# Patient Record
Sex: FEMALE | Race: WHITE | Employment: OTHER | URBAN - METROPOLITAN AREA
[De-identification: names, ages, dates, MRNs, and addresses within clinical notes are randomized per-mention and may not be internally consistent; named-entity substitution may affect disease eponyms.]

---

## 2017-01-12 RX ORDER — CITALOPRAM 20 MG/1
TABLET ORAL
Qty: 90 TABLET | Refills: 1 | Status: SHIPPED | OUTPATIENT
Start: 2017-01-12 | End: 2017-07-11 | Stop reason: SDUPTHER

## 2017-01-20 RX ORDER — IBUPROFEN 600 MG/1
TABLET ORAL
Qty: 270 TABLET | Refills: 0 | Status: SHIPPED | OUTPATIENT
Start: 2017-01-20 | End: 2017-04-18 | Stop reason: SDUPTHER

## 2017-02-18 DIAGNOSIS — I10 UNSPECIFIED ESSENTIAL HYPERTENSION: ICD-10-CM

## 2017-02-20 RX ORDER — AMLODIPINE BESYLATE 5 MG/1
TABLET ORAL
Qty: 90 TABLET | Refills: 0 | Status: SHIPPED | OUTPATIENT
Start: 2017-02-20 | End: 2017-05-19 | Stop reason: SDUPTHER

## 2017-03-21 ENCOUNTER — OFFICE VISIT (OUTPATIENT)
Dept: FAMILY MEDICINE CLINIC | Age: 47
End: 2017-03-21
Payer: COMMERCIAL

## 2017-03-21 VITALS
DIASTOLIC BLOOD PRESSURE: 68 MMHG | BODY MASS INDEX: 44.57 KG/M2 | SYSTOLIC BLOOD PRESSURE: 128 MMHG | WEIGHT: 227 LBS | HEIGHT: 60 IN | HEART RATE: 86 BPM | TEMPERATURE: 98.9 F

## 2017-03-21 DIAGNOSIS — K52.9 GASTROENTERITIS: Primary | ICD-10-CM

## 2017-03-21 DIAGNOSIS — R10.12 LEFT UPPER QUADRANT PAIN: ICD-10-CM

## 2017-03-21 PROCEDURE — 99213 OFFICE O/P EST LOW 20 MIN: CPT | Performed by: FAMILY MEDICINE

## 2017-03-21 ASSESSMENT — ENCOUNTER SYMPTOMS
ABDOMINAL PAIN: 1
CONSTIPATION: 0
SHORTNESS OF BREATH: 0
NAUSEA: 1
DIARRHEA: 1
VOMITING: 1
SORE THROAT: 0

## 2017-03-21 ASSESSMENT — PATIENT HEALTH QUESTIONNAIRE - PHQ9
2. FEELING DOWN, DEPRESSED OR HOPELESS: 0
SUM OF ALL RESPONSES TO PHQ QUESTIONS 1-9: 0
SUM OF ALL RESPONSES TO PHQ9 QUESTIONS 1 & 2: 0
1. LITTLE INTEREST OR PLEASURE IN DOING THINGS: 0

## 2017-03-29 ENCOUNTER — HOSPITAL ENCOUNTER (OUTPATIENT)
Dept: PAIN MANAGEMENT | Age: 47
Discharge: HOME | End: 2017-03-29
Payer: COMMERCIAL

## 2017-03-29 DIAGNOSIS — M47.26 OSTEOARTHRITIS OF SPINE WITH RADICULOPATHY, LUMBAR REGION: ICD-10-CM

## 2017-03-29 DIAGNOSIS — M70.72 HIP BURSITIS, LEFT: ICD-10-CM

## 2017-03-29 DIAGNOSIS — M16.12 PRIMARY OSTEOARTHRITIS OF LEFT HIP: ICD-10-CM

## 2017-03-29 DIAGNOSIS — M54.42 CHRONIC LEFT-SIDED LOW BACK PAIN WITH LEFT-SIDED SCIATICA: ICD-10-CM

## 2017-03-29 DIAGNOSIS — M51.36 DEGENERATION OF LUMBAR INTERVERTEBRAL DISC: ICD-10-CM

## 2017-03-29 DIAGNOSIS — M50.20 CERVICAL DISC HERNIATION: ICD-10-CM

## 2017-03-29 DIAGNOSIS — M47.816 ARTHROPATHY OF LUMBAR FACET JOINT: ICD-10-CM

## 2017-03-29 DIAGNOSIS — M54.16 LUMBAR RADICULOPATHY, CHRONIC: Primary | ICD-10-CM

## 2017-03-29 DIAGNOSIS — M46.1 SACROILIITIS (HCC): ICD-10-CM

## 2017-03-29 DIAGNOSIS — M50.30 DEGENERATIVE DISC DISEASE, CERVICAL: ICD-10-CM

## 2017-03-29 DIAGNOSIS — M47.896 OTHER OSTEOARTHRITIS OF SPINE, LUMBAR REGION: ICD-10-CM

## 2017-03-29 DIAGNOSIS — G89.29 CHRONIC LEFT-SIDED LOW BACK PAIN WITH LEFT-SIDED SCIATICA: ICD-10-CM

## 2017-03-29 DIAGNOSIS — M47.816 OSTEOARTHRITIS OF LUMBAR SPINE, UNSPECIFIED SPINAL OSTEOARTHRITIS COMPLICATION STATUS: ICD-10-CM

## 2017-03-29 PROCEDURE — 99213 OFFICE O/P EST LOW 20 MIN: CPT | Performed by: NURSE PRACTITIONER

## 2017-03-29 PROCEDURE — 99213 OFFICE O/P EST LOW 20 MIN: CPT

## 2017-03-29 RX ORDER — HYDROCODONE BITARTRATE AND ACETAMINOPHEN 5; 325 MG/1; MG/1
1 TABLET ORAL NIGHTLY PRN
Qty: 30 TABLET | Refills: 0 | Status: SHIPPED | OUTPATIENT
Start: 2017-04-02 | End: 2017-05-02 | Stop reason: SDUPTHER

## 2017-03-29 RX ORDER — HYDROCODONE BITARTRATE AND ACETAMINOPHEN 5; 325 MG/1; MG/1
1 TABLET ORAL NIGHTLY PRN
Qty: 30 TABLET | Refills: 0 | Status: SHIPPED | OUTPATIENT
Start: 2017-03-29 | End: 2017-03-29 | Stop reason: SDUPTHER

## 2017-04-06 RX ORDER — TOPIRAMATE 100 MG/1
TABLET, FILM COATED ORAL
Qty: 270 TABLET | Refills: 0 | Status: SHIPPED | OUTPATIENT
Start: 2017-04-06 | End: 2017-07-05 | Stop reason: SDUPTHER

## 2017-04-13 RX ORDER — TIZANIDINE 4 MG/1
4 TABLET ORAL 3 TIMES DAILY
Qty: 270 TABLET | Refills: 0 | Status: SHIPPED | OUTPATIENT
Start: 2017-05-04 | End: 2017-07-24 | Stop reason: SDUPTHER

## 2017-04-18 RX ORDER — IBUPROFEN 600 MG/1
TABLET ORAL
Qty: 270 TABLET | Refills: 0 | Status: SHIPPED | OUTPATIENT
Start: 2017-04-18 | End: 2017-07-17 | Stop reason: SDUPTHER

## 2017-05-02 ENCOUNTER — HOSPITAL ENCOUNTER (OUTPATIENT)
Dept: PAIN MANAGEMENT | Age: 47
Discharge: HOME | End: 2017-05-02
Payer: COMMERCIAL

## 2017-05-02 DIAGNOSIS — M47.26 OSTEOARTHRITIS OF SPINE WITH RADICULOPATHY, LUMBAR REGION: ICD-10-CM

## 2017-05-02 DIAGNOSIS — M47.896 OTHER OSTEOARTHRITIS OF SPINE, LUMBAR REGION: ICD-10-CM

## 2017-05-02 DIAGNOSIS — M16.12 PRIMARY OSTEOARTHRITIS OF LEFT HIP: ICD-10-CM

## 2017-05-02 DIAGNOSIS — M47.816 ARTHROPATHY OF LUMBAR FACET JOINT: ICD-10-CM

## 2017-05-02 DIAGNOSIS — M54.42 CHRONIC LEFT-SIDED LOW BACK PAIN WITH LEFT-SIDED SCIATICA: ICD-10-CM

## 2017-05-02 DIAGNOSIS — M50.30 DEGENERATIVE DISC DISEASE, CERVICAL: ICD-10-CM

## 2017-05-02 DIAGNOSIS — M50.20 CERVICAL DISC HERNIATION: ICD-10-CM

## 2017-05-02 DIAGNOSIS — G89.29 CHRONIC LEFT-SIDED LOW BACK PAIN WITH LEFT-SIDED SCIATICA: ICD-10-CM

## 2017-05-02 DIAGNOSIS — M51.36 DEGENERATION OF LUMBAR INTERVERTEBRAL DISC: ICD-10-CM

## 2017-05-02 DIAGNOSIS — M70.72 HIP BURSITIS, LEFT: ICD-10-CM

## 2017-05-02 DIAGNOSIS — M54.16 LUMBAR RADICULOPATHY, CHRONIC: Primary | ICD-10-CM

## 2017-05-02 PROCEDURE — 99213 OFFICE O/P EST LOW 20 MIN: CPT

## 2017-05-02 PROCEDURE — 99213 OFFICE O/P EST LOW 20 MIN: CPT | Performed by: NURSE PRACTITIONER

## 2017-05-02 RX ORDER — HYDROCODONE BITARTRATE AND ACETAMINOPHEN 5; 325 MG/1; MG/1
1 TABLET ORAL NIGHTLY PRN
Qty: 30 TABLET | Refills: 0 | Status: SHIPPED | OUTPATIENT
Start: 2017-05-02 | End: 2017-05-02 | Stop reason: SDUPTHER

## 2017-05-02 RX ORDER — MAGNESIUM OXIDE 500 MG
TABLET ORAL PRN
Status: ON HOLD | COMMUNITY
End: 2021-03-04 | Stop reason: ALTCHOICE

## 2017-05-02 RX ORDER — HYDROCODONE BITARTRATE AND ACETAMINOPHEN 5; 325 MG/1; MG/1
1 TABLET ORAL NIGHTLY PRN
Qty: 30 TABLET | Refills: 0 | Status: SHIPPED | OUTPATIENT
Start: 2017-05-14 | End: 2017-06-08 | Stop reason: SDUPTHER

## 2017-05-19 DIAGNOSIS — I10 UNSPECIFIED ESSENTIAL HYPERTENSION: ICD-10-CM

## 2017-05-19 RX ORDER — AMLODIPINE BESYLATE 5 MG/1
TABLET ORAL
Qty: 90 TABLET | Refills: 1 | Status: SHIPPED | OUTPATIENT
Start: 2017-05-19 | End: 2017-10-12 | Stop reason: SDUPTHER

## 2017-06-08 ENCOUNTER — HOSPITAL ENCOUNTER (OUTPATIENT)
Dept: PAIN MANAGEMENT | Age: 47
Discharge: HOME | End: 2017-06-08
Payer: COMMERCIAL

## 2017-06-08 VITALS
WEIGHT: 234 LBS | SYSTOLIC BLOOD PRESSURE: 144 MMHG | TEMPERATURE: 98.1 F | HEART RATE: 71 BPM | RESPIRATION RATE: 18 BRPM | OXYGEN SATURATION: 96 % | BODY MASS INDEX: 45.94 KG/M2 | HEIGHT: 60 IN | DIASTOLIC BLOOD PRESSURE: 84 MMHG

## 2017-06-08 DIAGNOSIS — M51.36 DEGENERATION OF LUMBAR INTERVERTEBRAL DISC: ICD-10-CM

## 2017-06-08 DIAGNOSIS — M47.816 ARTHROPATHY OF LUMBAR FACET JOINT: ICD-10-CM

## 2017-06-08 DIAGNOSIS — M54.16 LUMBAR RADICULOPATHY, CHRONIC: ICD-10-CM

## 2017-06-08 DIAGNOSIS — Z79.899 ENCOUNTER FOR MEDICATION MANAGEMENT: ICD-10-CM

## 2017-06-08 DIAGNOSIS — M47.896 OTHER OSTEOARTHRITIS OF SPINE, LUMBAR REGION: ICD-10-CM

## 2017-06-08 DIAGNOSIS — M47.26 OSTEOARTHRITIS OF SPINE WITH RADICULOPATHY, LUMBAR REGION: Primary | ICD-10-CM

## 2017-06-08 DIAGNOSIS — M50.20 CERVICAL DISC HERNIATION: ICD-10-CM

## 2017-06-08 DIAGNOSIS — M46.1 SACROILIITIS (HCC): ICD-10-CM

## 2017-06-08 DIAGNOSIS — M54.42 CHRONIC LEFT-SIDED LOW BACK PAIN WITH LEFT-SIDED SCIATICA: ICD-10-CM

## 2017-06-08 DIAGNOSIS — G89.29 CHRONIC LEFT-SIDED LOW BACK PAIN WITH LEFT-SIDED SCIATICA: ICD-10-CM

## 2017-06-08 DIAGNOSIS — M16.12 PRIMARY OSTEOARTHRITIS OF LEFT HIP: ICD-10-CM

## 2017-06-08 DIAGNOSIS — M50.30 DEGENERATIVE DISC DISEASE, CERVICAL: ICD-10-CM

## 2017-06-08 DIAGNOSIS — M47.816 OSTEOARTHRITIS OF LUMBAR SPINE, UNSPECIFIED SPINAL OSTEOARTHRITIS COMPLICATION STATUS: ICD-10-CM

## 2017-06-08 DIAGNOSIS — M70.72 HIP BURSITIS, LEFT: ICD-10-CM

## 2017-06-08 PROCEDURE — 99214 OFFICE O/P EST MOD 30 MIN: CPT | Performed by: PAIN MEDICINE

## 2017-06-08 PROCEDURE — 80307 DRUG TEST PRSMV CHEM ANLYZR: CPT

## 2017-06-08 PROCEDURE — 99213 OFFICE O/P EST LOW 20 MIN: CPT

## 2017-06-08 RX ORDER — HYDROCODONE BITARTRATE AND ACETAMINOPHEN 5; 325 MG/1; MG/1
1 TABLET ORAL NIGHTLY PRN
Qty: 30 TABLET | Refills: 0 | Status: SHIPPED | OUTPATIENT
Start: 2017-06-08 | End: 2017-07-24 | Stop reason: SDUPTHER

## 2017-06-08 ASSESSMENT — ENCOUNTER SYMPTOMS
BACK PAIN: 1
COUGH: 0
HEARTBURN: 0
ABDOMINAL PAIN: 0
BOWEL INCONTINENCE: 0
BLURRED VISION: 0
NAUSEA: 0
RESPIRATORY NEGATIVE: 1
EYES NEGATIVE: 1
GASTROINTESTINAL NEGATIVE: 1
PHOTOPHOBIA: 0
HEMOPTYSIS: 0
VOMITING: 0

## 2017-06-08 ASSESSMENT — PAIN DESCRIPTION - FREQUENCY: FREQUENCY: CONTINUOUS

## 2017-06-08 ASSESSMENT — PAIN DESCRIPTION - DESCRIPTORS: DESCRIPTORS: ACHING;SHARP;BURNING

## 2017-06-08 ASSESSMENT — PAIN DESCRIPTION - ORIENTATION: ORIENTATION: RIGHT;LEFT

## 2017-06-08 ASSESSMENT — PAIN DESCRIPTION - PAIN TYPE: TYPE: CHRONIC PAIN

## 2017-06-08 ASSESSMENT — PAIN DESCRIPTION - DIRECTION: RADIATING_TOWARDS: LEFT HIP AND KNEE

## 2017-06-08 ASSESSMENT — PAIN DESCRIPTION - LOCATION: LOCATION: BACK

## 2017-06-08 ASSESSMENT — PAIN DESCRIPTION - ONSET: ONSET: ON-GOING

## 2017-06-08 ASSESSMENT — PAIN SCALES - GENERAL: PAINLEVEL_OUTOF10: 4

## 2017-06-08 ASSESSMENT — PAIN DESCRIPTION - PROGRESSION: CLINICAL_PROGRESSION: NOT CHANGED

## 2017-06-12 LAB
6-ACETYLMORPHINE, UR: NOT DETECTED
7-AMINOCLONAZEPAM, URINE: NOT DETECTED
ALPHA-OH-ALPRAZ, URINE: NOT DETECTED
ALPRAZOLAM, URINE: NOT DETECTED
AMPHETAMINES, URINE: NOT DETECTED
BARBITURATES, URINE: NOT DETECTED
BENZOYLECGONINE, UR: NOT DETECTED
BUPRENORPHINE URINE: NOT DETECTED
CARISOPRODOL, UR: NOT DETECTED
CLONAZEPAM, URINE: NOT DETECTED
CODEINE, URINE: NOT DETECTED
CREATININE URINE: 102.1 MG/DL (ref 20–400)
DIAZEPAM, URINE: NOT DETECTED
DRUGS EXPECTED, UR: NORMAL
EER HI RES INTERP UR: NORMAL
ETHYL GLUCURONIDE UR: NOT DETECTED
FENTANYL URINE: NOT DETECTED
HYDROCODONE, URINE: PRESENT
HYDROMORPHONE, URINE: NOT DETECTED
LORAZEPAM, URINE: NOT DETECTED
MARIJUANA METAB, UR: NOT DETECTED
MDA, UR: NOT DETECTED
MDEA, EVE, UR: NOT DETECTED
MDMA URINE: NOT DETECTED
MEPERIDINE METAB, UR: NOT DETECTED
METHADONE, URINE: NOT DETECTED
METHAMPHETAMINE, URINE: NOT DETECTED
METHYLPHENIDATE: NOT DETECTED
MIDAZOLAM, URINE: NOT DETECTED
MORPHINE URINE: NOT DETECTED
NORBUPRENORPHINE, URINE: NOT DETECTED
NORDIAZEPAM, URINE: NOT DETECTED
NORFENTANYL, URINE: NOT DETECTED
NORHYDROCODONE, URINE: PRESENT
NOROXYCODONE, URINE: NOT DETECTED
NOROXYMORPHONE, URINE: NOT DETECTED
OXAZEPAM, URINE: NOT DETECTED
OXYCODONE URINE: NOT DETECTED
OXYMORPHONE, URINE: NOT DETECTED
PAIN MANAGEMENT DRUG PANEL INTERP, URINE: NORMAL
PAIN MGT DRUG PANEL, HI RES, UR: NORMAL
PCP,URINE: NOT DETECTED
PHENTERMINE, UR: NOT DETECTED
PROPOXYPHENE, URINE: NOT DETECTED
TAPENTADOL, URINE: NOT DETECTED
TAPENTADOL-O-SULFATE, URINE: NOT DETECTED
TEMAZEPAM, URINE: NOT DETECTED
TRAMADOL, URINE: NOT DETECTED
ZOLPIDEM, URINE: NOT DETECTED

## 2017-06-26 ENCOUNTER — HOSPITAL ENCOUNTER (OUTPATIENT)
Dept: PAIN MANAGEMENT | Age: 47
Discharge: HOME | End: 2017-06-26
Payer: COMMERCIAL

## 2017-06-26 DIAGNOSIS — M47.816 ARTHROPATHY OF LUMBAR FACET JOINT: ICD-10-CM

## 2017-06-26 DIAGNOSIS — M47.26 OSTEOARTHRITIS OF SPINE WITH RADICULOPATHY, LUMBAR REGION: Primary | ICD-10-CM

## 2017-06-26 DIAGNOSIS — M50.30 DEGENERATIVE DISC DISEASE, CERVICAL: ICD-10-CM

## 2017-07-05 RX ORDER — TOPIRAMATE 100 MG/1
TABLET, FILM COATED ORAL
Qty: 270 TABLET | Refills: 0 | Status: SHIPPED | OUTPATIENT
Start: 2017-07-05 | End: 2017-10-11 | Stop reason: SDUPTHER

## 2017-07-11 RX ORDER — CITALOPRAM 20 MG/1
TABLET ORAL
Qty: 90 TABLET | Refills: 0 | Status: SHIPPED | OUTPATIENT
Start: 2017-07-11 | End: 2017-10-09 | Stop reason: SDUPTHER

## 2017-07-17 ENCOUNTER — TELEPHONE (OUTPATIENT)
Dept: PAIN MANAGEMENT | Age: 47
End: 2017-07-17

## 2017-07-17 RX ORDER — IBUPROFEN 600 MG/1
TABLET ORAL
Qty: 270 TABLET | Refills: 0 | Status: SHIPPED | OUTPATIENT
Start: 2017-07-17 | End: 2017-10-15 | Stop reason: SDUPTHER

## 2017-07-24 ENCOUNTER — HOSPITAL ENCOUNTER (OUTPATIENT)
Dept: PAIN MANAGEMENT | Age: 47
Discharge: HOME | End: 2017-07-24
Payer: COMMERCIAL

## 2017-07-24 DIAGNOSIS — M54.16 LUMBAR RADICULOPATHY, CHRONIC: Chronic | ICD-10-CM

## 2017-07-24 DIAGNOSIS — M47.26 OSTEOARTHRITIS OF SPINE WITH RADICULOPATHY, LUMBAR REGION: Primary | ICD-10-CM

## 2017-07-24 DIAGNOSIS — M16.12 PRIMARY OSTEOARTHRITIS OF LEFT HIP: ICD-10-CM

## 2017-07-24 PROCEDURE — 99213 OFFICE O/P EST LOW 20 MIN: CPT | Performed by: NURSE PRACTITIONER

## 2017-07-24 PROCEDURE — 99213 OFFICE O/P EST LOW 20 MIN: CPT

## 2017-07-24 RX ORDER — HYDROCODONE BITARTRATE AND ACETAMINOPHEN 5; 325 MG/1; MG/1
1 TABLET ORAL NIGHTLY PRN
Qty: 30 TABLET | Refills: 0 | Status: SHIPPED | OUTPATIENT
Start: 2017-07-24 | End: 2017-08-21 | Stop reason: SDUPTHER

## 2017-07-24 RX ORDER — TIZANIDINE 4 MG/1
4 TABLET ORAL 3 TIMES DAILY
Qty: 270 TABLET | Refills: 0 | Status: SHIPPED | OUTPATIENT
Start: 2017-08-02 | End: 2017-10-13 | Stop reason: SDUPTHER

## 2017-07-24 RX ORDER — PREGABALIN 50 MG/1
50 CAPSULE ORAL 3 TIMES DAILY
Qty: 90 CAPSULE | Refills: 0 | Status: SHIPPED | OUTPATIENT
Start: 2017-07-24 | End: 2017-08-21 | Stop reason: SDUPTHER

## 2017-07-24 ASSESSMENT — ENCOUNTER SYMPTOMS
SHORTNESS OF BREATH: 0
BACK PAIN: 1
BOWEL INCONTINENCE: 0
CONSTIPATION: 0
COUGH: 0

## 2017-08-04 ENCOUNTER — HOSPITAL ENCOUNTER (OUTPATIENT)
Dept: PAIN MANAGEMENT | Age: 47
Discharge: HOME | End: 2017-08-04
Payer: COMMERCIAL

## 2017-08-04 ENCOUNTER — HOSPITAL ENCOUNTER (OUTPATIENT)
Dept: GENERAL RADIOLOGY | Age: 47
Discharge: HOME | End: 2017-08-04
Payer: COMMERCIAL

## 2017-08-04 VITALS
SYSTOLIC BLOOD PRESSURE: 147 MMHG | TEMPERATURE: 98.6 F | OXYGEN SATURATION: 98 % | RESPIRATION RATE: 18 BRPM | DIASTOLIC BLOOD PRESSURE: 92 MMHG | BODY MASS INDEX: 45.94 KG/M2 | WEIGHT: 234 LBS | HEART RATE: 66 BPM | HEIGHT: 60 IN

## 2017-08-04 DIAGNOSIS — M47.26 OSTEOARTHRITIS OF SPINE WITH RADICULOPATHY, LUMBAR REGION: Primary | ICD-10-CM

## 2017-08-04 DIAGNOSIS — R52 PAIN: ICD-10-CM

## 2017-08-04 DIAGNOSIS — M51.36 DEGENERATION OF LUMBAR INTERVERTEBRAL DISC: ICD-10-CM

## 2017-08-04 DIAGNOSIS — M47.816 ARTHROPATHY OF LUMBAR FACET JOINT: ICD-10-CM

## 2017-08-04 PROCEDURE — 6360000002 HC RX W HCPCS

## 2017-08-04 PROCEDURE — 64495 INJ PARAVERT F JNT L/S 3 LEV: CPT | Performed by: PAIN MEDICINE

## 2017-08-04 PROCEDURE — 6360000002 HC RX W HCPCS: Performed by: PAIN MEDICINE

## 2017-08-04 PROCEDURE — 64494 INJ PARAVERT F JNT L/S 2 LEV: CPT

## 2017-08-04 PROCEDURE — 64495 INJ PARAVERT F JNT L/S 3 LEV: CPT

## 2017-08-04 PROCEDURE — 64493 INJ PARAVERT F JNT L/S 1 LEV: CPT

## 2017-08-04 PROCEDURE — 2500000003 HC RX 250 WO HCPCS

## 2017-08-04 PROCEDURE — 64494 INJ PARAVERT F JNT L/S 2 LEV: CPT | Performed by: PAIN MEDICINE

## 2017-08-04 PROCEDURE — 64493 INJ PARAVERT F JNT L/S 1 LEV: CPT | Performed by: PAIN MEDICINE

## 2017-08-04 PROCEDURE — 6360000004 HC RX CONTRAST MEDICATION

## 2017-08-04 PROCEDURE — 3209999900 FLUORO FOR SURGICAL PROCEDURES

## 2017-08-04 RX ORDER — MIDAZOLAM HYDROCHLORIDE 1 MG/ML
INJECTION INTRAMUSCULAR; INTRAVENOUS
Status: COMPLETED | OUTPATIENT
Start: 2017-08-04 | End: 2017-08-04

## 2017-08-04 RX ORDER — FENTANYL CITRATE 50 UG/ML
INJECTION, SOLUTION INTRAMUSCULAR; INTRAVENOUS
Status: COMPLETED | OUTPATIENT
Start: 2017-08-04 | End: 2017-08-04

## 2017-08-04 RX ADMIN — FENTANYL CITRATE 50 MCG: 50 INJECTION INTRAMUSCULAR; INTRAVENOUS at 10:05

## 2017-08-04 RX ADMIN — MIDAZOLAM 2 MG: 1 INJECTION INTRAMUSCULAR; INTRAVENOUS at 10:00

## 2017-08-04 ASSESSMENT — PAIN - FUNCTIONAL ASSESSMENT: PAIN_FUNCTIONAL_ASSESSMENT: 0-10

## 2017-08-04 ASSESSMENT — PAIN DESCRIPTION - PROGRESSION: CLINICAL_PROGRESSION: GRADUALLY WORSENING

## 2017-08-04 ASSESSMENT — PAIN DESCRIPTION - PAIN TYPE: TYPE: CHRONIC PAIN

## 2017-08-04 ASSESSMENT — PAIN DESCRIPTION - FREQUENCY: FREQUENCY: CONTINUOUS

## 2017-08-04 ASSESSMENT — PAIN DESCRIPTION - ORIENTATION: ORIENTATION: LEFT

## 2017-08-04 ASSESSMENT — PAIN DESCRIPTION - ONSET: ONSET: ON-GOING

## 2017-08-04 ASSESSMENT — PAIN SCALES - GENERAL
PAINLEVEL_OUTOF10: 0
PAINLEVEL_OUTOF10: 3
PAINLEVEL_OUTOF10: 6

## 2017-08-04 ASSESSMENT — PAIN DESCRIPTION - LOCATION: LOCATION: BACK;HIP

## 2017-08-04 ASSESSMENT — PAIN DESCRIPTION - DIRECTION: RADIATING_TOWARDS: LEFT HIP

## 2017-08-07 ENCOUNTER — TELEPHONE (OUTPATIENT)
Dept: PAIN MANAGEMENT | Age: 47
End: 2017-08-07

## 2017-08-07 NOTE — TELEPHONE ENCOUNTER
Retrieved voice message left by pt this AM, re: back brace script prepared by  at office visit last week. She relates diagnosis r/t back brace needs to be included on the script; it must also be signed by ordering physician. Will route to Dr Maxine Ramirez for his review. Contacted pt et advised her of same. Assured her that the order will be faxed to [facility] on [phone] [facility] once it is ready.   Adolfo BURKETT

## 2017-08-21 ENCOUNTER — HOSPITAL ENCOUNTER (OUTPATIENT)
Dept: PAIN MANAGEMENT | Age: 47
Discharge: HOME | End: 2017-08-21
Payer: COMMERCIAL

## 2017-08-21 VITALS
WEIGHT: 227 LBS | SYSTOLIC BLOOD PRESSURE: 124 MMHG | BODY MASS INDEX: 44.57 KG/M2 | HEART RATE: 74 BPM | RESPIRATION RATE: 16 BRPM | DIASTOLIC BLOOD PRESSURE: 89 MMHG | HEIGHT: 60 IN | OXYGEN SATURATION: 96 %

## 2017-08-21 DIAGNOSIS — M16.12 PRIMARY OSTEOARTHRITIS OF LEFT HIP: ICD-10-CM

## 2017-08-21 DIAGNOSIS — G89.29 CHRONIC LEFT-SIDED LOW BACK PAIN WITH LEFT-SIDED SCIATICA: ICD-10-CM

## 2017-08-21 DIAGNOSIS — M47.816 OSTEOARTHRITIS OF LUMBAR SPINE, UNSPECIFIED SPINAL OSTEOARTHRITIS COMPLICATION STATUS: ICD-10-CM

## 2017-08-21 DIAGNOSIS — M50.30 DEGENERATIVE DISC DISEASE, CERVICAL: ICD-10-CM

## 2017-08-21 DIAGNOSIS — Z79.899 ENCOUNTER FOR MEDICATION MANAGEMENT: ICD-10-CM

## 2017-08-21 DIAGNOSIS — M50.20 CERVICAL DISC HERNIATION: ICD-10-CM

## 2017-08-21 DIAGNOSIS — M47.816 ARTHROPATHY OF LUMBAR FACET JOINT: ICD-10-CM

## 2017-08-21 DIAGNOSIS — M51.36 DEGENERATION OF LUMBAR INTERVERTEBRAL DISC: ICD-10-CM

## 2017-08-21 DIAGNOSIS — M47.26 OSTEOARTHRITIS OF SPINE WITH RADICULOPATHY, LUMBAR REGION: Primary | ICD-10-CM

## 2017-08-21 DIAGNOSIS — M25.552 HIP PAIN, ACUTE, LEFT: ICD-10-CM

## 2017-08-21 DIAGNOSIS — M54.42 CHRONIC LEFT-SIDED LOW BACK PAIN WITH LEFT-SIDED SCIATICA: ICD-10-CM

## 2017-08-21 PROCEDURE — 99213 OFFICE O/P EST LOW 20 MIN: CPT

## 2017-08-21 PROCEDURE — 99213 OFFICE O/P EST LOW 20 MIN: CPT | Performed by: NURSE PRACTITIONER

## 2017-08-21 RX ORDER — PREGABALIN 50 MG/1
50 CAPSULE ORAL 3 TIMES DAILY
Qty: 90 CAPSULE | Refills: 2 | Status: SHIPPED | OUTPATIENT
Start: 2017-08-23 | End: 2017-11-03

## 2017-08-21 RX ORDER — HYDROCODONE BITARTRATE AND ACETAMINOPHEN 5; 325 MG/1; MG/1
1 TABLET ORAL NIGHTLY PRN
Qty: 30 TABLET | Refills: 0 | Status: SHIPPED | OUTPATIENT
Start: 2017-08-28 | End: 2017-09-26 | Stop reason: SDUPTHER

## 2017-08-21 RX ORDER — HYDROCODONE BITARTRATE AND ACETAMINOPHEN 5; 325 MG/1; MG/1
1 TABLET ORAL NIGHTLY PRN
Qty: 30 TABLET | Refills: 0 | Status: SHIPPED | OUTPATIENT
Start: 2017-08-23 | End: 2017-08-21 | Stop reason: SDUPTHER

## 2017-08-21 ASSESSMENT — ENCOUNTER SYMPTOMS
EYES NEGATIVE: 1
RESPIRATORY NEGATIVE: 1
GASTROINTESTINAL NEGATIVE: 1
BACK PAIN: 1

## 2017-09-26 ENCOUNTER — HOSPITAL ENCOUNTER (OUTPATIENT)
Dept: PAIN MANAGEMENT | Age: 47
Discharge: HOME | End: 2017-09-26
Payer: COMMERCIAL

## 2017-09-26 VITALS — SYSTOLIC BLOOD PRESSURE: 158 MMHG | RESPIRATION RATE: 16 BRPM | DIASTOLIC BLOOD PRESSURE: 104 MMHG | HEART RATE: 73 BPM

## 2017-09-26 DIAGNOSIS — M51.36 DEGENERATION OF LUMBAR INTERVERTEBRAL DISC: Chronic | ICD-10-CM

## 2017-09-26 DIAGNOSIS — M54.16 LUMBAR RADICULOPATHY, CHRONIC: Primary | Chronic | ICD-10-CM

## 2017-09-26 DIAGNOSIS — M47.26 OSTEOARTHRITIS OF SPINE WITH RADICULOPATHY, LUMBAR REGION: ICD-10-CM

## 2017-09-26 DIAGNOSIS — Z79.899 ENCOUNTER FOR MEDICATION MANAGEMENT: ICD-10-CM

## 2017-09-26 PROCEDURE — 99213 OFFICE O/P EST LOW 20 MIN: CPT

## 2017-09-26 PROCEDURE — 99214 OFFICE O/P EST MOD 30 MIN: CPT | Performed by: NURSE PRACTITIONER

## 2017-09-26 RX ORDER — HYDROCODONE BITARTRATE AND ACETAMINOPHEN 5; 325 MG/1; MG/1
1 TABLET ORAL NIGHTLY PRN
Qty: 75 TABLET | Refills: 0 | Status: SHIPPED | OUTPATIENT
Start: 2017-09-26 | End: 2017-10-30 | Stop reason: SDUPTHER

## 2017-09-26 ASSESSMENT — ENCOUNTER SYMPTOMS
COUGH: 0
BACK PAIN: 1
SHORTNESS OF BREATH: 0
CONSTIPATION: 0

## 2017-10-09 RX ORDER — CITALOPRAM 20 MG/1
TABLET ORAL
Qty: 90 TABLET | Refills: 0 | Status: SHIPPED | OUTPATIENT
Start: 2017-10-09 | End: 2017-10-20 | Stop reason: ALTCHOICE

## 2017-10-11 RX ORDER — TOPIRAMATE 100 MG/1
TABLET, FILM COATED ORAL
Qty: 270 TABLET | Refills: 0 | Status: SHIPPED | OUTPATIENT
Start: 2017-10-11 | End: 2018-01-11 | Stop reason: SDUPTHER

## 2017-10-12 ENCOUNTER — TELEPHONE (OUTPATIENT)
Dept: FAMILY MEDICINE CLINIC | Age: 47
End: 2017-10-12

## 2017-10-12 DIAGNOSIS — I10 UNSPECIFIED ESSENTIAL HYPERTENSION: ICD-10-CM

## 2017-10-12 RX ORDER — AMLODIPINE BESYLATE 5 MG/1
TABLET ORAL
Qty: 90 TABLET | Refills: 0 | Status: SHIPPED | OUTPATIENT
Start: 2017-10-12 | End: 2017-12-23 | Stop reason: SDUPTHER

## 2017-10-12 RX ORDER — OMEPRAZOLE 40 MG/1
40 CAPSULE, DELAYED RELEASE ORAL DAILY
Qty: 90 CAPSULE | Refills: 0 | Status: SHIPPED | OUTPATIENT
Start: 2017-10-12 | End: 2017-12-23 | Stop reason: SDUPTHER

## 2017-10-13 DIAGNOSIS — I10 UNSPECIFIED ESSENTIAL HYPERTENSION: ICD-10-CM

## 2017-10-13 RX ORDER — TIZANIDINE 4 MG/1
4 TABLET ORAL SEE ADMIN INSTRUCTIONS
Qty: 180 TABLET | Refills: 0 | Status: SHIPPED | OUTPATIENT
Start: 2017-11-01 | End: 2018-01-05 | Stop reason: SDUPTHER

## 2017-10-14 DIAGNOSIS — I10 ESSENTIAL HYPERTENSION: Primary | ICD-10-CM

## 2017-10-16 RX ORDER — IBUPROFEN 600 MG/1
TABLET ORAL
Qty: 270 TABLET | Refills: 0 | Status: SHIPPED | OUTPATIENT
Start: 2017-10-16 | End: 2018-01-16 | Stop reason: SDUPTHER

## 2017-10-16 NOTE — TELEPHONE ENCOUNTER
Spoke with patient and let her know that lab work was ordered and sent over to [facility] on [address]

## 2017-10-18 LAB
ANION GAP SERPL CALCULATED.3IONS-SCNC: 12 MMOL/L
BUN BLDV-MCNC: 13 MG/DL (ref 10–20)
CALCIUM SERPL-MCNC: 9.3 MG/DL (ref 8.7–10.8)
CHLORIDE BLD-SCNC: 108 MMOL/L (ref 95–111)
CHOLESTEROL/HDL RATIO: 4.5
CHOLESTEROL: 217 MG/DL
CO2: 25 MMOL/L (ref 21–32)
CREAT SERPL-MCNC: 0.9 MG/DL (ref 0.5–1.3)
EGFR AFRICAN AMERICAN: 82
EGFR IF NONAFRICAN AMERICAN: 67
GLUCOSE: 123 MG/DL (ref 70–100)
HDLC SERPL-MCNC: 48 MG/DL (ref 40–60)
LDL CHOLESTEROL CALCULATED: 126 MG/DL
LDL/HDL RATIO: 2.6
POTASSIUM SERPL-SCNC: 4.2 MMOL/L (ref 3.5–5.4)
SODIUM BLD-SCNC: 141 MMOL/L (ref 134–147)
TRIGL SERPL-MCNC: 217 MG/DL
VLDLC SERPL CALC-MCNC: 43 MG/DL

## 2017-10-20 ENCOUNTER — OFFICE VISIT (OUTPATIENT)
Dept: FAMILY MEDICINE CLINIC | Age: 47
End: 2017-10-20
Payer: COMMERCIAL

## 2017-10-20 VITALS
BODY MASS INDEX: 46.33 KG/M2 | OXYGEN SATURATION: 98 % | HEART RATE: 74 BPM | DIASTOLIC BLOOD PRESSURE: 86 MMHG | HEIGHT: 59 IN | WEIGHT: 229.8 LBS | TEMPERATURE: 97.8 F | SYSTOLIC BLOOD PRESSURE: 122 MMHG

## 2017-10-20 DIAGNOSIS — Z23 NEED FOR INFLUENZA VACCINATION: ICD-10-CM

## 2017-10-20 DIAGNOSIS — E66.01 MORBID OBESITY WITH BMI OF 45.0-49.9, ADULT (HCC): ICD-10-CM

## 2017-10-20 DIAGNOSIS — F32.A DEPRESSION, UNSPECIFIED DEPRESSION TYPE: ICD-10-CM

## 2017-10-20 DIAGNOSIS — R35.0 URINARY FREQUENCY: ICD-10-CM

## 2017-10-20 DIAGNOSIS — I10 ESSENTIAL HYPERTENSION: Primary | ICD-10-CM

## 2017-10-20 DIAGNOSIS — Z12.31 SCREENING MAMMOGRAM, ENCOUNTER FOR: ICD-10-CM

## 2017-10-20 LAB
BILIRUBIN, POC: NORMAL
BLOOD URINE, POC: NORMAL
CLARITY, POC: CLEAR
COLOR, POC: YELLOW
GLUCOSE URINE, POC: NORMAL
KETONES, POC: NORMAL
LEUKOCYTE EST, POC: NORMAL
NITRITE, POC: NORMAL
PH, POC: 7
PROTEIN, POC: NORMAL
SPECIFIC GRAVITY, POC: 1.01
UROBILINOGEN, POC: NORMAL

## 2017-10-20 PROCEDURE — 99214 OFFICE O/P EST MOD 30 MIN: CPT | Performed by: FAMILY MEDICINE

## 2017-10-20 PROCEDURE — 81003 URINALYSIS AUTO W/O SCOPE: CPT | Performed by: FAMILY MEDICINE

## 2017-10-20 PROCEDURE — 90688 IIV4 VACCINE SPLT 0.5 ML IM: CPT | Performed by: FAMILY MEDICINE

## 2017-10-20 PROCEDURE — 90471 IMMUNIZATION ADMIN: CPT | Performed by: FAMILY MEDICINE

## 2017-10-20 RX ORDER — TOLTERODINE 4 MG/1
4 CAPSULE, EXTENDED RELEASE ORAL DAILY
Qty: 30 CAPSULE | Refills: 3 | Status: SHIPPED | OUTPATIENT
Start: 2017-10-20 | End: 2017-11-03 | Stop reason: SDUPTHER

## 2017-10-20 ASSESSMENT — ENCOUNTER SYMPTOMS
CONSTIPATION: 0
ABDOMINAL PAIN: 0
SHORTNESS OF BREATH: 0
SORE THROAT: 0
BACK PAIN: 1
NAUSEA: 0

## 2017-10-30 ENCOUNTER — HOSPITAL ENCOUNTER (OUTPATIENT)
Dept: PAIN MANAGEMENT | Age: 47
Discharge: HOME | End: 2017-10-30
Payer: COMMERCIAL

## 2017-10-30 VITALS
HEART RATE: 73 BPM | WEIGHT: 229 LBS | BODY MASS INDEX: 46.16 KG/M2 | HEIGHT: 59 IN | RESPIRATION RATE: 16 BRPM | TEMPERATURE: 98 F | OXYGEN SATURATION: 98 %

## 2017-10-30 DIAGNOSIS — M54.16 LUMBAR RADICULOPATHY, CHRONIC: Primary | ICD-10-CM

## 2017-10-30 DIAGNOSIS — M16.12 PRIMARY OSTEOARTHRITIS OF LEFT HIP: ICD-10-CM

## 2017-10-30 DIAGNOSIS — M47.816 ARTHROPATHY OF LUMBAR FACET JOINT: ICD-10-CM

## 2017-10-30 DIAGNOSIS — M47.26 OSTEOARTHRITIS OF SPINE WITH RADICULOPATHY, LUMBAR REGION: ICD-10-CM

## 2017-10-30 DIAGNOSIS — Z79.899 ENCOUNTER FOR MEDICATION MANAGEMENT: ICD-10-CM

## 2017-10-30 DIAGNOSIS — M50.30 DEGENERATIVE DISC DISEASE, CERVICAL: ICD-10-CM

## 2017-10-30 DIAGNOSIS — M25.552 HIP PAIN, ACUTE, LEFT: ICD-10-CM

## 2017-10-30 DIAGNOSIS — M50.20 CERVICAL DISC HERNIATION: ICD-10-CM

## 2017-10-30 DIAGNOSIS — M54.42 CHRONIC LEFT-SIDED LOW BACK PAIN WITH LEFT-SIDED SCIATICA: ICD-10-CM

## 2017-10-30 DIAGNOSIS — G89.29 CHRONIC LEFT-SIDED LOW BACK PAIN WITH LEFT-SIDED SCIATICA: ICD-10-CM

## 2017-10-30 DIAGNOSIS — M46.1 SACROILIITIS (HCC): ICD-10-CM

## 2017-10-30 DIAGNOSIS — M51.36 DEGENERATION OF LUMBAR INTERVERTEBRAL DISC: ICD-10-CM

## 2017-10-30 DIAGNOSIS — M47.816 OSTEOARTHRITIS OF LUMBAR SPINE, UNSPECIFIED SPINAL OSTEOARTHRITIS COMPLICATION STATUS: ICD-10-CM

## 2017-10-30 PROCEDURE — 99213 OFFICE O/P EST LOW 20 MIN: CPT

## 2017-10-30 PROCEDURE — 99213 OFFICE O/P EST LOW 20 MIN: CPT | Performed by: NURSE PRACTITIONER

## 2017-10-30 RX ORDER — HYDROCODONE BITARTRATE AND ACETAMINOPHEN 5; 325 MG/1; MG/1
1 TABLET ORAL SEE ADMIN INSTRUCTIONS
Qty: 75 TABLET | Refills: 0 | Status: SHIPPED | OUTPATIENT
Start: 2017-11-01 | End: 2017-12-01 | Stop reason: SDUPTHER

## 2017-10-30 ASSESSMENT — ENCOUNTER SYMPTOMS
CONSTIPATION: 1
EYES NEGATIVE: 1
RESPIRATORY NEGATIVE: 1

## 2017-10-30 NOTE — PROGRESS NOTES
tablet, Rfl: 0    [START ON 11/1/2017] tiZANidine (ZANAFLEX) 4 MG tablet, Take 1 tablet by mouth See Admin Instructions 1 tablet by mouth 2-3 times a day prn spasms, Disp: 180 tablet, Rfl: 0    omeprazole (PRILOSEC) 40 MG delayed release capsule, Take 1 capsule by mouth daily, Disp: 90 capsule, Rfl: 0    Elastic Bandages & Supports (LUMBAR BACK BRACE/SUPPORT PAD) MISC, 1 each by Does not apply route daily as needed (pain), Disp: 1 each, Rfl: 0    Melatonin ER 5 MG TBCR, Take by mouth as needed, Disp: , Rfl:     fluticasone (FLONASE) 50 MCG/ACT nasal spray, , Disp: , Rfl:     Zinc 30 MG TABS, Take by mouth, Disp: , Rfl:     Ascorbic Acid (VITAMIN C) 500 MG CAPS, Take by mouth, Disp: , Rfl:     ECHINACEA PO, Take by mouth, Disp: , Rfl:     Glucosamine-Chondroitin (GLUCOSAMINE CHONDR COMPLEX PO), Take  by mouth., Disp: , Rfl:     Family History   Problem Relation Age of Onset    Cancer Mother     Heart Disease Father     Diabetes Father     High Blood Pressure Father     Other Other      Celiac Sprue. Aunt       Social History     Social History    Marital status:      Spouse name: N/A    Number of children: N/A    Years of education: N/A     Occupational History    unemployed      Social History Main Topics    Smoking status: Never Smoker    Smokeless tobacco: Never Used    Alcohol use No    Drug use: No    Sexual activity: Yes     Other Topics Concern    Not on file     Social History Narrative    No narrative on file       Review of Systems:  Review of Systems   Constitution: Negative. HENT: Negative. Eyes: Negative. Cardiovascular: Negative. Respiratory: Negative. Skin: Negative. Musculoskeletal: Positive for joint pain. Left hip   Gastrointestinal: Positive for constipation. Genitourinary: Negative. Neurological:        Head feels like in a fog   Psychiatric/Behavioral: The patient is nervous/anxious.          On zoloft         Physical Exam:  Pulse 73 06/08/2017 11:30 AM [facility]   Oxazepam, Urine Not Detected   Final 06/08/2017 11:30 AM [facility]   Oxycodone Urine Not Detected   Final 06/08/2017 11:30 AM [facility]   Oxymorphone, Urine Not Detected   Final 06/08/2017 11:30 AM [facility]   PCP, Urine Not Detected   Final 06/08/2017 11:30 AM [facility]   Phentermine, Ur Not Detected   Final 06/08/2017 11:30 AM [facility]   Propoxyphene, Urine Not Detected   Final 06/08/2017 11:30 AM [facility]   Tapentadol-O-Sulfate, Urine Not Detected   Final 06/08/2017 11:30 AM [facility]   Tapentadol, Urine Not Detected   Final 06/08/2017 11:30 AM [facility]   Temazepam, Urine Not Detected   Final 06/08/2017 11:30 AM [facility]   Tramadol, Urine Not Detected   Final 06/08/2017 11:30 AM [facility]   Zolpidem, Urine Not Detected   Final 06/08/2017 11:30 AM [facility]   Drugs Expected, Ur   Final 06/08/2017 11:30 AM [facility]   HYDROCODONE ON [id number] AT 1201 AM, RECEIVED [id number]    Creatinine, Ur 102.1  20.0 - 400.0 mg/dL Final 06/08/2017 11:30 AM [facility]   Pain Mgt Drug Panel, Hi Res, Ur See Below   Final 06/08/2017 11:30 AM [facility]   (NOTE)   Methodology: Qualitative Enzyme Immunoassay and Qualitative Liquid   Chromatography-Time of Flight-Mass Spectrometry, Quantitative   Spectrophotometry   The absence of expected drug(s) and/or drug metabolite(s) may   indicate non-compliance, inappropriate timing of specimen   collection relative to drug administration, poor drug absorption,   diluted/adulterated urine, or limitations of testing. The   concentration must be greater than or equal to the cutoff to be   reported as present.  If specific drug concentrations are   required, contact the laboratory within two weeks of specimen   collection to request quantification by a second analytical   technique. Interpretive questions should be directed to the   laboratory. Results based on immunoassay detection that do not match clinical   expectations should be   interpreted with caution.  Confirmatory testing by mass spectrometry for immunoassay-based results is available, if   ordered within two weeks of specimen collection. Additional   charges apply. For medical purposes only; not valid for forensic use. This test was developed and its performance characteristics   determined by [facility]. The U.S. Food and Drug   Administration has not approved or cleared this test; however, FDA   clearance or approval is not currently required for clinical use. The results are not intended to be used as the sole means for   clinical diagnosis or patient management decisions. EER Hi Res Interp Ur See Note   Final 06/08/2017 11:30 AM [facility]   (NOTE)   Access DisplayLink Enhanced Report using either link below:   -Direct access: [email or web address]   -Enter Username, Password: [email or web address]   Username: [email or web address]   Password: [email or web address]   Performed by [facility] [phone]   www. Marisol Wilson MD, Lab. Director   Performed at [address] [place] [address] Dr Kay Prabhakar[address] [phone]    Testing Performed By     [facility] Brannon Levin Name Director Address Valid Date Range   18-[facility] LAB Unknown Unknown 02/07/11 1934-Present   Lab and Collection     DRUG SCREEN, PAIN on 6/8/2017   Result History     DRUG SCREEN, PAIN on 6/12/2017       Assessment:  Problem List Items Addressed This Visit     Lumbar radiculopathy, chronic - Primary (Chronic)    Degeneration of lumbar intervertebral disc (Chronic)    Relevant Medications    HYDROcodone-acetaminophen (NORCO) 5-325 MG per tablet (Start on 11/1/2017)    Degenerative disc disease, cervical    Relevant Medications    HYDROcodone-acetaminophen (NORCO) 5-325 MG per tablet (Start on 11/1/2017)    Cervical disc herniation    Osteoarthritis of lumbar spine    Relevant Medications    HYDROcodone-acetaminophen (NORCO) 5-325 MG per tablet (Start on 11/1/2017)    Sacroiliitis [facility])    Relevant Medications    HYDROcodone-acetaminophen (NORCO) 5-325 MG per tablet (Start on 11/1/2017)    Primary osteoarthritis of left hip    Relevant Medications    HYDROcodone-acetaminophen (NORCO) 5-325 MG per tablet (Start on 11/1/2017)    Arthropathy of lumbar facet joint    Chronic left-sided low back pain with left-sided sciatica    Relevant Medications    HYDROcodone-acetaminophen (NORCO) 5-325 MG per tablet (Start on 11/1/2017)    Osteoarthritis of spine with radiculopathy, lumbar region    Relevant Medications    HYDROcodone-acetaminophen (NORCO) 5-325 MG per tablet (Start on 11/1/2017)    Encounter for medication management      Other Visit Diagnoses     Hip pain, acute, left                Treatment Plan:  DISCUSSION: Treatment options discussed with patient and all questions answered to patient's satisfaction. TREATMENT OPTIONS:   Continue opioid therapy  Script hydrocodone, tizanidine  Return in 4 weeks  Contract requirements met. Satisfactory pain management plan. Medication helps with personal goals and self care needs. Patient is stable on current regimen of meds and medication is effectively managing pain, we will continue current medications without changes. As always, we encourage daily stretching and strengthening exercises, and recommend minimizing use of pain medications unless patient cannot get through daily activities due to pain.   Follow up appointment made

## 2017-11-03 ENCOUNTER — OFFICE VISIT (OUTPATIENT)
Dept: FAMILY MEDICINE CLINIC | Age: 47
End: 2017-11-03
Payer: COMMERCIAL

## 2017-11-03 VITALS
TEMPERATURE: 97.6 F | SYSTOLIC BLOOD PRESSURE: 132 MMHG | WEIGHT: 232.8 LBS | HEART RATE: 80 BPM | BODY MASS INDEX: 46.93 KG/M2 | OXYGEN SATURATION: 98 % | HEIGHT: 59 IN | DIASTOLIC BLOOD PRESSURE: 84 MMHG

## 2017-11-03 DIAGNOSIS — F32.A ANXIETY AND DEPRESSION: Primary | ICD-10-CM

## 2017-11-03 DIAGNOSIS — I10 ESSENTIAL HYPERTENSION: ICD-10-CM

## 2017-11-03 DIAGNOSIS — F41.9 ANXIETY AND DEPRESSION: Primary | ICD-10-CM

## 2017-11-03 DIAGNOSIS — K21.9 GASTROESOPHAGEAL REFLUX DISEASE WITHOUT ESOPHAGITIS: ICD-10-CM

## 2017-11-03 PROCEDURE — 99213 OFFICE O/P EST LOW 20 MIN: CPT | Performed by: FAMILY MEDICINE

## 2017-11-03 RX ORDER — TOLTERODINE 4 MG/1
4 CAPSULE, EXTENDED RELEASE ORAL DAILY
Qty: 90 CAPSULE | Refills: 1 | Status: SHIPPED | OUTPATIENT
Start: 2017-11-03 | End: 2018-04-26 | Stop reason: SDUPTHER

## 2017-11-03 ASSESSMENT — ENCOUNTER SYMPTOMS
CONSTIPATION: 0
ABDOMINAL PAIN: 0
SORE THROAT: 0
NAUSEA: 0
SHORTNESS OF BREATH: 0

## 2017-11-03 NOTE — PROGRESS NOTES
bothering her now I told her to make an appointment with him and let him know that she is having pain in her hands  Review of Systems   Constitutional: Negative for appetite change. HENT: Negative for ear pain and sore throat. Eyes: Positive for visual disturbance. Wears glasses   Respiratory: Negative for shortness of breath. Cardiovascular: Negative for chest pain. Gastrointestinal: Negative for abdominal pain, constipation and nausea. Genitourinary: Negative for frequency and pelvic pain. Musculoskeletal: Negative for arthralgias. Neurological: Negative for dizziness, syncope and headaches. Objective:   Physical Exam   Constitutional: She is oriented to person, place, and time. She appears well-developed and well-nourished. /84   Pulse 80   Temp 97.6 °F (36.4 °C) (Oral)   Ht 4' 11\" (1.499 m)   Wt 232 lb 12.8 oz (105.6 kg)   SpO2 98%   BMI 47.02 kg/m²    HENT:   Head: Normocephalic. Mouth/Throat: Oropharynx is clear and moist.        Eyes: Conjunctivae are normal.   Cardiovascular: Normal rate and regular rhythm. Pulmonary/Chest: Breath sounds normal. She has no rales. Abdominal: Soft. Bowel sounds are normal. There is no tenderness. Musculoskeletal: She exhibits no edema. Mild swelling in the metacarpophalangeal joints present in both hands   Lymphadenopathy:     She has no cervical adenopathy. Neurological: She is alert and oriented to person, place, and time. Skin: No rash noted. Nursing note and vitals reviewed. Assessment:      1. Anxiety and depression  Improving    2. Essential hypertension  Controlled    3. Gastroesophageal reflux disease without esophagitis  Controlled           Plan:        No orders of the defined types were placed in this encounter.     Orders Placed This Encounter   Medications    sertraline (ZOLOFT) 50 MG tablet     Sig: Take 1 tablet by mouth daily     Dispense:  90 tablet     Refill:  1    tolterodine (DETROL LA) 4

## 2017-11-06 ENCOUNTER — OFFICE VISIT (OUTPATIENT)
Dept: FAMILY MEDICINE CLINIC | Age: 47
End: 2017-11-06
Payer: COMMERCIAL

## 2017-11-06 VITALS
BODY MASS INDEX: 46.85 KG/M2 | WEIGHT: 232.4 LBS | HEART RATE: 71 BPM | SYSTOLIC BLOOD PRESSURE: 122 MMHG | HEIGHT: 59 IN | DIASTOLIC BLOOD PRESSURE: 84 MMHG | OXYGEN SATURATION: 97 % | TEMPERATURE: 97.9 F

## 2017-11-06 DIAGNOSIS — Z00.00 PHYSICAL EXAM: Primary | ICD-10-CM

## 2017-11-06 PROCEDURE — 99396 PREV VISIT EST AGE 40-64: CPT | Performed by: FAMILY MEDICINE

## 2017-11-06 ASSESSMENT — ENCOUNTER SYMPTOMS
COUGH: 0
BACK PAIN: 0
NAUSEA: 0
SHORTNESS OF BREATH: 0
VOMITING: 0
CONSTIPATION: 0
ABDOMINAL PAIN: 0
SORE THROAT: 0

## 2017-12-01 ENCOUNTER — HOSPITAL ENCOUNTER (OUTPATIENT)
Dept: PAIN MANAGEMENT | Age: 47
Discharge: HOME | End: 2017-12-01
Payer: COMMERCIAL

## 2017-12-01 DIAGNOSIS — M51.36 DEGENERATION OF LUMBAR INTERVERTEBRAL DISC: Chronic | ICD-10-CM

## 2017-12-01 DIAGNOSIS — Z79.899 ENCOUNTER FOR MEDICATION MANAGEMENT: ICD-10-CM

## 2017-12-01 DIAGNOSIS — M47.816 ARTHROPATHY OF LUMBAR FACET JOINT: ICD-10-CM

## 2017-12-01 DIAGNOSIS — M54.16 LUMBAR RADICULOPATHY, CHRONIC: Primary | Chronic | ICD-10-CM

## 2017-12-01 PROCEDURE — 99213 OFFICE O/P EST LOW 20 MIN: CPT

## 2017-12-01 PROCEDURE — 99214 OFFICE O/P EST MOD 30 MIN: CPT | Performed by: NURSE PRACTITIONER

## 2017-12-01 RX ORDER — HYDROCODONE BITARTRATE AND ACETAMINOPHEN 5; 325 MG/1; MG/1
1 TABLET ORAL SEE ADMIN INSTRUCTIONS
Qty: 60 TABLET | Refills: 0 | Status: SHIPPED | OUTPATIENT
Start: 2017-12-01 | End: 2018-01-05 | Stop reason: SDUPTHER

## 2017-12-01 RX ORDER — DOCUSATE SODIUM 100 MG/1
100 CAPSULE, LIQUID FILLED ORAL DAILY PRN
Qty: 30 CAPSULE | Refills: 2 | Status: SHIPPED | OUTPATIENT
Start: 2017-12-01

## 2017-12-01 ASSESSMENT — ENCOUNTER SYMPTOMS
COUGH: 0
CONSTIPATION: 0
BACK PAIN: 1
SHORTNESS OF BREATH: 0
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
## 2017-12-23 DIAGNOSIS — I10 ESSENTIAL HYPERTENSION: ICD-10-CM

## 2017-12-26 RX ORDER — OMEPRAZOLE 40 MG/1
CAPSULE, DELAYED RELEASE ORAL
Qty: 90 CAPSULE | Refills: 1 | Status: SHIPPED | OUTPATIENT
Start: 2017-12-26 | End: 2018-06-24 | Stop reason: SDUPTHER

## 2017-12-26 RX ORDER — AMLODIPINE BESYLATE 5 MG/1
TABLET ORAL
Qty: 90 TABLET | Refills: 1 | Status: SHIPPED | OUTPATIENT
Start: 2017-12-26 | End: 2018-06-24 | Stop reason: SDUPTHER

## 2018-01-05 ENCOUNTER — HOSPITAL ENCOUNTER (OUTPATIENT)
Dept: PAIN MANAGEMENT | Age: 48
Discharge: HOME | End: 2018-01-05
Payer: COMMERCIAL

## 2018-01-05 VITALS
DIASTOLIC BLOOD PRESSURE: 91 MMHG | WEIGHT: 229 LBS | OXYGEN SATURATION: 98 % | BODY MASS INDEX: 46.16 KG/M2 | TEMPERATURE: 98.4 F | HEART RATE: 72 BPM | SYSTOLIC BLOOD PRESSURE: 134 MMHG | HEIGHT: 59 IN | RESPIRATION RATE: 16 BRPM

## 2018-01-05 DIAGNOSIS — M16.12 PRIMARY OSTEOARTHRITIS OF LEFT HIP: ICD-10-CM

## 2018-01-05 DIAGNOSIS — M54.16 LUMBAR RADICULOPATHY, CHRONIC: ICD-10-CM

## 2018-01-05 DIAGNOSIS — M47.816 OSTEOARTHRITIS OF LUMBAR SPINE, UNSPECIFIED SPINAL OSTEOARTHRITIS COMPLICATION STATUS: Primary | ICD-10-CM

## 2018-01-05 DIAGNOSIS — M50.30 DEGENERATIVE DISC DISEASE, CERVICAL: ICD-10-CM

## 2018-01-05 DIAGNOSIS — M25.552 HIP PAIN, ACUTE, LEFT: ICD-10-CM

## 2018-01-05 DIAGNOSIS — M47.816 ARTHROPATHY OF LUMBAR FACET JOINT: ICD-10-CM

## 2018-01-05 DIAGNOSIS — M46.1 SACROILIITIS (HCC): ICD-10-CM

## 2018-01-05 DIAGNOSIS — M50.20 CERVICAL DISC HERNIATION: ICD-10-CM

## 2018-01-05 DIAGNOSIS — G89.29 CHRONIC LEFT-SIDED LOW BACK PAIN WITH LEFT-SIDED SCIATICA: ICD-10-CM

## 2018-01-05 DIAGNOSIS — M47.26 OSTEOARTHRITIS OF SPINE WITH RADICULOPATHY, LUMBAR REGION: ICD-10-CM

## 2018-01-05 DIAGNOSIS — M51.36 DEGENERATION OF LUMBAR INTERVERTEBRAL DISC: ICD-10-CM

## 2018-01-05 DIAGNOSIS — M54.42 CHRONIC LEFT-SIDED LOW BACK PAIN WITH LEFT-SIDED SCIATICA: ICD-10-CM

## 2018-01-05 DIAGNOSIS — Z79.899 ENCOUNTER FOR MEDICATION MANAGEMENT: ICD-10-CM

## 2018-01-05 PROCEDURE — 99213 OFFICE O/P EST LOW 20 MIN: CPT | Performed by: NURSE PRACTITIONER

## 2018-01-05 PROCEDURE — 99213 OFFICE O/P EST LOW 20 MIN: CPT

## 2018-01-05 RX ORDER — TIZANIDINE 4 MG/1
4 TABLET ORAL EVERY 8 HOURS PRN
Qty: 270 TABLET | Refills: 0 | Status: SHIPPED | OUTPATIENT
Start: 2018-01-05 | End: 2018-03-07 | Stop reason: SDUPTHER

## 2018-01-05 RX ORDER — PREGABALIN 50 MG/1
50 CAPSULE ORAL 3 TIMES DAILY
Qty: 90 CAPSULE | Refills: 3 | Status: SHIPPED | OUTPATIENT
Start: 2018-01-05 | End: 2018-03-07

## 2018-01-05 RX ORDER — HYDROCODONE BITARTRATE AND ACETAMINOPHEN 5; 325 MG/1; MG/1
1 TABLET ORAL 2 TIMES DAILY
Qty: 60 TABLET | Refills: 0 | Status: SHIPPED | OUTPATIENT
Start: 2018-01-10 | End: 2018-02-06 | Stop reason: SDUPTHER

## 2018-01-05 ASSESSMENT — ENCOUNTER SYMPTOMS
BACK PAIN: 1
CONSTIPATION: 1
RESPIRATORY NEGATIVE: 1

## 2018-01-11 ENCOUNTER — NURSE ONLY (OUTPATIENT)
Dept: FAMILY MEDICINE CLINIC | Age: 48
End: 2018-01-11
Payer: COMMERCIAL

## 2018-01-11 VITALS
WEIGHT: 228.4 LBS | BODY MASS INDEX: 46.13 KG/M2 | TEMPERATURE: 98.5 F | SYSTOLIC BLOOD PRESSURE: 126 MMHG | HEART RATE: 86 BPM | OXYGEN SATURATION: 96 % | DIASTOLIC BLOOD PRESSURE: 84 MMHG

## 2018-01-11 DIAGNOSIS — I10 ESSENTIAL HYPERTENSION: ICD-10-CM

## 2018-01-11 DIAGNOSIS — S91.331A PUNCTURE WOUND OF RIGHT FOOT, INITIAL ENCOUNTER: Primary | ICD-10-CM

## 2018-01-11 DIAGNOSIS — R53.83 FATIGUE, UNSPECIFIED TYPE: ICD-10-CM

## 2018-01-11 PROCEDURE — 90715 TDAP VACCINE 7 YRS/> IM: CPT | Performed by: FAMILY MEDICINE

## 2018-01-11 PROCEDURE — 90471 IMMUNIZATION ADMIN: CPT | Performed by: FAMILY MEDICINE

## 2018-01-11 PROCEDURE — 99213 OFFICE O/P EST LOW 20 MIN: CPT | Performed by: FAMILY MEDICINE

## 2018-01-11 RX ORDER — TOPIRAMATE 100 MG/1
TABLET, FILM COATED ORAL
Qty: 270 TABLET | Refills: 0 | Status: SHIPPED | OUTPATIENT
Start: 2018-01-11 | End: 2018-03-07 | Stop reason: SDUPTHER

## 2018-01-11 ASSESSMENT — ENCOUNTER SYMPTOMS
NAUSEA: 0
CONSTIPATION: 0
SORE THROAT: 0
SHORTNESS OF BREATH: 0
ABDOMINAL PAIN: 0

## 2018-01-11 NOTE — PROGRESS NOTES
After obtaining consent, and per orders of Dr. Melly Wilson injection of T-dap given in Left deltoid by Amber NUNEZ. Patient instructed to report any adverse reaction to the office immediately.

## 2018-01-16 RX ORDER — IBUPROFEN 600 MG/1
TABLET ORAL
Qty: 270 TABLET | Refills: 0 | Status: SHIPPED | OUTPATIENT
Start: 2018-01-16 | End: 2018-04-19 | Stop reason: SDUPTHER

## 2018-01-26 LAB
T4 FREE: 0.73 NG/DL (ref 0.8–1.8)
TSH SERPL DL<=0.05 MIU/L-ACNC: 0.69 UIU/ML (ref 0.4–4.4)

## 2018-02-06 ENCOUNTER — HOSPITAL ENCOUNTER (OUTPATIENT)
Dept: PAIN MANAGEMENT | Age: 48
Discharge: HOME | End: 2018-02-06
Payer: COMMERCIAL

## 2018-02-06 VITALS
HEIGHT: 59 IN | HEART RATE: 88 BPM | OXYGEN SATURATION: 96 % | DIASTOLIC BLOOD PRESSURE: 96 MMHG | RESPIRATION RATE: 16 BRPM | TEMPERATURE: 98.4 F | SYSTOLIC BLOOD PRESSURE: 152 MMHG | BODY MASS INDEX: 45.96 KG/M2 | WEIGHT: 228 LBS

## 2018-02-06 DIAGNOSIS — M47.816 OSTEOARTHRITIS OF LUMBAR SPINE, UNSPECIFIED SPINAL OSTEOARTHRITIS COMPLICATION STATUS: ICD-10-CM

## 2018-02-06 DIAGNOSIS — M47.816 ARTHROPATHY OF LUMBAR FACET JOINT: ICD-10-CM

## 2018-02-06 DIAGNOSIS — G89.29 CHRONIC LEFT-SIDED LOW BACK PAIN WITH LEFT-SIDED SCIATICA: ICD-10-CM

## 2018-02-06 DIAGNOSIS — M51.36 DEGENERATION OF LUMBAR INTERVERTEBRAL DISC: ICD-10-CM

## 2018-02-06 DIAGNOSIS — Z79.899 ENCOUNTER FOR MEDICATION MANAGEMENT: ICD-10-CM

## 2018-02-06 DIAGNOSIS — M54.42 CHRONIC LEFT-SIDED LOW BACK PAIN WITH LEFT-SIDED SCIATICA: ICD-10-CM

## 2018-02-06 DIAGNOSIS — M54.16 LUMBAR RADICULOPATHY, CHRONIC: Primary | ICD-10-CM

## 2018-02-06 DIAGNOSIS — M50.20 CERVICAL DISC HERNIATION: ICD-10-CM

## 2018-02-06 PROCEDURE — 99214 OFFICE O/P EST MOD 30 MIN: CPT | Performed by: PAIN MEDICINE

## 2018-02-06 PROCEDURE — 99213 OFFICE O/P EST LOW 20 MIN: CPT

## 2018-02-06 RX ORDER — HYDROCODONE BITARTRATE AND ACETAMINOPHEN 5; 325 MG/1; MG/1
1 TABLET ORAL 2 TIMES DAILY
Qty: 60 TABLET | Refills: 0 | Status: SHIPPED | OUTPATIENT
Start: 2018-02-11 | End: 2018-03-07 | Stop reason: SDUPTHER

## 2018-02-06 ASSESSMENT — PAIN SCALES - GENERAL: PAINLEVEL_OUTOF10: 4

## 2018-02-06 ASSESSMENT — ENCOUNTER SYMPTOMS
GASTROINTESTINAL NEGATIVE: 1
EYES NEGATIVE: 1
NAUSEA: 0
ABDOMINAL PAIN: 0
EYE PAIN: 0
HEARTBURN: 0
CONSTIPATION: 0
RESPIRATORY NEGATIVE: 1
BACK PAIN: 1
SHORTNESS OF BREATH: 0
COUGH: 0
BLURRED VISION: 0
BOWEL INCONTINENCE: 0

## 2018-02-06 ASSESSMENT — PAIN DESCRIPTION - DIRECTION: RADIATING_TOWARDS: LEFT LEG TO KNEE

## 2018-02-06 ASSESSMENT — PAIN DESCRIPTION - ONSET: ONSET: ON-GOING

## 2018-02-06 ASSESSMENT — PAIN DESCRIPTION - LOCATION: LOCATION: BACK;LEG

## 2018-02-06 ASSESSMENT — PAIN DESCRIPTION - PAIN TYPE: TYPE: CHRONIC PAIN

## 2018-02-06 ASSESSMENT — PAIN DESCRIPTION - ORIENTATION: ORIENTATION: LEFT;LOWER

## 2018-02-06 ASSESSMENT — PAIN DESCRIPTION - FREQUENCY: FREQUENCY: CONTINUOUS

## 2018-02-06 ASSESSMENT — PAIN DESCRIPTION - PROGRESSION: CLINICAL_PROGRESSION: NOT CHANGED

## 2018-02-06 ASSESSMENT — PAIN DESCRIPTION - DESCRIPTORS: DESCRIPTORS: ACHING;CONSTANT;DULL;NAGGING;SHARP

## 2018-02-06 NOTE — PROGRESS NOTES
patient has normal left ankle strength. Other   Pulse: present      Right Knee Exam     Muscle Strength     The patient has normal right knee strength. Left Knee Exam     Muscle Strength     The patient has normal left knee strength. Right Hip Exam     Muscle Strength   The patient has normal right hip strength. Left Hip Exam     Tenderness   The patient is experiencing tenderness in the greater trochanter. Muscle Strength   The patient has normal left hip strength. Back Exam     Tenderness   The patient is experiencing tenderness in the lumbar and sacroiliac. Range of Motion   Back extension: Limited and painful. Back flexion: Limited and painful. Back lateral bend right: Limited and painful. Back lateral bend left: Limited and painful. Back rotation right: Limited and painful. Back rotation left: Limited and painful. Tests   Straight leg raise right: positive  Straight leg raise left: positive    Other   Sensation: normal  Gait: antalgic   Erythema: no back redness  Scars: absent    Comments:  Skin --normal appearance  kyphosis absent and scoliosis absent  Alignment of her shoulders, scapulae and iliac crests--symmetric    Lumbar lordosis-----------Decreased  Movements of the lumbar spine indicated above are diminished range with pain   Facet loading---  : Right side--    Pain-Moderate                                   Left side----   Pain  Moderate  Graham's test -------------- Right side---positive                                           Left side-----positive          Nurses Notes and Vital Signs reviewed.     DATA  Labs:  Benzodiazepine Screen, Urine   Date Value Ref Range Status   09/19/2014 NEGATIVE NEG Final     Comment:           (Positive cutoff 200 ng/mL)                  6/12/2017  8:29 AM - [facility] Incoming Lab Results From [facility]     Component Results     Component Value Ref Range & Units Status Collected Lab   Pain Management Drug Panel Interp, Urine Dispense:  60 tablet     Refill:  0     Fill 12/3/17     Urine drug screens have been appropriate. No aberrant activity noted. Analgesia is achieved. Activities of daily living are possible because of medications. Safe use of medications explained to patient. Counselling/Preventive measures for pain  Control:    [x]  Spine strengthening exercises are discussed with patient in detail. [x] Ill effects of being on chronic pain medications such as sleep disturbances, hormonal changes, withdrawal symptoms,  chronic opioid dependence and tolerance were discussed with patient. I had asked the patient to minimize medication use and utilize pain medications only for uncontrolled rest pain or pain with exertional activities. I advised patient not to self escalate pain medications without consulting with us. At each of patient's future visits we will try to taper pain medications, while adjusting the adjunct medications, and re-evaluating for Physical Therapy to improve spinal and joint strength. We will continue to have discussions to decrease pain medications as tolerated. We discussed the same at today's visit and have not been to implement it, as the patient's pain is somewhat under control with current medications. Controlled Substances Monitoring:     Attestation: The Prescription Monitoring Report for this patient was reviewed today. (Edgar Sharma MD)  Documentation: Possible medication side effects, risk of tolerance and/or dependence, and alternative treatments discussed., Obtaining appropriate analgesic effect of treatment., No signs of potential drug abuse or diversion identified. (Edgar Sharma MD)  Medication Contracts: Existing medication contract. (Edgar Sharma MD)  Decision Making Process : Patient's health history and referral records thoroughly reviewed before focused physical examination and discussion with patient.    Over 50% of today's visit is spent on

## 2018-03-07 ENCOUNTER — HOSPITAL ENCOUNTER (OUTPATIENT)
Dept: PAIN MANAGEMENT | Age: 48
Discharge: HOME | End: 2018-03-07
Payer: COMMERCIAL

## 2018-03-07 VITALS
RESPIRATION RATE: 16 BRPM | DIASTOLIC BLOOD PRESSURE: 99 MMHG | HEART RATE: 77 BPM | BODY MASS INDEX: 45.96 KG/M2 | OXYGEN SATURATION: 96 % | SYSTOLIC BLOOD PRESSURE: 154 MMHG | WEIGHT: 228 LBS | TEMPERATURE: 98.3 F | HEIGHT: 59 IN

## 2018-03-07 DIAGNOSIS — M47.26 OSTEOARTHRITIS OF SPINE WITH RADICULOPATHY, LUMBAR REGION: ICD-10-CM

## 2018-03-07 DIAGNOSIS — M47.816 OSTEOARTHRITIS OF LUMBAR SPINE, UNSPECIFIED SPINAL OSTEOARTHRITIS COMPLICATION STATUS: Primary | ICD-10-CM

## 2018-03-07 DIAGNOSIS — M54.42 CHRONIC LEFT-SIDED LOW BACK PAIN WITH LEFT-SIDED SCIATICA: ICD-10-CM

## 2018-03-07 DIAGNOSIS — M25.552 HIP PAIN, ACUTE, LEFT: ICD-10-CM

## 2018-03-07 DIAGNOSIS — M46.1 SACROILIITIS (HCC): ICD-10-CM

## 2018-03-07 DIAGNOSIS — M16.12 PRIMARY OSTEOARTHRITIS OF LEFT HIP: ICD-10-CM

## 2018-03-07 DIAGNOSIS — M47.816 ARTHROPATHY OF LUMBAR FACET JOINT: ICD-10-CM

## 2018-03-07 DIAGNOSIS — M51.36 DEGENERATION OF LUMBAR INTERVERTEBRAL DISC: ICD-10-CM

## 2018-03-07 DIAGNOSIS — G89.29 CHRONIC LEFT-SIDED LOW BACK PAIN WITH LEFT-SIDED SCIATICA: ICD-10-CM

## 2018-03-07 DIAGNOSIS — M50.30 DEGENERATIVE DISC DISEASE, CERVICAL: ICD-10-CM

## 2018-03-07 DIAGNOSIS — M50.20 CERVICAL DISC HERNIATION: ICD-10-CM

## 2018-03-07 DIAGNOSIS — M54.16 LUMBAR RADICULOPATHY, CHRONIC: ICD-10-CM

## 2018-03-07 DIAGNOSIS — Z79.899 ENCOUNTER FOR MEDICATION MANAGEMENT: ICD-10-CM

## 2018-03-07 PROCEDURE — 99213 OFFICE O/P EST LOW 20 MIN: CPT

## 2018-03-07 PROCEDURE — 99213 OFFICE O/P EST LOW 20 MIN: CPT | Performed by: NURSE PRACTITIONER

## 2018-03-07 RX ORDER — PREGABALIN 75 MG/1
75 CAPSULE ORAL 3 TIMES DAILY
Qty: 90 CAPSULE | Refills: 2 | Status: SHIPPED | OUTPATIENT
Start: 2018-03-07 | End: 2018-07-17 | Stop reason: SDUPTHER

## 2018-03-07 RX ORDER — TOPIRAMATE 100 MG/1
TABLET, FILM COATED ORAL
Qty: 270 TABLET | Refills: 0 | Status: SHIPPED | OUTPATIENT
Start: 2018-04-11 | End: 2018-07-10 | Stop reason: SDUPTHER

## 2018-03-07 RX ORDER — TIZANIDINE 4 MG/1
4 TABLET ORAL EVERY 8 HOURS PRN
Qty: 270 TABLET | Refills: 0 | Status: SHIPPED | OUTPATIENT
Start: 2018-04-08 | End: 2018-07-11 | Stop reason: SDUPTHER

## 2018-03-07 RX ORDER — HYDROCODONE BITARTRATE AND ACETAMINOPHEN 5; 325 MG/1; MG/1
1 TABLET ORAL 2 TIMES DAILY
Qty: 60 TABLET | Refills: 0 | Status: SHIPPED | OUTPATIENT
Start: 2018-03-14 | End: 2018-04-04 | Stop reason: SDUPTHER

## 2018-03-07 RX ORDER — VITAMIN B COMPLEX
1 CAPSULE ORAL DAILY
Status: ON HOLD | COMMUNITY
End: 2021-03-04 | Stop reason: ALTCHOICE

## 2018-03-07 ASSESSMENT — ENCOUNTER SYMPTOMS
BACK PAIN: 1
RESPIRATORY NEGATIVE: 1
GASTROINTESTINAL NEGATIVE: 1

## 2018-03-07 NOTE — PROGRESS NOTES
symptoms. No bowel or bladder incontinence, no weakness, and no falling. Pill count: appropriate  Controlled Substances Monitoring: The Prescription Monitoring Report for this patient was reviewed today. ([facility] [place])    Obtaining appropriate analgesic effect of treatment., No signs of potential drug abuse or diversion identified. [facility] [place])              Existing medication contract. Lue Goltz Swope, APRN)    Morphine equivalent dose as reported on [place]AR[place]: 10  Attestation: The Prescription Monitoring Report for this patient was reviewed today. [facility] [place])  Documentation: Obtaining appropriate analgesic effect of treatment., No signs of potential drug abuse or diversion identified. [facility] [place])  Medication Contracts: Existing medication contract. ([facility] [place])  Review of [place] does not show any aberrant prescription behavior. Medication is helping the patient stay active. Patient denies any side effects and reports adequate analgesia. No sign of misuse/abuse.     Past Medical History:   Diagnosis Date    Asthma     Colon polyp 10/31/2016    sessile serated adenoma x2    Depression     Diverticulitis 10/2016    Gastritis     GERD (gastroesophageal reflux disease)     Hemorrhoids     int/ext    Hypertension     Osteoarthritis     Shingles 1-22-16    Tubular adenoma 10/31/2016       Past Surgical History:   Procedure Laterality Date    CERVICAL DISCECTOMY  12/2013    & fusion     CHOLECYSTECTOMY      COLONOSCOPY  10/31/2016    int/ext hemorrhoids; sessile serated adenomax2; tubular adenoma    DILATION AND CURETTAGE OF UTERUS      HAND SURGERY Right     thumb surgery, BONE REMOVED    HYSTERECTOMY      LAPAROSCOPY      TONSILLECTOMY      TUBAL LIGATION      UPPER GASTROINTESTINAL ENDOSCOPY  10/31/2016    gastritis       Allergies   Allergen Reactions    Adhesive Tape Other (See Comments)     blisters    Imitrex [Sumatriptan] Other (See Comments)     \"feels like head is going to explode    Morphine Itching     hives         Current Outpatient Prescriptions:     [START ON 4/11/2018] topiramate (TOPAMAX) 100 MG tablet, TAKE 1 TABLET IN THE MORNING AND 2 TABLETS IN THE EVENING, Disp: 270 tablet, Rfl: 0    [START ON 4/8/2018] tiZANidine (ZANAFLEX) 4 MG tablet, Take 1 tablet by mouth every 8 hours as needed (spasms), Disp: 270 tablet, Rfl: 0    b complex vitamins capsule, Take 1 capsule by mouth daily, Disp: , Rfl:     pregabalin (LYRICA) 75 MG capsule, Take 1 capsule by mouth 3 times daily for 30 days. , Disp: 90 capsule, Rfl: 2    [START ON 3/14/2018] HYDROcodone-acetaminophen (NORCO) 5-325 MG per tablet, Take 1 tablet by mouth 2 times daily for 30 days. Earliest Fill Date: 3/14/18, Disp: 60 tablet, Rfl: 0    omeprazole (PRILOSEC) 40 MG delayed release capsule, TAKE 1 CAPSULE DAILY, Disp: 90 capsule, Rfl: 1    amLODIPine (NORVASC) 5 MG tablet, TAKE 1 TABLET DAILY, Disp: 90 tablet, Rfl: 1    docusate sodium (COLACE) 100 MG capsule, Take 1 capsule by mouth daily as needed for Constipation, Disp: 30 capsule, Rfl: 2    sertraline (ZOLOFT) 50 MG tablet, Take 1 tablet by mouth daily, Disp: 90 tablet, Rfl: 1    tolterodine (DETROL LA) 4 MG extended release capsule, Take 1 capsule by mouth daily, Disp: 90 capsule, Rfl: 1    vitamin D (CHOLECALCIFEROL) 1000 UNIT TABS tablet, Take 1,000 Units by mouth daily, Disp: , Rfl:     Probiotic Product (PROBIOTIC DAILY PO), Take 1 tablet by mouth daily. , Disp: , Rfl:     Multiple Vitamins-Calcium (ONE-A-DAY WOMENS FORMULA PO), Take  by mouth., Disp: , Rfl:     ibuprofen (ADVIL;MOTRIN) 600 MG tablet, TAKE 1 TABLET EVERY 8 HOURS AS NEEDED FOR PAIN, Disp: 270 tablet, Rfl: 0    Elastic Bandages & Supports (LUMBAR BACK BRACE/SUPPORT PAD) MISC, 1 each by Does not apply route daily as needed (pain), Disp: 1 each, Rfl: 0    Melatonin ER 5 MG TBCR, Take by mouth as needed, Disp: , Rfl:     Zinc 30 MG TABS, Take by mouth, Disp: , Rfl:   Ascorbic Acid (VITAMIN C) 500 MG CAPS, Take by mouth, Disp: , Rfl:     ECHINACEA PO, Take by mouth, Disp: , Rfl:     Nutritional Supplements (ESTROVEN PM PO), Take 1 tablet by mouth daily. , Disp: , Rfl:     Glucosamine-Chondroitin (GLUCOSAMINE CHONDR COMPLEX PO), Take  by mouth., Disp: , Rfl:     Family History   Problem Relation Age of Onset    Cancer Mother     Heart Disease Father     Diabetes Father     High Blood Pressure Father     Other Other      Celiac Sprue. Aunt       Social History     Social History    Marital status:      Spouse name: N/A    Number of children: N/A    Years of education: N/A     Occupational History    unemployed      Social History Main Topics    Smoking status: Never Smoker    Smokeless tobacco: Never Used    Alcohol use No    Drug use: No    Sexual activity: Yes     Other Topics Concern    Not on file     Social History Narrative    No narrative on file       Review of Systems:  Review of Systems   Constitution: Negative. HENT: Negative. Eyes:        Glasses   Cardiovascular: Negative. Respiratory: Negative. Skin: Negative. Musculoskeletal: Positive for back pain and joint pain. Gastrointestinal: Negative. Genitourinary: Positive for nocturia. Neurological: Positive for numbness. Psychiatric/Behavioral: The patient is nervous/anxious. On zoloft which helps         Physical Exam:  BP (!) 154/99   Pulse 77   Temp 98.3 °F (36.8 °C) (Oral)   Resp 16   Ht 4' 11\" (1.499 m)   Wt 228 lb (103.4 kg)   SpO2 96%   BMI 46.05 kg/m²     Physical Exam  Constitutional: She is oriented to person, place, and time and well-developed, well-nourished, and in no distress. obese   HENT:   Head: Normocephalic. Neck: Normal range of motion. Neck supple. Pulmonary/Chest: Effort normal.   Musculoskeletal:        Lumbar back: She exhibits decreased range of motion and tenderness.         Back:    Tender left lumbar paraspinal muscles Neurological: She is alert and oriented to person, place, and time. She has normal strength. Reflex Scores:       Patellar reflexes are 1+ on the right side and 1+ on the left side. Achilles reflexes are 1+ on the right side and 1+ on the left side.     Record/Diagnostics Review:      As above, I did review the imaging  6/12/2017  8:29 AM - Raymon, [facility] Incoming Lab Results From [facility]      Component Results      Component Value Ref Range & Units Status Collected Lab   Pain Management Drug Panel Interp, Urine Consistent    Final 06/08/2017 11:30 AM [facility]   (NOTE)   ________________________________________________________________   DRUGS EXPECTED:   HYDROCODONE [06/07/2017 12:01 AM]   ________________________________________________________________   CONSISTENT with medications provided:   HYDROCODONE : based on hydrocodone, norhydrocodone   ________________________________________________________________   INTERPRETIVE INFORMATION:Pain Mgt Ruiz, High Res/EMIT, Ur, Interp   Interpretation depends on accuracy and completeness of patient   medication information submitted by client. 6-Acetylmorphine, Ur Not Detected    Final 06/08/2017 11:30 AM [facility]   7-Aminoclonazepam, Urine Not Detected    Final 06/08/2017 11:30 AM [facility]   Alpha-OH-Alpraz, Urine Not Detected    Final 06/08/2017 11:30 AM [facility]   Alprazolam, Urine Not Detected    Final 06/08/2017 11:30 AM [facility]   Amphetamines, urine Not Detected    Final 06/08/2017 11:30 AM [facility]   Barbiturates, Ur Not Detected    Final 06/08/2017 11:30 AM [facility]   Benzoylecgonine, Ur Not Detected    Final 06/08/2017 11:30 AM [facility]   Buprenorphine Urine Not Detected    Final 06/08/2017 11:30 AM [facility]   Carisoprodol, Ur Not Detected    Final 06/08/2017 11:30 AM [facility]   (NOTE)   The carisoprodol immunoassay has cross-reactivity to carisoprodol   and meprobamate.     Clonazepam, Urine Not Detected    Final 06/08/2017 11:30 AM [facility]   Codeine, Urine Not Detected    Final 06/08/2017 11:30 AM [facility]   MDA, Ur Not Detected    Final 06/08/2017 11:30 AM [facility]   Diazepam, Urine Not Detected    Final 06/08/2017 11:30 AM [facility]   Ethyl Glucuronide Ur Not Detected    Final 06/08/2017 11:30 AM [facility]   Fentanyl, Ur Not Detected    Final 06/08/2017 11:30 AM [facility]   Hydrocodone, Urine Present    Final 06/08/2017 11:30 AM [facility]   Hydromorphone, Urine Not Detected    Final 06/08/2017 11:30 AM [facility]   Lorazepam, Urine Not Detected    Final 06/08/2017 11:30 AM [facility]   Marijuana Metab, Ur Not Detected    Final 06/08/2017 11:30 AM [facility]   MDEA, EMA, Ur Not Detected    Final 06/08/2017 11:30 AM [facility]   MDMA URINE Not Detected    Final 06/08/2017 11:30 AM [facility]   Meperidine Metab, Ur Not Detected    Final 06/08/2017 11:30 AM [facility]   Methadone, Urine Not Detected    Final 06/08/2017 11:30 AM [facility]   Methamphetamine, Urine Not Detected    Final 06/08/2017 11:30 AM [facility]   Methylphenidate Not Detected    Final 06/08/2017 11:30 AM [facility]   Midazolam, Urine Not Detected    Final 06/08/2017 11:30 AM [facility]   Morphine Urine Not Detected    Final 06/08/2017 11:30 AM [facility]   Norbuprenorphine, Urine Not Detected    Final 06/08/2017 11:30 AM [facility]   Nordiazepam, Urine Not Detected    Final 06/08/2017 11:30 AM [facility]   Norfentanyl, Urine Not Detected    Final 06/08/2017 11:30 AM [facility]   NORHYDROCODONE, URINE Present    Final 06/08/2017 11:30 AM [facility]   Noroxycodone, Urine Not Detected    Final 06/08/2017 11:30 AM [facility]   NOROXYMORPHONE, URINE Not Detected    Final 06/08/2017 11:30 AM [facility]   Oxazepam, Urine Not Detected    Final 06/08/2017 11:30 AM [facility]   Oxycodone Urine Not Detected    Final 06/08/2017 11:30 AM [facility]   Oxymorphone, Urine Not Detected    Final 06/08/2017 11:30 AM [facility]   PCP, Urine Not Detected    Final 06/08/2017 11:30 AM [facility]   Phentermine, Ur Not Detected    Final 06/08/2017 11:30 AM [facility]   Propoxyphene, Urine Not Detected (ZANAFLEX) 4 MG tablet (Start on 4/8/2018)    HYDROcodone-acetaminophen ([address]) 5-325 MG per tablet (Start on 3/14/2018)    Primary osteoarthritis of left hip    Relevant Medications    tiZANidine (ZANAFLEX) 4 MG tablet (Start on 4/8/2018)    HYDROcodone-acetaminophen ([address]) 5-325 MG per tablet (Start on 3/14/2018)    Arthropathy of lumbar facet joint    Relevant Medications    HYDROcodone-acetaminophen (NORCO) 5-325 MG per tablet (Start on 3/14/2018)    Chronic left-sided low back pain with left-sided sciatica    Relevant Medications    tiZANidine (ZANAFLEX) 4 MG tablet (Start on 4/8/2018)    HYDROcodone-acetaminophen ([address]) 5-325 MG per tablet (Start on 3/14/2018)    Osteoarthritis of spine with radiculopathy, lumbar region    Relevant Medications    topiramate (TOPAMAX) 100 MG tablet (Start on 4/11/2018)    tiZANidine (ZANAFLEX) 4 MG tablet (Start on 4/8/2018)    pregabalin (LYRICA) 75 MG capsule    HYDROcodone-acetaminophen ([address]) 5-325 MG per tablet (Start on 3/14/2018)    Encounter for medication management      Other Visit Diagnoses     Hip pain, acute, left            Treatment Plan:  DISCUSSION: Treatment options discussed with patient and all questions answered to patient's satisfaction.       [x] Ill effects of being on chronic pain medications such as sleep disturbances, respiratory depression, hormonal changes, withdrawal symptoms,  chronic opioid dependence and tolerance as well as risk of taking opioids with Benzodiazepines and taking opioids along with  alcohol,  were discussed with patient. I had asked the patient to minimize medication use and utilize pain medications only for uncontrolled rest pain or pain with exertional activities. I advised patient not to self escalate pain medications without consulting with us.   At each of patient's future visits we will try to taper pain medications, while adjusting the adjunct medications, and re-evaluating for Physical Therapy to improve spinal and

## 2018-04-04 ENCOUNTER — HOSPITAL ENCOUNTER (OUTPATIENT)
Dept: PAIN MANAGEMENT | Age: 48
Discharge: HOME | End: 2018-04-04
Payer: COMMERCIAL

## 2018-04-04 ENCOUNTER — TELEPHONE (OUTPATIENT)
Dept: PAIN MANAGEMENT | Age: 48
End: 2018-04-04

## 2018-04-04 VITALS
SYSTOLIC BLOOD PRESSURE: 146 MMHG | HEART RATE: 84 BPM | OXYGEN SATURATION: 97 % | WEIGHT: 228 LBS | HEIGHT: 59 IN | DIASTOLIC BLOOD PRESSURE: 69 MMHG | TEMPERATURE: 98.4 F | BODY MASS INDEX: 45.96 KG/M2 | RESPIRATION RATE: 16 BRPM

## 2018-04-04 DIAGNOSIS — M50.30 DEGENERATIVE DISC DISEASE, CERVICAL: ICD-10-CM

## 2018-04-04 DIAGNOSIS — M54.42 CHRONIC LEFT-SIDED LOW BACK PAIN WITH LEFT-SIDED SCIATICA: ICD-10-CM

## 2018-04-04 DIAGNOSIS — M47.816 OSTEOARTHRITIS OF LUMBAR SPINE, UNSPECIFIED SPINAL OSTEOARTHRITIS COMPLICATION STATUS: Primary | ICD-10-CM

## 2018-04-04 DIAGNOSIS — G89.29 CHRONIC LEFT-SIDED LOW BACK PAIN WITH LEFT-SIDED SCIATICA: ICD-10-CM

## 2018-04-04 DIAGNOSIS — M47.896 OTHER OSTEOARTHRITIS OF SPINE, LUMBAR REGION: ICD-10-CM

## 2018-04-04 DIAGNOSIS — M16.12 PRIMARY OSTEOARTHRITIS OF LEFT HIP: ICD-10-CM

## 2018-04-04 DIAGNOSIS — M47.816 ARTHROPATHY OF LUMBAR FACET JOINT: ICD-10-CM

## 2018-04-04 DIAGNOSIS — M51.36 DEGENERATION OF LUMBAR INTERVERTEBRAL DISC: ICD-10-CM

## 2018-04-04 DIAGNOSIS — M25.552 HIP PAIN, ACUTE, LEFT: ICD-10-CM

## 2018-04-04 DIAGNOSIS — M46.1 SACROILIITIS (HCC): ICD-10-CM

## 2018-04-04 DIAGNOSIS — Z79.899 ENCOUNTER FOR MEDICATION MANAGEMENT: ICD-10-CM

## 2018-04-04 DIAGNOSIS — M54.16 LUMBAR RADICULOPATHY, CHRONIC: ICD-10-CM

## 2018-04-04 DIAGNOSIS — M47.26 OSTEOARTHRITIS OF SPINE WITH RADICULOPATHY, LUMBAR REGION: ICD-10-CM

## 2018-04-04 DIAGNOSIS — M50.20 CERVICAL DISC HERNIATION: ICD-10-CM

## 2018-04-04 PROCEDURE — 99213 OFFICE O/P EST LOW 20 MIN: CPT | Performed by: NURSE PRACTITIONER

## 2018-04-04 PROCEDURE — 99213 OFFICE O/P EST LOW 20 MIN: CPT

## 2018-04-04 RX ORDER — HYDROCODONE BITARTRATE AND ACETAMINOPHEN 5; 325 MG/1; MG/1
1 TABLET ORAL 2 TIMES DAILY
Qty: 60 TABLET | Refills: 0 | Status: SHIPPED | OUTPATIENT
Start: 2018-04-15 | End: 2018-05-11 | Stop reason: SDUPTHER

## 2018-04-04 ASSESSMENT — ENCOUNTER SYMPTOMS
RESPIRATORY NEGATIVE: 1
GASTROINTESTINAL NEGATIVE: 1
BACK PAIN: 1

## 2018-04-19 RX ORDER — IBUPROFEN 600 MG/1
TABLET ORAL
Qty: 270 TABLET | Refills: 0 | Status: SHIPPED | OUTPATIENT
Start: 2018-04-19 | End: 2018-07-19 | Stop reason: SDUPTHER

## 2018-04-26 RX ORDER — TOLTERODINE 4 MG/1
CAPSULE, EXTENDED RELEASE ORAL
Qty: 90 CAPSULE | Refills: 1 | Status: SHIPPED | OUTPATIENT
Start: 2018-04-26 | End: 2018-10-02 | Stop reason: ALTCHOICE

## 2018-05-11 ENCOUNTER — HOSPITAL ENCOUNTER (OUTPATIENT)
Dept: PAIN MANAGEMENT | Age: 48
Discharge: HOME | End: 2018-05-11
Payer: COMMERCIAL

## 2018-05-11 VITALS
DIASTOLIC BLOOD PRESSURE: 94 MMHG | HEART RATE: 70 BPM | HEIGHT: 59 IN | OXYGEN SATURATION: 98 % | WEIGHT: 228 LBS | RESPIRATION RATE: 16 BRPM | BODY MASS INDEX: 45.96 KG/M2 | TEMPERATURE: 98.3 F | SYSTOLIC BLOOD PRESSURE: 155 MMHG

## 2018-05-11 DIAGNOSIS — M46.1 SACROILIITIS (HCC): ICD-10-CM

## 2018-05-11 DIAGNOSIS — M47.26 OSTEOARTHRITIS OF SPINE WITH RADICULOPATHY, LUMBAR REGION: ICD-10-CM

## 2018-05-11 DIAGNOSIS — M54.16 LUMBAR RADICULOPATHY, CHRONIC: ICD-10-CM

## 2018-05-11 DIAGNOSIS — Z79.899 ENCOUNTER FOR MEDICATION MANAGEMENT: ICD-10-CM

## 2018-05-11 DIAGNOSIS — M54.42 CHRONIC LEFT-SIDED LOW BACK PAIN WITH LEFT-SIDED SCIATICA: ICD-10-CM

## 2018-05-11 DIAGNOSIS — G89.29 CHRONIC LEFT-SIDED LOW BACK PAIN WITH LEFT-SIDED SCIATICA: ICD-10-CM

## 2018-05-11 DIAGNOSIS — M50.30 DEGENERATIVE DISC DISEASE, CERVICAL: ICD-10-CM

## 2018-05-11 DIAGNOSIS — M51.36 DEGENERATION OF LUMBAR INTERVERTEBRAL DISC: ICD-10-CM

## 2018-05-11 DIAGNOSIS — M50.20 CERVICAL DISC HERNIATION: ICD-10-CM

## 2018-05-11 DIAGNOSIS — M47.816 ARTHROPATHY OF LUMBAR FACET JOINT: ICD-10-CM

## 2018-05-11 DIAGNOSIS — M47.816 OSTEOARTHRITIS OF LUMBAR SPINE, UNSPECIFIED SPINAL OSTEOARTHRITIS COMPLICATION STATUS: Primary | ICD-10-CM

## 2018-05-11 DIAGNOSIS — M16.12 PRIMARY OSTEOARTHRITIS OF LEFT HIP: ICD-10-CM

## 2018-05-11 DIAGNOSIS — M47.896 OTHER OSTEOARTHRITIS OF SPINE, LUMBAR REGION: ICD-10-CM

## 2018-05-11 PROCEDURE — 99213 OFFICE O/P EST LOW 20 MIN: CPT | Performed by: NURSE PRACTITIONER

## 2018-05-11 PROCEDURE — 99213 OFFICE O/P EST LOW 20 MIN: CPT

## 2018-05-11 RX ORDER — HYDROCODONE BITARTRATE AND ACETAMINOPHEN 5; 325 MG/1; MG/1
1 TABLET ORAL 2 TIMES DAILY
Qty: 60 TABLET | Refills: 0 | Status: SHIPPED | OUTPATIENT
Start: 2018-05-17 | End: 2018-06-19 | Stop reason: SDUPTHER

## 2018-05-11 ASSESSMENT — ENCOUNTER SYMPTOMS
BACK PAIN: 1
CONSTIPATION: 1
RESPIRATORY NEGATIVE: 1

## 2018-06-19 ENCOUNTER — HOSPITAL ENCOUNTER (OUTPATIENT)
Dept: PAIN MANAGEMENT | Age: 48
Discharge: HOME | End: 2018-06-19
Payer: COMMERCIAL

## 2018-06-19 VITALS
SYSTOLIC BLOOD PRESSURE: 148 MMHG | TEMPERATURE: 98.3 F | OXYGEN SATURATION: 98 % | WEIGHT: 228 LBS | BODY MASS INDEX: 45.96 KG/M2 | HEIGHT: 59 IN | HEART RATE: 71 BPM | DIASTOLIC BLOOD PRESSURE: 90 MMHG | RESPIRATION RATE: 16 BRPM

## 2018-06-19 DIAGNOSIS — M50.30 DEGENERATIVE DISC DISEASE, CERVICAL: ICD-10-CM

## 2018-06-19 DIAGNOSIS — M50.20 CERVICAL DISC HERNIATION: ICD-10-CM

## 2018-06-19 DIAGNOSIS — M47.26 OSTEOARTHRITIS OF SPINE WITH RADICULOPATHY, LUMBAR REGION: ICD-10-CM

## 2018-06-19 DIAGNOSIS — M47.816 ARTHROPATHY OF LUMBAR FACET JOINT: ICD-10-CM

## 2018-06-19 DIAGNOSIS — M47.816 OSTEOARTHRITIS OF LUMBAR SPINE, UNSPECIFIED SPINAL OSTEOARTHRITIS COMPLICATION STATUS: Primary | ICD-10-CM

## 2018-06-19 DIAGNOSIS — M51.36 DEGENERATION OF LUMBAR INTERVERTEBRAL DISC: ICD-10-CM

## 2018-06-19 DIAGNOSIS — Z79.899 ENCOUNTER FOR MEDICATION MANAGEMENT: ICD-10-CM

## 2018-06-19 DIAGNOSIS — M47.896 OTHER OSTEOARTHRITIS OF SPINE, LUMBAR REGION: ICD-10-CM

## 2018-06-19 DIAGNOSIS — M16.12 PRIMARY OSTEOARTHRITIS OF LEFT HIP: ICD-10-CM

## 2018-06-19 DIAGNOSIS — M46.1 SACROILIITIS (HCC): ICD-10-CM

## 2018-06-19 DIAGNOSIS — M54.16 LUMBAR RADICULOPATHY, CHRONIC: ICD-10-CM

## 2018-06-19 DIAGNOSIS — M54.42 CHRONIC LEFT-SIDED LOW BACK PAIN WITH LEFT-SIDED SCIATICA: ICD-10-CM

## 2018-06-19 DIAGNOSIS — G89.29 CHRONIC LEFT-SIDED LOW BACK PAIN WITH LEFT-SIDED SCIATICA: ICD-10-CM

## 2018-06-19 PROCEDURE — 80307 DRUG TEST PRSMV CHEM ANLYZR: CPT

## 2018-06-19 PROCEDURE — 99213 OFFICE O/P EST LOW 20 MIN: CPT | Performed by: NURSE PRACTITIONER

## 2018-06-19 PROCEDURE — 99213 OFFICE O/P EST LOW 20 MIN: CPT

## 2018-06-19 RX ORDER — HYDROCODONE BITARTRATE AND ACETAMINOPHEN 5; 325 MG/1; MG/1
1 TABLET ORAL 2 TIMES DAILY
Qty: 60 TABLET | Refills: 0 | Status: SHIPPED | OUTPATIENT
Start: 2018-06-20 | End: 2018-07-17 | Stop reason: SDUPTHER

## 2018-06-19 ASSESSMENT — ENCOUNTER SYMPTOMS
GASTROINTESTINAL NEGATIVE: 1
BACK PAIN: 1
EYES NEGATIVE: 1
RESPIRATORY NEGATIVE: 1

## 2018-06-24 DIAGNOSIS — I10 ESSENTIAL HYPERTENSION: ICD-10-CM

## 2018-06-24 LAB
6-ACETYLMORPHINE, UR: NOT DETECTED
7-AMINOCLONAZEPAM, URINE: NOT DETECTED
ALPHA-OH-ALPRAZ, URINE: NOT DETECTED
ALPRAZOLAM, URINE: NOT DETECTED
AMPHETAMINES, URINE: NOT DETECTED
BARBITURATES, URINE: NOT DETECTED
BENZOYLECGONINE, UR: NOT DETECTED
BUPRENORPHINE URINE: NOT DETECTED
CARISOPRODOL, UR: NOT DETECTED
CLONAZEPAM, URINE: NOT DETECTED
CODEINE, URINE: NOT DETECTED
CREATININE URINE: 132.8 MG/DL (ref 20–400)
DIAZEPAM, URINE: NOT DETECTED
DRUGS EXPECTED, UR: NORMAL
EER HI RES INTERP UR: NORMAL
ETHYL GLUCURONIDE UR: NOT DETECTED
FENTANYL URINE: NOT DETECTED
HYDROCODONE, URINE: NOT DETECTED
HYDROMORPHONE, URINE: NOT DETECTED
LORAZEPAM, URINE: NOT DETECTED
MARIJUANA METAB, UR: NOT DETECTED
MDA, UR: NOT DETECTED
MDEA, EVE, UR: NOT DETECTED
MDMA URINE: NOT DETECTED
MEPERIDINE METAB, UR: NOT DETECTED
METHADONE, URINE: NOT DETECTED
METHAMPHETAMINE, URINE: NOT DETECTED
METHYLPHENIDATE: NOT DETECTED
MIDAZOLAM, URINE: NOT DETECTED
MORPHINE URINE: NOT DETECTED
NORBUPRENORPHINE, URINE: NOT DETECTED
NORDIAZEPAM, URINE: NOT DETECTED
NORFENTANYL, URINE: NOT DETECTED
NORHYDROCODONE, URINE: NOT DETECTED
NOROXYCODONE, URINE: NOT DETECTED
NOROXYMORPHONE, URINE: NOT DETECTED
OXAZEPAM, URINE: NOT DETECTED
OXYCODONE URINE: NOT DETECTED
OXYMORPHONE, URINE: NOT DETECTED
PAIN MANAGEMENT DRUG PANEL INTERP, URINE: NORMAL
PAIN MGT DRUG PANEL, HI RES, UR: NORMAL
PCP,URINE: NOT DETECTED
PHENTERMINE, UR: NOT DETECTED
PROPOXYPHENE, URINE: NOT DETECTED
TAPENTADOL, URINE: NOT DETECTED
TAPENTADOL-O-SULFATE, URINE: NOT DETECTED
TEMAZEPAM, URINE: NOT DETECTED
TRAMADOL, URINE: NOT DETECTED
ZOLPIDEM, URINE: NOT DETECTED

## 2018-06-25 RX ORDER — OMEPRAZOLE 40 MG/1
CAPSULE, DELAYED RELEASE ORAL
Qty: 90 CAPSULE | Refills: 0 | Status: SHIPPED | OUTPATIENT
Start: 2018-06-25 | End: 2018-09-22 | Stop reason: SDUPTHER

## 2018-06-25 RX ORDER — AMLODIPINE BESYLATE 5 MG/1
TABLET ORAL
Qty: 90 TABLET | Refills: 0 | Status: SHIPPED | OUTPATIENT
Start: 2018-06-25 | End: 2018-09-22 | Stop reason: SDUPTHER

## 2018-07-10 RX ORDER — TOPIRAMATE 100 MG/1
TABLET, FILM COATED ORAL
Qty: 270 TABLET | Refills: 0 | Status: SHIPPED | OUTPATIENT
Start: 2018-07-10 | End: 2018-10-09 | Stop reason: SDUPTHER

## 2018-07-11 RX ORDER — TIZANIDINE 4 MG/1
4 TABLET ORAL 2 TIMES DAILY PRN
Qty: 180 TABLET | Refills: 0 | Status: SHIPPED | OUTPATIENT
Start: 2018-07-11 | End: 2018-09-14 | Stop reason: SDUPTHER

## 2018-07-17 ENCOUNTER — HOSPITAL ENCOUNTER (OUTPATIENT)
Dept: PAIN MANAGEMENT | Age: 48
Discharge: HOME | End: 2018-07-17
Payer: COMMERCIAL

## 2018-07-17 VITALS
SYSTOLIC BLOOD PRESSURE: 139 MMHG | DIASTOLIC BLOOD PRESSURE: 93 MMHG | BODY MASS INDEX: 45.96 KG/M2 | OXYGEN SATURATION: 98 % | TEMPERATURE: 98.2 F | WEIGHT: 228 LBS | RESPIRATION RATE: 20 BRPM | HEART RATE: 69 BPM | HEIGHT: 59 IN

## 2018-07-17 DIAGNOSIS — Z79.899 ENCOUNTER FOR MEDICATION MANAGEMENT: ICD-10-CM

## 2018-07-17 DIAGNOSIS — M50.20 CERVICAL DISC HERNIATION: ICD-10-CM

## 2018-07-17 DIAGNOSIS — M47.816 ARTHROPATHY OF LUMBAR FACET JOINT: ICD-10-CM

## 2018-07-17 DIAGNOSIS — M47.896 OTHER OSTEOARTHRITIS OF SPINE, LUMBAR REGION: ICD-10-CM

## 2018-07-17 DIAGNOSIS — M47.816 OSTEOARTHRITIS OF LUMBAR SPINE, UNSPECIFIED SPINAL OSTEOARTHRITIS COMPLICATION STATUS: Primary | ICD-10-CM

## 2018-07-17 DIAGNOSIS — G89.29 CHRONIC LEFT-SIDED LOW BACK PAIN WITH LEFT-SIDED SCIATICA: ICD-10-CM

## 2018-07-17 DIAGNOSIS — M46.1 SACROILIITIS (HCC): ICD-10-CM

## 2018-07-17 DIAGNOSIS — M50.30 DEGENERATIVE DISC DISEASE, CERVICAL: ICD-10-CM

## 2018-07-17 DIAGNOSIS — M51.36 DEGENERATION OF LUMBAR INTERVERTEBRAL DISC: ICD-10-CM

## 2018-07-17 DIAGNOSIS — M47.26 OSTEOARTHRITIS OF SPINE WITH RADICULOPATHY, LUMBAR REGION: ICD-10-CM

## 2018-07-17 DIAGNOSIS — M54.16 LUMBAR RADICULOPATHY, CHRONIC: ICD-10-CM

## 2018-07-17 DIAGNOSIS — M54.42 CHRONIC LEFT-SIDED LOW BACK PAIN WITH LEFT-SIDED SCIATICA: ICD-10-CM

## 2018-07-17 DIAGNOSIS — M16.12 PRIMARY OSTEOARTHRITIS OF LEFT HIP: ICD-10-CM

## 2018-07-17 PROCEDURE — 99213 OFFICE O/P EST LOW 20 MIN: CPT | Performed by: NURSE PRACTITIONER

## 2018-07-17 PROCEDURE — 99213 OFFICE O/P EST LOW 20 MIN: CPT

## 2018-07-17 RX ORDER — PREGABALIN 75 MG/1
75 CAPSULE ORAL 3 TIMES DAILY
Qty: 90 CAPSULE | Refills: 3 | Status: SHIPPED | OUTPATIENT
Start: 2018-07-17 | End: 2019-01-16 | Stop reason: SDUPTHER

## 2018-07-17 RX ORDER — HYDROCODONE BITARTRATE AND ACETAMINOPHEN 5; 325 MG/1; MG/1
1 TABLET ORAL 2 TIMES DAILY
Qty: 60 TABLET | Refills: 0 | Status: SHIPPED | OUTPATIENT
Start: 2018-07-17 | End: 2018-08-15 | Stop reason: SDUPTHER

## 2018-07-17 ASSESSMENT — ENCOUNTER SYMPTOMS
NAUSEA: 1
BACK PAIN: 1
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
## 2018-07-19 RX ORDER — IBUPROFEN 600 MG/1
TABLET ORAL
Qty: 270 TABLET | Refills: 0 | Status: SHIPPED | OUTPATIENT
Start: 2018-07-19 | End: 2018-10-18 | Stop reason: SDUPTHER

## 2018-08-15 ENCOUNTER — HOSPITAL ENCOUNTER (OUTPATIENT)
Dept: PAIN MANAGEMENT | Age: 48
Discharge: HOME | End: 2018-08-15
Payer: COMMERCIAL

## 2018-08-15 VITALS
HEIGHT: 60 IN | WEIGHT: 228 LBS | OXYGEN SATURATION: 97 % | TEMPERATURE: 98.2 F | HEART RATE: 78 BPM | DIASTOLIC BLOOD PRESSURE: 83 MMHG | RESPIRATION RATE: 20 BRPM | SYSTOLIC BLOOD PRESSURE: 134 MMHG | BODY MASS INDEX: 44.76 KG/M2

## 2018-08-15 DIAGNOSIS — M54.16 LUMBAR RADICULOPATHY, CHRONIC: ICD-10-CM

## 2018-08-15 DIAGNOSIS — M51.36 DEGENERATION OF LUMBAR INTERVERTEBRAL DISC: ICD-10-CM

## 2018-08-15 DIAGNOSIS — G89.29 CHRONIC LEFT-SIDED LOW BACK PAIN WITH LEFT-SIDED SCIATICA: ICD-10-CM

## 2018-08-15 DIAGNOSIS — M47.816 ARTHROPATHY OF LUMBAR FACET JOINT: ICD-10-CM

## 2018-08-15 DIAGNOSIS — M50.30 DEGENERATIVE DISC DISEASE, CERVICAL: ICD-10-CM

## 2018-08-15 DIAGNOSIS — M47.26 OSTEOARTHRITIS OF SPINE WITH RADICULOPATHY, LUMBAR REGION: Primary | ICD-10-CM

## 2018-08-15 DIAGNOSIS — M50.20 CERVICAL DISC HERNIATION: ICD-10-CM

## 2018-08-15 DIAGNOSIS — Z79.899 ENCOUNTER FOR MEDICATION MANAGEMENT: ICD-10-CM

## 2018-08-15 DIAGNOSIS — M54.42 CHRONIC LEFT-SIDED LOW BACK PAIN WITH LEFT-SIDED SCIATICA: ICD-10-CM

## 2018-08-15 DIAGNOSIS — M47.816 OSTEOARTHRITIS OF LUMBAR SPINE, UNSPECIFIED SPINAL OSTEOARTHRITIS COMPLICATION STATUS: ICD-10-CM

## 2018-08-15 PROCEDURE — 99214 OFFICE O/P EST MOD 30 MIN: CPT | Performed by: PAIN MEDICINE

## 2018-08-15 PROCEDURE — 99214 OFFICE O/P EST MOD 30 MIN: CPT

## 2018-08-15 RX ORDER — HYDROCODONE BITARTRATE AND ACETAMINOPHEN 5; 325 MG/1; MG/1
1 TABLET ORAL 2 TIMES DAILY
Qty: 60 TABLET | Refills: 0 | Status: SHIPPED | OUTPATIENT
Start: 2018-08-17 | End: 2018-09-14 | Stop reason: SDUPTHER

## 2018-08-15 ASSESSMENT — PAIN DESCRIPTION - ONSET: ONSET: ON-GOING

## 2018-08-15 ASSESSMENT — ENCOUNTER SYMPTOMS
COUGH: 0
NAUSEA: 0
BLURRED VISION: 0
BOWEL INCONTINENCE: 0
EYES NEGATIVE: 1
RESPIRATORY NEGATIVE: 1
BACK PAIN: 1
GASTROINTESTINAL NEGATIVE: 1
CONSTIPATION: 0
SPUTUM PRODUCTION: 0
PHOTOPHOBIA: 0
ABDOMINAL PAIN: 0
HEARTBURN: 0

## 2018-08-15 ASSESSMENT — PAIN DESCRIPTION - LOCATION: LOCATION: BACK

## 2018-08-15 ASSESSMENT — PAIN DESCRIPTION - PAIN TYPE: TYPE: CHRONIC PAIN

## 2018-08-15 ASSESSMENT — PAIN DESCRIPTION - FREQUENCY: FREQUENCY: CONTINUOUS

## 2018-08-15 ASSESSMENT — PAIN DESCRIPTION - ORIENTATION: ORIENTATION: MID;LOWER;LEFT

## 2018-08-15 ASSESSMENT — PAIN SCALES - GENERAL: PAINLEVEL_OUTOF10: 6

## 2018-08-15 ASSESSMENT — PAIN DESCRIPTION - PROGRESSION: CLINICAL_PROGRESSION: GRADUALLY WORSENING
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
## 2018-08-28 ENCOUNTER — OFFICE VISIT (OUTPATIENT)
Dept: PODIATRY | Age: 48
End: 2018-08-28
Payer: COMMERCIAL

## 2018-08-28 VITALS
BODY MASS INDEX: 44.57 KG/M2 | HEIGHT: 60 IN | HEART RATE: 67 BPM | SYSTOLIC BLOOD PRESSURE: 132 MMHG | DIASTOLIC BLOOD PRESSURE: 78 MMHG | WEIGHT: 227 LBS

## 2018-08-28 DIAGNOSIS — M92.61 HAGLUND'S DEFORMITY, RIGHT: ICD-10-CM

## 2018-08-28 DIAGNOSIS — L98.9 BENIGN SKIN LESION: ICD-10-CM

## 2018-08-28 DIAGNOSIS — M76.62 ACHILLES TENDONITIS, BILATERAL: ICD-10-CM

## 2018-08-28 DIAGNOSIS — B07.0 PLANTAR WART, LEFT FOOT: Primary | ICD-10-CM

## 2018-08-28 DIAGNOSIS — M92.62 HAGLUND'S DEFORMITY, LEFT: ICD-10-CM

## 2018-08-28 DIAGNOSIS — M79.672 PAIN OF LEFT FOOT: ICD-10-CM

## 2018-08-28 DIAGNOSIS — M67.02 ACHILLES TENDON CONTRACTURE, LEFT: ICD-10-CM

## 2018-08-28 DIAGNOSIS — M76.61 ACHILLES TENDONITIS, BILATERAL: ICD-10-CM

## 2018-08-28 PROCEDURE — 73620 X-RAY EXAM OF FOOT: CPT | Performed by: PODIATRIST

## 2018-08-28 PROCEDURE — 11421 EXC H-F-NK-SP B9+MARG 0.6-1: CPT | Performed by: PODIATRIST

## 2018-08-28 PROCEDURE — 99203 OFFICE O/P NEW LOW 30 MIN: CPT | Performed by: PODIATRIST

## 2018-08-28 PROCEDURE — L4397 STATIC OR DYNAMI AFO PRE OTS: HCPCS | Performed by: PODIATRIST

## 2018-08-28 RX ORDER — PREDNISONE 10 MG/1
10 TABLET ORAL DAILY
Qty: 21 EACH | Refills: 0 | Status: SHIPPED | OUTPATIENT
Start: 2018-08-28 | End: 2018-09-18 | Stop reason: ALTCHOICE

## 2018-08-28 NOTE — PROGRESS NOTES
ridges/skin lines, with small black petechia. Pain with squeeze noted. Gait analysis:     Radiographs: 2 views right Foot:  Small posterior calcaneal exostosis. Sudheer's deformity present. Radiographs: 2 views left Foot:  Small posterior calcaneal exostosis. Sudheer's deformity present. Asessment: Patient is a 53 y.o. female with:   1. Plantar wart, left foot    2. Benign skin lesion    3. Pain of left foot    4. Achilles tendonitis, bilateral    5. Sudheer's deformity, left    6. Sudheer's deformity, right    7. Achilles tendon contracture, left          Plan: Patient examined and evaluated. Current condition and treatment options discussed in detail. Verbal and written instructions given to patient. Contact office with any questions/problems/concerns. 1) continue orthotics, good shoes  2) HEP  3) I have dispensed a plantar fascia night splint/Pre-fabricated AFO LEFT. Due to the patient's diagnosis and related symptoms this is medically necessary for the treatment. The function of this device is to stretch the gastro-soleus complex and the plantar fascia, and provide stabilization and compression to the affected area. This device will allow the patient to slowly increase flexibility and ROM of the extremity. Specific instructions on weight bearing and ROM exercises were discussed and given to the patient. The goals and function of this device was explained in detail to the patient. Upon  analysis, the device appeared to be fitting well and the patient states that the device is comfortable at this time. The patient was shown how to properly apply, wear, and care for the device. The patient was able to apply properly. At that time, the device was  dispensed, it was suitable for the patient's condition and was not substandard. No guarantees were given and the precautions were reviewed.  Written instructions and warranty information was given along with the list of the twenty-one (21) Durable Medical

## 2018-08-28 NOTE — PATIENT INSTRUCTIONS
placing a weighted object, such as a soup can, on the other end of the towel. 1. While sitting, place your foot on a towel on the floor and scrunch the towel toward you with your toes. 2. Then, also using your toes, push the towel away from you. [place] pickups    1. Put marbles on the floor next to a cup.  2. Using your toes, try to lift the marbles up from the floor and put them in the cup. Follow-up care is a key part of your treatment and safety. Be sure to make and go to all appointments, and call your doctor if you are having problems. It's also a good idea to know your test results and keep a list of the medicines you take. Where can you learn more? Go to https://shopatplacespeamandeepFandeavor.SeeMedia. org and sign in to your [employer] account. Enter K683 in the Beaming box to learn more about \"Plantar Fasciitis: Exercises. \"     If you do not have an account, please click on the \"Sign Up Now\" link. Current as of: November 29, 2017  Content Version: 11.7  © 4378-5758 [employer]. Care instructions adapted under license by [facility] ([facility]). If you have questions about a medical condition or this instruction, always ask your healthcare professional. [address] any warranty or liability for your use of this information. Patient Education        Achilles Tendon: Exercises  Your Care Instructions  Here are some examples of exercises for your achilles tendon. Start each exercise slowly. Ease off the exercise if you start to have pain. Your doctor or physical therapist will tell you when you can start these exercises and which ones will work best for you. Toe stretch  Toe stretch    7. Sit in a chair, and extend your affected leg so that your heel is on the floor. 8. With your hand, reach down and pull your big toe up and back. Pull toward your ankle and away from the floor. 9. Hold the position for at least 15 to 30 seconds.   10. Repeat 2 to 4 times a session, several times a day. Calf-plantar fascia stretch    4. Sit with your legs extended and knees straight. 5. Place a towel around your foot just under the toes. 6. Hold each end of the towel in each hand, with your hands above your knees. 7. Pull back with the towel so that your foot stretches toward you. 8. Hold the position for at least 15 to 30 seconds. 9. Repeat 2 to 4 times a session, up to 5 sessions a day. Floor stretch    4. Stand about 2 feet from a wall, and place your hands on the wall at about shoulder height. Or you can stand behind a chair, placing your hands on the back of it for balance. 5. Step back with the leg you want to stretch. Keep the leg straight, and press your heel into the floor with your toe turned slightly in.  6. Lean forward, and bend your other leg slightly. Feel the stretch in the Achilles tendon of your back leg. Hold for at least 15 to 30 seconds. 7. Repeat 2 to 4 times a session, up to 5 sessions a day. Stair stretch    3. Stand with the balls of both feet on the edge of a step or curb (or a medium-sized phone book). With at least one hand, hold onto something solid for balance, such as a banister or handrail. 4. Keeping your affected leg straight, slowly let that heel hang down off of the step or curb until you feel a stretch in the back of your calf and/or Achilles area. Some of your weight should still be on the other leg. 5. Hold this position for at least 15 to 30 seconds. 6. Repeat 2 to 4 times a session, up to 5 times a day or whenever your Achilles tendon starts to feel tight. This stretch can also be done with your knee slightly bent. Strength exercise    3. This exercise will get you started on building strength after an Achilles tendon injury. Your doctor or physical therapist can help you move on to more challenging exercises as you heal and get stronger. 4. Stand on a step with your heel off the edge of the step.  Hold on to a handrail or wall for balance. 5. Push up on your toes, then slowly count to 10 as you lower yourself back down until your heel is below the step. If it hurts to push up on your toes, try putting most of your weight on your other foot as you push up, or try using your arms to help you. If you can't do this exercise without causing pain, stop the exercise and talk to your doctor. 6. Repeat the exercise 8 to 12 times, half with the knee straight and half with the knee bent. Follow-up care is a key part of your treatment and safety. Be sure to make and go to all appointments, and call your doctor if you are having problems. It's also a good idea to know your test results and keep a list of the medicines you take. Where can you learn more? Go to https://"[employer]leaheb.[employer]. org and sign in to your [employer] account. Enter L085 in the ALCOHOOT box to learn more about \"Achilles Tendon: Exercises. \"     If you do not have an account, please click on the \"Sign Up Now\" link. Current as of: March 21, 2017  Content Version: 11.7  © 9756-3789 [employer]. Care instructions adapted under license by [facility] ([facility]). If you have questions about a medical condition or this instruction, always ask your healthcare professional. [address] any warranty or liability for your use of this information.

## 2018-08-29 ASSESSMENT — ENCOUNTER SYMPTOMS
BACK PAIN: 1
VOMITING: 0
CONSTIPATION: 0
COLOR CHANGE: 0
DIARRHEA: 0
NAUSEA: 0

## 2018-09-14 ENCOUNTER — HOSPITAL ENCOUNTER (OUTPATIENT)
Dept: PAIN MANAGEMENT | Age: 48
Discharge: HOME | End: 2018-09-14
Payer: COMMERCIAL

## 2018-09-14 VITALS
BODY MASS INDEX: 44.57 KG/M2 | SYSTOLIC BLOOD PRESSURE: 148 MMHG | HEART RATE: 84 BPM | DIASTOLIC BLOOD PRESSURE: 93 MMHG | HEIGHT: 60 IN | WEIGHT: 227 LBS | RESPIRATION RATE: 16 BRPM

## 2018-09-14 DIAGNOSIS — M51.36 DEGENERATION OF LUMBAR INTERVERTEBRAL DISC: Chronic | ICD-10-CM

## 2018-09-14 DIAGNOSIS — Z79.899 ENCOUNTER FOR MEDICATION MANAGEMENT: ICD-10-CM

## 2018-09-14 DIAGNOSIS — M54.16 LUMBAR RADICULOPATHY, CHRONIC: Primary | Chronic | ICD-10-CM

## 2018-09-14 DIAGNOSIS — G89.29 CHRONIC LEFT-SIDED LOW BACK PAIN WITH LEFT-SIDED SCIATICA: ICD-10-CM

## 2018-09-14 DIAGNOSIS — M46.1 SACROILIITIS (HCC): ICD-10-CM

## 2018-09-14 DIAGNOSIS — M50.20 CERVICAL DISC HERNIATION: ICD-10-CM

## 2018-09-14 DIAGNOSIS — M47.816 ARTHROPATHY OF LUMBAR FACET JOINT: ICD-10-CM

## 2018-09-14 DIAGNOSIS — M47.816 OSTEOARTHRITIS OF LUMBAR SPINE, UNSPECIFIED SPINAL OSTEOARTHRITIS COMPLICATION STATUS: ICD-10-CM

## 2018-09-14 DIAGNOSIS — M47.816 LUMBAR SPONDYLOSIS: Chronic | ICD-10-CM

## 2018-09-14 DIAGNOSIS — M54.42 CHRONIC LEFT-SIDED LOW BACK PAIN WITH LEFT-SIDED SCIATICA: ICD-10-CM

## 2018-09-14 PROCEDURE — 80307 DRUG TEST PRSMV CHEM ANLYZR: CPT

## 2018-09-14 PROCEDURE — 99214 OFFICE O/P EST MOD 30 MIN: CPT | Performed by: NURSE PRACTITIONER

## 2018-09-14 PROCEDURE — 99213 OFFICE O/P EST LOW 20 MIN: CPT

## 2018-09-14 RX ORDER — HYDROCODONE BITARTRATE AND ACETAMINOPHEN 5; 325 MG/1; MG/1
1 TABLET ORAL 2 TIMES DAILY
Qty: 60 TABLET | Refills: 0 | Status: SHIPPED | OUTPATIENT
Start: 2018-09-17 | End: 2018-10-15 | Stop reason: SDUPTHER

## 2018-09-14 RX ORDER — TIZANIDINE 4 MG/1
4 TABLET ORAL EVERY 8 HOURS PRN
Qty: 270 TABLET | Refills: 0 | Status: SHIPPED | OUTPATIENT
Start: 2018-09-14 | End: 2018-12-13 | Stop reason: SDUPTHER

## 2018-09-14 ASSESSMENT — ENCOUNTER SYMPTOMS
CONSTIPATION: 0
SHORTNESS OF BREATH: 0
BACK PAIN: 1
COUGH: 0
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
## 2018-09-18 ENCOUNTER — OFFICE VISIT (OUTPATIENT)
Dept: FAMILY MEDICINE CLINIC | Age: 48
End: 2018-09-18
Payer: COMMERCIAL

## 2018-09-18 ENCOUNTER — HOSPITAL ENCOUNTER (OUTPATIENT)
Age: 48
Setting detail: SPECIMEN
Discharge: HOME | End: 2018-09-18
Payer: COMMERCIAL

## 2018-09-18 VITALS
BODY MASS INDEX: 44.96 KG/M2 | HEIGHT: 60 IN | TEMPERATURE: 99.3 F | SYSTOLIC BLOOD PRESSURE: 134 MMHG | WEIGHT: 229 LBS | HEART RATE: 72 BPM | DIASTOLIC BLOOD PRESSURE: 84 MMHG

## 2018-09-18 DIAGNOSIS — E11.9 TYPE 2 DIABETES MELLITUS WITHOUT COMPLICATION, WITHOUT LONG-TERM CURRENT USE OF INSULIN (HCC): Primary | ICD-10-CM

## 2018-09-18 DIAGNOSIS — Z23 NEED FOR PROPHYLACTIC VACCINATION AND INOCULATION AGAINST INFLUENZA: ICD-10-CM

## 2018-09-18 DIAGNOSIS — R30.0 DYSURIA: ICD-10-CM

## 2018-09-18 DIAGNOSIS — I10 ESSENTIAL HYPERTENSION: ICD-10-CM

## 2018-09-18 LAB
6-ACETYLMORPHINE, UR: NOT DETECTED
7-AMINOCLONAZEPAM, URINE: NOT DETECTED
ALPHA-OH-ALPRAZ, URINE: NOT DETECTED
ALPRAZOLAM, URINE: NOT DETECTED
AMPHETAMINES, URINE: NOT DETECTED
BARBITURATES, URINE: NOT DETECTED
BENZOYLECGONINE, UR: NOT DETECTED
BILIRUBIN, POC: NORMAL
BLOOD URINE, POC: NORMAL
BUPRENORPHINE URINE: NOT DETECTED
CARISOPRODOL, UR: NOT DETECTED
CLARITY, POC: NORMAL
CLONAZEPAM, URINE: NOT DETECTED
CODEINE, URINE: NOT DETECTED
COLOR, POC: YELLOW
CREATININE URINE: 193 MG/DL (ref 20–400)
DIAZEPAM, URINE: NOT DETECTED
DRUGS EXPECTED, UR: NORMAL
EER HI RES INTERP UR: NORMAL
ETHYL GLUCURONIDE UR: NOT DETECTED
FENTANYL URINE: NOT DETECTED
GLUCOSE URINE, POC: NORMAL
HBA1C MFR BLD: 7 %
HYDROCODONE, URINE: PRESENT
HYDROMORPHONE, URINE: NOT DETECTED
KETONES, POC: NORMAL
LEUKOCYTE EST, POC: NORMAL
LORAZEPAM, URINE: NOT DETECTED
MARIJUANA METAB, UR: NOT DETECTED
MDA, UR: NOT DETECTED
MDEA, EVE, UR: NOT DETECTED
MDMA URINE: NOT DETECTED
MEPERIDINE METAB, UR: NOT DETECTED
METHADONE, URINE: NOT DETECTED
METHAMPHETAMINE, URINE: NOT DETECTED
METHYLPHENIDATE: NOT DETECTED
MIDAZOLAM, URINE: NOT DETECTED
MORPHINE URINE: NOT DETECTED
NITRITE, POC: NORMAL
NORBUPRENORPHINE, URINE: NOT DETECTED
NORDIAZEPAM, URINE: NOT DETECTED
NORFENTANYL, URINE: NOT DETECTED
NORHYDROCODONE, URINE: PRESENT
NOROXYCODONE, URINE: NOT DETECTED
NOROXYMORPHONE, URINE: NOT DETECTED
OXAZEPAM, URINE: NOT DETECTED
OXYCODONE URINE: NOT DETECTED
OXYMORPHONE, URINE: NOT DETECTED
PAIN MANAGEMENT DRUG PANEL INTERP, URINE: NORMAL
PAIN MGT DRUG PANEL, HI RES, UR: NORMAL
PCP,URINE: NOT DETECTED
PH, POC: 7
PHENTERMINE, UR: NOT DETECTED
PROPOXYPHENE, URINE: NOT DETECTED
PROTEIN, POC: NORMAL
SPECIFIC GRAVITY, POC: 1.02
TAPENTADOL, URINE: NOT DETECTED
TAPENTADOL-O-SULFATE, URINE: NOT DETECTED
TEMAZEPAM, URINE: NOT DETECTED
TRAMADOL, URINE: NOT DETECTED
UROBILINOGEN, POC: 0.2
ZOLPIDEM, URINE: NOT DETECTED

## 2018-09-18 PROCEDURE — 99214 OFFICE O/P EST MOD 30 MIN: CPT | Performed by: FAMILY MEDICINE

## 2018-09-18 PROCEDURE — 83036 HEMOGLOBIN GLYCOSYLATED A1C: CPT | Performed by: FAMILY MEDICINE

## 2018-09-18 PROCEDURE — 90686 IIV4 VACC NO PRSV 0.5 ML IM: CPT | Performed by: FAMILY MEDICINE

## 2018-09-18 PROCEDURE — 81003 URINALYSIS AUTO W/O SCOPE: CPT | Performed by: FAMILY MEDICINE

## 2018-09-18 PROCEDURE — 90471 IMMUNIZATION ADMIN: CPT | Performed by: FAMILY MEDICINE

## 2018-09-18 RX ORDER — METFORMIN HYDROCHLORIDE 500 MG/1
500 TABLET, EXTENDED RELEASE ORAL
Qty: 30 TABLET | Refills: 3 | Status: SHIPPED | OUTPATIENT
Start: 2018-09-18 | End: 2019-01-04 | Stop reason: SDUPTHER

## 2018-09-18 NOTE — PROGRESS NOTES
Subjective:      Patient ID: Vilma Campos is a 53 y.o. female. Here today concerned about her sugars. She had a plantars wart removed a few weeks ago with Dr. Allyssa Mccarthy. She asked him about it and he said that it shouldn't have taken more than 10 days to heal. She is still having some tenderness and hurts to walk on. She has cut herself recently and it seemed to take linger to heal.     Visit Information    Have you changed or started any medications since your last visit including any over-the-counter medicines, vitamins, or herbal medicines? no   Are you having any side effects from any of your medications? -  no  Have you stopped taking any of your medications? Is so, why? -  no    Have you seen any other physician or provider since your last visit? No  Have you had any other diagnostic tests since your last visit? No  Have you been seen in the emergency room and/or had an admission to a hospital since we last saw you? No  Have you had your routine dental cleaning in the past 6 months? no    Have you activated your [employer] account? If not, what are your barriers?  Yes     Patient Care Team:  Marycarmen Veliz MD as PCP - General (Family Medicine)  Marycarmen Veliz MD as PCP - S Attributed Provider  [facility] MD Andreas as Orthopedic Surgeon (Orthopedic Surgery)  Lyn Walsh MD as Consulting Physician (Gastroenterology)    Medical History Review      Health Maintenance   Topic Date Due    HIV screen  08/08/1986    Diabetes screen  08/08/2011    Cervical cancer screen  07/03/2017    Flu vaccine (1) 09/01/2018    Colon cancer screen colonoscopy  10/31/2019    Lipid screen  10/17/2022    DTaP/Tdap/Td vaccine (2 - Td) 01/11/2028       HPI  72-year-old female is seen in the office today for follow-up for her hypertension blood pressure is controlled she is on Norvasc denies of any chest pain or shortness of her breath patient's blood sugars were high a few times and hemoglobin A1c was done and it was high she

## 2018-09-19 LAB
CULTURE: NO GROWTH
Lab: NORMAL
SPECIMEN DESCRIPTION: NORMAL
STATUS: NORMAL

## 2018-09-20 ASSESSMENT — ENCOUNTER SYMPTOMS
SHORTNESS OF BREATH: 0
BACK PAIN: 0
NAUSEA: 0
SORE THROAT: 0
ABDOMINAL PAIN: 0

## 2018-09-22 DIAGNOSIS — I10 ESSENTIAL HYPERTENSION: ICD-10-CM

## 2018-09-24 RX ORDER — OMEPRAZOLE 40 MG/1
CAPSULE, DELAYED RELEASE ORAL
Qty: 90 CAPSULE | Refills: 0 | Status: SHIPPED | OUTPATIENT
Start: 2018-09-24 | End: 2018-12-22 | Stop reason: SDUPTHER

## 2018-09-24 RX ORDER — AMLODIPINE BESYLATE 5 MG/1
TABLET ORAL
Qty: 90 TABLET | Refills: 0 | Status: SHIPPED | OUTPATIENT
Start: 2018-09-24 | End: 2018-12-18 | Stop reason: ALTCHOICE

## 2018-10-02 ENCOUNTER — OFFICE VISIT (OUTPATIENT)
Dept: FAMILY MEDICINE CLINIC | Age: 48
End: 2018-10-02
Payer: COMMERCIAL

## 2018-10-02 VITALS
HEART RATE: 88 BPM | DIASTOLIC BLOOD PRESSURE: 80 MMHG | BODY MASS INDEX: 44.17 KG/M2 | SYSTOLIC BLOOD PRESSURE: 138 MMHG | HEIGHT: 60 IN | WEIGHT: 225 LBS | TEMPERATURE: 98.5 F

## 2018-10-02 DIAGNOSIS — E11.9 TYPE 2 DIABETES MELLITUS WITHOUT COMPLICATION, WITHOUT LONG-TERM CURRENT USE OF INSULIN (HCC): Primary | ICD-10-CM

## 2018-10-02 DIAGNOSIS — R22.42 LUMP OF LEFT THIGH: ICD-10-CM

## 2018-10-02 DIAGNOSIS — I10 ESSENTIAL HYPERTENSION: ICD-10-CM

## 2018-10-02 DIAGNOSIS — R32 URINARY INCONTINENCE, UNSPECIFIED TYPE: ICD-10-CM

## 2018-10-02 PROCEDURE — 99214 OFFICE O/P EST MOD 30 MIN: CPT | Performed by: FAMILY MEDICINE

## 2018-10-02 RX ORDER — CEPHALEXIN 500 MG/1
500 CAPSULE ORAL 2 TIMES DAILY
Qty: 20 CAPSULE | Refills: 0 | Status: SHIPPED | OUTPATIENT
Start: 2018-10-02 | End: 2018-10-18 | Stop reason: ALTCHOICE

## 2018-10-02 ASSESSMENT — ENCOUNTER SYMPTOMS
SHORTNESS OF BREATH: 0
ABDOMINAL PAIN: 0
NAUSEA: 0
SORE THROAT: 0

## 2018-10-02 NOTE — PROGRESS NOTES
Subjective:      Patient ID: Roge Bell is a 53 y.o. female. Here for diabetes f/u she did bring her home numbers with her. She also has a boil in her groin area that is getting worse. She has tried to treat it herself. Chronic Disease Visit Information    BP Readings from Last 3 Encounters:   10/02/18 138/80   09/18/18 134/84   09/14/18 (!) 148/93          Hemoglobin A1C (%)   Date Value   09/18/2018 7.0     LDL Cholesterol (mg/dL)   Date Value   04/20/2016 122     LDL Calculated (mg/dL)   Date Value   10/17/2017 126     HDL (mg/dl)   Date Value   10/17/2017 48     BUN (mg/dl)   Date Value   10/17/2017 13     CREATININE (mg/dl)   Date Value   10/17/2017 0.9     Glucose (mg/dl)   Date Value   10/17/2017 123 (H)            Have you changed or started any medications since your last visit including any over-the-counter medicines, vitamins, or herbal medicines? no   Are you having any side effects from any of your medications? -  no  Have you stopped taking any of your medications? Is so, why? -  no    Have you seen any other physician or provider since your last visit? No  Have you had any other diagnostic tests since your last visit? No  Have you been seen in the emergency room and/or had an admission to a hospital since we last saw you? No  Have you had your annual diabetic retinal (eye) exam? Yes - Records Requested  Have you had your routine dental cleaning in the past 6 months? yes -     Have you activated your [employer] account? If not, what are your barriers?  Yes     Patient Care Team:  Maxine Bautista MD as PCP - General (Family Medicine)  Maxine Bautista MD as PCP - S Attributed Provider  Keaton Jenkins MD as Orthopedic Surgeon (Orthopedic Surgery)  Adalberto Arriaza MD as Consulting Physician (Gastroenterology)         Medical History Review      Health Maintenance   Topic Date Due    HIV screen  08/08/1986    Cervical cancer screen  07/03/2017    Potassium monitoring  10/17/2018    Creatinine monitoring  10/17/2018    Colon cancer screen colonoscopy  10/31/2019    Lipid screen  10/17/2022    DTaP/Tdap/Td vaccine (2 - Td) 01/11/2028    Flu vaccine  Completed       HPI  73-year-old female is seen in the Office. Today for follow-up for her diabetes was started on Glucophage her blood sugars are running good they are between 97 and 191 she is watching her diet and has lost some weight denies of any numbness or tingling in her feet. She complains off  Lump on her left thigh there is no drainage present and it is painful Denies of any fever or chills, Also detrol doesn't seem to help her incontinence will start her on Myrbetriq Does not want to see a urologist at present  Review of Systems   Constitutional: Negative for appetite change. HENT: Negative for ear pain and sore throat. Eyes: Positive for visual disturbance. Wears glasses   Respiratory: Negative for shortness of breath. Cardiovascular: Negative for chest pain. Gastrointestinal: Negative for abdominal pain and nausea. Genitourinary: Negative for frequency and pelvic pain. Neurological: Negative for dizziness and headaches. Objective:   Physical Exam   Constitutional: She is oriented to person, place, and time. She appears well-developed and well-nourished. /80 (Site: Right Upper Arm, Position: Sitting, Cuff Size: Large Adult)   Pulse 88   Temp 98.5 °F (36.9 °C) (Oral)   Ht 5' (1.524 m)   Wt 225 lb (102.1 kg)   BMI 43.94 kg/m²    HENT:   Head: Normocephalic. Mouth/Throat: Oropharynx is clear and moist.        Eyes: Conjunctivae are normal.   Cardiovascular: Normal rate and regular rhythm. Pulmonary/Chest: Breath sounds normal. She has no rales. Abdominal: Soft. Bowel sounds are normal. There is no tenderness. Musculoskeletal: She exhibits edema. Lymphadenopathy:     She has no cervical adenopathy. Neurological: She is alert and oriented to person, place, and time.    Skin:   Lump present on the

## 2018-10-09 RX ORDER — TOPIRAMATE 100 MG/1
TABLET, FILM COATED ORAL
Qty: 270 TABLET | Refills: 0 | Status: SHIPPED | OUTPATIENT
Start: 2018-10-09 | End: 2019-01-09 | Stop reason: SDUPTHER

## 2018-10-15 ENCOUNTER — HOSPITAL ENCOUNTER (OUTPATIENT)
Dept: PAIN MANAGEMENT | Age: 48
Discharge: HOME | End: 2018-10-15
Payer: COMMERCIAL

## 2018-10-15 DIAGNOSIS — M47.816 OSTEOARTHRITIS OF LUMBAR SPINE, UNSPECIFIED SPINAL OSTEOARTHRITIS COMPLICATION STATUS: ICD-10-CM

## 2018-10-15 DIAGNOSIS — M54.42 CHRONIC LEFT-SIDED LOW BACK PAIN WITH LEFT-SIDED SCIATICA: ICD-10-CM

## 2018-10-15 DIAGNOSIS — Z79.899 ENCOUNTER FOR MEDICATION MANAGEMENT: ICD-10-CM

## 2018-10-15 DIAGNOSIS — M51.36 DEGENERATION OF LUMBAR INTERVERTEBRAL DISC: Chronic | ICD-10-CM

## 2018-10-15 DIAGNOSIS — M54.16 LUMBAR RADICULOPATHY, CHRONIC: Chronic | ICD-10-CM

## 2018-10-15 DIAGNOSIS — M47.816 ARTHROPATHY OF LUMBAR FACET JOINT: ICD-10-CM

## 2018-10-15 DIAGNOSIS — G89.29 CHRONIC LEFT-SIDED LOW BACK PAIN WITH LEFT-SIDED SCIATICA: ICD-10-CM

## 2018-10-15 DIAGNOSIS — M47.26 OSTEOARTHRITIS OF SPINE WITH RADICULOPATHY, LUMBAR REGION: ICD-10-CM

## 2018-10-15 DIAGNOSIS — M47.816 LUMBAR SPONDYLOSIS: Primary | Chronic | ICD-10-CM

## 2018-10-15 DIAGNOSIS — M50.20 CERVICAL DISC HERNIATION: ICD-10-CM

## 2018-10-15 PROCEDURE — 99213 OFFICE O/P EST LOW 20 MIN: CPT

## 2018-10-15 PROCEDURE — 99214 OFFICE O/P EST MOD 30 MIN: CPT | Performed by: NURSE PRACTITIONER

## 2018-10-15 RX ORDER — HYDROCODONE BITARTRATE AND ACETAMINOPHEN 5; 325 MG/1; MG/1
1 TABLET ORAL 2 TIMES DAILY
Qty: 60 TABLET | Refills: 0 | Status: SHIPPED | OUTPATIENT
Start: 2018-10-17 | End: 2018-11-13 | Stop reason: SDUPTHER

## 2018-10-15 ASSESSMENT — ENCOUNTER SYMPTOMS
COUGH: 0
BACK PAIN: 1
SHORTNESS OF BREATH: 0
CONSTIPATION: 0

## 2018-10-15 NOTE — PROGRESS NOTES
Disp: 90 capsule, Rfl: 3    sertraline (ZOLOFT) 50 MG tablet, TAKE 1 TABLET DAILY, Disp: 90 tablet, Rfl: 1    b complex vitamins capsule, Take 1 capsule by mouth daily, Disp: , Rfl:     docusate sodium (COLACE) 100 MG capsule, Take 1 capsule by mouth daily as needed for Constipation, Disp: 30 capsule, Rfl: 2    Elastic Bandages & Supports (LUMBAR BACK BRACE/SUPPORT PAD) MISC, 1 each by Does not apply route daily as needed (pain), Disp: 1 each, Rfl: 0    Melatonin ER 5 MG TBCR, Take by mouth as needed, Disp: , Rfl:     vitamin D (CHOLECALCIFEROL) 1000 UNIT TABS tablet, Take 1,000 Units by mouth daily, Disp: , Rfl:     Zinc 30 MG TABS, Take by mouth, Disp: , Rfl:     Ascorbic Acid (VITAMIN C) 500 MG CAPS, Take by mouth, Disp: , Rfl:     ECHINACEA PO, Take by mouth, Disp: , Rfl:     Probiotic Product (PROBIOTIC DAILY PO), Take 1 tablet by mouth daily. , Disp: , Rfl:     Glucosamine-Chondroitin (GLUCOSAMINE CHONDR COMPLEX PO), Take  by mouth., Disp: , Rfl:     Multiple Vitamins-Calcium (ONE-A-DAY WOMENS FORMULA PO), Take  by mouth., Disp: , Rfl:     Family History   Problem Relation Age of Onset    Cancer Mother     Heart Disease Father     Diabetes Father     High Blood Pressure Father     Other Other         Celiac Sprue. Aunt       Social History     Social History    Marital status:      Spouse name: N/A    Number of children: N/A    Years of education: N/A     Occupational History    unemployed      Social History Main Topics    Smoking status: Never Smoker    Smokeless tobacco: Never Used    Alcohol use No    Drug use: No    Sexual activity: Yes     Other Topics Concern    Not on file     Social History Narrative    No narrative on file       Review of Systems:  Review of Systems   Constitution: Negative for chills and fever. Cardiovascular: Negative for chest pain and irregular heartbeat. Respiratory: Negative for cough and shortness of breath.     Musculoskeletal: Positive demonstrate no discrete mass.     Impression: Lumbar spondylotic changes without evidence for significant central canal stenosis. Please see above level by level complete analysis    Last MAMI 9/2018 - appropriate    Assessment:  Problem List Items Addressed This Visit     Lumbar radiculopathy, chronic (Chronic)    Relevant Medications    HYDROcodone-acetaminophen (NORCO) 5-325 MG per tablet (Start on 10/17/2018)    Other Relevant Orders    FACET JOINT L/S    FL INJ LUMB/SAC FACET SINGLE LEVEL    Degeneration of lumbar intervertebral disc (Chronic)    Relevant Medications    HYDROcodone-acetaminophen (NORCO) 5-325 MG per tablet (Start on 10/17/2018)    Other Relevant Orders    FACET JOINT L/S    FL INJ LUMB/SAC FACET SINGLE LEVEL    Lumbar spondylosis - Primary (Chronic)    Relevant Medications    HYDROcodone-acetaminophen (NORCO) 5-325 MG per tablet (Start on 10/17/2018)    Other Relevant Orders    FACET JOINT L/S    FL INJ LUMB/SAC FACET SINGLE LEVEL    Cervical disc herniation    Relevant Medications    HYDROcodone-acetaminophen (NORCO) 5-325 MG per tablet (Start on 10/17/2018)    Osteoarthritis of lumbar spine    Relevant Medications    HYDROcodone-acetaminophen (NORCO) 5-325 MG per tablet (Start on 10/17/2018)    Arthropathy of lumbar facet joint    Relevant Medications    HYDROcodone-acetaminophen (NORCO) 5-325 MG per tablet (Start on 10/17/2018)    Chronic left-sided low back pain with left-sided sciatica    Relevant Medications    HYDROcodone-acetaminophen (NORCO) 5-325 MG per tablet (Start on 10/17/2018)    Osteoarthritis of spine with radiculopathy, lumbar region    Relevant Medications    HYDROcodone-acetaminophen (NORCO) 5-325 MG per tablet (Start on 10/17/2018)    Encounter for medication management            Treatment Plan:  DISCUSSION: Treatment options discussed with patient and allquestions answered to patient's satisfaction. Patient relates current medications are helping the pain.  Patient reports

## 2018-10-18 ENCOUNTER — OFFICE VISIT (OUTPATIENT)
Dept: PODIATRY | Age: 48
End: 2018-10-18
Payer: COMMERCIAL

## 2018-10-18 VITALS — HEIGHT: 61 IN | WEIGHT: 225 LBS | BODY MASS INDEX: 42.48 KG/M2

## 2018-10-18 DIAGNOSIS — M67.02 ACHILLES TENDON CONTRACTURE, LEFT: ICD-10-CM

## 2018-10-18 DIAGNOSIS — M67.88 ACHILLES TENDINOSIS OF LEFT LOWER EXTREMITY: ICD-10-CM

## 2018-10-18 DIAGNOSIS — M92.62 HAGLUND'S DEFORMITY, LEFT: Primary | ICD-10-CM

## 2018-10-18 DIAGNOSIS — M72.2 PLANTAR FASCIITIS, LEFT: ICD-10-CM

## 2018-10-18 DIAGNOSIS — M79.672 PAIN OF LEFT FOOT: ICD-10-CM

## 2018-10-18 PROCEDURE — 99213 OFFICE O/P EST LOW 20 MIN: CPT | Performed by: PODIATRIST

## 2018-10-18 PROCEDURE — L4360 PNEUMAT WALKING BOOT PRE CST: HCPCS | Performed by: PODIATRIST

## 2018-10-18 RX ORDER — PREDNISONE 10 MG/1
1 TABLET ORAL DAILY
Qty: 21 EACH | Refills: 0 | Status: SHIPPED | OUTPATIENT
Start: 2018-10-18 | End: 2018-11-30

## 2018-10-18 RX ORDER — IBUPROFEN 600 MG/1
TABLET ORAL
Qty: 270 TABLET | Refills: 0 | Status: SHIPPED | OUTPATIENT
Start: 2018-10-18 | End: 2019-01-16 | Stop reason: SDUPTHER

## 2018-10-18 ASSESSMENT — ENCOUNTER SYMPTOMS
CONSTIPATION: 0
NAUSEA: 0
DIARRHEA: 0
COLOR CHANGE: 0
VOMITING: 0
BACK PAIN: 1

## 2018-10-18 NOTE — PROGRESS NOTES
insertion, and superior posterior calcaneus with prominence, lateral calcaneal tubercle, and lateral band of plantar fascia. left worse than rightBilateral. normal medial longitudinal arch, Bilateral.  Ankle ROM abnormal - tight posterior group,Bilateral.      Integument: Warm, dry, supple, Bilateral.  Multiple rough hyperkeratotic papules on the plantar left foot, total size 0.7 cm, that disrupt dermal ridges/skin lines, with small black petechia. Pain with squeeze noted. Gait analysis:     Radiographs: 2 views right Foot:  Small posterior calcaneal exostosis. Sudheer's deformity present. Radiographs: 2 views left Foot:  Small posterior calcaneal exostosis. Sudheer's deformity present. Asessment: Patient is a 53 y.o. female with:   1. Sudheer's deformity, left    2. Achilles tendon contracture, left    3. Achilles tendinosis of left lower extremity    4. Plantar fasciitis, left    5. Pain of left foot          Plan: Patient examined and evaluated. Current condition and treatment options discussed in detail. Verbal and written instructions given to patient. Contact office with any questions/problems/concerns. 1)  good shoes  2) HEP  3) I have dispensed a pneumatic equalizer walker. Due to the patient's diagnosis and related symptoms this is medically necessary for the treatment. The function of this device is to restrict and limit motion and provide stabilization and compression to the affected area. This device will allow the patient to slowly increase strength and ROM of the extremity. Specific instructions on weight bearing and ROM exercises were discussed and given to the patient. The goals and function of this device was explained in detail to the patient. Upon gait analysis, the device appeared to be fitting well and the patient states that the device is comfortable at this time. The patient was shown how to properly apply, wear, and care for the device.  The patient was able to apply properly

## 2018-10-22 DIAGNOSIS — E11.9 TYPE 2 DIABETES MELLITUS WITHOUT COMPLICATION, WITHOUT LONG-TERM CURRENT USE OF INSULIN (HCC): Primary | ICD-10-CM

## 2018-10-22 RX ORDER — GLUCOSAMINE HCL/CHONDROITIN SU 500-400 MG
CAPSULE ORAL
Qty: 360 STRIP | Refills: 0 | Status: CANCELLED | OUTPATIENT
Start: 2018-10-22

## 2018-10-22 RX ORDER — LANCETS 30 GAUGE
EACH MISCELLANEOUS
Qty: 360 EACH | Refills: 3 | Status: CANCELLED | OUTPATIENT
Start: 2018-10-22

## 2018-10-23 ENCOUNTER — HOSPITAL ENCOUNTER (OUTPATIENT)
Dept: GENERAL RADIOLOGY | Age: 48
Discharge: HOME | End: 2018-10-25
Payer: COMMERCIAL

## 2018-10-23 ENCOUNTER — HOSPITAL ENCOUNTER (OUTPATIENT)
Dept: PAIN MANAGEMENT | Age: 48
Discharge: HOME | End: 2018-10-23
Payer: COMMERCIAL

## 2018-10-23 VITALS
WEIGHT: 225 LBS | TEMPERATURE: 98.2 F | RESPIRATION RATE: 16 BRPM | HEIGHT: 61 IN | DIASTOLIC BLOOD PRESSURE: 91 MMHG | SYSTOLIC BLOOD PRESSURE: 170 MMHG | BODY MASS INDEX: 42.48 KG/M2 | HEART RATE: 55 BPM | OXYGEN SATURATION: 98 %

## 2018-10-23 DIAGNOSIS — M47.816 LUMBAR SPONDYLOSIS: ICD-10-CM

## 2018-10-23 DIAGNOSIS — M51.36 DEGENERATION OF LUMBAR INTERVERTEBRAL DISC: ICD-10-CM

## 2018-10-23 DIAGNOSIS — M47.26 OSTEOARTHRITIS OF SPINE WITH RADICULOPATHY, LUMBAR REGION: ICD-10-CM

## 2018-10-23 DIAGNOSIS — M47.816 LUMBAR SPONDYLOSIS: Primary | ICD-10-CM

## 2018-10-23 DIAGNOSIS — M54.16 LUMBAR RADICULOPATHY, CHRONIC: ICD-10-CM

## 2018-10-23 PROCEDURE — 3209999900 FLUORO FOR SURGICAL PROCEDURES

## 2018-10-23 PROCEDURE — 64495 INJ PARAVERT F JNT L/S 3 LEV: CPT

## 2018-10-23 PROCEDURE — 6360000004 HC RX CONTRAST MEDICATION

## 2018-10-23 PROCEDURE — 6360000002 HC RX W HCPCS: Performed by: PAIN MEDICINE

## 2018-10-23 PROCEDURE — 64493 INJ PARAVERT F JNT L/S 1 LEV: CPT

## 2018-10-23 PROCEDURE — 64494 INJ PARAVERT F JNT L/S 2 LEV: CPT | Performed by: PAIN MEDICINE

## 2018-10-23 PROCEDURE — 64495 INJ PARAVERT F JNT L/S 3 LEV: CPT | Performed by: PAIN MEDICINE

## 2018-10-23 PROCEDURE — 2500000003 HC RX 250 WO HCPCS

## 2018-10-23 PROCEDURE — 6360000002 HC RX W HCPCS

## 2018-10-23 PROCEDURE — 64493 INJ PARAVERT F JNT L/S 1 LEV: CPT | Performed by: PAIN MEDICINE

## 2018-10-23 PROCEDURE — 64494 INJ PARAVERT F JNT L/S 2 LEV: CPT

## 2018-10-23 RX ORDER — FENTANYL CITRATE 50 UG/ML
INJECTION, SOLUTION INTRAMUSCULAR; INTRAVENOUS
Status: COMPLETED | OUTPATIENT
Start: 2018-10-23 | End: 2018-10-23

## 2018-10-23 RX ORDER — MIDAZOLAM HYDROCHLORIDE 1 MG/ML
INJECTION INTRAMUSCULAR; INTRAVENOUS
Status: COMPLETED | OUTPATIENT
Start: 2018-10-23 | End: 2018-10-23

## 2018-10-23 RX ADMIN — FENTANYL CITRATE 25 MCG: 50 INJECTION, SOLUTION INTRAMUSCULAR; INTRAVENOUS at 09:33

## 2018-10-23 RX ADMIN — MIDAZOLAM 2 MG: 1 INJECTION INTRAMUSCULAR; INTRAVENOUS at 09:19

## 2018-10-23 RX ADMIN — FENTANYL CITRATE 50 MCG: 50 INJECTION, SOLUTION INTRAMUSCULAR; INTRAVENOUS at 09:24

## 2018-10-23 ASSESSMENT — PAIN - FUNCTIONAL ASSESSMENT: PAIN_FUNCTIONAL_ASSESSMENT: 0-10

## 2018-10-23 ASSESSMENT — PAIN DESCRIPTION - ONSET: ONSET: ON-GOING

## 2018-10-23 ASSESSMENT — PAIN DESCRIPTION - PAIN TYPE: TYPE: CHRONIC PAIN

## 2018-10-23 ASSESSMENT — PAIN DESCRIPTION - DIRECTION: RADIATING_TOWARDS: LEFT LEG TO KNEE

## 2018-10-23 ASSESSMENT — PAIN DESCRIPTION - PROGRESSION: CLINICAL_PROGRESSION: NOT CHANGED

## 2018-10-23 ASSESSMENT — PAIN DESCRIPTION - LOCATION: LOCATION: BACK

## 2018-10-23 ASSESSMENT — PAIN DESCRIPTION - FREQUENCY: FREQUENCY: CONTINUOUS

## 2018-10-23 ASSESSMENT — ACTIVITIES OF DAILY LIVING (ADL): EFFECT OF PAIN ON DAILY ACTIVITIES: PAIN INCREASES WITH WALKING AND STANDING

## 2018-10-23 ASSESSMENT — PAIN SCALES - GENERAL: PAINLEVEL_OUTOF10: 6

## 2018-10-23 ASSESSMENT — PAIN DESCRIPTION - ORIENTATION: ORIENTATION: RIGHT;LEFT;LOWER

## 2018-10-23 NOTE — PROCEDURES
Pre-Procedure Note    Patient Name: Ivon Flores   YOB: 1970  Room/Bed: Room/bed info not found  Medical Record Number: [id number]  Date: 10/23/2018       Indication:    1. Lumbar spondylosis    2. Lumbar radiculopathy, chronic    3. Degeneration of lumbar intervertebral disc    4. Osteoarthritis of spine with radiculopathy, lumbar region        Consent: On file. Vital Signs:   Vitals:    10/23/18 0939   BP: (!) 149/90   Pulse: 58   Resp: 16   Temp:    SpO2: 96%       Past Medical History:   has a past medical history of Asthma; Colon polyp; Depression; Diverticulitis; Gastritis; GERD (gastroesophageal reflux disease); Hemorrhoids; Hypertension; Osteoarthritis; Shingles; and Tubular adenoma. Past Surgical History:   has a past surgical history that includes Hysterectomy; laparoscopy; Tonsillectomy; Hand surgery (Right); Tubal ligation; Cervical discectomy (12/2013); Cholecystectomy; Dilation and curettage of uterus; Colonoscopy (10/31/2016); and Upper gastrointestinal endoscopy (10/31/2016). Pre-Sedation Documentation and Exam:   Vital signs have been reviewed (see flow sheet for vitals). Mallampati Airway Assessment:  normal    ASA Classification:  Class 3 - A patient with severe systemic disease that limits activity but is not incapacitating    Sedation/ Anesthesia Plan:   intravenous sedation   as needed    Medications Planned:   midazolam (Versed) / Fentanyl  Intravenously  as needed. Patient is an appropriate candidate for plan of sedation: yes  Patient's History and Physical examination was reviewed and there is no change. Electronically signed by Junie Ann MD on 10/23/2018 at 9:50 AM        Preoperative Diagnosis:    1. Lumbar spondylosis    2. Lumbar radiculopathy, chronic    3. Degeneration of lumbar intervertebral disc    4. Osteoarthritis of spine with radiculopathy, lumbar region        Postoperative Diagnosis:   1. Lumbar spondylosis    2.  Lumbar radiculopathy,

## 2018-10-24 ENCOUNTER — TELEPHONE (OUTPATIENT)
Dept: PAIN MANAGEMENT | Age: 48
End: 2018-10-24

## 2018-10-30 ENCOUNTER — HOSPITAL ENCOUNTER (OUTPATIENT)
Dept: MRI IMAGING | Facility: CLINIC | Age: 48
Discharge: HOME | End: 2018-11-01
Payer: COMMERCIAL

## 2018-10-30 PROCEDURE — 73721 MRI JNT OF LWR EXTRE W/O DYE: CPT

## 2018-10-31 ENCOUNTER — TELEPHONE (OUTPATIENT)
Dept: PODIATRY | Age: 48
End: 2018-10-31

## 2018-10-31 NOTE — TELEPHONE ENCOUNTER
----- Message from Guido Beckwith DPM sent at 10/31/2018 10:41 AM EDT -----  No tendon tear. Bursitis. Can try an injection, PT, or prednisone.

## 2018-11-08 RX ORDER — PREDNISONE 10 MG/1
1 TABLET ORAL DAILY
Qty: 21 EACH | Refills: 0 | Status: SHIPPED | OUTPATIENT
Start: 2018-11-08 | End: 2018-11-30

## 2018-11-13 ENCOUNTER — HOSPITAL ENCOUNTER (OUTPATIENT)
Dept: PAIN MANAGEMENT | Age: 48
Discharge: HOME | End: 2018-11-13
Payer: COMMERCIAL

## 2018-11-13 VITALS
HEART RATE: 74 BPM | SYSTOLIC BLOOD PRESSURE: 142 MMHG | BODY MASS INDEX: 42.48 KG/M2 | DIASTOLIC BLOOD PRESSURE: 79 MMHG | TEMPERATURE: 98.6 F | RESPIRATION RATE: 20 BRPM | OXYGEN SATURATION: 95 % | HEIGHT: 61 IN | WEIGHT: 225 LBS

## 2018-11-13 DIAGNOSIS — M54.16 LUMBAR RADICULOPATHY, CHRONIC: Chronic | ICD-10-CM

## 2018-11-13 DIAGNOSIS — M47.816 OSTEOARTHRITIS OF LUMBAR SPINE, UNSPECIFIED SPINAL OSTEOARTHRITIS COMPLICATION STATUS: ICD-10-CM

## 2018-11-13 DIAGNOSIS — G89.29 CHRONIC LEFT-SIDED LOW BACK PAIN WITH LEFT-SIDED SCIATICA: ICD-10-CM

## 2018-11-13 DIAGNOSIS — M51.36 DEGENERATION OF LUMBAR INTERVERTEBRAL DISC: Chronic | ICD-10-CM

## 2018-11-13 DIAGNOSIS — M50.20 CERVICAL DISC HERNIATION: ICD-10-CM

## 2018-11-13 DIAGNOSIS — M54.42 CHRONIC LEFT-SIDED LOW BACK PAIN WITH LEFT-SIDED SCIATICA: ICD-10-CM

## 2018-11-13 DIAGNOSIS — M47.816 ARTHROPATHY OF LUMBAR FACET JOINT: ICD-10-CM

## 2018-11-13 PROCEDURE — 99213 OFFICE O/P EST LOW 20 MIN: CPT

## 2018-11-13 PROCEDURE — 99214 OFFICE O/P EST MOD 30 MIN: CPT | Performed by: PAIN MEDICINE

## 2018-11-13 RX ORDER — HYDROCODONE BITARTRATE AND ACETAMINOPHEN 5; 325 MG/1; MG/1
1 TABLET ORAL 2 TIMES DAILY
Qty: 60 TABLET | Refills: 0 | Status: SHIPPED | OUTPATIENT
Start: 2018-11-16 | End: 2018-12-13 | Stop reason: SDUPTHER

## 2018-11-13 ASSESSMENT — ENCOUNTER SYMPTOMS
PHOTOPHOBIA: 0
EYE DISCHARGE: 0
NAUSEA: 0
SHORTNESS OF BREATH: 0
CONSTIPATION: 0
COUGH: 1
SORE THROAT: 0
ABDOMINAL PAIN: 0
DIARRHEA: 0
APNEA: 0
BACK PAIN: 1
VOMITING: 0

## 2018-11-13 ASSESSMENT — PAIN DESCRIPTION - PAIN TYPE: TYPE: CHRONIC PAIN

## 2018-11-13 ASSESSMENT — PAIN DESCRIPTION - PROGRESSION: CLINICAL_PROGRESSION: GRADUALLY IMPROVING

## 2018-11-13 ASSESSMENT — PAIN DESCRIPTION - DIRECTION: RADIATING_TOWARDS: LEFT LEG TO KNEE

## 2018-11-13 ASSESSMENT — PAIN DESCRIPTION - DESCRIPTORS: DESCRIPTORS: BURNING;ACHING

## 2018-11-13 ASSESSMENT — PAIN SCALES - GENERAL: PAINLEVEL_OUTOF10: 5

## 2018-11-13 ASSESSMENT — PAIN DESCRIPTION - ORIENTATION: ORIENTATION: LOWER;LEFT;RIGHT

## 2018-11-13 ASSESSMENT — PAIN DESCRIPTION - LOCATION: LOCATION: BACK

## 2018-11-13 ASSESSMENT — ACTIVITIES OF DAILY LIVING (ADL): EFFECT OF PAIN ON DAILY ACTIVITIES: PAIN INCREASES WITH WALKING AND STANDING

## 2018-11-13 ASSESSMENT — PAIN DESCRIPTION - ONSET: ONSET: ON-GOING

## 2018-11-13 NOTE — PROGRESS NOTES
analgesics, home exercises, heat, NSAIDs, muscle relaxant and ice for the symptoms. The treatment provided no relief. Back Pain   This is a chronic problem. The current episode started more than 1 year ago. The problem occurs constantly. Progression since onset: Somewhat improved. The pain is present in the lumbar spine and sacro-iliac. The quality of the pain is described as aching and burning. Radiates to: To both lower extremities worse on the left. The pain is at a severity of 4/10 (1-5). The pain is moderate. The pain is worse during the day. The symptoms are aggravated by bending and standing (Walking, lifting and ADLs). Associated symptoms include numbness. Pertinent negatives include no abdominal pain, chest pain, dysuria, fever, tingling or weakness. Risk factors include lack of exercise, sedentary lifestyle and obesity. Treatments tried: Lumbar facet joint injections. The treatment provided moderate relief. Patient relates current medications are helping the pain. Patient reports taking pain medications as prescribed, denies obtaining medications from different sources and denies use of illegal drugs. Patient denies side effects from medications like nausea, vomiting, constipation or drowsiness. Patient reports current activities of daily living ar possible due to medications and would like to continue them. Patient relates current medications are helping the pain. Patient reports taking pain medications as prescribed, denies obtaining medications from different sources and denies use of illegal drugs. Patient denies side effects from medications like nausea, vomiting, constipation or drowsiness. Patient reports current activities of daily living ar possible due to medications and would like to continue them. OARRS compliant?  yes  Concern for prescription abuse?no    Current Pain Assessment  Pain Assessment  Pain Assessment: 0-10  Pain Level: 5  Pain Type: Chronic pain  Pain Location: Back  Pain Orientation: Lower, Left, Right (worse left)  Pain Radiating Towards: left leg to knee  Pain Descriptors: Burning, Aching  Pain Onset: On-going  Clinical Progression: Gradually improving  Effect of Pain on Daily Activities: pain increases with walking and standing  Patient's Stated Pain Goal: 2  Pain Intervention(s): Medication (see eMar), Repositioned, Rest                    ADVERSE MEDICATION EFFECTS:   Constipation: no  Bowel Regimen: No:   Diet: carb free  Appetite:  ok  Sedation:  no  Urinary Retention: no    FOCUSED PAINSCALE:  Highest : 5  Lowest :1  Average: Range-varies with activity  When and What  was your last procedure:     10-17-18 left lumbar facet joint injection T12-L1 to L5-S1  Was your procedure effective:  75 % improved    ACTIVITY/SOCIAL/EMOTIONAL:  Sleep Pattern: 6 hours per night. generally restful sleep  Energy Level:  Tired/Fatigued  Currently attending Physical Therapy:  No  Home Exercises:every 3 days tread mill  Mobility: pain increases with walking   Currently seeing a Psychiatrist or Psychologist:  No  Emotional Issues: anxiety/ nervousness, depression- patient states recent loss of loved one and increase stress with daughter   Mood: flat affect     ABERRANT BEHAVIORS SINCE LAST VISIT:  Have you ever been treated in another Pain Clinic not applicable  Refills for prescriptions appropriate: yes  Lost rx/pills:no  Taking more medication than prescribed:  no  Are you receiving PAIN medications from  other doctors: no  Last Urine/Serum Drug Screen :9-2018  Was Serum/UDS as anticipated?   yes  Brought pill bottles in :yes   Was Pill count appropriate? :yes   Are currently pregnant?not applicable  Recent ER visits: No             Past Medical History      Diagnosis Date    Asthma     Colon polyp 10/31/2016    sessile serated adenoma x2    Depression     Diverticulitis 10/2016    Gastritis     GERD (gastroesophageal reflux disease)     Hemorrhoids     int/ext    Hypertension  Osteoarthritis     Shingles 1-22-16    Tubular adenoma 10/31/2016       Surgical History  Past Surgical History:   Procedure Laterality Date    CERVICAL DISCECTOMY  12/2013    & fusion     CHOLECYSTECTOMY      COLONOSCOPY  10/31/2016    int/ext hemorrhoids; sessile serated adenomax2; tubular adenoma    DILATION AND CURETTAGE OF UTERUS      HAND SURGERY Right     thumb surgery, BONE REMOVED    HYSTERECTOMY      LAPAROSCOPY      TONSILLECTOMY      TUBAL LIGATION      UPPER GASTROINTESTINAL ENDOSCOPY  10/31/2016    gastritis       Medications  Current Outpatient Prescriptions   Medication Sig Dispense Refill    [START ON 11/16/2018] HYDROcodone-acetaminophen (NORCO) 5-325 MG per tablet Take 1 tablet by mouth 2 times daily for 30 days. Bekah Martins Date: 11/16/18 60 tablet 0    sertraline (ZOLOFT) 50 MG tablet TAKE 1 TABLET DAILY 90 tablet 1    PredniSONE 10 MG (21) TBPK Take 1 Dose by mouth daily Take 60mg po on day 1, 50mg po on day 2, 40mg po on day 3, 30mg po on day 4, 20 mg po on day 5, 10mg po on day 6 21 each 0     MG tablet TAKE 1 TABLET EVERY 8 HOURS AS NEEDED FOR PAIN 270 tablet 0    omeprazole (PRILOSEC) 40 MG delayed release capsule TAKE 1 CAPSULE DAILY 90 capsule 0    amLODIPine (NORVASC) 5 MG tablet TAKE 1 TABLET DAILY 90 tablet 0    metFORMIN (GLUCOPHAGE XR) 500 MG extended release tablet Take 1 tablet by mouth daily (with breakfast) 30 tablet 3    tiZANidine (ZANAFLEX) 4 MG tablet Take 1 tablet by mouth every 8 hours as needed (spasms) 270 tablet 0    pregabalin (LYRICA) 75 MG capsule Take 1 capsule by mouth 3 times daily for 30 days. . 90 capsule 3    b complex vitamins capsule Take 1 capsule by mouth daily      docusate sodium (COLACE) 100 MG capsule Take 1 capsule by mouth daily as needed for Constipation 30 capsule 2    Melatonin ER 5 MG TBCR Take by mouth as needed      vitamin D (CHOLECALCIFEROL) 1000 UNIT TABS tablet Take 1,000 Units by mouth daily      Zinc fatigue. Negative for appetite change and fever. HENT: Negative for congestion, ear pain, nosebleeds and sore throat. Eyes: Negative for photophobia and discharge. Respiratory: Positive for cough. Negative for apnea and shortness of breath. Cardiovascular: Negative for chest pain and leg swelling. Gastrointestinal: Negative for abdominal pain, constipation, diarrhea, nausea and vomiting. Endocrine: Negative for polydipsia and polyuria. Genitourinary: Negative for difficulty urinating, dysuria and hematuria. Musculoskeletal: Positive for back pain. Skin: Negative. Negative for rash. Neurological: Positive for numbness. Negative for tingling, tremors, seizures, speech difficulty and weakness. Left thigh numbness   Hematological: Does not bruise/bleed easily. Psychiatric/Behavioral: Positive for sleep disturbance. Negative for suicidal ideas. The patient is nervous/anxious. GENERAL PHYSICAL EXAM:  Vitals: BP (!) 142/79   Pulse 74   Temp 98.6 °F (37 °C) (Oral)   Resp 20   Ht 5' 1\" (1.549 m)   Wt 225 lb (102.1 kg)   SpO2 95%   BMI 42.51 kg/m² , Body mass index is 42.51 kg/m². Physical Exam   Constitutional: She is oriented to person, place, and time. She appears well-developed and well-nourished. Morbidly obese   HENT:   Head: Normocephalic and atraumatic. Eyes: Pupils are equal, round, and reactive to light. Conjunctivae and EOM are normal.   Neck: Normal range of motion. Neck supple. No JVD present. No tracheal deviation present. No thyromegaly present. Cardiovascular: Normal rate and regular rhythm. Pulmonary/Chest: Effort normal. No respiratory distress. Abdominal: She exhibits no distension. Musculoskeletal: She exhibits no edema. Neurological: She is alert and oriented to person, place, and time. She has normal strength. She displays no atrophy, no tremor and normal reflexes. No cranial nerve deficit or sensory deficit.  She exhibits normal muscle tone. She displays a negative Romberg sign. Coordination and gait normal.   Reflex Scores:       Patellar reflexes are 2+ on the right side and 2+ on the left side. Achilles reflexes are 2+ on the right side and 2+ on the left side. Skin: Skin is warm and dry. Psychiatric: She has a normal mood and affect. Her speech is normal and behavior is normal. Judgment and thought content normal. Cognition and memory are normal.    Right Ankle Exam     Muscle Strength   The patient has normal right ankle strength. Left Ankle Exam     Muscle Strength   The patient has normal left ankle strength. Right Knee Exam     Muscle Strength     The patient has normal right knee strength. Left Knee Exam     Muscle Strength     The patient has normal left knee strength. Right Hip Exam     Muscle Strength   The patient has normal right hip strength. Left Hip Exam     Muscle Strength   The patient has normal left hip strength. Back Exam     Tenderness   The patient is experiencing tenderness in the lumbar and sacroiliac. Range of Motion   Back extension: Limited and painful. Back flexion: Limited and painful. Back lateral bend right: Limited and painful. Back lateral bend left: Limited and painful. Back rotation right: Limited and painful. Back rotation left: Limited and painful. Tests   Straight leg raise right: negative  Straight leg raise left: negative    Other   Sensation: normal  Gait: antalgic   Scars: absent    Comments:  Facet tenderness is present bilaterally especially worse on the left side            Nurses Notes and Vital Signs reviewed.     DATA  Labs:  Benzodiazepine Screen, Urine   Date Value Ref Range Status   09/19/2014 NEGATIVE NEG Final     Comment:           (Positive cutoff 200 ng/mL)                  9/18/2018  7:31 PM - [facility]angelist [facility]ullon Incoming Lab Results From [facility]     Component Results     Component Value Ref Range & Units Status Collected Lab   Pain

## 2018-11-15 ENCOUNTER — OFFICE VISIT (OUTPATIENT)
Dept: PODIATRY | Age: 48
End: 2018-11-15
Payer: COMMERCIAL

## 2018-11-15 VITALS — BODY MASS INDEX: 44.17 KG/M2 | WEIGHT: 225 LBS | HEIGHT: 60 IN

## 2018-11-15 DIAGNOSIS — M72.2 PLANTAR FASCIITIS, LEFT: ICD-10-CM

## 2018-11-15 DIAGNOSIS — M67.02 ACHILLES TENDON CONTRACTURE, LEFT: ICD-10-CM

## 2018-11-15 DIAGNOSIS — M79.672 PAIN OF LEFT FOOT: ICD-10-CM

## 2018-11-15 DIAGNOSIS — M92.62 HAGLUND'S DEFORMITY, LEFT: Primary | ICD-10-CM

## 2018-11-15 DIAGNOSIS — M67.88 ACHILLES TENDINOSIS OF LEFT LOWER EXTREMITY: ICD-10-CM

## 2018-11-15 PROCEDURE — 99213 OFFICE O/P EST LOW 20 MIN: CPT | Performed by: PODIATRIST

## 2018-11-15 ASSESSMENT — ENCOUNTER SYMPTOMS
COLOR CHANGE: 0
VOMITING: 0
DIARRHEA: 0
BACK PAIN: 1
CONSTIPATION: 0
NAUSEA: 0

## 2018-11-15 NOTE — PROGRESS NOTES
[facility]  New Patient History and Physical    Chief Complaint:   Chief Complaint   Patient presents with    Follow-up     left heel, feels better when in boot        HPI: Ella Ramey is a 53 y.o. female who presents to the office today  Follow up MRI    The CAM walker helps, the prednisone helped, but pain returning in the back of her heel.     heel pain, The prednisone helped with the back of the heel pain. The night splint is helping. Pain is returning however, getting worse on the side of her heel and foot. Zehra Parrish left worse than right, for years, was seen years ago, had injections in the heels by Dr. Oh Ren, did not want to come back due to pain, has custom orthotics, wearing them, has been doing HEP. Pain is back of heels, pain after rest.    .      Currently denies F/C/N/V. Allergies   Allergen Reactions    Adhesive Tape Other (See Comments)     blisters    Imitrex [Sumatriptan] Other (See Comments)     \"feels like head is going to explode    Morphine Itching     hives       Past Medical History:   Diagnosis Date    Asthma     Colon polyp 10/31/2016    sessile serated adenoma x2    Depression     Diverticulitis 10/2016    Gastritis     GERD (gastroesophageal reflux disease)     Hemorrhoids     int/ext    Hypertension     Osteoarthritis     Shingles 1-22-16    Tubular adenoma 10/31/2016       Prior to Admission medications    Medication Sig Start Date End Date Taking? Authorizing Provider   HYDROcodone-acetaminophen (NORCO) 5-325 MG per tablet Take 1 tablet by mouth 2 times daily for 30 days. Melissa Breath Date: 11/16/18 11/16/18 12/16/18 Yes Blair Burrows MD   PredniSONE 10 MG (21) TBPK Take 1 Dose by mouth daily Take 60mg po on day 1, 50mg po on day 2, 40mg po on day 3, 30mg po on day 4, 20 mg po on day 5, 10mg po on day 6 11/8/18  Yes Lauryn Dawn DPM   sertraline (ZOLOFT) 50 MG tablet TAKE 1 TABLET DAILY 10/23/18  Yes Lynn Martinez MD   ONE TOUCH LANCETS mouth   Yes Historical Provider, MD   ECHINACEA PO Take by mouth   Yes Historical Provider, MD   Probiotic Product (PROBIOTIC DAILY PO) Take 1 tablet by mouth daily. Yes Historical Provider, MD   Glucosamine-Chondroitin (GLUCOSAMINE CHONDR COMPLEX PO) Take  by mouth. Yes Historical Provider, MD   Multiple Vitamins-Calcium (ONE-A-DAY WOMENS FORMULA PO) Take  by mouth. Yes Historical Provider, MD   pregabalin (LYRICA) 75 MG capsule Take 1 capsule by mouth 3 times daily for 30 days. . 7/17/18 11/13/18  Massimo Service, APRN - CNP       Past Surgical History:   Procedure Laterality Date    CERVICAL DISCECTOMY  12/2013    & fusion     CHOLECYSTECTOMY      COLONOSCOPY  10/31/2016    int/ext hemorrhoids; sessile serated adenomax2; tubular adenoma    DILATION AND CURETTAGE OF UTERUS      HAND SURGERY Right     thumb surgery, BONE REMOVED    HYSTERECTOMY      LAPAROSCOPY      TONSILLECTOMY      TUBAL LIGATION      UPPER GASTROINTESTINAL ENDOSCOPY  10/31/2016    gastritis       Family History   Problem Relation Age of Onset    Cancer Mother     Heart Disease Father     Diabetes Father     High Blood Pressure Father     Other Other         Celiac Sprue. Aunt       Social History   Substance Use Topics    Smoking status: Never Smoker    Smokeless tobacco: Never Used    Alcohol use No       Review of Systems   Constitutional: Negative for chills, diaphoresis, fatigue, fever and unexpected weight change. Cardiovascular: Negative for leg swelling. Gastrointestinal: Negative for constipation, diarrhea, nausea and vomiting. Musculoskeletal: Positive for arthralgias, back pain, gait problem, joint swelling and myalgias. Skin: Negative for color change, pallor, rash and wound. Neurological: Negative for weakness and numbness. Lower Extremity Physical Examination:     Vitals: There were no vitals filed for this visit. General: AAO x 3 in NAD.      Vascular: DP and PT pulses palpable 2/4, Bilateral. CFT <3 seconds, Bilateral.  Hair growth present to the level of the digits, Bilateral.  Edema absent, Bilateral.  Varicosities absent, Bilateral. Erythema absent, Bilateral    Neurological:  Sensation present to light touch to level of digits, Bilateral.    Musculoskeletal: Muscle strength 5/5, Bilateral.  Pain present upon palpation of distal achilles, insertion, and superior posterior calcaneus with prominence, lateral calcaneal tubercle, and lateral band of plantar fascia. left worse than rightBilateral. normal medial longitudinal arch, Bilateral.  Ankle ROM abnormal - tight posterior group,Bilateral.      Integument: Warm, dry, supple, Bilateral.  Multiple rough hyperkeratotic papules on the plantar left foot, total size 0.7 cm, that disrupt dermal ridges/skin lines, with small black petechia. Pain with squeeze noted. Gait analysis:     Radiographs: 2 views right Foot:  Small posterior calcaneal exostosis. Sudheer's deformity present. Radiographs: 2 views left Foot:  Small posterior calcaneal exostosis. Sudheer's deformity present. MRI:  1. Minimal retrocalcaneal bursitis with some edema in Kager's fat.  No   Achilles tendon tear or significant tendinosis. 2. Old ATFL injury with question of an acute component given the adjacent   swelling. 3. Tendinosis of peroneus brevis and longus tendons. Asessment: Patient is a 53 y.o. female with:   1. Sudheer's deformity, left    2. Achilles tendon contracture, left    3. Achilles tendinosis of left lower extremity    4. Plantar fasciitis, left    5. Pain of left foot          Plan: Patient examined and evaluated. Current condition and treatment options discussed in detail. Verbal and written instructions given to patient. Contact office with any questions/problems/concerns. 1)  good shoes  2) HEP  3) continue CAM walker for now  4) I discussed in detail removal of Haglunds left. He/She was in full agreement and understood risks.  The reason for

## 2018-11-30 ENCOUNTER — HOSPITAL ENCOUNTER (OUTPATIENT)
Dept: PREADMISSION TESTING | Age: 48
Discharge: HOME | End: 2018-12-04
Payer: COMMERCIAL

## 2018-11-30 VITALS
TEMPERATURE: 98.4 F | HEART RATE: 71 BPM | WEIGHT: 221 LBS | DIASTOLIC BLOOD PRESSURE: 94 MMHG | OXYGEN SATURATION: 98 % | BODY MASS INDEX: 43.39 KG/M2 | SYSTOLIC BLOOD PRESSURE: 151 MMHG | HEIGHT: 60 IN | RESPIRATION RATE: 20 BRPM

## 2018-11-30 LAB
ABSOLUTE EOS #: 0.1 K/UL (ref 0–0.4)
ABSOLUTE IMMATURE GRANULOCYTE: ABNORMAL K/UL (ref 0–0.3)
ABSOLUTE LYMPH #: 3.6 K/UL (ref 1–4.8)
ABSOLUTE MONO #: 0.5 K/UL (ref 0.1–1.3)
ANION GAP SERPL CALCULATED.3IONS-SCNC: 14 MMOL/L (ref 9–17)
BASOPHILS # BLD: 1 % (ref 0–2)
BASOPHILS ABSOLUTE: 0 K/UL (ref 0–0.2)
BUN BLDV-MCNC: 11 MG/DL (ref 6–20)
BUN/CREAT BLD: ABNORMAL (ref 9–20)
CALCIUM SERPL-MCNC: 9.5 MG/DL (ref 8.6–10.4)
CHLORIDE BLD-SCNC: 103 MMOL/L (ref 98–107)
CO2: 25 MMOL/L (ref 20–31)
CREAT SERPL-MCNC: 0.93 MG/DL (ref 0.5–0.9)
DIFFERENTIAL TYPE: ABNORMAL
EKG ATRIAL RATE: 70 BPM
EKG P AXIS: 2 DEGREES
EKG P-R INTERVAL: 126 MS
EKG Q-T INTERVAL: 412 MS
EKG QRS DURATION: 88 MS
EKG QTC CALCULATION (BAZETT): 444 MS
EKG R AXIS: 6 DEGREES
EKG T AXIS: 20 DEGREES
EKG VENTRICULAR RATE: 70 BPM
EOSINOPHILS RELATIVE PERCENT: 1 % (ref 0–4)
GFR AFRICAN AMERICAN: >60 ML/MIN
GFR NON-AFRICAN AMERICAN: >60 ML/MIN
GFR SERPL CREATININE-BSD FRML MDRD: ABNORMAL ML/MIN/{1.73_M2}
GFR SERPL CREATININE-BSD FRML MDRD: ABNORMAL ML/MIN/{1.73_M2}
GLUCOSE BLD-MCNC: 153 MG/DL (ref 70–99)
HCT VFR BLD CALC: 41.7 % (ref 36–46)
HEMOGLOBIN: 13.9 G/DL (ref 12–16)
IMMATURE GRANULOCYTES: ABNORMAL %
LYMPHOCYTES # BLD: 47 % (ref 24–44)
MCH RBC QN AUTO: 29.6 PG (ref 26–34)
MCHC RBC AUTO-ENTMCNC: 33.3 G/DL (ref 31–37)
MCV RBC AUTO: 88.9 FL (ref 80–100)
MONOCYTES # BLD: 6 % (ref 1–7)
NRBC AUTOMATED: ABNORMAL PER 100 WBC
PDW BLD-RTO: 12.9 % (ref 11.5–14.9)
PLATELET # BLD: 240 K/UL (ref 150–450)
PLATELET ESTIMATE: ABNORMAL
PMV BLD AUTO: 7.8 FL (ref 6–12)
POTASSIUM SERPL-SCNC: 4 MMOL/L (ref 3.7–5.3)
RBC # BLD: 4.69 M/UL (ref 4–5.2)
RBC # BLD: ABNORMAL 10*6/UL
SEG NEUTROPHILS: 45 % (ref 36–66)
SEGMENTED NEUTROPHILS ABSOLUTE COUNT: 3.5 K/UL (ref 1.3–9.1)
SODIUM BLD-SCNC: 142 MMOL/L (ref 135–144)
WBC # BLD: 7.7 K/UL (ref 3.5–11)
WBC # BLD: ABNORMAL 10*3/UL

## 2018-11-30 PROCEDURE — 85025 COMPLETE CBC W/AUTO DIFF WBC: CPT

## 2018-11-30 PROCEDURE — 80048 BASIC METABOLIC PNL TOTAL CA: CPT

## 2018-11-30 PROCEDURE — 36415 COLL VENOUS BLD VENIPUNCTURE: CPT

## 2018-11-30 PROCEDURE — 93005 ELECTROCARDIOGRAM TRACING: CPT

## 2018-11-30 NOTE — H&P
BONE REMOVED    HYSTERECTOMY      LAPAROSCOPY      TONSILLECTOMY      TUBAL LIGATION      UPPER GASTROINTESTINAL ENDOSCOPY  10/31/2016    gastritis       FAMILY HISTORY       Family History   Problem Relation Age of Onset    Cancer Mother     Heart Disease Father     Diabetes Father     High Blood Pressure Father     Other Other         Celiac Sprue. Aunt       SOCIAL HISTORY       Social History     Social History    Marital status:      Spouse name: N/A    Number of children: N/A    Years of education: N/A     Occupational History    unemployed      Social History Main Topics    Smoking status: Never Smoker    Smokeless tobacco: Never Used    Alcohol use No    Drug use: No    Sexual activity: Yes     Other Topics Concern    None     Social History Narrative    None           REVIEW OF SYSTEMS      Allergies   Allergen Reactions    Adhesive Tape Other (See Comments)     blisters    Imitrex [Sumatriptan] Other (See Comments)     \"feels like head is going to explode    Morphine Itching     hives       Current Outpatient Prescriptions on File Prior to Encounter   Medication Sig Dispense Refill    HYDROcodone-acetaminophen (NORCO) 5-325 MG per tablet Take 1 tablet by mouth 2 times daily for 30 days. Tamera Qasim Date: 11/16/18 60 tablet 0    sertraline (ZOLOFT) 50 MG tablet TAKE 1 TABLET DAILY (Patient taking differently: TAKE 1 TABLET DAILY Nightly) 90 tablet 1     MG tablet TAKE 1 TABLET EVERY 8 HOURS AS NEEDED FOR PAIN 270 tablet 0    topiramate (TOPAMAX) 100 MG tablet TAKE 1 TABLET IN THE MORNING AND 2 TABLETS IN THE EVENING 270 tablet 0    omeprazole (PRILOSEC) 40 MG delayed release capsule TAKE 1 CAPSULE DAILY 90 capsule 0    amLODIPine (NORVASC) 5 MG tablet TAKE 1 TABLET DAILY 90 tablet 0    metFORMIN (GLUCOPHAGE XR) 500 MG extended release tablet Take 1 tablet by mouth daily (with breakfast) 30 tablet 3    tiZANidine (ZANAFLEX) 4 MG tablet Take 1 tablet by mouth Ht 5' (1.524 m)   Wt 221 lb (100.2 kg)   SpO2 98%   BMI 43.16 kg/m²  Body mass index is 43.16 kg/m². GENERAL APPEARANCE:   Wolm Estimable is 53 y.o.,  female, moderately obese, nourished, conscious, alert. Does not appear to be distress or pain at this time. SKIN:  Warm, dry, no cyanosis or jaundice. HEAD:  Normocephalic, atraumatic, no swelling or tenderness. EYES:  Pupils equal, reactive to light. EARS:  No discharge, no marked hearing loss. NOSE:  No rhinorrhea, epistaxis or septal deformity. THROAT:  Not congested. No ulceration bleeding or discharge. NECK:  No stiffness, trachea central.  No palpable masses or L.N.                 CHEST:  Symmetrical and equal on expansion. HEART:  RRR S1 > S2. No audible murmurs or gallops. LUNGS:  Equal on expansion, normal breath sounds. No adventitious sounds. ABDOMEN:  Obese. Soft on palpation. No localized tenderness. No guarding or rigidity. No palpable hepatosplenomegaly. LYMPHATICS:  No palpable cervical lymphadenopathy. LOCOMOTOR, BACK AND SPINE:  No tenderness or deformities. EXTREMITIES:  Symmetrical, no pretibial edema. Averys sign negative. No discoloration or ulcerations. NEUROLOGIC:  The patient is conscious, alert, oriented, No apparent focal sensory or motor deficits.                                                                                      PROVISIONAL DIAGNOSES / SURGERY:       Ben's deformity, left      Achilles tendon contracture, left      Achilles tendinosis of left lower extremity      Plantar fasciitis, left      Pain of left foot    FOOT BONE EXCISION BEN'S Left        Patient Active Problem List    Diagnosis Date Noted    Type 2 diabetes mellitus without complication, without long-term current use of insulin ([address] 10/02/2018    Urinary incontinence 10/02/2018    Anxiety and depression 11/03/2017    Morbid obesity with BMI of 40.0-44.9, adult ([address] 10/20/2017    Encounter for medication management     Osteoarthritis of spine with radiculopathy, lumbar region     Chronic left-sided low back pain with left-sided sciatica     Hemorrhoids     Colon polyp 10/31/2016    Tubular adenoma 10/31/2016    Arthropathy of lumbar facet joint     Rheumatoid arthritis ([address]     Primary osteoarthritis of left hip     Sacroiliitis (HCC)     Hip bursitis     Left-sided low back pain with left-sided sciatica     Osteoarthritis of lumbar spine     GERD (gastroesophageal reflux disease) 07/13/2015    Essential hypertension 11/03/2014    Lumbar radiculopathy, chronic 09/19/2014    Degeneration of lumbar intervertebral disc 09/19/2014    Lumbar spondylosis 09/19/2014    Cervical disc herniation 11/07/2013    Degenerative disc disease, cervical 10/08/2013           MADDY Wells, APRN - CNP on 11/30/2018 at 2:30 PM

## 2018-12-01 ENCOUNTER — ANESTHESIA EVENT (OUTPATIENT)
Dept: OPERATING ROOM | Age: 48
End: 2018-12-01
Payer: COMMERCIAL

## 2018-12-01 RX ORDER — SODIUM CHLORIDE 9 MG/ML
INJECTION, SOLUTION INTRAVENOUS CONTINUOUS
Status: CANCELLED | OUTPATIENT
Start: 2018-12-01

## 2018-12-01 RX ORDER — SODIUM CHLORIDE 0.9 % (FLUSH) 0.9 %
10 SYRINGE (ML) INJECTION EVERY 12 HOURS SCHEDULED
Status: CANCELLED | OUTPATIENT
Start: 2018-12-01

## 2018-12-01 RX ORDER — LIDOCAINE HYDROCHLORIDE 10 MG/ML
1 INJECTION, SOLUTION EPIDURAL; INFILTRATION; INTRACAUDAL; PERINEURAL
Status: CANCELLED | OUTPATIENT
Start: 2018-12-01 | End: 2018-12-01

## 2018-12-01 RX ORDER — SODIUM CHLORIDE 0.9 % (FLUSH) 0.9 %
10 SYRINGE (ML) INJECTION PRN
Status: CANCELLED | OUTPATIENT
Start: 2018-12-01

## 2018-12-13 ENCOUNTER — HOSPITAL ENCOUNTER (OUTPATIENT)
Dept: PAIN MANAGEMENT | Age: 48
Discharge: HOME | End: 2018-12-13
Payer: COMMERCIAL

## 2018-12-13 VITALS
WEIGHT: 221 LBS | TEMPERATURE: 98.3 F | SYSTOLIC BLOOD PRESSURE: 152 MMHG | BODY MASS INDEX: 43.39 KG/M2 | OXYGEN SATURATION: 96 % | DIASTOLIC BLOOD PRESSURE: 76 MMHG | HEART RATE: 83 BPM | HEIGHT: 60 IN | RESPIRATION RATE: 20 BRPM

## 2018-12-13 DIAGNOSIS — M54.42 CHRONIC LEFT-SIDED LOW BACK PAIN WITH LEFT-SIDED SCIATICA: ICD-10-CM

## 2018-12-13 DIAGNOSIS — M25.552 HIP PAIN, ACUTE, LEFT: ICD-10-CM

## 2018-12-13 DIAGNOSIS — M16.12 PRIMARY OSTEOARTHRITIS OF LEFT HIP: ICD-10-CM

## 2018-12-13 DIAGNOSIS — M47.896 OTHER OSTEOARTHRITIS OF SPINE, LUMBAR REGION: ICD-10-CM

## 2018-12-13 DIAGNOSIS — M54.16 LUMBAR RADICULOPATHY, CHRONIC: ICD-10-CM

## 2018-12-13 DIAGNOSIS — M47.816 LUMBAR SPONDYLOSIS: ICD-10-CM

## 2018-12-13 DIAGNOSIS — M51.36 DEGENERATION OF LUMBAR INTERVERTEBRAL DISC: ICD-10-CM

## 2018-12-13 DIAGNOSIS — G89.29 CHRONIC LEFT-SIDED LOW BACK PAIN WITH LEFT-SIDED SCIATICA: ICD-10-CM

## 2018-12-13 DIAGNOSIS — E66.01 MORBID OBESITY WITH BMI OF 40.0-44.9, ADULT (HCC): ICD-10-CM

## 2018-12-13 DIAGNOSIS — Z79.899 ENCOUNTER FOR MEDICATION MANAGEMENT: ICD-10-CM

## 2018-12-13 DIAGNOSIS — M47.816 OSTEOARTHRITIS OF LUMBAR SPINE, UNSPECIFIED SPINAL OSTEOARTHRITIS COMPLICATION STATUS: Primary | ICD-10-CM

## 2018-12-13 DIAGNOSIS — M50.20 CERVICAL DISC HERNIATION: ICD-10-CM

## 2018-12-13 DIAGNOSIS — M46.1 SACROILIITIS (HCC): ICD-10-CM

## 2018-12-13 DIAGNOSIS — M47.26 OSTEOARTHRITIS OF SPINE WITH RADICULOPATHY, LUMBAR REGION: ICD-10-CM

## 2018-12-13 DIAGNOSIS — M47.816 ARTHROPATHY OF LUMBAR FACET JOINT: ICD-10-CM

## 2018-12-13 DIAGNOSIS — M50.30 DEGENERATIVE DISC DISEASE, CERVICAL: ICD-10-CM

## 2018-12-13 PROCEDURE — 99213 OFFICE O/P EST LOW 20 MIN: CPT | Performed by: NURSE PRACTITIONER

## 2018-12-13 PROCEDURE — 99213 OFFICE O/P EST LOW 20 MIN: CPT

## 2018-12-13 RX ORDER — TIZANIDINE 4 MG/1
4 TABLET ORAL EVERY 8 HOURS PRN
Qty: 270 TABLET | Refills: 0 | Status: SHIPPED | OUTPATIENT
Start: 2018-12-13 | End: 2019-03-11 | Stop reason: SDUPTHER

## 2018-12-13 RX ORDER — HYDROCODONE BITARTRATE AND ACETAMINOPHEN 5; 325 MG/1; MG/1
1 TABLET ORAL 2 TIMES DAILY
Qty: 60 TABLET | Refills: 0 | Status: SHIPPED | OUTPATIENT
Start: 2018-12-17 | End: 2019-01-16 | Stop reason: SDUPTHER

## 2018-12-13 ASSESSMENT — ENCOUNTER SYMPTOMS
BACK PAIN: 1
RESPIRATORY NEGATIVE: 1
CONSTIPATION: 1

## 2018-12-13 NOTE — PROGRESS NOTES
09/14/2018  3:30 PM [facility]   (NOTE)   The carisoprodol immunoassay has cross-reactivity to carisoprodol   and meprobamate.     Clonazepam, Urine Not Detected   Final 09/14/2018  3:30 PM [facility]   Codeine, Urine Not Detected   Final 09/14/2018  3:30 PM [facility]   MDA, Ur Not Detected   Final 09/14/2018  3:30 PM [facility]   Diazepam, Urine Not Detected   Final 09/14/2018  3:30 PM [facility]   Ethyl Glucuronide Ur Not Detected   Final 09/14/2018  3:30 PM [facility]   Fentanyl, Ur Not Detected   Final 09/14/2018  3:30 PM [facility]   Hydrocodone, Urine Present   Final 09/14/2018  3:30 PM [facility]   Hydromorphone, Urine Not Detected   Final 09/14/2018  3:30 PM [facility]   Lorazepam, Urine Not Detected   Final 09/14/2018  3:30 PM [facility]   Marijuana Metab, Ur Not Detected   Final 09/14/2018  3:30 PM [facility]   MDEA, EMA, Ur Not Detected   Final 09/14/2018  3:30 PM [facility]   MDMA, Urine Not Detected   Final 09/14/2018  3:30 PM [facility]   Meperidine Metab, Ur Not Detected   Final 09/14/2018  3:30 PM [facility]   Methadone, Urine Not Detected   Final 09/14/2018  3:30 PM [facility]   Methamphetamine, Urine Not Detected   Final 09/14/2018  3:30 PM [facility]   Methylphenidate Not Detected   Final 09/14/2018  3:30 PM [facility]   Midazolam, Urine Not Detected   Final 09/14/2018  3:30 PM [facility]   Morphine Urine Not Detected   Final 09/14/2018  3:30 PM [facility]   Norbuprenorphine, Urine Not Detected   Final 09/14/2018  3:30 PM [facility]   Nordiazepam, Urine Not Detected   Final 09/14/2018  3:30 PM [facility]   Norfentanyl, Urine Not Detected   Final 09/14/2018  3:30 PM [facility]   NORHYDROCODONE, URINE Present   Final 09/14/2018  3:30 PM [facility]   Noroxycodone, Urine Not Detected   Final 09/14/2018  3:30 PM [facility]   NOROXYMORPHONE, URINE Not Detected   Final 09/14/2018  3:30 PM [facility]   Oxazepam, Urine Not Detected   Final 09/14/2018  3:30 PM [facility]   Oxycodone Urine Not Detected   Final 09/14/2018  3:30 PM [facility]   Oxymorphone, Urine Not Detected   Final 09/14/2018  3:30 PM [facility]   PCP, Urine Not Detected   Final 09/14/2018  3:30 PM [facility] Phentermine, Ur Not Detected   Final 09/14/2018  3:30 PM [facility]   Propoxyphene, Urine Not Detected   Final 09/14/2018  3:30 PM [facility]   Tapentadol-O-Sulfate, Urine Not Detected   Final 09/14/2018  3:30 PM [facility]   Tapentadol, Urine Not Detected   Final 09/14/2018  3:30 PM [facility]   Temazepam, Urine Not Detected   Final 09/14/2018  3:30 PM [facility]   Tramadol, Urine Not Detected   Final 09/14/2018  3:30 PM [facility]   Zolpidem, Urine Not Detected   Final 09/14/2018  3:30 PM [facility]   Drugs Expected, Thyra Hun ON 9/14 AT AM   Final 09/14/2018  3:30  [facility]   Creatinine, Ur 193.0  20.0 - 400.0 mg/dL Final 09/14/2018  3:30 PM [facility]   Pain Mgt Drug Panel, Hi Res, Ur See Below   Final 09/14/2018  3:30 PM [facility]   (NOTE)   Methodology: Qualitative Enzyme Immunoassay and Qualitative Liquid   Chromatography-Time of Flight-Mass Spectrometry or Tandem Mass   Spectrometry, Quantitative Spectrophotometry   The absence of expected drug(s) and/or drug metabolite(s) may   indicate non-compliance, inappropriate timing of specimen   collection relative to drug administration, poor drug absorption,   diluted/adulterated urine, or limitations of testing. The   concentration must be greater than or equal to the cutoff to be   reported as present.  If specific drug concentrations are   required, contact the laboratory within two weeks of specimen   collection to request quantification by a second analytical   technique. Interpretive questions should be directed to the   laboratory. Results based on immunoassay detection that do not match clinical   expectations should be   interpreted with caution. Confirmatory testing by mass   spectrometry for immunoassay-based results is available, if   ordered within two weeks of specimen collection. Additional   charges apply. For medical purposes only; not valid for forensic use. This test was developed and its performance characteristics   determined by [facility]. The [place]  Food and tablet, Rfl: 0    tiZANidine (ZANAFLEX) 4 MG tablet, Take 1 tablet by mouth every 8 hours as needed (spasms), Disp: 270 tablet, Rfl: 0    sertraline (ZOLOFT) 50 MG tablet, TAKE 1 TABLET DAILY (Patient taking differently: TAKE 1 TABLET DAILY Nightly), Disp: 90 tablet, Rfl: 1    topiramate (TOPAMAX) 100 MG tablet, TAKE 1 TABLET IN THE MORNING AND 2 TABLETS IN THE EVENING, Disp: 270 tablet, Rfl: 0    omeprazole (PRILOSEC) 40 MG delayed release capsule, TAKE 1 CAPSULE DAILY, Disp: 90 capsule, Rfl: 0    amLODIPine (NORVASC) 5 MG tablet, TAKE 1 TABLET DAILY, Disp: 90 tablet, Rfl: 0    metFORMIN (GLUCOPHAGE XR) 500 MG extended release tablet, Take 1 tablet by mouth daily (with breakfast), Disp: 30 tablet, Rfl: 3    pregabalin (LYRICA) 75 MG capsule, Take 1 capsule by mouth 3 times daily for 30 days. ., Disp: 90 capsule, Rfl: 3    b complex vitamins capsule, Take 1 capsule by mouth daily, Disp: , Rfl:     vitamin D (CHOLECALCIFEROL) 1000 UNIT TABS tablet, Take 1,000 Units by mouth daily, Disp: , Rfl:     Zinc 30 MG TABS, Take by mouth daily , Disp: , Rfl:     Ascorbic Acid (VITAMIN C) 500 MG CAPS, Take by mouth, Disp: , Rfl:     ECHINACEA PO, Take by mouth, Disp: , Rfl:     Probiotic Product (PROBIOTIC DAILY PO), Take 1 tablet by mouth daily. , Disp: , Rfl:     Multiple Vitamins-Calcium (ONE-A-DAY WOMENS FORMULA PO), Take  by mouth., Disp: , Rfl:     ONE TOUCH LANCETS MISC, 1 each by Does not apply route 2 times daily, Disp: 200 each, Rfl: 1    blood glucose test strips (ASCENSIA AUTODISC VI;ONE TOUCH ULTRA TEST VI) strip, 1 each by In Vitro route daily As needed. , Disp: 200 each, Rfl: 1     MG tablet, TAKE 1 TABLET EVERY 8 HOURS AS NEEDED FOR PAIN, Disp: 270 tablet, Rfl: 0    docusate sodium (COLACE) 100 MG capsule, Take 1 capsule by mouth daily as needed for Constipation, Disp: 30 capsule, Rfl: 2    Elastic Bandages & Supports (LUMBAR BACK BRACE/SUPPORT PAD) MISC, 1 each by Does not apply route daily as needed (pain), Disp: 1 each, Rfl: 0    Melatonin ER 5 MG TBCR, Take by mouth as needed, Disp: , Rfl:     Family History   Problem Relation Age of Onset    Cancer Mother     Heart Disease Father     Diabetes Father     High Blood Pressure Father     Other Other         Celiac Sprue. Aunt       Social History     Social History    Marital status:      Spouse name: N/A    Number of children: N/A    Years of education: N/A     Occupational History    unemployed      Social History Main Topics    Smoking status: Never Smoker    Smokeless tobacco: Never Used    Alcohol use No    Drug use: No    Sexual activity: Yes     Other Topics Concern    Not on file     Social History Narrative    No narrative on file       Review of Systems:  Review of Systems   Constitution: Positive for weakness. HENT: Negative. Eyes:        Glasses   Cardiovascular: Negative. Respiratory: Negative. Endocrine:        Blood sugar  Average low 100's   Hematologic/Lymphatic: Negative. Skin: Negative. Musculoskeletal: Positive for back pain, falls and joint pain. Knees, left hip   Gastrointestinal: Positive for constipation. Genitourinary: Positive for nocturia and urgency. Neurological: Positive for numbness. Psychiatric/Behavioral: Positive for depression. The patient is nervous/anxious. Sees PCP         Physical Exam:  BP (!) 152/76   Pulse 83   Temp 98.3 °F (36.8 °C) (Oral)   Resp 20   Ht 5' (1.524 m)   Wt 221 lb (100.2 kg)   SpO2 96%   BMI 43.16 kg/m²     Physical Exam   Constitutional: She appears well-developed. Overweight    HENT:   Head: Normocephalic. Neck: Normal range of motion. Neck supple. Pulmonary/Chest: Effort normal.   Musculoskeletal:        Thoracic back: She exhibits decreased range of motion and tenderness. Lumbar back: She exhibits decreased range of motion and tenderness.         Back:    Decreased ROM lumbar and thoracic spine, midline tenderness, no swelling or deformity   Skin: Skin is warm, dry and intact. Psychiatric: Her behavior is normal. Judgment and thought content normal. Cognition and memory are normal. She exhibits a depressed mood.          Assessment:  Problem List Items Addressed This Visit     Lumbar radiculopathy, chronic (Chronic)    Relevant Medications    HYDROcodone-acetaminophen (NORCO) 5-325 MG per tablet (Start on 12/17/2018)    tiZANidine (ZANAFLEX) 4 MG tablet    Degeneration of lumbar intervertebral disc (Chronic)    Relevant Medications    HYDROcodone-acetaminophen (NORCO) 5-325 MG per tablet (Start on 12/17/2018)    tiZANidine (ZANAFLEX) 4 MG tablet    Lumbar spondylosis (Chronic)    Relevant Medications    HYDROcodone-acetaminophen (NORCO) 5-325 MG per tablet (Start on 12/17/2018)    tiZANidine (ZANAFLEX) 4 MG tablet    Degenerative disc disease, cervical    Relevant Medications    HYDROcodone-acetaminophen (NORCO) 5-325 MG per tablet (Start on 12/17/2018)    tiZANidine (ZANAFLEX) 4 MG tablet    Cervical disc herniation    Relevant Medications    HYDROcodone-acetaminophen (NORCO) 5-325 MG per tablet (Start on 12/17/2018)    Osteoarthritis of lumbar spine - Primary    Relevant Medications    HYDROcodone-acetaminophen (NORCO) 5-325 MG per tablet (Start on 12/17/2018)    tiZANidine (ZANAFLEX) 4 MG tablet    Sacroiliitis ([address]    Relevant Medications    HYDROcodone-acetaminophen ([address]) 5-325 MG per tablet (Start on 12/17/2018)    tiZANidine (ZANAFLEX) 4 MG tablet    Primary osteoarthritis of left hip    Relevant Medications    HYDROcodone-acetaminophen (NORCO) 5-325 MG per tablet (Start on 12/17/2018)    tiZANidine (ZANAFLEX) 4 MG tablet    Arthropathy of lumbar facet joint    Relevant Medications    HYDROcodone-acetaminophen (NORCO) 5-325 MG per tablet (Start on 12/17/2018)    Chronic left-sided low back pain with left-sided sciatica    Relevant Medications    HYDROcodone-acetaminophen (NORCO) 5-325 MG per tablet (Start on 12/17/2018)    tiZANidine (ZANAFLEX) 4 MG tablet    Osteoarthritis of spine with radiculopathy, lumbar region    Relevant Medications    HYDROcodone-acetaminophen ([address]) 5-325 MG per tablet (Start on 12/17/2018)    tiZANidine (ZANAFLEX) 4 MG tablet    Encounter for medication management    Morbid obesity with BMI of 40.0-44.9, adult ([facility] [address]      Other Visit Diagnoses     Hip pain, acute, left                    Treatment Plan:  DISCUSSION: Treatment options discussed withpatient and all questions answered to patient's satisfaction. Possible side effects, risk of tolerance and or dependence and alternative treatments discussed    Obtaining appropriate analgesic effect of treatment   No signs of potential drug abuse or diversion identified    [x] Ill effects of being on chronic pain medications such as sleepdisturbances, respiratory depression, hormonal changes, withdrawal symptoms, chronic opioid dependence and tolerance as well as risk of taking opioids with Benzodiazepines and taking opioids along with alcohol,  werediscussed with patient. I had asked the patient to minimize medication use and utilize pain medications only for uncontrolled rest pain or pain with exertional activities. I advised patient not to self-escalate painmedications without consulting with us. At each of patient's future visits we will try to taper pain medications, while adjusting the adjunct medications, and re-evaluating for Physical Therapy to improve spinal andjoint strength. We will continue to have discussions to decrease pain medications as tolerated. Counseled patient on effects their pain medication and /or their medical condition mayhave on their  ability to drive or operate machinery.  Instructed not to drive or operate machinery if drowsy     I also discussed with the patient regarding the dangers of combining narcotic pain medication with tranquilizers, alcohol or illegal drugs or taking the medication any way

## 2018-12-14 ENCOUNTER — HOSPITAL ENCOUNTER (OUTPATIENT)
Age: 48
Setting detail: OUTPATIENT SURGERY
Discharge: HOME | End: 2018-12-14
Attending: PODIATRIST | Admitting: PODIATRIST
Payer: COMMERCIAL

## 2018-12-14 ENCOUNTER — ANESTHESIA (OUTPATIENT)
Dept: OPERATING ROOM | Age: 48
End: 2018-12-14
Payer: COMMERCIAL

## 2018-12-14 VITALS — SYSTOLIC BLOOD PRESSURE: 144 MMHG | DIASTOLIC BLOOD PRESSURE: 88 MMHG | OXYGEN SATURATION: 91 %

## 2018-12-14 VITALS
OXYGEN SATURATION: 93 % | HEIGHT: 60 IN | BODY MASS INDEX: 43.39 KG/M2 | TEMPERATURE: 98.3 F | WEIGHT: 221 LBS | RESPIRATION RATE: 16 BRPM | DIASTOLIC BLOOD PRESSURE: 97 MMHG | SYSTOLIC BLOOD PRESSURE: 134 MMHG | HEART RATE: 80 BPM

## 2018-12-14 LAB
GLUCOSE BLD-MCNC: 115 MG/DL (ref 65–105)
GLUCOSE BLD-MCNC: 97 MG/DL (ref 65–105)

## 2018-12-14 PROCEDURE — 3600000002 HC SURGERY LEVEL 2 BASE: Performed by: PODIATRIST

## 2018-12-14 PROCEDURE — 7100000011 HC PHASE II RECOVERY - ADDTL 15 MIN: Performed by: PODIATRIST

## 2018-12-14 PROCEDURE — 3700000000 HC ANESTHESIA ATTENDED CARE: Performed by: PODIATRIST

## 2018-12-14 PROCEDURE — 7100000000 HC PACU RECOVERY - FIRST 15 MIN: Performed by: PODIATRIST

## 2018-12-14 PROCEDURE — 2500000003 HC RX 250 WO HCPCS: Performed by: ANESTHESIOLOGY

## 2018-12-14 PROCEDURE — 2500000003 HC RX 250 WO HCPCS: Performed by: PODIATRIST

## 2018-12-14 PROCEDURE — 2580000003 HC RX 258: Performed by: ANESTHESIOLOGY

## 2018-12-14 PROCEDURE — 2709999900 HC NON-CHARGEABLE SUPPLY: Performed by: PODIATRIST

## 2018-12-14 PROCEDURE — 82947 ASSAY GLUCOSE BLOOD QUANT: CPT

## 2018-12-14 PROCEDURE — 7100000010 HC PHASE II RECOVERY - FIRST 15 MIN: Performed by: PODIATRIST

## 2018-12-14 PROCEDURE — 7100000001 HC PACU RECOVERY - ADDTL 15 MIN: Performed by: PODIATRIST

## 2018-12-14 PROCEDURE — 6360000002 HC RX W HCPCS: Performed by: ANESTHESIOLOGY

## 2018-12-14 PROCEDURE — 3700000001 HC ADD 15 MINUTES (ANESTHESIA): Performed by: PODIATRIST

## 2018-12-14 PROCEDURE — 3600000012 HC SURGERY LEVEL 2 ADDTL 15MIN: Performed by: PODIATRIST

## 2018-12-14 RX ORDER — MEPERIDINE HYDROCHLORIDE 50 MG/ML
12.5 INJECTION INTRAMUSCULAR; INTRAVENOUS; SUBCUTANEOUS EVERY 5 MIN PRN
Status: DISCONTINUED | OUTPATIENT
Start: 2018-12-14 | End: 2018-12-14 | Stop reason: HOSPADM

## 2018-12-14 RX ORDER — ONDANSETRON 2 MG/ML
4 INJECTION INTRAMUSCULAR; INTRAVENOUS
Status: DISCONTINUED | OUTPATIENT
Start: 2018-12-14 | End: 2018-12-14 | Stop reason: HOSPADM

## 2018-12-14 RX ORDER — SODIUM CHLORIDE 0.9 % (FLUSH) 0.9 %
10 SYRINGE (ML) INJECTION EVERY 12 HOURS SCHEDULED
Status: DISCONTINUED | OUTPATIENT
Start: 2018-12-14 | End: 2018-12-14 | Stop reason: HOSPADM

## 2018-12-14 RX ORDER — LIDOCAINE HYDROCHLORIDE 10 MG/ML
INJECTION, SOLUTION EPIDURAL; INFILTRATION; INTRACAUDAL; PERINEURAL PRN
Status: DISCONTINUED | OUTPATIENT
Start: 2018-12-14 | End: 2018-12-14 | Stop reason: SDUPTHER

## 2018-12-14 RX ORDER — PROPOFOL 10 MG/ML
INJECTION, EMULSION INTRAVENOUS PRN
Status: DISCONTINUED | OUTPATIENT
Start: 2018-12-14 | End: 2018-12-14 | Stop reason: SDUPTHER

## 2018-12-14 RX ORDER — ROCURONIUM BROMIDE 10 MG/ML
INJECTION, SOLUTION INTRAVENOUS PRN
Status: DISCONTINUED | OUTPATIENT
Start: 2018-12-14 | End: 2018-12-14 | Stop reason: SDUPTHER

## 2018-12-14 RX ORDER — LABETALOL HYDROCHLORIDE 5 MG/ML
5 INJECTION, SOLUTION INTRAVENOUS EVERY 10 MIN PRN
Status: DISCONTINUED | OUTPATIENT
Start: 2018-12-14 | End: 2018-12-14 | Stop reason: HOSPADM

## 2018-12-14 RX ORDER — DIPHENHYDRAMINE HYDROCHLORIDE 50 MG/ML
12.5 INJECTION INTRAMUSCULAR; INTRAVENOUS
Status: DISCONTINUED | OUTPATIENT
Start: 2018-12-14 | End: 2018-12-14 | Stop reason: HOSPADM

## 2018-12-14 RX ORDER — SODIUM CHLORIDE 0.9 % (FLUSH) 0.9 %
10 SYRINGE (ML) INJECTION PRN
Status: DISCONTINUED | OUTPATIENT
Start: 2018-12-14 | End: 2018-12-14 | Stop reason: HOSPADM

## 2018-12-14 RX ORDER — SODIUM CHLORIDE 9 MG/ML
INJECTION, SOLUTION INTRAVENOUS CONTINUOUS
Status: DISCONTINUED | OUTPATIENT
Start: 2018-12-14 | End: 2018-12-14 | Stop reason: HOSPADM

## 2018-12-14 RX ORDER — FENTANYL CITRATE 50 UG/ML
25 INJECTION, SOLUTION INTRAMUSCULAR; INTRAVENOUS EVERY 5 MIN PRN
Status: DISCONTINUED | OUTPATIENT
Start: 2018-12-14 | End: 2018-12-14 | Stop reason: HOSPADM

## 2018-12-14 RX ORDER — SODIUM CHLORIDE, SODIUM LACTATE, POTASSIUM CHLORIDE, CALCIUM CHLORIDE 600; 310; 30; 20 MG/100ML; MG/100ML; MG/100ML; MG/100ML
INJECTION, SOLUTION INTRAVENOUS CONTINUOUS PRN
Status: DISCONTINUED | OUTPATIENT
Start: 2018-12-14 | End: 2018-12-14 | Stop reason: SDUPTHER

## 2018-12-14 RX ORDER — MIDAZOLAM HYDROCHLORIDE 1 MG/ML
2 INJECTION INTRAMUSCULAR; INTRAVENOUS ONCE
Status: COMPLETED | OUTPATIENT
Start: 2018-12-14 | End: 2018-12-14

## 2018-12-14 RX ORDER — SUCCINYLCHOLINE CHLORIDE 20 MG/ML
INJECTION INTRAMUSCULAR; INTRAVENOUS PRN
Status: DISCONTINUED | OUTPATIENT
Start: 2018-12-14 | End: 2018-12-14 | Stop reason: SDUPTHER

## 2018-12-14 RX ORDER — FENTANYL CITRATE 50 UG/ML
INJECTION, SOLUTION INTRAMUSCULAR; INTRAVENOUS PRN
Status: DISCONTINUED | OUTPATIENT
Start: 2018-12-14 | End: 2018-12-14 | Stop reason: SDUPTHER

## 2018-12-14 RX ORDER — LIDOCAINE HYDROCHLORIDE 10 MG/ML
1 INJECTION, SOLUTION EPIDURAL; INFILTRATION; INTRACAUDAL; PERINEURAL
Status: DISCONTINUED | OUTPATIENT
Start: 2018-12-14 | End: 2018-12-14 | Stop reason: HOSPADM

## 2018-12-14 RX ORDER — BUPIVACAINE HYDROCHLORIDE 5 MG/ML
INJECTION, SOLUTION EPIDURAL; INTRACAUDAL PRN
Status: DISCONTINUED | OUTPATIENT
Start: 2018-12-14 | End: 2018-12-14 | Stop reason: HOSPADM

## 2018-12-14 RX ORDER — LIDOCAINE HYDROCHLORIDE 10 MG/ML
INJECTION, SOLUTION INFILTRATION; PERINEURAL PRN
Status: DISCONTINUED | OUTPATIENT
Start: 2018-12-14 | End: 2018-12-14 | Stop reason: HOSPADM

## 2018-12-14 RX ADMIN — PROPOFOL 200 MG: 10 INJECTION, EMULSION INTRAVENOUS at 12:44

## 2018-12-14 RX ADMIN — LIDOCAINE HYDROCHLORIDE 5 ML: 10 INJECTION, SOLUTION EPIDURAL; INFILTRATION; INTRACAUDAL; PERINEURAL at 12:44

## 2018-12-14 RX ADMIN — SUCCINYLCHOLINE CHLORIDE 140 MG: 20 INJECTION INTRAMUSCULAR; INTRAVENOUS at 12:44

## 2018-12-14 RX ADMIN — ROCURONIUM BROMIDE 5 MG: 10 INJECTION INTRAVENOUS at 12:44

## 2018-12-14 RX ADMIN — SODIUM CHLORIDE, POTASSIUM CHLORIDE, SODIUM LACTATE AND CALCIUM CHLORIDE: 600; 310; 30; 20 INJECTION, SOLUTION INTRAVENOUS at 12:44

## 2018-12-14 RX ADMIN — SODIUM CHLORIDE: 9 INJECTION, SOLUTION INTRAVENOUS at 12:15

## 2018-12-14 RX ADMIN — MIDAZOLAM HYDROCHLORIDE 2 MG: 1 INJECTION, SOLUTION INTRAMUSCULAR; INTRAVENOUS at 12:29

## 2018-12-14 RX ADMIN — FENTANYL CITRATE 100 MCG: 50 INJECTION, SOLUTION INTRAMUSCULAR; INTRAVENOUS at 12:44

## 2018-12-14 ASSESSMENT — PULMONARY FUNCTION TESTS
PIF_VALUE: 14
PIF_VALUE: 6
PIF_VALUE: 15
PIF_VALUE: 12
PIF_VALUE: 3
PIF_VALUE: 3
PIF_VALUE: 2
PIF_VALUE: 3
PIF_VALUE: 12
PIF_VALUE: 21
PIF_VALUE: 6
PIF_VALUE: 1
PIF_VALUE: 6
PIF_VALUE: 31
PIF_VALUE: 27
PIF_VALUE: 33
PIF_VALUE: 2
PIF_VALUE: 12
PIF_VALUE: 32
PIF_VALUE: 12
PIF_VALUE: 22
PIF_VALUE: 27
PIF_VALUE: 1
PIF_VALUE: 1
PIF_VALUE: 37
PIF_VALUE: 7
PIF_VALUE: 22
PIF_VALUE: 2
PIF_VALUE: 34
PIF_VALUE: 24
PIF_VALUE: 26
PIF_VALUE: 0
PIF_VALUE: 26
PIF_VALUE: 15
PIF_VALUE: 15
PIF_VALUE: 23
PIF_VALUE: 1
PIF_VALUE: 3
PIF_VALUE: 26
PIF_VALUE: 1
PIF_VALUE: 1
PIF_VALUE: 21
PIF_VALUE: 2

## 2018-12-14 ASSESSMENT — PAIN DESCRIPTION - PAIN TYPE: TYPE: CHRONIC PAIN

## 2018-12-14 ASSESSMENT — PAIN SCALES - GENERAL
PAINLEVEL_OUTOF10: 0

## 2018-12-14 ASSESSMENT — PAIN - FUNCTIONAL ASSESSMENT: PAIN_FUNCTIONAL_ASSESSMENT: 0-10

## 2018-12-14 ASSESSMENT — PAIN DESCRIPTION - DESCRIPTORS: DESCRIPTORS: ACHING

## 2018-12-14 ASSESSMENT — ENCOUNTER SYMPTOMS: STRIDOR: 0

## 2018-12-14 NOTE — PROGRESS NOTES
Unable to intubate patient after multiple attempts. Changed to LMA succesfully  But unable to ventilate patient, Vt 200, O2 sat 90%. Discussed with S, he agreed to abort. May rescheduled for GETA awake nasal intubation due to small mouth or spinal anesthesia.

## 2018-12-14 NOTE — ANESTHESIA PRE PROCEDURE
Provider, MD   Ascorbic Acid (VITAMIN C) 500 MG CAPS Take by mouth   Yes Historical Provider, MD   ECHINACEA PO Take by mouth   Yes Historical Provider, MD   Probiotic Product (PROBIOTIC DAILY PO) Take 1 tablet by mouth daily. Yes Historical Provider, MD   Multiple Vitamins-Calcium (ONE-A-DAY WOMENS FORMULA PO) Take  by mouth. Yes Historical Provider, MD   ONE TOUCH LANCETS MISC 1 each by Does not apply route 2 times daily 10/22/18   Suzette Bay MD   blood glucose test strips (ASCENSIA AUTODISC VI;ONE TOUCH ULTRA TEST VI) strip 1 each by In Vitro route daily As needed. 10/22/18   Suzette Bay MD    MG tablet TAKE 1 TABLET EVERY 8 HOURS AS NEEDED FOR PAIN 10/18/18   Valora Service, APRN - CNP   Elastic Bandages & Supports (LUMBAR BACK BRACE/SUPPORT PAD) MISC 1 each by Does not apply route daily as needed (pain) 8/4/17   Luis Angel Alba MD       Current medications:    Current Facility-Administered Medications   Medication Dose Route Frequency Provider Last Rate Last Dose    0.9 % sodium chloride infusion   Intravenous Continuous Estuardosha Whitney  mL/hr at 12/14/18 1215      lidocaine PF 1 % injection 1 mL  1 mL Intradermal Once PRN Bryanna Gutierrez MD        sodium chloride flush 0.9 % injection 10 mL  10 mL Intravenous 2 times per day Bryanna Gutierrez MD        sodium chloride flush 0.9 % injection 10 mL  10 mL Intravenous PRN Bryanna Gutierrez MD        midazolam (VERSED) injection 2 mg  2 mg Intravenous Once Garrett Strickland MD        ceFAZolin (ANCEF) 2 g in dextrose 5 % 50 mL IVPB  2 g Intravenous Once Ham Garcia DPM           Allergies:     Allergies   Allergen Reactions    Adhesive Tape Other (See Comments)     blisters    Imitrex [Sumatriptan] Other (See Comments)     \"feels like head is going to explode    Morphine Itching     hives       Problem List:    Patient Active Problem List   Diagnosis Code    Degenerative disc disease, cervical M50.30    Substance Use Topics    Smoking status: Never Smoker    Smokeless tobacco: Never Used    Alcohol use No                                Counseling given: Not Answered      Vital Signs (Current):   Vitals:    12/14/18 1154   BP: 127/74   Pulse: 75   Resp: 20   Temp: 98.2 °F (36.8 °C)   TempSrc: Oral   SpO2: 98%   Weight: 221 lb (100.2 kg)   Height: 5' (1.524 m)                                              BP Readings from Last 3 Encounters:   12/14/18 127/74   12/13/18 (!) 152/76   11/30/18 (!) 151/94       NPO Status: Time of last liquid consumption: 2200                        Time of last solid consumption: 1300                        Date of last liquid consumption: 12/13/18                        Date of last solid food consumption: 12/13/18    BMI:   Wt Readings from Last 3 Encounters:   12/14/18 221 lb (100.2 kg)   12/13/18 221 lb (100.2 kg)   11/30/18 221 lb (100.2 kg)     Body mass index is 43.16 kg/m².     CBC:   Lab Results   Component Value Date    WBC 7.7 11/30/2018    RBC 4.69 11/30/2018    HGB 13.9 11/30/2018    HCT 41.7 11/30/2018    MCV 88.9 11/30/2018    RDW 12.9 11/30/2018     11/30/2018       CMP:   Lab Results   Component Value Date     11/30/2018    K 4.0 11/30/2018     11/30/2018    CO2 25 11/30/2018    BUN 11 11/30/2018    CREATININE 0.93 11/30/2018    GFRAA >60 11/30/2018    LABGLOM >60 11/30/2018    GLUCOSE 153 11/30/2018    GLUCOSE 123 10/17/2017    CALCIUM 9.5 11/30/2018       POC Tests:   Recent Labs      12/14/18   1213   POCGLU  97       Coags: No results found for: PROTIME, INR, APTT    HCG (If Applicable):   Lab Results   Component Value Date    PREGTESTUR NEGATIVE 02/21/2014        ABGs: No results found for: PHART, PO2ART, RFI7OZP, BRT0ODT, BEART, M8TVJAUC     Type & Screen (If Applicable):  No results found for: ALEXY [facility]    Anesthesia Evaluation  Patient summary reviewed  Airway: Mallampati: II  TM distance: >3 FB   Neck ROM: full  Mouth opening: > = 3 FB

## 2018-12-14 NOTE — ANESTHESIA POSTPROCEDURE EVALUATION
POST- ANESTHESIA EVALUATION       Pt Name: Socorro Garrett  MRN: [id number]  YOB: 1970  Date of evaluation: 12/14/2018  Time:  2:07 PM      /87   Pulse 76   Temp 97.3 °F (36.3 °C)   Resp 11   Ht 5' (1.524 m)   Wt 221 lb (100.2 kg)   SpO2 95%   BMI 43.16 kg/m²      Consciousness Level  Awake  Cardiopulmonary Status  Stable  Pain Adequately Treated YES  Nausea / Vomiting  NO  Adequate Hydration  YES  Anesthesia Related Complications NONE      Electronically signed by Mao Alan MD on 12/14/2018 at 2:07 PM       Department of Anesthesiology  Postprocedure Note    Patient: Socorro Garrett  MRN: [id number]  YOB: 1970  Date of evaluation: 12/14/2018  Time:  2:07 PM     Procedure Summary     Date:  12/14/18 Room / Location:  [address] [place] [address] Kimberley Fang 01 / [id number]    Anesthesia Start:  8209 Anesthesia Stop:  8362    Procedure:  ATTEMPTED FOOT BONE EXCISION BEN'S (Left Foot) Diagnosis:  (LEFT FOOT BEN'S)    Surgeon:  Manny Dixon DPM Responsible Provider:  Mao Alan MD    Anesthesia Type:  general ASA Status:  3          Anesthesia Type: general    Yolie Phase I: Yolie Score: 8    Yolie Phase II:      Last vitals: Reviewed and per EMR flowsheets.        Anesthesia Post Evaluation

## 2018-12-18 ENCOUNTER — OFFICE VISIT (OUTPATIENT)
Dept: FAMILY MEDICINE CLINIC | Age: 48
End: 2018-12-18
Payer: COMMERCIAL

## 2018-12-18 ENCOUNTER — HOSPITAL ENCOUNTER (OUTPATIENT)
Age: 48
Setting detail: SPECIMEN
Discharge: HOME | End: 2018-12-18
Payer: COMMERCIAL

## 2018-12-18 VITALS
BODY MASS INDEX: 43.19 KG/M2 | SYSTOLIC BLOOD PRESSURE: 124 MMHG | TEMPERATURE: 97.3 F | HEART RATE: 74 BPM | HEIGHT: 60 IN | OXYGEN SATURATION: 97 % | DIASTOLIC BLOOD PRESSURE: 84 MMHG | WEIGHT: 220 LBS

## 2018-12-18 DIAGNOSIS — R35.0 URINARY FREQUENCY: ICD-10-CM

## 2018-12-18 DIAGNOSIS — R68.84 JAW PAIN: ICD-10-CM

## 2018-12-18 DIAGNOSIS — E11.9 TYPE 2 DIABETES MELLITUS WITHOUT COMPLICATION, WITHOUT LONG-TERM CURRENT USE OF INSULIN (HCC): Primary | ICD-10-CM

## 2018-12-18 LAB
-: ABNORMAL
AMORPHOUS: ABNORMAL
BACTERIA: ABNORMAL
BILIRUBIN URINE: NEGATIVE
BILIRUBIN, POC: ABNORMAL
BLOOD URINE, POC: ABNORMAL
CASTS UA: ABNORMAL /LPF (ref 0–8)
CLARITY, POC: CLEAR
COLOR, POC: YELLOW
COLOR: YELLOW
COMMENT UA: ABNORMAL
CREATININE URINE: 38.6 MG/DL (ref 28–217)
CRYSTALS, UA: ABNORMAL /HPF
EPITHELIAL CELLS UA: ABNORMAL /HPF (ref 0–5)
GLUCOSE URINE, POC: ABNORMAL
GLUCOSE URINE: NEGATIVE
HBA1C MFR BLD: 6.5 %
KETONES, POC: ABNORMAL
KETONES, URINE: NEGATIVE
LEUKOCYTE EST, POC: ABNORMAL
LEUKOCYTE ESTERASE, URINE: ABNORMAL
MICROALBUMIN/CREAT 24H UR: <12 MG/L
MICROALBUMIN/CREAT UR-RTO: NORMAL MCG/MG CREAT
MUCUS: ABNORMAL
NITRITE, POC: ABNORMAL
NITRITE, URINE: NEGATIVE
OTHER OBSERVATIONS UA: ABNORMAL
PH UA: 7 (ref 5–8)
PH, POC: 7
PROTEIN UA: NEGATIVE
PROTEIN, POC: ABNORMAL
RBC UA: ABNORMAL /HPF (ref 0–4)
RENAL EPITHELIAL, UA: ABNORMAL /HPF
SPECIFIC GRAVITY UA: 1.01 (ref 1–1.03)
SPECIFIC GRAVITY, POC: 1
TRICHOMONAS: ABNORMAL
TURBIDITY: CLEAR
URINE HGB: NEGATIVE
UROBILINOGEN, POC: 0.2
UROBILINOGEN, URINE: NORMAL
WBC UA: ABNORMAL /HPF (ref 0–5)
YEAST: ABNORMAL

## 2018-12-18 PROCEDURE — 81003 URINALYSIS AUTO W/O SCOPE: CPT | Performed by: FAMILY MEDICINE

## 2018-12-18 PROCEDURE — 99214 OFFICE O/P EST MOD 30 MIN: CPT | Performed by: FAMILY MEDICINE

## 2018-12-18 PROCEDURE — 83036 HEMOGLOBIN GLYCOSYLATED A1C: CPT | Performed by: FAMILY MEDICINE

## 2018-12-18 RX ORDER — PHENAZOPYRIDINE HYDROCHLORIDE 200 MG/1
200 TABLET, FILM COATED ORAL 3 TIMES DAILY PRN
Qty: 9 TABLET | Refills: 0 | Status: SHIPPED | OUTPATIENT
Start: 2018-12-18 | End: 2018-12-21

## 2018-12-18 RX ORDER — LISINOPRIL 5 MG/1
5 TABLET ORAL DAILY
Qty: 90 TABLET | Refills: 0 | Status: SHIPPED | OUTPATIENT
Start: 2018-12-18 | End: 2019-02-28 | Stop reason: SDUPTHER

## 2018-12-18 ASSESSMENT — PATIENT HEALTH QUESTIONNAIRE - PHQ9
1. LITTLE INTEREST OR PLEASURE IN DOING THINGS: 1
2. FEELING DOWN, DEPRESSED OR HOPELESS: 1
SUM OF ALL RESPONSES TO PHQ QUESTIONS 1-9: 2
SUM OF ALL RESPONSES TO PHQ9 QUESTIONS 1 & 2: 2
SUM OF ALL RESPONSES TO PHQ QUESTIONS 1-9: 2

## 2018-12-18 ASSESSMENT — ENCOUNTER SYMPTOMS
ABDOMINAL PAIN: 0
SHORTNESS OF BREATH: 0
NAUSEA: 0
SORE THROAT: 0

## 2018-12-18 NOTE — PROGRESS NOTES
Subjective:      Patient ID: Roge Bell is a 53 y.o. female. Patient is here today for jaw pain and tongue. She was to have surgery on Friday and they couldn't get the ET tub in . Her O2 sat dropped and they were unable to proceed with the surgery. She is also having urinary frequency and burning. Visit Information    Have you changed or started any medications since your last visit including any over-the-counter medicines, vitamins, or herbal medicines? no   Are you having any side effects from any of your medications? -  no  Have you stopped taking any of your medications? Is so, why? -  no    Have you seen any other physician or provider since your last visit? No  Have you had any other diagnostic tests since your last visit? No  Have you been seen in the emergency room and/or had an admission to a hospital since we last saw you? No  Have you had your routine dental cleaning in the past 6 months? no    Have you activated your [employer] account? If not, what are your barriers?  Yes     Patient Care Team:  Maxine Bautista MD as PCP - General (Family Medicine)  Maxine Bautista MD as PCP - [facility] Attributed Provider  Keaton Jenkins MD as Orthopedic Surgeon (Orthopedic Surgery)  Adalberto Arriaza MD as Consulting Physician (Gastroenterology)      Health Maintenance   Topic Date Due    Diabetic foot exam  08/08/1981    Diabetic retinal exam  08/08/1981    HIV screen  08/08/1986    Diabetic microalbuminuria test  08/08/1989    Pneumococcal med risk (1 of 1 - PPSV23) 08/08/1990    Cervical cancer screen  07/03/2017    Lipid screen  10/17/2018    A1C test (Diabetic or Prediabetic)  09/18/2019    Colon cancer screen colonoscopy  10/31/2019    Potassium monitoring  11/30/2019    Creatinine monitoring  11/30/2019    DTaP/Tdap/Td vaccine (2 - Td) 01/11/2028    Flu vaccine  Completed       HPI  50-year-old female is seen in the office today complaining of that she has got some right-sided jaw pain and they tried

## 2018-12-20 DIAGNOSIS — N39.0 URINARY TRACT INFECTION WITHOUT HEMATURIA, SITE UNSPECIFIED: Primary | ICD-10-CM

## 2018-12-20 LAB
CULTURE: ABNORMAL
Lab: ABNORMAL
ORGANISM: ABNORMAL
SPECIMEN DESCRIPTION: ABNORMAL
STATUS: ABNORMAL

## 2018-12-20 RX ORDER — CIPROFLOXACIN 500 MG/1
500 TABLET, FILM COATED ORAL 2 TIMES DAILY
Qty: 14 TABLET | Refills: 0 | OUTPATIENT
Start: 2018-12-20 | End: 2018-12-27

## 2018-12-24 RX ORDER — OMEPRAZOLE 40 MG/1
CAPSULE, DELAYED RELEASE ORAL
Qty: 90 CAPSULE | Refills: 0 | Status: SHIPPED | OUTPATIENT
Start: 2018-12-24 | End: 2019-03-23 | Stop reason: SDUPTHER

## 2019-01-04 DIAGNOSIS — E11.9 TYPE 2 DIABETES MELLITUS WITHOUT COMPLICATION, WITHOUT LONG-TERM CURRENT USE OF INSULIN (HCC): ICD-10-CM

## 2019-01-04 RX ORDER — METFORMIN HYDROCHLORIDE 500 MG/1
500 TABLET, EXTENDED RELEASE ORAL
Qty: 90 TABLET | Refills: 1 | Status: SHIPPED | OUTPATIENT
Start: 2019-01-04 | End: 2019-06-22 | Stop reason: SDUPTHER

## 2019-01-09 RX ORDER — TOPIRAMATE 100 MG/1
TABLET, FILM COATED ORAL
Qty: 270 TABLET | Refills: 0 | Status: SHIPPED | OUTPATIENT
Start: 2019-01-09 | End: 2019-03-11 | Stop reason: SDUPTHER

## 2019-01-16 ENCOUNTER — HOSPITAL ENCOUNTER (OUTPATIENT)
Dept: PAIN MANAGEMENT | Age: 49
Discharge: HOME | End: 2019-01-16
Payer: COMMERCIAL

## 2019-01-16 VITALS
HEIGHT: 60 IN | BODY MASS INDEX: 43.19 KG/M2 | WEIGHT: 220 LBS | OXYGEN SATURATION: 96 % | HEART RATE: 74 BPM | TEMPERATURE: 98.5 F | DIASTOLIC BLOOD PRESSURE: 96 MMHG | SYSTOLIC BLOOD PRESSURE: 145 MMHG | RESPIRATION RATE: 16 BRPM

## 2019-01-16 DIAGNOSIS — M16.12 PRIMARY OSTEOARTHRITIS OF LEFT HIP: ICD-10-CM

## 2019-01-16 DIAGNOSIS — M51.36 DEGENERATION OF LUMBAR INTERVERTEBRAL DISC: ICD-10-CM

## 2019-01-16 DIAGNOSIS — G89.29 CHRONIC LEFT-SIDED LOW BACK PAIN WITH LEFT-SIDED SCIATICA: ICD-10-CM

## 2019-01-16 DIAGNOSIS — M47.816 OSTEOARTHRITIS OF LUMBAR SPINE, UNSPECIFIED SPINAL OSTEOARTHRITIS COMPLICATION STATUS: Primary | ICD-10-CM

## 2019-01-16 DIAGNOSIS — M47.816 LUMBAR SPONDYLOSIS: ICD-10-CM

## 2019-01-16 DIAGNOSIS — Z79.899 ENCOUNTER FOR MEDICATION MANAGEMENT: ICD-10-CM

## 2019-01-16 DIAGNOSIS — M47.896 OTHER OSTEOARTHRITIS OF SPINE, LUMBAR REGION: ICD-10-CM

## 2019-01-16 DIAGNOSIS — M47.26 OSTEOARTHRITIS OF SPINE WITH RADICULOPATHY, LUMBAR REGION: ICD-10-CM

## 2019-01-16 DIAGNOSIS — M54.42 CHRONIC LEFT-SIDED LOW BACK PAIN WITH LEFT-SIDED SCIATICA: ICD-10-CM

## 2019-01-16 DIAGNOSIS — M47.816 ARTHROPATHY OF LUMBAR FACET JOINT: ICD-10-CM

## 2019-01-16 DIAGNOSIS — M46.1 SACROILIITIS (HCC): ICD-10-CM

## 2019-01-16 DIAGNOSIS — M54.16 LUMBAR RADICULOPATHY, CHRONIC: ICD-10-CM

## 2019-01-16 DIAGNOSIS — F11.90 CHRONIC, CONTINUOUS USE OF OPIOIDS: ICD-10-CM

## 2019-01-16 DIAGNOSIS — M50.20 CERVICAL DISC HERNIATION: ICD-10-CM

## 2019-01-16 DIAGNOSIS — M50.30 DEGENERATIVE DISC DISEASE, CERVICAL: ICD-10-CM

## 2019-01-16 PROCEDURE — 99213 OFFICE O/P EST LOW 20 MIN: CPT

## 2019-01-16 RX ORDER — HYDROCODONE BITARTRATE AND ACETAMINOPHEN 5; 325 MG/1; MG/1
1 TABLET ORAL 2 TIMES DAILY
Qty: 60 TABLET | Refills: 0 | Status: SHIPPED | OUTPATIENT
Start: 2019-01-16 | End: 2019-02-14 | Stop reason: SDUPTHER

## 2019-01-16 RX ORDER — IBUPROFEN 600 MG/1
TABLET ORAL
Qty: 270 TABLET | Refills: 0 | Status: SHIPPED | OUTPATIENT
Start: 2019-01-16 | End: 2019-10-21 | Stop reason: SINTOL

## 2019-01-16 RX ORDER — PREGABALIN 75 MG/1
75 CAPSULE ORAL 3 TIMES DAILY
Qty: 90 CAPSULE | Refills: 3 | Status: SHIPPED | OUTPATIENT
Start: 2019-01-16 | End: 2019-05-13 | Stop reason: SDUPTHER

## 2019-01-16 ASSESSMENT — ENCOUNTER SYMPTOMS
BACK PAIN: 1
RESPIRATORY NEGATIVE: 1
CONSTIPATION: 1

## 2019-02-14 ENCOUNTER — HOSPITAL ENCOUNTER (OUTPATIENT)
Dept: PAIN MANAGEMENT | Age: 49
Discharge: HOME | End: 2019-02-14
Payer: COMMERCIAL

## 2019-02-14 VITALS
HEIGHT: 60 IN | DIASTOLIC BLOOD PRESSURE: 76 MMHG | TEMPERATURE: 98.3 F | OXYGEN SATURATION: 96 % | HEART RATE: 66 BPM | SYSTOLIC BLOOD PRESSURE: 114 MMHG | WEIGHT: 200 LBS | RESPIRATION RATE: 16 BRPM | BODY MASS INDEX: 39.27 KG/M2

## 2019-02-14 DIAGNOSIS — F11.90 CHRONIC, CONTINUOUS USE OF OPIOIDS: ICD-10-CM

## 2019-02-14 DIAGNOSIS — Z79.899 ENCOUNTER FOR MEDICATION MANAGEMENT: ICD-10-CM

## 2019-02-14 DIAGNOSIS — M47.816 LUMBAR SPONDYLOSIS: ICD-10-CM

## 2019-02-14 DIAGNOSIS — M47.816 OSTEOARTHRITIS OF LUMBAR SPINE, UNSPECIFIED SPINAL OSTEOARTHRITIS COMPLICATION STATUS: Primary | ICD-10-CM

## 2019-02-14 DIAGNOSIS — M54.42 CHRONIC LEFT-SIDED LOW BACK PAIN WITH LEFT-SIDED SCIATICA: ICD-10-CM

## 2019-02-14 DIAGNOSIS — M46.1 SACROILIITIS (HCC): ICD-10-CM

## 2019-02-14 DIAGNOSIS — M47.896 OTHER OSTEOARTHRITIS OF SPINE, LUMBAR REGION: ICD-10-CM

## 2019-02-14 DIAGNOSIS — G89.29 CHRONIC LEFT-SIDED LOW BACK PAIN WITH LEFT-SIDED SCIATICA: ICD-10-CM

## 2019-02-14 DIAGNOSIS — M47.816 ARTHROPATHY OF LUMBAR FACET JOINT: ICD-10-CM

## 2019-02-14 DIAGNOSIS — M47.26 OSTEOARTHRITIS OF SPINE WITH RADICULOPATHY, LUMBAR REGION: ICD-10-CM

## 2019-02-14 DIAGNOSIS — M54.16 LUMBAR RADICULOPATHY, CHRONIC: ICD-10-CM

## 2019-02-14 DIAGNOSIS — M16.12 PRIMARY OSTEOARTHRITIS OF LEFT HIP: ICD-10-CM

## 2019-02-14 DIAGNOSIS — M51.36 DEGENERATION OF LUMBAR INTERVERTEBRAL DISC: ICD-10-CM

## 2019-02-14 DIAGNOSIS — M50.20 CERVICAL DISC HERNIATION: ICD-10-CM

## 2019-02-14 DIAGNOSIS — M50.30 DEGENERATIVE DISC DISEASE, CERVICAL: ICD-10-CM

## 2019-02-14 PROCEDURE — 99213 OFFICE O/P EST LOW 20 MIN: CPT | Performed by: NURSE PRACTITIONER

## 2019-02-14 PROCEDURE — 99213 OFFICE O/P EST LOW 20 MIN: CPT

## 2019-02-14 RX ORDER — HYDROCODONE BITARTRATE AND ACETAMINOPHEN 5; 325 MG/1; MG/1
1 TABLET ORAL 2 TIMES DAILY
Qty: 60 TABLET | Refills: 0 | Status: SHIPPED | OUTPATIENT
Start: 2019-02-15 | End: 2019-03-11 | Stop reason: SDUPTHER

## 2019-02-14 RX ORDER — METHYLPREDNISOLONE 4 MG/1
TABLET ORAL
Qty: 1 KIT | Refills: 0 | Status: SHIPPED | OUTPATIENT
Start: 2019-02-14 | End: 2019-02-20

## 2019-02-14 ASSESSMENT — ENCOUNTER SYMPTOMS
BACK PAIN: 1
CONSTIPATION: 1
RESPIRATORY NEGATIVE: 1

## 2019-02-22 ENCOUNTER — HOSPITAL ENCOUNTER (OUTPATIENT)
Dept: GENERAL RADIOLOGY | Age: 49
Discharge: HOME | End: 2019-02-24
Payer: COMMERCIAL

## 2019-02-22 ENCOUNTER — HOSPITAL ENCOUNTER (OUTPATIENT)
Dept: PAIN MANAGEMENT | Age: 49
Discharge: HOME | End: 2019-02-22
Payer: COMMERCIAL

## 2019-02-22 VITALS
OXYGEN SATURATION: 98 % | BODY MASS INDEX: 39.27 KG/M2 | HEART RATE: 68 BPM | WEIGHT: 200 LBS | RESPIRATION RATE: 20 BRPM | HEIGHT: 60 IN | TEMPERATURE: 98.7 F | DIASTOLIC BLOOD PRESSURE: 62 MMHG | SYSTOLIC BLOOD PRESSURE: 110 MMHG

## 2019-02-22 DIAGNOSIS — M47.816 ARTHROPATHY OF LUMBAR FACET JOINT: ICD-10-CM

## 2019-02-22 DIAGNOSIS — M47.816 OSTEOARTHRITIS OF LUMBAR SPINE, UNSPECIFIED SPINAL OSTEOARTHRITIS COMPLICATION STATUS: Primary | ICD-10-CM

## 2019-02-22 DIAGNOSIS — M47.816 OSTEOARTHRITIS OF LUMBAR SPINE, UNSPECIFIED SPINAL OSTEOARTHRITIS COMPLICATION STATUS: ICD-10-CM

## 2019-02-22 DIAGNOSIS — M47.816 LUMBAR SPONDYLOSIS: ICD-10-CM

## 2019-02-22 PROCEDURE — 64494 INJ PARAVERT F JNT L/S 2 LEV: CPT | Performed by: PAIN MEDICINE

## 2019-02-22 PROCEDURE — 64494 INJ PARAVERT F JNT L/S 2 LEV: CPT

## 2019-02-22 PROCEDURE — 3209999900 FLUORO FOR SURGICAL PROCEDURES

## 2019-02-22 PROCEDURE — 64493 INJ PARAVERT F JNT L/S 1 LEV: CPT

## 2019-02-22 PROCEDURE — 64490 INJ PARAVERT F JNT C/T 1 LEV: CPT | Performed by: PAIN MEDICINE

## 2019-02-22 PROCEDURE — 64495 INJ PARAVERT F JNT L/S 3 LEV: CPT

## 2019-02-22 PROCEDURE — 99152 MOD SED SAME PHYS/QHP 5/>YRS: CPT | Performed by: PAIN MEDICINE

## 2019-02-22 PROCEDURE — 64495 INJ PARAVERT F JNT L/S 3 LEV: CPT | Performed by: PAIN MEDICINE

## 2019-02-22 PROCEDURE — 6360000002 HC RX W HCPCS: Performed by: PAIN MEDICINE

## 2019-02-22 PROCEDURE — 64493 INJ PARAVERT F JNT L/S 1 LEV: CPT | Performed by: PAIN MEDICINE

## 2019-02-22 RX ORDER — FENTANYL CITRATE 50 UG/ML
INJECTION, SOLUTION INTRAMUSCULAR; INTRAVENOUS
Status: COMPLETED | OUTPATIENT
Start: 2019-02-22 | End: 2019-02-22

## 2019-02-22 RX ORDER — MIDAZOLAM HYDROCHLORIDE 1 MG/ML
INJECTION INTRAMUSCULAR; INTRAVENOUS
Status: COMPLETED | OUTPATIENT
Start: 2019-02-22 | End: 2019-02-22

## 2019-02-22 RX ADMIN — MIDAZOLAM 2 MG: 1 INJECTION INTRAMUSCULAR; INTRAVENOUS at 08:41

## 2019-02-22 RX ADMIN — FENTANYL CITRATE 25 MCG: 50 INJECTION INTRAMUSCULAR; INTRAVENOUS at 08:44

## 2019-02-22 ASSESSMENT — PAIN DESCRIPTION - ORIENTATION: ORIENTATION: LOWER;LEFT

## 2019-02-22 ASSESSMENT — PAIN SCALES - GENERAL: PAINLEVEL_OUTOF10: 8

## 2019-02-22 ASSESSMENT — PAIN DESCRIPTION - DIRECTION: RADIATING_TOWARDS: LEFT LEG

## 2019-02-22 ASSESSMENT — ACTIVITIES OF DAILY LIVING (ADL): EFFECT OF PAIN ON DAILY ACTIVITIES: PAIN INCREASES WITH WALKING AND STANDING

## 2019-02-22 ASSESSMENT — PAIN DESCRIPTION - PAIN TYPE: TYPE: CHRONIC PAIN

## 2019-02-22 ASSESSMENT — PAIN DESCRIPTION - ONSET: ONSET: ON-GOING

## 2019-02-22 ASSESSMENT — PAIN DESCRIPTION - DESCRIPTORS: DESCRIPTORS: ACHING;BURNING;SHARP

## 2019-02-22 ASSESSMENT — PAIN DESCRIPTION - FREQUENCY: FREQUENCY: CONTINUOUS

## 2019-02-22 ASSESSMENT — PAIN - FUNCTIONAL ASSESSMENT
PAIN_FUNCTIONAL_ASSESSMENT: 0-10
PAIN_FUNCTIONAL_ASSESSMENT: PREVENTS OR INTERFERES SOME ACTIVE ACTIVITIES AND ADLS
PAIN_FUNCTIONAL_ASSESSMENT: 0-10

## 2019-02-22 ASSESSMENT — PAIN DESCRIPTION - LOCATION: LOCATION: BACK

## 2019-02-25 ENCOUNTER — TELEPHONE (OUTPATIENT)
Dept: PAIN MANAGEMENT | Age: 49
End: 2019-02-25

## 2019-02-26 ENCOUNTER — HOSPITAL ENCOUNTER (OUTPATIENT)
Age: 49
Discharge: HOME | End: 2019-02-26
Payer: COMMERCIAL

## 2019-02-26 ENCOUNTER — OFFICE VISIT (OUTPATIENT)
Dept: FAMILY MEDICINE CLINIC | Age: 49
End: 2019-02-26
Payer: COMMERCIAL

## 2019-02-26 VITALS
DIASTOLIC BLOOD PRESSURE: 80 MMHG | SYSTOLIC BLOOD PRESSURE: 110 MMHG | HEART RATE: 78 BPM | OXYGEN SATURATION: 98 % | WEIGHT: 216.6 LBS | TEMPERATURE: 98.7 F | HEIGHT: 61 IN | BODY MASS INDEX: 40.89 KG/M2

## 2019-02-26 DIAGNOSIS — Z00.00 PHYSICAL EXAM: ICD-10-CM

## 2019-02-26 DIAGNOSIS — Z23 NEED FOR PROPHYLACTIC VACCINATION AGAINST STREPTOCOCCUS PNEUMONIAE (PNEUMOCOCCUS): ICD-10-CM

## 2019-02-26 DIAGNOSIS — Z00.00 PHYSICAL EXAM: Primary | ICD-10-CM

## 2019-02-26 LAB
ALBUMIN SERPL-MCNC: 4.3 G/DL (ref 3.5–5.2)
ALBUMIN/GLOBULIN RATIO: ABNORMAL (ref 1–2.5)
ALP BLD-CCNC: 72 U/L (ref 35–104)
ALT SERPL-CCNC: 15 U/L (ref 5–33)
ANION GAP SERPL CALCULATED.3IONS-SCNC: 12 MMOL/L (ref 9–17)
AST SERPL-CCNC: 9 U/L
BILIRUB SERPL-MCNC: 0.27 MG/DL (ref 0.3–1.2)
BUN BLDV-MCNC: 17 MG/DL (ref 6–20)
BUN/CREAT BLD: ABNORMAL (ref 9–20)
CALCIUM SERPL-MCNC: 10 MG/DL (ref 8.6–10.4)
CHLORIDE BLD-SCNC: 104 MMOL/L (ref 98–107)
CHOLESTEROL/HDL RATIO: 4.7
CHOLESTEROL: 207 MG/DL
CO2: 26 MMOL/L (ref 20–31)
CREAT SERPL-MCNC: 0.86 MG/DL (ref 0.5–0.9)
GFR AFRICAN AMERICAN: >60 ML/MIN
GFR NON-AFRICAN AMERICAN: >60 ML/MIN
GFR SERPL CREATININE-BSD FRML MDRD: ABNORMAL ML/MIN/{1.73_M2}
GFR SERPL CREATININE-BSD FRML MDRD: ABNORMAL ML/MIN/{1.73_M2}
GLUCOSE BLD-MCNC: 105 MG/DL (ref 70–99)
HBA1C MFR BLD: 6.1 %
HDLC SERPL-MCNC: 44 MG/DL
LDL CHOLESTEROL: 124 MG/DL (ref 0–130)
POTASSIUM SERPL-SCNC: 4.4 MMOL/L (ref 3.7–5.3)
SODIUM BLD-SCNC: 142 MMOL/L (ref 135–144)
TOTAL PROTEIN: 7.7 G/DL (ref 6.4–8.3)
TRIGL SERPL-MCNC: 194 MG/DL
TSH SERPL DL<=0.05 MIU/L-ACNC: 0.62 MIU/L (ref 0.3–5)
VLDLC SERPL CALC-MCNC: ABNORMAL MG/DL (ref 1–30)

## 2019-02-26 PROCEDURE — G0444 DEPRESSION SCREEN ANNUAL: HCPCS | Performed by: FAMILY MEDICINE

## 2019-02-26 PROCEDURE — 80061 LIPID PANEL: CPT

## 2019-02-26 PROCEDURE — 90732 PPSV23 VACC 2 YRS+ SUBQ/IM: CPT | Performed by: FAMILY MEDICINE

## 2019-02-26 PROCEDURE — 99396 PREV VISIT EST AGE 40-64: CPT | Performed by: FAMILY MEDICINE

## 2019-02-26 PROCEDURE — 90471 IMMUNIZATION ADMIN: CPT | Performed by: FAMILY MEDICINE

## 2019-02-26 PROCEDURE — 36415 COLL VENOUS BLD VENIPUNCTURE: CPT

## 2019-02-26 PROCEDURE — 80053 COMPREHEN METABOLIC PANEL: CPT

## 2019-02-26 PROCEDURE — 84443 ASSAY THYROID STIM HORMONE: CPT

## 2019-02-26 PROCEDURE — 83036 HEMOGLOBIN GLYCOSYLATED A1C: CPT | Performed by: FAMILY MEDICINE

## 2019-02-26 ASSESSMENT — PATIENT HEALTH QUESTIONNAIRE - PHQ9
4. FEELING TIRED OR HAVING LITTLE ENERGY: 3
1. LITTLE INTEREST OR PLEASURE IN DOING THINGS: 2
7. TROUBLE CONCENTRATING ON THINGS, SUCH AS READING THE NEWSPAPER OR WATCHING TELEVISION: 3
10. IF YOU CHECKED OFF ANY PROBLEMS, HOW DIFFICULT HAVE THESE PROBLEMS MADE IT FOR YOU TO DO YOUR WORK, TAKE CARE OF THINGS AT HOME, OR GET ALONG WITH OTHER PEOPLE: 1
2. FEELING DOWN, DEPRESSED OR HOPELESS: 2
5. POOR APPETITE OR OVEREATING: 1
6. FEELING BAD ABOUT YOURSELF - OR THAT YOU ARE A FAILURE OR HAVE LET YOURSELF OR YOUR FAMILY DOWN: 1
8. MOVING OR SPEAKING SO SLOWLY THAT OTHER PEOPLE COULD HAVE NOTICED. OR THE OPPOSITE, BEING SO FIGETY OR RESTLESS THAT YOU HAVE BEEN MOVING AROUND A LOT MORE THAN USUAL: 1
3. TROUBLE FALLING OR STAYING ASLEEP: 3
SUM OF ALL RESPONSES TO PHQ QUESTIONS 1-9: 16
SUM OF ALL RESPONSES TO PHQ9 QUESTIONS 1 & 2: 4
9. THOUGHTS THAT YOU WOULD BE BETTER OFF DEAD, OR OF HURTING YOURSELF: 0
SUM OF ALL RESPONSES TO PHQ QUESTIONS 1-9: 16

## 2019-02-26 ASSESSMENT — ENCOUNTER SYMPTOMS
DIARRHEA: 0
VOMITING: 0
SORE THROAT: 0
ABDOMINAL PAIN: 0
NAUSEA: 0
EYE ITCHING: 0
COUGH: 0
SHORTNESS OF BREATH: 0
CONSTIPATION: 0

## 2019-02-28 RX ORDER — LISINOPRIL 5 MG/1
TABLET ORAL
Qty: 90 TABLET | Refills: 0 | Status: SHIPPED | OUTPATIENT
Start: 2019-02-28 | End: 2019-05-29 | Stop reason: SDUPTHER

## 2019-03-11 ENCOUNTER — HOSPITAL ENCOUNTER (OUTPATIENT)
Dept: PAIN MANAGEMENT | Age: 49
Discharge: HOME | End: 2019-03-11
Payer: COMMERCIAL

## 2019-03-11 VITALS
SYSTOLIC BLOOD PRESSURE: 129 MMHG | HEIGHT: 60 IN | BODY MASS INDEX: 42.41 KG/M2 | TEMPERATURE: 98.1 F | RESPIRATION RATE: 16 BRPM | WEIGHT: 216 LBS | OXYGEN SATURATION: 98 % | DIASTOLIC BLOOD PRESSURE: 77 MMHG | HEART RATE: 67 BPM

## 2019-03-11 DIAGNOSIS — M47.817 LUMBOSACRAL SPONDYLOSIS WITHOUT MYELOPATHY: Primary | ICD-10-CM

## 2019-03-11 DIAGNOSIS — M54.50 CHRONIC BILATERAL LOW BACK PAIN WITHOUT SCIATICA: ICD-10-CM

## 2019-03-11 DIAGNOSIS — Z79.891 CHRONIC USE OF OPIATE DRUGS THERAPEUTIC PURPOSES: ICD-10-CM

## 2019-03-11 DIAGNOSIS — M51.36 DEGENERATION OF LUMBAR INTERVERTEBRAL DISC: ICD-10-CM

## 2019-03-11 DIAGNOSIS — E66.01 MORBID OBESITY WITH BMI OF 40.0-44.9, ADULT (HCC): ICD-10-CM

## 2019-03-11 DIAGNOSIS — G89.29 CHRONIC BILATERAL LOW BACK PAIN WITHOUT SCIATICA: ICD-10-CM

## 2019-03-11 DIAGNOSIS — M47.816 ARTHROPATHY OF LUMBAR FACET JOINT: ICD-10-CM

## 2019-03-11 PROCEDURE — 99213 OFFICE O/P EST LOW 20 MIN: CPT

## 2019-03-11 PROCEDURE — 99214 OFFICE O/P EST MOD 30 MIN: CPT | Performed by: ANESTHESIOLOGY

## 2019-03-11 RX ORDER — HYDROCODONE BITARTRATE AND ACETAMINOPHEN 5; 325 MG/1; MG/1
1 TABLET ORAL 2 TIMES DAILY
Qty: 60 TABLET | Refills: 0 | Status: SHIPPED | OUTPATIENT
Start: 2019-03-11 | End: 2019-04-08 | Stop reason: SDUPTHER

## 2019-03-11 RX ORDER — TOPIRAMATE 100 MG/1
TABLET, FILM COATED ORAL
Qty: 270 TABLET | Refills: 0 | Status: SHIPPED | OUTPATIENT
Start: 2019-03-11 | End: 2019-07-19 | Stop reason: SDUPTHER

## 2019-03-11 RX ORDER — TIZANIDINE 4 MG/1
4 TABLET ORAL EVERY 8 HOURS PRN
Qty: 270 TABLET | Refills: 0 | Status: SHIPPED | OUTPATIENT
Start: 2019-03-11 | End: 2019-03-25 | Stop reason: SDUPTHER

## 2019-03-11 ASSESSMENT — ENCOUNTER SYMPTOMS
ALLERGIC/IMMUNOLOGIC NEGATIVE: 1
BACK PAIN: 1
GASTROINTESTINAL NEGATIVE: 1
RESPIRATORY NEGATIVE: 1
EYES NEGATIVE: 1

## 2019-03-11 ASSESSMENT — PAIN DESCRIPTION - ONSET: ONSET: ON-GOING

## 2019-03-11 ASSESSMENT — PAIN DESCRIPTION - ORIENTATION: ORIENTATION: LEFT

## 2019-03-11 ASSESSMENT — PAIN DESCRIPTION - PAIN TYPE: TYPE: CHRONIC PAIN

## 2019-03-11 ASSESSMENT — PAIN DESCRIPTION - FREQUENCY: FREQUENCY: CONTINUOUS

## 2019-03-11 ASSESSMENT — PAIN DESCRIPTION - LOCATION: LOCATION: BACK

## 2019-03-11 ASSESSMENT — PAIN SCALES - GENERAL: PAINLEVEL_OUTOF10: 4

## 2019-03-11 ASSESSMENT — PAIN DESCRIPTION - DIRECTION: RADIATING_TOWARDS: LEFT LEG TO KNEE

## 2019-03-11 ASSESSMENT — PAIN DESCRIPTION - PROGRESSION: CLINICAL_PROGRESSION: GRADUALLY IMPROVING

## 2019-03-25 ENCOUNTER — HOSPITAL ENCOUNTER (OUTPATIENT)
Dept: PAIN MANAGEMENT | Age: 49
Discharge: HOME | End: 2019-03-25
Payer: COMMERCIAL

## 2019-03-25 ENCOUNTER — HOSPITAL ENCOUNTER (OUTPATIENT)
Dept: GENERAL RADIOLOGY | Age: 49
Discharge: HOME | End: 2019-03-27
Payer: COMMERCIAL

## 2019-03-25 VITALS
HEIGHT: 60 IN | BODY MASS INDEX: 42.41 KG/M2 | TEMPERATURE: 97.8 F | HEART RATE: 57 BPM | WEIGHT: 216 LBS | DIASTOLIC BLOOD PRESSURE: 58 MMHG | RESPIRATION RATE: 16 BRPM | SYSTOLIC BLOOD PRESSURE: 155 MMHG | OXYGEN SATURATION: 99 %

## 2019-03-25 DIAGNOSIS — M47.816 ARTHROPATHY OF LUMBAR FACET JOINT: ICD-10-CM

## 2019-03-25 DIAGNOSIS — M47.817 LUMBOSACRAL SPONDYLOSIS WITHOUT MYELOPATHY: ICD-10-CM

## 2019-03-25 DIAGNOSIS — M51.36 DEGENERATION OF LUMBAR INTERVERTEBRAL DISC: ICD-10-CM

## 2019-03-25 DIAGNOSIS — M47.816 OSTEOARTHRITIS OF LUMBAR SPINE, UNSPECIFIED SPINAL OSTEOARTHRITIS COMPLICATION STATUS: ICD-10-CM

## 2019-03-25 DIAGNOSIS — M47.817 LUMBOSACRAL SPONDYLOSIS WITHOUT MYELOPATHY: Primary | ICD-10-CM

## 2019-03-25 PROCEDURE — 2500000003 HC RX 250 WO HCPCS: Performed by: PAIN MEDICINE

## 2019-03-25 PROCEDURE — 6360000002 HC RX W HCPCS: Performed by: PAIN MEDICINE

## 2019-03-25 PROCEDURE — 64636 DESTROY L/S FACET JNT ADDL: CPT

## 2019-03-25 PROCEDURE — 64635 DESTROY LUMB/SAC FACET JNT: CPT

## 2019-03-25 PROCEDURE — 3209999900 FLUORO FOR SURGICAL PROCEDURES

## 2019-03-25 PROCEDURE — 64636 DESTROY L/S FACET JNT ADDL: CPT | Performed by: PAIN MEDICINE

## 2019-03-25 PROCEDURE — 64635 DESTROY LUMB/SAC FACET JNT: CPT | Performed by: PAIN MEDICINE

## 2019-03-25 PROCEDURE — 99152 MOD SED SAME PHYS/QHP 5/>YRS: CPT | Performed by: PAIN MEDICINE

## 2019-03-25 RX ORDER — TIZANIDINE 4 MG/1
4 TABLET ORAL EVERY 8 HOURS PRN
Qty: 270 TABLET | Refills: 0 | Status: SHIPPED | OUTPATIENT
Start: 2019-03-25 | End: 2019-06-11

## 2019-03-25 RX ORDER — MIDAZOLAM HYDROCHLORIDE 1 MG/ML
INJECTION INTRAMUSCULAR; INTRAVENOUS
Status: COMPLETED | OUTPATIENT
Start: 2019-03-25 | End: 2019-03-25

## 2019-03-25 RX ORDER — TRIAMCINOLONE ACETONIDE 40 MG/ML
INJECTION, SUSPENSION INTRA-ARTICULAR; INTRAMUSCULAR
Status: COMPLETED | OUTPATIENT
Start: 2019-03-25 | End: 2019-03-25

## 2019-03-25 RX ORDER — BUPIVACAINE HYDROCHLORIDE 5 MG/ML
INJECTION, SOLUTION EPIDURAL; INTRACAUDAL
Status: COMPLETED | OUTPATIENT
Start: 2019-03-25 | End: 2019-03-25

## 2019-03-25 RX ORDER — OMEPRAZOLE 40 MG/1
CAPSULE, DELAYED RELEASE ORAL
Qty: 90 CAPSULE | Refills: 0 | Status: SHIPPED | OUTPATIENT
Start: 2019-03-25 | End: 2019-06-22 | Stop reason: SDUPTHER

## 2019-03-25 RX ADMIN — TRIAMCINOLONE ACETONIDE 40 MG: 40 INJECTION, SUSPENSION INTRA-ARTICULAR; INTRAMUSCULAR at 08:58

## 2019-03-25 RX ADMIN — BUPIVACAINE HYDROCHLORIDE 7 ML: 5 INJECTION, SOLUTION EPIDURAL; INTRACAUDAL; PERINEURAL at 08:52

## 2019-03-25 RX ADMIN — MIDAZOLAM 2 MG: 1 INJECTION INTRAMUSCULAR; INTRAVENOUS at 08:19

## 2019-03-25 ASSESSMENT — PAIN - FUNCTIONAL ASSESSMENT
PAIN_FUNCTIONAL_ASSESSMENT: PREVENTS OR INTERFERES SOME ACTIVE ACTIVITIES AND ADLS
PAIN_FUNCTIONAL_ASSESSMENT: 0-10

## 2019-03-25 ASSESSMENT — PAIN DESCRIPTION - FREQUENCY: FREQUENCY: CONTINUOUS

## 2019-03-25 ASSESSMENT — PAIN DESCRIPTION - ORIENTATION: ORIENTATION: LOWER;LEFT

## 2019-03-25 ASSESSMENT — PAIN DESCRIPTION - DIRECTION: RADIATING_TOWARDS: LEFT LEG TO KNEE

## 2019-03-25 ASSESSMENT — PAIN DESCRIPTION - DESCRIPTORS: DESCRIPTORS: ACHING;BURNING;SHOOTING;SPASM

## 2019-03-25 ASSESSMENT — PAIN DESCRIPTION - PROGRESSION: CLINICAL_PROGRESSION: GRADUALLY WORSENING

## 2019-03-25 ASSESSMENT — PAIN DESCRIPTION - PAIN TYPE: TYPE: CHRONIC PAIN

## 2019-03-25 ASSESSMENT — PAIN SCALES - GENERAL: PAINLEVEL_OUTOF10: 7

## 2019-03-25 ASSESSMENT — PAIN DESCRIPTION - ONSET: ONSET: ON-GOING

## 2019-03-25 ASSESSMENT — ACTIVITIES OF DAILY LIVING (ADL): EFFECT OF PAIN ON DAILY ACTIVITIES: PAIN INCREASES WITH PHYSICAL ACTIVITY

## 2019-03-25 ASSESSMENT — PAIN DESCRIPTION - LOCATION: LOCATION: BACK

## 2019-03-26 ENCOUNTER — TELEPHONE (OUTPATIENT)
Dept: PAIN MANAGEMENT | Age: 49
End: 2019-03-26

## 2019-04-08 ENCOUNTER — HOSPITAL ENCOUNTER (OUTPATIENT)
Dept: PAIN MANAGEMENT | Age: 49
Discharge: HOME | End: 2019-04-08
Payer: COMMERCIAL

## 2019-04-08 VITALS
HEIGHT: 60 IN | TEMPERATURE: 98.6 F | OXYGEN SATURATION: 97 % | SYSTOLIC BLOOD PRESSURE: 135 MMHG | BODY MASS INDEX: 42.41 KG/M2 | DIASTOLIC BLOOD PRESSURE: 84 MMHG | HEART RATE: 74 BPM | WEIGHT: 216 LBS | RESPIRATION RATE: 16 BRPM

## 2019-04-08 DIAGNOSIS — M54.16 LUMBAR RADICULOPATHY, CHRONIC: ICD-10-CM

## 2019-04-08 DIAGNOSIS — M47.817 LUMBOSACRAL SPONDYLOSIS WITHOUT MYELOPATHY: ICD-10-CM

## 2019-04-08 DIAGNOSIS — E66.01 MORBID OBESITY WITH BMI OF 40.0-44.9, ADULT (HCC): ICD-10-CM

## 2019-04-08 DIAGNOSIS — M46.1 SACROILIITIS (HCC): ICD-10-CM

## 2019-04-08 DIAGNOSIS — M51.36 DEGENERATION OF LUMBAR INTERVERTEBRAL DISC: ICD-10-CM

## 2019-04-08 DIAGNOSIS — M47.816 LUMBAR SPONDYLOSIS: ICD-10-CM

## 2019-04-08 DIAGNOSIS — M54.50 CHRONIC BILATERAL LOW BACK PAIN WITHOUT SCIATICA: ICD-10-CM

## 2019-04-08 DIAGNOSIS — M47.816 OSTEOARTHRITIS OF LUMBAR SPINE, UNSPECIFIED SPINAL OSTEOARTHRITIS COMPLICATION STATUS: ICD-10-CM

## 2019-04-08 DIAGNOSIS — M47.816 ARTHROPATHY OF LUMBAR FACET JOINT: Primary | ICD-10-CM

## 2019-04-08 DIAGNOSIS — G89.29 CHRONIC BILATERAL LOW BACK PAIN WITHOUT SCIATICA: ICD-10-CM

## 2019-04-08 DIAGNOSIS — M16.12 PRIMARY OSTEOARTHRITIS OF LEFT HIP: ICD-10-CM

## 2019-04-08 PROCEDURE — 99214 OFFICE O/P EST MOD 30 MIN: CPT | Performed by: PAIN MEDICINE

## 2019-04-08 PROCEDURE — 99213 OFFICE O/P EST LOW 20 MIN: CPT

## 2019-04-08 RX ORDER — HYDROCODONE BITARTRATE AND ACETAMINOPHEN 5; 325 MG/1; MG/1
1 TABLET ORAL 2 TIMES DAILY
Qty: 60 TABLET | Refills: 0 | Status: SHIPPED | OUTPATIENT
Start: 2019-04-15 | End: 2019-05-13 | Stop reason: SDUPTHER

## 2019-04-08 RX ORDER — LANCETS 33 GAUGE
EACH MISCELLANEOUS
Qty: 200 EACH | Refills: 1 | Status: SHIPPED | OUTPATIENT
Start: 2019-04-08 | End: 2020-09-08 | Stop reason: SDUPTHER

## 2019-04-08 ASSESSMENT — ENCOUNTER SYMPTOMS
EYE PAIN: 0
NAUSEA: 0
BACK PAIN: 1
ABDOMINAL PAIN: 0
EYES NEGATIVE: 1
VOMITING: 0
RESPIRATORY NEGATIVE: 1
COUGH: 0
ALLERGIC/IMMUNOLOGIC NEGATIVE: 1
BOWEL INCONTINENCE: 0
EYE REDNESS: 0
SHORTNESS OF BREATH: 0
SINUS PAIN: 0
CONSTIPATION: 0

## 2019-04-08 ASSESSMENT — PAIN DESCRIPTION - DESCRIPTORS: DESCRIPTORS: STABBING;CONSTANT

## 2019-04-08 ASSESSMENT — PAIN DESCRIPTION - LOCATION: LOCATION: BACK

## 2019-04-08 ASSESSMENT — PAIN DESCRIPTION - ORIENTATION: ORIENTATION: LEFT;LOWER;MID

## 2019-04-08 ASSESSMENT — PAIN DESCRIPTION - PAIN TYPE: TYPE: CHRONIC PAIN

## 2019-04-08 ASSESSMENT — PAIN DESCRIPTION - ONSET: ONSET: ON-GOING

## 2019-04-08 ASSESSMENT — PAIN SCALES - GENERAL: PAINLEVEL_OUTOF10: 4

## 2019-04-08 ASSESSMENT — PAIN DESCRIPTION - PROGRESSION: CLINICAL_PROGRESSION: GRADUALLY IMPROVING

## 2019-04-08 ASSESSMENT — PAIN DESCRIPTION - FREQUENCY: FREQUENCY: CONTINUOUS

## 2019-04-08 NOTE — PROGRESS NOTES
[address] Pain Management  Patient Pain Assessment  RECHECK - Dr. Maria Guadalupe Doe    Primary Care Physician: Rosendo Fothergill, MD    Chief complaint:   Chief Complaint   Patient presents with    Lower Back Pain     left side   . HISTORY OF PRESENT ILLNESS:  Elkin Lewis is 53 y.o. female with    Patient return to the pain clinic with a chief complaint of pain involving the low back area with pain radiating mostly to the left lower extremity. She had undergone lumbar facet joint injections at the levels of the T12-L1 through L5-S1 on 10/17/2018 which gave her some pain relief but the pain returned to its original extent. She reports she is exercising on a regular basis. Patient also has history positive for diabetes mellitus. Patient's pain is mostly in the low back radiating to both lower extremities worse on the left side. Her pain is essentially unchanged from her previous visit. Back Pain   This is a chronic problem. The current episode started more than 1 year ago. The problem occurs constantly. The problem is unchanged. The pain is present in the lumbar spine and sacro-iliac. The quality of the pain is described as aching and burning. Radiates to: Mostly towards the left lower extremity. The pain is at a severity of 6/10 (4-8). The pain is moderate. The pain is worse during the day. The symptoms are aggravated by standing, twisting, position and bending (Walking lifting and ADLs). Associated symptoms include bladder incontinence, headaches and numbness. Pertinent negatives include no abdominal pain, bowel incontinence, fever, paresthesias, tingling or weakness. Risk factors include lack of exercise and obesity. Treatments tried: Lumbar facet joint injections. The treatment provided moderate relief. RX Monitoring 3/11/2019   Attestation The Prescription Monitoring Report for this patient was reviewed today.    Acute Pain Prescriptions -   Chronic Pain Routine Monitoring No signs of potential drug abuse or diversion identified: otherwise, see note documentation;Possible medication side effects, risk of tolerance/dependence & alternative treatments discussed. Chronic Pain > 50 MEDD Obtained or confirmened \"Consent of Opioid Use\" on file.    Chronic Pain > 80 MEDD -       Current Pain Assessment  Pain Assessment  Pain Assessment: 0-10  Pain Level: 4  Patient's Stated Pain Goal: 1(increase activity decrease pain)  Pain Type: Chronic pain  Pain Location: Back  Pain Orientation: Left, Lower, Mid  Pain Radiating Towards: center of back to left side  Pain Descriptors: Stabbing, Constant  Pain Frequency: Continuous  Pain Onset: On-going  Clinical Progression: Gradually improving  Effect of Pain on Daily Activities: better after injection but pain persists  Non-Pharmaceutical Pain Intervention(s): Rest, Repositioned(exercise daily)                    ADVERSE MEDICATION EFFECTS:   Constipation: yes  Bowel Regimen: Yes  Diet: common adult  No bread,chips,pasta  Appetite:  ok  Sedation:  no  Urinary Retention: no    FOCUSED PAINSCALE:  Highest : 8  Lowest :4  Average: Range-6  When and What  was your last procedure:      RFA   Was your procedure effective:  Yes   95% to present    ACTIVITY/SOCIAL/EMOTIONAL:  Sleep Pattern: 2 hours per night. nightime awakenings  Energy Level:  Tired/Fatigued  Currently attending Physical Therapy:  No  Home Exercises: daily goes to gym before work every day  Mobility: walking better, slight hitch in left hip  Currently seeing a Psychiatrist or Psychologist:  No  Emotional Issues: normal,zoloft for depression from PCP   Mood: appropriate     ABERRANT BEHAVIORS SINCE LAST VISIT:  Have you ever been treated in another Pain Clinic no  [facility] yrs ago  Refills for prescriptions appropriate: yes  Lost rx/pills:no  Taking more medication than prescribed:  no  Are you receiving PAIN medications from  other doctors: no  Last Urine/Serum Drug Screen :   09-  Was Serum/UDS as anticipated? yes  Brought pill bottles in :yes   Was Pill count appropriate? :yes   norco 14 pills left   Are currently pregnant? no  Recent ER visits: No             Past Medical History      Diagnosis Date    Anxiety     Asthma     Colon polyp 10/31/2016    sessile serated adenoma x2    Depression     Diabetes mellitus ([facility] [address]     Diverticulitis 10/2016    Gastritis     GERD (gastroesophageal reflux disease)     Hemorrhoids     int/ext    Hypertension     Migraines     Osteoarthritis     Shingles 1-22-16    Tubular adenoma 10/31/2016    Urinary leakage        Surgical History  Past Surgical History:   Procedure Laterality Date    CERVICAL DISCECTOMY  12/2013    & fusion     CHOLECYSTECTOMY      COLONOSCOPY  10/31/2016    int/ext hemorrhoids; sessile serated adenomax2; tubular adenoma    DILATION AND CURETTAGE OF UTERUS      HAND SURGERY Right     thumb surgery, BONE REMOVED    HYSTERECTOMY      LAPAROSCOPY      AK DEST,PARAVERTEBRAL,L/S,ADDL LVLS  3/25/2019         TONSILLECTOMY      TUBAL LIGATION      UPPER GASTROINTESTINAL ENDOSCOPY  10/31/2016    gastritis       Medications  Current Outpatient Medications   Medication Sig Dispense Refill    [START ON 4/15/2019] HYDROcodone-acetaminophen (NORCO) 5-325 MG per tablet Take 1 tablet by mouth 2 times daily for 30 days.  Indications: Pain 60 tablet 0    omeprazole (PRILOSEC) 40 MG delayed release capsule TAKE 1 CAPSULE DAILY 90 capsule 0    tiZANidine (ZANAFLEX) 4 MG tablet Take 1 tablet by mouth every 8 hours as needed (spasms) 270 tablet 0    topiramate (TOPAMAX) 100 MG tablet TAKE 1 TABLET IN THE MORNING AND 2 TABLETS IN THE EVENING 270 tablet 0    lisinopril (PRINIVIL;ZESTRIL) 5 MG tablet TAKE 1 TABLET DAILY 90 tablet 0     MG tablet TAKE 1 TABLET EVERY 8 HOURS AS NEEDED FOR PAIN 270 tablet 0    metFORMIN (GLUCOPHAGE XR) 500 MG extended release tablet Take 1 tablet by mouth daily (with breakfast) 90 tablet 1    sertraline (ZOLOFT) 50 MG tablet TAKE 1 TABLET DAILY (Patient taking differently: TAKE 1 TABLET DAILY Nightly) 90 tablet 1    docusate sodium (COLACE) 100 MG capsule Take 1 capsule by mouth daily as needed for Constipation 30 capsule 2    Melatonin ER 5 MG TBCR Take by mouth as needed      Multiple Vitamins-Calcium (ONE-A-DAY WOMENS FORMULA PO) Take  by mouth.  ONETOUCH DELICA LANCETS 11X MISC USE 1 EACH TWICE A  each 1    pregabalin (LYRICA) 75 MG capsule Take 1 capsule by mouth 3 times daily for 30 days. . 90 capsule 3    blood glucose test strips (ASCENSIA AUTODISC VI;ONE TOUCH ULTRA TEST VI) strip 1 each by In Vitro route daily As needed. 200 each 1    b complex vitamins capsule Take 1 capsule by mouth daily      Elastic Bandages & Supports (LUMBAR BACK BRACE/SUPPORT PAD) MISC 1 each by Does not apply route daily as needed (pain) 1 each 0    vitamin D (CHOLECALCIFEROL) 1000 UNIT TABS tablet Take 1,000 Units by mouth daily      Zinc 30 MG TABS Take by mouth daily       Ascorbic Acid (VITAMIN C) 500 MG CAPS Take by mouth      ECHINACEA PO Take by mouth      Probiotic Product (PROBIOTIC DAILY PO) Take 1 tablet by mouth daily. No current facility-administered medications for this encounter. Allergies  Adhesive tape; Imitrex [sumatriptan]; and Morphine    Family History  family history includes Cancer in her mother; Diabetes in her father; Heart Disease in her father; High Blood Pressure in her father; Other in an other family member.     Social History  Social History     Socioeconomic History    Marital status:      Spouse name: None    Number of children: None    Years of education: None    Highest education level: None   Occupational History    Occupation: unemployed   Social Needs    Financial resource strain: None    Food insecurity:     Worry: None     Inability: None    Transportation needs:     Medical: None     Non-medical: None   Tobacco Use    Smoking BMI 42.18 kg/m² , Body mass index is 42.18 kg/m². Physical Exam   Constitutional: She is oriented to person, place, and time. She appears well-developed and well-nourished. Morbidly obese   HENT:   Head: Normocephalic and atraumatic. Eyes: Pupils are equal, round, and reactive to light. Conjunctivae and EOM are normal.   Neck: Neck supple. No JVD present. No tracheal deviation present. No thyromegaly present. Cardiovascular: Normal rate and regular rhythm. Pulmonary/Chest: Effort normal. No respiratory distress. Abdominal: She exhibits no distension. Musculoskeletal: Normal range of motion. She exhibits no edema. Neurological: She is alert and oriented to person, place, and time. She has normal strength. She displays no atrophy and no tremor. No cranial nerve deficit or sensory deficit. She exhibits normal muscle tone. She displays a negative Romberg sign. Coordination normal.   Reflex Scores:       Patellar reflexes are 2+ on the right side and 1+ on the left side. Achilles reflexes are 1+ on the right side and 0 on the left side. Skin: Skin is warm and dry. Psychiatric: She has a normal mood and affect. Her speech is normal and behavior is normal. Judgment and thought content normal. Cognition and memory are normal.    Right Ankle Exam     Muscle Strength   The patient has normal right ankle strength. Left Ankle Exam     Muscle Strength   The patient has normal left ankle strength. Right Knee Exam     Muscle Strength   The patient has normal right knee strength. Left Knee Exam     Muscle Strength   The patient has normal left knee strength. Right Hip Exam     Muscle Strength   The patient has normal right hip strength. Left Hip Exam     Muscle Strength   The patient has normal left hip strength. Back Exam     Tenderness   The patient is experiencing tenderness in the lumbar and sacroiliac. Range of Motion   Back extension: Limited and painful.    Back flexion: Limited and painful. Back lateral bend right: Limited and painful. Back lateral bend left: Limited and painful. Back rotation right: Limited and painful. Back rotation left: Limited and painful. Tests   Straight leg raise right: negative  Straight leg raise left: negative    Other   Sensation: normal  Gait: antalgic   Erythema: no back redness  Scars: absent          Nurses Notes and Vital Signs reviewed. DATA  Labs:  Benzodiazepine Screen, Urine   Date Value Ref Range Status   09/19/2014 NEGATIVE NEG Final     Comment:           (Positive cutoff 200 ng/mL)                      Imaging:  Radiology Images and Reports reviewed where indicated and necessary  MRI lumbar spine without intravenous contrast, 3/26/2016       History: Back pain       Technique: Multiplanar multisequence MR imaging of the lumbar spine was performed without intravenous contrast.       Findings: Straightening of normal lumbar lordotic curvature. Visualized spinal cord demonstrates no evidence for cord edema. Conus terminus at approximately L1 level.  Cauda equina nerve roots follow spinal curvature.       Vertebral body heights are maintained slight heterogeneity of the marrow signal without evidence for marrow edema.       L1-L2 level: Bilateral facet hypertrophy without significant central canal stenosis or neuroforaminal narrowing.       L2-L3 level: Bilateral facet hypertrophy with broad-based disc bulge and ligamentum flavum thickening and a superimposed right foraminal and extraforaminal levels disc protrusion with mild to moderate right-sided neuroforaminal narrowing without    significant central canal stenosis.       L3-L4 level: Bilateral facet hypertrophy with broad-based disc bulge and left extraforaminal level disc protrusion with mild approximation of the left L3 nerve root without significant central canal stenosis.       L4-5 level: Broad-based disc bulge with facet hypertrophy without significant central canal stenosis and mild bilateral neuroforaminal narrowing.       L5-S1 level: Broad-based disc bulge and facet hypertrophy and mild to moderate bilateral neuroforaminal narrowing without significant central canal stenosis.       Paraspinous soft tissues demonstrate no discrete mass.       Impression: Lumbar spondylotic changes without evidence for significant central canal stenosis. Please see above level by level complete analysis       Patient Active Problem List   Diagnosis    Degenerative disc disease, cervical    Cervical disc herniation    Lumbar radiculopathy, chronic    Degeneration of lumbar intervertebral disc    Lumbar spondylosis    Essential hypertension    GERD (gastroesophageal reflux disease)    Left-sided low back pain with left-sided sciatica    Osteoarthritis of lumbar spine    Hip bursitis    Sacroiliitis (HCC)    [facility]oid arthritis ([facility] [address]    Primary osteoarthritis of left hip    Arthropathy of lumbar facet joint    Hemorrhoids    Colon polyp    Tubular adenoma    Chronic bilateral low back pain without sciatica    Osteoarthritis of spine with radiculopathy, lumbar region    Encounter for medication management    Morbid obesity with BMI of 40.0-44.9, adult ([facility] [address]    Anxiety and depression    Type 2 diabetes mellitus without complication, without long-term current use of insulin (HCC)    Urinary incontinence    Chronic, continuous use of opioids    Chronic use of opiate drugs therapeutic purposes    Lumbosacral spondylosis without myelopathy        ASSESSMENT    Cory Muller is a 53 y.o. female with     1. Arthropathy of lumbar facet joint    2. Degeneration of lumbar intervertebral disc    3. Lumbosacral spondylosis without myelopathy    4. Osteoarthritis of lumbar spine, unspecified spinal osteoarthritis complication status    5. Chronic bilateral low back pain without sciatica    6. Lumbar spondylosis    7. Lumbar radiculopathy, chronic    8.  Morbid obesity with medications over-the-counter medications that can depress breathing . Patient is advised to talk to the pharmacist or physicians if planning to take any over-the-counter medications before  takeing them. Patient is strongly advised to avoid tranquilizers or  Relaxants for any medications that can depress breathing or recreational drugs. Patient is also advised to tell us if there is any changes in his medications from other physicians. We discussed the same at today's visit and have not been to implement it, as the patient's pain is not under control with current medications. Decision Making Process : Patient's health history and referral records thoroughly reviewed before focused physical examination and discussion with patient. I have spent 20 Over 50% of today's visit is spent on examining the patient and counseling and coordinating the care. Level of complexity of date to be reviewed is Moderate. The chart date reviewed include the following: Imaging Reports. Summary of Care. Time spent reviewing with patient the below reports:   Medication safety, Treatment options. Level of diagnosis and management options of this case is multiple: involving the following management options: Interventions as needed, medication management as appropriate, future visits, activity modification, heat/ice as needed, Urine drug screen as required. [x]The patient's questions were answered to the best of my abilities. This note was created using voice recognition software. There may be inaccuracies of transcription  that are inadvertently overlooked prior to the signature. There is any questions about the transcription please contact me. Return in  4 weeks  with  CNP  for further plan of treatment.     Electronically signed by Dayana Lewis MD on 4/8/2019 at 8:36 PM

## 2019-04-16 ENCOUNTER — TELEPHONE (OUTPATIENT)
Dept: FAMILY MEDICINE CLINIC | Age: 49
End: 2019-04-16

## 2019-04-16 NOTE — TELEPHONE ENCOUNTER
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 But she is experiencing some abd discomfort and very soft stool . She does have diverticulitis. Patient wants to know if she should be tested for Cdiff ?    Please Advice

## 2019-04-22 NOTE — TELEPHONE ENCOUNTER
Please Approve or Refuse.   Send to Pharmacy per Pt's Request:      Next Visit Date:  6/27/2019   Last Visit Date: 2/26/2019    Hemoglobin A1C (%)   Date Value   02/26/2019 6.1   12/18/2018 6.5   09/18/2018 7.0             ( goal A1C is < 7)   BP Readings from Last 3 Encounters:   04/08/19 135/84   03/25/19 (!) 155/58   03/11/19 129/77          (goal 120/80)  BUN   Date Value Ref Range Status   02/26/2019 17 6 - 20 mg/dL Final     CREATININE   Date Value Ref Range Status   02/26/2019 0.86 0.50 - 0.90 mg/dL Final     Potassium   Date Value Ref Range Status   02/26/2019 4.4 3.7 - 5.3 mmol/L Final

## 2019-05-13 ENCOUNTER — HOSPITAL ENCOUNTER (OUTPATIENT)
Dept: PAIN MANAGEMENT | Age: 49
Discharge: HOME | End: 2019-05-13
Payer: COMMERCIAL

## 2019-05-13 VITALS
BODY MASS INDEX: 42.41 KG/M2 | HEIGHT: 60 IN | OXYGEN SATURATION: 95 % | SYSTOLIC BLOOD PRESSURE: 115 MMHG | RESPIRATION RATE: 16 BRPM | HEART RATE: 73 BPM | WEIGHT: 216 LBS | DIASTOLIC BLOOD PRESSURE: 73 MMHG

## 2019-05-13 DIAGNOSIS — Z79.891 CHRONIC USE OF OPIATE DRUGS THERAPEUTIC PURPOSES: ICD-10-CM

## 2019-05-13 DIAGNOSIS — M47.26 OSTEOARTHRITIS OF SPINE WITH RADICULOPATHY, LUMBAR REGION: ICD-10-CM

## 2019-05-13 DIAGNOSIS — M47.816 OSTEOARTHRITIS OF LUMBAR SPINE, UNSPECIFIED SPINAL OSTEOARTHRITIS COMPLICATION STATUS: ICD-10-CM

## 2019-05-13 DIAGNOSIS — G89.29 CHRONIC BILATERAL LOW BACK PAIN WITHOUT SCIATICA: ICD-10-CM

## 2019-05-13 DIAGNOSIS — Z79.899 ENCOUNTER FOR MEDICATION MANAGEMENT: ICD-10-CM

## 2019-05-13 DIAGNOSIS — M47.816 LUMBAR SPONDYLOSIS: Chronic | ICD-10-CM

## 2019-05-13 DIAGNOSIS — M50.30 DEGENERATIVE DISC DISEASE, CERVICAL: ICD-10-CM

## 2019-05-13 DIAGNOSIS — M47.896 OTHER OSTEOARTHRITIS OF SPINE, LUMBAR REGION: ICD-10-CM

## 2019-05-13 DIAGNOSIS — M16.12 PRIMARY OSTEOARTHRITIS OF LEFT HIP: ICD-10-CM

## 2019-05-13 DIAGNOSIS — M54.50 CHRONIC BILATERAL LOW BACK PAIN WITHOUT SCIATICA: ICD-10-CM

## 2019-05-13 DIAGNOSIS — M51.36 DEGENERATION OF LUMBAR INTERVERTEBRAL DISC: Chronic | ICD-10-CM

## 2019-05-13 DIAGNOSIS — M47.816 ARTHROPATHY OF LUMBAR FACET JOINT: ICD-10-CM

## 2019-05-13 DIAGNOSIS — M54.42 CHRONIC LEFT-SIDED LOW BACK PAIN WITH LEFT-SIDED SCIATICA: ICD-10-CM

## 2019-05-13 DIAGNOSIS — F11.90 CHRONIC, CONTINUOUS USE OF OPIOIDS: ICD-10-CM

## 2019-05-13 DIAGNOSIS — M54.16 LUMBAR RADICULOPATHY, CHRONIC: Primary | Chronic | ICD-10-CM

## 2019-05-13 DIAGNOSIS — M46.1 SACROILIITIS (HCC): ICD-10-CM

## 2019-05-13 DIAGNOSIS — M47.817 LUMBOSACRAL SPONDYLOSIS WITHOUT MYELOPATHY: ICD-10-CM

## 2019-05-13 DIAGNOSIS — G89.29 CHRONIC LEFT-SIDED LOW BACK PAIN WITH LEFT-SIDED SCIATICA: ICD-10-CM

## 2019-05-13 DIAGNOSIS — M50.20 CERVICAL DISC HERNIATION: ICD-10-CM

## 2019-05-13 PROCEDURE — 99213 OFFICE O/P EST LOW 20 MIN: CPT

## 2019-05-13 PROCEDURE — 99213 OFFICE O/P EST LOW 20 MIN: CPT | Performed by: NURSE PRACTITIONER

## 2019-05-13 RX ORDER — HYDROCODONE BITARTRATE AND ACETAMINOPHEN 5; 325 MG/1; MG/1
1 TABLET ORAL 2 TIMES DAILY
Qty: 60 TABLET | Refills: 0 | Status: SHIPPED | OUTPATIENT
Start: 2019-05-15 | End: 2019-06-11 | Stop reason: SDUPTHER

## 2019-05-13 RX ORDER — PREGABALIN 75 MG/1
75 CAPSULE ORAL 3 TIMES DAILY
Qty: 90 CAPSULE | Refills: 3 | Status: SHIPPED | OUTPATIENT
Start: 2019-05-13 | End: 2019-09-09 | Stop reason: SDUPTHER

## 2019-05-13 ASSESSMENT — ENCOUNTER SYMPTOMS
GASTROINTESTINAL NEGATIVE: 1
BACK PAIN: 1

## 2019-05-13 NOTE — PROGRESS NOTES
[address] PROGRESS NOTE      Patient here today to review Medication Agreement    Chief Complaint:  Low back pain      HPI: She c/o low back pain. She had RFA of L2-L5 on 3-25-19 with 95% relief. She c/o pain tailbone area,She c/o numbness left hand, states does not feel right. Blood sugar was 94 today. Relief is ongoing. States feels off. She c/o cramping between shoulder blades. She c/o left hip pain, Her sleep is good. No Ed visits. Back Pain   This is a chronic problem. The current episode started more than 1 year ago. The problem occurs constantly. The problem has been gradually improving since onset. The pain is present in the lumbar spine. The quality of the pain is described as aching. The pain does not radiate. The pain is at a severity of 3/10. The pain is mild. The pain is worse during the night (in am). Exacerbated by: exercising on elliptical, position too long. Stiffness is present all day. Associated symptoms include numbness. (Numbness left thigh anterior, tenderness left lateral thigh) Risk factors include obesity and menopause. She has tried heat and ice for the symptoms. The treatment provided mild relief. Patient denies any new neurological symptoms. No bowel or bladder incontinence, no weakness, and no falling. Treatment goals:  Functional status: reduce pain 3      Aberrancy:   Any alcoholic beverages  no     Any illegal drugs   no      Analgesia:pain 3      Adverse  Effects :takes colace, BM qod    ADL;s :exercises a few times a week        Pill count: appropriate    Morphine equivalent dose as reported on OARRS:10  Attestation: The Prescription Monitoring Report for this patient was reviewed today.  (BAR Ch[place] CNP)  Chronic Pain Routine Monitoring: Obtaining appropriate analgesic effect of treatment., No signs of potential drug abuse or diversion identified: otherwise, see note documentation BAR [place] - CNP)  Chronic Pain > 80 MEDD: Obtained or confirmed a written medication contract was on file. Kwadwo Martin, APRN - CNP)  Review ofOARRS does not show any aberrant prescription behavior. Medication is helping the patient stay active. Patient denies any side effects and reports adequate analgesia. No sign of misuse/abuse. When was thelast UDS:    9-14-18         Was the UDS appropriate:yes      Record/Diagnostics Review:      As above, I did review the imaging      9/18/2018  7:31 PM - Raymon, M[facility] Incoming Lab Results From [facility]To [employer]     Component Value Ref Range & Units Status Collected Lab   Pain Management Drug Panel Interp, Urine Consistent   Final 09/14/2018  3:30 PM [facility]   (NOTE)   ________________________________________________________________   DRUGS EXPECTED:   Mya Link (HYDROCODONE) [9/14/18]   ________________________________________________________________   CONSISTENT with medications provided:   Mya Link (HYDROCODONE) : based on hydrocodone, norhydrocodone   ________________________________________________________________   Drugs Not Included in this Assay:   Acetaminophen   ________________________________________________________________   INTERPRETIVE INFORMATION: Pain Mgt Ruiz, Mass Spec/EMIT, Ur,                            Interp   Interpretation depends on accuracy and completeness of patient   medication information submitted by client.     6-Acetylmorphine, Ur Not Detected   Final 09/14/2018  3:30 PM [facility]   7-Aminoclonazepam, Urine Not Detected   Final 09/14/2018  3:30 PM [facility]   Alpha-OH-Alpraz, Urine Not Detected   Final 09/14/2018  3:30 PM [facility]   Alprazolam, Urine Not Detected   Final 09/14/2018  3:30 PM [facility]   Amphetamines, urine Not Detected   Final 09/14/2018  3:30 PM [facility]   Barbiturates, Ur Not Detected   Final 09/14/2018  3:30 PM [facility]   Benzoylecgonine, Ur Not Detected   Final 09/14/2018  3:30 PM [facility]   Buprenorphine Urine Not Detected   Final 09/14/2018  3:30 PM [facility]   Carisoprodol, Ur Not Detected   Final 09/14/2018 Phentermine, Ur Not Detected   Final 09/14/2018  3:30 PM [facility]   Propoxyphene, Urine Not Detected   Final 09/14/2018  3:30 PM [facility]   Tapentadol-O-Sulfate, Urine Not Detected   Final 09/14/2018  3:30 PM [facility]   Tapentadol, Urine Not Detected   Final 09/14/2018  3:30 PM [facility]   Temazepam, Urine Not Detected   Final 09/14/2018  3:30 PM [facility]   Tramadol, Urine Not Detected   Final 09/14/2018  3:30 PM [facility]   Zolpidem, Urine Not Detected   Final 09/14/2018  3:30 PM [facility]   Drugs Expected, Ur   Final 09/14/2018  3:30  [facility] ON 9/14 AT AM    Creatinine, Ur 193.0  20.0 - 400.0 mg/dL Final 09/14/2018  3:30 PM [facility]   Pain Mgt Drug Panel, Hi Res, Ur See Below   Final 09/14/2018  3:30 PM [facility]   (NOTE)   Methodology: Qualitative Enzyme Immunoassay and Qualitative Liquid   Chromatography-Time of Flight-Mass Spectrometry or Tandem Mass   Spectrometry, Quantitative Spectrophotometry   The absence of expected drug(s) and/or drug metabolite(s) may   indicate non-compliance, inappropriate timing of specimen   collection relative to drug administration, poor drug absorption,   diluted/adulterated urine, or limitations of testing. The   concentration must be greater than or equal to the cutoff to be   reported as present.  If specific drug concentrations are   required, contact the laboratory within two weeks of specimen   collection to request quantification by a second analytical   technique. Interpretive questions should be directed to the   laboratory. Results based on immunoassay detection that do not match clinical   expectations should be   interpreted with caution. Confirmatory testing by mass   spectrometry for immunoassay-based results is available, if   ordered within two weeks of specimen collection. Additional   charges apply. For medical purposes only; not valid for forensic use. This test was developed and its performance characteristics   determined by [facility]. The U.S.  Food and Drug   Administration has not approved or cleared this test; however, FDA   clearance or approval is not currently required for clinical use. The results are not intended to be used as the sole means for   clinical diagnosis or patient management decisions. EER Hi Res Interp Ur See Note   Final 09/14/2018  3:30 PM [facility]   (NOTE)   Access [facility] Enhanced Report using either link below:   -Direct access: [email or web address]   -Enter Username, Password: [email or web address]   Username: [email or web address]   Password: [email or web address]   Performed by [place] [phone]   www. Shaina Villaseñor MD, Lab.  Director          Past Medical History:   Diagnosis Date    Anxiety     Asthma     Colon polyp 10/31/2016    sessile serated adenoma x2    Depression     Diabetes mellitus ([address]     Diverticulitis 10/2016    Gastritis     GERD (gastroesophageal reflux disease)     Hemorrhoids     int/ext    Hypertension     Migraines     Osteoarthritis     Shingles 1-22-16    Tubular adenoma 10/31/2016    Urinary leakage        Past Surgical History:   Procedure Laterality Date    CERVICAL DISCECTOMY  12/2013    & fusion     CHOLECYSTECTOMY      COLONOSCOPY  10/31/2016    int/ext hemorrhoids; sessile serated adenomax2; tubular adenoma    DILATION AND CURETTAGE OF UTERUS      HAND SURGERY Right     thumb surgery, BONE REMOVED    HYSTERECTOMY      LAPAROSCOPY      WA DEST,PARAVERTEBRAL,L/S,ADDL LVLS  3/25/2019         TONSILLECTOMY      TUBAL LIGATION      UPPER GASTROINTESTINAL ENDOSCOPY  10/31/2016    gastritis       Allergies   Allergen Reactions    Adhesive Tape Other (See Comments)     blisters    Imitrex [Sumatriptan] Other (See Comments)     \"feels like head is going to explode    Morphine Itching     hives         Current Outpatient Medications:     [START ON 5/15/2019] HYDROcodone-acetaminophen (NORCO) 5-325 MG per tablet, Take 1   Ascorbic Acid (VITAMIN C) 500 MG CAPS, Take by mouth, Disp: , Rfl:     ECHINACEA PO, Take by mouth, Disp: , Rfl:     Family History   Problem Relation Age of Onset    Cancer Mother     Heart Disease Father     Diabetes Father     High Blood Pressure Father     Other Other         Celiac Sprue. Aunt       Social History     Socioeconomic History    Marital status:      Spouse name: Not on file    Number of children: Not on file    Years of education: Not on file    Highest education level: Not on file   Occupational History    Occupation: unemployed   Social Needs    Financial resource strain: Not on file    Food insecurity:     Worry: Not on file     Inability: Not on file   [employer] needs:     Medical: Not on file     Non-medical: Not on file   Tobacco Use    Smoking status: Never Smoker    Smokeless tobacco: Never Used   Substance and Sexual Activity    Alcohol use: No    Drug use: No    Sexual activity: Yes   Lifestyle    Physical activity:     Days per week: Not on file     Minutes per session: Not on file    Stress: Not on file   Relationships    Social connections:     Talks on phone: Not on file     Gets together: Not on file     Attends Islam service: Not on file     Active member of club or organization: Not on file     Attends meetings of clubs or organizations: Not on file     Relationship status: Not on file    Intimate partner violence:     Fear of current or ex partner: Not on file     Emotionally abused: Not on file     Physically abused: Not on file     Forced sexual activity: Not on file   Other Topics Concern    Not on file   Social History Narrative    Not on file       Review of Systems:  Review of Systems   Constitution: Negative. HENT: Negative. Ringing in ears at times   Eyes:        Glasses   Cardiovascular: Negative. Endocrine:        Blood sugar 94 this am   Hematologic/Lymphatic: Negative.     Musculoskeletal: Positive for back pain and joint pain. Gastrointestinal: Negative. Genitourinary: Negative. Neurological: Positive for numbness. Psychiatric/Behavioral: Positive for depression. The patient is nervous/anxious. Managing         Physical Exam:  /73   Pulse 73   Resp 16   Ht 5' (1.524 m)   Wt 216 lb (98 kg)   SpO2 95%   BMI 42.18 kg/m²     Physical Exam   Constitutional: She appears well-developed. obese   HENT:   Head: Normocephalic. Neck: Normal range of motion. Neck supple. Pulmonary/Chest: Effort normal.   Musculoskeletal:        Left hip: She exhibits tenderness. Lumbar back: She exhibits tenderness and bony tenderness. Neurological: She is alert. She has normal strength. Reflex Scores:       Patellar reflexes are 1+ on the right side and 1+ on the left side. Achilles reflexes are 1+ on the right side and 1+ on the left side. Skin: Skin is warm, dry and intact. Psychiatric: Her speech is normal and behavior is normal. Judgment and thought content normal. Cognition and memory are normal. She exhibits a depressed mood.          Assessment:      Problem List Items Addressed This Visit     Sacroiliitis ([address]    Relevant Medications    HYDROcodone-acetaminophen (NORCO) 5-325 MG per tablet (Start on 5/15/2019)    Primary osteoarthritis of left hip    Relevant Medications    HYDROcodone-acetaminophen (NORCO) 5-325 MG per tablet (Start on 5/15/2019)    pregabalin (LYRICA) 75 MG capsule    Osteoarthritis of spine with radiculopathy, lumbar region    Relevant Medications    HYDROcodone-acetaminophen (NORCO) 5-325 MG per tablet (Start on 5/15/2019)    pregabalin (LYRICA) 75 MG capsule    Osteoarthritis of lumbar spine    Relevant Medications    HYDROcodone-acetaminophen (NORCO) 5-325 MG per tablet (Start on 5/15/2019)    pregabalin (LYRICA) 75 MG capsule    Lumbosacral spondylosis without myelopathy    Relevant Medications    HYDROcodone-acetaminophen (NORCO) 5-325 MG per tablet changes, withdrawal symptoms, chronic opioid dependence and tolerance as well as risk of taking opioids with Benzodiazepines and taking opioids along with alcohol,  werediscussed with patient. I had asked the patient to minimize medication use and utilize pain medications only for uncontrolled rest pain or pain with exertional activities. I advised patient not to self-escalate painmedications without consulting with us. At each of patient's future visits we will try to taper pain medications, while adjusting the adjunct medications, and re-evaluating for Physical Therapy to improve spinal andjoint strength. We will continue to have discussions to decrease pain medications as tolerated. Counseled patient on effects their pain medication and /or their medical condition mayhave on their  ability to drive or operate machinery. Instructed not to drive or operate machinery if drowsy     I also discussed with the patient regarding the dangers of combining narcotic pain medication with tranquilizers, alcohol or illegal drugs or taking the medication any way other than prescribed. The dangers were discussed  including respiratory depression and death. Patient was told to tell  all  physicians regarding the medications he is getting from pain clinic. Patient is warned not to take any unprescribed medications over-the-countermedications that can depress breathing . Patient is advised to talk to the pharmacist or physicians if planning to take any over-the-counter medications before  takeing them. Patient is strongly advised to avoid tranquilizers or  relaxants, illegal drugs  or any medications that can depress breathing  Patient is also advised to tell us if there is any changes in their medications from other physicians.     Discussed intervention,  Left hip injection, she wants to hold off right now    TREATMENT OPTIONS:     Return in 4 weeks  Medication Agreement Requirements Met  Continue Opioid therapy  Script written for  lyrica,hydrocodone  Follow up appointment made

## 2019-05-29 NOTE — TELEPHONE ENCOUNTER
Please Approve or Refuse.   Send to Pharmacy per Pt's Request:      Next Visit Date:  6/3/2019   Last Visit Date: 2/26/2019    Hemoglobin A1C (%)   Date Value   02/26/2019 6.1   12/18/2018 6.5   09/18/2018 7.0             ( goal A1C is < 7)   BP Readings from Last 3 Encounters:   05/13/19 115/73   04/08/19 135/84   03/25/19 (!) 155/58          (goal 120/80)  BUN   Date Value Ref Range Status   02/26/2019 17 6 - 20 mg/dL Final     CREATININE   Date Value Ref Range Status   02/26/2019 0.86 0.50 - 0.90 mg/dL Final     Potassium   Date Value Ref Range Status   02/26/2019 4.4 3.7 - 5.3 mmol/L Final

## 2019-05-30 RX ORDER — LISINOPRIL 5 MG/1
TABLET ORAL
Qty: 90 TABLET | Refills: 0 | Status: SHIPPED | OUTPATIENT
Start: 2019-05-30 | End: 2019-08-27 | Stop reason: SDUPTHER

## 2019-06-03 ENCOUNTER — HOSPITAL ENCOUNTER (OUTPATIENT)
Age: 49
Discharge: HOME | End: 2019-06-05
Payer: COMMERCIAL

## 2019-06-03 ENCOUNTER — HOSPITAL ENCOUNTER (OUTPATIENT)
Dept: GENERAL RADIOLOGY | Age: 49
Discharge: HOME | End: 2019-06-05
Payer: COMMERCIAL

## 2019-06-03 ENCOUNTER — OFFICE VISIT (OUTPATIENT)
Dept: FAMILY MEDICINE CLINIC | Age: 49
End: 2019-06-03
Payer: COMMERCIAL

## 2019-06-03 VITALS
HEIGHT: 60 IN | WEIGHT: 223.4 LBS | SYSTOLIC BLOOD PRESSURE: 124 MMHG | BODY MASS INDEX: 43.86 KG/M2 | DIASTOLIC BLOOD PRESSURE: 82 MMHG | TEMPERATURE: 98.7 F | OXYGEN SATURATION: 98 % | HEART RATE: 78 BPM

## 2019-06-03 DIAGNOSIS — R09.81 NASAL CONGESTION: ICD-10-CM

## 2019-06-03 DIAGNOSIS — R10.9 RT FLANK PAIN: Primary | ICD-10-CM

## 2019-06-03 DIAGNOSIS — E11.9 TYPE 2 DIABETES MELLITUS WITHOUT COMPLICATION, WITHOUT LONG-TERM CURRENT USE OF INSULIN (HCC): ICD-10-CM

## 2019-06-03 DIAGNOSIS — Z13.31 POSITIVE DEPRESSION SCREENING: ICD-10-CM

## 2019-06-03 DIAGNOSIS — R10.9 RT FLANK PAIN: ICD-10-CM

## 2019-06-03 LAB
BILIRUBIN, POC: NORMAL
BLOOD URINE, POC: NORMAL
CLARITY, POC: CLEAR
COLOR, POC: NORMAL
GLUCOSE URINE, POC: NORMAL
HBA1C MFR BLD: 6.4 %
KETONES, POC: NORMAL
LEUKOCYTE EST, POC: NORMAL
NITRITE, POC: NORMAL
PH, POC: 7
PROTEIN, POC: NORMAL
SPECIFIC GRAVITY, POC: 1.01
UROBILINOGEN, POC: 0.2

## 2019-06-03 PROCEDURE — 99214 OFFICE O/P EST MOD 30 MIN: CPT | Performed by: FAMILY MEDICINE

## 2019-06-03 PROCEDURE — 74018 RADEX ABDOMEN 1 VIEW: CPT

## 2019-06-03 PROCEDURE — 81003 URINALYSIS AUTO W/O SCOPE: CPT | Performed by: FAMILY MEDICINE

## 2019-06-03 PROCEDURE — G8431 POS CLIN DEPRES SCRN F/U DOC: HCPCS | Performed by: FAMILY MEDICINE

## 2019-06-03 PROCEDURE — 83036 HEMOGLOBIN GLYCOSYLATED A1C: CPT | Performed by: FAMILY MEDICINE

## 2019-06-03 RX ORDER — ATORVASTATIN CALCIUM 20 MG/1
20 TABLET, FILM COATED ORAL DAILY
Qty: 90 TABLET | Refills: 1 | Status: SHIPPED | OUTPATIENT
Start: 2019-06-03 | End: 2019-11-12 | Stop reason: SDUPTHER

## 2019-06-03 ASSESSMENT — ENCOUNTER SYMPTOMS
NAUSEA: 0
ABDOMINAL PAIN: 1
SORE THROAT: 0
SHORTNESS OF BREATH: 0

## 2019-06-03 NOTE — PROGRESS NOTES
On the basis of positive PHQ-9 screening ( ), the following plan was implemented: patient declines further evaluation/treatment for depression. Patient will follow-up in 3 month(s) with PCP.
denies of any chest pain or shortness of breath, patient cholesterol was slightly elevated and will start her on Lipitor. She also stated that her nose drips a lot and she saw an allergist who told her she doesn't have allergies will send her to ENT  Review of Systems   Constitutional: Negative for appetite change. HENT: Negative for ear pain and sore throat. Eyes: Positive for visual disturbance. Wears glasses   Respiratory: Negative for shortness of breath. Cardiovascular: Negative for chest pain. Gastrointestinal: Positive for abdominal pain. Negative for nausea. Right flank pain   Genitourinary: Negative for frequency and pelvic pain. Musculoskeletal: Negative for arthralgias. Neurological: Negative for dizziness, syncope and headaches. Objective:   Physical Exam   Constitutional: She is oriented to person, place, and time. She appears well-developed and well-nourished. /82 (Site: Left Upper Arm, Position: Sitting, Cuff Size: Large Adult)   Pulse 78   Temp 98.7 °F (37.1 °C) (Oral)   Ht 5' (1.524 m)   Wt 223 lb 6.4 oz (101.3 kg)   SpO2 98%   BMI 43.63 kg/m²    HENT:   Head: Normocephalic. Mouth/Throat: Oropharynx is clear and moist.        Eyes: Conjunctivae are normal.   Cardiovascular: Normal rate and regular rhythm. Pulmonary/Chest: Breath sounds normal. She has no rales. Abdominal: Soft. Bowel sounds are normal. There is tenderness. Tenderness in the right flank present   Musculoskeletal: She exhibits no edema. Lymphadenopathy:     She has no cervical adenopathy. Neurological: She is alert and oriented to person, place, and time. Nursing note and vitals reviewed. globin A1c today is 6.4 urine dipstick was negative   2/26/19 cholesterol was 207 HDL 44 LDL is 124 I triglyceride 194. sugar is 105  blood work was normal  Assessment:       Diagnosis Orders   1. Rt flank pain  POCT Urinalysis No Micro (Auto)    XR ABDOMEN (KUB) (SINGLE AP VIEW)   2.

## 2019-06-04 ENCOUNTER — TELEPHONE (OUTPATIENT)
Dept: FAMILY MEDICINE CLINIC | Age: 49
End: 2019-06-04

## 2019-06-04 DIAGNOSIS — N20.0 KIDNEY STONE: Primary | ICD-10-CM

## 2019-06-04 NOTE — TELEPHONE ENCOUNTER
Per Dr Alfredo Neil, I called and spoke with Iqra and gave her Dr Anita Bautista number and told her to call and make an appointment regarding kidney stone.

## 2019-06-04 NOTE — TELEPHONE ENCOUNTER
All patient and I talked to her and told her that she has a small stone in there and  to go and see Dr. Finnegan Just the urologist

## 2019-06-07 ENCOUNTER — HOSPITAL ENCOUNTER (OUTPATIENT)
Dept: CT IMAGING | Age: 49
Discharge: HOME | End: 2019-06-09
Payer: COMMERCIAL

## 2019-06-07 ENCOUNTER — TELEPHONE (OUTPATIENT)
Dept: UROLOGY | Age: 49
End: 2019-06-07

## 2019-06-07 ENCOUNTER — OFFICE VISIT (OUTPATIENT)
Dept: UROLOGY | Age: 49
End: 2019-06-07
Payer: COMMERCIAL

## 2019-06-07 VITALS
HEART RATE: 66 BPM | BODY MASS INDEX: 43.72 KG/M2 | HEIGHT: 60 IN | SYSTOLIC BLOOD PRESSURE: 110 MMHG | WEIGHT: 222.66 LBS | TEMPERATURE: 98.2 F | DIASTOLIC BLOOD PRESSURE: 69 MMHG

## 2019-06-07 DIAGNOSIS — R10.9 FLANK PAIN: Primary | ICD-10-CM

## 2019-06-07 DIAGNOSIS — R10.9 FLANK PAIN: ICD-10-CM

## 2019-06-07 DIAGNOSIS — N20.0 KIDNEY STONE: ICD-10-CM

## 2019-06-07 PROCEDURE — 99204 OFFICE O/P NEW MOD 45 MIN: CPT | Performed by: UROLOGY

## 2019-06-07 PROCEDURE — 74176 CT ABD & PELVIS W/O CONTRAST: CPT

## 2019-06-07 ASSESSMENT — ENCOUNTER SYMPTOMS
COUGH: 0
EYE REDNESS: 0
WHEEZING: 0
NAUSEA: 0
EYE PAIN: 0
BACK PAIN: 0
SHORTNESS OF BREATH: 0
ABDOMINAL PAIN: 0
COLOR CHANGE: 0
VOMITING: 0

## 2019-06-07 NOTE — LETTER
[facility]  [facility] [place]  [address]  [address]  [facility] [address]  Dept: [phone]  Dept Fax: [phone]        6/7/19    Patient: Jeffery Alvarez  YOB: 1970    Dear Ana Rankin MD,    I had the pleasure of seeing one of your patients, Bryce Hairston today in the office today. Below are the relevant portions of my assessment and plan of care. IMPRESSION:     1. Flank pain    2. Kidney stone         PLAN:        She has symptoms of a UTI.  KUB is suspicious for a distal ureteral stone. Will obtain a CT stone protocol. Prescriptions Ordered:  No orders of the defined types were placed in this encounter. Orders Placed:  No orders of the defined types were placed in this encounter. Thank you for allowing me to participate in the care of this patient. I will keep you updated on this patient's follow up and I look forward to serving you and your patients again in the future.         Arely Ramirez MD

## 2019-06-07 NOTE — PROGRESS NOTES
[facility]  [facility] [place]  [address]  [address]  [address] [place] [id number]  Dept: [address] [facility] Office Note - New Patient    Patient:  Abraham Mejia  YOB: 1970  Date: 6/7/2019    The patient is a 53 y.o. female who presentstoday for evaluation of the following problems:   Chief Complaint   Patient presents with    Nephrolithiasis     ref by PCP for kidney stone; pt c/o flank pain, dysuria, urgency, and urinary frequency; previous pt of Dr Nancy Perez     referred by Kayli Sherman MD.    HPI  She is here for right flank pain. It is constant and does radiate to the right groin. She has been having the pain for the last 6 weeks. She also has been having urinary complaints. She had stones in the past.  She has not seen any recent blood. She has not had any fevers or chills. She had a KUB done, which showed a possible renal stone and pelvic calcification. She has been straining her urine, but has not caught it. Her pain is tolerable. (Patient's old records have been requested, reviewed and summarized in today's note.)    Summary of old records: N/A    Additional History: N/A    ProceduresToday: N/A    Urinalysis today:  No results found for this visit on 06/07/19.     AUA Symptom Score (6/7/2019):                              Last BUN andcreatinine:  Lab Results   Component Value Date    BUN 17 02/26/2019     Lab Results   Component Value Date    CREATININE 0.86 02/26/2019       Additional Lab/Culture results: none    Imaging Reviewed during this Office Visit: none  (results were independently reviewed byphysician and radiology report verified)    PAST MEDICAL, FAMILY AND SOCIAL HISTORY:     Past Medical History:   Diagnosis Date    Anxiety     Asthma     Colon polyp 10/31/2016    sessile serated adenoma x2    Depression     Diabetes mellitus ([address]     Diverticulitis 10/2016    Gastritis     GERD (gastroesophageal reflux disease)     500 MG extended release tablet Take 1 tablet by mouth daily (with breakfast) 90 tablet 1    blood glucose test strips (ASCENSIA AUTODISC VI;ONE TOUCH ULTRA TEST VI) strip 1 each by In Vitro route daily As needed. 200 each 1    b complex vitamins capsule Take 1 capsule by mouth daily      docusate sodium (COLACE) 100 MG capsule Take 1 capsule by mouth daily as needed for Constipation 30 capsule 2    Elastic Bandages & Supports (LUMBAR BACK BRACE/SUPPORT PAD) MISC 1 each by Does not apply route daily as needed (pain) 1 each 0    Melatonin ER 5 MG TBCR Take by mouth as needed      vitamin D (CHOLECALCIFEROL) 1000 UNIT TABS tablet Take 1,000 Units by mouth daily      Zinc 30 MG TABS Take by mouth daily       Ascorbic Acid (VITAMIN C) 500 MG CAPS Take by mouth      ECHINACEA PO Take by mouth      Probiotic Product (PROBIOTIC DAILY PO) Take 1 tablet by mouth daily.  Multiple Vitamins-Calcium (ONE-A-DAY WOMENS FORMULA PO) Take  by mouth. Adhesive tape; Imitrex [sumatriptan]; and Morphine  Social History     Tobacco Use   Smoking Status Never Smoker   Smokeless Tobacco Never Used      (If patient a smoker, smoking cessation counseling offered)   Social History     Substance and Sexual Activity   Alcohol Use No       REVIEW OF SYSTEMS:  Review of Systems    Physical Exam:    This a 53 y.o. female      Vitals:    06/07/19 1205   BP: 110/69   Pulse: 66   Temp: 98.2 °F (36.8 °C)     Body mass index is 43.49 kg/m². Physical Exam  Constitutional: Patient in no acute distress, ggod grooming, appropriately dressed  Neuro: Alert and oriented to person, place and time.   Psych:Mood normal, affect normal  Skin: No rash noted  HEENT: Head: Normocephalic and atraumatic,Conjunctivae and EOM are normal,Nose- normal, Right/Left External Ear: normal, Mouth: Mucosa Moist  Neck: Supple  Lungs: Respiratory effort is normal  Cardiovascular: strong and regular, no lower leg edema  Abdomen: Soft, non-tender, non-distended with no CVA,    Lymphatics: No cervical palpable lymphadenopathy. Bladder non-tender and not distended. Musculoskeletal: Normal gait and station        Assessment and Plan      1. Flank pain    2. Kidney stone            Plan:         She has symptoms of a UTI.  KUB is suspicious for a distal ureteral stone. Will obtain a CT stone protocol. Prescriptions Ordered:  No orders of the defined types were placed in this encounter. Orders Placed:  No orders of the defined types were placed in this encounter. Chidi Pineda MD    Agree with the ROS entered by the MA.

## 2019-06-11 ENCOUNTER — HOSPITAL ENCOUNTER (OUTPATIENT)
Dept: PAIN MANAGEMENT | Age: 49
Discharge: HOME | End: 2019-06-11
Payer: COMMERCIAL

## 2019-06-11 ENCOUNTER — TELEPHONE (OUTPATIENT)
Dept: UROLOGY | Age: 49
End: 2019-06-11

## 2019-06-11 VITALS
HEART RATE: 64 BPM | BODY MASS INDEX: 43.59 KG/M2 | HEIGHT: 60 IN | SYSTOLIC BLOOD PRESSURE: 134 MMHG | OXYGEN SATURATION: 98 % | WEIGHT: 222 LBS | RESPIRATION RATE: 16 BRPM | TEMPERATURE: 97.8 F | DIASTOLIC BLOOD PRESSURE: 88 MMHG

## 2019-06-11 DIAGNOSIS — F11.90 CHRONIC, CONTINUOUS USE OF OPIOIDS: ICD-10-CM

## 2019-06-11 DIAGNOSIS — M47.816 LUMBAR SPONDYLOSIS: Chronic | ICD-10-CM

## 2019-06-11 DIAGNOSIS — M51.36 DEGENERATION OF LUMBAR INTERVERTEBRAL DISC: Chronic | ICD-10-CM

## 2019-06-11 DIAGNOSIS — M47.816 ARTHROPATHY OF LUMBAR FACET JOINT: ICD-10-CM

## 2019-06-11 DIAGNOSIS — M47.817 LUMBOSACRAL SPONDYLOSIS WITHOUT MYELOPATHY: ICD-10-CM

## 2019-06-11 DIAGNOSIS — M50.30 DEGENERATIVE DISC DISEASE, CERVICAL: ICD-10-CM

## 2019-06-11 DIAGNOSIS — M47.816 SPONDYLOSIS OF LUMBAR REGION WITHOUT MYELOPATHY OR RADICULOPATHY: ICD-10-CM

## 2019-06-11 DIAGNOSIS — M54.16 LUMBAR RADICULOPATHY, CHRONIC: Primary | Chronic | ICD-10-CM

## 2019-06-11 DIAGNOSIS — M16.12 PRIMARY OSTEOARTHRITIS OF LEFT HIP: ICD-10-CM

## 2019-06-11 PROCEDURE — 99213 OFFICE O/P EST LOW 20 MIN: CPT

## 2019-06-11 PROCEDURE — 80307 DRUG TEST PRSMV CHEM ANLYZR: CPT

## 2019-06-11 PROCEDURE — 99213 OFFICE O/P EST LOW 20 MIN: CPT | Performed by: NURSE PRACTITIONER

## 2019-06-11 RX ORDER — TIZANIDINE 4 MG/1
4 TABLET ORAL EVERY 8 HOURS PRN
Qty: 270 TABLET | Refills: 0 | Status: SHIPPED | OUTPATIENT
Start: 2019-06-11 | End: 2019-09-09 | Stop reason: SDUPTHER

## 2019-06-11 RX ORDER — HYDROCODONE BITARTRATE AND ACETAMINOPHEN 5; 325 MG/1; MG/1
1 TABLET ORAL 2 TIMES DAILY
Qty: 60 TABLET | Refills: 0 | Status: SHIPPED | OUTPATIENT
Start: 2019-06-21 | End: 2019-07-19 | Stop reason: SDUPTHER

## 2019-06-11 ASSESSMENT — ENCOUNTER SYMPTOMS
BACK PAIN: 1
RESPIRATORY NEGATIVE: 1
GASTROINTESTINAL NEGATIVE: 1

## 2019-06-11 NOTE — PROGRESS NOTES
[address] PROGRESS NOTE      Patient here today to review Medication Agreement    Chief Complaint:low back pain      HPI: She c/o low back pain. She states pain is unchanged. Pain is lower than where she had RFA . No history lumbar surgery. She has had cervical surgery. She c/o pain left hip. She had RFA of L2-L5 on 3-25-19 with 95% relief. She does home exercises. She is not sleeping well. States has kidney stones, she saw her Urologist. She has had no ED visits. Back Pain   This is a chronic problem. The current episode started more than 1 year ago. The problem occurs constantly. The problem is unchanged. The pain is present in the lumbar spine. The quality of the pain is described as aching (sharp). Radiates to: left lateral thigh, tenderness. The pain is at a severity of 5/10. Worse during: in am and night. Exacerbated by: 1 position too long. Stiffness is present all day. Associated symptoms include dysuria, headaches and numbness. (Left thigh) Risk factors include obesity. She has tried analgesics, ice, muscle relaxant and heat for the symptoms. The treatment provided mild relief. Patient denies any new neurological symptoms. No bowel or bladder incontinence, no weakness, and no falling.         Treatment goals:  Functional status: reduce pain 3        Aberrancy:   Any alcoholic beverages  no     Any illegal drugs   no        Analgesia:pain 3        Adverse  Effects :takes colace, BM qod     ADL;s :exercises a few times a week                 Pill count: appropriate    Morphine equivalent dose as reported on [place]:10  Periodic Controlled Substance Monitoring: No signs of potential drug abuse or diversion identified. , Assessed functional status., Obtaining appropriate analgesic effect of treatment. , Random urine drug screen sent today. BAR Gramajo CNP)  Review of[place]ARRS does not show any aberrant prescription behavior. Medication is helping the patient stay active. Urine Not Detected    Final 09/14/2018  3:30 PM [facility]   MDA, Ur Not Detected    Final 09/14/2018  3:30 PM [facility]   Diazepam, Urine Not Detected    Final 09/14/2018  3:30 PM [facility]   Ethyl Glucuronide Ur Not Detected    Final 09/14/2018  3:30 PM [facility]   Fentanyl, Ur Not Detected    Final 09/14/2018  3:30 PM [facility]   Hydrocodone, Urine Present    Final 09/14/2018  3:30 PM [facility]   Hydromorphone, Urine Not Detected    Final 09/14/2018  3:30 PM [facility]   Lorazepam, Urine Not Detected    Final 09/14/2018  3:30 PM [facility]   Marijuana Metab, Ur Not Detected    Final 09/14/2018  3:30 PM [facility]   MDEA, EMA, Ur Not Detected    Final 09/14/2018  3:30 PM [facility]   MDMA, Urine Not Detected    Final 09/14/2018  3:30 PM [facility]   Meperidine Metab, Ur Not Detected    Final 09/14/2018  3:30 PM [facility]   Methadone, Urine Not Detected    Final 09/14/2018  3:30 PM [facility]   Methamphetamine, Urine Not Detected    Final 09/14/2018  3:30 PM [facility]   Methylphenidate Not Detected    Final 09/14/2018  3:30 PM [facility]   Midazolam, Urine Not Detected    Final 09/14/2018  3:30 PM [facility]   Morphine Urine Not Detected    Final 09/14/2018  3:30 PM [facility]   Norbuprenorphine, Urine Not Detected    Final 09/14/2018  3:30 PM [facility]   Nordiazepam, Urine Not Detected    Final 09/14/2018  3:30 PM [facility]   Norfentanyl, Urine Not Detected    Final 09/14/2018  3:30 PM [facility]   NORHYDROCODONE, URINE Present    Final 09/14/2018  3:30 PM [facility]   Noroxycodone, Urine Not Detected    Final 09/14/2018  3:30 PM [facility]   NOROXYMORPHONE, URINE Not Detected    Final 09/14/2018  3:30 PM [facility]   Oxazepam, Urine Not Detected    Final 09/14/2018  3:30 PM [facility]   Oxycodone Urine Not Detected    Final 09/14/2018  3:30 PM [facility]   Oxymorphone, Urine Not Detected    Final 09/14/2018  3:30 PM [facility]   PCP, Urine Not Detected    Final 09/14/2018  3:30 PM [facility]   Phentermine, Ur Not Detected    Final 09/14/2018  3:30 PM [facility]   Propoxyphene, Urine Not Detected    Final 09/14/2018  3:30 PM [facility]   Tapentadol-O-Sulfate, Urine Not are not intended to be used as the sole means for   clinical diagnosis or patient management decisions. EER Hi Res Interp Ur See Note    Final 09/14/2018  3:30 PM [facility]   (NOTE)   Access [facility] Enhanced Report using either link below:   -Direct access: [email or web address]   -Enter Username, Password: [email or web address] [email or web address]   Username: [email or web address]   Password: [email or web address]   Performed by [facility] [address] [address] [phone]   www. Karly Frias MD, Lab.  Director                 Past Medical History:   Diagnosis Date    Anxiety     Asthma     Colon polyp 10/31/2016    sessile serated adenoma x2    Depression     Diabetes mellitus ([address]     Diverticulitis 10/2016    Gastritis     GERD (gastroesophageal reflux disease)     Hemorrhoids     int/ext    Hypertension     Migraines     Osteoarthritis     Shingles 1-22-16    Tubular adenoma 10/31/2016    Urinary leakage        Past Surgical History:   Procedure Laterality Date    CERVICAL DISCECTOMY  12/2013    & fusion     CHOLECYSTECTOMY      COLONOSCOPY  10/31/2016    int/ext hemorrhoids; sessile serated adenomax2; tubular adenoma    DILATION AND CURETTAGE OF UTERUS      HAND SURGERY Right     thumb surgery, BONE REMOVED    HYSTERECTOMY      LAPAROSCOPY      SC DEST,PARAVERTEBRAL,L/S,ADDL LVLS  3/25/2019         TONSILLECTOMY      TUBAL LIGATION      UPPER GASTROINTESTINAL ENDOSCOPY  10/31/2016    gastritis       Allergies   Allergen Reactions    Adhesive Tape Other (See Comments)     blisters    Imitrex [Sumatriptan] Other (See Comments)     \"feels like head is going to explode    Morphine Itching     hives         Current Outpatient Medications:     tiZANidine (ZANAFLEX) 4 MG tablet, Take 1 tablet by mouth every 8 hours as needed (spasms), Disp: 270 tablet, Rfl: 0    [START ON 6/21/2019] HYDROcodone-acetaminophen (NORCO) 5-325 MG per tablet, Take 1 tablet by mouth 2 times daily for 30 days. Indications: Pain, Disp: 60 tablet, Rfl: 0    atorvastatin (LIPITOR) 20 MG tablet, Take 1 tablet by mouth daily, Disp: 90 tablet, Rfl: 1    lisinopril (PRINIVIL;ZESTRIL) 5 MG tablet, TAKE 1 TABLET DAILY, Disp: 90 tablet, Rfl: 0    pregabalin (LYRICA) 75 MG capsule, Take 1 capsule by mouth 3 times daily for 30 days. , Disp: 90 capsule, Rfl: 3    sertraline (ZOLOFT) 50 MG tablet, TAKE 1 TABLET DAILY, Disp: 90 tablet, Rfl: 1    omeprazole (PRILOSEC) 40 MG delayed release capsule, TAKE 1 CAPSULE DAILY, Disp: 90 capsule, Rfl: 0    topiramate (TOPAMAX) 100 MG tablet, TAKE 1 TABLET IN THE MORNING AND 2 TABLETS IN THE EVENING, Disp: 270 tablet, Rfl: 0    metFORMIN (GLUCOPHAGE XR) 500 MG extended release tablet, Take 1 tablet by mouth daily (with breakfast), Disp: 90 tablet, Rfl: 1    Probiotic Product (PROBIOTIC DAILY PO), Take 1 tablet by mouth daily. , Disp: , Rfl:     Multiple Vitamins-Calcium (ONE-A-DAY WOMENS FORMULA PO), Take  by mouth., Disp: , Rfl:     ONETOUCH DELICA LANCETS 69Z MISC, USE 1 EACH TWICE A DAY, Disp: 200 each, Rfl: 1     MG tablet, TAKE 1 TABLET EVERY 8 HOURS AS NEEDED FOR PAIN, Disp: 270 tablet, Rfl: 0    blood glucose test strips (ASCENSIA AUTODISC VI;ONE TOUCH ULTRA TEST VI) strip, 1 each by In Vitro route daily As needed. , Disp: 200 each, Rfl: 1    b complex vitamins capsule, Take 1 capsule by mouth daily, Disp: , Rfl:     docusate sodium (COLACE) 100 MG capsule, Take 1 capsule by mouth daily as needed for Constipation, Disp: 30 capsule, Rfl: 2    Elastic Bandages & Supports (LUMBAR BACK BRACE/SUPPORT PAD) MISC, 1 each by Does not apply route daily as needed (pain), Disp: 1 each, Rfl: 0    Melatonin ER 5 MG TBCR, Take by mouth as needed, Disp: , Rfl:     vitamin D (CHOLECALCIFEROL) 1000 UNIT TABS tablet, Take 1,000 Units by mouth daily, Disp: , Rfl:     Zinc 30 MG TABS, Take by mouth daily , Disp: , Rfl:     Ascorbic Acid (VITAMIN C) 500 MG CAPS, Take by mouth, Disp: , Rfl:     ECHINACEA PO, Take by mouth, Disp: , Rfl:     Family History   Problem Relation Age of Onset    Cancer Mother     Heart Disease Father     Diabetes Father     High Blood Pressure Father     Other Other         Celiac Sprue. Aunt       Social History     Socioeconomic History    Marital status:      Spouse name: Not on file    Number of children: Not on file    Years of education: Not on file    Highest education level: Not on file   Occupational History    Occupation: unemployed   Social Needs    Financial resource strain: Not on file    Food insecurity:     Worry: Not on file     Inability: Not on file   RE2 needs:     Medical: Not on file     Non-medical: Not on file   Tobacco Use    Smoking status: Never Smoker    Smokeless tobacco: Never Used   Substance and Sexual Activity    Alcohol use: No    Drug use: No    Sexual activity: Yes   Lifestyle    Physical activity:     Days per week: Not on file     Minutes per session: Not on file    Stress: Not on file   Relationships    Social connections:     Talks on phone: Not on file     Gets together: Not on file     Attends [employer] service: Not on file     Active member of club or organization: Not on file     Attends meetings of clubs or organizations: Not on file     Relationship status: Not on file    Intimate partner violence:     Fear of current or ex partner: Not on file     Emotionally abused: Not on file     Physically abused: Not on file     Forced sexual activity: Not on file   Other Topics Concern    Not on file   Social History Narrative    Not on file       Review of Systems:  Review of Systems   Constitution: Negative. HENT: Negative. Eyes:        Glasses   Cardiovascular: Negative. Respiratory: Negative. Endocrine:        HGBA1C was 6.4   Hematologic/Lymphatic: Negative. Skin: Negative. Musculoskeletal: Positive for back pain and joint pain. Gastrointestinal: Negative. Genitourinary: Positive for dysuria, flank pain and frequency. Kidney stones   Neurological: Positive for headaches and numbness. Psychiatric/Behavioral: Negative. Physical Exam:  /88   Pulse 64   Temp 97.8 °F (36.6 °C) (Oral)   Resp 16   Ht 5' (1.524 m)   Wt 222 lb (100.7 kg)   SpO2 98%   BMI 43.36 kg/m²     Physical Exam   Constitutional: She appears well-developed. Neck: Normal range of motion. Neck supple. Pulmonary/Chest: Effort normal.   Musculoskeletal:        Left hip: She exhibits tenderness. Lumbar back: She exhibits decreased range of motion and tenderness. Neurological: She is alert. She has normal strength. Reflex Scores:       Patellar reflexes are 2+ on the right side and 2+ on the left side. Achilles reflexes are 2+ on the right side and 2+ on the left side. Skin: Skin is warm, dry and intact. Psychiatric: She has a normal mood and affect.  Her speech is normal and behavior is normal. Judgment and thought content normal. Cognition and memory are normal.         Assessment:    Problem List Items Addressed This Visit     Primary osteoarthritis of left hip    Relevant Medications    tiZANidine (ZANAFLEX) 4 MG tablet    HYDROcodone-acetaminophen (NORCO) 5-325 MG per tablet (Start on 6/21/2019)    Osteoarthritis of lumbar spine    Relevant Medications    tiZANidine (ZANAFLEX) 4 MG tablet    HYDROcodone-acetaminophen (NORCO) 5-325 MG per tablet (Start on 6/21/2019)    Lumbosacral spondylosis without myelopathy    Relevant Medications    tiZANidine (ZANAFLEX) 4 MG tablet    HYDROcodone-acetaminophen (NORCO) 5-325 MG per tablet (Start on 6/21/2019)    Lumbar spondylosis (Chronic)    Relevant Medications    tiZANidine (ZANAFLEX) 4 MG tablet    HYDROcodone-acetaminophen (NORCO) 5-325 MG per tablet (Start on 6/21/2019)    Lumbar radiculopathy, chronic - Primary (Chronic)    Relevant Medications    tiZANidine (ZANAFLEX) 4 MG tablet    Degenerative disc disease, cervical    Relevant Medications    tiZANidine (ZANAFLEX) 4 MG tablet    HYDROcodone-acetaminophen (NORCO) 5-325 MG per tablet (Start on 6/21/2019)    Degeneration of lumbar intervertebral disc (Chronic)    Relevant Medications    tiZANidine (ZANAFLEX) 4 MG tablet    HYDROcodone-acetaminophen (NORCO) 5-325 MG per tablet (Start on 6/21/2019)    Chronic, continuous use of opioids    Arthropathy of lumbar facet joint    Relevant Medications    HYDROcodone-acetaminophen (NORCO) 5-325 MG per tablet (Start on 6/21/2019)              Treatment Plan:  DISCUSSION: Treatment options discussed withpatient and all questions answered to patient's satisfaction. Possible side effects, risk of tolerance and or dependence and alternative treatments discussed    Obtaining appropriate analgesic effect of treatment   No signs of potential drug abuse or diversion identified    [x] Ill effects of being on chronic pain medications such as sleep disturbances, respiratory depression, hormonal changes, withdrawal symptoms, chronic opioid dependence and tolerance as well as risk of taking opioids with Benzodiazepines and taking opioids along with alcohol,  werediscussed with patient. I had asked the patient to minimize medication use and utilize pain medications only for uncontrolled rest pain or pain with exertional activities. I advised patient not to self-escalate painmedications without consulting with us. At each of patient's future visits we will try to taper pain medications, while adjusting the adjunct medications, and re-evaluating for Physical Therapy to improve spinal andjoint strength. We will continue to have discussions to decrease pain medications as tolerated. Counseled patient on effects their pain medication and /or their medical condition mayhave on their  ability to drive or operate machinery.  Instructed not to drive or operate machinery if drowsy     I also discussed with the patient regarding the

## 2019-06-14 LAB
6-ACETYLMORPHINE, UR: NOT DETECTED
7-AMINOCLONAZEPAM, URINE: NOT DETECTED
ALPHA-OH-ALPRAZ, URINE: NOT DETECTED
ALPRAZOLAM, URINE: NOT DETECTED
AMPHETAMINES, URINE: NOT DETECTED
BARBITURATES, URINE: NOT DETECTED
BENZOYLECGONINE, UR: NOT DETECTED
BUPRENORPHINE URINE: NOT DETECTED
CARISOPRODOL, UR: NOT DETECTED
CLONAZEPAM, URINE: NOT DETECTED
CODEINE, URINE: NOT DETECTED
CREATININE URINE: 41.2 MG/DL (ref 20–400)
DIAZEPAM, URINE: NOT DETECTED
DRUGS EXPECTED, UR: NORMAL
EER HI RES INTERP UR: NORMAL
ETHYL GLUCURONIDE UR: NOT DETECTED
FENTANYL URINE: NOT DETECTED
HYDROCODONE, URINE: PRESENT
HYDROMORPHONE, URINE: NOT DETECTED
LORAZEPAM, URINE: NOT DETECTED
MARIJUANA METAB, UR: NOT DETECTED
MDA, UR: NOT DETECTED
MDEA, EVE, UR: NOT DETECTED
MDMA URINE: NOT DETECTED
MEPERIDINE METAB, UR: NOT DETECTED
METHADONE, URINE: NOT DETECTED
METHAMPHETAMINE, URINE: NOT DETECTED
METHYLPHENIDATE: NOT DETECTED
MIDAZOLAM, URINE: NOT DETECTED
MORPHINE URINE: NOT DETECTED
NORBUPRENORPHINE, URINE: NOT DETECTED
NORDIAZEPAM, URINE: NOT DETECTED
NORFENTANYL, URINE: NOT DETECTED
NORHYDROCODONE, URINE: PRESENT
NOROXYCODONE, URINE: NOT DETECTED
NOROXYMORPHONE, URINE: NOT DETECTED
OXAZEPAM, URINE: NOT DETECTED
OXYCODONE URINE: NOT DETECTED
OXYMORPHONE, URINE: NOT DETECTED
PAIN MANAGEMENT DRUG PANEL INTERP, URINE: NORMAL
PAIN MGT DRUG PANEL, HI RES, UR: NORMAL
PCP,URINE: NOT DETECTED
PHENTERMINE, UR: NOT DETECTED
PROPOXYPHENE, URINE: NOT DETECTED
TAPENTADOL, URINE: NOT DETECTED
TAPENTADOL-O-SULFATE, URINE: NOT DETECTED
TEMAZEPAM, URINE: NOT DETECTED
TRAMADOL, URINE: NOT DETECTED
ZOLPIDEM, URINE: NOT DETECTED

## 2019-06-22 DIAGNOSIS — E11.9 TYPE 2 DIABETES MELLITUS WITHOUT COMPLICATION, WITHOUT LONG-TERM CURRENT USE OF INSULIN (HCC): ICD-10-CM

## 2019-06-24 RX ORDER — OMEPRAZOLE 40 MG/1
CAPSULE, DELAYED RELEASE ORAL
Qty: 90 CAPSULE | Refills: 0 | Status: SHIPPED | OUTPATIENT
Start: 2019-06-24 | End: 2019-09-21 | Stop reason: SDUPTHER

## 2019-06-24 RX ORDER — METFORMIN HYDROCHLORIDE 500 MG/1
TABLET, EXTENDED RELEASE ORAL
Qty: 90 TABLET | Refills: 1 | Status: SHIPPED | OUTPATIENT
Start: 2019-06-24 | End: 2019-12-19

## 2019-06-24 NOTE — TELEPHONE ENCOUNTER
Please Approve or Refuse.   Send to Pharmacy per Pt's Request:      Next Visit Date:  10/3/2019   Last Visit Date: 6/3/2019    Hemoglobin A1C (%)   Date Value   06/03/2019 6.4   02/26/2019 6.1   12/18/2018 6.5             ( goal A1C is < 7)   BP Readings from Last 3 Encounters:   06/11/19 134/88   06/07/19 110/69   06/03/19 124/82          (goal 120/80)  BUN   Date Value Ref Range Status   02/26/2019 17 6 - 20 mg/dL Final     CREATININE   Date Value Ref Range Status   02/26/2019 0.86 0.50 - 0.90 mg/dL Final     Potassium   Date Value Ref Range Status   02/26/2019 4.4 3.7 - 5.3 mmol/L Final

## 2019-06-25 ENCOUNTER — OFFICE VISIT (OUTPATIENT)
Dept: PODIATRY | Age: 49
End: 2019-06-25
Payer: COMMERCIAL

## 2019-06-25 VITALS — HEIGHT: 60 IN | BODY MASS INDEX: 43.19 KG/M2 | WEIGHT: 220 LBS

## 2019-06-25 DIAGNOSIS — M79.672 PAIN ASSOCIATED WITH ACCESSORY NAVICULAR BONE OF FOOT, LEFT: ICD-10-CM

## 2019-06-25 DIAGNOSIS — S92.255A CLOSED NONDISPLACED FRACTURE OF NAVICULAR BONE OF LEFT FOOT, INITIAL ENCOUNTER: ICD-10-CM

## 2019-06-25 DIAGNOSIS — S86.112A TRAUMATIC RUPTURE OF LEFT POSTERIOR TIBIAL TENDON, INITIAL ENCOUNTER: ICD-10-CM

## 2019-06-25 DIAGNOSIS — M79.672 FOOT PAIN, LEFT: Primary | ICD-10-CM

## 2019-06-25 DIAGNOSIS — Q74.2 PAIN ASSOCIATED WITH ACCESSORY NAVICULAR BONE OF FOOT, LEFT: ICD-10-CM

## 2019-06-25 PROCEDURE — 99213 OFFICE O/P EST LOW 20 MIN: CPT | Performed by: PODIATRIST

## 2019-06-25 NOTE — PROGRESS NOTES
MG tablet TAKE 1 TABLET DAILY 4/22/19  Yes Ariel Leon MD   [facility] LANCETS 58Q MISC USE 1 EACH TWICE A DAY 4/8/19  Yes Ariel Leon MD   topiramate (TOPAMAX) 100 MG tablet TAKE 1 TABLET IN THE MORNING AND 2 TABLETS IN THE EVENING 3/11/19  Yes Roel Lafleur MD    MG tablet TAKE 1 TABLET EVERY 8 HOURS AS NEEDED FOR PAIN 1/16/19  Yes BAR Ryan CNP   blood glucose test strips (ASCENSIA AUTODISC VI;ONE TOUCH ULTRA TEST VI) strip 1 each by In Vitro route daily As needed. 10/22/18  Yes Ariel Leon MD   b complex vitamins capsule Take 1 capsule by mouth daily   Yes Historical Provider, MD   docusate sodium (COLACE) 100 MG capsule Take 1 capsule by mouth daily as needed for Constipation 12/1/17  Yes BAR Sanchez CNP   Elastic Bandages & Supports (LUMBAR BACK BRACE/SUPPORT PAD) MISC 1 each by Does not apply route daily as needed (pain) 8/4/17  Yes Torres Chapman MD   Melatonin ER 5 MG TBCR Take by mouth as needed   Yes Historical Provider, MD   vitamin D (CHOLECALCIFEROL) 1000 UNIT TABS tablet Take 1,000 Units by mouth daily   Yes Historical Provider, MD   Zinc 30 MG TABS Take by mouth daily    Yes Historical Provider, MD   Ascorbic Acid (VITAMIN C) 500 MG CAPS Take by mouth   Yes Historical Provider, MD   ECHINACEA PO Take by mouth   Yes Historical Provider, MD   Probiotic Product (PROBIOTIC DAILY PO) Take 1 tablet by mouth daily. Yes Historical Provider, MD   Multiple Vitamins-Calcium (ONE-A-DAY WOMENS FORMULA PO) Take  by mouth. Yes Historical Provider, MD   pregabalin (LYRICA) 75 MG capsule Take 1 capsule by mouth 3 times daily for 30 days. 5/13/19 6/12/19  BAR Ryan CNP       Review of Systems   Constitutional: Negative for chills, diaphoresis, fatigue, fever and unexpected weight change. Cardiovascular: Negative for leg swelling. Gastrointestinal: Negative for constipation, diarrhea, nausea and vomiting.    Musculoskeletal: Positive for

## 2019-06-26 ASSESSMENT — ENCOUNTER SYMPTOMS
NAUSEA: 0
VOMITING: 0
DIARRHEA: 0
CONSTIPATION: 0
COLOR CHANGE: 0

## 2019-07-02 ENCOUNTER — HOSPITAL ENCOUNTER (OUTPATIENT)
Dept: MRI IMAGING | Age: 49
Discharge: HOME | End: 2019-07-04
Payer: COMMERCIAL

## 2019-07-02 DIAGNOSIS — M79.672 FOOT PAIN, LEFT: ICD-10-CM

## 2019-07-02 DIAGNOSIS — S86.112A TRAUMATIC RUPTURE OF LEFT POSTERIOR TIBIAL TENDON, INITIAL ENCOUNTER: ICD-10-CM

## 2019-07-02 DIAGNOSIS — Q74.2 PAIN ASSOCIATED WITH ACCESSORY NAVICULAR BONE OF FOOT, LEFT: ICD-10-CM

## 2019-07-02 DIAGNOSIS — M79.672 PAIN ASSOCIATED WITH ACCESSORY NAVICULAR BONE OF FOOT, LEFT: ICD-10-CM

## 2019-07-02 DIAGNOSIS — S92.255A CLOSED NONDISPLACED FRACTURE OF NAVICULAR BONE OF LEFT FOOT, INITIAL ENCOUNTER: ICD-10-CM

## 2019-07-02 PROCEDURE — 73721 MRI JNT OF LWR EXTRE W/O DYE: CPT

## 2019-07-15 ENCOUNTER — OFFICE VISIT (OUTPATIENT)
Dept: PODIATRY | Age: 49
End: 2019-07-15
Payer: COMMERCIAL

## 2019-07-15 VITALS — BODY MASS INDEX: 43.19 KG/M2 | WEIGHT: 220 LBS | HEIGHT: 60 IN

## 2019-07-15 DIAGNOSIS — M79.672 PAIN ASSOCIATED WITH ACCESSORY NAVICULAR BONE OF FOOT, LEFT: Primary | ICD-10-CM

## 2019-07-15 DIAGNOSIS — S93.422A TEAR OF DELTOID LIGAMENT OF LEFT ANKLE, INITIAL ENCOUNTER: ICD-10-CM

## 2019-07-15 DIAGNOSIS — Q74.2 PAIN ASSOCIATED WITH ACCESSORY NAVICULAR BONE OF FOOT, LEFT: Primary | ICD-10-CM

## 2019-07-15 DIAGNOSIS — M79.672 FOOT PAIN, LEFT: ICD-10-CM

## 2019-07-15 PROCEDURE — 99213 OFFICE O/P EST LOW 20 MIN: CPT | Performed by: PODIATRIST

## 2019-07-15 PROCEDURE — 29405 APPL SHORT LEG CAST: CPT | Performed by: PODIATRIST

## 2019-07-15 ASSESSMENT — ENCOUNTER SYMPTOMS
NAUSEA: 0
COLOR CHANGE: 0
DIARRHEA: 0
CONSTIPATION: 0
VOMITING: 0

## 2019-07-15 NOTE — PROGRESS NOTES
Pérez Dotson MD   lisinopril (PRINIVIL;ZESTRIL) 5 MG tablet TAKE 1 TABLET DAILY 5/30/19  Yes Wm Foster MD   sertraline (ZOLOFT) 50 MG tablet TAKE 1 TABLET DAILY 4/22/19  Yes Wm Foster MD   Juanis Gell LANCETS 02K MISC USE 1 EACH TWICE A DAY 4/8/19  Yes Wm Foster MD   topiramate (TOPAMAX) 100 MG tablet TAKE 1 TABLET IN THE MORNING AND 2 TABLETS IN THE EVENING 3/11/19  Yes Mike Recio MD    MG tablet TAKE 1 TABLET EVERY 8 HOURS AS NEEDED FOR PAIN 1/16/19  Yes Phoenix Swartz APRN - CNP   blood glucose test strips (ASCENSIA AUTODISC VI;ONE TOUCH ULTRA TEST VI) strip 1 each by In Vitro route daily As needed. 10/22/18  Yes Wm Foster MD   b complex vitamins capsule Take 1 capsule by mouth daily   Yes Historical Provider, MD   docusate sodium (COLACE) 100 MG capsule Take 1 capsule by mouth daily as needed for Constipation 12/1/17  Yes BAR Schofield - CNP   Elastic Bandages & Supports (LUMBAR BACK BRACE/SUPPORT PAD) MISC 1 each by Does not apply route daily as needed (pain) 8/4/17  Yes Leo Lopez MD   Melatonin ER 5 MG TBCR Take by mouth as needed   Yes Historical Provider, MD   vitamin D (CHOLECALCIFEROL) 1000 UNIT TABS tablet Take 1,000 Units by mouth daily   Yes Historical Provider, MD   Zinc 30 MG TABS Take by mouth daily    Yes Historical Provider, MD   Ascorbic Acid (VITAMIN C) 500 MG CAPS Take by mouth   Yes Historical Provider, MD   ECHINACEA PO Take by mouth   Yes Historical Provider, MD   Probiotic Product (PROBIOTIC DAILY PO) Take 1 tablet by mouth daily. Yes Historical Provider, MD   Multiple Vitamins-Calcium (ONE-A-DAY WOMENS FORMULA PO) Take  by mouth. Yes Historical Provider, MD   pregabalin (LYRICA) 75 MG capsule Take 1 capsule by mouth 3 times daily for 30 days. 5/13/19 6/12/19  Phoenix Swartz APRN - CNP       Review of Systems   Constitutional: Negative for chills, diaphoresis, fatigue, fever and unexpected weight change.    Cardiovascular:

## 2019-07-19 ENCOUNTER — HOSPITAL ENCOUNTER (OUTPATIENT)
Dept: PAIN MANAGEMENT | Age: 49
Discharge: HOME | End: 2019-07-19
Payer: COMMERCIAL

## 2019-07-19 VITALS
SYSTOLIC BLOOD PRESSURE: 111 MMHG | WEIGHT: 220 LBS | TEMPERATURE: 98.4 F | OXYGEN SATURATION: 97 % | BODY MASS INDEX: 43.19 KG/M2 | HEIGHT: 60 IN | HEART RATE: 70 BPM | DIASTOLIC BLOOD PRESSURE: 74 MMHG | RESPIRATION RATE: 16 BRPM

## 2019-07-19 DIAGNOSIS — M50.30 DEGENERATIVE DISC DISEASE, CERVICAL: ICD-10-CM

## 2019-07-19 DIAGNOSIS — M47.816 ARTHROPATHY OF LUMBAR FACET JOINT: ICD-10-CM

## 2019-07-19 DIAGNOSIS — G89.29 CHRONIC LEFT-SIDED LOW BACK PAIN WITH LEFT-SIDED SCIATICA: ICD-10-CM

## 2019-07-19 DIAGNOSIS — M46.1 SACROILIITIS (HCC): ICD-10-CM

## 2019-07-19 DIAGNOSIS — M54.16 LUMBAR RADICULOPATHY, CHRONIC: ICD-10-CM

## 2019-07-19 DIAGNOSIS — M47.26 OSTEOARTHRITIS OF SPINE WITH RADICULOPATHY, LUMBAR REGION: ICD-10-CM

## 2019-07-19 DIAGNOSIS — M47.816 LUMBAR SPONDYLOSIS: ICD-10-CM

## 2019-07-19 DIAGNOSIS — M54.42 CHRONIC LEFT-SIDED LOW BACK PAIN WITH LEFT-SIDED SCIATICA: ICD-10-CM

## 2019-07-19 DIAGNOSIS — M16.12 PRIMARY OSTEOARTHRITIS OF LEFT HIP: ICD-10-CM

## 2019-07-19 DIAGNOSIS — Z79.899 ENCOUNTER FOR MEDICATION MANAGEMENT: ICD-10-CM

## 2019-07-19 DIAGNOSIS — M51.36 DEGENERATION OF LUMBAR INTERVERTEBRAL DISC: Primary | Chronic | ICD-10-CM

## 2019-07-19 DIAGNOSIS — F11.90 CHRONIC, CONTINUOUS USE OF OPIOIDS: ICD-10-CM

## 2019-07-19 DIAGNOSIS — M47.817 LUMBOSACRAL SPONDYLOSIS WITHOUT MYELOPATHY: ICD-10-CM

## 2019-07-19 PROCEDURE — 99213 OFFICE O/P EST LOW 20 MIN: CPT

## 2019-07-19 PROCEDURE — 99213 OFFICE O/P EST LOW 20 MIN: CPT | Performed by: NURSE PRACTITIONER

## 2019-07-19 RX ORDER — HYDROCODONE BITARTRATE AND ACETAMINOPHEN 5; 325 MG/1; MG/1
1 TABLET ORAL 3 TIMES DAILY PRN
Qty: 90 TABLET | Refills: 0 | Status: SHIPPED | OUTPATIENT
Start: 2019-07-19 | End: 2019-08-14 | Stop reason: SDUPTHER

## 2019-07-19 RX ORDER — TOPIRAMATE 100 MG/1
TABLET, FILM COATED ORAL
Qty: 270 TABLET | Refills: 0 | Status: SHIPPED | OUTPATIENT
Start: 2019-07-19 | End: 2019-10-22 | Stop reason: SDUPTHER

## 2019-07-19 ASSESSMENT — ENCOUNTER SYMPTOMS
BACK PAIN: 1
CONSTIPATION: 1
RESPIRATORY NEGATIVE: 1

## 2019-07-19 NOTE — PROGRESS NOTES
Was the UDS appropriate:yes      Record/Diagnostics Review:      As above, I did review the imaging    6/14/2019  8:07 PM - Raymon, Evangelist Incoming Lab Results From [facility]     Component Value Ref Range & Units Status Collected Lab   Pain Management Drug Panel Interp, Urine Consistent   Final 06/11/2019 10:00 AM [facility]   (NOTE)   ________________________________________________________________   DRUGS EXPECTED:   HYDROCODONE [6/11/19]   ________________________________________________________________   CONSISTENT with medications provided:   HYDROCODONE : based on hydrocodone, norhydrocodone   ________________________________________________________________   INTERPRETIVE INFORMATION: Pain Mgt Ruiz, Mass Spec/EMIT, Ur,                            Interp   Interpretation depends on accuracy and completeness of patient   medication information submitted by client. 6-Acetylmorphine, Ur Not Detected   Final 06/11/2019 10:00 AM [facility]   7-Aminoclonazepam, Urine Not Detected   Final 06/11/2019 10:00 AM [facility]   Alpha-OH-Alpraz, Urine Not Detected   Final 06/11/2019 10:00 AM [facility]   Alprazolam, Urine Not Detected   Final 06/11/2019 10:00 AM [facility]   Amphetamines, urine Not Detected   Final 06/11/2019 10:00 AM [facility]   Barbiturates, Ur Not Detected   Final 06/11/2019 10:00 AM [facility]   Benzoylecgonine, Ur Not Detected   Final 06/11/2019 10:00 AM [facility]   Buprenorphine Urine Not Detected   Final 06/11/2019 10:00 AM [facility]   Carisoprodol, Ur Not Detected   Final 06/11/2019 10:00 AM [facility]   (NOTE)   The carisoprodol immunoassay has cross-reactivity to carisoprodol   and meprobamate.     Clonazepam, Urine Not Detected   Final 06/11/2019 10:00 AM [facility]   Codeine, Urine Not Detected   Final 06/11/2019 10:00 AM [facility]   MDA, Ur Not Detected   Final 06/11/2019 10:00 AM [facility]   Diazepam, Urine Not Detected   Final 06/11/2019 10:00 AM [facility]   Ethyl Glucuronide Ur Not Detected   Final 06/11/2019 10:00 AM [facility]   Fentanyl, Ur Not Detected   Final status:      Spouse name: Not on file    Number of children: Not on file    Years of education: Not on file    Highest education level: Not on file   Occupational History    Occupation: unemployed   Social Needs    Financial resource strain: Not on file    Food insecurity:     Worry: Not on file     Inability: Not on file   Dotted Block needs:     Medical: Not on file     Non-medical: Not on file   Tobacco Use    Smoking status: Never Smoker    Smokeless tobacco: Never Used   Substance and Sexual Activity    Alcohol use: No    Drug use: No    Sexual activity: Yes   Lifestyle    Physical activity:     Days per week: Not on file     Minutes per session: Not on file    Stress: Not on file   Relationships    Social connections:     Talks on phone: Not on file     Gets together: Not on file     Attends [employer] service: Not on file     Active member of club or organization: Not on file     Attends meetings of clubs or organizations: Not on file     Relationship status: Not on file    Intimate partner violence:     Fear of current or ex partner: Not on file     Emotionally abused: Not on file     Physically abused: Not on file     Forced sexual activity: Not on file   Other Topics Concern    Not on file   Social History Narrative    Not on file       Review of Systems:  Review of Systems   Constitution: Negative. HENT: Negative. Eyes:        Glasses   Cardiovascular: Negative. Respiratory: Negative. Endocrine: Negative. Hematologic/Lymphatic: Bruises/bleeds easily. Skin: Negative. Musculoskeletal: Positive for back pain and falls. Gastrointestinal: Positive for constipation. Genitourinary: Negative. Neurological:        Using knee rover   Psychiatric/Behavioral: Positive for depression. The patient is nervous/anxious.          Managing sometimes         Physical Exam:  /74   Pulse 70   Temp 98.4 °F (36.9 °C) (Oral)   Resp 16   Ht 5' (1.524 m)   Wt 220 lb

## 2019-07-30 ENCOUNTER — OFFICE VISIT (OUTPATIENT)
Dept: PODIATRY | Age: 49
End: 2019-07-30
Payer: COMMERCIAL

## 2019-07-30 VITALS — HEIGHT: 60 IN | WEIGHT: 220 LBS | BODY MASS INDEX: 43.19 KG/M2

## 2019-07-30 DIAGNOSIS — S93.422A TEAR OF DELTOID LIGAMENT OF LEFT ANKLE, INITIAL ENCOUNTER: ICD-10-CM

## 2019-07-30 DIAGNOSIS — Q74.2 PAIN ASSOCIATED WITH ACCESSORY NAVICULAR BONE OF FOOT, LEFT: Primary | ICD-10-CM

## 2019-07-30 DIAGNOSIS — M79.672 PAIN ASSOCIATED WITH ACCESSORY NAVICULAR BONE OF FOOT, LEFT: Primary | ICD-10-CM

## 2019-07-30 DIAGNOSIS — M79.672 FOOT PAIN, LEFT: ICD-10-CM

## 2019-07-30 PROCEDURE — 99213 OFFICE O/P EST LOW 20 MIN: CPT | Performed by: PODIATRIST

## 2019-07-30 ASSESSMENT — ENCOUNTER SYMPTOMS
COLOR CHANGE: 0
CONSTIPATION: 0
NAUSEA: 0
VOMITING: 0
DIARRHEA: 0

## 2019-08-14 ENCOUNTER — HOSPITAL ENCOUNTER (OUTPATIENT)
Dept: PAIN MANAGEMENT | Age: 49
Discharge: HOME | End: 2019-08-14
Payer: COMMERCIAL

## 2019-08-14 VITALS
TEMPERATURE: 98.3 F | SYSTOLIC BLOOD PRESSURE: 134 MMHG | OXYGEN SATURATION: 96 % | WEIGHT: 220 LBS | HEIGHT: 60 IN | RESPIRATION RATE: 16 BRPM | DIASTOLIC BLOOD PRESSURE: 85 MMHG | BODY MASS INDEX: 43.19 KG/M2 | HEART RATE: 65 BPM

## 2019-08-14 DIAGNOSIS — M46.1 SACROILIITIS (HCC): ICD-10-CM

## 2019-08-14 DIAGNOSIS — M51.36 DEGENERATION OF LUMBAR INTERVERTEBRAL DISC: Chronic | ICD-10-CM

## 2019-08-14 DIAGNOSIS — M16.12 PRIMARY OSTEOARTHRITIS OF LEFT HIP: ICD-10-CM

## 2019-08-14 DIAGNOSIS — F32.A DEPRESSION, UNSPECIFIED DEPRESSION TYPE: ICD-10-CM

## 2019-08-14 DIAGNOSIS — M54.16 LUMBAR RADICULOPATHY, CHRONIC: Primary | Chronic | ICD-10-CM

## 2019-08-14 DIAGNOSIS — G89.29 CHRONIC LEFT-SIDED LOW BACK PAIN WITH LEFT-SIDED SCIATICA: ICD-10-CM

## 2019-08-14 DIAGNOSIS — M47.816 SPONDYLOSIS OF LUMBAR REGION WITHOUT MYELOPATHY OR RADICULOPATHY: ICD-10-CM

## 2019-08-14 DIAGNOSIS — M50.30 DEGENERATIVE DISC DISEASE, CERVICAL: ICD-10-CM

## 2019-08-14 DIAGNOSIS — M47.817 LUMBOSACRAL SPONDYLOSIS WITHOUT MYELOPATHY: ICD-10-CM

## 2019-08-14 DIAGNOSIS — M47.26 OSTEOARTHRITIS OF SPINE WITH RADICULOPATHY, LUMBAR REGION: ICD-10-CM

## 2019-08-14 DIAGNOSIS — M50.20 CERVICAL DISC HERNIATION: ICD-10-CM

## 2019-08-14 DIAGNOSIS — Z79.899 ENCOUNTER FOR MEDICATION MANAGEMENT: ICD-10-CM

## 2019-08-14 DIAGNOSIS — M47.816 ARTHROPATHY OF LUMBAR FACET JOINT: ICD-10-CM

## 2019-08-14 DIAGNOSIS — F11.90 CHRONIC, CONTINUOUS USE OF OPIOIDS: ICD-10-CM

## 2019-08-14 DIAGNOSIS — M54.42 CHRONIC LEFT-SIDED LOW BACK PAIN WITH LEFT-SIDED SCIATICA: ICD-10-CM

## 2019-08-14 DIAGNOSIS — M47.816 LUMBAR SPONDYLOSIS: Chronic | ICD-10-CM

## 2019-08-14 PROCEDURE — 99213 OFFICE O/P EST LOW 20 MIN: CPT

## 2019-08-14 PROCEDURE — 99213 OFFICE O/P EST LOW 20 MIN: CPT | Performed by: NURSE PRACTITIONER

## 2019-08-14 RX ORDER — HYDROCODONE BITARTRATE AND ACETAMINOPHEN 5; 325 MG/1; MG/1
1 TABLET ORAL 3 TIMES DAILY PRN
Qty: 90 TABLET | Refills: 0 | Status: SHIPPED | OUTPATIENT
Start: 2019-08-21 | End: 2019-09-09 | Stop reason: SDUPTHER

## 2019-08-14 ASSESSMENT — ENCOUNTER SYMPTOMS
BACK PAIN: 1
CONSTIPATION: 1
RESPIRATORY NEGATIVE: 1

## 2019-08-14 NOTE — PROGRESS NOTES
Pricilla Lopes PROGRESS NOTE      Patient here today to review Medication Agreement    Chief Complaint:back pain      HPI: She c/o low back pain which has increased. She attributes thi to her gait which is off, she has a torn tendon left foot and has a walking booton. She does not sleep well. She has had no ED visits. She is considering seeing a psychologist for depression. She states has no support at home. She can not afford right now to have an injection for her back pain. She toes a tendon left foot and has fracture boot on. States her podiatrist wanted her to stay in cast.    Back Pain   This is a chronic problem. The current episode started more than 1 year ago. The problem occurs constantly. The problem has been gradually worsening since onset. The pain is present in the lumbar spine. The quality of the pain is described as shooting and aching. Radiates to: left leg to knee. The pain is at a severity of 7/10. The pain is moderate. The pain is worse during the night (am). The symptoms are aggravated by sitting and standing. Associated symptoms include numbness. (Spasms backl  Numbness left leg) Risk factors include obesity and menopause. She has tried heat, ice and analgesics for the symptoms. The treatment provided mild relief. Patient denies any new neurological symptoms. No bowel or bladder incontinence, no weakness, and no falling. Treatment goals:  Functional status: reduce pain 3        Aberrancy:   Any alcoholic beverages  no     Any illegal drugs   no        Analgesia:pain 7      Adverse  Effects :takes colace, BM qod     ADL;s : not active             Pill count: appropriate    Morphine equivalent dose as reported on [place]AR[place]:15  Periodic Controlled Substance Monitoring: Possible medication side effects, risk of tolerance/dependence & alternative treatments discussed., No signs of potential drug abuse or diversion identified. , Assessed functional status., Obtaining appropriate analgesic effect of treatment. Jerad Wilson, APRN - CNP)  Review of[place]ARRS does not show any aberrant prescription behavior. Medication is helping the patient stay active. Patient denies any side effects and reports adequate analgesia. No sign of misuse/abuse. As above, I did review the imaging    When was thelast UDS:    6-11-19         Was the UDS appropriate:yes        Record/Diagnostics Review:       As above, I did review the imaging     6/14/2019  8:07 PM - Raymon, Evangelist Incoming Lab Results From [facility]      Component Value Ref Range & Units Status Collected Lab   Pain Management Drug Panel Interp, Urine Consistent    Final 06/11/2019 10:00 AM [facility]   (NOTE)   ________________________________________________________________   DRUGS EXPECTED:   HYDROCODONE [6/11/19]   ________________________________________________________________   CONSISTENT with medications provided:   HYDROCODONE : based on hydrocodone, norhydrocodone   ________________________________________________________________   INTERPRETIVE INFORMATION: Pain Mgt Ruiz, Mass Spec/EMIT, Ur,                            Interp   Interpretation depends on accuracy and completeness of patient   medication information submitted by client.     6-Acetylmorphine, Ur Not Detected    Final 06/11/2019 10:00 AM [facility]   7-Aminoclonazepam, Urine Not Detected    Final 06/11/2019 10:00 AM [facility]   Alpha-OH-Alpraz, Urine Not Detected    Final 06/11/2019 10:00 AM [facility]   Alprazolam, Urine Not Detected    Final 06/11/2019 10:00 AM [facility]   Amphetamines, urine Not Detected    Final 06/11/2019 10:00 AM [facility]   Barbiturates, Ur Not Detected    Final 06/11/2019 10:00 AM [facility]   Benzoylecgonine, Ur Not Detected    Final 06/11/2019 10:00 AM [facility]   Buprenorphine Urine Not Detected    Final 06/11/2019 10:00 AM [facility]   Carisoprodol, Ur Not Detected    Final 06/11/2019 10:00 AM [facility]   (NOTE)   The carisoprodol immunoassay has cross-reactivity to carisoprodol   and Physical Therapy to improve spinal andjoint strength. We will continue to have discussions to decrease pain medications as tolerated. Counseled patient on effects their pain medication and /or their medical condition mayhave on their  ability to drive or operate machinery. Instructed not to drive or operate machinery if drowsy     I also discussed with the patient regarding the dangers of combining narcotic pain medication with tranquilizers, alcohol or illegal drugs or taking the medication any way other than prescribed. The dangers were discussed  including respiratory depression and death. Patient was told to tell  all  physicians regarding the medications he is getting from pain clinic. Patient is warned not to take any unprescribed medications over-the-countermedications that can depress breathing . Patient is advised to talk to the pharmacist or physicians if planning to take any over-the-counter medications before  takeing them. Patient is strongly advised to avoid tranquilizers or  relaxants, illegal drugs  or any medications that can depress breathing  Patient is also advised to tell us if there is any changes in their medications from other physicians.     1. Will give referral to see psychologist for depression    TREATMENT OPTIONS:   Referral behavioral  Return in 4 weeks  Medication Agreement Requirements Met  Continue Opioid therapy  Script written for  hydrocodone  Follow up appointment made

## 2019-08-14 NOTE — DISCHARGE INSTR - COC
KD:53662}  Urinary Catheter: {Urinary Catheter:622032682}   Colostomy/Ileostomy/Ileal Conduit: {YES / UO:54680}       Date of Last BM: ***  No intake or output data in the 24 hours ending 19 1334  No intake/output data recorded.     Safety Concerns:     [address] Safety Concerns:[id number]}    Impairments/Disabilities:      [address] Schmitz [facility] Impairments/Disabilities:015527172}    Nutrition Therapy:  Current Nutrition Therapy:   [address] Sch[address]z [facility] Diet List:271803624}    Routes of Feeding: {CHP DME Other Feedings:506423713}  Liquids: {Slp liquid thickness:84205}  Daily Fluid Restriction: {CHP DME Yes amt example:536458917}  Last Modified Barium Swallow with Video (Video Swallowing Test): {Done Not Done TJZZ:013840197}    Treatments at the Time of Hospital Discharge:   Respiratory Treatments: ***  Oxygen Therapy:  {Therapy; copd oxygen:27555}  Ventilator:    { CC Vent ERKT:567277842}    Rehab Therapies: {THERAPEUTIC INTERVENTION:2434824963}  Weight Bearing Status/Restrictions: [address] Schmitz  Weight Bearin}  Other Medical Equipment (for information only, NOT a DME order):  {EQUIPMENT:825889319}  Other Treatments: ***    Patient's personal belongings (please select all that are sent with patient):  {P DME Belongings:131726329}    RN SIGNATURE:  {Esignature:653426256}    CASE MANAGEMENT/SOCIAL WORK SECTION    Inpatient Status Date: ***    Readmission Risk Assessment Score:  Readmission Risk              Risk of Unplanned Readmission:        0           Discharging to Facility/ Agency   · Name:   · Address:  · Phone:  · Fax:    Dialysis Facility (if applicable)   · Name:  · Address:  · Dialysis Schedule:  · Phone:  · Fax:    / signature: {Esignature:202594438}    PHYSICIAN SECTION    Prognosis: {Prognosis:0453062050}    Condition at Discharge: [address] Schmitz Patient Condition:890289002}    Rehab Potential (if transferring to Rehab): {Prognosis:3522430777}    Recommended Labs or Other Treatments After Discharge:

## 2019-08-22 DIAGNOSIS — Z12.31 SCREENING MAMMOGRAM, ENCOUNTER FOR: Primary | ICD-10-CM

## 2019-08-28 DIAGNOSIS — N64.4 BREAST PAIN, LEFT: Primary | ICD-10-CM

## 2019-08-28 DIAGNOSIS — Z12.31 SCREENING MAMMOGRAM, ENCOUNTER FOR: ICD-10-CM

## 2019-09-06 ENCOUNTER — HOSPITAL ENCOUNTER (OUTPATIENT)
Dept: WOMENS IMAGING | Age: 49
Discharge: HOME | End: 2019-09-08
Payer: COMMERCIAL

## 2019-09-06 DIAGNOSIS — N64.4 BREAST PAIN, LEFT: ICD-10-CM

## 2019-09-06 PROCEDURE — G0279 TOMOSYNTHESIS, MAMMO: HCPCS

## 2019-09-06 PROCEDURE — 76642 ULTRASOUND BREAST LIMITED: CPT

## 2019-09-09 ENCOUNTER — HOSPITAL ENCOUNTER (OUTPATIENT)
Dept: PAIN MANAGEMENT | Age: 49
Discharge: HOME | End: 2019-09-09
Payer: COMMERCIAL

## 2019-09-09 VITALS
RESPIRATION RATE: 16 BRPM | SYSTOLIC BLOOD PRESSURE: 135 MMHG | HEART RATE: 98 BPM | TEMPERATURE: 98.1 F | OXYGEN SATURATION: 97 % | DIASTOLIC BLOOD PRESSURE: 77 MMHG

## 2019-09-09 DIAGNOSIS — M47.816 ARTHROPATHY OF LUMBAR FACET JOINT: ICD-10-CM

## 2019-09-09 DIAGNOSIS — F11.90 CHRONIC, CONTINUOUS USE OF OPIOIDS: ICD-10-CM

## 2019-09-09 DIAGNOSIS — M46.1 SACROILIITIS (HCC): ICD-10-CM

## 2019-09-09 DIAGNOSIS — Z79.891 CHRONIC USE OF OPIATE DRUG FOR THERAPEUTIC PURPOSE: ICD-10-CM

## 2019-09-09 DIAGNOSIS — M47.896 OTHER OSTEOARTHRITIS OF SPINE, LUMBAR REGION: ICD-10-CM

## 2019-09-09 DIAGNOSIS — M50.20 CERVICAL DISC HERNIATION: ICD-10-CM

## 2019-09-09 DIAGNOSIS — G89.29 CHRONIC BILATERAL LOW BACK PAIN WITHOUT SCIATICA: ICD-10-CM

## 2019-09-09 DIAGNOSIS — Z79.899 ENCOUNTER FOR MEDICATION MANAGEMENT: ICD-10-CM

## 2019-09-09 DIAGNOSIS — G89.29 CHRONIC LEFT-SIDED LOW BACK PAIN WITH LEFT-SIDED SCIATICA: ICD-10-CM

## 2019-09-09 DIAGNOSIS — M47.26 OSTEOARTHRITIS OF SPINE WITH RADICULOPATHY, LUMBAR REGION: ICD-10-CM

## 2019-09-09 DIAGNOSIS — M50.30 DEGENERATIVE DISC DISEASE, CERVICAL: ICD-10-CM

## 2019-09-09 DIAGNOSIS — M54.50 CHRONIC BILATERAL LOW BACK PAIN WITHOUT SCIATICA: ICD-10-CM

## 2019-09-09 DIAGNOSIS — M47.817 LUMBOSACRAL SPONDYLOSIS WITHOUT MYELOPATHY: ICD-10-CM

## 2019-09-09 DIAGNOSIS — M51.36 DEGENERATION OF LUMBAR INTERVERTEBRAL DISC: Chronic | ICD-10-CM

## 2019-09-09 DIAGNOSIS — M47.816 OSTEOARTHRITIS OF LUMBAR SPINE, UNSPECIFIED SPINAL OSTEOARTHRITIS COMPLICATION STATUS: ICD-10-CM

## 2019-09-09 DIAGNOSIS — M54.42 CHRONIC LEFT-SIDED LOW BACK PAIN WITH LEFT-SIDED SCIATICA: ICD-10-CM

## 2019-09-09 DIAGNOSIS — M47.816 LUMBAR SPONDYLOSIS: Chronic | ICD-10-CM

## 2019-09-09 DIAGNOSIS — M54.16 LUMBAR RADICULOPATHY, CHRONIC: Chronic | ICD-10-CM

## 2019-09-09 DIAGNOSIS — M16.12 PRIMARY OSTEOARTHRITIS OF LEFT HIP: Primary | ICD-10-CM

## 2019-09-09 PROCEDURE — 99213 OFFICE O/P EST LOW 20 MIN: CPT

## 2019-09-09 PROCEDURE — 99214 OFFICE O/P EST MOD 30 MIN: CPT | Performed by: NURSE PRACTITIONER

## 2019-09-09 RX ORDER — TIZANIDINE 4 MG/1
4 TABLET ORAL EVERY 8 HOURS PRN
Qty: 270 TABLET | Refills: 0 | Status: SHIPPED | OUTPATIENT
Start: 2019-09-09 | End: 2019-12-12 | Stop reason: SDUPTHER

## 2019-09-09 RX ORDER — PREGABALIN 75 MG/1
75 CAPSULE ORAL 3 TIMES DAILY
Qty: 90 CAPSULE | Refills: 3 | Status: SHIPPED | OUTPATIENT
Start: 2019-09-09 | End: 2019-10-01 | Stop reason: SDUPTHER

## 2019-09-09 RX ORDER — HYDROCODONE BITARTRATE AND ACETAMINOPHEN 5; 325 MG/1; MG/1
1 TABLET ORAL 3 TIMES DAILY PRN
Qty: 90 TABLET | Refills: 0 | Status: SHIPPED | OUTPATIENT
Start: 2019-09-23 | End: 2019-10-22 | Stop reason: SDUPTHER

## 2019-09-09 ASSESSMENT — ENCOUNTER SYMPTOMS
BACK PAIN: 1
BOWEL INCONTINENCE: 0

## 2019-09-10 ASSESSMENT — ENCOUNTER SYMPTOMS
SHORTNESS OF BREATH: 0
CONSTIPATION: 0
COUGH: 0

## 2019-09-13 ENCOUNTER — HOSPITAL ENCOUNTER (OUTPATIENT)
Dept: MRI IMAGING | Age: 49
Discharge: HOME | End: 2019-09-15
Payer: COMMERCIAL

## 2019-09-13 DIAGNOSIS — M54.50 CHRONIC BILATERAL LOW BACK PAIN WITHOUT SCIATICA: ICD-10-CM

## 2019-09-13 DIAGNOSIS — G89.29 CHRONIC BILATERAL LOW BACK PAIN WITHOUT SCIATICA: ICD-10-CM

## 2019-09-13 PROCEDURE — 72148 MRI LUMBAR SPINE W/O DYE: CPT

## 2019-10-01 DIAGNOSIS — Z79.899 ENCOUNTER FOR MEDICATION MANAGEMENT: ICD-10-CM

## 2019-10-01 DIAGNOSIS — M47.896 OTHER OSTEOARTHRITIS OF SPINE, LUMBAR REGION: ICD-10-CM

## 2019-10-01 DIAGNOSIS — M47.816 OSTEOARTHRITIS OF LUMBAR SPINE, UNSPECIFIED SPINAL OSTEOARTHRITIS COMPLICATION STATUS: ICD-10-CM

## 2019-10-01 DIAGNOSIS — M50.30 DEGENERATIVE DISC DISEASE, CERVICAL: ICD-10-CM

## 2019-10-01 DIAGNOSIS — M51.36 DEGENERATION OF LUMBAR INTERVERTEBRAL DISC: Chronic | ICD-10-CM

## 2019-10-01 DIAGNOSIS — M54.42 CHRONIC LEFT-SIDED LOW BACK PAIN WITH LEFT-SIDED SCIATICA: ICD-10-CM

## 2019-10-01 DIAGNOSIS — M50.20 CERVICAL DISC HERNIATION: ICD-10-CM

## 2019-10-01 DIAGNOSIS — M47.26 OSTEOARTHRITIS OF SPINE WITH RADICULOPATHY, LUMBAR REGION: ICD-10-CM

## 2019-10-01 DIAGNOSIS — M16.12 PRIMARY OSTEOARTHRITIS OF LEFT HIP: ICD-10-CM

## 2019-10-01 DIAGNOSIS — G89.29 CHRONIC LEFT-SIDED LOW BACK PAIN WITH LEFT-SIDED SCIATICA: ICD-10-CM

## 2019-10-01 RX ORDER — PREGABALIN 75 MG/1
75 CAPSULE ORAL 3 TIMES DAILY
Qty: 270 CAPSULE | Refills: 3 | Status: SHIPPED | OUTPATIENT
Start: 2019-10-01 | End: 2020-03-20 | Stop reason: SDUPTHER

## 2019-10-03 ENCOUNTER — OFFICE VISIT (OUTPATIENT)
Dept: FAMILY MEDICINE CLINIC | Age: 49
End: 2019-10-03
Payer: COMMERCIAL

## 2019-10-03 VITALS
HEART RATE: 62 BPM | DIASTOLIC BLOOD PRESSURE: 70 MMHG | OXYGEN SATURATION: 97 % | SYSTOLIC BLOOD PRESSURE: 122 MMHG | WEIGHT: 220.8 LBS | BODY MASS INDEX: 43.12 KG/M2 | TEMPERATURE: 97.7 F

## 2019-10-03 DIAGNOSIS — Z23 NEED FOR INFLUENZA VACCINATION: ICD-10-CM

## 2019-10-03 DIAGNOSIS — E66.01 MORBID OBESITY WITH BMI OF 40.0-44.9, ADULT (HCC): ICD-10-CM

## 2019-10-03 DIAGNOSIS — I10 ESSENTIAL HYPERTENSION: ICD-10-CM

## 2019-10-03 DIAGNOSIS — R10.30 LOWER ABDOMINAL PAIN: ICD-10-CM

## 2019-10-03 DIAGNOSIS — E11.9 TYPE 2 DIABETES MELLITUS WITHOUT COMPLICATION, WITHOUT LONG-TERM CURRENT USE OF INSULIN (HCC): Primary | ICD-10-CM

## 2019-10-03 LAB — HBA1C MFR BLD: 6.3 %

## 2019-10-03 PROCEDURE — 90471 IMMUNIZATION ADMIN: CPT | Performed by: FAMILY MEDICINE

## 2019-10-03 PROCEDURE — 99214 OFFICE O/P EST MOD 30 MIN: CPT | Performed by: FAMILY MEDICINE

## 2019-10-03 PROCEDURE — 90686 IIV4 VACC NO PRSV 0.5 ML IM: CPT | Performed by: FAMILY MEDICINE

## 2019-10-03 PROCEDURE — 83036 HEMOGLOBIN GLYCOSYLATED A1C: CPT | Performed by: FAMILY MEDICINE

## 2019-10-03 ASSESSMENT — ENCOUNTER SYMPTOMS
SORE THROAT: 0
COUGH: 0
BACK PAIN: 1
CONSTIPATION: 0
WHEEZING: 0
ABDOMINAL PAIN: 1
SHORTNESS OF BREATH: 0
NAUSEA: 0
EYE ITCHING: 0

## 2019-10-04 ENCOUNTER — TELEPHONE (OUTPATIENT)
Dept: GASTROENTEROLOGY | Age: 49
End: 2019-10-04

## 2019-10-06 LAB
ALBUMIN SERPL-MCNC: 4.1 G/DL (ref 3.2–5.3)
ALK PHOSPHATASE: 76 U/L (ref 39–130)
ALT SERPL-CCNC: 13 U/L (ref 0–31)
ANION GAP SERPL CALCULATED.3IONS-SCNC: 7 MMOL/L (ref 4–12)
AST SERPL-CCNC: 10 U/L (ref 0–41)
BILIRUB SERPL-MCNC: 0.3 MG/DL (ref 0.3–1.2)
BUN BLDV-MCNC: 15 MG/DL (ref 5–23)
CALCIUM SERPL-MCNC: 9.4 MG/DL (ref 8.5–10.5)
CHLORIDE BLD-SCNC: 111 MMOL/L (ref 98–109)
CHOLESTEROL/HDL RATIO: 3.9 (ref 1–5)
CHOLESTEROL: 151 MG/DL (ref 150–200)
CO2: 25 MMOL/L (ref 22–32)
CREAT SERPL-MCNC: 0.85 MG/DL (ref 0.4–1)
EGFR AFRICAN AMERICAN: >60 ML/MIN/1.73SQ.M
EGFR IF NONAFRICAN AMERICAN: >60 ML/MIN/1.73SQ.M
GLUCOSE: 121 MG/DL (ref 65–99)
HDLC SERPL-MCNC: 39 MG/DL
LDL CHOLESTEROL CALCULATED: 71 MG/DL
LDL/HDL RATIO: 1.8
POTASSIUM SERPL-SCNC: 4.7 MMOL/L (ref 3.5–5)
SODIUM BLD-SCNC: 143 MMOL/L (ref 134–146)
TOTAL PROTEIN: 6.6 G/DL (ref 6–8)
TRIGL SERPL-MCNC: 205 MG/DL (ref 27–150)
VLDLC SERPL CALC-MCNC: 41 MG/DL (ref 0–30)

## 2019-10-16 ENCOUNTER — OFFICE VISIT (OUTPATIENT)
Dept: GASTROENTEROLOGY | Age: 49
End: 2019-10-16
Payer: COMMERCIAL

## 2019-10-16 VITALS
BODY MASS INDEX: 42.99 KG/M2 | SYSTOLIC BLOOD PRESSURE: 138 MMHG | DIASTOLIC BLOOD PRESSURE: 84 MMHG | WEIGHT: 220.1 LBS | HEART RATE: 72 BPM

## 2019-10-16 DIAGNOSIS — R10.9 ABDOMINAL PAIN, UNSPECIFIED ABDOMINAL LOCATION: Primary | ICD-10-CM

## 2019-10-16 DIAGNOSIS — K21.9 GASTROESOPHAGEAL REFLUX DISEASE WITHOUT ESOPHAGITIS: ICD-10-CM

## 2019-10-16 DIAGNOSIS — R19.7 DIARRHEA, UNSPECIFIED TYPE: ICD-10-CM

## 2019-10-16 DIAGNOSIS — R63.4 WEIGHT LOSS: ICD-10-CM

## 2019-10-16 PROCEDURE — 99244 OFF/OP CNSLTJ NEW/EST MOD 40: CPT | Performed by: INTERNAL MEDICINE

## 2019-10-16 ASSESSMENT — ENCOUNTER SYMPTOMS
SINUS PRESSURE: 1
BACK PAIN: 1
TROUBLE SWALLOWING: 0
CONSTIPATION: 0
VOICE CHANGE: 0
RECTAL PAIN: 1
DIARRHEA: 1
VOMITING: 0
WHEEZING: 0
SORE THROAT: 0
BLOOD IN STOOL: 0
ALLERGIC/IMMUNOLOGIC NEGATIVE: 1
ANAL BLEEDING: 1
ABDOMINAL DISTENTION: 1
NAUSEA: 1
CHOKING: 1
COUGH: 0
ABDOMINAL PAIN: 1

## 2019-10-17 LAB
ABSOLUTE BASO #: 0.1 X10E9/L (ref 0–0.9)
ABSOLUTE EOS #: 0.1 X10E9/L (ref 0–0.4)
ABSOLUTE LYMPH #: 3.6 X10E9/L (ref 1–3.5)
ABSOLUTE MONO #: 0.5 X10E9/L (ref 0–0.9)
ABSOLUTE NEUT #: 3.6 X10E9/L (ref 1.5–6.6)
BASOPHILS ABSOLUTE: NORMAL /ΜL
BASOPHILS RELATIVE PERCENT: 0.7 %
BASOPHILS RELATIVE PERCENT: NORMAL %
EOSINOPHILS ABSOLUTE: NORMAL /ΜL
EOSINOPHILS RELATIVE PERCENT: 0.9 %
EOSINOPHILS RELATIVE PERCENT: NORMAL %
HCT VFR BLD CALC: 41 % (ref 35–47)
HCT VFR BLD CALC: NORMAL % (ref 36–46)
HEMOGLOBIN: 14.1 G/DL (ref 11.7–16)
HEMOGLOBIN: NORMAL G/DL (ref 12–16)
LYMPHOCYTE %: 46.3 %
LYMPHOCYTES ABSOLUTE: NORMAL /ΜL
LYMPHOCYTES RELATIVE PERCENT: NORMAL %
MCH RBC QN AUTO: 29.7 PG (ref 26–33.5)
MCH RBC QN AUTO: NORMAL PG
MCHC RBC AUTO-ENTMCNC: 34.3 G/DL (ref 32–36)
MCHC RBC AUTO-ENTMCNC: NORMAL G/DL
MCV RBC AUTO: 87 FL (ref 81–100)
MCV RBC AUTO: NORMAL FL
MONOCYTES # BLD: 5.9 %
MONOCYTES ABSOLUTE: NORMAL /ΜL
MONOCYTES RELATIVE PERCENT: NORMAL %
NEUTROPHILS ABSOLUTE: NORMAL /ΜL
NEUTROPHILS RELATIVE PERCENT: 46.2 %
NEUTROPHILS RELATIVE PERCENT: NORMAL %
PDW BLD-RTO: 12.7 % (ref 11.5–14.7)
PDW BLD-RTO: NORMAL %
PLATELET # BLD: NORMAL K/ΜL
PLATELETS: 277 X10E9/L (ref 150–450)
PMV BLD AUTO: 8.6 FL (ref 7–12)
PMV BLD AUTO: NORMAL FL
RBC # BLD: NORMAL 10^6/ΜL
RBC: 4.74 X10E12/L (ref 3.8–5.2)
SEDIMENTATION RATE, ERYTHROCYTE: 27 MM/H (ref 0–20)
SEDIMENTATION RATE, ERYTHROCYTE: NORMAL
TISSUE TRANSGLUTAMINASE IGA: NORMAL
TSH SERPL DL<=0.05 MIU/L-ACNC: 0.48 UIU/ML (ref 0.49–4.67)
TSH SERPL DL<=0.05 MIU/L-ACNC: NORMAL UIU/ML
WBC # BLD: NORMAL 10^3/ML
WBC: 7.7 X10E9/L (ref 4.8–10.8)

## 2019-10-21 LAB — TISSUE TRANSGLUTAMINASE IGA: <1.2 U/ML

## 2019-10-22 ENCOUNTER — ANESTHESIA (OUTPATIENT)
Dept: ENDOSCOPY | Age: 49
End: 2019-10-22
Payer: COMMERCIAL

## 2019-10-22 ENCOUNTER — HOSPITAL ENCOUNTER (OUTPATIENT)
Age: 49
Setting detail: OUTPATIENT SURGERY
Discharge: HOME | End: 2019-10-22
Attending: INTERNAL MEDICINE | Admitting: INTERNAL MEDICINE
Payer: COMMERCIAL

## 2019-10-22 ENCOUNTER — ANESTHESIA EVENT (OUTPATIENT)
Dept: ENDOSCOPY | Age: 49
End: 2019-10-22
Payer: COMMERCIAL

## 2019-10-22 ENCOUNTER — HOSPITAL ENCOUNTER (OUTPATIENT)
Dept: PAIN MANAGEMENT | Age: 49
Discharge: HOME | End: 2019-10-22
Payer: COMMERCIAL

## 2019-10-22 VITALS
TEMPERATURE: 97.9 F | HEART RATE: 62 BPM | SYSTOLIC BLOOD PRESSURE: 105 MMHG | RESPIRATION RATE: 16 BRPM | HEIGHT: 60 IN | BODY MASS INDEX: 43.19 KG/M2 | WEIGHT: 220 LBS | OXYGEN SATURATION: 100 % | DIASTOLIC BLOOD PRESSURE: 86 MMHG

## 2019-10-22 VITALS
HEIGHT: 60 IN | WEIGHT: 220 LBS | DIASTOLIC BLOOD PRESSURE: 90 MMHG | RESPIRATION RATE: 16 BRPM | HEART RATE: 80 BPM | SYSTOLIC BLOOD PRESSURE: 124 MMHG | BODY MASS INDEX: 43.19 KG/M2

## 2019-10-22 VITALS — SYSTOLIC BLOOD PRESSURE: 112 MMHG | DIASTOLIC BLOOD PRESSURE: 72 MMHG | OXYGEN SATURATION: 100 %

## 2019-10-22 DIAGNOSIS — M47.816 ARTHROPATHY OF LUMBAR FACET JOINT: ICD-10-CM

## 2019-10-22 DIAGNOSIS — M47.26 OSTEOARTHRITIS OF SPINE WITH RADICULOPATHY, LUMBAR REGION: ICD-10-CM

## 2019-10-22 DIAGNOSIS — M54.16 LUMBAR RADICULOPATHY, CHRONIC: Primary | Chronic | ICD-10-CM

## 2019-10-22 DIAGNOSIS — M47.816 LUMBAR SPONDYLOSIS: Chronic | ICD-10-CM

## 2019-10-22 DIAGNOSIS — M47.896 OTHER OSTEOARTHRITIS OF SPINE, LUMBAR REGION: ICD-10-CM

## 2019-10-22 DIAGNOSIS — M54.50 CHRONIC BILATERAL LOW BACK PAIN WITHOUT SCIATICA: ICD-10-CM

## 2019-10-22 DIAGNOSIS — G89.29 CHRONIC BILATERAL LOW BACK PAIN WITHOUT SCIATICA: ICD-10-CM

## 2019-10-22 DIAGNOSIS — M51.36 DEGENERATION OF LUMBAR INTERVERTEBRAL DISC: Chronic | ICD-10-CM

## 2019-10-22 LAB — GLUCOSE BLD-MCNC: 99 MG/DL (ref 65–105)

## 2019-10-22 PROCEDURE — 99213 OFFICE O/P EST LOW 20 MIN: CPT

## 2019-10-22 PROCEDURE — 7100000000 HC PACU RECOVERY - FIRST 15 MIN: Performed by: INTERNAL MEDICINE

## 2019-10-22 PROCEDURE — 3609010600 HC COLONOSCOPY POLYPECTOMY SNARE/COLD BIOPSY: Performed by: INTERNAL MEDICINE

## 2019-10-22 PROCEDURE — 82947 ASSAY GLUCOSE BLOOD QUANT: CPT

## 2019-10-22 PROCEDURE — 2500000003 HC RX 250 WO HCPCS: Performed by: NURSE ANESTHETIST, CERTIFIED REGISTERED

## 2019-10-22 PROCEDURE — 88305 TISSUE EXAM BY PATHOLOGIST: CPT

## 2019-10-22 PROCEDURE — 3700000001 HC ADD 15 MINUTES (ANESTHESIA): Performed by: INTERNAL MEDICINE

## 2019-10-22 PROCEDURE — 6360000002 HC RX W HCPCS: Performed by: NURSE ANESTHETIST, CERTIFIED REGISTERED

## 2019-10-22 PROCEDURE — 3700000000 HC ANESTHESIA ATTENDED CARE: Performed by: INTERNAL MEDICINE

## 2019-10-22 PROCEDURE — 45380 COLONOSCOPY AND BIOPSY: CPT | Performed by: INTERNAL MEDICINE

## 2019-10-22 PROCEDURE — 3609012400 HC EGD TRANSORAL BIOPSY SINGLE/MULTIPLE: Performed by: INTERNAL MEDICINE

## 2019-10-22 PROCEDURE — 45385 COLONOSCOPY W/LESION REMOVAL: CPT | Performed by: INTERNAL MEDICINE

## 2019-10-22 PROCEDURE — 7100000001 HC PACU RECOVERY - ADDTL 15 MIN: Performed by: INTERNAL MEDICINE

## 2019-10-22 PROCEDURE — 43239 EGD BIOPSY SINGLE/MULTIPLE: CPT | Performed by: INTERNAL MEDICINE

## 2019-10-22 PROCEDURE — 2709999900 HC NON-CHARGEABLE SUPPLY: Performed by: INTERNAL MEDICINE

## 2019-10-22 PROCEDURE — 99214 OFFICE O/P EST MOD 30 MIN: CPT | Performed by: NURSE PRACTITIONER

## 2019-10-22 PROCEDURE — 2580000003 HC RX 258: Performed by: ANESTHESIOLOGY

## 2019-10-22 RX ORDER — HYDROCODONE BITARTRATE AND ACETAMINOPHEN 5; 325 MG/1; MG/1
1 TABLET ORAL 3 TIMES DAILY PRN
Qty: 90 TABLET | Refills: 0 | Status: SHIPPED | OUTPATIENT
Start: 2019-10-25 | End: 2019-11-08 | Stop reason: SDUPTHER

## 2019-10-22 RX ORDER — SODIUM CHLORIDE 9 MG/ML
INJECTION, SOLUTION INTRAVENOUS CONTINUOUS
Status: DISCONTINUED | OUTPATIENT
Start: 2019-10-22 | End: 2019-10-22 | Stop reason: HOSPADM

## 2019-10-22 RX ORDER — TOPIRAMATE 100 MG/1
TABLET, FILM COATED ORAL
Qty: 270 TABLET | Refills: 0 | Status: SHIPPED | OUTPATIENT
Start: 2019-10-22 | End: 2020-02-13

## 2019-10-22 RX ORDER — PROPOFOL 10 MG/ML
INJECTION, EMULSION INTRAVENOUS PRN
Status: DISCONTINUED | OUTPATIENT
Start: 2019-10-22 | End: 2019-10-22 | Stop reason: SDUPTHER

## 2019-10-22 RX ORDER — LIDOCAINE HYDROCHLORIDE 10 MG/ML
INJECTION, SOLUTION EPIDURAL; INFILTRATION; INTRACAUDAL; PERINEURAL PRN
Status: DISCONTINUED | OUTPATIENT
Start: 2019-10-22 | End: 2019-10-22 | Stop reason: SDUPTHER

## 2019-10-22 RX ORDER — PROPOFOL 10 MG/ML
INJECTION, EMULSION INTRAVENOUS CONTINUOUS PRN
Status: DISCONTINUED | OUTPATIENT
Start: 2019-10-22 | End: 2019-10-22 | Stop reason: SDUPTHER

## 2019-10-22 RX ADMIN — PROPOFOL 125 MCG/KG/MIN: 10 INJECTION, EMULSION INTRAVENOUS at 09:47

## 2019-10-22 RX ADMIN — PROPOFOL 30 MG: 10 INJECTION, EMULSION INTRAVENOUS at 10:11

## 2019-10-22 RX ADMIN — SODIUM CHLORIDE: 9 INJECTION, SOLUTION INTRAVENOUS at 09:38

## 2019-10-22 RX ADMIN — LIDOCAINE HYDROCHLORIDE 50 MG: 10 INJECTION, SOLUTION EPIDURAL; INFILTRATION; INTRACAUDAL at 09:47

## 2019-10-22 RX ADMIN — PROPOFOL 40 MG: 10 INJECTION, EMULSION INTRAVENOUS at 10:00

## 2019-10-22 RX ADMIN — PROPOFOL 50 MG: 10 INJECTION, EMULSION INTRAVENOUS at 09:49

## 2019-10-22 RX ADMIN — PROPOFOL 150 MG: 10 INJECTION, EMULSION INTRAVENOUS at 09:47

## 2019-10-22 ASSESSMENT — PULMONARY FUNCTION TESTS
PIF_VALUE: 0
PIF_VALUE: 1
PIF_VALUE: 0

## 2019-10-22 ASSESSMENT — ENCOUNTER SYMPTOMS
BACK PAIN: 1
STRIDOR: 0
COUGH: 0
CONSTIPATION: 0
SHORTNESS OF BREATH: 0

## 2019-10-22 ASSESSMENT — PAIN - FUNCTIONAL ASSESSMENT: PAIN_FUNCTIONAL_ASSESSMENT: 0-10

## 2019-10-22 ASSESSMENT — PAIN DESCRIPTION - DESCRIPTORS: DESCRIPTORS: ACHING

## 2019-10-22 ASSESSMENT — PAIN SCALES - GENERAL: PAINLEVEL_OUTOF10: 0

## 2019-10-23 LAB — SURGICAL PATHOLOGY REPORT: NORMAL

## 2019-10-30 DIAGNOSIS — R63.4 WEIGHT LOSS: ICD-10-CM

## 2019-10-30 DIAGNOSIS — R10.9 ABDOMINAL PAIN, UNSPECIFIED ABDOMINAL LOCATION: ICD-10-CM

## 2019-10-30 DIAGNOSIS — R19.7 DIARRHEA, UNSPECIFIED TYPE: ICD-10-CM

## 2019-11-07 ENCOUNTER — OFFICE VISIT (OUTPATIENT)
Dept: FAMILY MEDICINE CLINIC | Age: 49
End: 2019-11-07
Payer: COMMERCIAL

## 2019-11-07 VITALS
WEIGHT: 220.4 LBS | OXYGEN SATURATION: 97 % | TEMPERATURE: 97.9 F | BODY MASS INDEX: 43.27 KG/M2 | HEIGHT: 60 IN | SYSTOLIC BLOOD PRESSURE: 130 MMHG | DIASTOLIC BLOOD PRESSURE: 80 MMHG | HEART RATE: 78 BPM

## 2019-11-07 DIAGNOSIS — I10 ESSENTIAL HYPERTENSION: ICD-10-CM

## 2019-11-07 DIAGNOSIS — E11.9 TYPE 2 DIABETES MELLITUS WITHOUT COMPLICATION, WITHOUT LONG-TERM CURRENT USE OF INSULIN (HCC): ICD-10-CM

## 2019-11-07 DIAGNOSIS — J32.0 MAXILLARY SINUSITIS, UNSPECIFIED CHRONICITY: Primary | ICD-10-CM

## 2019-11-07 PROCEDURE — 99213 OFFICE O/P EST LOW 20 MIN: CPT | Performed by: FAMILY MEDICINE

## 2019-11-07 RX ORDER — AMOXICILLIN AND CLAVULANATE POTASSIUM 875; 125 MG/1; MG/1
1 TABLET, FILM COATED ORAL 2 TIMES DAILY
Qty: 20 TABLET | Refills: 0 | Status: SHIPPED | OUTPATIENT
Start: 2019-11-07 | End: 2020-01-02 | Stop reason: SDUPTHER

## 2019-11-07 RX ORDER — FLUTICASONE PROPIONATE 50 MCG
2 SPRAY, SUSPENSION (ML) NASAL DAILY
Qty: 1 BOTTLE | Refills: 1 | Status: SHIPPED | OUTPATIENT
Start: 2019-11-07 | End: 2020-10-15 | Stop reason: SDUPTHER

## 2019-11-07 ASSESSMENT — ENCOUNTER SYMPTOMS
NAUSEA: 0
SORE THROAT: 0
ABDOMINAL PAIN: 0
COUGH: 1
RHINORRHEA: 1
SINUS PRESSURE: 1
SHORTNESS OF BREATH: 0

## 2019-11-08 ENCOUNTER — HOSPITAL ENCOUNTER (OUTPATIENT)
Dept: PAIN MANAGEMENT | Age: 49
Discharge: HOME | End: 2019-11-08
Payer: COMMERCIAL

## 2019-11-08 ENCOUNTER — HOSPITAL ENCOUNTER (OUTPATIENT)
Dept: GENERAL RADIOLOGY | Age: 49
Discharge: HOME | End: 2019-11-10
Payer: COMMERCIAL

## 2019-11-08 VITALS
DIASTOLIC BLOOD PRESSURE: 65 MMHG | HEART RATE: 60 BPM | OXYGEN SATURATION: 95 % | RESPIRATION RATE: 14 BRPM | TEMPERATURE: 98.3 F | SYSTOLIC BLOOD PRESSURE: 131 MMHG | HEIGHT: 60 IN | BODY MASS INDEX: 43.19 KG/M2 | WEIGHT: 220 LBS

## 2019-11-08 DIAGNOSIS — M47.816 ARTHROPATHY OF LUMBAR FACET JOINT: ICD-10-CM

## 2019-11-08 DIAGNOSIS — M47.816 LUMBAR SPONDYLOSIS: Primary | ICD-10-CM

## 2019-11-08 DIAGNOSIS — M51.36 DEGENERATION OF LUMBAR INTERVERTEBRAL DISC: Chronic | ICD-10-CM

## 2019-11-08 DIAGNOSIS — M47.816 LUMBAR SPONDYLOSIS: ICD-10-CM

## 2019-11-08 DIAGNOSIS — M47.26 OSTEOARTHRITIS OF SPINE WITH RADICULOPATHY, LUMBAR REGION: ICD-10-CM

## 2019-11-08 PROCEDURE — 64494 INJ PARAVERT F JNT L/S 2 LEV: CPT

## 2019-11-08 PROCEDURE — 2500000003 HC RX 250 WO HCPCS: Performed by: PAIN MEDICINE

## 2019-11-08 PROCEDURE — 3209999900 FLUORO FOR SURGICAL PROCEDURES

## 2019-11-08 PROCEDURE — 64493 INJ PARAVERT F JNT L/S 1 LEV: CPT | Performed by: PAIN MEDICINE

## 2019-11-08 PROCEDURE — 6360000004 HC RX CONTRAST MEDICATION: Performed by: PAIN MEDICINE

## 2019-11-08 PROCEDURE — 64495 INJ PARAVERT F JNT L/S 3 LEV: CPT | Performed by: PAIN MEDICINE

## 2019-11-08 PROCEDURE — 64494 INJ PARAVERT F JNT L/S 2 LEV: CPT | Performed by: PAIN MEDICINE

## 2019-11-08 PROCEDURE — 64493 INJ PARAVERT F JNT L/S 1 LEV: CPT

## 2019-11-08 PROCEDURE — 6360000002 HC RX W HCPCS: Performed by: PAIN MEDICINE

## 2019-11-08 PROCEDURE — 64495 INJ PARAVERT F JNT L/S 3 LEV: CPT

## 2019-11-08 RX ORDER — TRIAMCINOLONE ACETONIDE 40 MG/ML
INJECTION, SUSPENSION INTRA-ARTICULAR; INTRAMUSCULAR
Status: COMPLETED | OUTPATIENT
Start: 2019-11-08 | End: 2019-11-08

## 2019-11-08 RX ORDER — FENTANYL CITRATE 50 UG/ML
INJECTION, SOLUTION INTRAMUSCULAR; INTRAVENOUS
Status: COMPLETED | OUTPATIENT
Start: 2019-11-08 | End: 2019-11-08

## 2019-11-08 RX ORDER — BUPIVACAINE HYDROCHLORIDE 5 MG/ML
INJECTION, SOLUTION EPIDURAL; INTRACAUDAL
Status: COMPLETED | OUTPATIENT
Start: 2019-11-08 | End: 2019-11-08

## 2019-11-08 RX ORDER — HYDROCODONE BITARTRATE AND ACETAMINOPHEN 5; 325 MG/1; MG/1
1 TABLET ORAL 3 TIMES DAILY PRN
Qty: 90 TABLET | Refills: 0 | Status: SHIPPED | OUTPATIENT
Start: 2019-11-24 | End: 2019-12-12 | Stop reason: SDUPTHER

## 2019-11-08 RX ORDER — MIDAZOLAM HYDROCHLORIDE 1 MG/ML
INJECTION INTRAMUSCULAR; INTRAVENOUS
Status: COMPLETED | OUTPATIENT
Start: 2019-11-08 | End: 2019-11-08

## 2019-11-08 RX ADMIN — Medication 50 MCG: at 10:24

## 2019-11-08 RX ADMIN — IOHEXOL 3 ML: 180 INJECTION INTRAVENOUS at 10:40

## 2019-11-08 RX ADMIN — TRIAMCINOLONE ACETONIDE 80 MG: 40 INJECTION, SUSPENSION INTRA-ARTICULAR; INTRAMUSCULAR at 10:40

## 2019-11-08 RX ADMIN — BUPIVACAINE HYDROCHLORIDE 18 ML: 5 INJECTION, SOLUTION EPIDURAL; INTRACAUDAL; PERINEURAL at 10:40

## 2019-11-08 RX ADMIN — MIDAZOLAM 2 MG: 1 INJECTION INTRAMUSCULAR; INTRAVENOUS at 10:22

## 2019-11-08 ASSESSMENT — PAIN - FUNCTIONAL ASSESSMENT: PAIN_FUNCTIONAL_ASSESSMENT: 0-10

## 2019-11-08 ASSESSMENT — PAIN DESCRIPTION - DESCRIPTORS: DESCRIPTORS: ACHING;CONSTANT;SHOOTING

## 2019-11-11 ENCOUNTER — TELEPHONE (OUTPATIENT)
Dept: PAIN MANAGEMENT | Age: 49
End: 2019-11-11

## 2019-11-18 ENCOUNTER — OFFICE VISIT (OUTPATIENT)
Dept: GASTROENTEROLOGY | Age: 49
End: 2019-11-18
Payer: COMMERCIAL

## 2019-11-18 VITALS
WEIGHT: 216.9 LBS | BODY MASS INDEX: 42.36 KG/M2 | DIASTOLIC BLOOD PRESSURE: 86 MMHG | HEART RATE: 96 BPM | SYSTOLIC BLOOD PRESSURE: 128 MMHG

## 2019-11-18 DIAGNOSIS — R63.4 WEIGHT LOSS: ICD-10-CM

## 2019-11-18 DIAGNOSIS — R10.9 ABDOMINAL PAIN, UNSPECIFIED ABDOMINAL LOCATION: ICD-10-CM

## 2019-11-18 DIAGNOSIS — K21.9 GASTROESOPHAGEAL REFLUX DISEASE WITHOUT ESOPHAGITIS: ICD-10-CM

## 2019-11-18 DIAGNOSIS — R19.7 DIARRHEA, UNSPECIFIED TYPE: Primary | ICD-10-CM

## 2019-11-18 PROCEDURE — 99214 OFFICE O/P EST MOD 30 MIN: CPT | Performed by: INTERNAL MEDICINE

## 2019-11-18 ASSESSMENT — ENCOUNTER SYMPTOMS
RESPIRATORY NEGATIVE: 1
VOICE CHANGE: 0
BACK PAIN: 1
COUGH: 0
NAUSEA: 1
BLOOD IN STOOL: 0
DIARRHEA: 1
SINUS PRESSURE: 1
ABDOMINAL PAIN: 1
CONSTIPATION: 0
ALLERGIC/IMMUNOLOGIC NEGATIVE: 1
VOMITING: 0
CHOKING: 0
WHEEZING: 0
ABDOMINAL DISTENTION: 1
RECTAL PAIN: 1
SORE THROAT: 0
TROUBLE SWALLOWING: 0
ANAL BLEEDING: 1

## 2019-11-22 ENCOUNTER — HOSPITAL ENCOUNTER (OUTPATIENT)
Dept: PAIN MANAGEMENT | Age: 49
Discharge: HOME | End: 2019-11-22

## 2019-11-26 ENCOUNTER — HOSPITAL ENCOUNTER (OUTPATIENT)
Age: 49
Setting detail: SPECIMEN
Discharge: HOME | End: 2019-11-26
Payer: COMMERCIAL

## 2019-11-26 DIAGNOSIS — R10.9 ABDOMINAL PAIN, UNSPECIFIED ABDOMINAL LOCATION: ICD-10-CM

## 2019-11-26 DIAGNOSIS — R19.7 DIARRHEA, UNSPECIFIED TYPE: ICD-10-CM

## 2019-11-26 PROCEDURE — 82710 FATS/LIPIDS FECES QUANT: CPT

## 2019-12-09 LAB
SEND OUT REPORT: NORMAL
TEST NAME: NORMAL

## 2019-12-12 ENCOUNTER — HOSPITAL ENCOUNTER (OUTPATIENT)
Dept: PAIN MANAGEMENT | Age: 49
Discharge: HOME | End: 2019-12-12
Payer: COMMERCIAL

## 2019-12-12 VITALS
DIASTOLIC BLOOD PRESSURE: 74 MMHG | TEMPERATURE: 98.4 F | BODY MASS INDEX: 42.18 KG/M2 | OXYGEN SATURATION: 96 % | SYSTOLIC BLOOD PRESSURE: 124 MMHG | HEART RATE: 67 BPM | WEIGHT: 216 LBS | RESPIRATION RATE: 16 BRPM

## 2019-12-12 DIAGNOSIS — M51.36 DEGENERATION OF LUMBAR INTERVERTEBRAL DISC: Chronic | ICD-10-CM

## 2019-12-12 DIAGNOSIS — M50.20 CERVICAL DISC HERNIATION: ICD-10-CM

## 2019-12-12 DIAGNOSIS — E11.9 TYPE 2 DIABETES MELLITUS WITHOUT COMPLICATION, WITHOUT LONG-TERM CURRENT USE OF INSULIN (HCC): ICD-10-CM

## 2019-12-12 DIAGNOSIS — M47.816 LUMBAR SPONDYLOSIS: Chronic | ICD-10-CM

## 2019-12-12 DIAGNOSIS — M54.42 CHRONIC LEFT-SIDED LOW BACK PAIN WITH LEFT-SIDED SCIATICA: ICD-10-CM

## 2019-12-12 DIAGNOSIS — M47.816 ARTHROPATHY OF LUMBAR FACET JOINT: ICD-10-CM

## 2019-12-12 DIAGNOSIS — M46.1 SACROILIITIS (HCC): ICD-10-CM

## 2019-12-12 DIAGNOSIS — M47.896 OTHER OSTEOARTHRITIS OF SPINE, LUMBAR REGION: ICD-10-CM

## 2019-12-12 DIAGNOSIS — M54.16 LUMBAR RADICULOPATHY, CHRONIC: Primary | Chronic | ICD-10-CM

## 2019-12-12 DIAGNOSIS — G89.29 CHRONIC LEFT-SIDED LOW BACK PAIN WITH LEFT-SIDED SCIATICA: ICD-10-CM

## 2019-12-12 DIAGNOSIS — M16.12 PRIMARY OSTEOARTHRITIS OF LEFT HIP: ICD-10-CM

## 2019-12-12 DIAGNOSIS — F11.90 CHRONIC, CONTINUOUS USE OF OPIOIDS: ICD-10-CM

## 2019-12-12 PROCEDURE — 99213 OFFICE O/P EST LOW 20 MIN: CPT | Performed by: NURSE PRACTITIONER

## 2019-12-12 PROCEDURE — 99213 OFFICE O/P EST LOW 20 MIN: CPT

## 2019-12-12 RX ORDER — HYDROCODONE BITARTRATE AND ACETAMINOPHEN 5; 325 MG/1; MG/1
1 TABLET ORAL 3 TIMES DAILY PRN
Qty: 90 TABLET | Refills: 0 | Status: SHIPPED | OUTPATIENT
Start: 2019-12-26 | End: 2019-12-12 | Stop reason: SDUPTHER

## 2019-12-12 RX ORDER — HYDROCODONE BITARTRATE AND ACETAMINOPHEN 5; 325 MG/1; MG/1
1 TABLET ORAL 3 TIMES DAILY PRN
Qty: 90 TABLET | Refills: 0 | Status: SHIPPED | OUTPATIENT
Start: 2019-12-26 | End: 2020-01-21 | Stop reason: SDUPTHER

## 2019-12-12 RX ORDER — TIZANIDINE 4 MG/1
4 TABLET ORAL EVERY 8 HOURS PRN
Qty: 270 TABLET | Refills: 0 | Status: SHIPPED | OUTPATIENT
Start: 2019-12-12 | End: 2020-02-24 | Stop reason: SDUPTHER

## 2019-12-12 ASSESSMENT — ENCOUNTER SYMPTOMS
RESPIRATORY NEGATIVE: 1
DIARRHEA: 1
BACK PAIN: 1
HEARTBURN: 1

## 2020-01-02 ENCOUNTER — OFFICE VISIT (OUTPATIENT)
Dept: FAMILY MEDICINE CLINIC | Age: 50
End: 2020-01-02
Payer: COMMERCIAL

## 2020-01-02 VITALS
WEIGHT: 219 LBS | DIASTOLIC BLOOD PRESSURE: 64 MMHG | BODY MASS INDEX: 43 KG/M2 | OXYGEN SATURATION: 95 % | HEART RATE: 68 BPM | HEIGHT: 60 IN | SYSTOLIC BLOOD PRESSURE: 107 MMHG

## 2020-01-02 LAB
INFLUENZA A ANTIBODY: NEGATIVE
INFLUENZA B ANTIBODY: NEGATIVE

## 2020-01-02 PROCEDURE — 99213 OFFICE O/P EST LOW 20 MIN: CPT | Performed by: FAMILY MEDICINE

## 2020-01-02 PROCEDURE — 87804 INFLUENZA ASSAY W/OPTIC: CPT | Performed by: FAMILY MEDICINE

## 2020-01-02 RX ORDER — PREDNISONE 20 MG/1
40 TABLET ORAL DAILY
Qty: 20 TABLET | Refills: 0 | Status: SHIPPED | OUTPATIENT
Start: 2020-01-02 | End: 2020-01-07

## 2020-01-02 RX ORDER — AMOXICILLIN AND CLAVULANATE POTASSIUM 875; 125 MG/1; MG/1
1 TABLET, FILM COATED ORAL 2 TIMES DAILY
Qty: 20 TABLET | Refills: 0 | Status: SHIPPED | OUTPATIENT
Start: 2020-01-02 | End: 2020-01-12

## 2020-01-02 RX ORDER — BENZONATATE 100 MG/1
100 CAPSULE ORAL 3 TIMES DAILY PRN
Qty: 90 CAPSULE | Refills: 0 | Status: SHIPPED | OUTPATIENT
Start: 2020-01-02 | End: 2020-01-21

## 2020-01-02 ASSESSMENT — ENCOUNTER SYMPTOMS
NAUSEA: 1
ABDOMINAL PAIN: 0
EYE PAIN: 0
VOMITING: 0
DIARRHEA: 0
COUGH: 1
COLOR CHANGE: 0
SORE THROAT: 1
SINUS PAIN: 1
CHEST TIGHTNESS: 1
WHEEZING: 0
CONSTIPATION: 0
RHINORRHEA: 1

## 2020-01-02 NOTE — PROGRESS NOTES
tablet TAKE 1 TABLET DAILY WITH BREAKFAST 90 tablet 1    tiZANidine (ZANAFLEX) 4 MG tablet Take 1 tablet by mouth every 8 hours as needed (spasms) 270 tablet 0    HYDROcodone-acetaminophen (NORCO) 5-325 MG per tablet Take 1 tablet by mouth 3 times daily as needed for Pain for up to 30 days. Indications: Pain 90 tablet 0    atorvastatin (LIPITOR) 20 MG tablet TAKE 1 TABLET DAILY 90 tablet 1    fluticasone (FLONASE) 50 MCG/ACT nasal spray 2 sprays by Each Nostril route daily 1 Bottle 1    topiramate (TOPAMAX) 100 MG tablet TAKE 1 TABLET IN THE MORNING AND 2 TABLETS IN THE EVENING 270 tablet 0    sertraline (ZOLOFT) 50 MG tablet TAKE 1 TABLET DAILY 90 tablet 1    omeprazole (PRILOSEC) 40 MG delayed release capsule TAKE 1 CAPSULE DAILY 90 capsule 1    lisinopril (PRINIVIL;ZESTRIL) 5 MG tablet TAKE 1 TABLET DAILY 90 tablet 1    ONETOUCH DELICA LANCETS 17A MISC USE 1 EACH TWICE A  each 1    blood glucose test strips (ASCENSIA AUTODISC VI;ONE TOUCH ULTRA TEST VI) strip 1 each by In Vitro route daily As needed. 200 each 1    b complex vitamins capsule Take 1 capsule by mouth daily      docusate sodium (COLACE) 100 MG capsule Take 1 capsule by mouth daily as needed for Constipation 30 capsule 2    Elastic Bandages & Supports (LUMBAR BACK BRACE/SUPPORT PAD) MISC 1 each by Does not apply route daily as needed (pain) 1 each 0    Melatonin ER 5 MG TBCR Take by mouth as needed      ECHINACEA PO Take by mouth      Probiotic Product (PROBIOTIC DAILY PO) Take 1 tablet by mouth daily.  pregabalin (LYRICA) 75 MG capsule Take 1 capsule by mouth 3 times daily for 90 days. 270 capsule 3    vitamin D (CHOLECALCIFEROL) 1000 UNIT TABS tablet Take 1,000 Units by mouth daily      Zinc 30 MG TABS Take by mouth daily       Ascorbic Acid (VITAMIN C) 500 MG CAPS Take by mouth      Multiple Vitamins-Calcium (ONE-A-DAY WOMENS FORMULA PO) Take  by mouth.        No current facility-administered medications for this visit. Allergies   Allergen Reactions    Adhesive Tape Other (See Comments)     blisters    Imitrex [Sumatriptan] Other (See Comments)     \"feels like head is going to explode    Morphine Itching     hives     Subjective:   Review of Systems   Constitutional: Positive for activity change and fatigue. Negative for chills and fever. HENT: Positive for congestion, postnasal drip, rhinorrhea, sinus pain and sore throat. Negative for ear pain. Eyes: Negative for pain. Respiratory: Positive for cough and chest tightness. Negative for wheezing. Cardiovascular: Negative for chest pain and palpitations. Gastrointestinal: Positive for nausea. Negative for abdominal pain, constipation, diarrhea and vomiting. Endocrine: Positive for cold intolerance. Genitourinary: Negative for difficulty urinating. Musculoskeletal: Negative for arthralgias. Skin: Negative for color change. Neurological: Negative for dizziness and headaches. Hematological: Positive for adenopathy. Psychiatric/Behavioral: Positive for sleep disturbance. Negative for dysphoric mood. The patient is nervous/anxious. Objective:   Physical Exam  Nursing note reviewed. Exam conducted with a chaperone present. Constitutional:       Appearance: She is well-developed. She is ill-appearing and toxic-appearing. HENT:      Right Ear: Tympanic membrane and ear canal normal. No tenderness. No middle ear effusion. Left Ear: No mastoid tenderness. Nose: Mucosal edema, congestion and rhinorrhea present. Right Sinus: Maxillary sinus tenderness and frontal sinus tenderness present. Left Sinus: Maxillary sinus tenderness and frontal sinus tenderness present. Mouth/Throat:      Mouth: Mucous membranes are pale. Pharynx: Uvula midline. Pharyngeal swelling and posterior oropharyngeal erythema present. No oropharyngeal exudate. Eyes:      General: Lids are normal. No scleral icterus.         Right eye: No discharge. Pupils: Pupils are equal, round, and reactive to light. Neck:      Trachea: No tracheal tenderness. Cardiovascular:      Rate and Rhythm: Normal rate and regular rhythm. Pulmonary:      Effort: Pulmonary effort is normal.      Breath sounds: No stridor. Examination of the right-lower field reveals rhonchi. Rhonchi present. Abdominal:      General: Bowel sounds are normal.      Palpations: Abdomen is soft. Musculoskeletal: Normal range of motion. Lymphadenopathy:      Head:      Right side of head: Submandibular adenopathy present. Left side of head: Submandibular adenopathy present. Cervical: No cervical adenopathy. Skin:     General: Skin is dry. Neurological:      Mental Status: She is alert and oriented to person, place, and time. Psychiatric:         Mood and Affect: Mood is anxious. Speech: Speech is rapid and pressured. Behavior: Behavior normal.       /64   Pulse 68   Ht 5' (1.524 m)   Wt 219 lb (99.3 kg)   SpO2 95%   BMI 42.77 kg/m²   Results for orders placed or performed in visit on 01/02/20   POCT Influenza A/B   Result Value Ref Range    Influenza A Ab negative     Influenza B Ab negative       Lab Review not applicable  Assessment:   1. Acute non-recurrent pansinusitis  Worsening  Start AUgmentin, Prednisone and Tessalon Perles  Rest.  Advised to increase fluid intake. Eat more fruits and vegetables. Take medications as discussed. Report for worsening symptoms. - amoxicillin-clavulanate (AUGMENTIN) 875-125 MG per tablet; Take 1 tablet by mouth 2 times daily for 10 days  Dispense: 20 tablet; Refill: 0  - benzonatate (TESSALON PERLES) 100 MG capsule; Take 1 capsule by mouth 3 times daily as needed for Cough (take 1-2 capsule)  Dispense: 90 capsule; Refill: 0  - predniSONE (DELTASONE) 20 MG tablet; Take 2 tablets by mouth daily for 5 days  Dispense: 20 tablet; Refill: 0    2.  URTI (acute upper respiratory infection)  Worsening  Start AUgmentin, Prednisone and Tessalon Perles  Rest.  Advised to increase fluid intake. Eat more fruits and vegetables. Take medications as discussed. Report for worsening symptoms. - amoxicillin-clavulanate (AUGMENTIN) 875-125 MG per tablet; Take 1 tablet by mouth 2 times daily for 10 days  Dispense: 20 tablet; Refill: 0  - benzonatate (TESSALON PERLES) 100 MG capsule; Take 1 capsule by mouth 3 times daily as needed for Cough (take 1-2 capsule)  Dispense: 90 capsule; Refill: 0  - predniSONE (DELTASONE) 20 MG tablet; Take 2 tablets by mouth daily for 5 days  Dispense: 20 tablet; Refill: 0    3. Flu-like symptoms  Exposure  But negative to influenza    - POCT Influenza A/B  Results for POC orders placed in visit on 01/02/20   POCT Influenza A/B   Result Value Ref Range    Influenza A Ab negative     Influenza B Ab negative         Plan:     Iqra received counseling on the following healthy behaviors: nutrition, exercise and medication adherence  Reviewed prior labs and health maintenance  Continue current medications, diet and exercise. Discussed use, benefit, and side effects of prescribed medications. Barriers to medication compliance addressed. Patient given educational materials - see patient instructions  Was a self-tracking handout given in paper form or via [employer]? Yes    Requested Prescriptions     Signed Prescriptions Disp Refills    amoxicillin-clavulanate (AUGMENTIN) 875-125 MG per tablet 20 tablet 0     Sig: Take 1 tablet by mouth 2 times daily for 10 days    benzonatate (TESSALON PERLES) 100 MG capsule 90 capsule 0     Sig: Take 1 capsule by mouth 3 times daily as needed for Cough (take 1-2 capsule)    predniSONE (DELTASONE) 20 MG tablet 20 tablet 0     Sig: Take 2 tablets by mouth daily for 5 days       All patient questions answered. Patient voiced understanding. Quality Measures    Body mass index is 42.77 kg/m². Elevated.  Weight control planned discussed

## 2020-01-02 NOTE — PATIENT INSTRUCTIONS
Patient Education        Sinusitis: Care Instructions  Your Care Instructions    Sinusitis is an infection of the lining of the sinus cavities in your head. Sinusitis often follows a cold. It causes pain and pressure in your head and face. In most cases, sinusitis gets better on its own in 1 to 2 weeks. But some mild symptoms may last for several weeks. Sometimes antibiotics are needed. Follow-up care is a key part of your treatment and safety. Be sure to make and go to all appointments, and call your doctor if you are having problems. It's also a good idea to know your test results and keep a list of the medicines you take. How can you care for yourself at home? · Take an over-the-counter pain medicine, such as acetaminophen (Tylenol), ibuprofen (Advil, Motrin), or naproxen (Aleve). Read and follow all instructions on the label. · If the doctor prescribed antibiotics, take them as directed. Do not stop taking them just because you feel better. You need to take the full course of antibiotics. · Be careful when taking over-the-counter cold or flu medicines and Tylenol at the same time. Many of these medicines have acetaminophen, which is Tylenol. Read the labels to make sure that you are not taking more than the recommended dose. Too much acetaminophen (Tylenol) can be harmful. · Breathe warm, moist air from a steamy shower, a hot bath, or a sink filled with hot water. Avoid cold, dry air. Using a humidifier in your home may help. Follow the directions for cleaning the machine. · Use saline (saltwater) nasal washes to help keep your nasal passages open and wash out mucus and bacteria. You can buy saline nose drops at a grocery store or drugstore. Or you can make your own at home by adding 1 teaspoon of salt and 1 teaspoon of baking soda to 2 cups of distilled water. If you make your own, fill a bulb syringe with the solution, insert the tip into your nostril, and squeeze gently. Brennan Charlesbert your nose.   · Put a hot, wet towel or a warm gel pack on your face 3 or 4 times a day for 5 to 10 minutes each time. · Try a decongestant nasal spray like oxymetazoline (Afrin). Do not use it for more than 3 days in a row. Using it for more than 3 days can make your congestion worse. When should you call for help? Call your doctor now or seek immediate medical care if:    · You have new or worse swelling or redness in your face or around your eyes.     · You have a new or higher fever.    Watch closely for changes in your health, and be sure to contact your doctor if:    · You have new or worse facial pain.     · The mucus from your nose becomes thicker (like pus) or has new blood in it.     · You are not getting better as expected. Where can you learn more? Go to https://[employer]pe[employer]iceweb.[employer]. org and sign in to your [facility] account. Enter E145 in the [employer] box to learn more about \"Sinusitis: Care Instructions. \"     If you do not have an account, please click on the \"Sign Up Now\" link. Current as of: October 21, 2018  Content Version: 12.1  © 6793-9036 [employer]. Care instructions adapted under license by [facility] [place] ([facility]). If you have questions about a medical condition or this instruction, always ask your healthcare professional. [address] any warranty or liability for your use of this information. Patient Education        Upper Respiratory Infection (Cold): Care Instructions  Your Care Instructions    An upper respiratory infection, or URI, is an infection of the nose, sinuses, or throat. URIs are spread by coughs, sneezes, and direct contact. The common cold is the most frequent kind of URI. The flu and sinus infections are other kinds of URIs. Almost all URIs are caused by viruses. Antibiotics won't cure them. But you can treat most infections with home care. This may include drinking lots of fluids and taking over-the-counter pain medicine.  You will probably

## 2020-01-02 NOTE — PROGRESS NOTES
Visit Information    Have you changed or started any medications since your last visit including any over-the-counter medicines, vitamins, or herbal medicines? no   Have you stopped taking any of your medications? Is so, why? -  no  Are you having any side effects from any of your medications? - no    Have you seen any other physician or provider since your last visit?  no   Have you had any other diagnostic tests since your last visit?  no   Have you been seen in the emergency room and/or had an admission in a hospital since we last saw you?  no   Have you had your routine dental cleaning in the past 6 months?  no     Do you have an active MyChart account? If no, what is the barrier?   Yes    Patient Care Team:  Melissa Sung MD as PCP - General (Family Medicine)  Melissa Sung MD as PCP - [facility]  Monica Rosado MD as Orthopedic Surgeon (Orthopedic Surgery)  Xiao Nunez MD as Consulting Physician (Gastroenterology)    Medical History Review  Past Medical, Family, and Social History reviewed and does contribute to the patient presenting condition    Health Maintenance   Topic Date Due    Diabetic retinal exam  08/08/1981    HIV screen  08/08/1986    Hepatitis B vaccine (1 of 3 - Risk 3-dose series) 08/08/1990    Cervical cancer screen  07/03/2017    Diabetic microalbuminuria test  12/18/2019    Diabetic foot exam  10/03/2020    A1C test (Diabetic or Prediabetic)  10/03/2020    Lipid screen  10/05/2020    Potassium monitoring  10/05/2020    Creatinine monitoring  10/05/2020    Colon cancer screen colonoscopy  10/22/2022    DTaP/Tdap/Td vaccine (2 - Td) 01/11/2028    Flu vaccine  Completed    Pneumococcal 0-64 years Vaccine  Completed

## 2020-01-21 ENCOUNTER — HOSPITAL ENCOUNTER (OUTPATIENT)
Dept: PAIN MANAGEMENT | Age: 50
Discharge: HOME | End: 2020-01-21
Payer: COMMERCIAL

## 2020-01-21 VITALS
TEMPERATURE: 98.2 F | SYSTOLIC BLOOD PRESSURE: 131 MMHG | OXYGEN SATURATION: 100 % | BODY MASS INDEX: 43 KG/M2 | DIASTOLIC BLOOD PRESSURE: 66 MMHG | RESPIRATION RATE: 16 BRPM | WEIGHT: 219 LBS | HEART RATE: 64 BPM | HEIGHT: 60 IN

## 2020-01-21 PROCEDURE — 99213 OFFICE O/P EST LOW 20 MIN: CPT

## 2020-01-21 PROCEDURE — 99213 OFFICE O/P EST LOW 20 MIN: CPT | Performed by: NURSE PRACTITIONER

## 2020-01-21 RX ORDER — HYDROCODONE BITARTRATE AND ACETAMINOPHEN 5; 325 MG/1; MG/1
1 TABLET ORAL 3 TIMES DAILY PRN
Qty: 90 TABLET | Refills: 0 | Status: SHIPPED | OUTPATIENT
Start: 2020-01-26 | End: 2020-02-24 | Stop reason: SDUPTHER

## 2020-01-21 ASSESSMENT — ENCOUNTER SYMPTOMS
BACK PAIN: 1
RESPIRATORY NEGATIVE: 1
GASTROINTESTINAL NEGATIVE: 1

## 2020-01-21 NOTE — PROGRESS NOTES
any aberrant prescription behavior. Medication is helping the patient stay active. Patient denies any side effects and reports adequate analgesia. No sign of misuse/abuse. As above, I did review the imaging       When was thelast UDS:     6-11-19        Was the UDS appropriate:yes        Record/Diagnostics Review:       As above, I did review the imaging     6/14/2019  8:07 PM - Raymon, [facility] Incoming Lab Results From [facility]      Component Value Ref Range & Units Status Collected Lab   Pain Management Drug Panel Interp, Urine Consistent    Final 06/11/2019 10:00 AM [facility]   (NOTE)   ________________________________________________________________   DRUGS EXPECTED:   HYDROCODONE [6/11/19]   ________________________________________________________________   CONSISTENT with medications provided:   HYDROCODONE : based on hydrocodone, norhydrocodone   ________________________________________________________________   INTERPRETIVE INFORMATION: Pain Mgt Ruiz, Mass Spec/EMIT, Ur,                            Interp   Interpretation depends on accuracy and completeness of patient   medication information submitted by client. 6-Acetylmorphine, Ur Not Detected    Final 06/11/2019 10:00 AM [facility]   7-Aminoclonazepam, Urine Not Detected    Final 06/11/2019 10:00 AM [facility]   Alpha-OH-Alpraz, Urine Not Detected    Final 06/11/2019 10:00 AM [facility]   Alprazolam, Urine Not Detected    Final 06/11/2019 10:00 AM [facility]   Amphetamines, urine Not Detected    Final 06/11/2019 10:00 AM [facility]   Barbiturates, Ur Not Detected    Final 06/11/2019 10:00 AM [facility]   Benzoylecgonine, Ur Not Detected    Final 06/11/2019 10:00 AM [facility]   Buprenorphine Urine Not Detected    Final 06/11/2019 10:00 AM [facility]   Carisoprodol, Ur Not Detected    Final 06/11/2019 10:00 AM [facility]   (NOTE)   The carisoprodol immunoassay has cross-reactivity to carisoprodol   and meprobamate.     Clonazepam, Urine Not Detected    Final 06/11/2019 10:00 AM [facility]   Codeine, Urine Not Detected    Final 06/11/2019 10:00 AM [facility]   MDA, Ur Not Detected    Final 06/11/2019 10:00 AM [facility]   Diazepam, Urine Not Detected    Final 06/11/2019 10:00 AM [facility]   Ethyl Glucuronide Ur Not Detected    Final 06/11/2019 10:00 AM [facility]   Fentanyl, Ur Not Detected    Final 06/11/2019 10:00 AM [facility]   Hydrocodone, Urine Present    Final 06/11/2019 10:00 AM [facility]   Hydromorphone, Urine Not Detected    Final 06/11/2019 10:00 AM [facility]   Lorazepam, Urine Not Detected    Final 06/11/2019 10:00 AM [facility]   Marijuana Metab, Ur Not Detected    Final 06/11/2019 10:00 AM [facility]   MDEA, EMA, Ur Not Detected    Final 06/11/2019 10:00 AM [facility]   MDMA, Urine Not Detected    Final 06/11/2019 10:00 AM [facility]   Meperidine Metab, Ur Not Detected    Final 06/11/2019 10:00 AM [facility]   Methadone, Urine Not Detected    Final 06/11/2019 10:00 AM [facility]   Methamphetamine, Urine Not Detected    Final 06/11/2019 10:00 AM [facility]   Methylphenidate Not Detected    Final 06/11/2019 10:00 AM [facility]   Midazolam, Urine Not Detected    Final 06/11/2019 10:00 AM [facility]   Morphine Urine Not Detected    Final 06/11/2019 10:00 AM [facility]   Norbuprenorphine, Urine Not Detected    Final 06/11/2019 10:00 AM [facility]   Nordiazepam, Urine Not Detected    Final 06/11/2019 10:00 AM [facility]   Norfentanyl, Urine Not Detected    Final 06/11/2019 10:00 AM [facility]   NORHYDROCODONE, URINE Present    Final 06/11/2019 10:00 AM [facility]   Noroxycodone, Urine Not Detected    Final 06/11/2019 10:00 AM [facility]   NOROXYMORPHONE, URINE Not Detected    Final 06/11/2019 10:00 AM [facility]   Oxazepam, Urine Not Detected    Final 06/11/2019 10:00 AM [facility]   Oxycodone Urine Not Detected    Final 06/11/2019 10:00 AM [facility]   Oxymorphone, Urine Not Detected    Final 06/11/2019 10:00 AM [facility]   PCP, Urine Not Detected    Final 06/11/2019 10:00 AM [facility]   Phentermine, Ur Not Detected    Final 06/11/2019 10:00 AM [facility]   Propoxyphene, Urine Not Detected    Final 06/11/2019 10:00 AM [facility]   Tapentadol-O-Sulfate, Urine Not Detected    Final 06/11/2019 10:00 AM [facility]   Tapentadol, Urine Not Detected    Final 06/11/2019 10:00 AM [facility]   Temazepam, Urine Not Detected    Final 06/11/2019 10:00 AM [facility]   Tramadol, Urine Not Detected    Final 06/11/2019 10:00 AM [facility]   Zolpidem, Urine Not Detected    Final 06/11/2019 10:00 AM [facility]   Drugs Expected, Ur     Final 06/11/2019 10:00 AM [facility] [address] [facility]   HYDROCODONE ON 6/11/19 AT 8:30AM    Creatinine, Ur 41.2  20.0 - 400.0 mg/dL Final 06/11/2019 10:00 AM [facility]   Pain Mgt Drug Panel, Hi Res, Ur See Below    Final 06/11/2019 10:00 AM [facility]   (NOTE)   Methodology: Qualitative Enzyme Immunoassay and Qualitative Liquid   Chromatography-Time of Flight-Mass Spectrometry or Tandem Mass   Spectrometry, Quantitative Spectrophotometry   The absence of expected drug(s) and/or drug metabolite(s) may   indicate non-compliance, inappropriate timing of specimen   collection relative to drug administration, poor drug absorption,   diluted/adulterated urine, or limitations of testing. The   concentration must be greater than or equal to the cutoff to be   reported as present.  If specific drug concentrations are   required, contact the laboratory within two weeks of specimen   collection to request quantification by a second analytical   technique. Interpretive questions should be directed to the   laboratory. Results based on immunoassay detection that do not match clinical   expectations should be   interpreted with caution. Confirmatory testing by mass   spectrometry for immunoassay-based results is available, if   ordered within two weeks of specimen collection. Additional   charges apply. For medical purposes only; not valid for forensic use. This test was developed and its performance characteristics   determined by [employer]. The U.S. Food and Drug   Administration has not approved or cleared this test; however, FDA   clearance or approval is not currently required for clinical use.    The results are not intended to be used as the sole means for   clinical diagnosis or patient management decisions. EER Hi Res Interp Ur See Note                 Past Medical History:   Diagnosis Date    Anxiety     Asthma     Colon polyp 10/31/2016    sessile serated adenoma x2    Depression     Diabetes mellitus ([facility] [address]     Diverticulitis 10/2016    Gastritis     GERD (gastroesophageal reflux disease)     Hemorrhoids     int/ext    Hypertension     Migraines     Osteoarthritis     Shingles 1-22-16    Tubular adenoma 10/31/2016    Urinary leakage        Past Surgical History:   Procedure Laterality Date    CERVICAL DISCECTOMY  12/2013    & fusion     CHOLECYSTECTOMY      COLONOSCOPY  10/31/2016    int/ext hemorrhoids; sessile serated adenomax2; tubular adenoma    COLONOSCOPY N/A 10/22/2019    COLONOSCOPY POLYPECTOMY SNARE/COLD BIOPSY AND RANDOM BIOPSIES performed by Fern Monroe MD at [facility] HAND SURGERY Right     thumb surgery, BONE REMOVED    HYSTERECTOMY      LAPAROSCOPY      [place] DEST,PARAVERTEBRAL,L/S,ADDL LVLS  3/25/2019         TONSILLECTOMY      TUBAL LIGATION      UPPER GASTROINTESTINAL ENDOSCOPY  10/31/2016    gastritis    UPPER GASTROINTESTINAL ENDOSCOPY N/A 10/22/2019    EGD BIOPSY performed by Fern Monroe MD at [address] [place] [address] ENDO       Allergies   Allergen Reactions    Adhesive Tape Other (See Comments)     blisters    Imitrex [Sumatriptan] Other (See Comments)     \"feels like head is going to explode    Morphine Itching     hives         Current Outpatient Medications:     [START ON 1/26/2020] HYDROcodone-acetaminophen (NORCO) 5-325 MG per tablet, Take 1 tablet by mouth 3 times daily as needed for Pain for up to 30 days.  Indications: Pain, Disp: 90 tablet, Rfl: 0    metFORMIN (GLUCOPHAGE-XR) 500 MG extended release tablet, TAKE 1 TABLET DAILY WITH BREAKFAST, Disp: 90 tablet, Rfl: 1    atorvastatin (LIPITOR) 20 MG tablet, TAKE 1 TABLET DAILY, Disp: 90 tablet, Rfl: 1    fluticasone (FLONASE) 50 MCG/ACT nasal spray, 2 sprays by Each Nostril route daily, Disp: 1 Bottle, Rfl: 1    topiramate (TOPAMAX) 100 MG tablet, TAKE 1 TABLET IN THE MORNING AND 2 TABLETS IN THE EVENING, Disp: 270 tablet, Rfl: 0    sertraline (ZOLOFT) 50 MG tablet, TAKE 1 TABLET DAILY, Disp: 90 tablet, Rfl: 1    pregabalin (LYRICA) 75 MG capsule, Take 1 capsule by mouth 3 times daily for 90 days. , Disp: 270 capsule, Rfl: 3    omeprazole (PRILOSEC) 40 MG delayed release capsule, TAKE 1 CAPSULE DAILY, Disp: 90 capsule, Rfl: 1    lisinopril (PRINIVIL;ZESTRIL) 5 MG tablet, TAKE 1 TABLET DAILY, Disp: 90 tablet, Rfl: 1    b complex vitamins capsule, Take 1 capsule by mouth daily, Disp: , Rfl:     vitamin D (CHOLECALCIFEROL) 1000 UNIT TABS tablet, Take 1,000 Units by mouth daily, Disp: , Rfl:     Zinc 30 MG TABS, Take by mouth daily , Disp: , Rfl:     Ascorbic Acid (VITAMIN C) 500 MG CAPS, Take by mouth, Disp: , Rfl:     Probiotic Product (PROBIOTIC DAILY PO), Take 1 tablet by mouth daily. , Disp: , Rfl:     Multiple Vitamins-Calcium (ONE-A-DAY WOMENS FORMULA PO), Take  by mouth., Disp: , Rfl:     tiZANidine (ZANAFLEX) 4 MG tablet, Take 1 tablet by mouth every 8 hours as needed (spasms), Disp: 270 tablet, Rfl: 0    ONETOUCH DELICA LANCETS 53H MISC, USE 1 EACH TWICE A DAY, Disp: 200 each, Rfl: 1    blood glucose test strips (ASCENSIA AUTODISC VI;ONE TOUCH ULTRA TEST VI) strip, 1 each by In Vitro route daily As needed. , Disp: 200 each, Rfl: 1    docusate sodium (COLACE) 100 MG capsule, Take 1 capsule by mouth daily as needed for Constipation, Disp: 30 capsule, Rfl: 2    Elastic Bandages & Supports (LUMBAR BACK BRACE/SUPPORT PAD) MISC, 1 each by Does not apply route daily as needed (pain), Disp: 1 each, Rfl: 0    Melatonin ER 5 MG TBCR, Take by mouth as needed, Disp: , Rfl:     ECHINACEA PO, Take by mouth, Disp: , Rfl:     Family History   Problem Relation Age of machinery. Instructed not to drive or operate machinery if drowsy     I also discussed with the patient regarding the dangers of combining narcotic pain medication with tranquilizers, alcohol or illegal drugs or taking the medication any way other than prescribed. The dangers were discussed  including respiratory depression and death. Patient was told to tell  all  physicians regarding the medications he is getting from pain clinic. Patient is warned not to take any unprescribed medications over-the-countermedications that can depress breathing . Patient is advised to talk to the pharmacist or physicians if planning to take any over-the-counter medications before  takeing them. Patient is strongly advised to avoid tranquilizers or  relaxants, illegal drugs  or any medications that can depress breathing  Patient is also advised to tell us if there is any changes in their medications from other physicians. 1.  Schedule  RFA  Right median nerves T12-S1, pain axial, recent right lumbar facet injection , pain a 7, pain interfering with ADL's.  Tender right lumbar facet joints    TREATMENT OPTIONS:     Return ASAP   RFA right Median branch nerves  Medication Agreement Requirements Met  Continue Opioid therapy  Script written for  hydrocodone  Follow up appointment made

## 2020-02-03 ENCOUNTER — APPOINTMENT (OUTPATIENT)
Dept: GENERAL RADIOLOGY | Age: 50
End: 2020-02-03
Payer: COMMERCIAL

## 2020-02-03 ENCOUNTER — HOSPITAL ENCOUNTER (OUTPATIENT)
Age: 50
Setting detail: OBSERVATION
Discharge: HOME | End: 2020-02-04
Attending: EMERGENCY MEDICINE | Admitting: INTERNAL MEDICINE
Payer: COMMERCIAL

## 2020-02-03 PROBLEM — J10.1 INFLUENZA A: Status: ACTIVE | Noted: 2020-02-03

## 2020-02-03 LAB
ABSOLUTE EOS #: 0 K/UL (ref 0–0.4)
ABSOLUTE IMMATURE GRANULOCYTE: ABNORMAL K/UL (ref 0–0.3)
ABSOLUTE LYMPH #: 0.9 K/UL (ref 1–4.8)
ABSOLUTE MONO #: 0.6 K/UL (ref 0.1–1.3)
ALLEN TEST: ABNORMAL
ANION GAP SERPL CALCULATED.3IONS-SCNC: 12 MMOL/L (ref 9–17)
BASOPHILS # BLD: 0 % (ref 0–2)
BASOPHILS ABSOLUTE: 0 K/UL (ref 0–0.2)
BUN BLDV-MCNC: 8 MG/DL (ref 6–20)
BUN/CREAT BLD: ABNORMAL (ref 9–20)
CALCIUM SERPL-MCNC: 8.8 MG/DL (ref 8.6–10.4)
CARBOXYHEMOGLOBIN: 1.2 % (ref 0–5)
CHLORIDE BLD-SCNC: 104 MMOL/L (ref 98–107)
CO2: 22 MMOL/L (ref 20–31)
CREAT SERPL-MCNC: 0.9 MG/DL (ref 0.5–0.9)
DIFFERENTIAL TYPE: ABNORMAL
DIRECT EXAM: ABNORMAL
DIRECT EXAM: ABNORMAL
EOSINOPHILS RELATIVE PERCENT: 0 % (ref 0–4)
FIO2: ABNORMAL
GFR AFRICAN AMERICAN: >60 ML/MIN
GFR NON-AFRICAN AMERICAN: >60 ML/MIN
GFR SERPL CREATININE-BSD FRML MDRD: ABNORMAL ML/MIN/{1.73_M2}
GFR SERPL CREATININE-BSD FRML MDRD: ABNORMAL ML/MIN/{1.73_M2}
GLUCOSE BLD-MCNC: 118 MG/DL (ref 65–105)
GLUCOSE BLD-MCNC: 129 MG/DL (ref 65–105)
GLUCOSE BLD-MCNC: 144 MG/DL (ref 65–105)
GLUCOSE BLD-MCNC: 146 MG/DL (ref 70–99)
HCO3 VENOUS: 23.2 MMOL/L (ref 24–30)
HCT VFR BLD CALC: 39.6 % (ref 36–46)
HEMOGLOBIN: 13.3 G/DL (ref 12–16)
IMMATURE GRANULOCYTES: ABNORMAL %
LYMPHOCYTES # BLD: 15 % (ref 24–44)
Lab: ABNORMAL
MCH RBC QN AUTO: 29.7 PG (ref 26–34)
MCHC RBC AUTO-ENTMCNC: 33.7 G/DL (ref 31–37)
MCV RBC AUTO: 88.2 FL (ref 80–100)
METHEMOGLOBIN: ABNORMAL % (ref 0–1.9)
MODE: ABNORMAL
MONOCYTES # BLD: 11 % (ref 1–7)
NEGATIVE BASE EXCESS, VEN: 2.4 MMOL/L (ref 0–2)
NOTIFICATION TIME: ABNORMAL
NOTIFICATION: ABNORMAL
NRBC AUTOMATED: ABNORMAL PER 100 WBC
O2 DEVICE/FLOW/%: ABNORMAL
O2 SAT, VEN: 82.9 % (ref 60–85)
OXYHEMOGLOBIN: ABNORMAL % (ref 95–98)
PATIENT TEMP: 37
PCO2, VEN, TEMP ADJ: ABNORMAL MMHG (ref 39–55)
PCO2, VEN: 41.6 (ref 39–55)
PDW BLD-RTO: 12.9 % (ref 11.5–14.9)
PEEP/CPAP: ABNORMAL
PH VENOUS: 7.35 (ref 7.32–7.42)
PH, VEN, TEMP ADJ: ABNORMAL (ref 7.32–7.42)
PLATELET # BLD: 214 K/UL (ref 150–450)
PLATELET ESTIMATE: ABNORMAL
PMV BLD AUTO: 7.9 FL (ref 6–12)
PO2, VEN, TEMP ADJ: ABNORMAL MMHG (ref 30–50)
PO2, VEN: 46.1 (ref 30–50)
POSITIVE BASE EXCESS, VEN: ABNORMAL MMOL/L (ref 0–2)
POTASSIUM SERPL-SCNC: 3.9 MMOL/L (ref 3.7–5.3)
PSV: ABNORMAL
PT. POSITION: ABNORMAL
RBC # BLD: 4.49 M/UL (ref 4–5.2)
RBC # BLD: ABNORMAL 10*6/UL
RESPIRATORY RATE: ABNORMAL
SAMPLE SITE: ABNORMAL
SEG NEUTROPHILS: 74 % (ref 36–66)
SEGMENTED NEUTROPHILS ABSOLUTE COUNT: 4.3 K/UL (ref 1.3–9.1)
SET RATE: ABNORMAL
SODIUM BLD-SCNC: 138 MMOL/L (ref 135–144)
SPECIMEN DESCRIPTION: ABNORMAL
TEXT FOR RESPIRATORY: ABNORMAL
TOTAL HB: ABNORMAL G/DL (ref 12–16)
TOTAL RATE: ABNORMAL
VT: ABNORMAL
WBC # BLD: 5.9 K/UL (ref 3.5–11)
WBC # BLD: ABNORMAL 10*3/UL

## 2020-02-03 PROCEDURE — 6360000002 HC RX W HCPCS: Performed by: STUDENT IN AN ORGANIZED HEALTH CARE EDUCATION/TRAINING PROGRAM

## 2020-02-03 PROCEDURE — 94664 DEMO&/EVAL PT USE INHALER: CPT

## 2020-02-03 PROCEDURE — 36415 COLL VENOUS BLD VENIPUNCTURE: CPT

## 2020-02-03 PROCEDURE — 82947 ASSAY GLUCOSE BLOOD QUANT: CPT

## 2020-02-03 PROCEDURE — 6370000000 HC RX 637 (ALT 250 FOR IP): Performed by: EMERGENCY MEDICINE

## 2020-02-03 PROCEDURE — 99220 PR INITIAL OBSERVATION CARE/DAY 70 MINUTES: CPT | Performed by: INTERNAL MEDICINE

## 2020-02-03 PROCEDURE — 94640 AIRWAY INHALATION TREATMENT: CPT

## 2020-02-03 PROCEDURE — 96375 TX/PRO/DX INJ NEW DRUG ADDON: CPT

## 2020-02-03 PROCEDURE — 6370000000 HC RX 637 (ALT 250 FOR IP): Performed by: STUDENT IN AN ORGANIZED HEALTH CARE EDUCATION/TRAINING PROGRAM

## 2020-02-03 PROCEDURE — 87804 INFLUENZA ASSAY W/OPTIC: CPT

## 2020-02-03 PROCEDURE — 6370000000 HC RX 637 (ALT 250 FOR IP): Performed by: INTERNAL MEDICINE

## 2020-02-03 PROCEDURE — 94761 N-INVAS EAR/PLS OXIMETRY MLT: CPT

## 2020-02-03 PROCEDURE — G0378 HOSPITAL OBSERVATION PER HR: HCPCS

## 2020-02-03 PROCEDURE — 71046 X-RAY EXAM CHEST 2 VIEWS: CPT

## 2020-02-03 PROCEDURE — 80048 BASIC METABOLIC PNL TOTAL CA: CPT

## 2020-02-03 PROCEDURE — 96374 THER/PROPH/DIAG INJ IV PUSH: CPT

## 2020-02-03 PROCEDURE — 2580000003 HC RX 258: Performed by: EMERGENCY MEDICINE

## 2020-02-03 PROCEDURE — 99285 EMERGENCY DEPT VISIT HI MDM: CPT

## 2020-02-03 PROCEDURE — 2580000003 HC RX 258: Performed by: INTERNAL MEDICINE

## 2020-02-03 PROCEDURE — 85025 COMPLETE CBC W/AUTO DIFF WBC: CPT

## 2020-02-03 PROCEDURE — 82805 BLOOD GASES W/O2 SATURATION: CPT

## 2020-02-03 PROCEDURE — 82800 BLOOD PH: CPT

## 2020-02-03 PROCEDURE — 6360000002 HC RX W HCPCS: Performed by: EMERGENCY MEDICINE

## 2020-02-03 RX ORDER — IBUPROFEN 800 MG/1
800 TABLET ORAL ONCE
Status: COMPLETED | OUTPATIENT
Start: 2020-02-03 | End: 2020-02-03

## 2020-02-03 RX ORDER — IPRATROPIUM BROMIDE AND ALBUTEROL SULFATE 2.5; .5 MG/3ML; MG/3ML
1 SOLUTION RESPIRATORY (INHALATION) ONCE
Status: COMPLETED | OUTPATIENT
Start: 2020-02-03 | End: 2020-02-03

## 2020-02-03 RX ORDER — ALBUTEROL SULFATE 2.5 MG/3ML
2.5 SOLUTION RESPIRATORY (INHALATION) EVERY 6 HOURS PRN
Status: DISCONTINUED | OUTPATIENT
Start: 2020-02-03 | End: 2020-02-04 | Stop reason: HOSPADM

## 2020-02-03 RX ORDER — PREGABALIN 75 MG/1
75 CAPSULE ORAL 3 TIMES DAILY
Status: DISCONTINUED | OUTPATIENT
Start: 2020-02-03 | End: 2020-02-04 | Stop reason: HOSPADM

## 2020-02-03 RX ORDER — NICOTINE POLACRILEX 4 MG
15 LOZENGE BUCCAL PRN
Status: DISCONTINUED | OUTPATIENT
Start: 2020-02-03 | End: 2020-02-04 | Stop reason: HOSPADM

## 2020-02-03 RX ORDER — IBUPROFEN 800 MG/1
800 TABLET ORAL EVERY 6 HOURS PRN
Status: DISCONTINUED | OUTPATIENT
Start: 2020-02-03 | End: 2020-02-04 | Stop reason: HOSPADM

## 2020-02-03 RX ORDER — LISINOPRIL 5 MG/1
5 TABLET ORAL DAILY
Status: DISCONTINUED | OUTPATIENT
Start: 2020-02-03 | End: 2020-02-04 | Stop reason: HOSPADM

## 2020-02-03 RX ORDER — OSELTAMIVIR PHOSPHATE 75 MG/1
75 CAPSULE ORAL 2 TIMES DAILY
Status: DISCONTINUED | OUTPATIENT
Start: 2020-02-03 | End: 2020-02-04 | Stop reason: HOSPADM

## 2020-02-03 RX ORDER — ONDANSETRON 4 MG/1
4 TABLET, ORALLY DISINTEGRATING ORAL ONCE
Status: COMPLETED | OUTPATIENT
Start: 2020-02-03 | End: 2020-02-03

## 2020-02-03 RX ORDER — DEXTROSE MONOHYDRATE 50 MG/ML
100 INJECTION, SOLUTION INTRAVENOUS PRN
Status: DISCONTINUED | OUTPATIENT
Start: 2020-02-03 | End: 2020-02-04 | Stop reason: HOSPADM

## 2020-02-03 RX ORDER — HYDROCODONE BITARTRATE AND ACETAMINOPHEN 5; 325 MG/1; MG/1
1 TABLET ORAL 3 TIMES DAILY PRN
Status: DISCONTINUED | OUTPATIENT
Start: 2020-02-03 | End: 2020-02-04 | Stop reason: HOSPADM

## 2020-02-03 RX ORDER — ATORVASTATIN CALCIUM 20 MG/1
20 TABLET, FILM COATED ORAL NIGHTLY
Status: DISCONTINUED | OUTPATIENT
Start: 2020-02-03 | End: 2020-02-04 | Stop reason: HOSPADM

## 2020-02-03 RX ORDER — SODIUM CHLORIDE 0.9 % (FLUSH) 0.9 %
10 SYRINGE (ML) INJECTION EVERY 12 HOURS SCHEDULED
Status: DISCONTINUED | OUTPATIENT
Start: 2020-02-03 | End: 2020-02-04 | Stop reason: HOSPADM

## 2020-02-03 RX ORDER — 0.9 % SODIUM CHLORIDE 0.9 %
1000 INTRAVENOUS SOLUTION INTRAVENOUS ONCE
Status: COMPLETED | OUTPATIENT
Start: 2020-02-03 | End: 2020-02-03

## 2020-02-03 RX ORDER — SODIUM CHLORIDE 0.9 % (FLUSH) 0.9 %
10 SYRINGE (ML) INJECTION PRN
Status: DISCONTINUED | OUTPATIENT
Start: 2020-02-03 | End: 2020-02-04 | Stop reason: HOSPADM

## 2020-02-03 RX ORDER — DIPHENHYDRAMINE HYDROCHLORIDE 50 MG/ML
25 INJECTION INTRAMUSCULAR; INTRAVENOUS ONCE
Status: COMPLETED | OUTPATIENT
Start: 2020-02-03 | End: 2020-02-03

## 2020-02-03 RX ORDER — POTASSIUM CHLORIDE 7.45 MG/ML
10 INJECTION INTRAVENOUS PRN
Status: DISCONTINUED | OUTPATIENT
Start: 2020-02-03 | End: 2020-02-04 | Stop reason: HOSPADM

## 2020-02-03 RX ORDER — DIPHENHYDRAMINE HCL 25 MG
25 TABLET ORAL EVERY 6 HOURS PRN
Status: DISCONTINUED | OUTPATIENT
Start: 2020-02-03 | End: 2020-02-04 | Stop reason: HOSPADM

## 2020-02-03 RX ORDER — POTASSIUM CHLORIDE 20 MEQ/1
40 TABLET, EXTENDED RELEASE ORAL PRN
Status: DISCONTINUED | OUTPATIENT
Start: 2020-02-03 | End: 2020-02-04 | Stop reason: HOSPADM

## 2020-02-03 RX ORDER — ALBUTEROL SULFATE 2.5 MG/3ML
2.5 SOLUTION RESPIRATORY (INHALATION) 4 TIMES DAILY
Status: DISCONTINUED | OUTPATIENT
Start: 2020-02-03 | End: 2020-02-04 | Stop reason: HOSPADM

## 2020-02-03 RX ORDER — ACETAMINOPHEN 500 MG
1000 TABLET ORAL ONCE
Status: COMPLETED | OUTPATIENT
Start: 2020-02-03 | End: 2020-02-03

## 2020-02-03 RX ORDER — FLUTICASONE PROPIONATE 50 MCG
2 SPRAY, SUSPENSION (ML) NASAL DAILY PRN
Status: DISCONTINUED | OUTPATIENT
Start: 2020-02-03 | End: 2020-02-04 | Stop reason: HOSPADM

## 2020-02-03 RX ORDER — PROCHLORPERAZINE EDISYLATE 5 MG/ML
10 INJECTION INTRAMUSCULAR; INTRAVENOUS ONCE
Status: COMPLETED | OUTPATIENT
Start: 2020-02-03 | End: 2020-02-03

## 2020-02-03 RX ORDER — TOPIRAMATE 100 MG/1
100 TABLET, FILM COATED ORAL 3 TIMES DAILY
Status: DISCONTINUED | OUTPATIENT
Start: 2020-02-03 | End: 2020-02-04 | Stop reason: HOSPADM

## 2020-02-03 RX ORDER — DEXTROSE MONOHYDRATE 25 G/50ML
12.5 INJECTION, SOLUTION INTRAVENOUS PRN
Status: DISCONTINUED | OUTPATIENT
Start: 2020-02-03 | End: 2020-02-04 | Stop reason: HOSPADM

## 2020-02-03 RX ORDER — ACETAMINOPHEN 325 MG/1
650 TABLET ORAL EVERY 4 HOURS PRN
Status: DISCONTINUED | OUTPATIENT
Start: 2020-02-03 | End: 2020-02-04 | Stop reason: HOSPADM

## 2020-02-03 RX ADMIN — OSELTAMIVIR PHOSPHATE 75 MG: 75 CAPSULE ORAL at 21:29

## 2020-02-03 RX ADMIN — SODIUM CHLORIDE 1000 ML: 9 INJECTION, SOLUTION INTRAVENOUS at 10:42

## 2020-02-03 RX ADMIN — Medication 10 ML: at 21:40

## 2020-02-03 RX ADMIN — DIPHENHYDRAMINE HCL 25 MG: 25 TABLET ORAL at 17:26

## 2020-02-03 RX ADMIN — PROCHLORPERAZINE EDISYLATE 10 MG: 5 INJECTION INTRAMUSCULAR; INTRAVENOUS at 10:45

## 2020-02-03 RX ADMIN — LISINOPRIL 5 MG: 5 TABLET ORAL at 14:22

## 2020-02-03 RX ADMIN — ACETAMINOPHEN 650 MG: 325 TABLET, FILM COATED ORAL at 19:13

## 2020-02-03 RX ADMIN — ACETAMINOPHEN 1000 MG: 500 TABLET, FILM COATED ORAL at 07:39

## 2020-02-03 RX ADMIN — PREGABALIN 75 MG: 75 CAPSULE ORAL at 14:22

## 2020-02-03 RX ADMIN — HYDROCODONE BITARTRATE AND ACETAMINOPHEN 1 TABLET: 5; 325 TABLET ORAL at 17:26

## 2020-02-03 RX ADMIN — INSULIN LISPRO 1 UNITS: 100 INJECTION, SOLUTION INTRAVENOUS; SUBCUTANEOUS at 21:29

## 2020-02-03 RX ADMIN — DIPHENHYDRAMINE HYDROCHLORIDE 25 MG: 50 INJECTION, SOLUTION INTRAMUSCULAR; INTRAVENOUS at 10:43

## 2020-02-03 RX ADMIN — ACETAMINOPHEN 650 MG: 325 TABLET, FILM COATED ORAL at 23:27

## 2020-02-03 RX ADMIN — IPRATROPIUM BROMIDE AND ALBUTEROL SULFATE 1 AMPULE: .5; 3 SOLUTION RESPIRATORY (INHALATION) at 07:48

## 2020-02-03 RX ADMIN — TOPIRAMATE 100 MG: 100 TABLET, FILM COATED ORAL at 14:22

## 2020-02-03 RX ADMIN — ATORVASTATIN CALCIUM 20 MG: 20 TABLET, FILM COATED ORAL at 21:29

## 2020-02-03 RX ADMIN — PREGABALIN 75 MG: 75 CAPSULE ORAL at 21:29

## 2020-02-03 RX ADMIN — IBUPROFEN 800 MG: 800 TABLET, FILM COATED ORAL at 07:39

## 2020-02-03 RX ADMIN — ONDANSETRON 4 MG: 4 TABLET, ORALLY DISINTEGRATING ORAL at 07:38

## 2020-02-03 RX ADMIN — SERTRALINE HYDROCHLORIDE 50 MG: 50 TABLET ORAL at 14:22

## 2020-02-03 RX ADMIN — TOPIRAMATE 100 MG: 100 TABLET, FILM COATED ORAL at 21:29

## 2020-02-03 RX ADMIN — OSELTAMIVIR PHOSPHATE 75 MG: 75 CAPSULE ORAL at 15:22

## 2020-02-03 RX ADMIN — ALBUTEROL SULFATE 2.5 MG: 2.5 SOLUTION RESPIRATORY (INHALATION) at 19:13

## 2020-02-03 ASSESSMENT — ENCOUNTER SYMPTOMS
VOMITING: 0
COUGH: 1
ABDOMINAL PAIN: 0
RHINORRHEA: 1
DIARRHEA: 0
SINUS PRESSURE: 1
BACK PAIN: 0
EYE DISCHARGE: 0
ABDOMINAL DISTENTION: 0
SINUS PAIN: 1
NAUSEA: 0
EYE PAIN: 0
COLOR CHANGE: 0
SHORTNESS OF BREATH: 1

## 2020-02-03 ASSESSMENT — PAIN SCALES - GENERAL
PAINLEVEL_OUTOF10: 0
PAINLEVEL_OUTOF10: 7
PAINLEVEL_OUTOF10: 10
PAINLEVEL_OUTOF10: 0
PAINLEVEL_OUTOF10: 0
PAINLEVEL_OUTOF10: 4
PAINLEVEL_OUTOF10: 6
PAINLEVEL_OUTOF10: 0
PAINLEVEL_OUTOF10: 10

## 2020-02-03 ASSESSMENT — PAIN DESCRIPTION - LOCATION
LOCATION: GENERALIZED
LOCATION: HEAD

## 2020-02-03 ASSESSMENT — PAIN DESCRIPTION - PAIN TYPE
TYPE: ACUTE PAIN
TYPE: ACUTE PAIN

## 2020-02-03 ASSESSMENT — PAIN DESCRIPTION - FREQUENCY
FREQUENCY: CONTINUOUS
FREQUENCY: CONTINUOUS

## 2020-02-03 ASSESSMENT — PAIN DESCRIPTION - ONSET
ONSET: ON-GOING
ONSET: ON-GOING

## 2020-02-03 ASSESSMENT — PAIN DESCRIPTION - DESCRIPTORS
DESCRIPTORS: POUNDING
DESCRIPTORS: ACHING

## 2020-02-03 NOTE — FLOWSHEET NOTE
02/03/20 1544   Encounter Summary   Services provided to: Patient   Referral/Consult From: Rounding   Continue Visiting   (2-3-20)   Complexity of Encounter Low   Length of Encounter 15 minutes   Spiritual/Episcopal   Type Ritual   Intervention Anointing   Sacraments   Sacrament of Sick-Anointing Anointed  (Fr Munguia 2-3-20)

## 2020-02-03 NOTE — H&P
VBG showed the patient was in slight respiratory alkalosis. Chest x-ray was negative for any cardiopulmonary process. Past Medical History:     Past Medical History:   Diagnosis Date    Anxiety     Asthma     Colon polyp 10/31/2016    sessile serated adenoma x2    Depression     [facility] mellitus ([facility] [address]     Diverticulitis 10/2016    Gastritis     GERD (gastroesophageal reflux disease)     Hemorrhoids     int/ext    Hypertension     Migraines     Osteoarthritis     Shingles 1-22-16    Tubular adenoma 10/31/2016    Urinary leakage         Past SurgicalHistory:     Past Surgical History:   Procedure Laterality Date    CERVICAL DISCECTOMY  12/2013    & fusion     CHOLECYSTECTOMY      COLONOSCOPY  10/31/2016    int/ext hemorrhoids; sessile serated adenomax2; tubular adenoma    COLONOSCOPY N/A 10/22/2019    COLONOSCOPY POLYPECTOMY SNARE/COLD BIOPSY AND RANDOM BIOPSIES performed by Aureliano Thompson MD at [place] [address] HAND SURGERY Right     thumb surgery, BONE REMOVED    HYSTERECTOMY      LAPAROSCOPY      MT DEST,PARAVERTEBRAL,L/S,ADDL LVLS  3/25/2019         TONSILLECTOMY      TUBAL LIGATION      UPPER GASTROINTESTINAL ENDOSCOPY  10/31/2016    gastritis    UPPER GASTROINTESTINAL ENDOSCOPY N/A 10/22/2019    EGD BIOPSY performed by Aureliano Thompson MD at [facility] ENDO        Medications Prior to Admission:        Prior to Admission medications    Medication Sig Start Date End Date Taking? Authorizing Provider   HYDROcodone-acetaminophen (NORCO) 5-325 MG per tablet Take 1 tablet by mouth 3 times daily as needed for Pain for up to 30 days.  Indications: Pain 1/26/20 2/25/20 Yes BAR Samson - CNP   metFORMIN (GLUCOPHAGE-XR) 500 MG extended release tablet TAKE 1 TABLET DAILY WITH BREAKFAST 12/19/19  Yes Derian Walker MD   tiZANidine (ZANAFLEX) 4 MG tablet Take 1 tablet by mouth every 8 hours as needed  19   Alex Mckeon MD   Zinc 30 MG TABS Take by mouth daily     Historical Provider, MD   Ascorbic Acid (VITAMIN C) 500 MG CAPS Take by mouth    Historical Provider, MD        Allergies:     Adhesive tape; Imitrex [sumatriptan]; and Morphine    Social History:     Tobacco:    reports that she has never smoked. She has never used smokeless tobacco.  Alcohol:      reports no history of alcohol use. Drug Use:  reports no history of drug use. Family History:     Family History   Problem Relation Age of Onset   Aidan Silver Cancer Mother     Heart Disease Father     Diabetes Father     High Blood Pressure Father     Other Other         Celiac Sprue. Aunt       Review of Systems:     Positive and Negative as described in HPI. Review of Systems   Constitutional: Positive for fatigue and fever. Negative for chills. HENT: Positive for congestion, postnasal drip, rhinorrhea, sinus pressure and sinus pain. Negative for ear pain. Eyes: Negative for pain and discharge. Respiratory: Positive for cough and shortness of breath. Cardiovascular: Negative for chest pain and leg swelling. Gastrointestinal: Negative for abdominal distention, abdominal pain, diarrhea, nausea and vomiting. Endocrine: Negative for cold intolerance and heat intolerance. Genitourinary: Negative for difficulty urinating, dysuria and urgency. Musculoskeletal: Negative for arthralgias, back pain and myalgias. Skin: Negative for color change and rash. Neurological: Negative for dizziness, numbness and headaches. Psychiatric/Behavioral: Negative for agitation, behavioral problems and confusion.        Physical Exam:   /76   Pulse 94   Temp 102.4 °F (39.1 °C) (Oral)   Resp 16   Ht 5' (1.524 m)   Wt 214 lb (97.1 kg)   SpO2 99%   BMI 41.79 kg/m²   Temp (24hrs), Av.8 °F (37.7 °C), Min:98.1 °F (36.7 °C), Max:102.4 °F (39.1 °C)    Recent Labs     20  1209 20  1555 20  1908   POCGLU 118* 129* 144* Lymphocytes 15 (L) 24 - 44 %    Monocytes 11 (H) 1 - 7 %    Eosinophils % 0 0 - 4 %    Basophils 0 0 - 2 %    Immature Granulocytes NOT REPORTED 0 %    Segs Absolute 4.30 1.3 - 9.1 k/uL    Absolute Lymph # 0.90 (L) 1.0 - 4.8 k/uL    Absolute Mono # 0.60 0.1 - 1.3 k/uL    Absolute Eos # 0.00 0.0 - 0.4 k/uL    Basophils Absolute 0.00 0.0 - 0.2 k/uL    Absolute Immature Granulocyte NOT REPORTED 0.00 - 0.30 k/uL    WBC Morphology NOT REPORTED     RBC Morphology NOT REPORTED     Platelet Estimate NOT REPORTED    Basic Metabolic Panel    Collection Time: 02/03/20  9:45 AM   Result Value Ref Range    Glucose 146 (H) 70 - 99 mg/dL    BUN 8 6 - 20 mg/dL    CREATININE 0.90 0.50 - 0.90 mg/dL    Bun/Cre Ratio NOT REPORTED 9 - 20    Calcium 8.8 8.6 - 10.4 mg/dL    Sodium 138 135 - 144 mmol/L    Potassium 3.9 3.7 - 5.3 mmol/L    Chloride 104 98 - 107 mmol/L    CO2 22 20 - 31 mmol/L    Anion Gap 12 9 - 17 mmol/L    GFR Non-African American >60 >60 mL/min    GFR African American >60 >60 mL/min    GFR Comment          GFR Staging NOT REPORTED    Blood Gas, Venous    Collection Time: 02/03/20  9:48 AM   Result Value Ref Range    pH, Domingo 7.353 7.320 - 7.420    pCO2, Domingo 41.6 39.0 - 55.0    pO2, Domingo 46.1 30.0 - 50.0    HCO3, Venous 23.2 (L) 24.0 - 30.0 mmol/L    Positive Base Excess, Domingo NOT REPORTED 0.0 - 2.0 mmol/L    Negative Base Excess, Domingo 2.4 (H) 0.0 - 2.0 mmol/L    O2 Sat, Domingo 82.9 60.0 - 85.0 %    Total Hb NOT REPORTED 12.0 - 16.0 g/dl    Oxyhemoglobin NOT REPORTED 95.0 - 98.0 %    Carboxyhemoglobin 1.2 0 - 5 %    Methemoglobin NOT REPORTED 0.0 - 1.9 %    Pt Temp 37     pH, Domingo, Temp Adj NOT REPORTED 7.320 - 7.420    pCO2, Domingo, Temp Adj NOT REPORTED 39.0 - 55.0 mmHg    pO2, Domingo, Temp Adj NOT REPORTED 30.0 - 50.0 mmHg    O2 Device/Flow/% NOT REPORTED     Respiratory Rate NOT REPORTED     Konstantin Test NOT REPORTED     Sample Site NOT REPORTED     Pt.  Position NOT REPORTED     Mode NOT REPORTED     Set Rate NOT REPORTED Total Rate NOT REPORTED     VT NOT REPORTED     FIO2 NOT REPORTED     Peep/Cpap NOT REPORTED     PSV NOT REPORTED     Text for Respiratory NOT REPORTED     NOTIFICATION NOT REPORTED     NOTIFICATION TIME NOT REPORTED    POC Glucose Fingerstick    Collection Time: 02/03/20 12:09 PM   Result Value Ref Range    POC Glucose 118 (H) 65 - 105 mg/dL   POC Glucose Fingerstick    Collection Time: 02/03/20  3:55 PM   Result Value Ref Range    POC Glucose 129 (H) 65 - 105 mg/dL   POC Glucose Fingerstick    Collection Time: 02/03/20  7:08 PM   Result Value Ref Range    POC Glucose 144 (H) 65 - 105 mg/dL       Imaging/Diagnostics:  Xr Chest Standard (2 Vw)    Result Date: 2/3/2020  EXAMINATION: TWO XRAY VIEWS OF THE CHEST 2/3/2020 8:01 am COMPARISON: November 20, 2016 HISTORY: ORDERING SYSTEM PROVIDED HISTORY: Cough TECHNOLOGIST PROVIDED HISTORY: Cough Reason for Exam: Pt states cough and fever 1 month Acuity: Unknown Type of Exam: Unknown FINDINGS: The cardiomediastinal silhouette is within normal limits. There is no consolidation, pneumothorax or evidence for edema. No evidence for effusion. Status post anterior cervical fusion. No acute osseous abnormality is identified. No acute airspace disease identified.        Assessment :      Primary Problem  <principal problem not specified>    Active Hospital Problems    Diagnosis Date Noted    Influenza A [J10.1] 02/03/2020    Type 2 diabetes mellitus without complication, without long-term current use of insulin ([facility] [address] [E11.9] 10/02/2018    Rheumatoid arthritis ([facility] [address] [M06.9]     Essential hypertension [I10] 11/03/2014       Plan:     Patient status Admit as inpatient in the  Progressive Unit/Step down    Mild hypoxia 2/2 influenza A  -Hx of asthma, obesity  -On admission: SPO2 87%, positive influenza A swab  -Chest x-ray negative  -Currently on 2 L nasal cannula O2  -Start Tamiflu 75 mg twice daily for 5 days  -Tylenol and ibuprofen for fever/pain control  -Respiratory Subcutaneous TID WC    insulin lispro  0-3 Units Subcutaneous Nightly    oseltamivir  75 mg Oral BID    albuterol  2.5 mg Nebulization 4x daily     Continuous Infusions:    dextrose       PRN Meds: sodium chloride flush, acetaminophen, glucose, dextrose, glucagon (rDNA), dextrose, ibuprofen, potassium chloride **OR** potassium alternative oral replacement **OR** potassium chloride, albuterol, fluticasone, diphenhydrAMINE, HYDROcodone-acetaminophen        [facility] [address]    Phone [phone]   Fax: [phone]  Answering Service: [phone]

## 2020-02-03 NOTE — ED NOTES
Report given to Xi Welch RN from U. Report method by phone   The following was reviewed with receiving RN:   Current vital signs:  BP (!) 103/52   Pulse 81   Temp 98.7 °F (37.1 °C) (Oral)   Resp 18   Ht 5' (1.524 m)   Wt 214 lb (97.1 kg)   SpO2 94%   BMI 41.79 kg/m²                MEWS Score: 1     Any medication or safety alerts were reviewed. Any pending diagnostics and notifications were also reviewed, as well as any safety concerns or issues, abnormal labs, abnormal imaging, and abnormal assessment findings. Questions were answered.       Isela Chapin RN  02/03/20 2742

## 2020-02-04 VITALS
HEIGHT: 60 IN | RESPIRATION RATE: 16 BRPM | WEIGHT: 214 LBS | BODY MASS INDEX: 42.01 KG/M2 | HEART RATE: 79 BPM | SYSTOLIC BLOOD PRESSURE: 115 MMHG | OXYGEN SATURATION: 94 % | DIASTOLIC BLOOD PRESSURE: 54 MMHG | TEMPERATURE: 98.8 F

## 2020-02-04 PROBLEM — J32.9 BACTERIAL SINUSITIS: Status: ACTIVE | Noted: 2020-02-04

## 2020-02-04 PROBLEM — B96.89 BACTERIAL SINUSITIS: Status: ACTIVE | Noted: 2020-02-04

## 2020-02-04 LAB
GLUCOSE BLD-MCNC: 117 MG/DL (ref 65–105)
GLUCOSE BLD-MCNC: 159 MG/DL (ref 65–105)

## 2020-02-04 PROCEDURE — 99217 PR OBSERVATION CARE DISCHARGE MANAGEMENT: CPT | Performed by: INTERNAL MEDICINE

## 2020-02-04 PROCEDURE — G0378 HOSPITAL OBSERVATION PER HR: HCPCS

## 2020-02-04 PROCEDURE — 6370000000 HC RX 637 (ALT 250 FOR IP): Performed by: STUDENT IN AN ORGANIZED HEALTH CARE EDUCATION/TRAINING PROGRAM

## 2020-02-04 PROCEDURE — 94761 N-INVAS EAR/PLS OXIMETRY MLT: CPT

## 2020-02-04 PROCEDURE — 2700000000 HC OXYGEN THERAPY PER DAY

## 2020-02-04 PROCEDURE — 6360000002 HC RX W HCPCS: Performed by: STUDENT IN AN ORGANIZED HEALTH CARE EDUCATION/TRAINING PROGRAM

## 2020-02-04 PROCEDURE — 6370000000 HC RX 637 (ALT 250 FOR IP): Performed by: INTERNAL MEDICINE

## 2020-02-04 PROCEDURE — 94640 AIRWAY INHALATION TREATMENT: CPT

## 2020-02-04 PROCEDURE — 96372 THER/PROPH/DIAG INJ SC/IM: CPT

## 2020-02-04 PROCEDURE — 6360000002 HC RX W HCPCS: Performed by: INTERNAL MEDICINE

## 2020-02-04 PROCEDURE — 2580000003 HC RX 258: Performed by: INTERNAL MEDICINE

## 2020-02-04 PROCEDURE — 82947 ASSAY GLUCOSE BLOOD QUANT: CPT

## 2020-02-04 RX ORDER — BUDESONIDE AND FORMOTEROL FUMARATE DIHYDRATE 160; 4.5 UG/1; UG/1
2 AEROSOL RESPIRATORY (INHALATION) 2 TIMES DAILY
Qty: 1 INHALER | Refills: 0 | Status: SHIPPED | OUTPATIENT
Start: 2020-02-04 | End: 2020-06-25

## 2020-02-04 RX ORDER — OSELTAMIVIR PHOSPHATE 75 MG/1
75 CAPSULE ORAL 2 TIMES DAILY
Qty: 8 CAPSULE | Refills: 0 | Status: SHIPPED | OUTPATIENT
Start: 2020-02-04 | End: 2020-02-08

## 2020-02-04 RX ORDER — DOXYCYCLINE HYCLATE 100 MG
100 TABLET ORAL 2 TIMES DAILY
Qty: 14 TABLET | Refills: 0 | Status: SHIPPED | OUTPATIENT
Start: 2020-02-04 | End: 2020-02-11

## 2020-02-04 RX ORDER — IBUPROFEN 800 MG/1
800 TABLET ORAL EVERY 8 HOURS PRN
Qty: 45 TABLET | Refills: 0 | Status: SHIPPED | OUTPATIENT
Start: 2020-02-04 | End: 2020-04-16 | Stop reason: SINTOL

## 2020-02-04 RX ADMIN — LISINOPRIL 5 MG: 5 TABLET ORAL at 08:45

## 2020-02-04 RX ADMIN — ACETAMINOPHEN 650 MG: 325 TABLET, FILM COATED ORAL at 05:18

## 2020-02-04 RX ADMIN — ALBUTEROL SULFATE 2.5 MG: 2.5 SOLUTION RESPIRATORY (INHALATION) at 11:34

## 2020-02-04 RX ADMIN — TOPIRAMATE 100 MG: 100 TABLET, FILM COATED ORAL at 08:45

## 2020-02-04 RX ADMIN — SERTRALINE HYDROCHLORIDE 50 MG: 50 TABLET ORAL at 08:45

## 2020-02-04 RX ADMIN — ALBUTEROL SULFATE 2.5 MG: 2.5 SOLUTION RESPIRATORY (INHALATION) at 06:55

## 2020-02-04 RX ADMIN — PREGABALIN 75 MG: 75 CAPSULE ORAL at 08:46

## 2020-02-04 RX ADMIN — ENOXAPARIN SODIUM 40 MG: 40 INJECTION SUBCUTANEOUS at 08:46

## 2020-02-04 RX ADMIN — IBUPROFEN 800 MG: 800 TABLET, FILM COATED ORAL at 10:22

## 2020-02-04 RX ADMIN — Medication 10 ML: at 08:46

## 2020-02-04 RX ADMIN — DIPHENHYDRAMINE HCL 25 MG: 25 TABLET ORAL at 05:18

## 2020-02-04 RX ADMIN — OSELTAMIVIR PHOSPHATE 75 MG: 75 CAPSULE ORAL at 08:46

## 2020-02-04 RX ADMIN — FLUTICASONE PROPIONATE 2 SPRAY: 50 SPRAY, METERED NASAL at 10:02

## 2020-02-04 ASSESSMENT — PAIN SCALES - GENERAL
PAINLEVEL_OUTOF10: 4
PAINLEVEL_OUTOF10: 0
PAINLEVEL_OUTOF10: 3
PAINLEVEL_OUTOF10: 0
PAINLEVEL_OUTOF10: 0

## 2020-02-04 ASSESSMENT — ENCOUNTER SYMPTOMS
BACK PAIN: 0
SHORTNESS OF BREATH: 0
DIARRHEA: 0
VOMITING: 0
SORE THROAT: 0
RHINORRHEA: 1
RHINORRHEA: 0
SINUS PRESSURE: 1
NAUSEA: 0
COUGH: 1
PHOTOPHOBIA: 0
CHEST TIGHTNESS: 0
EYE DISCHARGE: 0
NAUSEA: 1
COUGH: 0
ABDOMINAL PAIN: 0
EYE PAIN: 0
TROUBLE SWALLOWING: 0
SINUS PAIN: 1
ABDOMINAL DISTENTION: 0
COLOR CHANGE: 0
SHORTNESS OF BREATH: 1

## 2020-02-04 ASSESSMENT — PAIN DESCRIPTION - PROGRESSION: CLINICAL_PROGRESSION: GRADUALLY IMPROVING

## 2020-02-04 ASSESSMENT — PAIN DESCRIPTION - ONSET: ONSET: GRADUAL

## 2020-02-04 ASSESSMENT — PAIN DESCRIPTION - LOCATION: LOCATION: HEAD

## 2020-02-04 ASSESSMENT — PAIN DESCRIPTION - FREQUENCY: FREQUENCY: INTERMITTENT

## 2020-02-04 ASSESSMENT — PAIN - FUNCTIONAL ASSESSMENT: PAIN_FUNCTIONAL_ASSESSMENT: ACTIVITIES ARE NOT PREVENTED

## 2020-02-04 ASSESSMENT — PAIN DESCRIPTION - PAIN TYPE: TYPE: ACUTE PAIN

## 2020-02-04 ASSESSMENT — PAIN DESCRIPTION - DESCRIPTORS: DESCRIPTORS: ACHING

## 2020-02-04 NOTE — DISCHARGE INSTR - DIET

## 2020-02-04 NOTE — PROGRESS NOTES
Bronchodilator Assessment     RR 16  Breath Sounds: diminished  SPO2: 97  FiO2: 28      · Bronchodilator assessment at level  3  ·   · []    Bronchodilator Assessment  BRONCHODILATOR ASSESSMENT SCORE  Score 0 1 2 3 4 5   Breath Sounds   []  Patient Baseline []  No Wheeze good aeration []  Faint, scattered wheezing, good aeration [x]  Expiratory Wheezing and or moderately diminished []  Insp/Exp wheeze and/or very diminished []  Insp/Exp and/ or marked distress   Respiratory Rate   []  Patient Baseline []  Less than 20 [x]  Less than 20 []  20-25 []  Greater than 25 []  Greater than 25   Peak flow % of Pred or PB []  NA   []  Greater than 90%  []  81-90% []  71-80% []  Less than or equal to 70%  or unable to perform []  Unable due to Respiratory Distress   Dyspnea re []  Patient Baseline []  No SOB []  No SOB [x]  SOB on exertion []  SOB min activity []  At rest/acute   e FEV% Predicted       []  NA []  Above 69%  []  Unable []  Above 60-69%  []  Unable []  Above 50-59%  []  Unable []  Above 35-49%  []  Unable []  Less than 35%  []  Unable          SHAHEEN BURCH      3:50 PM
Patient ambulated in hallway 140 feet on room air and O2 Sat 94-95%.
Physical Therapy  DATE: 2020    NAME: Delta Haq  MRN: [id number]   : 1970    Patient not seen this date for Physical Therapy due to:  [] Blood transfusion in progress  [] Hemodialysis  []  Patient Declined  [] Spine Precautions   [] Strict Bedrest  [] Surgery/ Procedure  [] Testing      [] Other        [x] PT being discontinued at this time. Patient independent. No further needs. 20: D/C PT - pt IND in room, denies needs for PT/trialing stairs to enter home. Pt steady on feet. [id number], KWADWO Hurd in room and informed. BK  [] PT being discontinued at this time as the patient has been transferred to palliative care. No further needs.     Kiki Law, PT
°C), Max:102.4 °F (39.1 °C)    Recent Labs     02/03/20  1555 02/03/20  1908 02/04/20  0739 02/04/20  1137   POCGLU 129* 144* 117* 159*       I/O(24Hr): No intake or output data in the 24 hours ending 02/04/20 1244    Labs:  [unfilled]    Lab Results   Component Value Date/Time    SPECIAL NOT REPORTED 02/03/2020 07:42 AM     Lab Results   Component Value Date/Time    CULTURE ESCHERICHIA COLI 50 to 100,000 CFU/ML (A) 12/18/2018 08:01 PM       [unfilled]    Radiology:    Xr Chest Standard (2 Vw)    Result Date: 2/3/2020  EXAMINATION: TWO XRAY VIEWS OF THE CHEST 2/3/2020 8:01 am COMPARISON: November 20, 2016 HISTORY: ORDERING SYSTEM PROVIDED HISTORY: Cough TECHNOLOGIST PROVIDED HISTORY: Cough Reason for Exam: Pt states cough and fever 1 month Acuity: Unknown Type of Exam: Unknown FINDINGS: The cardiomediastinal silhouette is within normal limits. There is no consolidation, pneumothorax or evidence for edema. No evidence for effusion. Status post anterior cervical fusion. No acute osseous abnormality is identified. No acute airspace disease identified. Physical Examination:        Physical Exam  Constitutional:       Appearance: She is well-developed. She is ill-appearing. HENT:      Head: Normocephalic and atraumatic. Right Ear: Tympanic membrane normal.      Left Ear: Tympanic membrane normal.      Nose: Congestion and rhinorrhea present. Right Sinus: Maxillary sinus tenderness and frontal sinus tenderness present. Left Sinus: Maxillary sinus tenderness and frontal sinus tenderness present. Eyes:      Conjunctiva/sclera: Conjunctivae normal.      Pupils: Pupils are equal, round, and reactive to light. Neck:      Musculoskeletal: Normal range of motion and neck supple. Cardiovascular:      Rate and Rhythm: Normal rate and regular rhythm. Heart sounds: Normal heart sounds. Pulmonary:      Effort: Pulmonary effort is normal.      Breath sounds: Normal breath sounds.

## 2020-02-04 NOTE — CARE COORDINATION
CASE MANAGEMENT NOTE:    Admission Date:  2/3/2020 Meli Lindsey is a 51 y.o.  female    Admitted for : Influenza A [J10.1]    Met with:  Patient    PCP:  Dr Brooke Hall:  Murphy Cat:  independently at home             Current Services PTA:  Yes [facility]    Is patient agreeable to VNS: No    Freedom of choice and list provided: Yes    List of [address] provided: No    VNS chosen:  No    DME:   cane    Home Oxygen: No    Nebulizer: No    CPAP/BIPAP: No    Supplier: N/A    Potential Assistance Needed: No    SNF needed: No    Freedom of choice and list provided:     Pharmacy:  [facility]       Does Patient want to use MEDS to BEDS? No    Is the Patient an ANALILIA ROCHE [facility] with Readmission Risk Score greater than 14%? No  If yes, pt needs a follow up appointment made within 7 days. Family Members/Caregivers that pt would like involved in their care:    Yes    If yes, list name here:  Francisca Nicole     Transportation Provider:  Patient             Is patient in Isolation/One on One/Altered Mental Status? No  If yes, skip next question. If no, would they like an I-Pad to  use? No  If yes, call [phone]. Discharge Plan:  2/4/2020  [employer]  Pt lives independently at home with . .  Pt is current with [facility]. DME: cane. Pt expects to return home w no needs. //rm                 Electronically signed by: Jay Escobedo RN on 2/4/2020 at 10:26 AM

## 2020-02-04 NOTE — PLAN OF CARE
Patient remained free from falls. Call light within reach. Bed locked in lowest position. Patient received breathing treatments to improve breathing. Vital signs monitored for disease process. Tamiflu given. Patient was given anti-pyretics for elevated temperatures.

## 2020-02-04 NOTE — FLOWSHEET NOTE
02/04/20 0741   Encounter Summary   Place of [address] Completed   Spiritual/[facility]   Intervention Contacted support as requested per patient/family request   Who? Florencio Kennedy   Why?  Spiritual Support   At Request Of Patient/Family

## 2020-02-04 NOTE — DISCHARGE SUMMARY
[address]    Discharge Summary     Patient ID: Andreea Valencia  :  1970   MRN: [id number]     ACCOUNT:  [de-identified]   Patient's PCP: Arjun Naqvi MD  Admit Date: 2/3/2020   Discharge Date: 2020   Length of Stay: 0  Code Status:  Prior  Admitting Physician: Julianna Ferrara MD  Discharge Physician: Estelita Munoz MD     Active Discharge Diagnoses:       Primary Problem  Influenza [address] Problems    Diagnosis Date Noted    Bacterial sinusitis [J32.9, B96.89] 2020    Influenza A [J10.1] 2020    Type 2 diabetes mellitus without complication, without long-term current use of insulin ([facility] [E11.9] 10/02/2018    Rheumatoid arthritis ([facility] [M06.9]     Essential hypertension [I10] 2014       Admission Condition:  fair    Discharged Condition: good    Hospital Stay:       Hospital Course:  Andreea Valencia is a 51 y.o. female who was admitted for the management of   Influenza A , presented to ER with shortness of breath. Patient presented to the emergency room on 2/3 with chief complaint of severe shortness of breath and cough for approximately 2 days. She initially had cold symptoms starting in December which improved after 2 weeks following a course of Augmentin. However about a week later the symptoms suddenly returned which included cough, headache, runny nose, sinus pain/pressure, subjective fevers, generalized body aches, shortness of breath. On the day of admission patient said her shortness of breath became so severe that she needed to come to the hospital.    On arrival to the ED patient did have acute hypoxia with SPO2 down to 87%. Influenza A swab was positive. Patient was placed on 2 L nasal cannula oxygen and subsequently oxygenated normally. Chest x-ray was negative. Patient was started on Tamiflu at this time as well as supportive treatment.     Today on day of discharge patient has improved clinically in terms of breathing. Patient was successfully weaned off of nasal cannula oxygen. As patient does have a history of asthma we will discharge with both albuterol and Symbicort inhalers. We will also discharge the patient with Tamiflu to finish her total course of 5 days. Patient does have lingering symptoms of bacterial sinusitis including sinus pain/pressure and headache with production of purulent mucus. We will discharge the patient with doxycycline therapy to address this issue. Significant therapeutic interventions: None    Significant Diagnostic Studies:   Labs / Micro:  CBC:   Lab Results   Component Value Date    WBC 5.9 02/03/2020    RBC 4.49 02/03/2020    RBC 4.74 10/16/2019    HGB 13.3 02/03/2020    HCT 39.6 02/03/2020    MCV 88.2 02/03/2020    MCH 29.7 02/03/2020    MCHC 33.7 02/03/2020    RDW 12.9 02/03/2020     02/03/2020     BMP:    Lab Results   Component Value Date    GLUCOSE 146 02/03/2020    GLUCOSE 121 10/05/2019     02/03/2020    K 3.9 02/03/2020     02/03/2020    CO2 22 02/03/2020    ANIONGAP 12 02/03/2020    BUN 8 02/03/2020    CREATININE 0.90 02/03/2020    BUNCRER NOT REPORTED 02/03/2020    CALCIUM 8.8 02/03/2020    LABGLOM >60 02/03/2020    GFRAA >60 02/03/2020    GFR      02/03/2020    GFR NOT REPORTED 02/03/2020       Radiology:    Xr Chest Standard (2 Vw)    Result Date: 2/3/2020  EXAMINATION: TWO XRAY VIEWS OF THE CHEST 2/3/2020 8:01 am COMPARISON: November 20, 2016 HISTORY: ORDERING SYSTEM PROVIDED HISTORY: Cough TECHNOLOGIST PROVIDED HISTORY: Cough Reason for Exam: Pt states cough and fever 1 month Acuity: Unknown Type of Exam: Unknown FINDINGS: The cardiomediastinal silhouette is within normal limits. There is no consolidation, pneumothorax or evidence for edema. No evidence for effusion. Status post anterior cervical fusion. No acute osseous abnormality is identified. No acute airspace disease identified. Consultations:    Consults:     Final Specialist Recommendations/Findings:   IP CONSULT TO INTERNAL MEDICINE  IP CONSULT TO RESPIRATORY CARE  IP CONSULT TO SOCIAL WORK      The patient was seen and examined on day of discharge and this discharge summary is in conjunction with any daily progress note from day of discharge. Discharge plan:       Disposition: Home    Physician Follow Up:     Constantin Chino MD  [address] [facility]  [address]  [id number]  [address] [facility] [address]  [address]  [address] [address]  [phone]             Requiring Further Evaluation/Follow Up POST HOSPITALIZATION/Incidental Findings: None    Diet: regular diet    Activity: As tolerated    Instructions to Patient: Please take the following medications as prescribed. Please follow up with your PCP regarding this admission. Return to the emergency department if symptoms return or worsen.     Discharge Medications:      Medication List      START taking these medications    albuterol sulfate 108 (90 Base) MCG/ACT aerosol powder inhalation  Commonly known as:  ProAir RespiClick  Inhale 2 puffs into the lungs every 4 hours as needed for Wheezing or Shortness of Breath     budesonide-formoterol 160-4.5 MCG/ACT Aero  Commonly known as:  Symbicort  Inhale 2 puffs into the lungs 2 times daily for 15 days     doxycycline hyclate 100 MG tablet  Commonly known as:  VIBRA-TABS  Take 1 tablet by mouth 2 times daily for 7 days     ibuprofen 800 MG tablet  Commonly known as:  ADVIL;MOTRIN  Take 1 tablet by mouth every 8 hours as needed for Pain     oseltamivir 75 MG capsule  Commonly known as:  TAMIFLU  Take 1 capsule by mouth 2 times daily for 4 days        CHANGE how you take these medications    fluticasone 50 MCG/ACT nasal spray  Commonly known as:  FLONASE  2 sprays by Each Nostril route daily  What changed:    · when to take this  · reasons to take this        CONTINUE taking these medications inhalation  · budesonide-formoterol 160-4.5 MCG/ACT Aero  · doxycycline hyclate 100 MG tablet  · ibuprofen 800 MG tablet  · oseltamivir 75 MG capsule         Time Spent on discharge is  31 mins in patient examination, evaluation, counseling as well as medication reconciliation, prescriptions for required medications, discharge plan and follow up. Electronically signed by   Ruma Donato MD  2/4/2020  4:57 PM      Thank you Dr. Lana Underwood MD for the opportunity to be involved in this patient's care. Attending Physician Statement    I have discussed the case of [address] [place] [address], including pertinent history and exam findings with the resident. I have seen and examined the patient and the key elements of the encounter have been performed by me. I agree with the assessment, plan, and orders as documented by the resident. The patient has almost 5 to 6-week history of respiratory symptoms due to influenza a infection. She is being treated for that. Will be given doxycycline for possible atypical infection.   Is also a candidate for in inhalational  corticosteroid therapy  Electronically signed by Sol Freed MD on 2/4/2020 at 6:03 PM

## 2020-02-05 ENCOUNTER — TELEPHONE (OUTPATIENT)
Dept: FAMILY MEDICINE CLINIC | Age: 50
End: 2020-02-05

## 2020-02-05 NOTE — ED PROVIDER NOTES
[facility] ED  Emergency Department Encounter  Emergency Medicine Attending Note     Pt Name: Rashel Sorensen  MRN: [id number]  [place] 1970   Date of evaluation: 2/3/2020  PCP:  Burgess Kayce MD    [address] [place] [address]       Chief Complaint   Patient presents with    Influenza       HISTORY OF PRESENT ILLNESS  (Location/Symptom, Timing/Onset, Context/Setting, Quality, Duration, Modifying Factors, Severity.)      Rashel Sorensen is a 51 y.o. female who presents with headache, body ache, nasal congestion, shortness of breath. Patient states that on and off since Christmas, she is been having a cough. However she had been improving. And this morning she woke up with a severe headache that encompasses the whole head. She also has some sinus discomfort and pressure. But no vision changes. No numbness or weakness. Has body aches and nasal congestion. Believes that she was warm, but did not take her temperature. States that she has been nauseated but no vomiting. No abdominal pain. No diarrhea or constipation. She did not get her flu shot this year. She denies any history of any breathing problesm in the past.    PAST MEDICAL / SURGICAL / SOCIAL / FAMILY HISTORY     Past Medical History:  has a past medical history of Anxiety, Asthma, Colon polyp, Depression, Diabetes mellitus ([address] Diverticulitis, Gastritis, GERD (gastroesophageal reflux disease), Hemorrhoids, Hypertension, Migraines, Osteoarthritis, Shingles, Tubular adenoma, and Urinary leakage. Past Surgical History:  has a past surgical history that includes Hysterectomy; laparoscopy; Tonsillectomy; Hand surgery (Right); Tubal ligation; Cervical discectomy (12/2013); Cholecystectomy; Dilation and curettage of uterus; Colonoscopy (10/31/2016); Upper gastrointestinal endoscopy (10/31/2016); pr dest,paravertebral,l/s,addl lvls (3/25/2019); Colonoscopy (N/A, 10/22/2019); and Upper gastrointestinal endoscopy (N/A, 10/22/2019). Allergies:  Adhesive tape; Imitrex [sumatriptan]; and Morphine     Home Meds:   Prior to Visit Medications    Medication Sig Taking? Authorizing Provider   ibuprofen (ADVIL;MOTRIN) 800 MG tablet Take 1 tablet by mouth every 8 hours as needed for Pain Yes Michelet Garcia MD   oseltamivir (TAMIFLU) 75 MG capsule Take 1 capsule by mouth 2 times daily for 4 days Yes Michelet Garcia MD   doxycycline hyclate (VIBRA-TABS) 100 MG tablet Take 1 tablet by mouth 2 times daily for 7 days Yes Michelet Garcia MD   budesonide-formoterol (SYMBICORT) 160-4.5 MCG/ACT AERO Inhale 2 puffs into the lungs 2 times daily for 15 days Yes Michelet Garcia MD   albuterol sulfate (PROAIR RESPICLICK) 052 (90 Base) MCG/ACT aerosol powder inhalation Inhale 2 puffs into the lungs every 4 hours as needed for Wheezing or Shortness of Breath Yes Michelet Garcia MD   HYDROcodone-acetaminophen (NORCO) 5-325 MG per tablet Take 1 tablet by mouth 3 times daily as needed for Pain for up to 30 days. Indications: Pain Yes BAR Walsh CNP   metFORMIN (GLUCOPHAGE-XR) 500 MG extended release tablet TAKE 1 TABLET DAILY WITH BREAKFAST Yes Lencho Winter MD   tiZANidine (ZANAFLEX) 4 MG tablet Take 1 tablet by mouth every 8 hours as needed (spasms) Yes BAR Walsh CNP   atorvastatin (LIPITOR) 20 MG tablet TAKE 1 TABLET DAILY Yes Lencho Winter MD   topiramate (TOPAMAX) 100 MG tablet TAKE 1 TABLET IN THE MORNING AND 2 TABLETS IN THE EVENING Yes BAR Lester CNP   sertraline (ZOLOFT) 50 MG tablet TAKE 1 TABLET DAILY Yes Lencho Winter MD   pregabalin (LYRICA) 75 MG capsule Take 1 capsule by mouth 3 times daily for 90 days.  Yes BAR Walsh CNP   omeprazole (PRILOSEC) 40 MG delayed release capsule TAKE 1 CAPSULE DAILY Yes Lencho Winter MD   lisinopril (PRINIVIL;ZESTRIL) 5 MG tablet TAKE 1 TABLET DAILY Yes Chad Sola, MD Gust Severance LANCETS 04Q MISC USE [address], MD   blood glucose test strips ED Course, MDM or here. Xr Chest Standard (2 Vw)    Result Date: 2/3/2020  EXAMINATION: TWO XRAY VIEWS OF THE CHEST 2/3/2020 8:01 am COMPARISON: November 20, 2016 HISTORY: ORDERING SYSTEM PROVIDED HISTORY: Cough TECHNOLOGIST PROVIDED HISTORY: Cough Reason for Exam: Pt states cough and fever 1 month Acuity: Unknown Type of Exam: Unknown FINDINGS: The cardiomediastinal silhouette is within normal limits. There is no consolidation, pneumothorax or evidence for edema. No evidence for effusion. Status post anterior cervical fusion. No acute osseous abnormality is identified. No acute airspace disease identified. BEDSIDE ULTRASOUND:  Not clinically indicated at this time.       ED COURSE      ED Medication Orders (From admission, onward)    Start Ordered     Status Ordering Provider    02/03/20 0930 02/03/20 0927  prochlorperazine (COMPAZINE) injection 10 mg  ONCE      Last MAR action:  Given - by Diandra Rhoades on 02/03/20 [facility]    02/03/20 0930 02/03/20 0927  diphenhydrAMINE (BENADRYL) injection 25 mg  ONCE      Last MAR action:  Given - by Diandra Rhoades on 02/03/20 at [facility]    02/03/20 0930 02/03/20 0927  0.9 % sodium chloride bolus  ONCE      Last MAR action:  Stopped - by Almita Aceves on 02/03/20 at [facility]2 [facility]    02/03/20 0745 02/03/20 0732  ondansetron (ZOFRAN-ODT) disintegrating tablet 4 mg  ONCE      Last MAR action:  Given - by Ivone Guaman on 02/03/20 at 0738 [facility] E    02/03/20 0745 02/03/20 0732  acetaminophen (TYLENOL) tablet 1,000 mg  ONCE      Last MAR action:  Given - by Ivone Eureka on 02/03/20 at 0739 [facility] E    02/03/20 0745 02/03/20 0732  ibuprofen (ADVIL;MOTRIN) tablet 800 mg  ONCE      Last MAR action:  Given - by Ivone Eureka on 02/03/20 at 0739 [facility] E    02/03/20 0745 02/03/20 0732  ipratropium-albuterol (DUONEB) nebulizer solution 1 ampule  ONCE      Last MAR action:  Given - by Dannie Flores, recognition program.  Efforts were made to edit the dictations but occasionallywords are mis-transcribed.)         Garry Painting MD  02/04/20 7033

## 2020-02-06 ENCOUNTER — OFFICE VISIT (OUTPATIENT)
Dept: FAMILY MEDICINE CLINIC | Age: 50
End: 2020-02-06
Payer: COMMERCIAL

## 2020-02-06 VITALS
WEIGHT: 220 LBS | HEART RATE: 82 BPM | DIASTOLIC BLOOD PRESSURE: 84 MMHG | BODY MASS INDEX: 43.19 KG/M2 | SYSTOLIC BLOOD PRESSURE: 126 MMHG | OXYGEN SATURATION: 94 % | HEIGHT: 60 IN

## 2020-02-06 LAB — HBA1C MFR BLD: 6.6 %

## 2020-02-06 PROCEDURE — 1111F DSCHRG MED/CURRENT MED MERGE: CPT | Performed by: FAMILY MEDICINE

## 2020-02-06 PROCEDURE — 99496 TRANSJ CARE MGMT HIGH F2F 7D: CPT | Performed by: FAMILY MEDICINE

## 2020-02-06 PROCEDURE — 83036 HEMOGLOBIN GLYCOSYLATED A1C: CPT | Performed by: FAMILY MEDICINE

## 2020-02-06 ASSESSMENT — ENCOUNTER SYMPTOMS
SHORTNESS OF BREATH: 0
COUGH: 1
ABDOMINAL PAIN: 0
SORE THROAT: 0
WHEEZING: 0
EYE ITCHING: 0
NAUSEA: 0

## 2020-02-06 NOTE — PROGRESS NOTES
capsule  TAKE 1 CAPSULE DAILY             ONETOUCH DELICA LANCETS 64U MISC  USE 1 EACH TWICE A DAY             oseltamivir (TAMIFLU) 75 MG capsule  Take 1 capsule by mouth 2 times daily for 4 days             pregabalin (LYRICA) 75 MG capsule  Take 1 capsule by mouth 3 times daily for 90 days. Probiotic Product (PROBIOTIC DAILY PO)  Take 1 tablet by mouth daily. sertraline (ZOLOFT) 50 MG tablet  TAKE 1 TABLET DAILY             tiZANidine (ZANAFLEX) 4 MG tablet  Take 1 tablet by mouth every 8 hours as needed (spasms)             topiramate (TOPAMAX) 100 MG tablet  TAKE 1 TABLET IN THE MORNING AND 2 TABLETS IN THE EVENING             vitamin D (CHOLECALCIFEROL) 1000 UNIT TABS tablet  Take 1,000 Units by mouth daily             Zinc 30 MG TABS  Take by mouth daily                    Medications marked \"taking\" at this time  Outpatient Medications Marked as Taking for the 2/6/20 encounter (Office Visit) with Arjun Naqvi MD   Medication Sig Dispense Refill    ibuprofen (ADVIL;MOTRIN) 800 MG tablet Take 1 tablet by mouth every 8 hours as needed for Pain 45 tablet 0    oseltamivir (TAMIFLU) 75 MG capsule Take 1 capsule by mouth 2 times daily for 4 days 8 capsule 0    doxycycline hyclate (VIBRA-TABS) 100 MG tablet Take 1 tablet by mouth 2 times daily for 7 days 14 tablet 0    budesonide-formoterol (SYMBICORT) 160-4.5 MCG/ACT AERO Inhale 2 puffs into the lungs 2 times daily for 15 days 1 Inhaler 0    albuterol sulfate (PROAIR RESPICLICK) 304 (90 Base) MCG/ACT aerosol powder inhalation Inhale 2 puffs into the lungs every 4 hours as needed for Wheezing or Shortness of Breath 1 Inhaler 2    HYDROcodone-acetaminophen (NORCO) 5-325 MG per tablet Take 1 tablet by mouth 3 times daily as needed for Pain for up to 30 days.  Indications: Pain 90 tablet 0    metFORMIN (GLUCOPHAGE-XR) 500 MG extended release tablet TAKE 1 TABLET DAILY WITH BREAKFAST 90 tablet 1    tiZANidine (ZANAFLEX) 4 MG tablet acute bacterial sinusitis she is still having some stuffy nose cough no sore throat fever or chills feels tired. Has got hypertension blood pressure is controlled on medication chest pain or shortness of breath, diabetic blood sugars are running good she is watching her diet and is on metformin  Inpatient course: Discharge summary reviewed- see chart. Interval history/Current status: Stable    Review of Systems   Constitutional: Positive for appetite change and fatigue. HENT: Positive for congestion. Negative for ear pain, sneezing and sore throat. Eyes: Positive for visual disturbance. Negative for itching. Wears glasses   Respiratory: Positive for cough. Negative for shortness of breath and wheezing. Cardiovascular: Negative for chest pain and palpitations. Gastrointestinal: Negative for abdominal pain and nausea. Genitourinary: Negative for frequency, pelvic pain and vaginal discharge. Musculoskeletal: Negative for arthralgias. Neurological: Negative for dizziness, syncope and headaches. Vitals:    02/06/20 0918   BP: 126/84   Pulse: 82   SpO2: 94%   Weight: 220 lb (99.8 kg)   Height: 5' (1.524 m)     Body mass index is 42.97 kg/m². Wt Readings from Last 3 Encounters:   02/06/20 220 lb (99.8 kg)   02/03/20 214 lb (97.1 kg)   01/21/20 219 lb (99.3 kg)     BP Readings from Last 3 Encounters:   02/06/20 126/84   02/04/20 (!) 115/54   01/21/20 131/66       Physical Exam  Vitals signs and nursing note reviewed. Constitutional:       Appearance: She is well-developed. She is obese. Comments: /84   Pulse 82   Ht 5' (1.524 m)   Wt 220 lb (99.8 kg)   SpO2 94%   BMI 42.97 kg/m²    HENT:      Head: Normocephalic. Right Ear: Tympanic membrane normal.      Left Ear: Tympanic membrane normal.      Nose: Nose normal.      Mouth/Throat:      Mouth: Mucous membranes are moist.      Pharynx: Oropharynx is clear.    Eyes:      Conjunctiva/sclera: Conjunctivae normal.   Neck: Thyroid: No thyromegaly. Cardiovascular:      Rate and Rhythm: Normal rate and regular rhythm. Pulmonary:      Breath sounds: Normal breath sounds. No rales. Abdominal:      General: Bowel sounds are normal.      Palpations: Abdomen is soft. Tenderness: There is no abdominal tenderness. Musculoskeletal:         General: No tenderness. Lymphadenopathy:      Cervical: No cervical adenopathy. Skin:     Findings: No rash. Neurological:      Mental Status: She is alert and oriented to person, place, and time.              Assessment/Plan:  1 Influenza A  2 Acute bacterial sinusitis     3 Diabetes medical Decision Making: high complexity  4 Hypertension    Robitussin-DM DM 2 teaspoons 3 times a day

## 2020-02-13 RX ORDER — TOPIRAMATE 100 MG/1
TABLET, FILM COATED ORAL
Qty: 270 TABLET | Refills: 4 | Status: SHIPPED | OUTPATIENT
Start: 2020-02-13 | End: 2022-04-20 | Stop reason: SDUPTHER

## 2020-02-24 ENCOUNTER — HOSPITAL ENCOUNTER (OUTPATIENT)
Dept: PAIN MANAGEMENT | Age: 50
Discharge: HOME | End: 2020-02-24
Payer: COMMERCIAL

## 2020-02-24 VITALS
RESPIRATION RATE: 16 BRPM | SYSTOLIC BLOOD PRESSURE: 130 MMHG | HEIGHT: 60 IN | DIASTOLIC BLOOD PRESSURE: 93 MMHG | WEIGHT: 220 LBS | TEMPERATURE: 98.2 F | BODY MASS INDEX: 43.19 KG/M2 | OXYGEN SATURATION: 96 % | HEART RATE: 72 BPM

## 2020-02-24 PROCEDURE — 99214 OFFICE O/P EST MOD 30 MIN: CPT | Performed by: PAIN MEDICINE

## 2020-02-24 PROCEDURE — 99213 OFFICE O/P EST LOW 20 MIN: CPT

## 2020-02-24 RX ORDER — TIZANIDINE 4 MG/1
4 TABLET ORAL EVERY 8 HOURS PRN
Qty: 270 TABLET | Refills: 0 | Status: SHIPPED | OUTPATIENT
Start: 2020-02-24 | End: 2020-03-03 | Stop reason: SDUPTHER

## 2020-02-24 RX ORDER — HYDROCODONE BITARTRATE AND ACETAMINOPHEN 5; 325 MG/1; MG/1
1 TABLET ORAL 3 TIMES DAILY PRN
Qty: 90 TABLET | Refills: 0 | Status: SHIPPED | OUTPATIENT
Start: 2020-02-29 | End: 2020-03-20 | Stop reason: SDUPTHER

## 2020-02-24 ASSESSMENT — ENCOUNTER SYMPTOMS
ABDOMINAL PAIN: 0
COUGH: 0
BACK PAIN: 1
EYE PAIN: 0
CONSTIPATION: 1
SHORTNESS OF BREATH: 1
SORE THROAT: 0
COLOR CHANGE: 0

## 2020-02-24 ASSESSMENT — PAIN DESCRIPTION - PROGRESSION: CLINICAL_PROGRESSION: GRADUALLY WORSENING

## 2020-02-24 ASSESSMENT — PAIN DESCRIPTION - PAIN TYPE: TYPE: CHRONIC PAIN

## 2020-02-24 ASSESSMENT — PAIN - FUNCTIONAL ASSESSMENT: PAIN_FUNCTIONAL_ASSESSMENT: PREVENTS OR INTERFERES SOME ACTIVE ACTIVITIES AND ADLS

## 2020-02-24 ASSESSMENT — PAIN DESCRIPTION - DESCRIPTORS: DESCRIPTORS: SHOOTING;ACHING;CONSTANT

## 2020-02-24 ASSESSMENT — ACTIVITIES OF DAILY LIVING (ADL): EFFECT OF PAIN ON DAILY ACTIVITIES: PAIN INCREASES WITH WALKING, STANDING

## 2020-02-24 ASSESSMENT — PAIN DESCRIPTION - FREQUENCY: FREQUENCY: CONTINUOUS

## 2020-02-24 ASSESSMENT — PAIN SCALES - GENERAL: PAINLEVEL_OUTOF10: 5

## 2020-02-24 ASSESSMENT — PAIN DESCRIPTION - ORIENTATION: ORIENTATION: LOWER;RIGHT;LEFT

## 2020-02-24 ASSESSMENT — PAIN DESCRIPTION - ONSET: ONSET: ON-GOING

## 2020-02-24 ASSESSMENT — PAIN DESCRIPTION - LOCATION: LOCATION: BACK

## 2020-02-24 NOTE — PROGRESS NOTES
patient  Currently seeing a Psychiatrist or Psychologist:  No  Emotional Issues: depression, patient denies suicidal thoughts, anxiety   Mood: appropriate     ABERRANT BEHAVIORS SINCE LAST VISIT:  Have you ever been treated in another Pain Clinic no  Refills for prescriptions appropriate: yes  Lost rx/pills:no  Taking more medication than prescribed:  no  Are you receiving PAIN medications from  other doctors: no  Last Urine/Serum Drug Screen : 6-2019  Was Serum/UDS as anticipated? yes  Brought pill bottles in :brought Norco only   Was Pill count appropriate? :yes   Are currently pregnant?not applicable  Recent ER visits: Yes.  February 3- \"flu like symptoms\"             Past Medical History      Diagnosis Date    Anxiety     Asthma     Colon polyp 10/31/2016    sessile serated adenoma x2    Depression     Diabetes mellitus ([facility] [address]     Diverticulitis 10/2016    Gastritis     GERD (gastroesophageal reflux disease)     Hemorrhoids     int/ext    Hypertension     Migraines     Osteoarthritis     Shingles 1-22-16    Tubular adenoma 10/31/2016    Urinary leakage        Surgical History  Past Surgical History:   Procedure Laterality Date    CERVICAL DISCECTOMY  12/2013    & fusion     CHOLECYSTECTOMY      COLONOSCOPY  10/31/2016    int/ext hemorrhoids; sessile serated adenomax2; tubular adenoma    COLONOSCOPY N/A 10/22/2019    COLONOSCOPY POLYPECTOMY SNARE/COLD BIOPSY AND RANDOM BIOPSIES performed by Luis Manuel Taylor MD at [address] [facility]      HAND SURGERY Right     thumb surgery, BONE REMOVED    HYSTERECTOMY      LAPAROSCOPY      MA DEST,PARAVERTEBRAL,L/S,ADDL LVLS  3/25/2019         TONSILLECTOMY      TUBAL LIGATION      UPPER GASTROINTESTINAL ENDOSCOPY  10/31/2016    gastritis    UPPER GASTROINTESTINAL ENDOSCOPY N/A 10/22/2019    EGD BIOPSY performed by Luis Manuel Taylor MD at [address] [facility] ENDO       Medications  Current Outpatient Medications   Medication Sig Dispense Refill    [START ON 2/29/2020] HYDROcodone-acetaminophen (NORCO) 5-325 MG per tablet Take 1 tablet by mouth 3 times daily as needed for Pain for up to 30 days. Indications: Pain 90 tablet 0    tiZANidine (ZANAFLEX) 4 MG tablet Take 1 tablet by mouth every 8 hours as needed (spasms) 270 tablet 0    topiramate (TOPAMAX) 100 MG tablet TAKE 1 TABLET IN THE MORNING AND 2 TABLETS IN THE EVENING 270 tablet 4    ibuprofen (ADVIL;MOTRIN) 800 MG tablet Take 1 tablet by mouth every 8 hours as needed for Pain 45 tablet 0    budesonide-formoterol (SYMBICORT) 160-4.5 MCG/ACT AERO Inhale 2 puffs into the lungs 2 times daily for 15 days 1 Inhaler 0    albuterol sulfate (PROAIR RESPICLICK) 980 (90 Base) MCG/ACT aerosol powder inhalation Inhale 2 puffs into the lungs every 4 hours as needed for Wheezing or Shortness of Breath 1 Inhaler 2    metFORMIN (GLUCOPHAGE-XR) 500 MG extended release tablet TAKE 1 TABLET DAILY WITH BREAKFAST 90 tablet 1    atorvastatin (LIPITOR) 20 MG tablet TAKE 1 TABLET DAILY 90 tablet 1    fluticasone (FLONASE) 50 MCG/ACT nasal spray 2 sprays by Each Nostril route daily (Patient taking differently: 2 sprays by Each Nostril route as needed ) 1 Bottle 1    sertraline (ZOLOFT) 50 MG tablet TAKE 1 TABLET DAILY 90 tablet 1    pregabalin (LYRICA) 75 MG capsule Take 1 capsule by mouth 3 times daily for 90 days.  270 capsule 3    omeprazole (PRILOSEC) 40 MG delayed release capsule TAKE 1 CAPSULE DAILY 90 capsule 1    lisinopril (PRINIVIL;ZESTRIL) 5 MG tablet TAKE 1 TABLET DAILY 90 tablet 1    b complex vitamins capsule Take 1 capsule by mouth daily      docusate sodium (COLACE) 100 MG capsule Take 1 capsule by mouth daily as needed for Constipation 30 capsule 2    Melatonin ER 5 MG TBCR Take by mouth as needed      vitamin D (CHOLECALCIFEROL) 1000 UNIT TABS tablet Take 1,000 Units by mouth daily      Zinc 30 MG TABS Take by mouth daily       Ascorbic Acid (VITAMIN C) 500 MG CAPS Take by mouth      ECHINACEA PO Take by mouth      Probiotic Product (PROBIOTIC DAILY PO) Take 1 tablet by mouth daily.  Multiple Vitamins-Calcium (ONE-A-DAY WOMENS FORMULA PO) Take  by mouth.  ONETOUCH DELICA LANCETS 86F MISC USE 1 EACH TWICE A  each 1    blood glucose test strips (ASCENSIA AUTODISC VI;ONE TOUCH ULTRA TEST VI) strip 1 each by In Vitro route daily As needed. 200 each 1    Elastic Bandages & Supports (LUMBAR BACK BRACE/SUPPORT PAD) MISC 1 each by Does not apply route daily as needed (pain) 1 each 0     No current facility-administered medications for this encounter. Allergies  Adhesive tape; Imitrex [sumatriptan]; and Morphine    Family History  family history includes Cancer in her mother; Diabetes in her father; Heart Disease in her father; High Blood Pressure in her father; Other in an other family member.     Social History  Social History     Socioeconomic History    Marital status:      Spouse name: None    Number of children: None    Years of education: None    Highest education level: None   Occupational History    Occupation: unemployed   Social Needs    Financial resource strain: None    Food insecurity:     Worry: None     Inability: None    Transportation needs:     Medical: None     Non-medical: None   Tobacco Use    Smoking status: Never Smoker    Smokeless tobacco: Never Used   Substance and Sexual Activity    Alcohol use: No    Drug use: No    Sexual activity: Yes   Lifestyle    Physical activity:     Days per week: None     Minutes per session: None    Stress: None   Relationships    Social connections:     Talks on phone: None     Gets together: None     Attends Scientologist service: None     Active member of club or organization: None     Attends meetings of clubs or organizations: None     Relationship status: None    Intimate partner violence:     Fear of current or ex partner: None     Emotionally abused: None     Physically abused: None Forced sexual activity: None   Other Topics Concern    None   Social History Narrative    None      reports no history of drug use. REVIEW OF SYSTEMS:  Review of Systems   Constitutional: Positive for fatigue. Negative for activity change, chills and fever. HENT: Negative for congestion, ear pain and sore throat. Eyes: Negative for pain and visual disturbance. Wears eyeglasses   Respiratory: Positive for shortness of breath. Negative for cough. Cardiovascular: Negative for chest pain. Gastrointestinal: Positive for constipation. Negative for abdominal pain. Endocrine: Negative for cold intolerance and polyuria. Genitourinary: Negative for difficulty urinating, dysuria and hematuria. Musculoskeletal: Positive for arthralgias and back pain. Skin: Negative for color change and rash. Neurological: Positive for weakness. Negative for tremors, seizures and speech difficulty. Intermittent weakness left leg   Hematological: Does not bruise/bleed easily. Psychiatric/Behavioral: Positive for sleep disturbance. Negative for behavioral problems and suicidal ideas. The patient is nervous/anxious. GENERAL PHYSICAL EXAM:  Vitals: BP (!) 130/93   Pulse 72   Temp 98.2 °F (36.8 °C) (Oral)   Resp 16   Ht 5' (1.524 m)   Wt 220 lb (99.8 kg)   SpO2 96%   BMI 42.97 kg/m² , Body mass index is 42.97 kg/m². Physical Exam  Constitutional:       Appearance: Normal appearance. She is well-developed. She is obese. HENT:      Head: Normocephalic and atraumatic. Mouth/Throat:      Mouth: Mucous membranes are moist.   Eyes:      Conjunctiva/sclera: Conjunctivae normal.      Pupils: Pupils are equal, round, and reactive to light. Neck:      Musculoskeletal: Normal range of motion and neck supple. Thyroid: No thyromegaly. Trachea: No tracheal deviation. Cardiovascular:      Rate and Rhythm: Normal rate and regular rhythm.    Pulmonary:      Effort: Pulmonary left: negative    Other   Sensation: normal  Gait: antalgic   Erythema: no back redness  Scars: absent        Nurses Notes and Vital Signs reviewed. DATA  Labs:  Benzodiazepine Screen, Urine   Date Value Ref Range Status   09/19/2014 NEGATIVE NEG Final     Comment:           (Positive cutoff 200 ng/mL)                      Imaging:  Radiology Images and Reports reviewed where indicated and necessary  [address] [facility], 9/13/2019 12:57 pm       TECHNIQUE:   Multiplanar multisequence MRI of the lumbar spine was performed without the   administration of intravenous contrast.       COMPARISON:   None       HISTORY:   ORDERING SYSTEM PROVIDED HISTORY: Chronic bilateral low back pain without   sciatica. TECHNOLOGIST PROVIDED HISTORY:   Reason for Exam: Pt c/o chronic low back pain x years and bilat hip x couple   months.    Acuity: Unknown   Type of Exam: Ongoing       FINDINGS:   BONES/ALIGNMENT: Straightening of the lumbar lordosis.  The signal   characteristics of the bone marrow are normal.  No evidence of a marrow   replacing process.  No evidence of fracture.       SPINAL CORD: The conus terminates normally.       SOFT TISSUES: No paraspinal mass identified.       L1-L2: No significant disc bulge, spinal stenosis, or neural foraminal   stenosis.       L2-L3: Right foraminal disc protrusion superimposed upon circumferential disc   bulge.  Effacement of the exiting right L2 nerve root and the descending   right L3 nerve root in the lateral recess.  Facet and ligamentum flavum   hypertrophy.  Mild canal narrowing.  Moderate to severe right neural   foraminal narrowing.       L3-L4: Circumferential disc bulge with facet and ligamentum flavum   hypertrophy.  Mild spinal canal narrowing.  Mild bilateral neural foraminal   narrowing.       L4-L5: Circumferential disc bulge eccentric to left.  Facet and ligamentum   flavum hypertrophy.  Mild canal narrowing.  Mild bilateral neural foraminal   narrowing.     by me with the use of a spine model. Orders Placed This Encounter   Procedures    Fluoro For Surgical Procedures     Standing Status:   Future     Standing Expiration Date:   2/24/2021    AK INJ DX/THER AGNT PARAVERT FACET JOINT, LUMBAR/SAC, 1ST LEVEL    Saline lock IV     Standing Status:   Future     Standing Expiration Date:   8/24/2021       Decision Making Process : Patient's health history and referral records thoroughly reviewed before focused physical examination and discussion with patient. I have spent 20 Over 50% of today's visit is spent on examining the patient and counseling and coordinating the care. Level of complexity of date to be reviewed is Moderate. The chart date reviewed include the following: Imaging Reports. Summary of Care. Time spent reviewing with patient the below reports:   Medication safety, Treatment options. Level of diagnosis and management options of this case is multiple: involving the following management options: Interventions as needed, medication management as appropriate, future visits, activity modification, heat/ice as needed, Urine drug screen as required. [x]The patient's questions were answered to the best of my abilities. This note was created using voice recognition software. There may be inaccuracies of transcription  that are inadvertently overlooked prior to the signature. There is any questions about the transcription please contact me.     Electronically signed by Susan Vega MD on 2/24/2020 at 11:39 AM

## 2020-03-03 ENCOUNTER — HOSPITAL ENCOUNTER (OUTPATIENT)
Dept: PAIN MANAGEMENT | Age: 50
Discharge: HOME | End: 2020-03-03
Payer: COMMERCIAL

## 2020-03-03 ENCOUNTER — HOSPITAL ENCOUNTER (OUTPATIENT)
Dept: GENERAL RADIOLOGY | Age: 50
Discharge: HOME | End: 2020-03-05
Payer: COMMERCIAL

## 2020-03-03 VITALS
TEMPERATURE: 98 F | HEIGHT: 60 IN | HEART RATE: 66 BPM | RESPIRATION RATE: 16 BRPM | SYSTOLIC BLOOD PRESSURE: 126 MMHG | OXYGEN SATURATION: 97 % | DIASTOLIC BLOOD PRESSURE: 87 MMHG | BODY MASS INDEX: 43.19 KG/M2 | WEIGHT: 220 LBS

## 2020-03-03 PROCEDURE — 6360000002 HC RX W HCPCS: Performed by: PAIN MEDICINE

## 2020-03-03 PROCEDURE — 6360000004 HC RX CONTRAST MEDICATION: Performed by: PAIN MEDICINE

## 2020-03-03 PROCEDURE — 64493 INJ PARAVERT F JNT L/S 1 LEV: CPT | Performed by: PAIN MEDICINE

## 2020-03-03 PROCEDURE — 64495 INJ PARAVERT F JNT L/S 3 LEV: CPT

## 2020-03-03 PROCEDURE — 2500000003 HC RX 250 WO HCPCS: Performed by: PAIN MEDICINE

## 2020-03-03 PROCEDURE — 64495 INJ PARAVERT F JNT L/S 3 LEV: CPT | Performed by: PAIN MEDICINE

## 2020-03-03 PROCEDURE — 64494 INJ PARAVERT F JNT L/S 2 LEV: CPT | Performed by: PAIN MEDICINE

## 2020-03-03 PROCEDURE — 64493 INJ PARAVERT F JNT L/S 1 LEV: CPT

## 2020-03-03 PROCEDURE — 3209999900 FLUORO FOR SURGICAL PROCEDURES

## 2020-03-03 PROCEDURE — 64494 INJ PARAVERT F JNT L/S 2 LEV: CPT

## 2020-03-03 RX ORDER — BUPIVACAINE HYDROCHLORIDE 5 MG/ML
INJECTION, SOLUTION EPIDURAL; INTRACAUDAL
Status: COMPLETED | OUTPATIENT
Start: 2020-03-03 | End: 2020-03-03

## 2020-03-03 RX ORDER — FENTANYL CITRATE 50 UG/ML
INJECTION, SOLUTION INTRAMUSCULAR; INTRAVENOUS
Status: COMPLETED | OUTPATIENT
Start: 2020-03-03 | End: 2020-03-03

## 2020-03-03 RX ORDER — TIZANIDINE 4 MG/1
4 TABLET ORAL EVERY 8 HOURS PRN
Qty: 270 TABLET | Refills: 0 | Status: SHIPPED | OUTPATIENT
Start: 2020-03-03 | End: 2020-04-27 | Stop reason: SDUPTHER

## 2020-03-03 RX ORDER — MIDAZOLAM HYDROCHLORIDE 1 MG/ML
INJECTION INTRAMUSCULAR; INTRAVENOUS
Status: COMPLETED | OUTPATIENT
Start: 2020-03-03 | End: 2020-03-03

## 2020-03-03 RX ORDER — TRIAMCINOLONE ACETONIDE 40 MG/ML
INJECTION, SUSPENSION INTRA-ARTICULAR; INTRAMUSCULAR
Status: COMPLETED | OUTPATIENT
Start: 2020-03-03 | End: 2020-03-03

## 2020-03-03 RX ADMIN — Medication 50 MCG: at 09:11

## 2020-03-03 RX ADMIN — TRIAMCINOLONE ACETONIDE 80 MG: 40 INJECTION, SUSPENSION INTRA-ARTICULAR; INTRAMUSCULAR at 09:24

## 2020-03-03 RX ADMIN — IOHEXOL 3 ML: 180 INJECTION INTRAVENOUS at 09:24

## 2020-03-03 RX ADMIN — BUPIVACAINE HYDROCHLORIDE 18 ML: 5 INJECTION, SOLUTION EPIDURAL; INTRACAUDAL; PERINEURAL at 09:24

## 2020-03-03 RX ADMIN — MIDAZOLAM 2 MG: 1 INJECTION INTRAMUSCULAR; INTRAVENOUS at 09:05

## 2020-03-03 ASSESSMENT — PAIN - FUNCTIONAL ASSESSMENT
PAIN_FUNCTIONAL_ASSESSMENT: 0-10

## 2020-03-03 ASSESSMENT — PAIN DESCRIPTION - DESCRIPTORS: DESCRIPTORS: ACHING;CONSTANT;SHOOTING

## 2020-03-03 NOTE — PROGRESS NOTES
Discharge criteria met. Patient alert and oriented x3  Post procedure dressing dry and intact. Sensory and motor function intact as per pre-procedure. Instructions and follow up reviewed with pt at patient at discharge.   Patient discharged ambulatory @ 9:45am            Patient discharged per wheelchair accompanied by volunteer and brother

## 2020-03-03 NOTE — PROCEDURES
Pre-Procedure Note    Patient Name: Steve Fang   YOB: 1970  Room/Bed: Room/bed info not found  Medical Record Number: [id number]  Date: 3/3/2020       Indication:    1. Lumbosacral spondylosis without myelopathy    2. Degeneration of lumbar intervertebral disc    3. Arthropathy of lumbar facet joint        Consent: On file. Vital Signs:   Vitals:    03/03/20 0941   BP: 126/87   Pulse: 66   Resp: 16   Temp:    SpO2: 97%       Past Medical History:   has a past medical history of Anxiety, Asthma, Colon polyp, Depression, Diabetes mellitus ([facility] [address] Diverticulitis, Gastritis, GERD (gastroesophageal reflux disease), Hemorrhoids, Hypertension, Migraines, Osteoarthritis, Shingles, Tubular adenoma, and Urinary leakage. Past Surgical History:   has a past surgical history that includes Hysterectomy; laparoscopy; Tonsillectomy; Hand surgery (Right); Tubal ligation; Cervical discectomy (12/2013); Cholecystectomy; Dilation and curettage of uterus; Colonoscopy (10/31/2016); Upper gastrointestinal endoscopy (10/31/2016); pr dest,paravertebral,l/s,addl lvls (3/25/2019); Colonoscopy (N/A, 10/22/2019); and Upper gastrointestinal endoscopy (N/A, 10/22/2019). Pre-Sedation Documentation and Exam:   Vital signs have been reviewed (see flow sheet for vitals). Mallampati Airway Assessment:  normal    ASA Classification:  Class 3 - A patient with severe systemic disease that limits activity but is not incapacitating    Sedation/ Anesthesia Plan:   intravenous sedation   as needed    Medications Planned:   midazolam (Versed) / Fentanyl  Intravenously  as needed. Patient is an appropriate candidate for plan of sedation: yes  Patient's History and Physical examination was reviewed and there is no change. Electronically signed by Collin Streeter MD on 3/3/2020 at 9:53 AM        Preoperative Diagnosis:    1. Lumbosacral spondylosis without myelopathy    2. Degeneration of lumbar intervertebral disc    3. Arthropathy of lumbar facet joint        Postoperative Diagnosis:   1. Lumbosacral spondylosis without myelopathy    2. Degeneration of lumbar intervertebral disc    3. Arthropathy of lumbar facet joint        Procedure Performed[de-identified]  Lumbar facet joint injections at the levels of T12-L1, L1-L2, L2-L3, L3-L4, L4-L5, L5-S1 on the Right side with fluoroscopy guidance, with IV sedation. Indication for the Procedure: The patient had history of chronic low back pain which is not responding well to the conservative treatment. The patient's pain is mostly axial in nature. Pain is interfering with the activities of daily living. Physical examination revealed facet tenderness and facet loading is positive. We decided to try lumbar facet joint injection for diagnostic as well as for therapeutic purposes. The procedure and its risks were discussed with the patient and an informed consent was obtained. .Current Pain Assessment  Pain Assessment  Pain Assessment: 0-10  Pain Level: 1  Patient's Stated Pain Goal: 2(Pain at 2 with increased activity)  Pain Type: Chronic pain  Pain Location: Back, Leg  Pain Orientation: Right, Left, Lower  Pain Radiating Towards: both legs  Pain Descriptors: Aching, Constant, Shooting  Pain Frequency: Continuous  Pain Onset: On-going  Clinical Progression: Not changed  Effect of Pain on Daily Activities: Pain incareases with walking and standing  Non-Pharmaceutical Pain Intervention(s): Rest, Repositioned   Procedure:   After starting IV patient was sedated with 2 mg of Midazolam and 50 mcg of Fentanyl intravenously by the RN under my direct supervision. Patient's vital signs including BP, EKG and SaO2 were monitored by RN and they remained stable during the procedure. A meaningful communication was kept up with the patient throughout   the procedure. The patient is placed in prone position. Skin over the back was prepped and draped in sterile manner.  Under fluoroscopy the facet joints were identified and were palpated, and the following joints were found to be tender:  T12-L1, L1-L2, L2-L3, L3-L4, L4-L5, L5-S1 on the Right side. Hence we decided to inject these joints in the following way:  Using fluoroscopy the facet joints were identified and by adjusting the angle of the fluoroscopy, the view of the joint space was optimized. The skin and deep tissues over the joint space were anesthetized with 1 ml of 0.5% Marcaine. The #22-gauge, 3-1/2 inch spinal needle was introduced through the skin wheal under fluoroscopoc guidance such that the tip of the needle lies in the joint space. This was confirmed by injecting about 0.5 ml of Omnipaque-180 through the needle and observing the spread of the contrast along the joint space. Then after negative aspiration a mixture of 0.5% Marcaine with triamcinolone was injected into the joint space. This was done at the levels of T12-L1, L1-L2, L2-L3, L3-L4, L4-L5, L5-S1 on the Right side. A total of 80 mg of triamcinolone and 12 ml of 0.5% Marcaine was used and was divided in equal amounts among the joints. After removing the needles Band-Aids were placed over the needle insertion sites. Patient's vital signs remained stable and tolerated the procedure well. The patient was discharged home in stable condition and will be followed in the pain clinic in the next few weeks for further planning.     Electronically signed by Aditya Zamarripa MD on 3/3/2020 at 9:53 AM

## 2020-03-04 ENCOUNTER — TELEPHONE (OUTPATIENT)
Dept: PAIN MANAGEMENT | Age: 50
End: 2020-03-04

## 2020-03-05 PROBLEM — J10.1 INFLUENZA A: Status: RESOLVED | Noted: 2020-02-03 | Resolved: 2020-03-05

## 2020-04-27 ENCOUNTER — HOSPITAL ENCOUNTER (OUTPATIENT)
Dept: PAIN MANAGEMENT | Age: 50
Discharge: HOME | End: 2020-04-27
Payer: COMMERCIAL

## 2020-04-27 PROCEDURE — 99213 OFFICE O/P EST LOW 20 MIN: CPT

## 2020-04-27 PROCEDURE — 99443 PR PHYS/QHP TELEPHONE EVALUATION 21-30 MIN: CPT | Performed by: PAIN MEDICINE

## 2020-04-27 RX ORDER — TIZANIDINE 4 MG/1
4 TABLET ORAL EVERY 8 HOURS PRN
Qty: 270 TABLET | Refills: 0 | Status: SHIPPED | OUTPATIENT
Start: 2020-04-27 | End: 2020-09-03 | Stop reason: SDUPTHER

## 2020-04-27 RX ORDER — HYDROCODONE BITARTRATE AND ACETAMINOPHEN 5; 325 MG/1; MG/1
1 TABLET ORAL 3 TIMES DAILY PRN
Qty: 90 TABLET | Refills: 0 | Status: SHIPPED | OUTPATIENT
Start: 2020-04-29 | End: 2020-05-27 | Stop reason: SDUPTHER

## 2020-04-27 NOTE — PROGRESS NOTES
(gastroesophageal reflux disease)     Hemorrhoids     int/ext    Hypertension     Migraines     Osteoarthritis     Shingles 1-22-16    Tubular adenoma 10/31/2016    Urinary leakage        Past Surgical History:   Procedure Laterality Date    CERVICAL DISCECTOMY  12/2013    & fusion     CHOLECYSTECTOMY      COLONOSCOPY  10/31/2016    int/ext hemorrhoids; sessile serated adenomax2; tubular adenoma    COLONOSCOPY N/A 10/22/2019    COLONOSCOPY POLYPECTOMY SNARE/COLD BIOPSY AND RANDOM BIOPSIES performed by Gemma Campos MD at [address] [facility]      HAND SURGERY Right     thumb surgery, BONE REMOVED    HYSTERECTOMY      LAPAROSCOPY      LA DEST,PARAVERTEBRAL,L/S,ADDL LVLS  3/25/2019         TONSILLECTOMY      TUBAL LIGATION      UPPER GASTROINTESTINAL ENDOSCOPY  10/31/2016    gastritis    UPPER GASTROINTESTINAL ENDOSCOPY N/A 10/22/2019    EGD BIOPSY performed by Gemma Campos MD at [address] [facility]       Family History   Problem Relation Age of Onset    Cancer Mother     Heart Disease Father     Diabetes Father     High Blood Pressure Father     Other Other         Celiac Sprue.  Aunt       Social History     Socioeconomic History    Marital status:      Spouse name: None    Number of children: None    Years of education: None    Highest education level: None   Occupational History    Occupation: unemployed   Social Needs    Financial resource strain: None    Food insecurity     Worry: None     Inability: None    Transportation needs     Medical: None     Non-medical: None   Tobacco Use    Smoking status: Never Smoker    Smokeless tobacco: Never Used   Substance and Sexual Activity    Alcohol use: No    Drug use: No    Sexual activity: Yes   Lifestyle    Physical activity     Days per week: None     Minutes per session: None    Stress: None   Relationships    Social connections     Talks on phone: None     Gets together: None     Attends we will try to taper pain medications, while adjusting the adjunct medications, and re-evaluating for Physical Therapy to improve spinal and joint strength. We will continue to have discussions to decrease pain medications as tolerated. I also discussed with the patient regarding the dangers of combining narcotic pain medication with tranquilizers, alcohol or illegal drugs or taking the medication any other than prescribed. The dangers including the respiratory depression and death. Patient was told to tell  to all  physicians regarding the medications he is getting from pain clinic. Patient is warned not to take any unprescribed medications over-the-counter medications that can depress breathing . Patient is advised to talk to the pharmacist or physicians if planning to take any over-the-counter medications before  takeing them. Patient is strongly advised to avoid tranquilizers or  Relaxants for any medications that can depress breathing or recreational drugs. Patient is also advised to tell us if there is any changes in his medications from other physicians. We discussed the same at today's visit and have not been to implement it, as the patient's pain is not under control with current medications. Decision Making Process : Patient's health history and referral records thoroughly reviewed before focused physical examination and discussion with patient. I have spent 20 mins. Over 50% of today's visit is spent on examining the patient and counseling and coordinating the care. Level of complexity of date to be reviewed is Moderate. The chart date reviewed include the following: Imaging Reports. Summary of Care. Time spent reviewing with patient the below reports:   Medication safety, Treatment options.     Level of diagnosis and management options of this case is multiple: involving the following management options: Interventions as needed, medication management as appropriate, future visits,

## 2020-04-28 ASSESSMENT — ENCOUNTER SYMPTOMS
VOMITING: 0
RESPIRATORY NEGATIVE: 1
ALLERGIC/IMMUNOLOGIC NEGATIVE: 1
SHORTNESS OF BREATH: 0
NAUSEA: 0
CHOKING: 0
BACK PAIN: 1
ABDOMINAL PAIN: 0
CONSTIPATION: 0
BOWEL INCONTINENCE: 0
CHEST TIGHTNESS: 0
EYE REDNESS: 0

## 2020-05-21 RX ORDER — ATORVASTATIN CALCIUM 20 MG/1
TABLET, FILM COATED ORAL
Qty: 90 TABLET | Refills: 0 | Status: SHIPPED | OUTPATIENT
Start: 2020-05-21 | End: 2020-09-04 | Stop reason: SDUPTHER

## 2020-05-27 ENCOUNTER — HOSPITAL ENCOUNTER (OUTPATIENT)
Dept: PAIN MANAGEMENT | Age: 50
Discharge: HOME | End: 2020-05-27
Payer: COMMERCIAL

## 2020-05-27 PROCEDURE — 99213 OFFICE O/P EST LOW 20 MIN: CPT

## 2020-05-27 PROCEDURE — 99442 PR PHYS/QHP TELEPHONE EVALUATION 11-20 MIN: CPT | Performed by: NURSE PRACTITIONER

## 2020-05-27 RX ORDER — HYDROCODONE BITARTRATE AND ACETAMINOPHEN 5; 325 MG/1; MG/1
1 TABLET ORAL 3 TIMES DAILY PRN
Qty: 90 TABLET | Refills: 0 | Status: SHIPPED | OUTPATIENT
Start: 2020-06-04 | End: 2020-06-25 | Stop reason: SDUPTHER

## 2020-05-27 ASSESSMENT — ENCOUNTER SYMPTOMS
SHORTNESS OF BREATH: 0
CONSTIPATION: 0
COUGH: 0
BACK PAIN: 1

## 2020-05-27 NOTE — PROGRESS NOTES
40 MG delayed release capsule, TAKE 1 CAPSULE DAILY, Disp: 90 capsule, Rfl: 1    pregabalin (LYRICA) 75 MG capsule, Take 1 capsule by mouth 3 times daily for 90 days. , Disp: 270 capsule, Rfl: 3    lisinopril (PRINIVIL;ZESTRIL) 5 MG tablet, TAKE 1 TABLET DAILY, Disp: 90 tablet, Rfl: 1    topiramate (TOPAMAX) 100 MG tablet, TAKE 1 TABLET IN THE MORNING AND 2 TABLETS IN THE EVENING, Disp: 270 tablet, Rfl: 4    budesonide-formoterol (SYMBICORT) 160-4.5 MCG/ACT AERO, Inhale 2 puffs into the lungs 2 times daily for 15 days, Disp: 1 Inhaler, Rfl: 0    albuterol sulfate (PROAIR RESPICLICK) 320 (90 Base) MCG/ACT aerosol powder inhalation, Inhale 2 puffs into the lungs every 4 hours as needed for Wheezing or Shortness of Breath, Disp: 1 Inhaler, Rfl: 2    metFORMIN (GLUCOPHAGE-XR) 500 MG extended release tablet, TAKE 1 TABLET DAILY WITH BREAKFAST, Disp: 90 tablet, Rfl: 1    fluticasone (FLONASE) 50 MCG/ACT nasal spray, 2 sprays by Each Nostril route daily (Patient taking differently: 2 sprays by Each Nostril route as needed ), Disp: 1 Bottle, Rfl: 1    ONETOUCH DELICA LANCETS 88S MISC, USE 1 EACH TWICE A DAY, Disp: 200 each, Rfl: 1    blood glucose test strips (ASCENSIA AUTODISC VI;ONE TOUCH ULTRA TEST VI) strip, 1 each by In Vitro route daily As needed. , Disp: 200 each, Rfl: 1    b complex vitamins capsule, Take 1 capsule by mouth daily, Disp: , Rfl:     docusate sodium (COLACE) 100 MG capsule, Take 1 capsule by mouth daily as needed for Constipation, Disp: 30 capsule, Rfl: 2    Elastic Bandages & Supports (LUMBAR BACK BRACE/SUPPORT PAD) MISC, 1 each by Does not apply route daily as needed (pain), Disp: 1 each, Rfl: 0    Melatonin ER 5 MG TBCR, Take by mouth as needed, Disp: , Rfl:     vitamin D (CHOLECALCIFEROL) 1000 UNIT TABS tablet, Take 1,000 Units by mouth daily, Disp: , Rfl:     Ascorbic Acid (VITAMIN C) 500 MG CAPS, Take by mouth, Disp: , Rfl:     ECHINACEA PO, Take by mouth, Disp: , Rfl:    Probiotic Product (PROBIOTIC DAILY PO), Take 1 tablet by mouth daily. , Disp: , Rfl:     Multiple Vitamins-Calcium (ONE-A-DAY WOMENS FORMULA PO), Take  by mouth., Disp: , Rfl:     Family History   Problem Relation Age of Onset    Cancer Mother     Heart Disease Father     Diabetes Father     High Blood Pressure Father     Other Other         Celiac Sprue. Aunt       Social History     Socioeconomic History    Marital status:      Spouse name: Not on file    Number of children: Not on file    Years of education: Not on file    Highest education level: Not on file   Occupational History    Occupation: unemployed   Social Needs    Financial resource strain: Not on file    Food insecurity     Worry: Not on file     Inability: Not on file   [employer] needs     Medical: Not on file     Non-medical: Not on file   Tobacco Use    Smoking status: Never Smoker    Smokeless tobacco: Never Used   Substance and Sexual Activity    Alcohol use: No    Drug use: No    Sexual activity: Yes   Lifestyle    Physical activity     Days per week: Not on file     Minutes per session: Not on file    Stress: Not on file   Relationships    Social connections     Talks on phone: Not on file     Gets together: Not on file     Attends Yazidism service: Not on file     Active member of club or organization: Not on file     Attends meetings of clubs or organizations: Not on file     Relationship status: Not on file    Intimate partner violence     Fear of current or ex partner: Not on file     Emotionally abused: Not on file     Physically abused: Not on file     Forced sexual activity: Not on file   Other Topics Concern    Not on file   Social History Narrative    Not on file       Review of Systems:  Review of Systems   Constitution: Negative for chills and fever. Cardiovascular: Negative for chest pain and palpitations. Respiratory: Negative for cough and shortness of breath.     Musculoskeletal: Positive sources and denies use of illegal drugs. Patient denies side effects from medications like nausea, vomiting, constipation or drowsiness. Patient reports current activities of daily living are possible due to medications and would like to continue them. As always, we encourage daily stretching and strengthening exercises, and recommend minimizing use of pain medications unless patient cannot get through daily activities due to pain. Contract requirements met. Continue opioid therapy. Script written for norco  Pt would like to schedule right lumbar RFA next month  UDS next office visit  Follow up appointment made for 4 weeks    Starla Hallman is a 51 y.o. female being evaluated by a Virtual Visit telephone encounter to address concerns as mentioned above. A caregiver was present when appropriate. Due to this being a TeleHealth encounter (During QYNYP-63 public health emergency), evaluation of the following organ systems was limited: Vitals/Constitutional/EENT/Resp/CV/GI//MS/Neuro/Skin/Heme-Lymph-Imm. Pursuant to the emergency declaration under the [address] [place] [address] authority and the [employer] Act, this Virtual Visit was conducted with patient's (and/or legal guardian's) consent, to reduce the patient's risk of exposure to COVID-19 and provide necessary medical care. The patient (and/or legal guardian) has also been advised to contact this office for worsening conditions or problems, and seek emergency medical treatment and/or call 911 if deemed necessary. Total time spent for this encounter: 15 minutes    Services were provided through a telephone discussion virtually to substitute for in-person clinic visit. Patient and provider were located at their individual homes. --BAR Butler CNP on 5/27/2020 at 12:47 PM    An electronic signature was used to authenticate this note.

## 2020-06-13 ENCOUNTER — APPOINTMENT (OUTPATIENT)
Dept: GENERAL RADIOLOGY | Age: 50
End: 2020-06-13
Payer: COMMERCIAL

## 2020-06-13 ENCOUNTER — HOSPITAL ENCOUNTER (EMERGENCY)
Age: 50
Discharge: HOME | End: 2020-06-13
Attending: EMERGENCY MEDICINE
Payer: COMMERCIAL

## 2020-06-13 VITALS
HEIGHT: 60 IN | RESPIRATION RATE: 16 BRPM | BODY MASS INDEX: 44.76 KG/M2 | HEART RATE: 65 BPM | OXYGEN SATURATION: 97 % | WEIGHT: 228 LBS | TEMPERATURE: 97.3 F

## 2020-06-13 PROCEDURE — 6370000000 HC RX 637 (ALT 250 FOR IP): Performed by: EMERGENCY MEDICINE

## 2020-06-13 PROCEDURE — 73502 X-RAY EXAM HIP UNI 2-3 VIEWS: CPT

## 2020-06-13 PROCEDURE — 99284 EMERGENCY DEPT VISIT MOD MDM: CPT

## 2020-06-13 RX ORDER — HYDROCODONE BITARTRATE AND ACETAMINOPHEN 5; 325 MG/1; MG/1
2 TABLET ORAL ONCE
Status: COMPLETED | OUTPATIENT
Start: 2020-06-13 | End: 2020-06-13

## 2020-06-13 RX ADMIN — HYDROCODONE BITARTRATE AND ACETAMINOPHEN 2 TABLET: 5; 325 TABLET ORAL at 15:06

## 2020-06-13 ASSESSMENT — PAIN SCALES - GENERAL
PAINLEVEL_OUTOF10: 8
PAINLEVEL_OUTOF10: 8

## 2020-06-13 ASSESSMENT — PAIN DESCRIPTION - LOCATION: LOCATION: HIP

## 2020-06-13 ASSESSMENT — ENCOUNTER SYMPTOMS
ABDOMINAL PAIN: 0
COUGH: 0

## 2020-06-13 ASSESSMENT — PAIN DESCRIPTION - ORIENTATION: ORIENTATION: LEFT

## 2020-06-13 ASSESSMENT — PAIN DESCRIPTION - PAIN TYPE: TYPE: ACUTE PAIN

## 2020-06-13 NOTE — ED PROVIDER NOTES
CHOLECYSTECTOMY      COLONOSCOPY  10/31/2016    int/ext hemorrhoids; sessile serated adenomax2; tubular adenoma    COLONOSCOPY N/A 10/22/2019    COLONOSCOPY POLYPECTOMY SNARE/COLD BIOPSY AND RANDOM BIOPSIES performed by Juan Rahman MD at [facility] HAND SURGERY Right     thumb surgery, BONE REMOVED    HYSTERECTOMY      LAPAROSCOPY      AK DEST,PARAVERTEBRAL,L/S,ADDL LVLS  3/25/2019         TONSILLECTOMY      TUBAL LIGATION      UPPER GASTROINTESTINAL ENDOSCOPY  10/31/2016    gastritis    UPPER GASTROINTESTINAL ENDOSCOPY N/A 10/22/2019    EGD BIOPSY performed by Juan Rahman MD at [address] [place] [address]       Discharge Medication List as of 6/13/2020  3:44 PM      CONTINUE these medications which have NOT CHANGED    Details   HYDROcodone-acetaminophen (NORCO) 5-325 MG per tablet Take 1 tablet by mouth 3 times daily as needed for Pain for up to 30 days. Indications: Pain, Disp-90 tablet, R-0Normal      atorvastatin (LIPITOR) 20 MG tablet TAKE 1 TABLET DAILY, Disp-90 tablet, R-0Normal      tiZANidine (ZANAFLEX) 4 MG tablet Take 1 tablet by mouth every 8 hours as needed (spasms), Disp-270 tablet, R-090 day supplyNormal      sertraline (ZOLOFT) 50 MG tablet TAKE 1 TABLET DAILY, Disp-90 tablet, R-1Normal      omeprazole (PRILOSEC) 40 MG delayed release capsule TAKE 1 CAPSULE DAILY, Disp-90 capsule, R-1Normal      pregabalin (LYRICA) 75 MG capsule Take 1 capsule by mouth 3 times daily for 90 days. , Disp-270 capsule, R-3Normal      lisinopril (PRINIVIL;ZESTRIL) 5 MG tablet TAKE 1 TABLET DAILY, Disp-90 tablet, R-1Normal      topiramate (TOPAMAX) 100 MG tablet TAKE 1 TABLET IN THE MORNING AND 2 TABLETS IN THE EVENING, Disp-270 tablet, R-4Normal      budesonide-formoterol (SYMBICORT) 160-4.5 MCG/ACT AERO Inhale 2 puffs into the lungs 2 times daily for 15 days, Disp-1 Inhaler, R-0Normal      albuterol sulfate (PROAIR RESPICLICK) 963 (90 Base) kg/m²     GEN: NAD  Head: NCAT  HEENT: PERRL. EOMI, No conjunctival hemorrhage. No pupil deformity. Negative chacon sign,  Negative raccoon eyes. Neck:  Cervical spine with no ttp. Chest: Ribs without TTP. No bruising, lacerations, or abrasions. CVS: RRR, no murmurs, no rubs, no muffled heart sounds. Bilateral radial, dp, and pt pulses are 2+. Pulm: CTA b/l. Normal breath sounds over all lung fields. Abd: soft, non-tender to palpation, no ecchymosis, or erythema, no guarding or rebound  Skin: no laceration   no abrasion there is ecchymosis over the left greater trochanter area   MSK: Full ROM at bilateral hip, no limitation with internal or external  Rotation  Neurologic: Patient is alert and oriented x3, motor and sensation is intact in all 4 extremities, speech is fluent  Extremities: DP, PT, Radial pulses are intact. MEDICAL DECISION MAKING:     MDM  51 y.o. female presenting with hip pain after fall. The patient is not on anticoagulation. No sign of head injury. Xray of hip. Providing analgesics. We'll reassess. Hospital course:   Xray shows no fracture or dislocation. D/w pt the results, treatment plan, warning precautions for prompt ED return and importance of close OP FU, she verbalizes understanding and agrees with the treatment plan. DIAGNOSTIC RESULTS     RADIOLOGY:All plain film, CT, MRI, and formal ultrasound images (except ED bedside ultrasound) are read by the radiologist and the images and interpretations are directly viewed by the emergency physician. XR HIP LEFT (2-3 VIEWS)   Final Result   Negative left hip with no acute osseous abnormality.              EMERGENCY DEPARTMENT COURSE:   Vitals:    Vitals:    06/13/20 1419   Pulse: 65   Resp: 16   Temp: 97.3 °F (36.3 °C)   TempSrc: Temporal   SpO2: 97%   Weight: 228 lb (103.4 kg)   Height: 5' (1.524 m)       The patient was given the following medications while in the emergency department:  Orders Placed Statement Selected

## 2020-06-15 ENCOUNTER — TELEPHONE (OUTPATIENT)
Dept: FAMILY MEDICINE CLINIC | Age: 50
End: 2020-06-15

## 2020-06-15 NOTE — TELEPHONE ENCOUNTER
[facility]) ED Follow up Call    Reason for ED visit:           Carlos Kiranmy , this is Jerry Smith from Dr. Kimebrly Shrestha office, just calling to see how you are doing after your recent ED visit. Did you receive discharge instructions? Yes  Do you understand the discharge instructions? Yes  Did the ED give you any new prescriptions? Yes  Were you able to fill your prescriptions? Yes      Do you have one of our red, yellow and green  Zone sheets that help you to determine when you should go to the ED? No      Do you need or want to make a follow up appt with your PCP? No    Do you have any further needs in the home i.e. Equipment?   No        FU appts/Provider:    Future Appointments   Date Time Provider Javier Carlos   6/25/2020 11:30 AM Raza Templeton 23

## 2020-06-16 RX ORDER — METFORMIN HYDROCHLORIDE 500 MG/1
TABLET, EXTENDED RELEASE ORAL
Qty: 90 TABLET | Refills: 0 | Status: SHIPPED | OUTPATIENT
Start: 2020-06-16 | End: 2020-09-08 | Stop reason: SDUPTHER

## 2020-06-16 NOTE — TELEPHONE ENCOUNTER
Please Approve or Refuse.   Send to Pharmacy per Pt's Request:      Next Visit Date:  Visit date not found   Last Visit Date: 2/6/2020    Hemoglobin A1C (%)   Date Value   02/06/2020 6.6   10/03/2019 6.3   06/03/2019 6.4             ( goal A1C is < 7)   BP Readings from Last 3 Encounters:   03/03/20 126/87   02/24/20 (!) 130/93   02/06/20 126/84          (goal 120/80)  BUN   Date Value Ref Range Status   02/03/2020 8 6 - 20 mg/dL Final     CREATININE   Date Value Ref Range Status   02/03/2020 0.90 0.50 - 0.90 mg/dL Final     Potassium   Date Value Ref Range Status   02/03/2020 3.9 3.7 - 5.3 mmol/L Final

## 2020-06-25 ENCOUNTER — HOSPITAL ENCOUNTER (OUTPATIENT)
Dept: PAIN MANAGEMENT | Age: 50
Discharge: HOME | End: 2020-06-25
Payer: COMMERCIAL

## 2020-06-25 PROCEDURE — 99213 OFFICE O/P EST LOW 20 MIN: CPT

## 2020-06-25 PROCEDURE — 99213 OFFICE O/P EST LOW 20 MIN: CPT | Performed by: NURSE PRACTITIONER

## 2020-06-25 RX ORDER — HYDROCODONE BITARTRATE AND ACETAMINOPHEN 5; 325 MG/1; MG/1
1 TABLET ORAL 3 TIMES DAILY PRN
Qty: 90 TABLET | Refills: 0 | Status: SHIPPED | OUTPATIENT
Start: 2020-07-06 | End: 2020-07-28 | Stop reason: SDUPTHER

## 2020-06-25 RX ORDER — PREGABALIN 75 MG/1
75 CAPSULE ORAL 4 TIMES DAILY
Qty: 360 CAPSULE | Refills: 0 | Status: SHIPPED | OUTPATIENT
Start: 2020-07-02 | End: 2020-12-04

## 2020-06-25 ASSESSMENT — ENCOUNTER SYMPTOMS
EYES NEGATIVE: 1
GASTROINTESTINAL NEGATIVE: 1
BACK PAIN: 1
RESPIRATORY NEGATIVE: 1

## 2020-06-25 NOTE — PROGRESS NOTES
[facility] PROGRESS NOTE      Patient video viist  to review Medication Agreement    Chief Complaint: low back pain    HPI: She c/o low back pain which has increased,since her fall. She had a fall about 2 weeks ago and went to the ED and had left hip x-ray done. The hip pain is improving. The pain radiates down her legs. She had right lumbar facet injection 3/2020 with T12-S1. She states has muscle spasms in her back and is not sleeping well. Back Pain   This is a chronic problem. The problem occurs constantly. The problem has been gradually worsening since onset. The pain is present in the lumbar spine. The quality of the pain is described as shooting (sharp). Radiates to: left hip and knee. The pain is at a severity of 8/10. The pain is severe. The pain is the same all the time. Exacerbated by: 1 posiiton too long. Associated symptoms include numbness. (Left leg) Risk factors include menopause and obesity. She has tried analgesics and heat (change position) for the symptoms. The treatment provided moderate relief. Patient denies any new neurological symptoms. No bowel or bladder incontinence, no weakness, and no falling. Any new diagnostic workup: [] no  [x] yes [x] Xray [] CT scan [] MRI [] DEXA scan     [] Other            EXAMINATION:   TWO XRAY VIEWS OF THE LEFT HIP       6/13/2020 3:08 pm       COMPARISON:   None.       HISTORY:   ORDERING SYSTEM PROVIDED HISTORY: fall hip pain       Reason for Exam: fall, left hip pain and bruising           FINDINGS:   No evidence of acute fracture.  There is normal alignment.  No acute joint   abnormality.  No focal osseous lesion.  No focal soft tissue abnormality.           Impression   Negative left hip with no acute osseous abnormality.               Treatment goals:  Functional status: get pain under control      Aberrancy:   Any alcoholic beverages   no    Any illegal drugs   no      Analgesia:8      Adverse  Effects :constipation, colace bm 1-22-16    Tubular adenoma 10/31/2016    Urinary leakage        Past Surgical History:   Procedure Laterality Date    CERVICAL DISCECTOMY  12/2013    & fusion     CHOLECYSTECTOMY      COLONOSCOPY  10/31/2016    int/ext hemorrhoids; sessile serated adenomax2; tubular adenoma    COLONOSCOPY N/A 10/22/2019    COLONOSCOPY POLYPECTOMY SNARE/COLD BIOPSY AND RANDOM BIOPSIES performed by Juan Rahman MD at [facility] 46 HAND SURGERY Right     thumb surgery, BONE REMOVED    HYSTERECTOMY      LAPAROSCOPY      ID DEST,PARAVERTEBRAL,L/S,ADDL LVLS  3/25/2019         TONSILLECTOMY      TUBAL LIGATION      UPPER GASTROINTESTINAL ENDOSCOPY  10/31/2016    gastritis    UPPER GASTROINTESTINAL ENDOSCOPY N/A 10/22/2019    EGD BIOPSY performed by Juan Rahman MD at [facility]       Allergies   Allergen Reactions    Adhesive Tape Other (See Comments)     blisters    Imitrex [Sumatriptan] Other (See Comments)     \"feels like head is going to explode    Morphine Itching     hives         Current Outpatient Medications:     metFORMIN (GLUCOPHAGE-XR) 500 MG extended release tablet, TAKE 1 TABLET DAILY WITH BREAKFAST, Disp: 90 tablet, Rfl: 0    HYDROcodone-acetaminophen (NORCO) 5-325 MG per tablet, Take 1 tablet by mouth 3 times daily as needed for Pain for up to 30 days. Indications: Pain, Disp: 90 tablet, Rfl: 0    atorvastatin (LIPITOR) 20 MG tablet, TAKE 1 TABLET DAILY, Disp: 90 tablet, Rfl: 0    tiZANidine (ZANAFLEX) 4 MG tablet, Take 1 tablet by mouth every 8 hours as needed (spasms), Disp: 270 tablet, Rfl: 0    sertraline (ZOLOFT) 50 MG tablet, TAKE 1 TABLET DAILY, Disp: 90 tablet, Rfl: 1    omeprazole (PRILOSEC) 40 MG delayed release capsule, TAKE 1 CAPSULE DAILY, Disp: 90 capsule, Rfl: 1    pregabalin (LYRICA) 75 MG capsule, Take 1 capsule by mouth 3 times daily for 90 days. , Disp: 270 capsule, Rfl: 3    lisinopril (PRINIVIL;ZESTRIL) 5 MG tablet, TAKE 1 TABLET DAILY, Disp: 90 tablet, Rfl: 1    b complex vitamins capsule, Take 1 capsule by mouth daily, Disp: , Rfl:     Probiotic Product (PROBIOTIC DAILY PO), Take 1 tablet by mouth daily. , Disp: , Rfl:     Multiple Vitamins-Calcium (ONE-A-DAY WOMENS FORMULA PO), Take  by mouth., Disp: , Rfl:     topiramate (TOPAMAX) 100 MG tablet, TAKE 1 TABLET IN THE MORNING AND 2 TABLETS IN THE EVENING, Disp: 270 tablet, Rfl: 4    albuterol sulfate (PROAIR RESPICLICK) 049 (90 Base) MCG/ACT aerosol powder inhalation, Inhale 2 puffs into the lungs every 4 hours as needed for Wheezing or Shortness of Breath, Disp: 1 Inhaler, Rfl: 2    fluticasone (FLONASE) 50 MCG/ACT nasal spray, 2 sprays by Each Nostril route daily (Patient taking differently: 2 sprays by Each Nostril route as needed ), Disp: 1 Bottle, Rfl: 1    ONETOUCH DELICA LANCETS 37L MISC, USE 1 EACH TWICE A DAY, Disp: 200 each, Rfl: 1    blood glucose test strips (ASCENSIA AUTODISC VI;ONE TOUCH ULTRA TEST VI) strip, 1 each by In Vitro route daily As needed. , Disp: 200 each, Rfl: 1    docusate sodium (COLACE) 100 MG capsule, Take 1 capsule by mouth daily as needed for Constipation, Disp: 30 capsule, Rfl: 2    Elastic Bandages & Supports (LUMBAR BACK BRACE/SUPPORT PAD) MISC, 1 each by Does not apply route daily as needed (pain), Disp: 1 each, Rfl: 0    Melatonin ER 5 MG TBCR, Take by mouth as needed, Disp: , Rfl:     vitamin D (CHOLECALCIFEROL) 1000 UNIT TABS tablet, Take 1,000 Units by mouth daily, Disp: , Rfl:     Ascorbic Acid (VITAMIN C) 500 MG CAPS, Take by mouth, Disp: , Rfl:     ECHINACEA PO, Take by mouth, Disp: , Rfl:     Family History   Problem Relation Age of Onset    Cancer Mother     Heart Disease Father     Diabetes Father     High Blood Pressure Father     Other Other         Celiac Sprue.  Aunt       Social History     Socioeconomic History    Marital status:      Spouse name: Not on file    Number of children: Not on file    Years of education: Not on file    Highest education level: Not on file   Occupational History    Occupation: unemployed   Social Needs    Financial resource strain: Not on file    Food insecurity     Worry: Not on file     Inability: Not on file   [employer] needs     Medical: Not on file     Non-medical: Not on file   Tobacco Use    Smoking status: Never Smoker    Smokeless tobacco: Never Used   Substance and Sexual Activity    Alcohol use: No    Drug use: No    Sexual activity: Yes   Lifestyle    Physical activity     Days per week: Not on file     Minutes per session: Not on file    Stress: Not on file   Relationships    Social connections     Talks on phone: Not on file     Gets together: Not on file     Attends [employer] service: Not on file     Active member of club or organization: Not on file     Attends meetings of clubs or organizations: Not on file     Relationship status: Not on file    Intimate partner violence     Fear of current or ex partner: Not on file     Emotionally abused: Not on file     Physically abused: Not on file     Forced sexual activity: Not on file   Other Topics Concern    Not on file   Social History Narrative    Not on file         Review of Systems:  Review of Systems   Constitution: Negative. HENT: Negative. Eyes: Negative. Cardiovascular: Negative. Respiratory: Negative. Endocrine:        Blood sugar 120   Hematologic/Lymphatic: Negative. Skin: Negative. Musculoskeletal: Positive for back pain, falls and joint pain. Gastrointestinal: Negative. Genitourinary: Negative. Neurological: Positive for numbness. Psychiatric/Behavioral: Positive for depression. Managing         Physical Exam:    Physical Exam  HENT:      Head: Normocephalic. Pulmonary:      Effort: Pulmonary effort is normal.   Skin:         Neurological:      Mental Status: She is alert.    Psychiatric:         Mood and Affect: Mood normal.         Thought Content: effects, risk of tolerance and or dependence and alternative treatments discussed    Obtaining appropriate analgesic effect of treatment   No signs of potential drug abuse or diversion identified    [x] Ill effects of being on chronic pain medications such as sleep disturbances, respiratory depression, hormonal changes, withdrawal symptoms, chronic opioid dependence and tolerance as well as risk of taking opioids with Benzodiazepines and taking opioids along with alcohol,  werediscussed with patient. I had asked the patient to minimize medication use and utilize pain medications only for uncontrolled rest pain or pain with exertional activities. I advised patient not to self-escalate painmedications without consulting with us. At each of patient's future visits we will try to taper pain medications, while adjusting the adjunct medications, and re-evaluating for Physical Therapy to improve spinal andjoint strength. We will continue to have discussions to decrease pain medications as tolerated. Counseled patient on effects their pain medication and /or their medical condition mayhave on their  ability to drive or operate machinery. Instructed not to drive or operate machinery if drowsy     I also discussed with the patient regarding the dangers of combining narcotic pain medication with tranquilizers, alcohol or illegal drugs or taking the medication any way other than prescribed. The dangers were discussed  including respiratory depression and death. Patient was told to tell  all  physicians regarding the medications he is getting from pain clinic. Patient is warned not to take any unprescribed medications over-the-countermedications that can depress breathing . Patient is advised to talk to the pharmacist or physicians if planning to take any over-the-counter medications before  takeing them.  Patient is strongly advised to avoid tranquilizers or  relaxants, illegal drugs  or any medications that can depress

## 2020-06-29 ENCOUNTER — HOSPITAL ENCOUNTER (OUTPATIENT)
Dept: GENERAL RADIOLOGY | Age: 50
Discharge: HOME | End: 2020-07-01
Payer: COMMERCIAL

## 2020-06-29 ENCOUNTER — HOSPITAL ENCOUNTER (OUTPATIENT)
Age: 50
Discharge: HOME | End: 2020-07-01
Payer: COMMERCIAL

## 2020-06-29 PROCEDURE — 72100 X-RAY EXAM L-S SPINE 2/3 VWS: CPT

## 2020-07-24 ENCOUNTER — HOSPITAL ENCOUNTER (OUTPATIENT)
Dept: PREADMISSION TESTING | Age: 50
Discharge: HOME | End: 2020-07-28
Payer: COMMERCIAL

## 2020-07-24 PROCEDURE — U0003 INFECTIOUS AGENT DETECTION BY NUCLEIC ACID (DNA OR RNA); SEVERE ACUTE RESPIRATORY SYNDROME CORONAVIRUS 2 (SARS-COV-2) (CORONAVIRUS DISEASE [COVID-19]), AMPLIFIED PROBE TECHNIQUE, MAKING USE OF HIGH THROUGHPUT TECHNOLOGIES AS DESCRIBED BY CMS-2020-01-R: HCPCS

## 2020-07-26 LAB — SARS-COV-2, NAA: NOT DETECTED

## 2020-07-27 ENCOUNTER — TELEPHONE (OUTPATIENT)
Dept: PAIN MANAGEMENT | Age: 50
End: 2020-07-27

## 2020-07-28 ENCOUNTER — HOSPITAL ENCOUNTER (OUTPATIENT)
Dept: PAIN MANAGEMENT | Age: 50
Discharge: HOME | End: 2020-07-28
Payer: COMMERCIAL

## 2020-07-28 ENCOUNTER — HOSPITAL ENCOUNTER (OUTPATIENT)
Dept: GENERAL RADIOLOGY | Age: 50
Discharge: HOME | End: 2020-07-30
Payer: COMMERCIAL

## 2020-07-28 VITALS
SYSTOLIC BLOOD PRESSURE: 119 MMHG | OXYGEN SATURATION: 97 % | DIASTOLIC BLOOD PRESSURE: 65 MMHG | HEART RATE: 54 BPM | TEMPERATURE: 98.8 F | WEIGHT: 229 LBS | BODY MASS INDEX: 44.96 KG/M2 | HEIGHT: 60 IN | RESPIRATION RATE: 16 BRPM

## 2020-07-28 PROCEDURE — 64636 DESTROY L/S FACET JNT ADDL: CPT

## 2020-07-28 PROCEDURE — 6360000002 HC RX W HCPCS: Performed by: PAIN MEDICINE

## 2020-07-28 PROCEDURE — 76000 FLUOROSCOPY <1 HR PHYS/QHP: CPT

## 2020-07-28 PROCEDURE — 2500000003 HC RX 250 WO HCPCS: Performed by: PAIN MEDICINE

## 2020-07-28 PROCEDURE — 64635 DESTROY LUMB/SAC FACET JNT: CPT | Performed by: PAIN MEDICINE

## 2020-07-28 PROCEDURE — 80307 DRUG TEST PRSMV CHEM ANLYZR: CPT

## 2020-07-28 PROCEDURE — 64635 DESTROY LUMB/SAC FACET JNT: CPT

## 2020-07-28 PROCEDURE — 64636 DESTROY L/S FACET JNT ADDL: CPT | Performed by: PAIN MEDICINE

## 2020-07-28 RX ORDER — HYDROCODONE BITARTRATE AND ACETAMINOPHEN 5; 325 MG/1; MG/1
1 TABLET ORAL 3 TIMES DAILY PRN
Qty: 90 TABLET | Refills: 0 | Status: SHIPPED | OUTPATIENT
Start: 2020-08-05 | End: 2020-09-03 | Stop reason: SDUPTHER

## 2020-07-28 RX ORDER — MIDAZOLAM HYDROCHLORIDE 1 MG/ML
INJECTION INTRAMUSCULAR; INTRAVENOUS
Status: COMPLETED | OUTPATIENT
Start: 2020-07-28 | End: 2020-07-28

## 2020-07-28 RX ORDER — TRIAMCINOLONE ACETONIDE 40 MG/ML
INJECTION, SUSPENSION INTRA-ARTICULAR; INTRAMUSCULAR
Status: COMPLETED | OUTPATIENT
Start: 2020-07-28 | End: 2020-07-28

## 2020-07-28 RX ORDER — FENTANYL CITRATE 50 UG/ML
INJECTION, SOLUTION INTRAMUSCULAR; INTRAVENOUS
Status: COMPLETED | OUTPATIENT
Start: 2020-07-28 | End: 2020-07-28

## 2020-07-28 RX ORDER — BUPIVACAINE HYDROCHLORIDE 5 MG/ML
INJECTION, SOLUTION EPIDURAL; INTRACAUDAL
Status: COMPLETED | OUTPATIENT
Start: 2020-07-28 | End: 2020-07-28

## 2020-07-28 RX ADMIN — BUPIVACAINE HYDROCHLORIDE 6 ML: 5 INJECTION, SOLUTION EPIDURAL; INTRACAUDAL; PERINEURAL at 09:08

## 2020-07-28 RX ADMIN — Medication 25 MCG: at 08:44

## 2020-07-28 RX ADMIN — TRIAMCINOLONE ACETONIDE 80 MG: 40 INJECTION, SUSPENSION INTRA-ARTICULAR; INTRAMUSCULAR at 08:56

## 2020-07-28 RX ADMIN — MIDAZOLAM 2 MG: 1 INJECTION INTRAMUSCULAR; INTRAVENOUS at 08:27

## 2020-07-28 RX ADMIN — Medication 50 MCG: at 08:32

## 2020-07-28 ASSESSMENT — PAIN DESCRIPTION - PAIN TYPE: TYPE: CHRONIC PAIN

## 2020-07-28 ASSESSMENT — PAIN DESCRIPTION - ORIENTATION: ORIENTATION: RIGHT;LEFT;LOWER

## 2020-07-28 ASSESSMENT — PAIN DESCRIPTION - PROGRESSION: CLINICAL_PROGRESSION: GRADUALLY WORSENING

## 2020-07-28 ASSESSMENT — PAIN - FUNCTIONAL ASSESSMENT
PAIN_FUNCTIONAL_ASSESSMENT: PREVENTS OR INTERFERES SOME ACTIVE ACTIVITIES AND ADLS
PAIN_FUNCTIONAL_ASSESSMENT: 0-10
PAIN_FUNCTIONAL_ASSESSMENT: 0-10

## 2020-07-28 ASSESSMENT — PAIN DESCRIPTION - LOCATION: LOCATION: BACK;HIP

## 2020-07-28 ASSESSMENT — PAIN SCALES - GENERAL: PAINLEVEL_OUTOF10: 5

## 2020-07-28 ASSESSMENT — PAIN DESCRIPTION - ONSET: ONSET: ON-GOING

## 2020-07-28 ASSESSMENT — PAIN DESCRIPTION - FREQUENCY: FREQUENCY: CONTINUOUS

## 2020-07-28 NOTE — PROCEDURES
Pre-Procedure Note    Patient Name: Zac High   YOB: 1970  Room/Bed: Room/bed info not found  Medical Record Number: [id number]  Date: 7/28/2020       Indication:    1. Lumbosacral spondylosis without myelopathy    2. Osteoarthritis of spine with radiculopathy, lumbar region    3. Degeneration of lumbar intervertebral disc    4. Arthropathy of lumbar facet joint        Consent: On file. Vital Signs:   Vitals:    07/28/20 0900   BP: 112/64   Pulse: 53   Resp: 11   Temp:    SpO2: 94%       Past Medical History:   has a past medical history of Anxiety, Asthma, Colon polyp, Depression, Diabetes mellitus ([address] Diverticulitis, Gastritis, GERD (gastroesophageal reflux disease), Hemorrhoids, Hypertension, Migraines, Osteoarthritis, Shingles, Tubular adenoma, and Urinary leakage. Past Surgical History:   has a past surgical history that includes Hysterectomy; laparoscopy; Tonsillectomy; Hand surgery (Right); Tubal ligation; Cervical discectomy (12/2013); Cholecystectomy; Dilation and curettage of uterus; Colonoscopy (10/31/2016); Upper gastrointestinal endoscopy (10/31/2016); pr dest,paravertebral,l/s,addl lvls (3/25/2019); Colonoscopy (N/A, 10/22/2019); and Upper gastrointestinal endoscopy (N/A, 10/22/2019). Pre-Sedation Documentation and Exam:   Vital signs have been reviewed (see flow sheet for vitals). Mallampati Airway Assessment:  normal    ASA Classification:  Class 3 - A patient with severe systemic disease that limits activity but is not incapacitating    Sedation/ Anesthesia Plan:   intravenous sedation   as needed. Medications Planned:   midazolam (Versed) / Fentanyl  Intravenously  as needed. Patient is an appropriate candidate for plan of sedation: yes  Patient's History and Physical examination was reviewed and there is no change. Electronically signed by Zeynep Storm MD on 7/28/2020 at 9:08 AM      Preoperative Diagnosis:    1.  Lumbosacral spondylosis without myelopathy    2. Osteoarthritis of spine with radiculopathy, lumbar region    3. Degeneration of lumbar intervertebral disc    4. Arthropathy of lumbar facet joint        Postoperative Diagnosis:    1. Lumbosacral spondylosis without myelopathy    2. Osteoarthritis of spine with radiculopathy, lumbar region    3. Degeneration of lumbar intervertebral disc    4. Arthropathy of lumbar facet joint        Procedure Performed:  :Radiofrequency ablation of median branches at the levels of L2, L3, L4, L5, [id number] under fluoroscopic guidance with IV sedation. Indication for the Procedure: The patient has ahistory of chronic low back pain that is not responding well to the conservative treatment. Patient's pain is mostly axial in nature. Pain is interfering with the activities of daily living. Physical examination revealed facet tenderness and facet loading is positive. Patient had undergone lumbar facet joint injections with pain relief that lasted for only a short period of time and had greater than 70% pain relief with confirmatory median branch blocks. Hence we decided to do radiofrequency abalation of median branches for long term pain releif. The procedure and risks  were discussed with the patient and an informed consent was obtained.   Current Pain Assessment  Pain Assessment  Pain Assessment: 0-10  Pain Level: 5  Pain Type: Chronic pain  Pain Location: Back, Hip  Pain Orientation: Right, Left, Lower  Pain Radiating Towards: back to hips to left leg to my knee  Pain Descriptors: Shooting, Sharp, Aching, Constant, Throbbing, Squeezing, Stabbing  Pain Frequency: Continuous  Pain Onset: On-going  Clinical Progression: Gradually worsening  Effect of Pain on Daily Activities: all activtites and while sitting  Functional Pain Assessment: Prevents or interferes some active activities and ADLs  Non-Pharmaceutical Pain Intervention(s): Heat applied, Massage, Repositioned, Rest, Cold applied     Procedure:    After starting an IV, the patient was sedated with 2 mg of Midazolam and 75 mcg of Fentanyl intravenously by the RN under my direct supervision. Patient's vital signs including BP, EKG and SaO2 were monitored by the RN and they remained stable during the procedure. A meaningful communication was kept up with the patient throughout the procedure. The patient is placed in prone position and skin over the back was prepped and draped in sterile manner. Then using fluoroscopy the junction of the transverse process of the vertebra with the superior process of the facet joint was observed and the view was optimized. The skin and deep tissues posterior were infiltrated with 2 ml of  0.5% Marcaine. For the procedure Drager Venom  RF cannulas were used. The RF canula with the 10mm active tip was introduced through the skin wheal under fluoroscopy guidance such that the tip of the needle lies in the groove of the transverse process with the superior processes of the facet joint. Then a lateral view of the lumbar spine was obtained to make sure the tip of needle is not in the neural foramen. Then electric impedence was checked to make sure it is acceptable. Then a sensory stimulus was applied at 50 Hz up to 1 volt and concordant pain symptoms were reproduced. Then a motor stimulus was applied at 2 Hz up to 2 volts and no motor stimulation was seen in lower extremities. Some multifidus stimulus was seen. Then after negative aspiration a mixture of Kenalog and 0.5%  Marcaine was injected through the needle. Then a initial lesion was done at 80 degrees centigrade for 90 seconds. Then the needle was repositioned such that it lies somewaht superior and medial to the origional position and a second lesion was applied. e. A total of 40 mg.of triamcinolone and 5 ml 0.5% Marcaine was used. This was done at the levels of L2, L3, L4, L5, S1 in similar fashion.      For L5 median branch block the junction of the ala of  the sacrum with the superior articular process of the facet joint was taken as a reference point. For the L4 median branch the junction of the transverse process of L5 with the superolateral possible facet joint was taken as a reference point and for S1 median branch the most lateral and superior aspect of S1 foramina was taken as a reference point,. For L3 median branch the junction of L4 transverse process and superior articular process of facet joint was taken as reference point and so on. Patient's vital signs and neurological status remained stable throughout the procedure and post procedural period. The patient tolerated the procedure well and was discharged home in stable condition.     Electronically signed by Baylee Robbins MD on 7/28/2020 at 9:08 AM

## 2020-07-28 NOTE — PROGRESS NOTES
Discharge criteria met. Patient alert and oriented x3  Post procedure dressing dry and intact. Sensory and motor function intact as per pre-procedure. Instructions and follow up reviewed with pt at patient at discharge.   Patient discharged per wheelchair, gait steady, Stefan-  @[phone]

## 2020-07-31 ENCOUNTER — TELEPHONE (OUTPATIENT)
Dept: PAIN MANAGEMENT | Age: 50
End: 2020-07-31

## 2020-08-01 LAB
6-ACETYLMORPHINE, UR: NOT DETECTED
7-AMINOCLONAZEPAM, URINE: NOT DETECTED
ALPHA-OH-ALPRAZ, URINE: NOT DETECTED
ALPRAZOLAM, URINE: NOT DETECTED
AMPHETAMINES, URINE: NOT DETECTED
BARBITURATES, URINE: NOT DETECTED
BENZOYLECGONINE, UR: NOT DETECTED
BUPRENORPHINE URINE: NOT DETECTED
CARISOPRODOL, UR: NOT DETECTED
CLONAZEPAM, URINE: NOT DETECTED
CODEINE, URINE: NOT DETECTED
CREATININE URINE: 227.9 MG/DL (ref 20–400)
DIAZEPAM, URINE: NOT DETECTED
DRUGS EXPECTED, UR: NORMAL
EER HI RES INTERP UR: NORMAL
ETHYL GLUCURONIDE UR: NOT DETECTED
FENTANYL URINE: NOT DETECTED
HYDROCODONE, URINE: PRESENT
HYDROMORPHONE, URINE: PRESENT
LORAZEPAM, URINE: NOT DETECTED
MARIJUANA METAB, UR: NOT DETECTED
MDA, UR: NOT DETECTED
MDEA, EVE, UR: NOT DETECTED
MDMA URINE: NOT DETECTED
MEPERIDINE METAB, UR: NOT DETECTED
METHADONE, URINE: NOT DETECTED
METHAMPHETAMINE, URINE: NOT DETECTED
METHYLPHENIDATE: NOT DETECTED
MIDAZOLAM, URINE: NOT DETECTED
MORPHINE URINE: NOT DETECTED
NORBUPRENORPHINE, URINE: NOT DETECTED
NORDIAZEPAM, URINE: NOT DETECTED
NORFENTANYL, URINE: NOT DETECTED
NORHYDROCODONE, URINE: PRESENT
NOROXYCODONE, URINE: NOT DETECTED
NOROXYMORPHONE, URINE: NOT DETECTED
OXAZEPAM, URINE: NOT DETECTED
OXYCODONE URINE: NOT DETECTED
OXYMORPHONE, URINE: NOT DETECTED
PAIN MANAGEMENT DRUG PANEL INTERP, URINE: NORMAL
PAIN MGT DRUG PANEL, HI RES, UR: NORMAL
PCP,URINE: NOT DETECTED
PHENTERMINE, UR: NOT DETECTED
PROPOXYPHENE, URINE: NOT DETECTED
TAPENTADOL, URINE: NOT DETECTED
TAPENTADOL-O-SULFATE, URINE: NOT DETECTED
TEMAZEPAM, URINE: NOT DETECTED
TRAMADOL, URINE: NOT DETECTED
ZOLPIDEM, URINE: NOT DETECTED

## 2020-08-04 ASSESSMENT — ENCOUNTER SYMPTOMS
SHORTNESS OF BREATH: 0
NAUSEA: 0
EYE REDNESS: 0
VOMITING: 0
ABDOMINAL PAIN: 0
BACK PAIN: 1
CHOKING: 0
RESPIRATORY NEGATIVE: 1
CONSTIPATION: 0
CHEST TIGHTNESS: 0
ALLERGIC/IMMUNOLOGIC NEGATIVE: 1
BOWEL INCONTINENCE: 0

## 2020-08-04 NOTE — PROGRESS NOTES
Patricio Sierra is a 53 y.o. female evaluated on 8/11/2020. Modality of virtual service provided -via Video+audio    Consent:  Patient and/or health care decision maker is aware that that patient may receive a bill for this telephone service, depending on one's insurance coverage, and has provided verbal consent to proceed: Yes    Patient identification was verified at the start of the visit: Yes    Chief complaint: Patricio Sierra is 53 y.o.,  female, with  No chief complaint on file. .      Patient is complaining of pain involving the low back area which is mostly axial in nature. She had undergone radiofrequency ablation of the medial branches at the levels of L2 L3-L4-L5 which helped her pain for a few months. She reports she is trying to exercise on a regular basis and walking every day. Her pain on the right side is better and is worse on the left side. Patient would like to try RFA on the left side at a later date    Back Pain   This is a chronic problem. The current episode started more than 1 year ago. The problem occurs constantly. The problem is unchanged. The pain is present in the lumbar spine and sacro-iliac. The quality of the pain is described as aching and shooting Joseline Ponto). Radiates to: Radiates to the hips mostly on the right side. The pain is at a severity of 5/10 (3-7). The pain is moderate. The pain is worse during the day. The symptoms are aggravated by bending, position, standing and twisting (Walking lifting her ADLs). Associated symptoms include weakness. Pertinent negatives include no abdominal pain, bladder incontinence, bowel incontinence, chest pain, dysuria, fever, numbness, paresthesias or tingling. Risk factors include lack of exercise and obesity.         Alleviating factors:heat and ice   Lifestyle changes experienced with pain: Wakes from sleep, Prevents or limits ADLs, Increases w/activity. , Increases w/prolonged sitting/standing/walking  Mood changes,irritable  Patient currently unemployed. Physical therapy did not help the pain. Are you under psychological counseling at present: No  Goals for treatment include:  Decrease in pain  Enjoy daily and recreational activities, return to previous status. Last procedure was 7/28/20Radiofrequency ablation of median branches at the levels of L2, L3, L4, L5, [id number]     Patient relates current medications are helping the pain. Patient reports taking pain medications as prescribed, denies obtaining medications from different sources and denies use of illegal drugs. Patient denies side effects from medications like nausea, vomiting, constipation or drowsiness. Patient reports current activities of daily living ar possible due to medications and would like to continue them. ACTIVITY/SOCIAL/EMOTIONAL:  Sleep Pattern: 6 hours per night.  difficulty falling asleep, nightime awakenings and difficulty falling back asleep if awakened  Home Exercises: daily walking  Activity:not significantly changed  Emotional Issues: normal.   Currently seeing a Psychiatrist or Psychologist:  No     ADVERSE MEDICATION EFFECTS:   Nausea and vomiting: no   Constipation: no-Undercontrol-: yes  Dizziness/drowsy/sleepy--no  Urinary Retention: no    ABERRANT BEHAVIORS SINCE LAST VISIT  Lost rx/pills:------------------------------------------ no  Taking  medication as prescribed: ----------- yes  Urine Drug Screen ---------------------------------  yes  Recent ER visits: -------------------------------------No  Pill count is appropriate: ---------------------------yes   Refills for prescriptions appropriate:---------- yes      Past Medical History:   Diagnosis Date    Anxiety     Asthma     Colon polyp 10/31/2016    sessile serated adenoma x2    Depression     Diabetes mellitus [address] [address]     Diverticulitis 10/2016    Gastritis     GERD (gastroesophageal reflux disease)     Hemorrhoids     int/ext    Hypertension     Migraines  Osteoarthritis     Shingles 1-22-16    Tubular adenoma 10/31/2016    Urinary leakage        Past Surgical History:   Procedure Laterality Date    CERVICAL DISCECTOMY  12/2013    & fusion     CHOLECYSTECTOMY      COLONOSCOPY  10/31/2016    int/ext hemorrhoids; sessile serated adenomax2; tubular adenoma    COLONOSCOPY N/A 10/22/2019    COLONOSCOPY POLYPECTOMY SNARE/COLD BIOPSY AND RANDOM BIOPSIES performed by Domingo Hubbard MD at [address] [place] [address]      HAND SURGERY Right     thumb surgery, BONE REMOVED    HYSTERECTOMY      LAPAROSCOPY      SD DEST,PARAVERTEBRAL,L/S,ADDL LVLS  3/25/2019         TONSILLECTOMY      TUBAL LIGATION      UPPER GASTROINTESTINAL ENDOSCOPY  10/31/2016    gastritis    UPPER GASTROINTESTINAL ENDOSCOPY N/A 10/22/2019    EGD BIOPSY performed by [facility]ingo Hubbard MD at [facility]       Family History   Problem Relation Age of Onset    Cancer Mother     Heart Disease Father     Diabetes Father     High Blood Pressure Father     Other Other         Celiac Sprue.  Aunt       Social History     Socioeconomic History    Marital status:      Spouse name: Not on file    Number of children: Not on file    Years of education: Not on file    Highest education level: Not on file   Occupational History    Occupation: unemployed   Social Needs    Financial resource strain: Not on file    Food insecurity     Worry: Not on file     Inability: Not on file   Floorball Gear needs     Medical: Not on file     Non-medical: Not on file   Tobacco Use    Smoking status: Never Smoker    Smokeless tobacco: Never Used   Substance and Sexual Activity    Alcohol use: No    Drug use: No    Sexual activity: Yes   Lifestyle    Physical activity     Days per week: Not on file     Minutes per session: Not on file    Stress: Not on file   Relationships    Social connections     Talks on phone: Not on file     Gets together: Not on file     Attends Yarsani service: Not on file     Active member of club or organization: Not on file     Attends meetings of clubs or organizations: Not on file     Relationship status: Not on file    Intimate partner violence     Fear of current or ex partner: Not on file     Emotionally abused: Not on file     Physically abused: Not on file     Forced sexual activity: Not on file   Other Topics Concern    Not on file   Social History Narrative    Not on file       Allergies   Allergen Reactions    Adhesive Tape Other (See Comments)     blisters    Imitrex [Sumatriptan] Other (See Comments)     \"feels like head is going to explode    Morphine Itching     hives       Current Outpatient Medications on File Prior to Encounter   Medication Sig Dispense Refill    HYDROcodone-acetaminophen (NORCO) 5-325 MG per tablet Take 1 tablet by mouth 3 times daily as needed for Pain for up to 30 days. Indications: Pain 90 tablet 0    pregabalin (LYRICA) 75 MG capsule Take 1 capsule by mouth 4 times daily for 90 days.  360 capsule 0    metFORMIN (GLUCOPHAGE-XR) 500 MG extended release tablet TAKE 1 TABLET DAILY WITH BREAKFAST 90 tablet 0    atorvastatin (LIPITOR) 20 MG tablet TAKE 1 TABLET DAILY 90 tablet 0    tiZANidine (ZANAFLEX) 4 MG tablet Take 1 tablet by mouth every 8 hours as needed (spasms) 270 tablet 0    sertraline (ZOLOFT) 50 MG tablet TAKE 1 TABLET DAILY 90 tablet 1    omeprazole (PRILOSEC) 40 MG delayed release capsule TAKE 1 CAPSULE DAILY 90 capsule 1    lisinopril (PRINIVIL;ZESTRIL) 5 MG tablet TAKE 1 TABLET DAILY 90 tablet 1    topiramate (TOPAMAX) 100 MG tablet TAKE 1 TABLET IN THE MORNING AND 2 TABLETS IN THE EVENING 270 tablet 4    albuterol sulfate (PROAIR RESPICLICK) 828 (90 Base) MCG/ACT aerosol powder inhalation Inhale 2 puffs into the lungs every 4 hours as needed for Wheezing or Shortness of Breath 1 Inhaler 2    fluticasone (FLONASE) 50 MCG/ACT nasal spray 2 sprays by Each Nostril route daily (Patient taking differently: 2 sprays by Each Nostril route as needed ) 1 Bottle 1    ONETOUCH DELICA LANCETS 18P MISC USE 1 EACH TWICE A  each 1    blood glucose test strips (ASCENSIA AUTODISC VI;ONE TOUCH ULTRA TEST VI) strip 1 each by In Vitro route daily As needed. 200 each 1    b complex vitamins capsule Take 1 capsule by mouth daily      docusate sodium (COLACE) 100 MG capsule Take 1 capsule by mouth daily as needed for Constipation 30 capsule 2    Elastic Bandages & Supports (LUMBAR BACK BRACE/SUPPORT PAD) MISC 1 each by Does not apply route daily as needed (pain) 1 each 0    Melatonin ER 5 MG TBCR Take by mouth as needed      vitamin D (CHOLECALCIFEROL) 1000 UNIT TABS tablet Take 1,000 Units by mouth daily      Ascorbic Acid (VITAMIN C) 500 MG CAPS Take by mouth      ECHINACEA PO Take by mouth      Probiotic Product (PROBIOTIC DAILY PO) Take 1 tablet by mouth daily.  Multiple Vitamins-Calcium (ONE-A-DAY WOMENS FORMULA PO) Take  by mouth. No current facility-administered medications on file prior to encounter. Review of Systems   Constitutional: Positive for fatigue. Negative for activity change, fever and unexpected weight change. HENT: Negative for congestion, ear pain and rhinorrhea. Eyes: Negative for redness and visual disturbance. Respiratory: Negative. Negative for choking, chest tightness and shortness of breath. Cardiovascular: Negative. Negative for chest pain and palpitations. Gastrointestinal: Negative for abdominal pain, bowel incontinence, constipation, nausea and vomiting. Endocrine: Negative. Negative for heat intolerance and polyuria. Genitourinary: Negative. Negative for bladder incontinence, dysuria and hematuria. Musculoskeletal: Positive for back pain. Negative for arthralgias. Skin: Negative for rash and wound. Allergic/Immunologic: Negative. Neurological: Positive for weakness. Negative for tingling, numbness and paresthesias. Hematological: Negative. Psychiatric/Behavioral: Positive for sleep disturbance. Negative for self-injury and suicidal ideas. The patient is not nervous/anxious. Physical Exam  Skin:         Neurological:      Mental Status: She is alert and oriented to person, place, and time. Cranial Nerves: No cranial nerve deficit. Psychiatric:         Mood and Affect: Mood normal.            DATA:  LAB.:  8/1/2020 12:06 AM - [place] Incoming Lab Results From [facility]     Component  Value  Ref Range & Units  Status  Collected  Lab    Pain Management Drug Panel Interp, Urine  Consistent   Final  07/28/2020  3:50 PM  [facility]    (NOTE)   ________________________________________________________________   DRUGS EXPECTED:   [address] (HYDROCODONE)   ________________________________________________________________   [id number] with medications provided:   [address] (HYDROCODONE): based on hydrocodone, norhydrocodone,   hydromorphone   ________________________________________________________________   Drugs Not Included in this Assay:   Acetaminophen   ________________________________________________________________        X-Ray reports:  Technique: Multiplanar multisequence MR imaging of the lumbar spine was performed without intravenous contrast.   Findings: Straightening of normal lumbar lordotic curvature. Visualized spinal cord demonstrates no evidence for cord edema. Conus terminus at approximately L1 level. Cauda equina nerve roots follow spinal curvature. Vertebral body heights are maintained slight heterogeneity of the marrow signal without evidence for marrow edema. L1-L2 level: Bilateral facet hypertrophy without significant central canal stenosis or neuroforaminal narrowing.    L2-L3 level: Bilateral facet hypertrophy with broad-based disc bulge and ligamentum flavum thickening and a superimposed right foraminal and extraforaminal levels disc protrusion with mild to moderate right-sided neuroforaminal narrowing without significant central canal stenosis. L3-L4 level: Bilateral facet hypertrophy with broad-based disc bulge and left extraforaminal level disc protrusion with mild approximation of the left L3 nerve root without significant central canal stenosis. L4-5 level: Broad-based disc bulge with facet hypertrophy without significant central canal stenosis and mild bilateral neuroforaminal narrowing. L5-S1 level: Broad-based disc bulge and facet hypertrophy and mild to moderate bilateral neuroforaminal narrowing without significant central canal stenosis. Paraspinous soft tissues demonstrate no discrete mass. Impression: Lumbar spondylotic changes without evidence for significant central canal stenosis. Please see above level by level complete analysis     Final report electronically signed by Baylee Wagner M.D. on 3/27/2016 12:07 PM    Clinical  impression:  1. Lumbosacral spondylosis without myelopathy    2. Osteoarthritis of spine with radiculopathy, lumbar region    3. Degeneration of lumbar intervertebral disc    4. Arthropathy of lumbar facet joint    5. Lumbar spondylosis    6. Lumbar radiculopathy, chronic    7. Degenerative disc disease, cervical    8. Cervical disc herniation    9. Rheumatoid arthritis, involving unspecified site, unspecified rheumatoid factor presence ([address]    10. Sacroiliitis ([address]    11. Morbid obesity with BMI of 40.0-44.9, adult ([address]    12. Chronic, continuous use of opioids    13. Encounter for medication management        Plan of care:  Patient had undergone radiofrequency ablation on the right side in the lumbar region which apparently helped the right side pain. She has some left side pain but would like to wait few weeks before deciding to proceed with RFA on the left side. Her pain is worse on the left side at this time. We will continue current pain medications  Current medications are being tolerated without any Adverse side effects.   Patient does not need refill can depress breathing or recreational drugs. Patient is also advised to tell us if there is any changes in his medications from other physicians. We discussed the same at today's visit and have not been to implement it, as the patient's pain is somewhat under control with current medications. Decision Making Process : Patient's health history and referral records thoroughly reviewed before focused physical examination and discussion with patient. I have spent 20 mins. Over 50% of today's visit is spent on examining the patient and counseling and coordinating the care. Level of complexity of date to be reviewed is Moderate. The chart date reviewed include the following: Imaging Reports. Summary of Care. Time spent reviewing with patient the below reports:   Medication safety, Treatment options. Level of diagnosis and management options of this case is multiple: involving the following management options: Interventions as needed, medication management as appropriate, future visits, activity modification, heat/ice as needed, Urine drug screen as required. [x]The patient's questions were answered to the best of my abilities. This note was created using voice recognition software. There may be inaccuracies of transcription  that are inadvertently overlooked prior to the signature. There is any questions about the transcription please contact me. Return in  4 weeks  with physician / CNP  for further plan of treatment. Due to the COVID-19 pandemic and the appropriate interventions by Rico Guardado, our non-urgent pain management patients will not be seen in the office at this time for their protection and the protection of our staff.  To offer continuity of care, their prescriptions will be escribed this month after a careful chart review and review of their OARRS report  Pursuant to the emergency declaration under the [employer] and [place] waiver authority and the [employer] and [employer] Act, this Virtual Visit was conducted, with patient's consent, to reduce the patient's risk of exposure to COVID-19 and provide continuity of care for an established patient. Services were provided through a video synchronous discussion virtually to substitute for in-person appointment. \"  Documentation:  I communicated with the patient and/or health care decision maker about plan of care  Details of this discussion including any medical advice provided: Total Time: minutes: 11-20 minutes    I affirm this is a Patient Initiated Episode with an Established Patient who has not had a related appointment within my department in the past 7 days or scheduled within the next 24 hours.     Electronically signed by Yovana Will MD on 8/11/2020 at 11:21 PM

## 2020-08-11 ENCOUNTER — HOSPITAL ENCOUNTER (OUTPATIENT)
Dept: PAIN MANAGEMENT | Age: 50
Discharge: HOME | End: 2020-08-11
Payer: COMMERCIAL

## 2020-08-11 PROCEDURE — 99213 OFFICE O/P EST LOW 20 MIN: CPT | Performed by: PAIN MEDICINE

## 2020-08-11 PROCEDURE — 99213 OFFICE O/P EST LOW 20 MIN: CPT

## 2020-08-11 ASSESSMENT — ENCOUNTER SYMPTOMS: RHINORRHEA: 0

## 2020-09-03 ENCOUNTER — HOSPITAL ENCOUNTER (OUTPATIENT)
Dept: PAIN MANAGEMENT | Age: 50
Discharge: HOME | End: 2020-09-03
Payer: COMMERCIAL

## 2020-09-03 PROCEDURE — 99213 OFFICE O/P EST LOW 20 MIN: CPT

## 2020-09-03 PROCEDURE — 99214 OFFICE O/P EST MOD 30 MIN: CPT | Performed by: NURSE PRACTITIONER

## 2020-09-03 RX ORDER — TIZANIDINE 4 MG/1
4 TABLET ORAL EVERY 8 HOURS PRN
Qty: 270 TABLET | Refills: 0 | Status: SHIPPED | OUTPATIENT
Start: 2020-09-03 | End: 2020-12-04 | Stop reason: SDUPTHER

## 2020-09-03 RX ORDER — HYDROCODONE BITARTRATE AND ACETAMINOPHEN 5; 325 MG/1; MG/1
1 TABLET ORAL 3 TIMES DAILY PRN
Qty: 90 TABLET | Refills: 0 | Status: SHIPPED | OUTPATIENT
Start: 2020-09-08 | End: 2020-10-02 | Stop reason: SDUPTHER

## 2020-09-03 ASSESSMENT — ENCOUNTER SYMPTOMS
CONSTIPATION: 0
BACK PAIN: 1
COUGH: 0
SHORTNESS OF BREATH: 0
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
## 2020-09-04 RX ORDER — ATORVASTATIN CALCIUM 20 MG/1
TABLET, FILM COATED ORAL
Qty: 90 TABLET | Refills: 0 | Status: SHIPPED | OUTPATIENT
Start: 2020-09-04 | End: 2020-09-08 | Stop reason: SDUPTHER

## 2020-09-04 NOTE — TELEPHONE ENCOUNTER
Last seen 2/6/20  Next visit 9/8/20    Next Visit Date:  Future Appointments   Date Time Provider Javier Carlos   9/8/2020 11:00 AM Marty Cramer, APRN - CNP [facility]   10/2/2020 11:30 AM Zac Swenson, APRN - CNP [address] [facility]   Topic Date Due    Diabetic retinal exam  08/08/1981    HIV screen  08/08/1986    Hepatitis B vaccine (1 of 3 - Risk 3-dose series) 08/08/1990    Cervical cancer screen  07/03/2017    Diabetic microalbuminuria test  12/18/2019    Flu vaccine (1) 09/01/2020    Diabetic foot exam  10/03/2020    Lipid screen  10/05/2020    Potassium monitoring  02/03/2021    Creatinine monitoring  02/03/2021    A1C test (Diabetic or Prediabetic)  02/06/2021    Colon cancer screen colonoscopy  10/22/2022    DTaP/Tdap/Td vaccine (2 - Td) 01/11/2028    Pneumococcal 0-64 years Vaccine  Completed    Hepatitis A vaccine  Aged Out    Hib vaccine  Aged Out    Meningococcal (ACWY) vaccine  Aged Out       Hemoglobin A1C (%)   Date Value   02/06/2020 6.6   10/03/2019 6.3   06/03/2019 6.4             ( goal A1C is < 7)   Microalb/Crt.  Ratio (mcg/mg creat)   Date Value   12/18/2018 CANNOT BE CALCULATED     LDL Cholesterol (mg/dL)   Date Value   02/26/2019 124     LDL Calculated (mg/dL)   Date Value   10/05/2019 71       (goal LDL is <100)   AST (U/L)   Date Value   10/05/2019 10     ALT (U/L)   Date Value   10/05/2019 13     BUN (mg/dL)   Date Value   02/03/2020 8     BP Readings from Last 3 Encounters:   07/28/20 119/65   03/03/20 126/87   02/24/20 (!) 130/93          (goal 120/80)    All Future Testing planned in [facility]  Lab Frequency Next Occurrence   DONNA DIGITAL SCREEN W CAD BILATERAL Once 01/28/2021   OR INJECT ANES/STEROID FORAMEN LUMBAR/SACRAL W IMG GUIDE ,EA ADD LEVEL Once 01/22/2020   FLUORO FOR SURGICAL PROCEDURES Once 03/03/2021   Saline lock IV Once 03/03/2021               Patient Active Problem List:     Degenerative disc disease, cervical     Cervical disc herniation     Lumbar radiculopathy, chronic     Degeneration of lumbar intervertebral disc     Lumbar spondylosis     Essential hypertension     Left-sided low back pain with left-sided sciatica     Osteoarthritis of lumbar spine     Sacroiliitis (HCC)     Rheumatoid arthritis (HCC)     Primary osteoarthritis of left hip     Arthropathy of lumbar facet joint     Hemorrhoids     Colon polyp     Tubular adenoma     Chronic bilateral low back pain without sciatica     Osteoarthritis of spine with radiculopathy, lumbar region     Encounter for medication management     Morbid obesity with BMI of 40.0-44.9, adult ([facility] [address]     Anxiety and depression     Type 2 diabetes mellitus without complication, without long-term current use of insulin ([facility])     Urinary incontinence     Chronic, continuous use of opioids     Chronic use of opiate drugs therapeutic purposes     Lumbosacral spondylosis without myelopathy     Chronic GERD     Bacterial sinusitis

## 2020-09-08 ENCOUNTER — VIRTUAL VISIT (OUTPATIENT)
Dept: FAMILY MEDICINE CLINIC | Age: 50
End: 2020-09-08
Payer: COMMERCIAL

## 2020-09-08 VITALS — BODY MASS INDEX: 43.94 KG/M2 | WEIGHT: 225 LBS

## 2020-09-08 PROBLEM — E78.2 MIXED HYPERLIPIDEMIA: Status: ACTIVE | Noted: 2020-09-08

## 2020-09-08 PROCEDURE — 99214 OFFICE O/P EST MOD 30 MIN: CPT | Performed by: FAMILY MEDICINE

## 2020-09-08 PROCEDURE — 81003 URINALYSIS AUTO W/O SCOPE: CPT | Performed by: FAMILY MEDICINE

## 2020-09-08 RX ORDER — METFORMIN HYDROCHLORIDE 500 MG/1
TABLET, EXTENDED RELEASE ORAL
Qty: 90 TABLET | Refills: 2 | Status: SHIPPED | OUTPATIENT
Start: 2020-09-08 | End: 2020-09-11

## 2020-09-08 RX ORDER — ATORVASTATIN CALCIUM 20 MG/1
TABLET, FILM COATED ORAL
Qty: 90 TABLET | Refills: 2 | Status: SHIPPED | OUTPATIENT
Start: 2020-09-08 | End: 2021-08-10

## 2020-09-08 RX ORDER — LANCETS 33 GAUGE
EACH MISCELLANEOUS
Qty: 200 EACH | Refills: 5 | Status: SHIPPED | OUTPATIENT
Start: 2020-09-08 | End: 2021-11-17

## 2020-09-08 RX ORDER — SERTRALINE HYDROCHLORIDE 100 MG/1
TABLET, FILM COATED ORAL
Qty: 90 TABLET | Refills: 2 | Status: SHIPPED | OUTPATIENT
Start: 2020-09-08 | End: 2021-05-21

## 2020-09-08 RX ORDER — BLOOD PRESSURE TEST KIT
KIT MISCELLANEOUS
Qty: 1 KIT | Refills: 1 | Status: SHIPPED | OUTPATIENT
Start: 2020-09-08

## 2020-09-08 RX ORDER — LISINOPRIL 5 MG/1
TABLET ORAL
Qty: 90 TABLET | Refills: 2 | Status: SHIPPED | OUTPATIENT
Start: 2020-09-08 | End: 2021-05-25

## 2020-09-08 ASSESSMENT — ANXIETY QUESTIONNAIRES
6. BECOMING EASILY ANNOYED OR IRRITABLE: 1-SEVERAL DAYS
3. WORRYING TOO MUCH ABOUT DIFFERENT THINGS: 1-SEVERAL DAYS
GAD7 TOTAL SCORE: 9
7. FEELING AFRAID AS IF SOMETHING AWFUL MIGHT HAPPEN: 2-OVER HALF THE DAYS
5. BEING SO RESTLESS THAT IT IS HARD TO SIT STILL: 1-SEVERAL DAYS
4. TROUBLE RELAXING: 1-SEVERAL DAYS
2. NOT BEING ABLE TO STOP OR CONTROL WORRYING: 2-OVER HALF THE DAYS
1. FEELING NERVOUS, ANXIOUS, OR ON EDGE: 1-SEVERAL DAYS

## 2020-09-08 ASSESSMENT — ENCOUNTER SYMPTOMS
ABDOMINAL PAIN: 0
EYE ITCHING: 0
SORE THROAT: 0
SHORTNESS OF BREATH: 0
COUGH: 0
WHEEZING: 0
NAUSEA: 0

## 2020-09-08 ASSESSMENT — PATIENT HEALTH QUESTIONNAIRE - PHQ9
2. FEELING DOWN, DEPRESSED OR HOPELESS: 0
1. LITTLE INTEREST OR PLEASURE IN DOING THINGS: 0
SUM OF ALL RESPONSES TO PHQ QUESTIONS 1-9: 0
SUM OF ALL RESPONSES TO PHQ9 QUESTIONS 1 & 2: 0
SUM OF ALL RESPONSES TO PHQ QUESTIONS 1-9: 0
9. THOUGHTS THAT YOU WOULD BE BETTER OFF DEAD, OR OF HURTING YOURSELF: 0
10. IF YOU CHECKED OFF ANY PROBLEMS, HOW DIFFICULT HAVE THESE PROBLEMS MADE IT FOR YOU TO DO YOUR WORK, TAKE CARE OF THINGS AT HOME, OR GET ALONG WITH OTHER PEOPLE: 0

## 2020-09-08 NOTE — PROGRESS NOTES
et al., 2008) is: 8.3%    Values used to calculate the score:      Age: 53 years      Sex: Female      Diabetic: Yes      Tobacco smoker: No      Systolic Blood Pressure: 180 mmHg      Is BP treated: Yes      HDL Cholesterol: 39 mg/dL      Total Cholesterol: 151 mg/dL  Lab Results   Component Value Date/Time    CHOL 151 10/05/2019 08:50 AM    CHOL 210 05/13/2014    HDL 39 (L) 10/05/2019 08:50 AM    LDLCHOLESTEROL 124 02/26/2019 10:25 AM    TRIG 205 (H) 10/05/2019 08:50 AM    CHOLHDLRATIO 3.9 10/05/2019 08:50 AM    VLDL NOT REPORTED (H) 02/26/2019 10:25 AM     Iqra Goodrich has a known history of rheumatoid arthritis. She is also morbidly obese. She is reported some weight gain. Patient is currently on Lyrica, tizanidine, and Norco.  Patient is established with the pain management. She sees him on a regular basis. Dr Ashley Schultz. DEPRESSION AND ANXIETY  Iqra Friedman reported some ongoing issues with anxiety. Symptoms includes irritable, psychomotor agitation, racing thoughts. Previous therapy Celexa. she reported mild with those therapies. Current therapy includes Celexa, which is not working well for her. she denies adverse reaction to current therapy. she also denies suicidal/homicidal ideation, plan or intent. PHQ-2 Over the past 2 weeks, how often have you been bothered by any of the following problems? Little interest or pleasure in doing things: Not at all  Feeling down, depressed, or hopeless: Not at all  PHQ-2 Score: 0 She was on a differenct type of medication in the past. She is willing to go up on the dose today  PHQ-9 Over the past 2 weeks, how often have you been bothered by any of the following problems?   Thoughts that you would be better off dead, or of hurting yourself in some way: Not at all  If you checked off any problems, how difficult have these problems made it for you to do your work, take care of things at home, or get along with other people?: Not difficult at all  PHQ-9 Total Date Noted    Mixed hyperlipidemia 09/08/2020    Bacterial sinusitis 02/04/2020    Chronic GERD     Lumbosacral spondylosis without myelopathy     Chronic use of opiate drugs therapeutic purposes 03/11/2019    Chronic, continuous use of opioids     Type 2 diabetes mellitus without complication, without long-term current use of insulin ([address] 10/02/2018    Urinary incontinence 10/02/2018    Anxiety and depression 11/03/2017    Morbid obesity with BMI of 40.0-44.9, adult ([address] 10/20/2017    Encounter for medication management     Osteoarthritis of spine with radiculopathy, lumbar region     Chronic bilateral low back pain without sciatica     Hemorrhoids     Colon polyp 10/31/2016    Tubular adenoma 10/31/2016    Arthropathy of lumbar facet joint     Rheumatoid arthritis ([address]     Primary osteoarthritis of left hip     Sacroiliitis ([address]     Left-sided low back pain with left-sided sciatica     Osteoarthritis of lumbar spine     Essential hypertension 11/03/2014    Lumbar radiculopathy, chronic 09/19/2014    Degeneration of lumbar intervertebral disc 09/19/2014    Lumbar spondylosis 09/19/2014    Cervical disc herniation 11/07/2013    Degenerative disc disease, cervical 10/08/2013        Past Medical History:   Diagnosis Date    Anxiety     Asthma     Colon polyp 10/31/2016    sessile serated adenoma x2    Depression     Diabetes mellitus ([address]     Diverticulitis 10/2016    Gastritis     GERD (gastroesophageal reflux disease)     Hemorrhoids     int/ext    Hypertension     Migraines     Osteoarthritis     Shingles 1-22-16    Tubular adenoma 10/31/2016    Urinary leakage      Past Surgical History:   Procedure Laterality Date    CERVICAL DISCECTOMY  12/2013    & fusion     CHOLECYSTECTOMY      COLONOSCOPY  10/31/2016    int/ext hemorrhoids; sessile serated adenomax2; tubular adenoma    COLONOSCOPY N/A 10/22/2019    COLONOSCOPY POLYPECTOMY SNARE/COLD BIOPSY AND RANDOM 2 sprays by Each Nostril route daily (Patient taking differently: 2 sprays by Each Nostril route as needed ) 1 Bottle 1    docusate sodium (COLACE) 100 MG capsule Take 1 capsule by mouth daily as needed for Constipation 30 capsule 2    Elastic Bandages & Supports (LUMBAR BACK BRACE/SUPPORT PAD) MISC 1 each by Does not apply route daily as needed (pain) 1 each 0    vitamin D (CHOLECALCIFEROL) 1000 UNIT TABS tablet Take 1,000 Units by mouth daily      ECHINACEA PO Take by mouth      Probiotic Product (PROBIOTIC DAILY PO) Take 1 tablet by mouth daily.  Multiple Vitamins-Calcium (ONE-A-DAY WOMENS FORMULA PO) Take  by mouth.  albuterol sulfate (PROAIR RESPICLICK) 827 (90 Base) MCG/ACT aerosol powder inhalation Inhale 2 puffs into the lungs every 4 hours as needed for Wheezing or Shortness of Breath (Patient not taking: Reported on 9/4/2020) 1 Inhaler 2    b complex vitamins capsule Take 1 capsule by mouth daily      Melatonin ER 5 MG TBCR Take by mouth as needed      Ascorbic Acid (VITAMIN C) 500 MG CAPS Take by mouth       No current facility-administered medications for this visit. Allergies   Allergen Reactions    Adhesive Tape Other (See Comments)     blisters    Imitrex [Sumatriptan] Other (See Comments)     \"feels like head is going to explode    Morphine Itching     hives        Prior to Visit Medications    Medication Sig Taking? Authorizing Provider   Blood Pressure Monitor KIT Use as directed.  Yes BAR Oliva CNP   lisinopril (PRINIVIL;ZESTRIL) 5 MG tablet TAKE 1 TABLET DAILY Yes BAR Oliva CNP   sertraline (ZOLOFT) 100 MG tablet TAKE 1 TABLET DAILY Yes BAR Oliva CNP   atorvastatin (LIPITOR) 20 MG tablet TAKE 1 TABLET DAILY Yes BAR Oliva CNP   metFORMIN (GLUCOPHAGE-XR) 500 MG extended release tablet TAKE 1 TABLET DAILY WITH BREAKFAST Yes BAR Oliva CNP   OneTouch Delica Lancets 60P MISC USE 1 EACH TWICE A DAY Yes BAR Oliva CNP   blood glucose test strips (ASCENSIA AUTODISC VI;ONE TOUCH ULTRA TEST VI) strip 1 each by In Vitro route daily verio test strips, PRN Yes BAR Oliva CNP   HYDROcodone-acetaminophen (NORCO) 5-325 MG per tablet Take 1 tablet by mouth 3 times daily as needed for Pain for up to 30 days. Indications: Pain Yes BAR Campos CNP   tiZANidine (ZANAFLEX) 4 MG tablet Take 1 tablet by mouth every 8 hours as needed (spasms) Yes BAR Kowalski CNP   pregabalin (LYRICA) 75 MG capsule Take 1 capsule by mouth 4 times daily for 90 days. Yes BAR Wetzel CNP   omeprazole (PRILOSEC) 40 MG delayed release capsule TAKE 1 CAPSULE DAILY Yes Lucho Ram MD   topiramate (TOPAMAX) 100 MG tablet TAKE 1 TABLET IN THE MORNING AND 2 TABLETS IN THE EVENING Yes BAR Pichardo CNP   fluticasone (FLONASE) 50 MCG/ACT nasal spray 2 sprays by Each Nostril route daily  Patient taking differently: 2 sprays by Each Nostril route as needed  Yes Lucho Ram MD   docusate sodium (COLACE) 100 MG capsule Take 1 capsule by mouth daily as needed for Constipation Yes BAR Campos CNP   Elastic Bandages & Supports (LUMBAR BACK BRACE/SUPPORT PAD) MISC 1 each by Does not apply route daily as needed (pain) Yes Stepan Londono MD   vitamin D (CHOLECALCIFEROL) 1000 UNIT TABS tablet Take 1,000 Units by mouth daily Yes Historical Provider, MD   ECHINACEA PO Take by mouth Yes Historical Provider, MD   Probiotic Product (PROBIOTIC DAILY PO) Take 1 tablet by mouth daily. Yes Historical Provider, MD   Multiple Vitamins-Calcium (ONE-A-DAY WOMENS FORMULA PO) Take  by mouth.  Yes Historical Provider, MD   albuterol sulfate (PROAIR RESPICLICK) 302 (90 Base) MCG/ACT aerosol powder inhalation Inhale 2 puffs into the lungs every 4 hours as needed for Wheezing or Shortness of Breath  Patient not taking: Reported on 9/4/2020  MD dawna Alberto vitamins capsule Take 1 capsule by mouth daily  Historical Provider, MD   Melatonin ER 5 MG TBCR Take by mouth as needed  Historical Provider, MD   Ascorbic Acid (VITAMIN C) 500 MG CAPS Take by mouth  Historical Provider, MD       Social History     Tobacco Use    Smoking status: Never Smoker    Smokeless tobacco: Never Used   Substance Use Topics    Alcohol use: No    Drug use: No        PHYSICAL EXAMINATION:  Vital Signs: (As obtained by patient/caregiver or practitioner observation)    Constitutional: [x] Appears well-developed and well-nourished [x] No apparent distress      [x] Abnormal- obese  Mental status  [x] Alert and awake  [x] Oriented to person/place/time [x]Able to follow commands      Eyes:  EOM    [x]  Normal  [] Abnormal-  Sclera  [x]  Normal  [] Abnormal -         Discharge [x]  None visible  [] Abnormal -    HENT:   [x] Normocephalic, atraumatic.   [] Abnormal   [x] Mouth/Throat: Mucous membranes are moist.     External Ears [x] Normal  [] Abnormal-     Neck: [x] No visualized mass     Pulmonary/Chest: [x] Respiratory effort normal.  [x] No visualized signs of difficulty breathing or respiratory distress        [] Abnormal     Musculoskeletal:   [x] Normal gait with no signs of ataxia         [x] Normal range of motion of neck        [] Abnormal-     Neurological:        [x] No Facial Asymmetry (Cranial nerve 7 motor function) (limited exam to video visit)          [x] No gaze palsy        [] Abnormal-     Skin:        [x] No significant exanthematous lesions or discoloration noted on facial skin         [] Abnormal-     Psychiatric:       [x] Normal Affect [x] No Hallucinations        [x] Abnormal- Anxious, with pressured speech    Other pertinent observable physical exam findings-   Lab Results   Component Value Date    WBC 5.9 02/03/2020    HGB 13.3 02/03/2020    HCT 39.6 02/03/2020    MCV 88.2 02/03/2020     02/03/2020     Lab Results   Component Value Date     02/03/2020    K 3.9 02/03/2020     02/03/2020    CO2 22 02/03/2020    BUN 8 02/03/2020    CREATININE 0.90 02/03/2020    GLUCOSE 146 02/03/2020    GLUCOSE 121 10/05/2019    CALCIUM 8.8 02/03/2020      Lab Results   Component Value Date    ALT 13 10/05/2019    AST 10 10/05/2019    ALKPHOS 76 10/05/2019    BILITOT 0.3 10/05/2019     Lab Results   Component Value Date    TSH 0.48 (L) 10/16/2019     Lab Results   Component Value Date    CHOL 151 10/05/2019    CHOL 207 (H) 02/26/2019    CHOL 217 (H) 10/17/2017     Lab Results   Component Value Date    TRIG 205 (H) 10/05/2019    TRIG 194 (H) 02/26/2019    TRIG 217 (H) 10/17/2017     Lab Results   Component Value Date    HDL 39 (L) 10/05/2019    HDL 44 02/26/2019    HDL 48 10/17/2017     Lab Results   Component Value Date    LDLCALC 71 10/05/2019    LDLCALC 126 10/17/2017    LDLCALC 118 07/14/2015    LDLCHOLESTEROL 124 02/26/2019    LDLCHOLESTEROL 122 04/20/2016     Lab Results   Component Value Date    LABVLDL 41 (H) 10/05/2019    LABVLDL 43 (H) 10/17/2017    LABVLDL 42 (H) 07/14/2015    VLDL NOT REPORTED (H) 02/26/2019    VLDL NOT REPORTED 04/20/2016    VLDL 35 05/13/2014     Lab Results   Component Value Date    CHOLHDLRATIO 3.9 10/05/2019    CHOLHDLRATIO 4.7 02/26/2019    CHOLHDLRATIO 4.5 10/17/2017     Lab Results   Component Value Date    LABA1C 6.6 02/06/2020     Due to this being a TeleHealth encounter, evaluation of the following organ systems is limited: Vitals/Constitutional/EENT/Resp/CV/GI//MS/Neuro/Skin/Heme-Lymph-Imm. ASSESSMENT/PLAN:  1. Essential hypertension  Stable  Continue current therapy. DISCUSSED AND ADVISED TO:  Cut down on your salt intake. Cut down on caffeinated drinks, sports drinks. Instructed to check BP at home regularly. Report for any chest pains, shortness of breath, headaches, and lightheadedness.   Call the office if your blood pressure continue to be higher than 140/90 or 90/50.      - CBC Auto Differential; Future  - Comprehensive Metabolic Panel, Fasting; Future  - Urinalysis Reflex to Culture; Future  - Blood Pressure Monitor KIT; Use as directed. Dispense: 1 kit; Refill: 1  - lisinopril (PRINIVIL;ZESTRIL) 5 MG tablet; TAKE 1 TABLET DAILY  Dispense: 90 tablet; Refill: 2    2. Type 2 diabetes mellitus without complication, without long-term current use of insulin (HCC)  Stable  Continue therapy. We will continue to monitor Hemoglobin A1c   DISCUSSED and ADVISED TO:  Continue to check Glucose levels at home. Report increased and low levels as discussed. Decrease carbohydrates, sugary drinks, desserts in your diet. Exercise regularly, as tolerated. Try to lose weight. - Microalbumin, Ur; Future  - Hemoglobin A1C; Future  - metFORMIN (GLUCOPHAGE-XR) 500 MG extended release tablet; TAKE 1 TABLET DAILY WITH BREAKFAST  Dispense: 90 tablet; Refill: 2  - OneTouch Delica Lancets 68T MISC; USE 1 EACH TWICE A DAY  Dispense: 200 each; Refill: 5  - blood glucose test strips (ASCENSIA AUTODISC VI;ONE TOUCH ULTRA TEST VI) strip; 1 each by In Vitro route daily verio test strips, PRN  Dispense: 200 each; Refill: 1  - Marcell Puentes MD, Ophthalmology, [place]    3. Rheumatoid arthritis, involving unspecified site, unspecified rheumatoid factor presence ([facility]  Stable  Continue current therapy. DISCUSSED and ADVISED TO:  Stay at a healthy weight. Continue exercises/PT  Stretch to help prevent stiffness and to prevent injury before exercise. Gentle forms of yoga help keep joints and muscles flexible. Walk instead of jog, ride a bike, swim, and water exercise. Lift weights as tolerated. strong muscles help reduce stress on joints. Take pain medicines exactly as directed and only as needed. 4. Mixed hyperlipidemia  Stable  Continue therapy   Advised to decrease the consumption of red meats, fried foods, trans fats, sweets, sugary beverages. Advised to increase fish, vegetables, and fruits consumption.    Advised to add fiber or OTC supplements in diet. Discussed weight loss which will result in improvement of lipids levels. Advised to increase daily physical activities and add regular exercises. - atorvastatin (LIPITOR) 20 MG tablet; TAKE 1 TABLET DAILY  Dispense: 90 tablet; Refill: 2    5. Anxiety and depression  Worsening  INCREASED ZOloft  Discussed how to recognize anxiety. Advised to relieve tension with exercise or a massage. Advised to get enough rest.  Advised to avoid alcohol, caffeine, nicotine, and illegal drugs. Which can increase anxiety level and cause sleep problems. - sertraline (ZOLOFT) 100 MG tablet; TAKE 1 TABLET DAILY  Dispense: 90 tablet; Refill: 2    6. Other osteoarthritis of spine, lumbar region  Stable  Continue current therapy. DISCUSSED and ADVISED TO:  Stay at a healthy weight. Continue exercises/PT  Stretch to help prevent stiffness and to prevent injury before exercise. Gentle forms of yoga help keep joints and muscles flexible. Walk instead of jog, ride a bike, swim, and water exercise. Lift weights as tolerated. strong muscles help reduce stress on joints. Take pain medicines exactly as directed and only as needed. - Vitamin D 25 Hydroxy; Future    7. BMI 40.0-44.9, adult (HCC)  BMI increasing  DISCUSSED AND ADVISED TO:  Eat a low-fat and low carbohydrates diet. Avoid fried foods especially fast food. Choose healthier options for snacks. Have 5-6 servings of fruits and vegetables per day. Cut down on eating processed food. Add 30 minutes to 1 hour aerobic exercise for 3-4 days a week. 8. Weight gain  Ongoing   Evaluate for metabolic disorders   and or vitamin deficiencies. - Vitamin D 25 Hydroxy; Future  - TSH without Reflex; Future    9. Screening for viral disease  recommended    - HIV Screen;  Future    Controlled Substance Monitoring:  Acute and Chronic Pain Monitoring:   RX Monitoring 9/8/2020   Attestation -   Acute Pain Prescriptions -   Periodic Controlled Substance Monitoring No signs of potential drug abuse or diversion identified. Chronic Pain > 50 MEDD -   Chronic Pain > 80 MEDD -     Orders Placed This Encounter   Procedures    Vitamin D 25 Hydroxy     Standing Status:   Future     Standing Expiration Date:   9/7/2021    CBC Auto Differential     Standing Status:   Future     Standing Expiration Date:   9/7/2021    Comprehensive Metabolic Panel, Fasting     Standing Status:   Future     Standing Expiration Date:   9/7/2021    Urinalysis Reflex to Culture     Standing Status:   Future     Standing Expiration Date:   9/7/2021     Order Specific Question:   SPECIFY(EX-CATH,MIDSTREAM,CYSTO,ETC)? Answer:   mid stream    TSH without Reflex     Standing Status:   Future     Standing Expiration Date:   9/7/2021    Lipid, Fasting     Standing Status:   Future     Standing Expiration Date:   9/7/2021    HIV Screen     Standing Status:   Future     Standing Expiration Date:   3/7/2022    Microalbumin, Ur     Standing Status:   Future     Standing Expiration Date:   3/7/2022    Hemoglobin A1C     Standing Status:   Future     Standing Expiration Date:   9/8/2021    MELI - Halie Chen MD, Ophthalmology, [place]     Referral Priority:   Routine     Referral Type:   Eval and Treat     Referral Reason:   Specialty Services Required     Referred to Provider:   Fatimah Pacheco MD     Requested Specialty:   Ophthalmology     Number of Visits Requested:   1      Orders Placed This Encounter   Medications    Blood Pressure Monitor KIT     Sig: Use as directed.      Dispense:  1 kit     Refill:  1    lisinopril (PRINIVIL;ZESTRIL) 5 MG tablet     Sig: TAKE 1 TABLET DAILY     Dispense:  90 tablet     Refill:  2    sertraline (ZOLOFT) 100 MG tablet     Sig: TAKE 1 TABLET DAILY     Dispense:  90 tablet     Refill:  2    atorvastatin (LIPITOR) 20 MG tablet     Sig: TAKE 1 TABLET DAILY     Dispense:  90 tablet     Refill:  2    metFORMIN (GLUCOPHAGE-XR) 500 MG extended release tablet     Sig: TAKE 1 TABLET DAILY WITH BREAKFAST     Dispense:  90 tablet     Refill:  2     Patient needs to make appt, dr Jeremiah Dueñas retired.  OneTouch Delica Lancets 57S MISC     Sig: USE 1 EACH TWICE A DAY     Dispense:  200 each     Refill:  5    blood glucose test strips (ASCENSIA AUTODISC VI;ONE TOUCH ULTRA TEST VI) strip     Si each by In Vitro route daily verio test strips, PRN     Dispense:  200 each     Refill:  1      Medications Discontinued During This Encounter   Medication Reason    lisinopril (PRINIVIL;ZESTRIL) 5 MG tablet REORDER    sertraline (ZOLOFT) 50 MG tablet REORDER    atorvastatin (LIPITOR) 20 MG tablet REORDER    metFORMIN (GLUCOPHAGE-XR) 500 MG extended release tablet REORDER    ONETOUCH DELICA LANCETS 19Y MISC REORDER    blood glucose test strips (ASCENSIA AUTODISC VI;ONE TOUCH ULTRA TEST VI) strip REORDER      Iqra received counseling on the following healthy behaviors: nutrition, exercise and medication adherence  Reviewed prior labs and health maintenance. Continue current medications, diet and exercise. Discussed use, benefit, and side effects of prescribed medications. Barriers to medication compliance addressed. Patient given educational materials - see patient instructions. All patient questions answered. Patient voiced understanding. Return in about 4 weeks (around 10/6/2020) for Rev. results, F2F, 30mins, PHQ9/GAD7. Karen uHll is a 53 y.o. female patient  being evaluated by a Virtual Visit (video visit) encounter to address concerns as mentioned above. Due to this being a TeleHealth encounter (During -10 public health emergency), evaluation of the following organ systems was limited:Vitals/Constitutional/EENT/Resp/CV/GI//MS/Neuro/Skin/Heme-Lymph-Imm. Services were provided through a video synchronous discussion virtually to substitute for in-person clinic visit.  This is a telehealth visit that was performed with the originating site at Patient Location: home and provider Location of [place], [place]. Verbal consent to participate in video visit was obtained. Patient ID verified by me prior to start of this visit  I discussed with the patient the nature of our telehealth visits via interactive/real-time audio/video that:  - I would evaluate the patient and recommend diagnostics and treatments based on my assessment  - Our sessions are not being recorded and that personal health information is protected  - Our team would provide follow up care in person if/when the patient needs it. Pursuant to the emergency declaration under the [address] [place] [address], [address] [place] [address] authority and the [employer] Act, this Virtual Visit was conducted with patient's (and/or legal guardian's) consent, to reduce the patient's risk of exposure to COVID-19 and provide necessary medical care. The patient (and/or legal guardian) has also been advised to contact this office for worsening conditions or problems, and seek emergency medical treatment and/or call 911 if deemed necessary. This note was completed by using the assistance of a speech-recognition program. However, inadvertent computerized transcription errors may be present. Although every effort was made to ensure accuracy, no guarantees can be provided that every mistake has been identified and corrected by editing.   Electronically signed by BAR Kamara CNP on 9/7/20 at 9:57 PM EDT

## 2020-09-08 NOTE — PATIENT INSTRUCTIONS
Patient Education        Learning About Diabetes Food Guidelines  Your Care Instructions     Meal planning is important to manage diabetes. It helps keep your blood sugar at a target level (which you set with your doctor). You don't have to eat special foods. You can eat what your family eats, including sweets once in a while. But you do have to pay attention to how often you eat and how much you eat of certain foods. You may want to work with a dietitian or a certified diabetes educator (CDE) to help you plan meals and snacks. A dietitian or CDE can also help you lose weight if that is one of your goals. What should you know about eating carbs? Managing the amount of carbohydrate (carbs) you eat is an important part of healthy meals when you have diabetes. Carbohydrate is found in many foods. · Learn which foods have carbs. And learn the amounts of carbs in different foods. ? Bread, cereal, pasta, and rice have about 15 grams of carbs in a serving. A serving is 1 slice of bread (1 ounce), ½ cup of cooked cereal, or 1/3 cup of cooked pasta or rice. ? Fruits have 15 grams of carbs in a serving. A serving is 1 small fresh fruit, such as an apple or orange; ½ of a banana; ½ cup of cooked or canned fruit; ½ cup of fruit juice; 1 cup of melon or raspberries; or 2 tablespoons of dried fruit. ? Milk and no-sugar-added yogurt have 15 grams of carbs in a serving. A serving is 1 cup of milk or 2/3 cup of no-sugar-added yogurt. ? Starchy vegetables have 15 grams of carbs in a serving. A serving is ½ cup of mashed potatoes or sweet potato; 1 cup winter squash; ½ of a small baked potato; ½ cup of cooked beans; or ½ cup cooked corn or green peas. · Learn how much carbs to eat each day and at each meal. A dietitian or CDE can teach you how to keep track of the amount of carbs you eat. This is called carbohydrate counting. · If you are not sure how to count carbohydrate grams, use the Plate Method to plan meals.  It is a good, quick way to make sure that you have a balanced meal. It also helps you spread carbs throughout the day. ? Divide your plate by types of foods. Put non-starchy vegetables on half the plate, meat or other protein food on one-quarter of the plate, and a grain or starchy vegetable in the final quarter of the plate. To this you can add a small piece of fruit and 1 cup of milk or yogurt, depending on how many carbs you are supposed to eat at a meal.  · Try to eat about the same amount of carbs at each meal. Do not \"save up\" your daily allowance of carbs to eat at one meal.  · Proteins have very little or no carbs per serving. Examples of proteins are beef, chicken, turkey, fish, eggs, tofu, cheese, cottage cheese, and peanut butter. A serving size of meat is 3 ounces, which is about the size of a deck of cards. Examples of meat substitute serving sizes (equal to 1 ounce of meat) are 1/4 cup of cottage cheese, 1 egg, 1 tablespoon of peanut butter, and ½ cup of tofu. How can you eat out and still eat healthy? · Learn to estimate the serving sizes of foods that have carbohydrate. If you measure food at home, it will be easier to estimate the amount in a serving of restaurant food. · If the meal you order has too much carbohydrate (such as potatoes, corn, or baked beans), ask to have a low-carbohydrate food instead. Ask for a salad or green vegetables. · If you use insulin, check your blood sugar before and after eating out to help you plan how much to eat in the future. · If you eat more carbohydrate at a meal than you had planned, take a walk or do other exercise. This will help lower your blood sugar. What else should you know? · Limit saturated fat, such as the fat from meat and dairy products. This is a healthy choice because people who have diabetes are at higher risk of heart disease. So choose lean cuts of meat and nonfat or low-fat dairy products.  Use olive or canola oil instead of butter or shortening when cooking. · Don't skip meals. Your blood sugar may drop too low if you skip meals and take insulin or certain medicines for diabetes. · Check with your doctor before you drink alcohol. Alcohol can cause your blood sugar to drop too low. Alcohol can also cause a bad reaction if you take certain diabetes medicines. Follow-up care is a key part of your treatment and safety. Be sure to make and go to all appointments, and call your doctor if you are having problems. It's also a good idea to know your test results and keep a list of the medicines you take. Where can you learn more? Go to https://Aptelapepiceweb.TweetUp. org and sign in to your [employer] account. Enter N323 in the [employer] box to learn more about \"Learning About Diabetes Food Guidelines. \"     If you do not have an account, please click on the \"Sign Up Now\" link. Current as of: December 20, 2019               Content Version: 12.5  © 3847-8932 [employer]. Care instructions adapted under license by [facility] ([facility]). If you have questions about a medical condition or this instruction, always ask your healthcare professional. [place] [address] any warranty or liability for your use of this information.

## 2020-09-10 ENCOUNTER — HOSPITAL ENCOUNTER (OUTPATIENT)
Age: 50
Discharge: HOME | End: 2020-09-10
Payer: COMMERCIAL

## 2020-09-10 LAB
-: ABNORMAL
ABSOLUTE EOS #: 0.14 K/UL (ref 0–0.4)
ABSOLUTE IMMATURE GRANULOCYTE: ABNORMAL K/UL (ref 0–0.3)
ABSOLUTE LYMPH #: 2.92 K/UL (ref 1–4.8)
ABSOLUTE MONO #: 0.68 K/UL (ref 0.1–1.3)
ALBUMIN SERPL-MCNC: 4.2 G/DL (ref 3.5–5.2)
ALBUMIN/GLOBULIN RATIO: ABNORMAL (ref 1–2.5)
ALP BLD-CCNC: 71 U/L (ref 35–104)
ALT SERPL-CCNC: 17 U/L (ref 5–33)
AMORPHOUS: ABNORMAL
ANION GAP SERPL CALCULATED.3IONS-SCNC: 11 MMOL/L (ref 9–17)
AST SERPL-CCNC: 14 U/L
ATYPICAL LYMPHOCYTE ABSOLUTE COUNT: 0.75 K/UL
ATYPICAL LYMPHOCYTES: 11 %
BACTERIA: ABNORMAL
BASOPHILS # BLD: 0 % (ref 0–2)
BASOPHILS ABSOLUTE: 0 K/UL (ref 0–0.2)
BILIRUB SERPL-MCNC: 0.22 MG/DL (ref 0.3–1.2)
BILIRUBIN URINE: NEGATIVE
BUN BLDV-MCNC: 11 MG/DL (ref 6–20)
BUN/CREAT BLD: ABNORMAL (ref 9–20)
CALCIUM SERPL-MCNC: 9.5 MG/DL (ref 8.6–10.4)
CASTS UA: ABNORMAL /LPF
CHLORIDE BLD-SCNC: 105 MMOL/L (ref 98–107)
CHOLESTEROL, FASTING: 154 MG/DL
CHOLESTEROL/HDL RATIO: 3.7
CO2: 25 MMOL/L (ref 20–31)
COLOR: YELLOW
COMMENT UA: ABNORMAL
CREAT SERPL-MCNC: 0.79 MG/DL (ref 0.5–0.9)
CREATININE URINE: 196 MG/DL (ref 28–217)
CRYSTALS, UA: ABNORMAL /HPF
DIFFERENTIAL TYPE: ABNORMAL
EOSINOPHILS RELATIVE PERCENT: 2 % (ref 0–4)
EPITHELIAL CELLS UA: ABNORMAL /HPF
ESTIMATED AVERAGE GLUCOSE: 160 MG/DL
GFR AFRICAN AMERICAN: >60 ML/MIN
GFR NON-AFRICAN AMERICAN: >60 ML/MIN
GFR SERPL CREATININE-BSD FRML MDRD: ABNORMAL ML/MIN/{1.73_M2}
GFR SERPL CREATININE-BSD FRML MDRD: ABNORMAL ML/MIN/{1.73_M2}
GLUCOSE FASTING: 130 MG/DL (ref 70–99)
GLUCOSE URINE: NEGATIVE
HBA1C MFR BLD: 7.2 % (ref 4–6)
HCT VFR BLD CALC: 43.4 % (ref 36–46)
HDLC SERPL-MCNC: 42 MG/DL
HEMOGLOBIN: 14.4 G/DL (ref 12–16)
HIV AG/AB: NONREACTIVE
IMMATURE GRANULOCYTES: ABNORMAL %
KETONES, URINE: NEGATIVE
LDL CHOLESTEROL: 58 MG/DL (ref 0–130)
LEUKOCYTE ESTERASE, URINE: ABNORMAL
LYMPHOCYTES # BLD: 43 % (ref 24–44)
MCH RBC QN AUTO: 29.7 PG (ref 26–34)
MCHC RBC AUTO-ENTMCNC: 33.1 G/DL (ref 31–37)
MCV RBC AUTO: 89.9 FL (ref 80–100)
MICROALBUMIN/CREAT 24H UR: <12 MG/L
MICROALBUMIN/CREAT UR-RTO: NORMAL MCG/MG CREAT
MONOCYTES # BLD: 10 % (ref 1–7)
MORPHOLOGY: NORMAL
MUCUS: ABNORMAL
NITRITE, URINE: NEGATIVE
NRBC AUTOMATED: ABNORMAL PER 100 WBC
OTHER OBSERVATIONS UA: ABNORMAL
PDW BLD-RTO: 13 % (ref 11.5–14.9)
PH UA: 6.5 (ref 5–8)
PLATELET # BLD: 236 K/UL (ref 150–450)
PLATELET ESTIMATE: ABNORMAL
PMV BLD AUTO: 7.7 FL (ref 6–12)
POTASSIUM SERPL-SCNC: 4.2 MMOL/L (ref 3.7–5.3)
PROTEIN UA: NEGATIVE
RBC # BLD: 4.83 M/UL (ref 4–5.2)
RBC # BLD: ABNORMAL 10*6/UL
RBC UA: ABNORMAL /HPF
RENAL EPITHELIAL, UA: ABNORMAL /HPF
SEG NEUTROPHILS: 34 % (ref 36–66)
SEGMENTED NEUTROPHILS ABSOLUTE COUNT: 2.31 K/UL (ref 1.3–9.1)
SODIUM BLD-SCNC: 141 MMOL/L (ref 135–144)
SPECIFIC GRAVITY UA: 1.02 (ref 1–1.03)
TOTAL PROTEIN: 6.8 G/DL (ref 6.4–8.3)
TRICHOMONAS: ABNORMAL
TRIGLYCERIDE, FASTING: 268 MG/DL
TSH SERPL DL<=0.05 MIU/L-ACNC: 0.86 MIU/L (ref 0.3–5)
TURBIDITY: CLEAR
URINE HGB: NEGATIVE
UROBILINOGEN, URINE: NORMAL
VITAMIN D 25-HYDROXY: 67.7 NG/ML (ref 30–100)
VLDLC SERPL CALC-MCNC: ABNORMAL MG/DL (ref 1–30)
WBC # BLD: 6.8 K/UL (ref 3.5–11)
WBC # BLD: ABNORMAL 10*3/UL
WBC UA: ABNORMAL /HPF
YEAST: ABNORMAL

## 2020-09-10 PROCEDURE — 82043 UR ALBUMIN QUANTITATIVE: CPT

## 2020-09-10 PROCEDURE — 82306 VITAMIN D 25 HYDROXY: CPT

## 2020-09-10 PROCEDURE — 81001 URINALYSIS AUTO W/SCOPE: CPT

## 2020-09-10 PROCEDURE — 82570 ASSAY OF URINE CREATININE: CPT

## 2020-09-10 PROCEDURE — 87389 HIV-1 AG W/HIV-1&-2 AB AG IA: CPT

## 2020-09-10 PROCEDURE — 36415 COLL VENOUS BLD VENIPUNCTURE: CPT

## 2020-09-10 PROCEDURE — 84443 ASSAY THYROID STIM HORMONE: CPT

## 2020-09-10 PROCEDURE — 83036 HEMOGLOBIN GLYCOSYLATED A1C: CPT

## 2020-09-10 PROCEDURE — 85025 COMPLETE CBC W/AUTO DIFF WBC: CPT

## 2020-09-10 PROCEDURE — 80061 LIPID PANEL: CPT

## 2020-09-10 PROCEDURE — 87086 URINE CULTURE/COLONY COUNT: CPT

## 2020-09-10 PROCEDURE — 80053 COMPREHEN METABOLIC PANEL: CPT

## 2020-09-11 LAB
CULTURE: NORMAL
Lab: NORMAL
SPECIMEN DESCRIPTION: NORMAL

## 2020-09-11 RX ORDER — METFORMIN HYDROCHLORIDE 750 MG/1
TABLET, EXTENDED RELEASE ORAL
Qty: 90 TABLET | Refills: 2 | Status: SHIPPED | OUTPATIENT
Start: 2020-09-11 | End: 2021-05-21

## 2020-10-02 ENCOUNTER — HOSPITAL ENCOUNTER (OUTPATIENT)
Dept: PAIN MANAGEMENT | Age: 50
Discharge: HOME | End: 2020-10-02
Payer: COMMERCIAL

## 2020-10-02 PROCEDURE — 99213 OFFICE O/P EST LOW 20 MIN: CPT

## 2020-10-02 PROCEDURE — 99214 OFFICE O/P EST MOD 30 MIN: CPT | Performed by: NURSE PRACTITIONER

## 2020-10-02 RX ORDER — HYDROCODONE BITARTRATE AND ACETAMINOPHEN 5; 325 MG/1; MG/1
1 TABLET ORAL 3 TIMES DAILY PRN
Qty: 90 TABLET | Refills: 0 | Status: SHIPPED | OUTPATIENT
Start: 2020-10-08 | End: 2020-11-05 | Stop reason: SDUPTHER

## 2020-10-02 ASSESSMENT — ENCOUNTER SYMPTOMS
COUGH: 0
BACK PAIN: 1
CONSTIPATION: 0
SHORTNESS OF BREATH: 0

## 2020-10-02 NOTE — PROGRESS NOTES
Diagnosis Date    Anxiety     Asthma     Colon polyp 10/31/2016    sessile serated adenoma x2    Depression     Diabetes mellitus ([facility] [address]     Diverticulitis 10/2016    Gastritis     GERD (gastroesophageal reflux disease)     Hemorrhoids     int/ext    Hypertension     Migraines     Osteoarthritis     Shingles 1-22-16    Tubular adenoma 10/31/2016    Urinary leakage        Past Surgical History:   Procedure Laterality Date    CERVICAL DISCECTOMY  12/2013    & fusion     CHOLECYSTECTOMY      COLONOSCOPY  10/31/2016    int/ext hemorrhoids; sessile serated adenomax2; tubular adenoma    COLONOSCOPY N/A 10/22/2019    COLONOSCOPY POLYPECTOMY SNARE/COLD BIOPSY AND RANDOM BIOPSIES performed by Saw Bello MD at [facility] HAND SURGERY Right     thumb surgery, BONE REMOVED    HYSTERECTOMY      LAPAROSCOPY      DC DEST,PARAVERTEBRAL,L/S,ADDL LVLS  3/25/2019         TONSILLECTOMY      TUBAL LIGATION      UPPER GASTROINTESTINAL ENDOSCOPY  10/31/2016    gastritis    UPPER GASTROINTESTINAL ENDOSCOPY N/A 10/22/2019    EGD BIOPSY performed by Saw Bello MD at [facility] ENDO       Allergies   Allergen Reactions    Adhesive Tape Other (See Comments)     blisters    Imitrex [Sumatriptan] Other (See Comments)     \"feels like head is going to explode    Morphine Itching     hives         Current Outpatient Medications:     metFORMIN (GLUCOPHAGE-XR) 750 MG extended release tablet, TAKE 1 TABLET DAILY WITH BREAKFAST, Disp: 90 tablet, Rfl: 2    Blood Pressure Monitor KIT, Use as directed., Disp: 1 kit, Rfl: 1    lisinopril (PRINIVIL;ZESTRIL) 5 MG tablet, TAKE 1 TABLET DAILY, Disp: 90 tablet, Rfl: 2    sertraline (ZOLOFT) 100 MG tablet, TAKE 1 TABLET DAILY, Disp: 90 tablet, Rfl: 2    atorvastatin (LIPITOR) 20 MG tablet, TAKE 1 TABLET DAILY, Disp: 90 tablet, Rfl: 2    OneTouch Delica Lancets 28S MISC, USE 1 EACH TWICE A DAY, Disp: 200 each, Rfl: 5   (ONE-A-DAY WOMENS FORMULA PO), Take  by mouth., Disp: , Rfl:     Family History   Problem Relation Age of Onset    Cancer Mother     Heart Disease Father     Diabetes Father     High Blood Pressure Father     Other Other         Celiac Sprue. Aunt       Social History     Socioeconomic History    Marital status:      Spouse name: Not on file    Number of children: Not on file    Years of education: Not on file    Highest education level: Not on file   Occupational History    Occupation: unemployed   Social Needs    Financial resource strain: Not on file    Food insecurity     Worry: Not on file     Inability: Not on file   [employer] needs     Medical: Not on file     Non-medical: Not on file   Tobacco Use    Smoking status: Never Smoker    Smokeless tobacco: Never Used   Substance and Sexual Activity    Alcohol use: No    Drug use: No    Sexual activity: Yes   Lifestyle    Physical activity     Days per week: Not on file     Minutes per session: Not on file    Stress: Not on file   Relationships    Social connections     Talks on phone: Not on file     Gets together: Not on file     Attends Anglican service: Not on file     Active member of club or organization: Not on file     Attends meetings of clubs or organizations: Not on file     Relationship status: Not on file    Intimate partner violence     Fear of current or ex partner: Not on file     Emotionally abused: Not on file     Physically abused: Not on file     Forced sexual activity: Not on file   Other Topics Concern    Not on file   Social History Narrative    Not on file       Review of Systems:  Review of Systems   Constitution: Negative for chills and fever. Cardiovascular: Negative for chest pain and palpitations. Respiratory: Negative for cough and shortness of breath. Musculoskeletal: Positive for back pain. Gastrointestinal: Negative for constipation.    Neurological: Negative for disturbances in coordination, loss of balance, numbness, paresthesias and tingling. Physical Exam:  There were no vitals taken for this visit. Physical Exam  HENT:      Head: Normocephalic. Pulmonary:      Effort: Pulmonary effort is normal.   Neurological:      Mental Status: She is alert. Psychiatric:         Mood and Affect: Mood normal.         Behavior: Behavior normal.         Record/Diagnostics Review:    Last desirae 7/2020 and was appropriate     Assessment:  Problem List Items Addressed This Visit     Lumbar radiculopathy, chronic - Primary (Chronic)    Degeneration of lumbar intervertebral disc (Chronic)    Relevant Medications    HYDROcodone-acetaminophen (NORCO) 5-325 MG per tablet (Start on 10/8/2020)    Other Relevant Orders    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    Saline lock IV    FLUORO FOR SURGICAL PROCEDURES    Lumbar spondylosis (Chronic)    Relevant Medications    HYDROcodone-acetaminophen (NORCO) 5-325 MG per tablet (Start on 10/8/2020)    Other Relevant Orders    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    Saline lock IV    FLUORO FOR SURGICAL PROCEDURES    Sacroiliitis ([facility] [address]    Relevant Medications    HYDROcodone-acetaminophen (NORCO) 5-325 MG per tablet (Start on 10/8/2020)    Arthropathy of lumbar facet joint    Relevant Medications    HYDROcodone-acetaminophen (NORCO) 5-325 MG per tablet (Start on 10/8/2020)    Other Relevant Orders    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    Saline lock IV    FLUORO FOR SURGICAL PROCEDURES    Encounter for medication management    Lumbosacral spondylosis without myelopathy    Relevant Medications    HYDROcodone-acetaminophen (NORCO) 5-325 MG per tablet (Start on 10/8/2020)    Other Relevant Orders    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 Patient reports taking pain medications as prescribed, denies obtaining medications from different sources and denies use of illegal drugs. Patient denies side effects from medications like nausea, vomiting, constipation or drowsiness. Patient reports current activities of daily living are possible due to medications and would like to continue them. As always, we encourage daily stretching and strengthening exercises, and recommend minimizing use of pain medications unless patient cannot get through daily activities due to pain. Contract requirements met. Continue opioid therapy. Script written for deanne  Has failed conservative options, significant axial low back pain with degenerative facet changes on MRI   RFA with significant relief in the past and pt requesting to repeat on the left side - last RFA on left side was 3/2019  Follow up appointment made for 4 weeks    Marco Hightower is a 53 y.o. female being evaluated by a Virtual Visit (video visit) encounter to address concerns as mentioned above. A caregiver was present when appropriate. Due to this being a TeleHealth encounter (During HKZID-28 public health emergency), evaluation of the following organ systems was limited: Vitals/Constitutional/EENT/Resp/CV/GI//MS/Neuro/Skin/Heme-Lymph-Imm. Pursuant to the emergency declaration under the [address] [place] [address], [address] [place] [address] authority and the [employer] Act, this Virtual Visit was conducted with patient's (and/or legal guardian's) consent, to reduce the patient's risk of exposure to COVID-19 and provide necessary medical care. The patient (and/or legal guardian) has also been advised to contact this office for worsening conditions or problems, and seek emergency medical treatment and/or call 911 if deemed necessary. Patient identification was verified at the start of the visit: Yes    Total time spent for this encounter: Not billed by time    Services were provided through a video synchronous discussion virtually to substitute for in-person clinic visit. Patient and provider were located at their individual homes. --BAR Bautista - CNP on 10/2/2020 at 11:32 AM    An electronic signature was used to authenticate this note.

## 2020-10-05 RX ORDER — OMEPRAZOLE 40 MG/1
CAPSULE, DELAYED RELEASE ORAL
Qty: 90 CAPSULE | Refills: 2 | Status: SHIPPED | OUTPATIENT
Start: 2020-10-05 | End: 2021-06-14

## 2020-10-05 NOTE — TELEPHONE ENCOUNTER
Please Approve or Refuse.   Send to Pharmacy per Pt's Request:      Next Visit Date:  10/8/2020   Last Visit Date: 9/8/2020    Hemoglobin A1C (%)   Date Value   09/10/2020 7.2 (H)   02/06/2020 6.6   10/03/2019 6.3             ( goal A1C is < 7)   BP Readings from Last 3 Encounters:   07/28/20 119/65   03/03/20 126/87   02/24/20 (!) 130/93          (goal 120/80)  BUN   Date Value Ref Range Status   09/10/2020 11 6 - 20 mg/dL Final     CREATININE   Date Value Ref Range Status   09/10/2020 0.79 0.50 - 0.90 mg/dL Final     Potassium   Date Value Ref Range Status   09/10/2020 4.2 3.7 - 5.3 mmol/L Final

## 2020-10-06 NOTE — PROGRESS NOTES
P PHYSICIANS  [facility]  [address]  [address] [address]  Dept: [phone]     10/8/2020   Chief Complaint   Patient presents with    Results    Nasal Injury     nasal drip, sores inside     Diabetes     HPI  Matt Perry (:  1970) is a 53 y.o. female was an established patient of Dr. Ernst Smith. Patient has a history of hypertension, diabetes, rheumatoid arthritis, mixed hyperlipidemia,  osteoarthritis, obesity.     HYPERTENSION  Iqra Yusuf has a well controlled hypertension. she is currently on lisinopril. Patient's most recent BP in the office was stable. she reports stable BP readings at home. Patient denies any adverse reaction to this therapy. she denies any CP, SOB, HA, or palpitations. Cardiovascular risk factors: diabetes mellitus, hypertension, obesity (BMI >= 30 kg/m2) and sedentary lifestyle. Use of agents associated with hypertension: NSAIDS. History of target organ damage: none.     DIABETES MELLITUS/ OBESITY  Patient has a  stable Diabetes Mellitus. Patient's recent   Lab Results   Component Value Date/Time    LABA1C 7.2 (H) 09/10/2020 07:59 AM    LABA1C 6.6 2020 09:32 AM    LABA1C 6.3 10/03/2019 10:48 AM   Current therapy includes Metformin. Patient is responding well with this therapy. Patient is continuously gaining weight. We will try to send some GLP-1's for her to use in addition to her metformin. Patient reports home glucose monitoring as Stable readings. Patient denieshypoglycemia episodes such as{symptoms. Patient denies pruritus. BMI Readings from Last 1 Encounters:   10/08/20 44.80 kg/m²   Eye Exam: done  Foot Exam:next visit  Wt Readings from Last 3 Encounters:   10/08/20 229 lb 6.4 oz (104.1 kg)   20 225 lb (102.1 kg)   20 229 lb (103.9 kg)     Heidi Mayberry is currently on atorvastatin (Lipitor). Patient denies adverse reaction to this medication.  Compliance with treatment thus far has been good. The patient is not known to have coexisting coronary artery disease. The 10-year CVD risk score (YESENIA'Agoino, et al., 2008) is: 7.9%    Values used to calculate the score:      Age: 53 years      Sex: Female      Diabetic: Yes      Tobacco smoker: No      Systolic Blood Pressure: 150 mmHg      Is BP treated: Yes      HDL Cholesterol: 42 mg/dL      Total Cholesterol: 154 mg/dL  Lab Results   Component Value Date/Time    CHOL 151 10/05/2019 08:50 AM    CHOL 210 05/13/2014    HDL 42 09/10/2020 07:59 AM    LDLCHOLESTEROL 58 09/10/2020 07:59 AM    TRIG 205 (H) 10/05/2019 08:50 AM    CHOLHDLRATIO 3.7 09/10/2020 07:59 AM    VLDL NOT REPORTED (H) 09/10/2020 07:59 AM     Iqra Goodrihc has a known history of rheumatoid arthritis. Patient also has some chronic low back pain. She did have some significant thoracic to lumbar spine disorder. Patient had recently had 2 courses of RFA's with relief for up to 6 months. However, patient continues to have the pain. Patient is encouraged to continue with follow-up with the pain management doctors. She is also morbidly obese. She is reported some weight gain. Patient is currently on Lyrica, tizanidine, and Norco.  Patient is established with the pain management. She sees him on a regular basis. Dr Beverly Moraes. HIV SCREENING  Iqra Mccarthy 's recent HIV screening results came back negative. Result below was reviewed with the patient. Lab Results   Component Value Date/Time    HIVAG/AB NONREACTIVE 09/10/2020 07:59 AM        Iqra Mccarthy  's most recent TSH level was normal. Results reviewed with the patient. Patient denies any history of thyroid disorders. Lab Results   Component Value Date/Time    TSH 0.86 09/10/2020 07:59 AM      VITAMIN D  Patient has a known Vitamin D Deficiency. she had a course of supplementation for 12 weeks. she is due to get levels check. We continue to discuss ways to manage this condition.  We also discussed ways to improve the levels. Such as learning food groups that are high in Vitamin D. We also discuss that sun exposure is beneficial however, too much sun and sun damage can potentially result in skin cancer. Lab Results   Component Value Date/Time    VITD25 67.7 09/10/2020 07:59 AM      CBC and CMP reviewed with Iqra : elev fbs  Lab Results   Component Value Date    WBC 6.8 09/10/2020    HGB 14.4 09/10/2020    HCT 43.4 09/10/2020    MCV 89.9 09/10/2020     09/10/2020    LYMPHOPCT 43 09/10/2020    RBC 4.83 09/10/2020    MCH 29.7 09/10/2020    MCHC 33.1 09/10/2020    RDW 13.0 09/10/2020     Lab Results   Component Value Date     09/10/2020    K 4.2 09/10/2020     09/10/2020    CO2 25 09/10/2020    BUN 11 09/10/2020    CREATININE 0.79 09/10/2020    GLUCOSE 146 (H) 02/03/2020    CALCIUM 9.5 09/10/2020    PROT 6.8 09/10/2020    LABALBU 4.2 09/10/2020    BILITOT 0.22 (L) 09/10/2020    ALKPHOS 71 09/10/2020    AST 14 09/10/2020    ALT 17 09/10/2020    LABGLOM >60 09/10/2020    GFRAA >60 09/10/2020     Recent urinalysis reviewed with Iqra today. Lekocytes, no growth  Lab Results   Component Value Date    COLORU YELLOW 09/10/2020    NITRU NEGATIVE 09/10/2020    GLUCOSEU NEGATIVE 09/10/2020    KETUA NEGATIVE 09/10/2020    UROBILINOGEN Normal 09/10/2020    BILIRUBINUR NEGATIVE 09/10/2020    BILIRUBINUR neg 06/03/2019    HGBUR NEGATIVE 09/10/2020    PROTEINU NEGATIVE 09/10/2020    PHUR 6.5 09/10/2020    LEUKOCYTESUR MOD 09/10/2020      Lab Results   Component Value Date/Time    CULTURE NO SIGNIFICANT GROWTH 09/10/2020 09:03 AM    SPECDESC . CLEAN CATCH URINE 09/10/2020 09:03 AM    SPECIAL NOT REPORTED 09/10/2020 09:03 AM             No data recorded   Counseling given: Yes    Patient Active Problem List   Diagnosis    Degenerative disc disease, cervical    Cervical disc herniation    Lumbar radiculopathy, chronic    Degeneration of lumbar intervertebral disc    Lumbar spondylosis    Essential hypertension  Left-sided low back pain with left-sided sciatica    Osteoarthritis of lumbar spine    Sacroiliitis (HCC)    Rheumatoid arthritis ([facility]    Primary osteoarthritis of left hip    Arthropathy of lumbar facet joint    Hemorrhoids    Colon polyp    Tubular adenoma    Chronic bilateral low back pain without sciatica    Osteoarthritis of spine with radiculopathy, lumbar region    Encounter for medication management    Morbid obesity with BMI of 40.0-44.9, adult ([facility]    Anxiety and depression    Type 2 diabetes mellitus without complication, without long-term current use of insulin (HCC)    Urinary incontinence    Chronic, continuous use of opioids    Chronic use of opiate drugs therapeutic purposes    Lumbosacral spondylosis without myelopathy    Chronic GERD    Bacterial sinusitis    Mixed hyperlipidemia      Past Medical History:   Diagnosis Date    Anxiety     Asthma     Colon polyp 10/31/2016    sessile serated adenoma x2    Depression     Diabetes mellitus ([facility]     Diverticulitis 10/2016    Gastritis     GERD (gastroesophageal reflux disease)     Hemorrhoids     int/ext    Hypertension     Migraines     Osteoarthritis     Shingles 1-22-16    Tubular adenoma 10/31/2016    Urinary leakage       Past Surgical History:   Procedure Laterality Date    CERVICAL DISCECTOMY  12/2013    & fusion     CHOLECYSTECTOMY      COLONOSCOPY  10/31/2016    int/ext hemorrhoids; sessile serated adenomax2; tubular adenoma    COLONOSCOPY N/A 10/22/2019    COLONOSCOPY POLYPECTOMY SNARE/COLD BIOPSY AND RANDOM BIOPSIES performed by Osman Albarran MD at [address] [place] [address]      HAND SURGERY Right     thumb surgery, BONE REMOVED    HYSTERECTOMY      LAPAROSCOPY      IN DEST,PARAVERTEBRAL,L/S,ADDL LVLS  3/25/2019         TONSILLECTOMY      TUBAL LIGATION      UPPER GASTROINTESTINAL ENDOSCOPY  10/31/2016    gastritis    UPPER GASTROINTESTINAL ENDOSCOPY N/A 10/22/2019    EGD BIOPSY performed by Melody Neves MD at [facility]     Family History   Problem Relation Age of Onset    Cancer Mother     Heart Disease Father     Diabetes Father     High Blood Pressure Father     Other Other         Celiac Sprue. Aunt     Current Outpatient Medications   Medication Sig Dispense Refill    Dulaglutide (TRULICITY) 6.18 NW/7.7CT SOPN Inject 0.75 mg into the skin once a week 4 pen 0    neomycin-bacitracin-polymyxin (NEOSPORIN) 400-5-5000 ointment Apply topically 2 times daily. 1 Tube 1    omeprazole (PRILOSEC) 40 MG delayed release capsule TAKE 1 CAPSULE DAILY 90 capsule 2    HYDROcodone-acetaminophen (NORCO) 5-325 MG per tablet Take 1 tablet by mouth 3 times daily as needed for Pain for up to 30 days. Indications: Pain 90 tablet 0    metFORMIN (GLUCOPHAGE-XR) 750 MG extended release tablet TAKE 1 TABLET DAILY WITH BREAKFAST 90 tablet 2    Blood Pressure Monitor KIT Use as directed.  1 kit 1    lisinopril (PRINIVIL;ZESTRIL) 5 MG tablet TAKE 1 TABLET DAILY 90 tablet 2    sertraline (ZOLOFT) 100 MG tablet TAKE 1 TABLET DAILY 90 tablet 2    atorvastatin (LIPITOR) 20 MG tablet TAKE 1 TABLET DAILY 90 tablet 2    OneTouch Delica Lancets 00P MISC USE 1 EACH TWICE A  each 5    blood glucose test strips (ASCENSIA AUTODISC VI;ONE TOUCH ULTRA TEST VI) strip 1 each by In Vitro route daily verio test strips,  each 1    tiZANidine (ZANAFLEX) 4 MG tablet Take 1 tablet by mouth every 8 hours as needed (spasms) 270 tablet 0    topiramate (TOPAMAX) 100 MG tablet TAKE 1 TABLET IN THE MORNING AND 2 TABLETS IN THE EVENING 270 tablet 4    fluticasone (FLONASE) 50 MCG/ACT nasal spray 2 sprays by Each Nostril route daily (Patient taking differently: 2 sprays by Each Nostril route as needed ) 1 Bottle 1    b complex vitamins capsule Take 1 capsule by mouth daily      docusate sodium (COLACE) 100 MG capsule Take 1 capsule by mouth daily as needed for Constipation 30 capsule 2    Elastic Bandages & Supports (LUMBAR BACK BRACE/SUPPORT PAD) MISC 1 each by Does not apply route daily as needed (pain) 1 each 0    Melatonin ER 5 MG TBCR Take by mouth as needed      Ascorbic Acid (VITAMIN C) 500 MG CAPS Take by mouth      ECHINACEA PO Take by mouth      Probiotic Product (PROBIOTIC DAILY PO) Take 1 tablet by mouth daily.  Multiple Vitamins-Calcium (ONE-A-DAY WOMENS FORMULA PO) Take  by mouth.  pregabalin (LYRICA) 75 MG capsule Take 1 capsule by mouth 4 times daily for 90 days. 360 capsule 0    albuterol sulfate (PROAIR RESPICLICK) 591 (90 Base) MCG/ACT aerosol powder inhalation Inhale 2 puffs into the lungs every 4 hours as needed for Wheezing or Shortness of Breath (Patient not taking: Reported on 10/8/2020) 1 Inhaler 2    vitamin D (CHOLECALCIFEROL) 1000 UNIT TABS tablet Take 1,000 Units by mouth daily       No current facility-administered medications for this visit. Review of Systems   Constitutional: Positive for activity change and unexpected weight change. Negative for appetite change and fatigue. HENT: Negative for ear pain, sneezing and sore throat. Eyes: Positive for visual disturbance. Negative for itching. Wears glasses   Respiratory: Negative for cough, shortness of breath and wheezing. Cardiovascular: Negative for chest pain and palpitations. Gastrointestinal: Negative for abdominal pain and nausea. Genitourinary: Negative for frequency, pelvic pain and vaginal discharge. Musculoskeletal: Positive for arthralgias, back pain, joint swelling and myalgias. Back and hips   Skin: Positive for rash and wound. Neurological: Negative for dizziness, syncope and headaches. Psychiatric/Behavioral: Negative for sleep disturbance. The patient is nervous/anxious. Prior to Visit Medications    Medication Sig Taking?  Authorizing Provider   Dulaglutide (TRULICITY) 6.24 RP/0.3QA SOPN Inject 0.75 mg into the skin once a week Yes BAR Oliva CNP   neomycin-bacitracin-polymyxin (NEOSPORIN) 400-5-5000 ointment Apply topically 2 times daily. Yes BAR Oliva CNP   omeprazole (PRILOSEC) 40 MG delayed release capsule TAKE 1 CAPSULE DAILY Yes BAR Oliva CNP   HYDROcodone-acetaminophen (NORCO) 5-325 MG per tablet Take 1 tablet by mouth 3 times daily as needed for Pain for up to 30 days. Indications: Pain Yes BAR Tobin CNP   metFORMIN (GLUCOPHAGE-XR) 750 MG extended release tablet TAKE 1 TABLET DAILY WITH BREAKFAST Yes BAR Oliva CNP   Blood Pressure Monitor KIT Use as directed.  Yes BAR Oliva CNP   lisinopril (PRINIVIL;ZESTRIL) 5 MG tablet TAKE 1 TABLET DAILY Yes BAR Oliva CNP   sertraline (ZOLOFT) 100 MG tablet TAKE 1 TABLET DAILY Yes BAR Oliva CNP   atorvastatin (LIPITOR) 20 MG tablet TAKE 1 TABLET DAILY Yes BAR Oliva CNP   OneTouch Delica Lancets 69J MISC USE 1 EACH TWICE A DAY Yes BAR Oliva CNP   blood glucose test strips (ASCENSIA AUTODISC VI;ONE TOUCH ULTRA TEST VI) strip 1 each by In Vitro route daily verio test strips, PRN Yes BAR Oliva CNP   tiZANidine (ZANAFLEX) 4 MG tablet Take 1 tablet by mouth every 8 hours as needed (spasms) Yes BAR Tobin CNP   topiramate (TOPAMAX) 100 MG tablet TAKE 1 TABLET IN THE MORNING AND 2 TABLETS IN THE EVENING Yes BAR Anderson CNP   fluticasone (FLONASE) 50 MCG/ACT nasal spray 2 sprays by Each Nostril route daily  Patient taking differently: 2 sprays by Each Nostril route as needed  Yes An Thomas MD   b complex vitamins capsule Take 1 capsule by mouth daily Yes Antonio Lewis MD   docusate sodium (COLACE) 100 MG capsule Take 1 capsule by mouth daily as needed for Constipation Yes Koleen Bare, APRN - CNP   Elastic Bandages & Supports (LUMBAR BACK BRACE/SUPPORT PAD) MISC 1 each by Does not apply route daily as needed (pain) Yes Jan Gamez MD   Melatonin ER 5 MG TBCR Take by mouth as needed Yes Historical Provider, MD   Ascorbic Acid (VITAMIN C) 500 MG CAPS Take by mouth Yes Historical Provider, MD   ECHINACEA PO Take by mouth Yes Historical Provider, MD   Probiotic Product (PROBIOTIC DAILY PO) Take 1 tablet by mouth daily. Yes Historical Provider, MD   Multiple Vitamins-Calcium (ONE-A-DAY WOMENS FORMULA PO) Take  by mouth. Yes Historical Provider, MD   pregabalin (LYRICA) 75 MG capsule Take 1 capsule by mouth 4 times daily for 90 days. Gareth Aguilera APRN - ИРИНА   albuterol sulfate (PROAIR RESPICLICK) 900 (90 Base) MCG/ACT aerosol powder inhalation Inhale 2 puffs into the lungs every 4 hours as needed for Wheezing or Shortness of Breath  Patient not taking: Reported on 10/8/2020  Amador Ortiz MD   vitamin D (CHOLECALCIFEROL) 1000 UNIT TABS tablet Take 1,000 Units by mouth daily  Historical Provider, MD      Social History     Tobacco Use    Smoking status: Never Smoker    Smokeless tobacco: Never Used   Substance Use Topics    Alcohol use: No      Vitals:    10/08/20 0931   BP: 118/82   Pulse: 83   Temp: 97.3 °F (36.3 °C)   TempSrc: Temporal   SpO2: 97%   Weight: 229 lb 6.4 oz (104.1 kg)   Height: 5' (1.524 m)     Estimated body mass index is 44.8 kg/m² as calculated from the following:    Height as of this encounter: 5' (1.524 m). Weight as of this encounter: 229 lb 6.4 oz (104.1 kg). Physical Exam  Vitals signs and nursing note reviewed. Constitutional:       Appearance: She is well-developed. She is morbidly obese. HENT:      Head: Normocephalic. Right Ear: Tympanic membrane and external ear normal.      Left Ear: Tympanic membrane and external ear normal.      Nose: Nose normal.      Mouth/Throat:      Mouth: Mucous membranes are moist.   Eyes:      Pupils: Pupils are equal, round, and reactive to light.    Neck:      Musculoskeletal: Normal range of motion and neck supple. Thyroid: No thyromegaly. Vascular: No JVD. Cardiovascular:      Rate and Rhythm: Normal rate and regular rhythm. Pulses: Normal pulses. Heart sounds: Normal heart sounds. No murmur. Pulmonary:      Effort: Pulmonary effort is normal. No respiratory distress. Breath sounds: Normal breath sounds. Abdominal:      General: Bowel sounds are normal. There is no distension. Palpations: Abdomen is soft. Tenderness: There is no abdominal tenderness. Musculoskeletal:      Thoracic back: She exhibits decreased range of motion, tenderness and pain. She exhibits no deformity. Lumbar back: She exhibits decreased range of motion, tenderness and pain. She exhibits no deformity. Lymphadenopathy:      Cervical: No cervical adenopathy. Skin:     General: Skin is warm and dry. Capillary Refill: Capillary refill takes less than 2 seconds. Findings: Acne (nasal) and rash present. Rash is pustular (inside nares). Neurological:      Mental Status: She is alert and oriented to person, place, and time. Psychiatric:         Mood and Affect: Mood is anxious. Speech: Speech is rapid and pressured. Behavior: Behavior normal.       Most recent labs reviewed with patient, and all questions answered.   Lab Results   Component Value Date    WBC 6.8 09/10/2020    HGB 14.4 09/10/2020    HCT 43.4 09/10/2020    MCV 89.9 09/10/2020     09/10/2020     Lab Results   Component Value Date     09/10/2020    K 4.2 09/10/2020     09/10/2020    CO2 25 09/10/2020    BUN 11 09/10/2020    CREATININE 0.79 09/10/2020    GLUCOSE 146 02/03/2020    GLUCOSE 121 10/05/2019    CALCIUM 9.5 09/10/2020      Lab Results   Component Value Date    ALT 17 09/10/2020    AST 14 09/10/2020    ALKPHOS 71 09/10/2020    BILITOT 0.22 (L) 09/10/2020     Lab Results   Component Value Date    TSH 0.86 09/10/2020     Lab Results   Component Value Date    CHOL 151 10/05/2019    CHOL 207 (H) 02/26/2019    CHOL 217 (H) 10/17/2017     Lab Results   Component Value Date    TRIG 205 (H) 10/05/2019    TRIG 194 (H) 02/26/2019    TRIG 217 (H) 10/17/2017     Lab Results   Component Value Date    HDL 42 09/10/2020    HDL 39 (L) 10/05/2019    HDL 44 02/26/2019     Lab Results   Component Value Date    LDLCALC 71 10/05/2019    LDLCALC 126 10/17/2017    LDLCALC 118 07/14/2015    LDLCHOLESTEROL 58 09/10/2020    LDLCHOLESTEROL 124 02/26/2019    LDLCHOLESTEROL 122 04/20/2016     Lab Results   Component Value Date    CHOLHDLRATIO 3.7 09/10/2020    CHOLHDLRATIO 3.9 10/05/2019    CHOLHDLRATIO 4.7 02/26/2019     Lab Results   Component Value Date    LABA1C 7.2 (H) 09/10/2020      No results found for: [id number]  No results found for: FOLATE  Lab Results   Component Value Date    VITD25 67.7 09/10/2020     ASSESSMENT/PLAN:  1. Essential hypertension  Stable  Continue current therapy. DISCUSSED AND ADVISED TO:  Cut down on your salt intake. Cut down on caffeinated drinks, sports drinks. Instructed to check BP at home regularly. Report for any chest pains, shortness of breath, headaches, and lightheadedness. Call the office if your blood pressure continue to be higher than 140/90 or 90/50.        2. Type 2 diabetes mellitus without complication, without long-term current use of insulin (HCC)  Stable  Continue therapy. We will continue to monitor Hemoglobin A1c   DISCUSSED and ADVISED TO:  Continue to check Glucose levels at home. Report increased and low levels as discussed. Decrease carbohydrates, sugary drinks, desserts in your diet. Exercise regularly, as tolerated. Try to lose weight. - Dulaglutide (TRULICITY) 1.66 DB/1.4TO SOPN; Inject 0.75 mg into the skin once a week  Dispense: 4 pen; Refill: 0    3. Mixed hyperlipidemia  Stable  Continue therapy. Advised to decrease the consumption of red meats, fried foods, trans fats, sweets, sugary beverages.    Advised to increase fish, vegetables, and Substance Monitoring Possible medication side effects, risk of tolerance/dependence & alternative treatments discussed. ;No signs of potential drug abuse or diversion identified.;Obtaining appropriate analgesic effect of treatment. Chronic Pain > 50 MEDD -   Chronic Pain > 80 MEDD -     Orders Placed This Encounter   Procedures    INFLUENZA, QUADV, 3 YRS AND OLDER, IM PF, PREFILL SYR OR SDV, 0.5ML (AFLURIA QUADV, PF)    Hep B Vaccine Adult (ENGERIX-B)     Orders Placed This Encounter   Medications    Dulaglutide (TRULICITY) 1.39 RB/3.8DB SOPN     Sig: Inject 0.75 mg into the skin once a week     Dispense:  4 pen     Refill:  0    neomycin-bacitracin-polymyxin (NEOSPORIN) 400-5-5000 ointment     Sig: Apply topically 2 times daily. Dispense:  1 Tube     Refill:  1      There are no discontinued medications. Health Maintenance Due   Topic Date Due    Diabetic retinal exam  08/08/1981    Cervical cancer screen  07/03/2017    Diabetic foot exam  10/03/2020      Return in about 3 months (around 1/8/2021) for DM/A1c, HTN, HLP, dm foot exam.  Iqra received counseling on the following healthy behaviors: nutrition, exercise and medication adherence  Reviewed prior labs and health maintenance  Continue current medications, diet and exercise. Discussed use, benefit, and side effects of prescribed medications. Barriers to medication compliance addressed. Patient given educational materials - see patient instructions  Was a self-tracking handout given in paper form or via [employer]? Yes    Requested Prescriptions     Signed Prescriptions Disp Refills    Dulaglutide (TRULICITY) 5.64 IX/0.3UM SOPN 4 pen 0     Sig: Inject 0.75 mg into the skin once a week    neomycin-bacitracin-polymyxin (NEOSPORIN) 400-5-5000 ointment 1 Tube 1     Sig: Apply topically 2 times daily. All patient questions answered. Patient voiced understanding. Quality Measures    Body mass index is 44.8 kg/m². Elevated.  Weight control planned discussed Healthy diet and regular exercise. BP: 118/82 Blood pressure is normal. Treatment plan consists of No treatment change needed. Lab Results   Component Value Date    LDLCALC 71 10/05/2019    LDLCHOLESTEROL 58 09/10/2020    (goal LDL reduction with dx if diabetes is 50% LDL reduction)      PHQ Scores 9/8/2020 2/26/2019 12/18/2018 3/21/2017   PHQ2 Score 0 4 2 0   PHQ9 Score 0 16 2 0     Interpretation of Total Score Depression Severity: 1-4 = Minimal depression, 5-9 = Mild depression, 10-14 = Moderate depression, 15-19 = Moderately severe depression, 20-27 = Severe depression   This note was completed by using the assistance of a speech-recognition program. However, inadvertent computerized transcription errors may be present. Although every effort was made to ensure accuracy, no guarantees can be provided that every mistake has been identified and corrected by editing   An electronic signature was used to authenticate this note.   --Marty Cramer, BAR - CNP on 10/8/2020 at 11:35 AM

## 2020-10-08 ENCOUNTER — OFFICE VISIT (OUTPATIENT)
Dept: FAMILY MEDICINE CLINIC | Age: 50
End: 2020-10-08
Payer: COMMERCIAL

## 2020-10-08 ENCOUNTER — TELEPHONE (OUTPATIENT)
Dept: PAIN MANAGEMENT | Age: 50
End: 2020-10-08

## 2020-10-08 VITALS
TEMPERATURE: 97.3 F | HEART RATE: 83 BPM | HEIGHT: 60 IN | WEIGHT: 229.4 LBS | BODY MASS INDEX: 45.04 KG/M2 | SYSTOLIC BLOOD PRESSURE: 118 MMHG | OXYGEN SATURATION: 97 % | DIASTOLIC BLOOD PRESSURE: 82 MMHG

## 2020-10-08 PROBLEM — L70.0 ACNE CYSTICA: Status: ACTIVE | Noted: 2020-10-08

## 2020-10-08 PROCEDURE — 90746 HEPB VACCINE 3 DOSE ADULT IM: CPT | Performed by: FAMILY MEDICINE

## 2020-10-08 PROCEDURE — 3051F HG A1C>EQUAL 7.0%<8.0%: CPT | Performed by: FAMILY MEDICINE

## 2020-10-08 PROCEDURE — 90471 IMMUNIZATION ADMIN: CPT | Performed by: FAMILY MEDICINE

## 2020-10-08 PROCEDURE — 90472 IMMUNIZATION ADMIN EACH ADD: CPT | Performed by: FAMILY MEDICINE

## 2020-10-08 PROCEDURE — 99214 OFFICE O/P EST MOD 30 MIN: CPT | Performed by: FAMILY MEDICINE

## 2020-10-08 PROCEDURE — 90686 IIV4 VACC NO PRSV 0.5 ML IM: CPT | Performed by: FAMILY MEDICINE

## 2020-10-08 RX ORDER — DULAGLUTIDE 0.75 MG/.5ML
0.75 INJECTION, SOLUTION SUBCUTANEOUS WEEKLY
Qty: 4 PEN | Refills: 0 | Status: SHIPPED | OUTPATIENT
Start: 2020-10-08 | End: 2020-10-15 | Stop reason: ALTCHOICE

## 2020-10-08 RX ORDER — BACITRACIN, NEOMYCIN, POLYMYXIN B 400; 3.5; 5 [USP'U]/G; MG/G; [USP'U]/G
OINTMENT TOPICAL
Qty: 1 TUBE | Refills: 1 | Status: SHIPPED | OUTPATIENT
Start: 2020-10-08 | End: 2021-05-24 | Stop reason: ALTCHOICE

## 2020-10-08 ASSESSMENT — ENCOUNTER SYMPTOMS
EYE ITCHING: 0
NAUSEA: 0
WHEEZING: 0
SORE THROAT: 0
SHORTNESS OF BREATH: 0
COUGH: 0
BACK PAIN: 1
ABDOMINAL PAIN: 0

## 2020-10-08 NOTE — PROGRESS NOTES
Visit Information    Have you changed or started any medications since your last visit including any over-the-counter medicines, vitamins, or herbal medicines? no   Are you having any side effects from any of your medications? -  no  Have you stopped taking any of your medications? Is so, why? -  no    Have you seen any other physician or provider since your last visit? Yes - Records Obtained  Have you had any other diagnostic tests since your last visit? No  Have you been seen in the emergency room and/or had an admission to a hospital since we last saw you? No  Have you had your routine dental cleaning in the past 6 months? no    Have you activated your [employer] account? If not, what are your barriers?  Yes     Patient Care Team:  BAR Valenzuela CNP as PCP - General (Family Medicine)  BAR Valenzuela CNP as PCP - [facility]  Opal Reagan MD as Orthopedic Surgeon (Orthopedic Surgery)  Melody Neves MD as Consulting Physician (Gastroenterology)    Medical History Review  Past Medical, Family, and Social History reviewed and does contribute to the patient presenting condition    Health Maintenance   Topic Date Due    Diabetic retinal exam  08/08/1981    Hepatitis B vaccine (1 of 3 - Risk 3-dose series) 08/08/1990    Cervical cancer screen  07/03/2017    Flu vaccine (1) 09/01/2020    Diabetic foot exam  10/03/2020    A1C test (Diabetic or Prediabetic)  09/10/2021    Diabetic microalbuminuria test  09/10/2021    Lipid screen  09/10/2021    Potassium monitoring  09/10/2021    Creatinine monitoring  09/10/2021    Colon cancer screen colonoscopy  10/22/2022    DTaP/Tdap/Td vaccine (2 - Td) 01/11/2028    Pneumococcal 0-64 years Vaccine  Completed    HIV screen  Completed    Hepatitis A vaccine  Aged Out    Hib vaccine  Aged Out    Meningococcal (ACWY) vaccine  Aged Out

## 2020-10-08 NOTE — PATIENT INSTRUCTIONS
Patient Education        liraglutide  Pronunciation:  LIR a GLOO tide  Brand:  Santino Speedy  What is the most important information I should know about liraglutide? Do not use Saxenda and Victoza together. You should not use liraglutide if you have multiple endocrine neoplasia type 2 (tumors in your glands), a personal or family history of medullary thyroid cancer. In animal studies, liraglutide caused thyroid tumors or thyroid cancer. It is not known whether these effects would occur in people using regular doses. Call your doctor at once if you have signs of a thyroid tumor, such as swelling or a lump in your neck, trouble swallowing, a hoarse voice, or shortness of breath. What is liraglutide? The Victoza brand of liraglutide is used together with diet and exercise to improve blood sugar control in adults and children at least 8years old who have type 2 diabetes mellitus. Victoza  is also used to help reduce the risk of serious heart problems such as heart attack or stroke in adults who have type 2 diabetes and heart disease. Victoza is not for treating type 1 diabetes. The Saxenda brand of liraglutide is used together with diet and exercise to help people lose weight when they have certain health conditions. Pinky Sing is not for treating type 1 or type 2 diabetes. Pinky Sing is not a weight-loss medicine or appetite suppressant. Liraglutide may also be used for purposes not listed in this medication guide. What should I discuss with my health care provider before using liraglutide? You should not use liraglutide if you are allergic to it, or if you have:  · multiple endocrine neoplasia type 2 (tumors in your glands); or  · a personal or family history of medullary thyroid carcinoma (a type of thyroid cancer). You should not use Saxenda if you also use other medicines like liraglutide (albiglutide, dulaglutide, exenatide, Byetta, Bydureon, Tanzeum, Trulicity).   Tell your doctor if you have ever had:  · stomach problems causing slow digestion;  · kidney or liver disease;  · high triglycerides (a type of fat in the blood);  · heart problems;  · problems with your pancreas or gallbladder; or  · (if you use Saxenda) depression or suicidal thoughts. In animal studies, liraglutide caused thyroid tumors or thyroid cancer. It is not known whether these effects would occur in people using regular doses. Ask your doctor about your risk. Do not use Saxenda if you are pregnant. Weight loss is not recommended during pregnancy, even if you are overweight. Stop using Saxenda and tell your doctor right away if you become pregnant. Follow your doctor's instructions about using Victoza if you are pregnant or you become pregnant. Controlling diabetes is very important during pregnancy, and having high blood sugar may cause complications in both the mother and the baby. It may not be safe to breastfeed while using liraglutide. Ask your doctor about any risk. How should I use liraglutide? Follow all directions on your prescription label and read all medication guides or instruction sheets. Your doctor may occasionally change your dose. Use the medicine exactly as directed. Do not use Saxenda and Victoza together. Liraglutide is injected under the skin at any time of the day, with or without a meal. A healthcare provider may teach you how to properly use the medication by yourself. Read and carefully follow any Instructions for Use provided with your medicine. Ask your doctor or pharmacist if you don't understand all instructions. Prepare an injection only when you are ready to give it. Do not use if the medicine has changed colors or has particles in it. Call your pharmacist for new medicine. Call your doctor if you are sick with vomiting or diarrhea. You can easily become dehydrated while using liraglutide. This can lead to kidney failure.   You may have low blood sugar (hypoglycemia) and feel very hungry, dizzy, or pounding heartbeats;  · sudden changes in mood or behavior, suicidal thoughts;  · dehydration symptoms --feeling very thirsty or hot, being unable to urinate, heavy sweating, or hot and dry skin;  · low blood sugar --headache, hunger, sweating, irritability, dizziness, fast heart rate, and feeling anxious or shaky;  · gallbladder or pancreas problems --sudden and severe pain in your upper stomach that may spread to your back, nausea, vomiting, fever, jaundice (yellowing of your skin or eyes); or  · signs of a thyroid tumor --swelling or a lump in your neck, trouble swallowing, a hoarse voice, feeling short of breath. Common side effects may include:  · low blood sugar;  · nausea, vomiting, stomach discomfort, loss of appetite;  · diarrhea, constipation;  · rash;  · headache, dizziness; or  · feeling tired. This is not a complete list of side effects and others may occur. Call your doctor for medical advice about side effects. You may report side effects to FDA at 7-203-FDA-6170. What other drugs will affect liraglutide? Liraglutide can slow your digestion, and it may take longer for your body to absorb any medicines you take by mouth. Tell your doctor if you also use insulin or oral diabetes medicine. Other drugs may affect liraglutide, including prescription and over-the-counter medicines, vitamins, and herbal products. Tell your doctor about all your current medicines and any medicine you start or stop using. Where can I get more information? Your pharmacist can provide more information about liraglutide. Remember, keep this and all other medicines out of the reach of children, never share your medicines with others, and use this medication only for the indication prescribed. Every effort has been made to ensure that the information provided by [facility] is accurate, up-to-date, and complete, but no guarantee is made to that effect.  Drug information contained herein may be time sensitive. [employer] information has been compiled for use by healthcare practitioners and consumers in the [place] and therefore [employer] does not warrant that uses outside of the [place] are appropriate, unless specifically indicated otherwise. [facility]'s drug information does not endorse drugs, diagnose patients or recommend therapy. [facility] drug information is an informational resource designed to assist licensed healthcare practitioners in caring for their patients and/or to serve consumers viewing this service as a supplement to, and not a substitute for, the expertise, skill, knowledge and judgment of healthcare practitioners. The absence of a warning for a given drug or drug combination in no way should be construed to indicate that the drug or drug combination is safe, effective or appropriate for any given patient. [facility] does not assume any responsibility for any aspect of healthcare administered with the aid of information [facility] provides. The information contained herein is not intended to cover all possible uses, directions, precautions, warnings, drug interactions, allergic reactions, or adverse effects. If you have questions about the drugs you are taking, check with your doctor, nurse or pharmacist.  Copyright 5242-6695 [address]. Version: 10.01. Revision date: 4/9/2020. Care instructions adapted under license by [facility] [place] ([facility]). If you have questions about a medical condition or this instruction, always ask your healthcare professional. [place] [address] any warranty or liability for your use of this information.

## 2020-10-13 ENCOUNTER — HOSPITAL ENCOUNTER (OUTPATIENT)
Dept: GENERAL RADIOLOGY | Age: 50
Discharge: HOME | End: 2020-10-15
Payer: COMMERCIAL

## 2020-10-13 ENCOUNTER — HOSPITAL ENCOUNTER (OUTPATIENT)
Dept: PAIN MANAGEMENT | Age: 50
Discharge: HOME | End: 2020-10-13
Payer: COMMERCIAL

## 2020-10-13 ENCOUNTER — TELEPHONE (OUTPATIENT)
Dept: FAMILY MEDICINE CLINIC | Age: 50
End: 2020-10-13

## 2020-10-13 VITALS
DIASTOLIC BLOOD PRESSURE: 72 MMHG | HEIGHT: 60 IN | OXYGEN SATURATION: 95 % | WEIGHT: 229 LBS | HEART RATE: 66 BPM | RESPIRATION RATE: 20 BRPM | BODY MASS INDEX: 44.96 KG/M2 | SYSTOLIC BLOOD PRESSURE: 112 MMHG | TEMPERATURE: 98.2 F

## 2020-10-13 PROCEDURE — 3209999900 FLUORO FOR SURGICAL PROCEDURES

## 2020-10-13 PROCEDURE — 6360000002 HC RX W HCPCS: Performed by: PAIN MEDICINE

## 2020-10-13 PROCEDURE — 64635 DESTROY LUMB/SAC FACET JNT: CPT

## 2020-10-13 PROCEDURE — 2500000003 HC RX 250 WO HCPCS: Performed by: PAIN MEDICINE

## 2020-10-13 PROCEDURE — 64636 DESTROY L/S FACET JNT ADDL: CPT | Performed by: PAIN MEDICINE

## 2020-10-13 PROCEDURE — 64635 DESTROY LUMB/SAC FACET JNT: CPT | Performed by: PAIN MEDICINE

## 2020-10-13 PROCEDURE — 64636 DESTROY L/S FACET JNT ADDL: CPT

## 2020-10-13 RX ORDER — FENTANYL CITRATE 50 UG/ML
INJECTION, SOLUTION INTRAMUSCULAR; INTRAVENOUS
Status: COMPLETED | OUTPATIENT
Start: 2020-10-13 | End: 2020-10-13

## 2020-10-13 RX ORDER — MIDAZOLAM HYDROCHLORIDE 1 MG/ML
INJECTION INTRAMUSCULAR; INTRAVENOUS
Status: COMPLETED | OUTPATIENT
Start: 2020-10-13 | End: 2020-10-13

## 2020-10-13 RX ORDER — BUPIVACAINE HYDROCHLORIDE 5 MG/ML
INJECTION, SOLUTION EPIDURAL; INTRACAUDAL
Status: COMPLETED | OUTPATIENT
Start: 2020-10-13 | End: 2020-10-13

## 2020-10-13 RX ORDER — TRIAMCINOLONE ACETONIDE 40 MG/ML
INJECTION, SUSPENSION INTRA-ARTICULAR; INTRAMUSCULAR
Status: COMPLETED | OUTPATIENT
Start: 2020-10-13 | End: 2020-10-13

## 2020-10-13 RX ADMIN — Medication 25 MCG: at 08:32

## 2020-10-13 RX ADMIN — BUPIVACAINE HYDROCHLORIDE 30 ML: 5 INJECTION, SOLUTION EPIDURAL; INTRACAUDAL; PERINEURAL at 08:54

## 2020-10-13 RX ADMIN — MIDAZOLAM 2 MG: 1 INJECTION INTRAMUSCULAR; INTRAVENOUS at 08:14

## 2020-10-13 RX ADMIN — TRIAMCINOLONE ACETONIDE 40 MG: 40 INJECTION, SUSPENSION INTRA-ARTICULAR; INTRAMUSCULAR at 08:54

## 2020-10-13 ASSESSMENT — PAIN DESCRIPTION - ORIENTATION: ORIENTATION: RIGHT;LEFT

## 2020-10-13 ASSESSMENT — PAIN DESCRIPTION - FREQUENCY: FREQUENCY: CONTINUOUS

## 2020-10-13 ASSESSMENT — PAIN DESCRIPTION - ONSET: ONSET: ON-GOING

## 2020-10-13 ASSESSMENT — PAIN DESCRIPTION - DESCRIPTORS: DESCRIPTORS: ACHING;BURNING;THROBBING

## 2020-10-13 ASSESSMENT — PAIN DESCRIPTION - PROGRESSION: CLINICAL_PROGRESSION: GRADUALLY WORSENING

## 2020-10-13 ASSESSMENT — PAIN DESCRIPTION - PAIN TYPE: TYPE: CHRONIC PAIN

## 2020-10-13 ASSESSMENT — PAIN DESCRIPTION - LOCATION: LOCATION: BACK

## 2020-10-13 NOTE — PROGRESS NOTES
NEVER  deniesEver been transfused or tattooed  admits to Wears seatbelts:     Tobacco use screening:No Smoking History = 0  The patient smokes NONE packs per day.      Alcohol use:none  no history of illicit drug use    Hepatitis C screening-adults at high risk and adults born between 3234-5080 No results found for: HAV, HEPAIGM, HEPBIGM, HEPBCAB, HBEAG, HEPCAB   HIV screening No results found for: [id number]     Immunization:  Tdap one time, then Td every 10 years:   Influenza vaccination annually  Pneumococcal   Varicella   Immunization History   Administered Date(s) Administered    Hepatitis B Adult (Engerix-B) 10/08/2020    Influenza Virus Vaccine 10/31/2013, 11/03/2014, 10/22/2015    Influenza, Quadv, 6-35 Months, IM (Fluzone,Afluria) 10/20/2017    Influenza, Quadv, IM, PF (6 mo and older Fluzone, Flulaval, Fluarix, and 3 yrs and older Afluria) 09/18/2018, 10/03/2019, 10/08/2020    Pneumococcal Polysaccharide ([id number]) 02/26/2019    Tdap (Boostrix, Adacel) 01/11/2018     Patient Active Problem List    Diagnosis Date Noted    Acne cystica 10/08/2020    Mixed hyperlipidemia 09/08/2020    Bacterial sinusitis 02/04/2020    Chronic GERD     Lumbosacral spondylosis without myelopathy     Chronic use of opiate drugs therapeutic purposes 03/11/2019    Chronic, continuous use of opioids     Type 2 diabetes mellitus without complication, without long-term current use of insulin ([facility] [address] 10/02/2018    Urinary incontinence 10/02/2018    Anxiety and depression 11/03/2017    Morbid obesity with BMI of 40.0-44.9, adult ([facility] [address] 10/20/2017    Encounter for medication management     Osteoarthritis of spine with radiculopathy, lumbar region     Chronic bilateral low back pain without sciatica     Hemorrhoids     Colon polyp 10/31/2016    Tubular adenoma 10/31/2016    Arthropathy of lumbar facet joint     Rheumatoid arthritis ([address]     Primary osteoarthritis of left hip     Sacroiliitis ([facility] [address]     Left-sided low back pain with left-sided sciatica     Osteoarthritis of lumbar spine     Essential hypertension 11/03/2014    Lumbar radiculopathy, chronic 09/19/2014    Degeneration of lumbar intervertebral disc 09/19/2014    Lumbar spondylosis 09/19/2014    Cervical disc herniation 11/07/2013    Degenerative disc disease, cervical 10/08/2013     Past Surgical History:   Procedure Laterality Date    CERVICAL DISCECTOMY  12/2013    & fusion     CHOLECYSTECTOMY      COLONOSCOPY  10/31/2016    int/ext hemorrhoids; sessile serated adenomax2; tubular adenoma    COLONOSCOPY N/A 10/22/2019    COLONOSCOPY POLYPECTOMY SNARE/COLD BIOPSY AND RANDOM BIOPSIES performed by Rolando Yu MD at [address] [facility]      HAND SURGERY Right     thumb surgery, BONE REMOVED    HYSTERECTOMY      LAPAROSCOPY      MT DEST,PARAVERTEBRAL,L/S,ADDL LVLS  3/25/2019         TONSILLECTOMY      TUBAL LIGATION      UPPER GASTROINTESTINAL ENDOSCOPY  10/31/2016    gastritis    UPPER GASTROINTESTINAL ENDOSCOPY N/A 10/22/2019    EGD BIOPSY performed by Rolando Yu MD at [facility]     Family History   Problem Relation Age of Onset    Cancer Mother     Heart Disease Father     Diabetes Father     High Blood Pressure Father     Other Other         Celiac Sprue. Aunt     Current Outpatient Medications   Medication Sig Dispense Refill    Dulaglutide 1.5 MG/0.5ML SOPN Inject 1.5 mg into the skin once a week 12 pen 2    neomycin-bacitracin-polymyxin (NEOSPORIN) 400-5-5000 ointment Apply topically 2 times daily. 1 Tube 1    omeprazole (PRILOSEC) 40 MG delayed release capsule TAKE 1 CAPSULE DAILY 90 capsule 2    HYDROcodone-acetaminophen (NORCO) 5-325 MG per tablet Take 1 tablet by mouth 3 times daily as needed for Pain for up to 30 days.  Indications: Pain 90 tablet 0    metFORMIN (GLUCOPHAGE-XR) 750 MG extended release tablet TAKE 1 TABLET DAILY WITH BREAKFAST 90 tablet 2    Blood Pressure Monitor KIT Use as directed. 1 kit 1    lisinopril (PRINIVIL;ZESTRIL) 5 MG tablet TAKE 1 TABLET DAILY 90 tablet 2    sertraline (ZOLOFT) 100 MG tablet TAKE 1 TABLET DAILY 90 tablet 2    atorvastatin (LIPITOR) 20 MG tablet TAKE 1 TABLET DAILY 90 tablet 2    OneTouch Delica Lancets 40A MISC USE 1 EACH TWICE A  each 5    blood glucose test strips (ASCENSIA AUTODISC VI;ONE TOUCH ULTRA TEST VI) strip 1 each by In Vitro route daily verio test strips,  each 1    tiZANidine (ZANAFLEX) 4 MG tablet Take 1 tablet by mouth every 8 hours as needed (spasms) 270 tablet 0    topiramate (TOPAMAX) 100 MG tablet TAKE 1 TABLET IN THE MORNING AND 2 TABLETS IN THE EVENING 270 tablet 4    albuterol sulfate (PROAIR RESPICLICK) 667 (90 Base) MCG/ACT aerosol powder inhalation Inhale 2 puffs into the lungs every 4 hours as needed for Wheezing or Shortness of Breath 1 Inhaler 2    fluticasone (FLONASE) 50 MCG/ACT nasal spray 2 sprays by Each Nostril route daily (Patient taking differently: 2 sprays by Each Nostril route as needed ) 1 Bottle 1    b complex vitamins capsule Take 1 capsule by mouth daily      docusate sodium (COLACE) 100 MG capsule Take 1 capsule by mouth daily as needed for Constipation 30 capsule 2    Elastic Bandages & Supports (LUMBAR BACK BRACE/SUPPORT PAD) MISC 1 each by Does not apply route daily as needed (pain) 1 each 0    Melatonin ER 5 MG TBCR Take by mouth as needed      Ascorbic Acid (VITAMIN C) 500 MG CAPS Take by mouth      ECHINACEA PO Take by mouth      Probiotic Product (PROBIOTIC DAILY PO) Take 1 tablet by mouth daily.  Multiple Vitamins-Calcium (ONE-A-DAY WOMENS FORMULA PO) Take  by mouth.  pregabalin (LYRICA) 75 MG capsule Take 1 capsule by mouth 4 times daily for 90 days. 360 capsule 0    vitamin D (CHOLECALCIFEROL) 1000 UNIT TABS tablet Take 1,000 Units by mouth daily       No current facility-administered medications for this visit.       The patient's past medical, surgical, social, and family history as well as her current medications and allergies were reviewed as documented in today's encounter. Review of Systems   /70   Pulse 71   Ht 5' (1.524 m)   Wt 225 lb (102.1 kg)   SpO2 97%   BMI 43.94 kg/m²   Physical Exam  Vitals signs and nursing note reviewed. Constitutional:       Appearance: She is well-developed. She is morbidly obese. HENT:      Head: Normocephalic. Right Ear: Tympanic membrane and external ear normal.      Left Ear: External ear normal. Tympanic membrane has decreased mobility. Nose: Mucosal edema present. Mouth/Throat:      Mouth: Mucous membranes are moist.   Eyes:      Pupils: Pupils are equal, round, and reactive to light. Neck:      Musculoskeletal: Normal range of motion and neck supple. Thyroid: No thyromegaly. Vascular: No JVD. Cardiovascular:      Rate and Rhythm: Normal rate and regular rhythm. Pulses: Normal pulses. Heart sounds: Normal heart sounds. No murmur. Pulmonary:      Effort: Pulmonary effort is normal. No respiratory distress. Breath sounds: Normal breath sounds. Abdominal:      General: Bowel sounds are normal. There is no distension. Palpations: Abdomen is soft. Tenderness: There is no abdominal tenderness. Musculoskeletal:      Thoracic back: She exhibits decreased range of motion, tenderness and pain. She exhibits no deformity. Lumbar back: She exhibits decreased range of motion, tenderness and pain. She exhibits no deformity. Lymphadenopathy:      Cervical: No cervical adenopathy. Skin:     General: Skin is warm and dry. Capillary Refill: Capillary refill takes less than 2 seconds. Findings: Acne (nasal) and rash present. Rash is pustular (inside nares). Neurological:      Mental Status: She is alert and oriented to person, place, and time. Psychiatric:         Mood and Affect: Mood is anxious.          Speech: Speech is rapid and pressured. Behavior: Behavior normal.         Lab Results   Component Value Date    WBC 6.8 09/10/2020    HGB 14.4 09/10/2020    HCT 43.4 09/10/2020    MCV 89.9 09/10/2020     09/10/2020     Lab Results   Component Value Date     09/10/2020    K 4.2 09/10/2020     09/10/2020    CO2 25 09/10/2020    BUN 11 09/10/2020    CREATININE 0.79 09/10/2020    GLUCOSE 146 02/03/2020    GLUCOSE 121 10/05/2019    CALCIUM 9.5 09/10/2020      Lab Results   Component Value Date    ALT 17 09/10/2020    AST 14 09/10/2020    ALKPHOS 71 09/10/2020    BILITOT 0.22 (L) 09/10/2020     Lab Results   Component Value Date    TSH 0.86 09/10/2020     Lab Results   Component Value Date    CHOL 151 10/05/2019    CHOL 207 (H) 02/26/2019    CHOL 217 (H) 10/17/2017     Lab Results   Component Value Date    TRIG 205 (H) 10/05/2019    TRIG 194 (H) 02/26/2019    TRIG 217 (H) 10/17/2017     Lab Results   Component Value Date    HDL 42 09/10/2020    HDL 39 (L) 10/05/2019    HDL 44 02/26/2019     Lab Results   Component Value Date    LDLCALC 71 10/05/2019    LDLCALC 126 10/17/2017    LDLCALC 118 07/14/2015    LDLCHOLESTEROL 58 09/10/2020    LDLCHOLESTEROL 124 02/26/2019    LDLCHOLESTEROL 122 04/20/2016     Lab Results   Component Value Date    CHOLHDLRATIO 3.7 09/10/2020    CHOLHDLRATIO 3.9 10/05/2019    CHOLHDLRATIO 4.7 02/26/2019     Lab Results   Component Value Date    LABA1C 7.2 (H) 09/10/2020       Lab Results   Component Value Date    VITD25 67.7 09/10/2020     I personally reviewed testing with patient. Otherwise labs within normal limits  ASSESSMENT AND PLAN  1. Type 2 diabetes mellitus without complication, without long-term current use of insulin (HCC)  iMPROVING  Continue therapy. We will continue to monitor Hemoglobin A1c   DISCUSSED and ADVISED TO:  Continue to check Glucose levels at home. Report increased and low levels as discussed.   Decrease carbohydrates, sugary drinks, desserts in your diet.  Exercise regularly, as tolerated. Try to lose weight. - Dulaglutide 1.5 MG/0.5ML SOPN; Inject 1.5 mg into the skin once a week  Dispense: 12 pen; Refill: 2    2. Chronic rhinitis  Failure to Improve  Start zyrtec and flonase  Continue with the same therapy   ADVISED TO:  Avoid known allergens/irritants. Stop smoking or avoid second hand smoke. Stay hydrated. Report for worsening symptoms    - fluticasone (FLONASE) 50 MCG/ACT nasal spray; 1 spray by Each Nostril route 2 times daily  Dispense: 2 Bottle; Refill: 2    3. Surgical menopause  historical    4. Morbid obesity with body mass index of 40.0-49.9 ([facility])  BMI decreasing  DISCUSSED AND ADVISED TO:  Eat a low-fat and low carbohydrates diet. Avoid fried foods especially fast food. Choose healthier options for snacks. Have 5-6 servings of fruits and vegetables per day. Cut down on eating processed food. Add 30 minutes to 1 hour aerobic exercise for 3-4 days a week. 5. Encounter for annual physical exam  DISCUSSED AND ADVISED TO:   Don't smoke.  Eat heart-healthy foods.  Be active. Talk to your doctor about what type and level of exercise is safe for you.  Stay at a healthy weight. Lose weight if you need to.  Avoid alcohol if it triggers symptoms.  Manage other health problems such as high blood pressure, high cholesterol, and diabetes.  Avoid getting sick from the flu. Get a flu shot every year.  Manage stress, sleep at least 7 hours every night. 6. Breast cancer screening by mammogram  recommended    - DONNA DIGITAL SCREEN W OR WO CAD BILATERAL;  Future      Health Maintenance Due   Topic Date Due    Diabetic retinal exam  08/08/1980     Health Maintenance reviewed   MAMMOGRAM. No treatment change needed   Healthful diet and Physical activity counseling to prevent CVD- low carb, low fat diet, increase fruits and vegetables, and exercise 4-5 times a day 30-40minutes a day discussed  Low carb, low fat diet, increase fruits and vegetables, and exercise 4-5 times a week 30-40 minutes a day, or walk 1-2 hours per day, or wear a pedometer and get at least 10,000 steps per day. Eye Examination every year advised. Dental exam 2-3 times /year advised. Immunizations reviewed. Data Unavailable  No orders of the defined types were placed in this encounter. Medications Discontinued During This Encounter   Medication Reason    Dulaglutide (TRULICITY) 2.13 OA/0.9LN SOPN Therapy completed     Iqra received counseling on the following healthy behaviors: nutrition, exercise and medication adherence  Reviewed prior labs and health maintenance  Continue current medications, diet and exercise. Discussed use, benefit, and side effects of prescribed medications. Barriers to medication compliance addressed. Patient given educational materials - see patient instructions  Was a self-tracking handout given in paper form or via [employer]? Yes    Requested Prescriptions     Signed Prescriptions Disp Refills    Dulaglutide 1.5 MG/0.5ML SOPN 12 pen 2     Sig: Inject 1.5 mg into the skin once a week    fluticasone (FLONASE) 50 MCG/ACT nasal spray 2 Bottle 2     Si spray by Each Nostril route 2 times daily       All patient questions answered. Patient voiced understanding. Quality Measures    Body mass index is 43.94 kg/m². Elevated. Weight control planned discussed Healthy diet and regular exercise. BP: 120/70 Blood pressure is normal. Treatment plan consists of No treatment change needed.     Lab Results   Component Value Date    LDLCALC 71 10/05/2019    LDLCHOLESTEROL 58 09/10/2020    (goal LDL reduction with dx if diabetes is 50% LDL reduction)      PHQ Scores 2020 2019 2018 3/21/2017   PHQ2 Score 0 4 2 0   PHQ9 Score 0 16 2 0     Interpretation of Total Score Depression Severity: 1-4 = Minimal depression, 5-9 = Mild depression, 10-14 = Moderate depression, 15-19 = Moderately severe depression, 20-27 = Severe depression  The patient's past medical, surgical, social, andfamily history as well as her   current medications and allergies were reviewed as documented in today's encounter. Medications, labs, diagnostic studies, consultations and follow-up as documented in thisencount. No follow-ups on file. Patient was seen with total face to face timeof 30 minutes. More than 50% of this visit was counseling and education. Future Appointments   Date Time Provider Javier Carlos   11/5/2020 11:30 AM BAR Morales CNP [address]   11/13/2020 10:00 [facility]   1/8/2021 10:00 AM BAR Oliva CNP [facility]   4/14/2021 10:00 AM [facility]     This note was completed by using the assistance of a speech-recognition program. However, inadvertent computerized transcription errors may be present. Although every effort was made to ensure accuracy, no guarantees can be provided that every mistake has been identified and corrected by editing.   Electronically signed by BAR Arreola CNP on 10/15/2020 at 12:04 PM

## 2020-10-13 NOTE — PROCEDURES
Degeneration of lumbar intervertebral disc    4. Lumbosacral spondylosis without myelopathy        Postoperative Diagnosis:    1. Arthropathy of lumbar facet joint    2. Lumbar spondylosis    3. Degeneration of lumbar intervertebral disc    4. Lumbosacral spondylosis without myelopathy        Procedure Performed:  :Radiofrequency ablation of median branches at the levels of L2, L3, L4, L5, S1left under fluoroscopic guidance with IV sedation. Indication for the Procedure: The patient has ahistory of chronic low back pain that is not responding well to the conservative treatment. Patient's pain is mostly axial in nature. Pain is interfering with the activities of daily living. Physical examination revealed facet tenderness and facet loading is positive. Patient had undergone lumbar facet joint injections with pain relief that lasted for only a short period of time and had greater than 70% pain relief with confirmatory median branch blocks. Hence we decided to do radiofrequency abalation of median branches for long term pain releif. The procedure and risks  were discussed with the patient and an informed consent was obtained.   Current Pain Assessment  Pain Assessment  Pain Level: 0  Patient's Stated Pain Goal: 2(decrease pain and increase acitivity)  Pain Type: Chronic pain  Pain Location: Back  Pain Orientation: Right, Left  Pain Radiating Towards: back to hips to left leg to knee  Pain Descriptors: Aching, Burning, Throbbing  Pain Frequency: Continuous  Pain Onset: On-going  Clinical Progression: Gradually worsening  Effect of Pain on Daily Activities: all activities and sitting  Functional Pain Assessment: Prevents or interferes some active activities and ADLs  Non-Pharmaceutical Pain Intervention(s): Rest  RASS Score: Alert and calm  POSS Score (Patient Ctrl Analgesia): 1     Procedure:    After starting an IV, the patient was sedated with 2 mg of Midazolam and 25 mcg of Fentanyl intravenously by the RN was taken as a reference point. For the L4 median branch the junction of the transverse process of L5 with the superolateral possible facet joint was taken as a reference point and for S1 median branch the most lateral and superior aspect of S1 foramina was taken as a reference point,. For L3 median branch the junction of L4 transverse process and superior articular process of facet joint was taken as reference point and so on. Patient's vital signs and neurological status remained stable throughout the procedure and post procedural period. The patient tolerated the procedure well and was discharged home in stable condition.     Electronically signed by Solis Walsh MD on 10/13/2020 at 9:07 AM

## 2020-10-13 NOTE — TELEPHONE ENCOUNTER
This patient is due for pap smear. If she does not have any gynecologist she follows, we can do the pap smear on Thursday with her annual physical. Please change her to 30 mins.  Thanks,

## 2020-10-15 ENCOUNTER — OFFICE VISIT (OUTPATIENT)
Dept: FAMILY MEDICINE CLINIC | Age: 50
End: 2020-10-15
Payer: COMMERCIAL

## 2020-10-15 ENCOUNTER — TELEPHONE (OUTPATIENT)
Dept: PAIN MANAGEMENT | Age: 50
End: 2020-10-15

## 2020-10-15 VITALS
HEIGHT: 60 IN | HEART RATE: 71 BPM | WEIGHT: 225 LBS | DIASTOLIC BLOOD PRESSURE: 70 MMHG | BODY MASS INDEX: 44.17 KG/M2 | OXYGEN SATURATION: 97 % | SYSTOLIC BLOOD PRESSURE: 120 MMHG

## 2020-10-15 PROBLEM — J31.0 CHRONIC RHINITIS: Status: ACTIVE | Noted: 2020-10-15

## 2020-10-15 PROBLEM — E89.40 SURGICAL MENOPAUSE: Status: ACTIVE | Noted: 2020-10-15

## 2020-10-15 PROCEDURE — 3051F HG A1C>EQUAL 7.0%<8.0%: CPT | Performed by: FAMILY MEDICINE

## 2020-10-15 PROCEDURE — 99396 PREV VISIT EST AGE 40-64: CPT | Performed by: FAMILY MEDICINE

## 2020-10-15 RX ORDER — FLUTICASONE PROPIONATE 50 MCG
1 SPRAY, SUSPENSION (ML) NASAL 2 TIMES DAILY
Qty: 2 BOTTLE | Refills: 2 | Status: SHIPPED | OUTPATIENT
Start: 2020-10-15 | End: 2021-03-11 | Stop reason: SDUPTHER

## 2020-10-15 NOTE — PATIENT INSTRUCTIONS
Patient Education        Dual-Energy X-Ray Absorptiometry (DXA) Test: About This Test  What is it? Dual-energy X-ray absorptiometry (DXA) is a test that uses two different X-ray beams to check bone thickness (density) in your spine and hip. This information is used to estimate the strength of your bones. Why is this test done? Bone density testing is often done for:  · People who are at risk for osteoporosis, including:  ? Women who are age 72 and older. ? Older men. ? People who take some medications, such as corticosteroids. ? People who have certain medical conditions, such as hyperparathyroidism. · Younger women who have osteoporosis and are at high risk for broken bones. · People who have osteoporosis, to see how well treatment is working. How is the test done? · You will lie on your back on a padded table. You probably can leave your clothes on, but you will remove any metal buttons or freda for the test.  · You may need to lie with your legs straight or with your lower legs resting on a platform built into the table. · The machine will scan your bones and measure the amount of radiation they absorb. During this test you are exposed to a very low dose of radiation. How long does the test take? The DXA scan, which scans the hip and lower spine, takes about 20 minutes. Other kinds of bone density scans may take 30 to 45 minutes. What happens after the test?  · You will probably be able to go home right away. · You can go back to your usual activities right away. Follow-up care is a key part of your treatment and safety. Be sure to make and go to all appointments, and call your doctor if you are having problems. It's also a good idea to keep a list of the medicines you take. Ask your doctor when you can expect to have your test results. Where can you learn more? Go to [email or web address].[email or web address] org and sign in to your [employer]Em account.  Enter F546 in the Liquid State box to learn more about \"Dual-Energy X-Ray Absorptiometry (DXA) Test: About This Test.\"     If you do not have an account, please click on the \"Sign Up Now\" link. Current as of: April 15, 2020               Content Version: 12.6  © 2006-2020 [employer]. Care instructions adapted under license by [facility] [place] ([facility]). If you have questions about a medical condition or this instruction, always ask your healthcare professional. [address] any warranty or liability for your use of this information. Patient Education        Preventing Osteoporosis: Care Instructions  Your Care Instructions     Osteoporosis means the bones are weak and thin enough that they can break easily. The older you are, the more likely you are to get osteoporosis. But with plenty of calcium, vitamin D, and exercise, you can help prevent osteoporosis. The preteen and teen years are a key time for bone building. With the help of calcium, vitamin D, and exercise in those early years and beyond, the bones reach their peak density and strength by age 27. After age 27, your bones naturally start to thin and weaken. The stronger your bones are at around age 27, the lower your risk for osteoporosis. But no matter what your age and risk are, your bones still need calcium, vitamin D, and exercise to stay strong. Also avoid smoking, and limit alcohol. Smoking and heavy alcohol use can make your bones thinner. Talk to your doctor about any special risks you might have, such as having a close relative with osteoporosis or taking a medicine that can weaken bones. Your doctor can tell you the best ways to protect your bones from thinning. Follow-up care is a key part of your treatment and safety. Be sure to make and go to all appointments, and call your doctor if you are having problems. It's also a good idea to know your test results and keep a list of the medicines you take. How can you care for yourself at home?   · Get enough calcium and vitamin D. The [place] [employer] recommends adults younger than age 46 need 1,000 mg of calcium and 600 IU of vitamin D each day. Women ages 46 to 79 need 1,200 mg of calcium and 600 IU of vitamin D each day. Men ages 46 to 79 need 1,000 mg of calcium and 600 IU of vitamin D each day. Adults 71 and older need 1,200 mg of calcium and 800 IU of vitamin D each day. ? Eat foods rich in calcium, like yogurt, cheese, milk, and dark green vegetables. ? Eat foods rich in vitamin D, like eggs, fatty fish, cereal, and fortified milk. ? Get some sunshine. Your body uses sunshine to make its own vitamin D. The safest time to be out in the sun is before 10 a.m. or after 3 p.m. Avoid getting sunburned. Sunburn can increase your risk of skin cancer. ? Talk to your doctor about taking a calcium plus vitamin D supplement if you don't think you are getting enough through your diet and sunshine. Ask about what type of calcium is right for you, and how much to take at a time. Adults ages 23 to 48 should not get more than 2,500 mg of calcium and 4,000 IU of vitamin D each day, whether it is from supplements and/or food. Adults ages 46 and older should not get more than 2,000 mg of calcium and 4,000 IU of vitamin D each day from supplements and/or food. · Get regular bone-building exercise. Weight-bearing and resistance exercises keep bones healthy by working the muscles and bones against gravity. Start out at an exercise level that feels right for you. Add a little at a time until you can do the following:  ? Do 30 minutes of weight-bearing exercise on most days of the week. Walking, jogging, stair climbing, and dancing are good choices. ? Do resistance exercises with weights or elastic bands 2 to 3 days a week. · Limit alcohol. Drink no more than 1 alcohol drink a day if you are a woman. Drink no more than 2 alcohol drinks a day if you are a man. · Do not smoke. Smoking can make bones thin faster.  If you need help quitting, talk to your doctor about stop-smoking programs and medicines. These can increase your chances of quitting for good. When should you call for help? Watch closely for changes in your health, and be sure to contact your doctor if you have any problems. Where can you learn more? Go to https://[email or web address].[email or web address] and sign in to your [employer] account. Enter B011 in the Nourish box to learn more about \"Preventing Osteoporosis: Care Instructions. \"     If you do not have an account, please click on the \"Sign Up Now\" link. Current as of: April 15, 2020               Content Version: 12.6  © 1827-5657 [employer]. Care instructions adapted under license by [facility] ([facility]). If you have questions about a medical condition or this instruction, always ask your healthcare professional. [address] any warranty or liability for your use of this information. Patient Education        Eustachian Tube Problems: Care Instructions  Your Care Instructions     The eustachian (say \"you-STAY-shee-un\") tubes run between the inside of the ears and the throat. They keep air pressure stable in the ears. If your eustachian tubes become blocked, the air pressure in your ears changes. The fluids from a cold can clog eustachian tubes, causing pain in the ears. A quick change in air pressure can cause eustachian tubes to close up. This might happen when an airplane changes altitude or when a  goes up or down underwater. Eustachian tube problems often clear up on their own or after antibiotic treatment. If your tubes continue to be blocked, you may need surgery. Follow-up care is a key part of your treatment and safety. Be sure to make and go to all appointments, and call your doctor if you are having problems. It's also a good idea to know your test results and keep a list of the medicines you take. How can you care for yourself at home?   · To [id number] [employer]. Care instructions adapted under license by [facility] ([facility]). If you have questions about a medical condition or this instruction, always ask your healthcare professional. [address] any warranty or liability for your use of this information.

## 2020-10-28 ENCOUNTER — HOSPITAL ENCOUNTER (OUTPATIENT)
Dept: WOMENS IMAGING | Age: 50
Discharge: HOME | End: 2020-10-30
Payer: COMMERCIAL

## 2020-10-28 PROCEDURE — 77063 BREAST TOMOSYNTHESIS BI: CPT

## 2020-11-03 PROBLEM — M47.816 OSTEOARTHRITIS OF LUMBAR SPINE: Status: RESOLVED | Noted: 2020-11-03 | Resolved: 2020-11-03

## 2020-11-05 ENCOUNTER — HOSPITAL ENCOUNTER (OUTPATIENT)
Dept: PAIN MANAGEMENT | Age: 50
Discharge: HOME | End: 2020-11-05
Payer: COMMERCIAL

## 2020-11-05 PROCEDURE — 99213 OFFICE O/P EST LOW 20 MIN: CPT

## 2020-11-05 PROCEDURE — 99213 OFFICE O/P EST LOW 20 MIN: CPT | Performed by: NURSE PRACTITIONER

## 2020-11-05 RX ORDER — HYDROCODONE BITARTRATE AND ACETAMINOPHEN 5; 325 MG/1; MG/1
1 TABLET ORAL 3 TIMES DAILY PRN
Qty: 90 TABLET | Refills: 0 | Status: SHIPPED | OUTPATIENT
Start: 2020-11-07 | End: 2020-12-04 | Stop reason: SDUPTHER

## 2020-11-05 ASSESSMENT — ENCOUNTER SYMPTOMS
EYES NEGATIVE: 1
BACK PAIN: 1
RESPIRATORY NEGATIVE: 1
GASTROINTESTINAL NEGATIVE: 1

## 2020-11-05 NOTE — PROGRESS NOTES
[address] PROGRESS NOTE      Patient video visit to  review Medication Agreement    Chief Complaint:low back pain    She had RFA on the left of lumbar median branch nerves L2-S1on 10- with 50% relief,She had right side done this past July, She is able to be more active,Her back pain radiates to left knee and right hip. She states when she walks it feels like someone pounding a nail in to it. ,She is sleeping okay,She has had no ED visits. She had her flu vaccine. She is diabetic, her last HGBA1C was 7,22, she states her PCP put her on trulicity and increased her metformin. Her blood sugar was 94 this am. She lost 11 pounds. Back Pain   This is a chronic problem. The current episode started more than 1 year ago. The problem occurs constantly. The problem has been gradually improving since onset. The pain is present in the lumbar spine. The quality of the pain is described as shooting (grabbing). Radiates to: to left knee and right hip. The pain is at a severity of 5/10. The pain is moderate. The pain is worse during the night. The symptoms are aggravated by standing (stairs). Associated symptoms include numbness and weakness. (Left leg) Risk factors include menopause and obesity. She has tried analgesics, heat and ice for the symptoms. The treatment provided mild relief. Patient denies any new neurological symptoms. No bowel or bladder incontinence, no weakness, and no falling.     Any new diagnostic workup: [x] no  [] yes [] Xray [] CT scan [] MRI [] DEXA scan     [] Other          EXAMINATION:    THREE XRAY VIEWS OF THE LUMBAR SPINE         6/29/2020 10:04 am         COMPARISON:    None.         HISTORY:    ORDERING SYSTEM PROVIDED HISTORY: Osteoarthritis of spine with radiculopathy,    lumbar region    TECHNOLOGIST PROVIDED HISTORY:    S/p fall increased pain, numbness left leg    Reason for Exam: PT CO pain in left lower back that radiates into her L hip    after falling 2 weeks. Pain and swelling. Acuity: Acute    Type of Exam: Initial         FINDINGS:    Three views of the lumbar spine are submitted for review.  No acute fracture    or malalignment.  Right upper abdominal surgical clips are most compatible    with previous cholecystectomy.  Moderate stool burden noted throughout the    colon.  No discrete osseous abnormality.              Impression    No acute osseus abnormality of the lumbar spine.                     Treatment goals:  Functional status: be more mobile      Aberrancy:   Any alcoholic beverages no      Any illegal drugs   no      Analgesia:5      Adverse  Effects :none  ADL;s :home exercises        Pill count: appropriate fill date 11-7-2020  Morphine equivalent dose as reported on OARRS:15  Periodic Controlled Substance Monitoring: Possible medication side effects, risk of tolerance/dependence & alternative treatments discussed., No signs of potential drug abuse or diversion identified. , Assessed functional status., Obtaining appropriate analgesic effect of treatment. Lenore Thornton, APRN - CNP)  Review ofOARRS does not show any aberrant prescription behavior. Medication is helping the patient stay active. Patient denies any side effects and reports adequate analgesia. No sign of misuse/abuse.         When was thelast UDS:      7-      Record/Diagnostics Review: yes    As above, I did review the imaging    8/1/2020 12:06 AM - Raymon, Evangelist Incoming Lab Results From [facility]     Component  Value  Ref Range & Units  Status  Collected  Lab    Pain Management Drug Panel Interp, Urine  Consistent   Final  07/28/2020  3:50 PM  [facility]    (NOTE)   ________________________________________________________________   DRUGS EXPECTED:   [address] (HYDROCODONE)   ________________________________________________________________   CONSISTENT with medications provided:   NORCO (HYDROCODONE): based on hydrocodone, norhydrocodone,   hydromorphone ________________________________________________________________   Drugs Not Included in this Assay:   Acetaminophen   ________________________________________________________________   INTERPRETIVE INFORMATION: Pain Mgt Ruiz, Mass Spec/EMIT, Ur,                            Interp   Interpretation depends on accuracy and completeness of patient   medication information submitted by client.     6-Acetylmorphine, Ur  Not Detected   Fin             Past Medical History:   Diagnosis Date    Anxiety     Asthma     Colon polyp 10/31/2016    sessile serated adenoma x2    Depression     Diabetes mellitus ([address]     Diverticulitis 10/2016    Gastritis     GERD (gastroesophageal reflux disease)     Hemorrhoids     int/ext    Hypertension     Migraines     Osteoarthritis     Shingles 1-22-16    Tubular adenoma 10/31/2016    Urinary leakage        Past Surgical History:   Procedure Laterality Date    CERVICAL DISCECTOMY  12/2013    & fusion     CHOLECYSTECTOMY      COLONOSCOPY  10/31/2016    int/ext hemorrhoids; sessile serated adenomax2; tubular adenoma    COLONOSCOPY N/A 10/22/2019    COLONOSCOPY POLYPECTOMY SNARE/COLD BIOPSY AND RANDOM BIOPSIES performed by Hermilo Valencia MD at [facility] HAND SURGERY Right     thumb surgery, BONE REMOVED    HYSTERECTOMY      LAPAROSCOPY      NV DEST,PARAVERTEBRAL,L/S,ADDL LVLS  3/25/2019         TONSILLECTOMY      TUBAL LIGATION      UPPER GASTROINTESTINAL ENDOSCOPY  10/31/2016    gastritis    UPPER GASTROINTESTINAL ENDOSCOPY N/A 10/22/2019    EGD BIOPSY performed by Hermilo [facility] MD at [facility]       Allergies   Allergen Reactions    Adhesive Tape Other (See Comments)     blisters    Imitrex [Sumatriptan] Other (See Comments)     \"feels like head is going to explode    Morphine Itching     hives         Current Outpatient Medications:     Dulaglutide 1.5 MG/0.5ML SOPN, Inject 1.5 mg into the skin once a week, Disp: 12 pen, Rfl: 2    fluticasone (FLONASE) 50 MCG/ACT nasal spray, 1 spray by Each Nostril route 2 times daily, Disp: 2 Bottle, Rfl: 2    omeprazole (PRILOSEC) 40 MG delayed release capsule, TAKE 1 CAPSULE DAILY, Disp: 90 capsule, Rfl: 2    HYDROcodone-acetaminophen (NORCO) 5-325 MG per tablet, Take 1 tablet by mouth 3 times daily as needed for Pain for up to 30 days. Indications: Pain, Disp: 90 tablet, Rfl: 0    metFORMIN (GLUCOPHAGE-XR) 750 MG extended release tablet, TAKE 1 TABLET DAILY WITH BREAKFAST, Disp: 90 tablet, Rfl: 2    lisinopril (PRINIVIL;ZESTRIL) 5 MG tablet, TAKE 1 TABLET DAILY, Disp: 90 tablet, Rfl: 2    sertraline (ZOLOFT) 100 MG tablet, TAKE 1 TABLET DAILY, Disp: 90 tablet, Rfl: 2    atorvastatin (LIPITOR) 20 MG tablet, TAKE 1 TABLET DAILY, Disp: 90 tablet, Rfl: 2    pregabalin (LYRICA) 75 MG capsule, Take 1 capsule by mouth 4 times daily for 90 days. , Disp: 360 capsule, Rfl: 0    topiramate (TOPAMAX) 100 MG tablet, TAKE 1 TABLET IN THE MORNING AND 2 TABLETS IN THE EVENING, Disp: 270 tablet, Rfl: 4    vitamin D (CHOLECALCIFEROL) 1000 UNIT TABS tablet, Take 1,000 Units by mouth daily, Disp: , Rfl:     Ascorbic Acid (VITAMIN C) 500 MG CAPS, Take by mouth, Disp: , Rfl:     ECHINACEA PO, Take by mouth, Disp: , Rfl:     Probiotic Product (PROBIOTIC DAILY PO), Take 1 tablet by mouth daily. , Disp: , Rfl:     Multiple Vitamins-Calcium (ONE-A-DAY WOMENS FORMULA PO), Take  by mouth., Disp: , Rfl:     neomycin-bacitracin-polymyxin (NEOSPORIN) 400-5-5000 ointment, Apply topically 2 times daily. , Disp: 1 Tube, Rfl: 1    Blood Pressure Monitor KIT, Use as directed., Disp: 1 kit, Rfl: 1    OneTouch Delica Lancets 94Q MISC, USE 1 EACH TWICE A DAY, Disp: 200 each, Rfl: 5    blood glucose test strips (ASCENSIA AUTODISC VI;ONE TOUCH ULTRA TEST VI) strip, 1 each by In Vitro route daily verio test strips, PRN, Disp: 200 each, Rfl: 1    tiZANidine (ZANAFLEX) 4 MG tablet, Take 1 tablet by mouth every 8 hours as needed (spasms), Disp: 270 tablet, Rfl: 0    albuterol sulfate (PROAIR RESPICLICK) 707 (90 Base) MCG/ACT aerosol powder inhalation, Inhale 2 puffs into the lungs every 4 hours as needed for Wheezing or Shortness of Breath, Disp: 1 Inhaler, Rfl: 2    b complex vitamins capsule, Take 1 capsule by mouth daily, Disp: , Rfl:     docusate sodium (COLACE) 100 MG capsule, Take 1 capsule by mouth daily as needed for Constipation, Disp: 30 capsule, Rfl: 2    Elastic Bandages & Supports (LUMBAR BACK BRACE/SUPPORT PAD) MISC, 1 each by Does not apply route daily as needed (pain), Disp: 1 each, Rfl: 0    Melatonin ER 5 MG TBCR, Take by mouth as needed, Disp: , Rfl:     Family History   Problem Relation Age of Onset    Cancer Mother     Heart Disease Father     Diabetes Father     High Blood Pressure Father     Other Other         Celiac Sprue.  Aunt       Social History     Socioeconomic History    Marital status:      Spouse name: Not on file    Number of children: Not on file    Years of education: Not on file    Highest education level: Not on file   Occupational History    Occupation: unemployed   Social Needs    Financial resource strain: Not on file    Food insecurity     Worry: Not on file     Inability: Not on file   [employer] needs     Medical: Not on file     Non-medical: Not on file   Tobacco Use    Smoking status: Never Smoker    Smokeless tobacco: Never Used   Substance and Sexual Activity    Alcohol use: No    Drug use: No    Sexual activity: Yes   Lifestyle    Physical activity     Days per week: Not on file     Minutes per session: Not on file    Stress: Not on file   Relationships    Social connections     Talks on phone: Not on file     Gets together: Not on file     Attends Faith service: Not on file     Active member of club or organization: Not on file     Attends meetings of clubs or organizations: Not on file     Relationship status: Not on file    Intimate partner violence     Fear of current or ex partner: Not on file     Emotionally abused: Not on file     Physically abused: Not on file     Forced sexual activity: Not on file   Other Topics Concern    Not on file   Social History Narrative    Not on file         Review of Systems:  Review of Systems   Constitution: Negative. HENT: Negative. Eyes: Negative. Glasses   Cardiovascular: Negative. Respiratory: Negative. Endocrine:        Diabetic   Hematologic/Lymphatic: Bruises/bleeds easily. Skin: Negative. Musculoskeletal: Positive for back pain and joint pain. Gastrointestinal: Negative. Genitourinary: Negative. Neurological: Positive for numbness and weakness. Psychiatric/Behavioral: The patient is nervous/anxious. Managing         Physical Exam:    Physical Exam  HENT:      Head: Normocephalic. Pulmonary:      Effort: Pulmonary effort is normal.   Skin:         Neurological:      Mental Status: She is alert and oriented to person, place, and time.    Psychiatric:         Mood and Affect: Mood normal.         Behavior: Behavior normal.           Assessment:      Problem List Items Addressed This Visit     Sacroiliitis ([facility] [address] - Primary    Relevant Medications    HYDROcodone-acetaminophen (NORCO) 5-325 MG per tablet (Start on 11/7/2020)    Primary osteoarthritis of left hip    Relevant Medications    HYDROcodone-acetaminophen (NORCO) 5-325 MG per tablet (Start on 11/7/2020)    Osteoarthritis of spine with radiculopathy, lumbar region    Relevant Medications    HYDROcodone-acetaminophen (NORCO) 5-325 MG per tablet (Start on 11/7/2020)    Lumbosacral spondylosis without myelopathy    Relevant Medications    HYDROcodone-acetaminophen (NORCO) 5-325 MG per tablet (Start on 11/7/2020)    Lumbar radiculopathy, chronic (Chronic)    Degeneration of lumbar intervertebral disc (Chronic)    Relevant Medications    HYDROcodone-acetaminophen (NORCO) 5-325 MG per tablet is warned not to take any unprescribed medications over-the-countermedications that can depress breathing . Patient is advised to talk to the pharmacist or physicians if planning to take any over-the-counter medications before  takeing them. Patient is strongly advised to avoid tranquilizers or  relaxants, illegal drugs  or any medications that can depress breathing  Patient is also advised to tell us if there is any changes in their medications from other physicians. 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 To call when she needs next refill of lyrica, states only given enough for 3 tabs a day instead of 4, so will run out early  2.  X-ray right hip, increasing pain, difficulty with gait    TREATMENT OPTIONS:     X-ray right hip  Medication Agreement Requirements Met  Continue Opioid therapy  Script written for  hydrocodone  Follow up appointment made

## 2020-11-06 ENCOUNTER — HOSPITAL ENCOUNTER (OUTPATIENT)
Dept: GENERAL RADIOLOGY | Age: 50
Discharge: HOME | End: 2020-11-08
Payer: COMMERCIAL

## 2020-11-06 ENCOUNTER — HOSPITAL ENCOUNTER (OUTPATIENT)
Age: 50
Discharge: HOME | End: 2020-11-08
Payer: COMMERCIAL

## 2020-11-06 PROCEDURE — 73502 X-RAY EXAM HIP UNI 2-3 VIEWS: CPT

## 2020-11-13 ENCOUNTER — NURSE ONLY (OUTPATIENT)
Dept: FAMILY MEDICINE CLINIC | Age: 50
End: 2020-11-13
Payer: COMMERCIAL

## 2020-11-13 PROCEDURE — 90746 HEPB VACCINE 3 DOSE ADULT IM: CPT | Performed by: FAMILY MEDICINE

## 2020-11-13 PROCEDURE — 90471 IMMUNIZATION ADMIN: CPT | Performed by: FAMILY MEDICINE

## 2020-12-04 ENCOUNTER — HOSPITAL ENCOUNTER (OUTPATIENT)
Dept: PAIN MANAGEMENT | Age: 50
Discharge: HOME | End: 2020-12-04
Payer: COMMERCIAL

## 2020-12-04 PROBLEM — M47.816 OSTEOARTHRITIS OF LUMBAR SPINE: Status: ACTIVE | Noted: 2020-11-03

## 2020-12-04 PROCEDURE — 99213 OFFICE O/P EST LOW 20 MIN: CPT | Performed by: NURSE PRACTITIONER

## 2020-12-04 PROCEDURE — 99213 OFFICE O/P EST LOW 20 MIN: CPT

## 2020-12-04 RX ORDER — PREGABALIN 75 MG/1
75 CAPSULE ORAL 4 TIMES DAILY
Qty: 360 CAPSULE | Refills: 0 | Status: SHIPPED | OUTPATIENT
Start: 2020-12-04 | End: 2021-02-19

## 2020-12-04 RX ORDER — HYDROCODONE BITARTRATE AND ACETAMINOPHEN 5; 325 MG/1; MG/1
1 TABLET ORAL 3 TIMES DAILY PRN
Qty: 90 TABLET | Refills: 0 | Status: SHIPPED | OUTPATIENT
Start: 2020-12-07 | End: 2021-01-06 | Stop reason: SDUPTHER

## 2020-12-04 RX ORDER — TIZANIDINE 4 MG/1
4 TABLET ORAL EVERY 8 HOURS PRN
Qty: 270 TABLET | Refills: 0 | Status: SHIPPED | OUTPATIENT
Start: 2020-12-04 | End: 2021-02-19

## 2020-12-04 ASSESSMENT — ENCOUNTER SYMPTOMS
CONSTIPATION: 1
RESPIRATORY NEGATIVE: 1
EYES NEGATIVE: 1
BACK PAIN: 1

## 2020-12-04 NOTE — PROGRESS NOTES
[address] PROGRESS NOTE      Patient  Video visit to  review Medication Agreement    Chief Complaint: low back pain      She  C/o low back pain radiating into right si joint and  right hip area, The pain has increased in the right hip area. and si joint, It hurts to lay on her right side. She had RFA left lumbar median branch  with 50% relief. She is not sleeping well,. She has not been able to be as active. She has to pace her activities. No history of lumbar surgery. No Ed visits. Back Pain   This is a chronic problem. The problem occurs constantly. The problem has been gradually worsening since onset. The pain is present in the lumbar spine and sacro-iliac. Quality: pounding. Radiates to: right hip. The pain is at a severity of 7/10. The pain is the same all the time. The symptoms are aggravated by sitting (walking). Associated symptoms include numbness. (Left leg) She has tried muscle relaxant, analgesics, heat and ice (change posiition) for the symptoms. Patient denies any new neurological symptoms. No bowel or bladder incontinence, no weakness, and no falling. Any new diagnostic workup: [] no  [x] yes [] Xray [] CT scan [] MRI [] DEXA scan     [] Other          EXAMINATION:    TWO XRAY VIEWS OF THE RIGHT HIP         11/6/2020 9:27 am         COMPARISON:    None.         HISTORY:    ORDERING SYSTEM PROVIDED HISTORY: Hip pain, chronic, right    TECHNOLOGIST PROVIDED HISTORY:    increasing pain right hip, difficulty ambulating    Reason for Exam: chronic rt hip pain getting worse    Acuity: Unknown    Type of Exam: Unknown         FINDINGS:    Two views of the right hip are submitted for review.  Femoroacetabular joint    space is maintained.  No acute osseous abnormality identified.  There is    osteoarthrosis of the right sacroiliac joint and pubic symphysis.               Impression    No radiographic abnormality of the right femoroacetabular joint.      Osteoarthrosis of the right sacroiliac joint and pubic symphysis. EXAMINATION:    THREE XRAY VIEWS OF THE LUMBAR SPINE         6/29/2020 10:04 am         COMPARISON:    None.         HISTORY:    ORDERING SYSTEM PROVIDED HISTORY: Osteoarthritis of spine with radiculopathy,    lumbar region    TECHNOLOGIST PROVIDED HISTORY:    S/p fall increased pain, numbness left leg    Reason for Exam: PT CO pain in left lower back that radiates into her L hip    after falling 2 weeks. Pain and swelling. Acuity: Acute    Type of Exam: Initial         FINDINGS:    Three views of the lumbar spine are submitted for review.  No acute fracture    or malalignment.  Right upper abdominal surgical clips are most compatible    with previous cholecystectomy.  Moderate stool burden noted throughout the    colon.  No discrete osseous abnormality.              Impression    No acute osseus abnormality of the lumbar spine.                          Treatment goals:  Functional status: get pain under control      Aberrancy:   Any alcoholic beverages   no    Any illegal drugs   no      Analgesia:7    Adverse  Effects :constipation, takes colace    ADL;s :  stretching      Pill count: appropriate  Fill date 12-7-2020    Morphine equivalent dose as reported on [place]:15  Periodic Controlled Substance Monitoring: Possible medication side effects, risk of tolerance/dependence & alternative treatments discussed., No signs of potential drug abuse or diversion identified. , Assessed functional status., Obtaining appropriate analgesic effect of treatment. Mahad Mistry, APRN - CNP)  Review of[place]ARRS does not show any aberrant prescription behavior. Medication is helping the patient stay active. Patient denies any side effects and reports adequate analgesia. No sign of misuse/abuse.         When was thelast UDS:   7-          Was the UDS appropriate:yes    Record/Diagnostics Review:      As above, I did review the imaging      8/1/2020 12:06 AM - Ray[place], Queenie[place] Incoming Lab Results From [facility]     Component  Value  Ref Range & Units  Status  Collected  Lab    Pain Management Drug Panel Interp, Urine  Consistent   Final  07/28/2020  3:50 PM  [facility]    (NOTE)   ________________________________________________________________   DRUGS EXPECTED:   Desma Ree (HYDROCODONE)   ________________________________________________________________   CONSISTENT with medications provided:   Desma Ree (HYDROCODONE): based on hydrocodone, norhydrocodone,   hydromorphone   ________________________________________________________________   Drugs Not Included in this Assay:   Acetaminophen   ________________________________________________________________   INTERPRETIVE INFORMATION: Pain Mgt Ruiz, Mass Spec/EMIT, Ur,                            Interp   Interpretation depends on accuracy and completeness of patient   medication information submitted by client.           Past Medical History:   Diagnosis Date    Anxiety     Asthma     Colon polyp 10/31/2016    sessile serated adenoma x2    Depression     Diabetes mellitus ([address]     Diverticulitis 10/2016    Gastritis     GERD (gastroesophageal reflux disease)     Hemorrhoids     int/ext    Hypertension     Migraines     Osteoarthritis     Shingles 1-22-16    Tubular adenoma 10/31/2016    Urinary leakage        Past Surgical History:   Procedure Laterality Date    CERVICAL DISCECTOMY  12/2013    & fusion     CHOLECYSTECTOMY      COLONOSCOPY  10/31/2016    int/ext hemorrhoids; sessile serated adenomax2; tubular adenoma    COLONOSCOPY N/A 10/22/2019    COLONOSCOPY POLYPECTOMY SNARE/COLD BIOPSY AND RANDOM BIOPSIES performed by Roni Gutiérrez MD at [facility] HAND SURGERY Right     thumb surgery, BONE REMOVED    HYSTERECTOMY      LAPAROSCOPY      NC DEST,PARAVERTEBRAL,L/S,ADDL LVLS  3/25/2019         TONSILLECTOMY      TUBAL LIGATION      UPPER GASTROINTESTINAL ENDOSCOPY 10/31/2016    gastritis    UPPER GASTROINTESTINAL ENDOSCOPY N/A 10/22/2019    EGD BIOPSY performed by Hermilo Valencia MD at [facility] ENDO       Allergies   Allergen Reactions    Adhesive Tape Other (See Comments)     blisters    Imitrex [Sumatriptan] Other (See Comments)     \"feels like head is going to explode    Morphine Itching     hives         Current Outpatient Medications:     Zinc Sulfate (ZINC-220 PO), Take by mouth, Disp: , Rfl:     HYDROcodone-acetaminophen (NORCO) 5-325 MG per tablet, Take 1 tablet by mouth 3 times daily as needed for Pain for up to 30 days. Indications: Pain, Disp: 90 tablet, Rfl: 0    fluticasone (FLONASE) 50 MCG/ACT nasal spray, 1 spray by Each Nostril route 2 times daily, Disp: 2 Bottle, Rfl: 2    omeprazole (PRILOSEC) 40 MG delayed release capsule, TAKE 1 CAPSULE DAILY, Disp: 90 capsule, Rfl: 2    metFORMIN (GLUCOPHAGE-XR) 750 MG extended release tablet, TAKE 1 TABLET DAILY WITH BREAKFAST, Disp: 90 tablet, Rfl: 2    lisinopril (PRINIVIL;ZESTRIL) 5 MG tablet, TAKE 1 TABLET DAILY, Disp: 90 tablet, Rfl: 2    sertraline (ZOLOFT) 100 MG tablet, TAKE 1 TABLET DAILY, Disp: 90 tablet, Rfl: 2    atorvastatin (LIPITOR) 20 MG tablet, TAKE 1 TABLET DAILY, Disp: 90 tablet, Rfl: 2    tiZANidine (ZANAFLEX) 4 MG tablet, Take 1 tablet by mouth every 8 hours as needed (spasms), Disp: 270 tablet, Rfl: 0    pregabalin (LYRICA) 75 MG capsule, Take 1 capsule by mouth 4 times daily for 90 days. , Disp: 360 capsule, Rfl: 0    topiramate (TOPAMAX) 100 MG tablet, TAKE 1 TABLET IN THE MORNING AND 2 TABLETS IN THE EVENING, Disp: 270 tablet, Rfl: 4    b complex vitamins capsule, Take 1 capsule by mouth daily, Disp: , Rfl:     vitamin D (CHOLECALCIFEROL) 1000 UNIT TABS tablet, Take 1,000 Units by mouth daily, Disp: , Rfl:     Ascorbic Acid (VITAMIN C) 500 MG CAPS, Take by mouth, Disp: , Rfl:     ECHINACEA PO, Take by mouth, Disp: , Rfl:     Probiotic Product (PROBIOTIC DAILY PO), Take 1 tablet by mouth daily. , Disp: , Rfl:     Multiple Vitamins-Calcium (ONE-A-DAY WOMENS FORMULA PO), Take  by mouth., Disp: , Rfl:     Dulaglutide 1.5 MG/0.5ML SOPN, Inject 1.5 mg into the skin once a week, Disp: 12 pen, Rfl: 2    neomycin-bacitracin-polymyxin (NEOSPORIN) 400-5-5000 ointment, Apply topically 2 times daily. , Disp: 1 Tube, Rfl: 1    Blood Pressure Monitor KIT, Use as directed., Disp: 1 kit, Rfl: 1    OneTouch Delica Lancets 07P MISC, USE 1 EACH TWICE A DAY, Disp: 200 each, Rfl: 5    blood glucose test strips (ASCENSIA AUTODISC VI;ONE TOUCH ULTRA TEST VI) strip, 1 each by In Vitro route daily verio test strips, PRN, Disp: 200 each, Rfl: 1    albuterol sulfate (PROAIR RESPICLICK) 918 (90 Base) MCG/ACT aerosol powder inhalation, Inhale 2 puffs into the lungs every 4 hours as needed for Wheezing or Shortness of Breath, Disp: 1 Inhaler, Rfl: 2    docusate sodium (COLACE) 100 MG capsule, Take 1 capsule by mouth daily as needed for Constipation, Disp: 30 capsule, Rfl: 2    Elastic Bandages & Supports (LUMBAR BACK BRACE/SUPPORT PAD) MISC, 1 each by Does not apply route daily as needed (pain), Disp: 1 each, Rfl: 0    Melatonin ER 5 MG TBCR, Take by mouth as needed, Disp: , Rfl:     Family History   Problem Relation Age of Onset    Cancer Mother     Heart Disease Father     Diabetes Father     High Blood Pressure Father     Other Other         Celiac Sprue.  Aunt       Social History     Socioeconomic History    Marital status:      Spouse name: Not on file    Number of children: Not on file    Years of education: Not on file    Highest education level: Not on file   Occupational History    Occupation: unemployed   Social Needs    Financial resource strain: Not on file    Food insecurity     Worry: Not on file     Inability: Not on file   [employer] needs     Medical: Not on file     Non-medical: Not on file   Tobacco Use    Smoking status: Never Smoker    Smokeless tobacco: Never Used   Substance and Sexual Activity    Alcohol use: No    Drug use: No    Sexual activity: Yes   Lifestyle    Physical activity     Days per week: Not on file     Minutes per session: Not on file    Stress: Not on file   Relationships    Social connections     Talks on phone: Not on file     Gets together: Not on file     Attends [employer] service: Not on file     Active member of club or organization: Not on file     Attends meetings of clubs or organizations: Not on file     Relationship status: Not on file    Intimate partner violence     Fear of current or ex partner: Not on file     Emotionally abused: Not on file     Physically abused: Not on file     Forced sexual activity: Not on file   Other Topics Concern    Not on file   Social History Narrative    Not on file         Review of Systems:  Review of Systems   Constitution: Positive for malaise/fatigue. HENT: Negative. Eyes: Negative. Cardiovascular: Negative. Respiratory: Negative. Endocrine:        Diabetic  Blood sugars 94-99 in am   Hematologic/Lymphatic: Bruises/bleeds easily. Skin: Negative. Musculoskeletal: Positive for back pain and joint pain. Gastrointestinal: Positive for constipation. Genitourinary: Negative. Neurological: Positive for numbness. Psychiatric/Behavioral: The patient is nervous/anxious. Managing         Physical Exam:    Physical Exam  HENT:      Head: Normocephalic. Pulmonary:      Effort: Pulmonary effort is normal.   Skin:         Neurological:      Mental Status: She is alert and oriented to person, place, and time. Psychiatric:         Mood and Affect: Mood normal.         Thought Content:  Thought content normal.           Assessment:    Problem List Items Addressed This Visit     Sacroiliitis ([facility] [address] - Primary    Relevant Medications    tiZANidine (ZANAFLEX) 4 MG tablet    HYDROcodone-acetaminophen (NORCO) 5-325 MG per tablet (Start on 12/7/2020)    Primary osteoarthritis of left hip    Relevant Medications    pregabalin (LYRICA) 75 MG capsule    tiZANidine (ZANAFLEX) 4 MG tablet    HYDROcodone-acetaminophen (NORCO) 5-325 MG per tablet (Start on 12/7/2020)    Osteoarthritis of spine with radiculopathy, lumbar region    Relevant Medications    pregabalin (LYRICA) 75 MG capsule    tiZANidine (ZANAFLEX) 4 MG tablet    HYDROcodone-acetaminophen (NORCO) 5-325 MG per tablet (Start on 12/7/2020)    Lumbosacral spondylosis without myelopathy    Relevant Medications    tiZANidine (ZANAFLEX) 4 MG tablet    HYDROcodone-acetaminophen (NORCO) 5-325 MG per tablet (Start on 12/7/2020)    Lumbar radiculopathy, chronic (Chronic)    Relevant Medications    pregabalin (LYRICA) 75 MG capsule    tiZANidine (ZANAFLEX) 4 MG tablet    Left-sided low back pain with left-sided sciatica    Relevant Medications    pregabalin (LYRICA) 75 MG capsule    tiZANidine (ZANAFLEX) 4 MG tablet    HYDROcodone-acetaminophen (NORCO) 5-325 MG per tablet (Start on 12/7/2020)    Hip pain, chronic, right    Relevant Medications    pregabalin (LYRICA) 75 MG capsule    tiZANidine (ZANAFLEX) 4 MG tablet    HYDROcodone-acetaminophen (NORCO) 5-325 MG per tablet (Start on 12/7/2020)    Encounter for medication management    Relevant Medications    pregabalin (LYRICA) 75 MG capsule    Degenerative disc disease, cervical    Relevant Medications    pregabalin (LYRICA) 75 MG capsule    tiZANidine (ZANAFLEX) 4 MG tablet    HYDROcodone-acetaminophen (NORCO) 5-325 MG per tablet (Start on 12/7/2020)    Degeneration of lumbar intervertebral disc (Chronic)    Relevant Medications    pregabalin (LYRICA) 75 MG capsule    tiZANidine (ZANAFLEX) 4 MG tablet    HYDROcodone-acetaminophen (NORCO) 5-325 MG per tablet (Start on 12/7/2020)    Chronic, continuous use of opioids    Chronic use of opiate drugs therapeutic purposes    Chronic bilateral low back pain without sciatica    Relevant Medications    pregabalin (LYRICA) 75 MG capsule    tiZANidine continue to have discussions to decrease pain medications as tolerated. Counseled patient on effects their pain medication and /or their medical condition mayhave on their  ability to drive or operate machinery. Instructed not to drive or operate machinery if drowsy     I also discussed with the patient regarding the dangers of combining narcotic pain medication with tranquilizers, alcohol or illegal drugs or taking the medication any way other than prescribed. The dangers were discussed  including respiratory depression and death. Patient was told to tell  all  physicians regarding the medications he is getting from pain clinic. Patient is warned not to take any unprescribed medications over-the-countermedications that can depress breathing . Patient is advised to talk to the pharmacist or physicians if planning to take any over-the-counter medications before  takeing them. Patient is strongly advised to avoid tranquilizers or  relaxants, illegal drugs  or any medications that can depress breathing  Patient is also advised to tell us if there is any changes in their medications from other physicians. 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 The patient was counseled about the risks of keaton Covid-19 during their procedure period and any recovery window from their procedure. The patient was made aware that keaton Covid-19 may worsen their prognosis for recovering from their procedure and lend to a higher morbidity and/or mortality risk. All material risks, benefits, and reasonable alternatives including postponing the procedure were discussed. The patient (DOES wish) to proceed with their procedure at this time.   2. Schedule right si joint injection, pain right si joint area interfering with ADL's    TREATMENT OPTIONS:     Right si joint injection  Medication Agreement Requirements Met  Continue Opioid therapy  Script written for  Hydrocdone, tizanidine,   Follow up appointment made

## 2020-12-09 ENCOUNTER — TELEPHONE (OUTPATIENT)
Dept: PAIN MANAGEMENT | Age: 50
End: 2020-12-09

## 2020-12-21 NOTE — TELEPHONE ENCOUNTER
Please Approve or Refuse.   Send to Pharmacy per Pt's Request: trulicity almost out      Next Visit Date:  1/8/2021   Last Visit Date: 10/15/2020    Hemoglobin A1C (%)   Date Value   09/10/2020 7.2 (H)   02/06/2020 6.6   10/03/2019 6.3             ( goal A1C is < 7)   BP Readings from Last 3 Encounters:   10/15/20 120/70   10/13/20 112/72   10/08/20 118/82          (goal 120/80)  BUN   Date Value Ref Range Status   09/10/2020 11 6 - 20 mg/dL Final     CREATININE   Date Value Ref Range Status   09/10/2020 0.79 0.50 - 0.90 mg/dL Final     Potassium   Date Value Ref Range Status   09/10/2020 4.2 3.7 - 5.3 mmol/L Final

## 2021-01-06 ENCOUNTER — HOSPITAL ENCOUNTER (OUTPATIENT)
Dept: PAIN MANAGEMENT | Age: 51
Discharge: HOME | End: 2021-01-06
Payer: COMMERCIAL

## 2021-01-06 DIAGNOSIS — M54.16 LUMBAR RADICULOPATHY, CHRONIC: Chronic | ICD-10-CM

## 2021-01-06 DIAGNOSIS — M25.551 HIP PAIN, CHRONIC, RIGHT: ICD-10-CM

## 2021-01-06 DIAGNOSIS — M46.1 SACROILIITIS (HCC): Primary | ICD-10-CM

## 2021-01-06 DIAGNOSIS — M16.12 PRIMARY OSTEOARTHRITIS OF LEFT HIP: ICD-10-CM

## 2021-01-06 DIAGNOSIS — M47.817 LUMBOSACRAL SPONDYLOSIS WITHOUT MYELOPATHY: ICD-10-CM

## 2021-01-06 DIAGNOSIS — M50.30 DEGENERATIVE DISC DISEASE, CERVICAL: ICD-10-CM

## 2021-01-06 DIAGNOSIS — M51.36 DEGENERATION OF LUMBAR INTERVERTEBRAL DISC: Chronic | ICD-10-CM

## 2021-01-06 DIAGNOSIS — Z79.899 ENCOUNTER FOR MEDICATION MANAGEMENT: ICD-10-CM

## 2021-01-06 DIAGNOSIS — G89.29 HIP PAIN, CHRONIC, RIGHT: ICD-10-CM

## 2021-01-06 DIAGNOSIS — F11.90 CHRONIC, CONTINUOUS USE OF OPIOIDS: ICD-10-CM

## 2021-01-06 DIAGNOSIS — M47.896 OTHER OSTEOARTHRITIS OF SPINE, LUMBAR REGION: ICD-10-CM

## 2021-01-06 DIAGNOSIS — M47.816 ARTHROPATHY OF LUMBAR FACET JOINT: ICD-10-CM

## 2021-01-06 PROCEDURE — 99213 OFFICE O/P EST LOW 20 MIN: CPT | Performed by: NURSE PRACTITIONER

## 2021-01-06 PROCEDURE — 99213 OFFICE O/P EST LOW 20 MIN: CPT

## 2021-01-06 RX ORDER — HYDROCODONE BITARTRATE AND ACETAMINOPHEN 5; 325 MG/1; MG/1
1 TABLET ORAL 3 TIMES DAILY PRN
Qty: 90 TABLET | Refills: 0 | Status: SHIPPED | OUTPATIENT
Start: 2021-01-06 | End: 2021-02-03 | Stop reason: SDUPTHER

## 2021-01-06 ASSESSMENT — ENCOUNTER SYMPTOMS
EYES NEGATIVE: 1
GASTROINTESTINAL NEGATIVE: 1
BACK PAIN: 1
RESPIRATORY NEGATIVE: 1

## 2021-01-06 NOTE — PROGRESS NOTES
[address] PROGRESS NOTE      Patient  completed [x]  video visit   []   phone call:      Minutes :   to  review Medication Agreement    Location:  Provider:  working from    [x]    home    []   [facility] - TIMMY NUNN , patient at  home   Chief Complaint: low back pain    She c/o pain low back pain area,and right  si joint and hip area. ,She is scheduled for right si joint injection next week, Pain is the same. The pain keeps her awake at night. Sometimes she sleeps in a recliner. She is able to do her housework but has to pace herself. She does home exercises. She is working on [employer] loss,No Ed visits,    Back Pain  This is a chronic problem. The problem occurs constantly. The problem is unchanged. The pain is present in the lumbar spine and sacro-iliac. The quality of the pain is described as aching and shooting (sharp). Radiates to: left side to knee, right hip. The pain is at a severity of 7/10. The pain is moderate. The pain is the same all the time. Exacerbated by: nothing. Associated symptoms include numbness and weakness. Risk factors include menopause. She has tried analgesics, heat, ice and home exercises for the symptoms.        Treatment goals:  Functional status: lose weight      Aberrancy:   Any alcoholic beverages   no         Any illegal drugs   no      Analgesia:  7                   Adverse  Effects :none      ADL;s :home exercises    Data:    When was thelast UDS:   7-          Was the UDS appropriate:yes      Record/Diagnostics Review:      As above, I did review the imaging   8/1/2020 12:06 AM - [place], [place] Incoming Lab Results From [facility]    Component Value Ref Range & Units Status Collected Lab   Pain Management Drug Panel Interp, Urine Consistent   Final 07/28/2020  3:50 PM [facility]   (NOTE)   ________________________________________________________________   DRUGS EXPECTED:   NORCO (HYDROCODONE)   ________________________________________________________________ CONSISTENT with medications provided:   Cee Martins (HYDROCODONE): based on hydrocodone, norhydrocodone,   hydromorphone   ________________________________________________________________   Drugs Not Included in this Assay:   Acetaminophen   ________________________________________________________________   INTERPRETIVE INFORMATION: Pain Mgt Ruiz, Mass Spec/EMIT, Ur,                            Interp   Interpretation depends on accuracy and completeness of patient   medication information submitted by client. EXAMINATION:   TWO XRAY VIEWS OF THE RIGHT HIP       11/6/2020 9:27 am       COMPARISON:   None.       HISTORY:   ORDERING SYSTEM PROVIDED HISTORY: Hip pain, chronic, right   TECHNOLOGIST PROVIDED HISTORY:   increasing pain right hip, difficulty ambulating   Reason for Exam: chronic rt hip pain getting worse   Acuity: Unknown   Type of Exam: Unknown       FINDINGS:   Two views of the right hip are submitted for review.  Femoroacetabular joint   space is maintained.  No acute osseous abnormality identified.  There is   osteoarthrosis of the right sacroiliac joint and pubic symphysis.         Impression   No radiographic abnormality of the right femoroacetabular joint.       Osteoarthrosis of the right sacroiliac joint and pubic symphysis.                   Pill count: appropriate  fill date :1-6-2021  Morphine equivalent dose as reported on OARRS:15  Periodic Controlled Substance Monitoring: Possible medication side effects, risk of tolerance/dependence & alternative treatments discussed., No signs of potential drug abuse or diversion identified., Obtaining appropriate analgesic effect of treatment., Assessed functional status. Peter López, BAR - CNP)  Review ofOARRS does not show any aberrant prescription behavior. Medication is helping the patient stay active. Patient denies any side effects and reports adequate analgesia. No sign of misuse/abuse.             Past Medical History:   Diagnosis Date  Anxiety     Asthma     Colon polyp 10/31/2016    sessile serated adenoma x2    Depression     Diabetes mellitus ([address]     Diverticulitis 10/2016    Gastritis     GERD (gastroesophageal reflux disease)     Hemorrhoids     int/ext    Hypertension     Migraines     Osteoarthritis     Shingles 1-22-16    Tubular adenoma 10/31/2016    Urinary leakage        Past Surgical History:   Procedure Laterality Date    CERVICAL DISCECTOMY  12/2013    & fusion     CHOLECYSTECTOMY      COLONOSCOPY  10/31/2016    int/ext hemorrhoids; sessile serated adenomax2; tubular adenoma    COLONOSCOPY N/A 10/22/2019    COLONOSCOPY POLYPECTOMY SNARE/COLD BIOPSY AND RANDOM BIOPSIES performed by Sherif Garcia MD at [place] [address] HAND SURGERY Right     thumb surgery, BONE REMOVED    HYSTERECTOMY      LAPAROSCOPY      PA DEST,PARAVERTEBRAL,L/S,ADDL LVLS  3/25/2019         TONSILLECTOMY      TUBAL LIGATION      UPPER GASTROINTESTINAL ENDOSCOPY  10/31/2016    gastritis    UPPER GASTROINTESTINAL ENDOSCOPY N/A 10/22/2019    EGD BIOPSY performed by Sherif Garcia MD at [facility]       Allergies   Allergen Reactions    Adhesive Tape Other (See Comments)     blisters    Imitrex [Sumatriptan] Other (See Comments)     \"feels like head is going to explode    Morphine Itching     hives         Current Outpatient Medications:     pregabalin (LYRICA) 75 MG capsule, Take 1 capsule by mouth 4 times daily for 90 days. , Disp: 360 capsule, Rfl: 0    HYDROcodone-acetaminophen (NORCO) 5-325 MG per tablet, Take 1 tablet by mouth 3 times daily as needed for Pain for up to 30 days.  Indications: Pain, Disp: 90 tablet, Rfl: 0    fluticasone (FLONASE) 50 MCG/ACT nasal spray, 1 spray by Each Nostril route 2 times daily, Disp: 2 Bottle, Rfl: 2    omeprazole (PRILOSEC) 40 MG delayed release capsule, TAKE 1 CAPSULE DAILY, Disp: 90 capsule, Rfl: 2    metFORMIN (GLUCOPHAGE-XR) 750 MG extended release tablet, TAKE 1 TABLET DAILY WITH BREAKFAST, Disp: 90 tablet, Rfl: 2    lisinopril (PRINIVIL;ZESTRIL) 5 MG tablet, TAKE 1 TABLET DAILY, Disp: 90 tablet, Rfl: 2    sertraline (ZOLOFT) 100 MG tablet, TAKE 1 TABLET DAILY, Disp: 90 tablet, Rfl: 2    atorvastatin (LIPITOR) 20 MG tablet, TAKE 1 TABLET DAILY, Disp: 90 tablet, Rfl: 2    topiramate (TOPAMAX) 100 MG tablet, TAKE 1 TABLET IN THE MORNING AND 2 TABLETS IN THE EVENING, Disp: 270 tablet, Rfl: 4    Probiotic Product (PROBIOTIC DAILY PO), Take 1 tablet by mouth daily. , Disp: , Rfl:     Dulaglutide 1.5 MG/0.5ML SOPN, Inject 1.5 mg into the skin once a week, Disp: 12 pen, Rfl: 2    Zinc Sulfate (ZINC-220 PO), Take by mouth, Disp: , Rfl:     tiZANidine (ZANAFLEX) 4 MG tablet, Take 1 tablet by mouth every 8 hours as needed (spasms), Disp: 270 tablet, Rfl: 0    neomycin-bacitracin-polymyxin (NEOSPORIN) 400-5-5000 ointment, Apply topically 2 times daily. , Disp: 1 Tube, Rfl: 1    Blood Pressure Monitor KIT, Use as directed., Disp: 1 kit, Rfl: 1    OneTouch Delica Lancets 99N MISC, USE 1 EACH TWICE A DAY, Disp: 200 each, Rfl: 5    blood glucose test strips (ASCENSIA AUTODISC VI;ONE TOUCH ULTRA TEST VI) strip, 1 each by In Vitro route daily verio test strips, PRN, Disp: 200 each, Rfl: 1    albuterol sulfate (PROAIR RESPICLICK) 183 (90 Base) MCG/ACT aerosol powder inhalation, Inhale 2 puffs into the lungs every 4 hours as needed for Wheezing or Shortness of Breath, Disp: 1 Inhaler, Rfl: 2    b complex vitamins capsule, Take 1 capsule by mouth daily, Disp: , Rfl:     docusate sodium (COLACE) 100 MG capsule, Take 1 capsule by mouth daily as needed for Constipation, Disp: 30 capsule, Rfl: 2    Elastic Bandages & Supports (LUMBAR BACK BRACE/SUPPORT PAD) MISC, 1 each by Does not apply route daily as needed (pain), Disp: 1 each, Rfl: 0    Melatonin ER 5 MG TBCR, Take by mouth as needed, Disp: , Rfl:     vitamin D (CHOLECALCIFEROL) 1000 UNIT TABS tablet, Take 1,000 Units by mouth daily, Disp: , Rfl:     Ascorbic Acid (VITAMIN C) 500 MG CAPS, Take by mouth, Disp: , Rfl:     ECHINACEA PO, Take by mouth, Disp: , Rfl:     Multiple Vitamins-Calcium (ONE-A-DAY WOMENS FORMULA PO), Take  by mouth., Disp: , Rfl:     Family History   Problem Relation Age of Onset    Cancer Mother     Heart Disease Father     Diabetes Father     High Blood Pressure Father     Other Other         Celiac Sprue. Aunt       Social History     Socioeconomic History    Marital status:      Spouse name: Not on file    Number of children: Not on file    Years of education: Not on file    Highest education level: Not on file   Occupational History    Occupation: unemployed   Social Needs    Financial resource strain: Not on file    Food insecurity     Worry: Not on file     Inability: Not on file   [employer] needs     Medical: Not on file     Non-medical: Not on file   Tobacco Use    Smoking status: Never Smoker    Smokeless tobacco: Never Used   Substance and Sexual Activity    Alcohol use: No    Drug use: No    Sexual activity: Yes   Lifestyle    Physical activity     Days per week: Not on file     Minutes per session: Not on file    Stress: Not on file   Relationships    Social connections     Talks on phone: Not on file     Gets together: Not on file     Attends Quaker service: Not on file     Active member of club or organization: Not on file     Attends meetings of clubs or organizations: Not on file     Relationship status: Not on file    Intimate partner violence     Fear of current or ex partner: Not on file     Emotionally abused: Not on file     Physically abused: Not on file     Forced sexual activity: Not on file   Other Topics Concern    Not on file   Social History Narrative    Not on file         Review of Systems:  Review of Systems   Constitution: Negative. HENT: Negative. Eyes: Negative. Cardiovascular: Negative.     Respiratory: Negative. Endocrine:        Blood sugar 94 this am   Hematologic/Lymphatic: Bruises/bleeds easily. Skin: Negative. Musculoskeletal: Positive for back pain and joint pain. Gastrointestinal: Negative. Genitourinary: Negative. Neurological: Positive for numbness and weakness. Psychiatric/Behavioral: The patient is nervous/anxious. Managing         Physical Exam:    Physical Exam  HENT:      Head: Normocephalic. Pulmonary:      Effort: Pulmonary effort is normal.   Skin:         Neurological:      Mental Status: She is alert and oriented to person, place, and time. Psychiatric:         Mood and Affect: Mood normal.         Thought Content: Thought content normal.           Assessment:    Problem List Items Addressed This Visit     Sacroiliitis ([facility] [address] - Primary    Primary osteoarthritis of left hip    Osteoarthritis of lumbar spine    Lumbosacral spondylosis without myelopathy    Lumbar radiculopathy, chronic (Chronic)    Hip pain, chronic, right    Encounter for medication management    Degenerative disc disease, cervical    Degeneration of lumbar intervertebral disc (Chronic)    Chronic, continuous use of opioids    Arthropathy of lumbar facet joint              Treatment Plan:  DISCUSSION: Treatment options discussed withpatient and all questions answered to patient's satisfaction. Possible side effects, risk of tolerance and or dependence and alternative treatments discussed    Obtaining appropriate analgesic effect of treatment   No signs of potential drug abuse or diversion identified    [x] Ill effects of being on chronic pain medications such as sleep disturbances, respiratory depression, hormonal changes, withdrawal symptoms, chronic opioid dependence and tolerance as well as risk of taking opioids with Benzodiazepines and taking opioids along with alcohol,  werediscussed with patient.  I had asked the patient to minimize medication use and utilize pain medications only for uncontrolled rest pain or pain with exertional activities. I advised patient not to self-escalate painmedications without consulting with us. At each of patient's future visits we will try to taper pain medications, while adjusting the adjunct medications, and re-evaluating for Physical Therapy to improve spinal andjoint strength. We will continue to have discussions to decrease pain medications as tolerated. Counseled patient on effects their pain medication and /or their medical condition mayhave on their  ability to drive or operate machinery. Instructed not to drive or operate machinery if drowsy     I also discussed with the patient regarding the dangers of combining narcotic pain medication with tranquilizers, alcohol or illegal drugs or taking the medication any way other than prescribed. The dangers were discussed  including respiratory depression and death. Patient was told to tell  all  physicians regarding the medications he is getting from pain clinic. Patient is warned not to take any unprescribed medications over-the-countermedications that can depress breathing . Patient is advised to talk to the pharmacist or physicians if planning to take any over-the-counter medications before  takeing them. Patient is strongly advised to avoid tranquilizers or  relaxants, illegal drugs  or any medications that can depress breathing  Patient is also advised to tell us if there is any changes in their medications from other physicians.             TREATMENT OPTIONS:       Medication Agreement Requirements Met  Continue Opioid therapy  Script written for hydrocodone  Follow up appointment made Complex Repair And Tissue Cultured Epidermal Autograft Text: The defect edges were debeveled with a #15 scalpel blade.  The primary defect was closed partially with a complex linear closure.  Given the location of the defect, shape of the defect and the proximity to free margins an tissue cultured epidermal autograft was deemed most appropriate to repair the remaining defect.  The graft was trimmed to fit the size of the remaining defect.  The graft was then placed in the primary defect, oriented appropriately, and sutured into place.

## 2021-01-07 ENCOUNTER — TELEPHONE (OUTPATIENT)
Dept: PAIN MANAGEMENT | Age: 51
End: 2021-01-07

## 2021-01-07 ASSESSMENT — ENCOUNTER SYMPTOMS
SORE THROAT: 0
BACK PAIN: 1
WHEEZING: 0
NAUSEA: 0
EYE ITCHING: 0
SHORTNESS OF BREATH: 0
ABDOMINAL PAIN: 0
COUGH: 0

## 2021-01-07 NOTE — PROGRESS NOTES
readings. Patient denieshypoglycemia episodes such as{symptoms. Patient admits to neuropathy. Eye Exam: 12/2020  Foot Exam:due    HYPERLIPIDEMIA  Iqra Fair is currently on atorvastatin (Lipitor). Patient denies adverse reaction to this medication. Compliance with treatment thus far has been good. The patient is not known to have coexisting coronary artery disease. The 10-year CVD risk score (Karthikeyan, et al., 2008) is: 6.5%    Values used to calculate the score:      Age: 53 years      Sex: Female      Diabetic: Yes      Tobacco smoker: No      Systolic Blood Pressure: 403 mmHg      Is BP treated: Yes      HDL Cholesterol: 42 mg/dL      Total Cholesterol: 154 mg/dL  Lab Results   Component Value Date/Time    CHOL 151 10/05/2019 08:50 AM    CHOL 210 05/13/2014    HDL 42 09/10/2020 07:59 AM    LDLCHOLESTEROL 58 09/10/2020 07:59 AM    TRIG 205 (H) 10/05/2019 08:50 AM    CHOLHDLRATIO 3.7 09/10/2020 07:59 AM    VLDL NOT REPORTED (H) 09/10/2020 07:59 AM     RHEUMATOID ARTHRITIS  Iqra Fair has a known history of rheumatoid arthritis. Patient also has some chronic low back pain. She did have some significant thoracic to lumbar spine disorder. Patient had recently had 2 courses of RFA's with relief for up to 6 months. However, patient continues to have the pain. Patient is encouraged to continue with follow-up with the pain management doctors. She is also morbidly obese. She is reported some weight gain. Patient is currently on Lyrica, tizanidine, and Norco.  Patient is established with the pain management. She sees him on a regular basis. Dr Linwood Boothe. Patient is also wondering abou Fibromyalgia since she has pain on her parts of her body. We had a long discussion about this condition and the treatment which she is already on several of the treatment including Lyrica. VITAMIN D  Patient has a known Vitamin D Deficiency. she had a course of supplementation for 12 weeks.  she is due to get levels check. We continue to discuss ways to manage this condition. We also discussed ways to improve the levels. Such as learning food groups that are high in Vitamin D. We also discuss that sun exposure is beneficial however, too much sun and sun damage can potentially result in skin cancer. Lab Results   Component Value Date/Time    VITD25 67.7 09/10/2020 07:59 AM      DEPRESSION AND ANXIETY  Iqra Goodrich reported some ongoing issues with depression and anxiety. Symptoms includes feelings of losing control, insomnia, irritable, psychomotor agitation, racing thoughts. Current therapy includes Sertraline- will start Buspirone, which is working well for her. she denies adverse reaction to current therapy. she also denies suicidal/homicidal ideation, plan or intent. PHQ-2 Over the past 2 weeks, how often have you been bothered by any of the following problems? Little interest or pleasure in doing things: Nearly every day  Feeling down, depressed, or hopeless: Not at all  PHQ-2 Score: 3  PHQ-9 Over the past 2 weeks, how often have you been bothered by any of the following problems? Trouble falling or staying asleep, or sleeping too much: More than half the days  Feeling tired or having little energy: Nearly every day  Poor appetite or overeating: Not at all  Feeling bad about yourself - or that you are a failure or have let yourself or your family down: Nearly every day  Trouble concentrating on things, such as reading the newspaper or watching television: Nearly every day  Moving or speaking so slowly that other people could have noticed.  Or the opposite - being so fidgety or restless that you have been moving around a lot more than usual: Not at all  Thoughts that you would be better off dead, or of hurting yourself in some way: Not at all  If you checked off any problems, how difficult have these problems made it for you to do your work, take care of things at home, or get along with other people?: Somewhat difficult  PHQ-9 Total Score: 14   CHP KAITLIN-7 1/8/2021 9/8/2020   Feeling nervous, anxious, or on edge 1-Several days 1-Several days   Not able to stop or control worrying 1-Several days 2-Over half the days   Worrying too much about different things 1-Several days 1-Several days   Trouble relaxing 1-Several days 1-Several days   Being so restless that it's hard to sit still 0-Not at all 1-Several days   Becoming easily annoyed or irritable 1-Several days 1-Several days   Feeling afraid as if something awful might happen 2-Over half the days 2-Over half the days   KAITLIN-7 Total Score 7 9       CBC and CMP reviewed with Iqra : elev fbs  Lab Results   Component Value Date    WBC 6.8 09/10/2020    HGB 14.4 09/10/2020    HCT 43.4 09/10/2020    MCV 89.9 09/10/2020     09/10/2020    LYMPHOPCT 43 09/10/2020    RBC 4.83 09/10/2020    MCH 29.7 09/10/2020    MCHC 33.1 09/10/2020    RDW 13.0 09/10/2020     Lab Results   Component Value Date     09/10/2020    K 4.2 09/10/2020     09/10/2020    CO2 25 09/10/2020    BUN 11 09/10/2020    CREATININE 0.79 09/10/2020    GLUCOSE 146 (H) 02/03/2020    CALCIUM 9.5 09/10/2020    PROT 6.8 09/10/2020    LABALBU 4.2 09/10/2020    BILITOT 0.22 (L) 09/10/2020    ALKPHOS 71 09/10/2020    AST 14 09/10/2020    ALT 17 09/10/2020    LABGLOM >60 09/10/2020    GFRAA >60 09/10/2020     Recent urinalysis reviewed with Iqra today. Lekocytes, no growth  Lab Results   Component Value Date    COLORU YELLOW 09/10/2020    NITRU NEGATIVE 09/10/2020    GLUCOSEU NEGATIVE 09/10/2020    KETUA NEGATIVE 09/10/2020    UROBILINOGEN Normal 09/10/2020    BILIRUBINUR NEGATIVE 09/10/2020    BILIRUBINUR neg 06/03/2019    HGBUR NEGATIVE 09/10/2020    PROTEINU NEGATIVE 09/10/2020    PHUR 6.5 09/10/2020    LEUKOCYTESUR MOD 09/10/2020      Lab Results   Component Value Date/Time    CULTURE NO SIGNIFICANT GROWTH 09/10/2020 09:03 AM    SPECDESC . CLEAN CATCH URINE 09/10/2020 09:03 AM    SPECIAL NOT REPORTED 09/10/2020 09:03 AM             PHQ-9 Total Score: 14 (1/8/2021 10:14 AM)  Thoughts that you would be better off dead, or of hurting yourself in some way: 0 (1/8/2021 10:14 AM)     Counseling given: Yes    Patient Active Problem List   Diagnosis    Degenerative disc disease, cervical    Cervical disc herniation    Lumbar radiculopathy, chronic    Degeneration of lumbar intervertebral disc    Essential hypertension    Left-sided low back pain with left-sided sciatica    Osteoarthritis of lumbar spine    [facility]    Rheumatoid arthritis ([facility]    Primary osteoarthritis of left hip    Arthropathy of lumbar facet joint    Hip pain, chronic, right    Hemorrhoids    Colon polyp    Tubular adenoma    Chronic bilateral low back pain without sciatica    Spinal osteoarthritis    Encounter for medication management    Morbid obesity with BMI of 40.0-44.9, adult ([facility]    Adjustment disorder with mixed anxiety and depressed mood    Type 2 diabetes mellitus without complication, without long-term current use of insulin ([facility])    Mixed incontinence urge and stress    Chronic, continuous use of opioids    Chronic use of opiate drugs therapeutic purposes    Lumbosacral spondylosis without myelopathy    Chronic GERD    Bacterial sinusitis    Mixed hyperlipidemia    Acne cystica    Chronic rhinitis    Surgical menopause    Chronic allergic rhinitis      Past Medical History:   Diagnosis Date    Anxiety     Asthma     Colon polyp 10/31/2016    sessile serated adenoma x2    Depression     Diabetes mellitus ([facility] [address] [facility]     Diverticulitis 10/2016    Gastritis     GERD (gastroesophageal reflux disease)     Hemorrhoids     int/ext    Hypertension     Migraines     Osteoarthritis     Shingles 1-22-16    Tubular adenoma 10/31/2016    Urinary leakage       Past Surgical History:   Procedure Laterality Date    CERVICAL DISCECTOMY  12/2013    & fusion     CHOLECYSTECTOMY      COLONOSCOPY 10/31/2016    int/ext hemorrhoids; sessile serated adenomax2; tubular adenoma    COLONOSCOPY N/A 10/22/2019    COLONOSCOPY POLYPECTOMY SNARE/COLD BIOPSY AND RANDOM BIOPSIES performed by Araseli Arenas MD at [address] [place] [address]      HAND SURGERY Right     thumb surgery, BONE REMOVED    HYSTERECTOMY      LAPAROSCOPY      VT DEST,PARAVERTEBRAL,L/S,ADDL LVLS  3/25/2019         TONSILLECTOMY      TUBAL LIGATION      UPPER GASTROINTESTINAL ENDOSCOPY  10/31/2016    gastritis    UPPER GASTROINTESTINAL ENDOSCOPY N/A 10/22/2019    EGD BIOPSY performed by [facility]eli [facility] MD at [facility]     Family History   Problem Relation Age of Onset    Cancer Mother     Heart Disease Father     Diabetes Father     High Blood Pressure Father     Other Other         Celiac Sprue. Aunt     Current Outpatient Medications   Medication Sig Dispense Refill    busPIRone (BUSPAR) 10 MG tablet Take 1 tablet by mouth 3 times daily 90 tablet 0    HYDROcodone-acetaminophen (NORCO) 5-325 MG per tablet Take 1 tablet by mouth 3 times daily as needed for Pain for up to 30 days. Indications: Pain 90 tablet 0    Dulaglutide 1.5 MG/0.5ML SOPN Inject 1.5 mg into the skin once a week 12 pen 2    Zinc Sulfate (ZINC-220 PO) Take by mouth      pregabalin (LYRICA) 75 MG capsule Take 1 capsule by mouth 4 times daily for 90 days. 360 capsule 0    tiZANidine (ZANAFLEX) 4 MG tablet Take 1 tablet by mouth every 8 hours as needed (spasms) 270 tablet 0    fluticasone (FLONASE) 50 MCG/ACT nasal spray 1 spray by Each Nostril route 2 times daily 2 Bottle 2    neomycin-bacitracin-polymyxin (NEOSPORIN) 400-5-5000 ointment Apply topically 2 times daily. 1 Tube 1    omeprazole (PRILOSEC) 40 MG delayed release capsule TAKE 1 CAPSULE DAILY 90 capsule 2    metFORMIN (GLUCOPHAGE-XR) 750 MG extended release tablet TAKE 1 TABLET DAILY WITH BREAKFAST 90 tablet 2    Blood Pressure Monitor KIT Use as directed.  1 kit 1    lisinopril (PRINIVIL;ZESTRIL) 5 MG tablet TAKE 1 TABLET DAILY 90 tablet 2    sertraline (ZOLOFT) 100 MG tablet TAKE 1 TABLET DAILY 90 tablet 2    atorvastatin (LIPITOR) 20 MG tablet TAKE 1 TABLET DAILY 90 tablet 2    OneTouch Delica Lancets 93A MISC USE 1 EACH TWICE A  each 5    blood glucose test strips (ASCENSIA AUTODISC VI;ONE TOUCH ULTRA TEST VI) strip 1 each by In Vitro route daily verio test strips,  each 1    topiramate (TOPAMAX) 100 MG tablet TAKE 1 TABLET IN THE MORNING AND 2 TABLETS IN THE EVENING 270 tablet 4    b complex vitamins capsule Take 1 capsule by mouth daily      docusate sodium (COLACE) 100 MG capsule Take 1 capsule by mouth daily as needed for Constipation 30 capsule 2    Elastic Bandages & Supports (LUMBAR BACK BRACE/SUPPORT PAD) MISC 1 each by Does not apply route daily as needed (pain) 1 each 0    Melatonin ER 5 MG TBCR Take by mouth as needed      vitamin D (CHOLECALCIFEROL) 1000 UNIT TABS tablet Take 1,000 Units by mouth daily      Ascorbic Acid (VITAMIN C) 500 MG CAPS Take by mouth      ECHINACEA PO Take by mouth      Probiotic Product (PROBIOTIC DAILY PO) Take 1 tablet by mouth daily.  Multiple Vitamins-Calcium (ONE-A-DAY WOMENS FORMULA PO) Take  by mouth.  albuterol sulfate (PROAIR RESPICLICK) 426 (90 Base) MCG/ACT aerosol powder inhalation Inhale 2 puffs into the lungs every 4 hours as needed for Wheezing or Shortness of Breath (Patient not taking: Reported on 1/8/2021) 1 Inhaler 2     No current facility-administered medications for this visit. Review of Systems   Constitutional: Positive for activity change. Negative for appetite change, fatigue and unexpected weight change (losing weight). HENT: Negative for ear pain, sneezing and sore throat. Eyes: Negative for itching and visual disturbance. Wears glasses   Respiratory: Negative for cough, shortness of breath and wheezing.     Cardiovascular: Negative for chest pain and palpitations. Gastrointestinal: Negative for abdominal pain and nausea. Genitourinary: Positive for urgency (chronic). Negative for frequency, pelvic pain and vaginal discharge. Musculoskeletal: Positive for arthralgias, back pain, joint swelling and myalgias. Back and hips   Skin: Positive for rash and wound. Allergic/Immunologic: Positive for environmental allergies. Neurological: Negative for dizziness, syncope and headaches. Psychiatric/Behavioral: Negative for sleep disturbance. The patient is nervous/anxious. Prior to Visit Medications    Medication Sig Taking? Authorizing Provider   busPIRone (BUSPAR) 10 MG tablet Take 1 tablet by mouth 3 times daily Yes BAR Oliva CNP   HYDROcodone-acetaminophen (NORCO) 5-325 MG per tablet Take 1 tablet by mouth 3 times daily as needed for Pain for up to 30 days. Indications: Pain Yes BAR Blakely CNP   Dulaglutide 1.5 MG/0.5ML SOPN Inject 1.5 mg into the skin once a week Yes BAR Oliva CNP   Zinc Sulfate (ZINC-220 PO) Take by mouth Yes Historical Provider, MD   pregabalin (LYRICA) 75 MG capsule Take 1 capsule by mouth 4 times daily for 90 days. Yes BAR Blakely CNP   tiZANidine (ZANAFLEX) 4 MG tablet Take 1 tablet by mouth every 8 hours as needed (spasms) Yes BAR Blakely CNP   fluticasone (FLONASE) 50 MCG/ACT nasal spray 1 spray by Each Nostril route 2 times daily Yes BAR Oliva CNP   neomycin-bacitracin-polymyxin (NEOSPORIN) 400-5-5000 ointment Apply topically 2 times daily. Yes BAR Oliva CNP   omeprazole (PRILOSEC) 40 MG delayed release capsule TAKE 1 CAPSULE DAILY Yes BAR Oliva CNP   metFORMIN (GLUCOPHAGE-XR) 750 MG extended release tablet TAKE 1 TABLET DAILY WITH BREAKFAST Yes BAR Oliva CNP   Blood Pressure Monitor KIT Use as directed.  Yes BAR Oliva CNP   lisinopril (PRINIVIL;ZESTRIL) 5 MG tablet TAKE 1 TABLET DAILY Yes BAR Oliva - CNP   sertraline (ZOLOFT) 100 MG tablet TAKE 1 TABLET DAILY Yes BAR Oliva CNP   atorvastatin (LIPITOR) 20 MG tablet TAKE 1 TABLET DAILY Yes BAR Oliva CNP   OneTouch Delica Lancets 49V MISC USE 1 EACH TWICE A DAY Yes BAR Oliva CNP   blood glucose test strips (ASCENSIA AUTODISC VI;ONE TOUCH ULTRA TEST VI) strip 1 each by In Vitro route daily verio test strips, PRN Yes BAR Oliva CNP   topiramate (TOPAMAX) 100 MG tablet TAKE 1 TABLET IN THE MORNING AND 2 TABLETS IN THE EVENING Yes BAR Barrios CNP   b complex vitamins capsule Take 1 capsule by mouth daily Yes Historical Provider, MD   docusate sodium (COLACE) 100 MG capsule Take 1 capsule by mouth daily as needed for Constipation Yes Rollene PagelandBAR CNP   Elastic Bandages & Supports (LUMBAR BACK BRACE/SUPPORT PAD) MISC 1 each by Does not apply route daily as needed (pain) Yes Carly Isaac MD   Melatonin ER 5 MG TBCR Take by mouth as needed Yes Historical Provider, MD   vitamin D (CHOLECALCIFEROL) 1000 UNIT TABS tablet Take 1,000 Units by mouth daily Yes Historical Provider, MD   Ascorbic Acid (VITAMIN C) 500 MG CAPS Take by mouth Yes Historical Provider, MD   ECHINACEA PO Take by mouth Yes Historical Provider, MD   Probiotic Product (PROBIOTIC DAILY PO) Take 1 tablet by mouth daily. Yes Historical Provider, MD   Multiple Vitamins-Calcium (ONE-A-DAY WOMENS FORMULA PO) Take  by mouth.  Yes Historical Provider, MD   albuterol sulfate (PROAIR RESPICLICK) 002 (90 Base) MCG/ACT aerosol powder inhalation Inhale 2 puffs into the lungs every 4 hours as needed for Wheezing or Shortness of Breath  Patient not taking: Reported on 1/8/2021  Amari Fofana MD      Social History     Tobacco Use    Smoking status: Never Smoker    Smokeless tobacco: Never Used   Substance Use Topics    Alcohol use: No      Vitals:    01/08/21 1010   BP: 110/76   Pulse: 86 Affect: Mood is anxious. Speech: Speech is rapid and pressured. Behavior: Behavior normal.       Most recent labs reviewed with patient, and all questions answered. Lab Results   Component Value Date    WBC 6.8 09/10/2020    HGB 14.4 09/10/2020    HCT 43.4 09/10/2020    MCV 89.9 09/10/2020     09/10/2020     Lab Results   Component Value Date     09/10/2020    K 4.2 09/10/2020     09/10/2020    CO2 25 09/10/2020    BUN 11 09/10/2020    CREATININE 0.79 09/10/2020    GLUCOSE 146 02/03/2020    GLUCOSE 121 10/05/2019    CALCIUM 9.5 09/10/2020      Lab Results   Component Value Date    ALT 17 09/10/2020    AST 14 09/10/2020    ALKPHOS 71 09/10/2020    BILITOT 0.22 (L) 09/10/2020     Lab Results   Component Value Date    TSH 0.86 09/10/2020     Lab Results   Component Value Date    CHOL 151 10/05/2019    CHOL 207 (H) 02/26/2019    CHOL 217 (H) 10/17/2017     Lab Results   Component Value Date    TRIG 205 (H) 10/05/2019    TRIG 194 (H) 02/26/2019    TRIG 217 (H) 10/17/2017     Lab Results   Component Value Date    HDL 42 09/10/2020    HDL 39 (L) 10/05/2019    HDL 44 02/26/2019     Lab Results   Component Value Date    LDLCALC 71 10/05/2019    LDLCALC 126 10/17/2017    LDLCALC 118 07/14/2015    LDLCHOLESTEROL 58 09/10/2020    LDLCHOLESTEROL 124 02/26/2019    LDLCHOLESTEROL 122 04/20/2016     Lab Results   Component Value Date    CHOLHDLRATIO 3.7 09/10/2020    CHOLHDLRATIO 3.9 10/05/2019    CHOLHDLRATIO 4.7 02/26/2019     Lab Results   Component Value Date    LABA1C 5.3 01/08/2021      No results found for: [id number]  No results found for: FOLATE  Lab Results   Component Value Date    VITD25 67.7 09/10/2020     ASSESSMENT/PLAN:  1. Essential hypertension  Stable  Continue current therapy. DISCUSSED AND ADVISED TO:  Cut down on your salt intake. Cut down on caffeinated drinks, sports drinks. Instructed to check BP at home regularly.   Report for any chest pains, shortness of breath, headaches, and lightheadedness. Call the office if your blood pressure continue to be higher than 140/90 or 90/50.      2. Type 2 diabetes mellitus without complication, without long-term current use of insulin (HCC)  Stable  Continue therapy. We will continue to monitor Hemoglobin A1c   DISCUSSED and ADVISED TO:  Continue to check Glucose levels at home. Report increased and low levels as discussed. Decrease carbohydrates, sugary drinks, desserts in your diet. Exercise regularly, as tolerated. Try to lose weight.    - POCT glycosylated hemoglobin (Hb A1C)    3. Mixed hyperlipidemia  Stable  Continue therapy. Advised to decrease the consumption of red meats, fried foods, trans fats, sweets, sugary beverages. Advised to increase fish, vegetables, and fruits consumption. Advised to add fiber or OTC supplements in diet. Discussed weight loss which will result in improvement of lipids levels. Advised to increase daily physical activities and add regular exercises. 4. Rheumatoid arthritis, involving unspecified site, unspecified whether rheumatoid factor present ([address]  Stable  Continue current therapy. DISCUSSED and ADVISED TO:  Stay at a healthy weight. Continue exercises/PT  Stretch to help prevent stiffness and to prevent injury before exercise. Gentle forms of yoga help keep joints and muscles flexible. Walk instead of jog, ride a bike, swim, and water exercise. Lift weights as tolerated. strong muscles help reduce stress on joints. Take pain medicines exactly as directed and only as needed. 5. Adjustment disorder with mixed anxiety and depressed mood  Failure to Improve  Continue current routine or therapy. DISCUSSED AND ADVISED TO:  Cut down intake of drinks with caffeine, such as coffee or black tea, for 8 hours before bed. Cut down on smoking or use other types of tobacco near bedtime. Cut down on Nicotine and alcohol   Stop eating a big meal close to bedtime.    Stop drinking a lot of  fluids close to bedtime. 6. Mixed incontinence urge and stress  Failure to Improve  Referred to Urogynecology  - Monica Zuniga DO, Urogynecology, [place]    7. Chronic allergic rhinitis  Stable  Continue with the same therapy   ADVISED TO:  Avoid known allergens/irritants. Stop smoking or avoid second hand smoke. Stay hydrated. Report for worsening symptoms    8. Morbid obesity with body mass index of 40.0-49.9 ([facility])  BMI decreasing  DISCUSSED AND ADVISED TO:  Eat a low-fat and low carbohydrates diet. Avoid fried foods especially fast food. Choose healthier options for snacks. Have 5-6 servings of fruits and vegetables per day. Cut down on eating processed food. Add 30 minutes to 1 hour aerobic exercise for 3-4 days a week. 9. Positive depression screening  Failure to Improve  Started buspirone  monitor  - Positive Screen for Clinical Depression with a Documented Follow-up Plan   - busPIRone (BUSPAR) 10 MG tablet; Take 1 tablet by mouth 3 times daily  Dispense: 90 tablet; Refill: 0    10. Screening for viral disease  recommended    - Hepatitis C Antibody    Controlled Substance Monitoring:  Acute and Chronic Pain Monitoring:   RX Monitoring 1/6/2021   Attestation -   Acute Pain Prescriptions -   Periodic Controlled Substance Monitoring Possible medication side effects, risk of tolerance/dependence & alternative treatments discussed. ;No signs of potential drug abuse or diversion identified.;Obtaining appropriate analgesic effect of treatment. ;Assessed functional status. Chronic Pain > 50 MEDD Obtained or confirmed \"Consent for Opioid Use\" on file.    Chronic Pain > 80 MEDD -     Orders Placed This Encounter   Procedures    Hepatitis C Antibody   Karel Ruvalcaba DO, Urogynecology, [place]     Referral Priority:   Routine     Referral Type:   Eval and Treat     Referral Reason:   Specialty Services Required     Referred to Provider:   Nicolas Victoria DO     Requested Specialty: Urogynecology     Number of Visits Requested:   1    POCT glycosylated hemoglobin (Hb A1C)    Positive Screen for Clinical Depression with a Documented Follow-up Plan      Orders Placed This Encounter   Medications    busPIRone (BUSPAR) 10 MG tablet     Sig: Take 1 tablet by mouth 3 times daily     Dispense:  90 tablet     Refill:  0      There are no discontinued medications. Health Maintenance Due   Topic Date Due    Diabetic retinal exam  [birth year]1981      Return in about 4 months (around 5/8/2021) for Chronic conditions, 30mins. Iqra received counseling on the following healthy behaviors: nutrition, exercise and medication adherence  Reviewed prior labs and health maintenance  Continue current medications, diet and exercise. Discussed use, benefit, and side effects of prescribed medications. Barriers to medication compliance addressed. Patient given educational materials - see patient instructions  Was a self-tracking handout given in paper form or via [employer]? Yes    Requested Prescriptions     Signed Prescriptions Disp Refills    busPIRone (BUSPAR) 10 MG tablet 90 tablet 0     Sig: Take 1 tablet by mouth 3 times daily       All patient questions answered. Patient voiced understanding. Quality Measures    Body mass index is 42.73 kg/m². Elevated. Weight control planned discussed Healthy diet and regular exercise. BP: 110/76 Blood pressure is normal. Treatment plan consists of No treatment change needed.     Lab Results   Component Value Date    LDLCALC 71 10/05/2019    LDLCHOLESTEROL 58 09/10/2020    (goal LDL reduction with dx if diabetes is 50% LDL reduction)      PHQ Scores 1/8/2021 9/8/2020 2/26/2019 12/18/2018 3/21/2017   PHQ2 Score 3 0 4 2 0   PHQ9 Score 14 0 16 2 0     Interpretation of Total Score Depression Severity: 1-4 = Minimal depression, 5-9 = Mild depression, 10-14 = Moderate depression, 15-19 = Moderately severe depression, 20-27 = Severe depression   This note was completed

## 2021-01-08 ENCOUNTER — OFFICE VISIT (OUTPATIENT)
Dept: FAMILY MEDICINE CLINIC | Age: 51
End: 2021-01-08
Payer: COMMERCIAL

## 2021-01-08 VITALS
SYSTOLIC BLOOD PRESSURE: 110 MMHG | WEIGHT: 218.8 LBS | OXYGEN SATURATION: 97 % | DIASTOLIC BLOOD PRESSURE: 76 MMHG | HEART RATE: 86 BPM | TEMPERATURE: 97.2 F | HEIGHT: 60 IN | BODY MASS INDEX: 42.96 KG/M2

## 2021-01-08 DIAGNOSIS — E66.01 MORBID OBESITY WITH BODY MASS INDEX OF 40.0-49.9 (HCC): ICD-10-CM

## 2021-01-08 DIAGNOSIS — F43.23 ADJUSTMENT DISORDER WITH MIXED ANXIETY AND DEPRESSED MOOD: ICD-10-CM

## 2021-01-08 DIAGNOSIS — M06.9 RHEUMATOID ARTHRITIS, INVOLVING UNSPECIFIED SITE, UNSPECIFIED WHETHER RHEUMATOID FACTOR PRESENT (HCC): ICD-10-CM

## 2021-01-08 DIAGNOSIS — Z11.59 SCREENING FOR VIRAL DISEASE: ICD-10-CM

## 2021-01-08 DIAGNOSIS — I10 ESSENTIAL HYPERTENSION: Primary | ICD-10-CM

## 2021-01-08 DIAGNOSIS — E78.2 MIXED HYPERLIPIDEMIA: ICD-10-CM

## 2021-01-08 DIAGNOSIS — J30.9 CHRONIC ALLERGIC RHINITIS: ICD-10-CM

## 2021-01-08 DIAGNOSIS — N39.46 MIXED INCONTINENCE URGE AND STRESS: ICD-10-CM

## 2021-01-08 DIAGNOSIS — E11.9 TYPE 2 DIABETES MELLITUS WITHOUT COMPLICATION, WITHOUT LONG-TERM CURRENT USE OF INSULIN (HCC): ICD-10-CM

## 2021-01-08 DIAGNOSIS — Z13.31 POSITIVE DEPRESSION SCREENING: ICD-10-CM

## 2021-01-08 LAB
HBA1C MFR BLD: 5.3 %
HEPATITIS C ANTIBODY: NORMAL

## 2021-01-08 PROCEDURE — 99214 OFFICE O/P EST MOD 30 MIN: CPT | Performed by: FAMILY MEDICINE

## 2021-01-08 PROCEDURE — G8431 POS CLIN DEPRES SCRN F/U DOC: HCPCS | Performed by: FAMILY MEDICINE

## 2021-01-08 PROCEDURE — 83036 HEMOGLOBIN GLYCOSYLATED A1C: CPT | Performed by: FAMILY MEDICINE

## 2021-01-08 RX ORDER — BUSPIRONE HYDROCHLORIDE 10 MG/1
10 TABLET ORAL 3 TIMES DAILY
Qty: 90 TABLET | Refills: 0 | Status: SHIPPED | OUTPATIENT
Start: 2021-01-08 | End: 2021-02-19

## 2021-01-08 ASSESSMENT — ANXIETY QUESTIONNAIRES
2. NOT BEING ABLE TO STOP OR CONTROL WORRYING: 1-SEVERAL DAYS
GAD7 TOTAL SCORE: 7
3. WORRYING TOO MUCH ABOUT DIFFERENT THINGS: 1-SEVERAL DAYS
4. TROUBLE RELAXING: 1-SEVERAL DAYS

## 2021-01-08 ASSESSMENT — COLUMBIA-SUICIDE SEVERITY RATING SCALE - C-SSRS
6. HAVE YOU EVER DONE ANYTHING, STARTED TO DO ANYTHING, OR PREPARED TO DO ANYTHING TO END YOUR LIFE?: NO
1. WITHIN THE PAST MONTH, HAVE YOU WISHED YOU WERE DEAD OR WISHED YOU COULD GO TO SLEEP AND NOT WAKE UP?: NO

## 2021-01-08 ASSESSMENT — PATIENT HEALTH QUESTIONNAIRE - PHQ9
3. TROUBLE FALLING OR STAYING ASLEEP: 2
SUM OF ALL RESPONSES TO PHQ QUESTIONS 1-9: 14
1. LITTLE INTEREST OR PLEASURE IN DOING THINGS: 3
9. THOUGHTS THAT YOU WOULD BE BETTER OFF DEAD, OR OF HURTING YOURSELF: 0
8. MOVING OR SPEAKING SO SLOWLY THAT OTHER PEOPLE COULD HAVE NOTICED. OR THE OPPOSITE, BEING SO FIGETY OR RESTLESS THAT YOU HAVE BEEN MOVING AROUND A LOT MORE THAN USUAL: 0
4. FEELING TIRED OR HAVING LITTLE ENERGY: 3
2. FEELING DOWN, DEPRESSED OR HOPELESS: 0
SUM OF ALL RESPONSES TO PHQ9 QUESTIONS 1 & 2: 3
5. POOR APPETITE OR OVEREATING: 0

## 2021-01-08 NOTE — PATIENT INSTRUCTIONS
Patient Education        Fibromyalgia: Care Instructions  Overview     Fibromyalgia is a painful condition that is not completely understood by medical experts. The cause of fibromyalgia is not known. It can make you feel tired and ache all over. It causes tender spots at specific points of the body that hurt only when you press on them. You may have trouble sleeping, as well as other symptoms. These problems can upset your work and home life. Symptoms tend to come and go, although they may never go away completely. Fibromyalgia does not harm your muscles, joints, or organs. Follow-up care is a key part of your treatment and safety. Be sure to make and go to all appointments, and call your doctor if you are having problems. It's also a good idea to know your test results and keep a list of the medicines you take. How can you care for yourself at home? · Exercise often. Walk, swim, or bike to help with pain and sleep problems and to make you feel better. · Try to get a good night's sleep. Go to bed and get up at the same time each day, whether you feel rested or not. Make sure you have a good mattress and pillow. · Reduce stress. Avoid things that cause you stress, if you can. If not, work at making them less stressful. Learn to use biofeedback, guided imagery, meditation, or other methods to relax. · Make healthy changes. Eat a balanced diet, quit smoking, and limit alcohol and caffeine. · Use a heating pad set on low or take warm baths or showers for pain. Using cold packs for up to 20 minutes at a time can also relieve pain. Put a thin cloth between the cold pack and your skin. A gentle massage might help too. · Be safe with medicines. Take your medicines exactly as prescribed. Call your doctor if you think you are having a problem with your medicine. Your doctor may talk to you about taking antidepressant medicines.  These medicines may improve sleep, relieve pain, and in some cases treat depression. · Learn about fibromyalgia. This makes coping easier. Then, take an active role in your treatment. · Think about joining a support group with others who have fibromyalgia to learn more and get support. When should you call for help? Watch closely for changes in your health, and be sure to contact your doctor if:    · You feel sad, helpless, or hopeless; lose interest in things you used to enjoy; or have other symptoms of depression.     · Your fibromyalgia symptoms get worse. Where can you learn more? Go to https://[email or web address]. org and sign in to your [employer] account. Enter V003 in the Cvgram.me box to learn more about \"Fibromyalgia: Care Instructions. \"     If you do not have an account, please click on the \"Sign Up Now\" link. Current as of: November 20, 2019               Content Version: 12.6  © 2270-9692 [employer]. Care instructions adapted under license by [facility] ([facility]). If you have questions about a medical condition or this instruction, always ask your healthcare professional. [address] any warranty or liability for your use of this information.

## 2021-01-08 NOTE — PROGRESS NOTES
Visit Information    Have you changed or started any medications since your last visit including any over-the-counter medicines, vitamins, or herbal medicines? no   Are you having any side effects from any of your medications? -  no  Have you stopped taking any of your medications? Is so, why? -  no    Have you seen any other physician or provider since your last visit? Yes - Records Obtained  Have you had any other diagnostic tests since your last visit? No  Have you been seen in the emergency room and/or had an admission to a hospital since we last saw you? No  Have you had your routine dental cleaning in the past 6 months? no    Have you activated your T[employer]Wiki account? If not, what are your barriers?  Yes     Patient Care Team:  BAR Oliva CNP as PCP - General (Family Medicine)  BAR Dobbs CNP as PCP - [address] [place] [address] [facility]  Sherine Sterling MD as Orthopedic Surgeon (Orthopedic Surgery)  Bessie Siemens, MD as Consulting Physician (Gastroenterology)    Medical History Review  Past Medical, Family, and Social History reviewed and does contribute to the patient presenting condition    Health Maintenance   Topic Date Due    Hepatitis C screen  1970    Diabetic retinal exam  08/08/1981    Diabetic foot exam  10/15/2021 (Originally 10/3/2020)    Cervical cancer screen  10/15/2021 (Originally 7/3/2017)    Hepatitis B vaccine (3 of 3 - Risk 3-dose series) 03/13/2021    A1C test (Diabetic or Prediabetic)  09/10/2021    Diabetic microalbuminuria test  09/10/2021    Lipid screen  09/10/2021    Potassium monitoring  09/10/2021    Creatinine monitoring  09/10/2021    Colon cancer screen colonoscopy  10/22/2022    DTaP/Tdap/Td vaccine (2 - Td) 01/11/2028    Flu vaccine  Completed    Pneumococcal 0-64 years Vaccine  Completed    HIV screen  Completed    Hepatitis A vaccine  Aged Out    Hib vaccine  Aged Out    Meningococcal (ACWY) vaccine  Aged Out

## 2021-01-11 ENCOUNTER — HOSPITAL ENCOUNTER (OUTPATIENT)
Dept: GENERAL RADIOLOGY | Age: 51
Discharge: HOME | End: 2021-01-13
Payer: COMMERCIAL

## 2021-01-11 ENCOUNTER — HOSPITAL ENCOUNTER (OUTPATIENT)
Dept: PAIN MANAGEMENT | Age: 51
Discharge: HOME | End: 2021-01-11
Payer: COMMERCIAL

## 2021-01-11 VITALS
DIASTOLIC BLOOD PRESSURE: 86 MMHG | HEART RATE: 78 BPM | RESPIRATION RATE: 20 BRPM | BODY MASS INDEX: 42.8 KG/M2 | SYSTOLIC BLOOD PRESSURE: 118 MMHG | HEIGHT: 60 IN | WEIGHT: 218 LBS | TEMPERATURE: 98.6 F | OXYGEN SATURATION: 96 %

## 2021-01-11 DIAGNOSIS — M46.1 SACROILIITIS (HCC): Primary | ICD-10-CM

## 2021-01-11 DIAGNOSIS — M46.1 SACROILIITIS (HCC): ICD-10-CM

## 2021-01-11 PROCEDURE — 3209999900 FLUORO FOR SURGICAL PROCEDURES

## 2021-01-11 PROCEDURE — 27096 INJECT SACROILIAC JOINT: CPT | Performed by: PAIN MEDICINE

## 2021-01-11 PROCEDURE — 6360000002 HC RX W HCPCS: Performed by: PAIN MEDICINE

## 2021-01-11 PROCEDURE — G0260 INJ FOR SACROILIAC JT ANESTH: HCPCS

## 2021-01-11 PROCEDURE — 6360000004 HC RX CONTRAST MEDICATION: Performed by: PAIN MEDICINE

## 2021-01-11 RX ORDER — FENTANYL CITRATE 50 UG/ML
INJECTION, SOLUTION INTRAMUSCULAR; INTRAVENOUS
Status: COMPLETED | OUTPATIENT
Start: 2021-01-11 | End: 2021-01-11

## 2021-01-11 RX ORDER — ROPIVACAINE HYDROCHLORIDE 5 MG/ML
INJECTION, SOLUTION EPIDURAL; INFILTRATION; PERINEURAL
Status: COMPLETED | OUTPATIENT
Start: 2021-01-11 | End: 2021-01-11

## 2021-01-11 RX ORDER — TRIAMCINOLONE ACETONIDE 40 MG/ML
INJECTION, SUSPENSION INTRA-ARTICULAR; INTRAMUSCULAR
Status: COMPLETED | OUTPATIENT
Start: 2021-01-11 | End: 2021-01-11

## 2021-01-11 RX ORDER — MIDAZOLAM HYDROCHLORIDE 1 MG/ML
INJECTION INTRAMUSCULAR; INTRAVENOUS
Status: COMPLETED | OUTPATIENT
Start: 2021-01-11 | End: 2021-01-11

## 2021-01-11 RX ADMIN — IOHEXOL 1 ML: 180 INJECTION INTRAVENOUS at 10:26

## 2021-01-11 RX ADMIN — ROPIVACAINE HYDROCHLORIDE 6 ML: 5 INJECTION, SOLUTION EPIDURAL; INFILTRATION; PERINEURAL at 10:26

## 2021-01-11 RX ADMIN — TRIAMCINOLONE ACETONIDE 40 MG: 40 INJECTION, SUSPENSION INTRA-ARTICULAR; INTRAMUSCULAR at 10:27

## 2021-01-11 RX ADMIN — MIDAZOLAM 2 MG: 1 INJECTION INTRAMUSCULAR; INTRAVENOUS at 10:11

## 2021-01-11 RX ADMIN — Medication 50 MCG: at 10:22

## 2021-01-11 ASSESSMENT — PAIN - FUNCTIONAL ASSESSMENT: PAIN_FUNCTIONAL_ASSESSMENT: PREVENTS OR INTERFERES SOME ACTIVE ACTIVITIES AND ADLS

## 2021-01-11 ASSESSMENT — PAIN SCALES - GENERAL
PAINLEVEL_OUTOF10: 5
PAINLEVEL_OUTOF10: 5
PAINLEVEL_OUTOF10: 2

## 2021-01-11 ASSESSMENT — PAIN DESCRIPTION - PROGRESSION: CLINICAL_PROGRESSION: GRADUALLY WORSENING

## 2021-01-11 ASSESSMENT — PAIN DESCRIPTION - ORIENTATION: ORIENTATION: RIGHT;LEFT

## 2021-01-11 ASSESSMENT — PAIN DESCRIPTION - FREQUENCY
FREQUENCY: CONTINUOUS
FREQUENCY: CONTINUOUS

## 2021-01-11 ASSESSMENT — PAIN DESCRIPTION - LOCATION: LOCATION: BACK;HIP;LEG;KNEE

## 2021-01-11 NOTE — PROCEDURES
Pre-Procedure Note    Patient Name: Beckie Fleming   YOB: 1970  Room/Bed: Room/bed info not found  Medical Record Number: [id number]  Date: 1/11/2021       Indication:    1. Sacroiliitis ([facility] [address]        Consent: On file. Vital Signs:   Vitals:    01/11/21 1023   BP: 99/72   Pulse: 74   Resp: 20   Temp:    SpO2: 95%       Past Medical History:   has a past medical history of Anxiety, Asthma, Colon polyp, Depression, Diabetes mellitus ([facility] [address] Diverticulitis, Gastritis, GERD (gastroesophageal reflux disease), Hemorrhoids, Hypertension, Migraines, Osteoarthritis, Shingles, Tubular adenoma, and Urinary leakage. Past Surgical History:   has a past surgical history that includes Hysterectomy; laparoscopy; Tonsillectomy; Hand surgery (Right); Tubal ligation; Cervical discectomy (12/2013); Cholecystectomy; Dilation and curettage of uterus; Colonoscopy (10/31/2016); Upper gastrointestinal endoscopy (10/31/2016); pr dest,paravertebral,l/s,addl lvls (3/25/2019); Colonoscopy (N/A, 10/22/2019); and Upper gastrointestinal endoscopy (N/A, 10/22/2019). Pre-Sedation Documentation and Exam:   Vital signs have been reviewed (see flow sheet for vitals). Mallampati Airway Assessment:  normal    ASA Classification:  Class 3 - A patient with severe systemic disease that limits activity but is not incapacitating    Sedation/ Anesthesia Plan:   intravenous sedation  as needed. Medications Planned:   midazolam (Versed) / Fentanyl  Intravenously  as needed. Patient is an appropriate candidate for plan of sedation: yes  Patient's History and Physical examination was reviewed and there is no change. Electronically signed by Sara Murray MD on 1/11/2021 at 10:33 AM}        Preoperative Diagnosis:  Right sacroiliitis. Postoperative Diagnosis: Right  Sacroiliitis. Procedure Performed:  Right sacroiliac joint injection under fluoroscopy guidance with IV sedation. Indication for the Procedure:   The patient failed conservative management  for pain in low back. The patient is tender over the Right SI joint. Graham's test is positive on the Right side. As patient is not responding to conservative management and pain is interfering with activities of daily living we decided to proceed with SI joint injection. The procedure and risks were discussed with the patient and an informed consent was obtained. Current Pain Assessment  Pain Assessment  Pain Assessment: 0-10  Pain Level: 5  Patient's Stated Pain Goal: 2(to decrease pain with increased activity)  Pain Type: Chronic pain  Pain Location: Back, Hip, Leg, Knee  Pain Orientation: Right, Left  Pain Radiating Towards: hips to left leg and knee  Pain Descriptors: Aching, Burning, Throbbing(stiff)  Pain Frequency: Continuous  Pain Onset: On-going  Clinical Progression: Gradually worsening  Effect of Pain on Daily Activities: all activities and sitting  Functional Pain Assessment: Prevents or interferes some active activities and ADLs  Non-Pharmaceutical Pain Intervention(s): Rest   Procedure:    After starting an IV, the patient was sedated with 2 mg of Midazolam and 50 mcg of Fentanyl intravenously by the RN under my direct supervision. The patient's vital signs including BP, EKG and SaO2 were monitored by the RN and they remained stable during the procedure. A meaningful communication was kept up with the patient throughout the procedure. The patient is placed in prone position. Skin over the back was prepped and draped in sterile manner. Then using fluoroscopy the Right sacroiliac joint was identified. Then the angle of the fluoroscopy was adjusted such that the view of the caudal aspect of the joint space was optimized. Then skin and deep tissues over the caudal aspect of the joint were infiltrated with 2 ml of 0.5% Naropin.    The #22-gauge, 3-1/2 inch spinal needle was introduced through the skin wheal and directed such that the tip of the needle lies in the joint space. This was confirmed by injecting 1 ml of Omnipaque-180 through the needle and observing the spread of the contrast along the joint space. Then after negative aspiration a total of 40 mg of triamcinolone with 4 ml of  0.5%  Naropin was injected through the needle. The needle is removed and a Band-Aid was placed over the needle insertion site. The patient tolerated the procedure well and vital signs remained stable. The patient was discharged home in stable condition and will be followed in the pain clinic in the next few weeks or further planning.     Electronically signed by Kat Fang MD on 1/11/2021 at 10:33 AM

## 2021-01-12 ENCOUNTER — TELEPHONE (OUTPATIENT)
Dept: PAIN MANAGEMENT | Age: 51
End: 2021-01-12

## 2021-01-31 ASSESSMENT — ENCOUNTER SYMPTOMS
CHEST TIGHTNESS: 0
SHORTNESS OF BREATH: 0
ABDOMINAL PAIN: 0
CONSTIPATION: 0
CHOKING: 0
BACK PAIN: 1
ALLERGIC/IMMUNOLOGIC NEGATIVE: 1
VOMITING: 0
EYE REDNESS: 0
RHINORRHEA: 0
NAUSEA: 0
RESPIRATORY NEGATIVE: 1
BOWEL INCONTINENCE: 0

## 2021-01-31 NOTE — PROGRESS NOTES
Balbina Grewal is a 53 y.o. female evaluated on 2/3/2021. Modality of virtual service provided -via Video+audio    Consent:  Patient and/or health care decision maker is aware that that patient may receive a bill for this telephone service, depending on one's insurance coverage, and has provided verbal consent to proceed: Yes    Patient identification was verified at the start of the visit: Yes    Chief complaint: Balbina Grewal is 53 y.o.,  female, with complaint of Back pain   ePAST MEDICAL HISTORY            Patient is complaining of pain involving the low back area. In the past she had undergone lumbar facet joint injections as well as radiofrequency ablation which did help her pain on the right side she still having pain on the left side. She would like to wait for RFA on the left side. She is having some problems with the urinary incontinence and is being seen by urologist.  She reports she is trying to exercise and working on weight loss. Patient reports not much change in her pain since her last visit. Back Pain  This is a chronic problem. The current episode started more than 1 year ago. The problem occurs constantly. The problem is unchanged. The pain is present in the lumbar spine and sacro-iliac. The quality of the pain is described as aching and shooting Rand Minion). Radiates to: Radiates to the hips mostly on the right side. The pain is at a severity of 5/10 (3-8). The pain is moderate. The pain is worse during the day. The symptoms are aggravated by bending, position, standing and twisting (Walking lifting her ADLs). Associated symptoms include weakness. Pertinent negatives include no abdominal pain, bladder incontinence, bowel incontinence, chest pain, dysuria, fever, numbness, paresthesias or tingling. Risk factors include lack of exercise and obesity.       Alleviating factors:rest and stretching, heat and ice  Lifestyle changes experienced with pain: Wakes from sleep from Gastritis     GERD (gastroesophageal reflux disease)     Hemorrhoids     int/ext    Hypertension     Migraines     Osteoarthritis     Shingles 1-22-16    Tubular adenoma 10/31/2016    Urinary leakage        Past Surgical History:   Procedure Laterality Date    CERVICAL DISCECTOMY  12/2013    & fusion     CHOLECYSTECTOMY      COLONOSCOPY  10/31/2016    int/ext hemorrhoids; sessile serated adenomax2; tubular adenoma    COLONOSCOPY N/A 10/22/2019    COLONOSCOPY POLYPECTOMY SNARE/COLD BIOPSY AND RANDOM BIOPSIES performed by Manjit Palacios MD at [address] [facility]      HAND SURGERY Right     thumb surgery, BONE REMOVED    HYSTERECTOMY      LAPAROSCOPY      AL DEST,PARAVERTEBRAL,L/S,ADDL LVLS  3/25/2019         TONSILLECTOMY      TUBAL LIGATION      UPPER GASTROINTESTINAL ENDOSCOPY  10/31/2016    gastritis    UPPER GASTROINTESTINAL ENDOSCOPY N/A 10/22/2019    EGD BIOPSY performed by Manjit Palacios MD at [facility]       Family History   Problem Relation Age of Onset    Cancer Mother     Heart Disease Father     Diabetes Father     High Blood Pressure Father     Other Other         Celiac Sprue.  Aunt       Social History     Socioeconomic History    Marital status:      Spouse name: None    Number of children: None    Years of education: None    Highest education level: None   Occupational History    Occupation: unemployed   Social Needs    Financial resource strain: None    Food insecurity     Worry: None     Inability: None    Transportation needs     Medical: None     Non-medical: None   Tobacco Use    Smoking status: Never Smoker    Smokeless tobacco: Never Used   Substance and Sexual Activity    Alcohol use: No    Drug use: No    Sexual activity: Yes   Lifestyle    Physical activity     Days per week: None     Minutes per session: None    Stress: None   Relationships    Social connections     Talks on phone: None     Gets together: None     Attends Taoism service: None     Active member of club or organization: None     Attends meetings of clubs or organizations: None     Relationship status: None    Intimate partner violence     Fear of current or ex partner: None     Emotionally abused: None     Physically abused: None     Forced sexual activity: None   Other Topics Concern    None   Social History Narrative    None       Allergies   Allergen Reactions    Adhesive Tape Other (See Comments)     blisters    Imitrex [Sumatriptan] Other (See Comments)     \"feels like head is going to explode    Morphine Itching     hives       Current Outpatient Medications on File Prior to Encounter   Medication Sig Dispense Refill    busPIRone (BUSPAR) 10 MG tablet Take 1 tablet by mouth 3 times daily 90 tablet 0    Dulaglutide 1.5 MG/0.5ML SOPN Inject 1.5 mg into the skin once a week 12 pen 2    Zinc Sulfate (ZINC-220 PO) Take by mouth      pregabalin (LYRICA) 75 MG capsule Take 1 capsule by mouth 4 times daily for 90 days. 360 capsule 0    tiZANidine (ZANAFLEX) 4 MG tablet Take 1 tablet by mouth every 8 hours as needed (spasms) 270 tablet 0    fluticasone (FLONASE) 50 MCG/ACT nasal spray 1 spray by Each Nostril route 2 times daily 2 Bottle 2    neomycin-bacitracin-polymyxin (NEOSPORIN) 400-5-5000 ointment Apply topically 2 times daily. 1 Tube 1    omeprazole (PRILOSEC) 40 MG delayed release capsule TAKE 1 CAPSULE DAILY 90 capsule 2    metFORMIN (GLUCOPHAGE-XR) 750 MG extended release tablet TAKE 1 TABLET DAILY WITH BREAKFAST 90 tablet 2    Blood Pressure Monitor KIT Use as directed.  1 kit 1    lisinopril (PRINIVIL;ZESTRIL) 5 MG tablet TAKE 1 TABLET DAILY 90 tablet 2    sertraline (ZOLOFT) 100 MG tablet TAKE 1 TABLET DAILY 90 tablet 2    atorvastatin (LIPITOR) 20 MG tablet TAKE 1 TABLET DAILY 90 tablet 2    OneTouch Delica Lancets 39Q MISC USE 1 EACH TWICE A  each 5    blood glucose test strips ([address] ULTRA TEST VI) strip 1 each by In Vitro route daily verio test strips,  each 1    topiramate (TOPAMAX) 100 MG tablet TAKE 1 TABLET IN THE MORNING AND 2 TABLETS IN THE EVENING 270 tablet 4    albuterol sulfate (PROAIR RESPICLICK) 895 (90 Base) MCG/ACT aerosol powder inhalation Inhale 2 puffs into the lungs every 4 hours as needed for Wheezing or Shortness of Breath 1 Inhaler 2    b complex vitamins capsule Take 1 capsule by mouth daily      docusate sodium (COLACE) 100 MG capsule Take 1 capsule by mouth daily as needed for Constipation 30 capsule 2    Elastic Bandages & Supports (LUMBAR BACK BRACE/SUPPORT PAD) MISC 1 each by Does not apply route daily as needed (pain) 1 each 0    Melatonin ER 5 MG TBCR Take by mouth as needed      vitamin D (CHOLECALCIFEROL) 1000 UNIT TABS tablet Take 1,000 Units by mouth daily      Ascorbic Acid (VITAMIN C) 500 MG CAPS Take by mouth      ECHINACEA PO Take by mouth      Probiotic Product (PROBIOTIC DAILY PO) Take 1 tablet by mouth daily.  Multiple Vitamins-Calcium (ONE-A-DAY WOMENS FORMULA PO) Take  by mouth. No current facility-administered medications on file prior to encounter. Review of Systems   Constitutional: Positive for fatigue. Negative for activity change, fever and unexpected weight change. HENT: Negative for congestion, ear pain and rhinorrhea. Eyes: Negative for photophobia, redness and visual disturbance. Respiratory: Negative. Negative for choking, chest tightness and shortness of breath. Cardiovascular: Negative. Negative for chest pain and palpitations. Gastrointestinal: Negative for abdominal pain, bowel incontinence, constipation, nausea and vomiting. Endocrine: Negative. Negative for heat intolerance and polyuria. Genitourinary: Negative. Negative for bladder incontinence, dysuria and hematuria. Musculoskeletal: Positive for back pain. Negative for arthralgias. Skin: Negative for rash and wound. Allergic/Immunologic: Negative. Neurological: Positive for weakness. Negative for tingling, tremors, numbness and paresthesias. Hematological: Negative. Psychiatric/Behavioral: Positive for sleep disturbance. Negative for self-injury and suicidal ideas. The patient is not nervous/anxious. Physical Exam  Skin:         Neurological:      Mental Status: She is alert and oriented to person, place, and time. Psychiatric:         Mood and Affect: Mood normal.            DATA:  LAB.:  8/1/2020 12:06 AM - [place] Incoming Lab Results From [facility]    Component Value Ref Range & Units Status Collected Lab   Pain Management Drug Panel Interp, Urine Consistent   Final 07/28/2020  3:50 PM [facility]   (NOTE)   ________________________________________________________________   DRUGS EXPECTED:   Melody Igora (HYDROCODONE)   ________________________________________________________________   [id number] with medications provided:   Melody Igora (HYDROCODONE): based on hydrocodone, norhydrocodone,   hydromorphone   ________________________________________________________________   Drugs Not Included in this Assay:   Acetaminophen        X-Ray reports:  Technique: Multiplanar multisequence MR imaging of the lumbar spine was performed without intravenous contrast.   Findings: Straightening of normal lumbar lordotic curvature. Visualized spinal cord demonstrates no evidence for cord edema. Conus terminus at approximately L1 level. Cauda equina nerve roots follow spinal curvature. Vertebral body heights are maintained slight heterogeneity of the marrow signal without evidence for marrow edema. L1-L2 level: Bilateral facet hypertrophy without significant central canal stenosis or neuroforaminal narrowing.    L2-L3 level: Bilateral facet hypertrophy with broad-based disc bulge and ligamentum flavum thickening and a superimposed right foraminal and extraforaminal levels disc protrusion with mild to moderate right-sided neuroforaminal narrowing without significant central canal stenosis. L3-L4 level: Bilateral facet hypertrophy with broad-based disc bulge and left extraforaminal level disc protrusion with mild approximation of the left L3 nerve root without significant central canal stenosis. L4-5 level: Broad-based disc bulge with facet hypertrophy without significant central canal stenosis and mild bilateral neuroforaminal narrowing. L5-S1 level: Broad-based disc bulge and facet hypertrophy and mild to moderate bilateral neuroforaminal narrowing without significant central canal stenosis. Paraspinous soft tissues demonstrate no discrete mass. Impression: Lumbar spondylotic changes without evidence for significant central canal stenosis. Please see above level by level complete analysis     Final report electronically signed by Shantel Miranda M.D. on 3/27/2016 12:07 PM    Clinical  impression:  1. Lumbosacral spondylosis without myelopathy    2. Lumbar radiculopathy, chronic    3. Degeneration of lumbar intervertebral disc    4. Sacroiliitis ([address]    5. Degenerative disc disease, cervical    6. Cervical disc herniation    7. Hip pain, chronic, right    8. Morbid obesity with BMI of 40.0-44.9, adult ([address]    9. Encounter for medication management    10. Arthropathy of lumbar facet joint        Plan of care: We will continue current pain medications  Current medications are being tolerated without any Adverse side effects. Orders Placed This Encounter   Medications    HYDROcodone-acetaminophen (NORCO) 5-325 MG per tablet     Sig: Take 1 tablet by mouth 3 times daily as needed for Pain for up to 30 days. Indications: Pain     Dispense:  90 tablet     Refill:  0     Reduce doses taken as pain becomes manageable     Urine drug screens have been appropriate. No aberrant activity noted. Analgesia is achieved. Activities of daily living are possible because of medications. Safe use of medications explained to patient.     PDMP Monitoring:    Last PDMP Prasad Tran as Reviewed [facility]):  Review User Review Instant Review Result   Miles Late 1/31/2021  6:22 AM Reviewed PDMP [1]     Counselling/Preventive measures for pain  Control:    [x]  Spine strengthening exercises are discussed with patient in detail. [x] Ill effects of being on chronic pain medications such as sleep disturbances, hormonal changes, withdrawal symptoms,  chronic opioid dependence and tolerance were discussed with patient. I had asked the patient to minimize medication use and utilize pain medications only for uncontrolled rest pain or pain with exertional activities. I advised patient not to self escalate pain medications without consulting with us. At each of patient's future visits we will try to taper pain medications, while adjusting the adjunct medications, and re-evaluating for Physical Therapy to improve spinal and joint strength. We will continue to have discussions to decrease pain medications as tolerated. I also discussed with the patient regarding the dangers of combining narcotic pain medication with tranquilizers, alcohol or illegal drugs or taking the medication any other than prescribed. The dangers including the respiratory depression and death. Patient was told to tell  to all  physicians regarding the medications he is getting from pain clinic. Patient is warned not to take any unprescribed medications over-the-counter medications that can depress breathing . Patient is advised to talk to the pharmacist or physicians if planning to take any over-the-counter medications before  takeing them. Patient is strongly advised to avoid tranquilizers or  Relaxants for any medications that can depress breathing or recreational drugs. Patient is also advised to tell us if there is any changes in his medications from other physicians.    We discussed the same at today's visit and have not been to implement it, as the patient's pain is not under control with Services were provided through a video synchronous discussion virtually to substitute for in-person appointment. \"  Documentation:  I communicated with the patient and/or health care decision maker about plan of care  Details of this discussion including any medical advice provided: Total Time: minutes: 11-20 minutes    I affirm this is a Patient Initiated Episode with an Established Patient who has not had a related appointment within my department in the past 7 days or scheduled within the next 24 hours.     Electronically signed by Victoria Berry MD on 2/3/2021 at 3:57 PM

## 2021-02-03 ENCOUNTER — HOSPITAL ENCOUNTER (OUTPATIENT)
Dept: PAIN MANAGEMENT | Age: 51
Discharge: HOME | End: 2021-02-03
Payer: COMMERCIAL

## 2021-02-03 DIAGNOSIS — Z79.899 ENCOUNTER FOR MEDICATION MANAGEMENT: ICD-10-CM

## 2021-02-03 DIAGNOSIS — M50.20 CERVICAL DISC HERNIATION: ICD-10-CM

## 2021-02-03 DIAGNOSIS — M51.36 DEGENERATION OF LUMBAR INTERVERTEBRAL DISC: ICD-10-CM

## 2021-02-03 DIAGNOSIS — M47.816 ARTHROPATHY OF LUMBAR FACET JOINT: ICD-10-CM

## 2021-02-03 DIAGNOSIS — M50.30 DEGENERATIVE DISC DISEASE, CERVICAL: ICD-10-CM

## 2021-02-03 DIAGNOSIS — M46.1 SACROILIITIS (HCC): ICD-10-CM

## 2021-02-03 DIAGNOSIS — M25.551 HIP PAIN, CHRONIC, RIGHT: ICD-10-CM

## 2021-02-03 DIAGNOSIS — G89.29 HIP PAIN, CHRONIC, RIGHT: ICD-10-CM

## 2021-02-03 DIAGNOSIS — M54.16 LUMBAR RADICULOPATHY, CHRONIC: ICD-10-CM

## 2021-02-03 DIAGNOSIS — E66.01 MORBID OBESITY WITH BMI OF 40.0-44.9, ADULT (HCC): ICD-10-CM

## 2021-02-03 DIAGNOSIS — M47.817 LUMBOSACRAL SPONDYLOSIS WITHOUT MYELOPATHY: Primary | ICD-10-CM

## 2021-02-03 PROCEDURE — 99214 OFFICE O/P EST MOD 30 MIN: CPT | Performed by: PAIN MEDICINE

## 2021-02-03 PROCEDURE — 99213 OFFICE O/P EST LOW 20 MIN: CPT

## 2021-02-03 RX ORDER — HYDROCODONE BITARTRATE AND ACETAMINOPHEN 5; 325 MG/1; MG/1
1 TABLET ORAL 3 TIMES DAILY PRN
Qty: 90 TABLET | Refills: 0 | Status: SHIPPED | OUTPATIENT
Start: 2021-02-05 | End: 2021-03-04 | Stop reason: SDUPTHER

## 2021-02-03 ASSESSMENT — ENCOUNTER SYMPTOMS: PHOTOPHOBIA: 0

## 2021-02-09 ENCOUNTER — HOSPITAL ENCOUNTER (OUTPATIENT)
Dept: CT IMAGING | Age: 51
Discharge: HOME | End: 2021-02-11
Payer: COMMERCIAL

## 2021-02-09 LAB
BUN BLDV-MCNC: 8 MG/DL (ref 6–20)
CREAT SERPL-MCNC: 0.9 MG/DL (ref 0.5–0.9)
GFR AFRICAN AMERICAN: >60 ML/MIN
GFR NON-AFRICAN AMERICAN: >60 ML/MIN
GFR SERPL CREATININE-BSD FRML MDRD: NORMAL ML/MIN/{1.73_M2}
GFR SERPL CREATININE-BSD FRML MDRD: NORMAL ML/MIN/{1.73_M2}

## 2021-02-09 PROCEDURE — 82565 ASSAY OF CREATININE: CPT

## 2021-02-09 PROCEDURE — 36415 COLL VENOUS BLD VENIPUNCTURE: CPT

## 2021-02-09 PROCEDURE — 74177 CT ABD & PELVIS W/CONTRAST: CPT

## 2021-02-09 PROCEDURE — 2580000003 HC RX 258: Performed by: NURSE PRACTITIONER

## 2021-02-09 PROCEDURE — 6360000004 HC RX CONTRAST MEDICATION: Performed by: NURSE PRACTITIONER

## 2021-02-09 PROCEDURE — 84520 ASSAY OF UREA NITROGEN: CPT

## 2021-02-09 RX ORDER — SODIUM CHLORIDE 0.9 % (FLUSH) 0.9 %
10 SYRINGE (ML) INJECTION PRN
Status: DISCONTINUED | OUTPATIENT
Start: 2021-02-09 | End: 2021-02-12 | Stop reason: HOSPADM

## 2021-02-09 RX ORDER — 0.9 % SODIUM CHLORIDE 0.9 %
80 INTRAVENOUS SOLUTION INTRAVENOUS ONCE
Status: COMPLETED | OUTPATIENT
Start: 2021-02-09 | End: 2021-02-09

## 2021-02-09 RX ADMIN — Medication 10 ML: at 17:47

## 2021-02-09 RX ADMIN — IOPAMIDOL 75 ML: 755 INJECTION, SOLUTION INTRAVENOUS at 17:47

## 2021-02-09 RX ADMIN — SODIUM CHLORIDE 80 ML: 9 INJECTION, SOLUTION INTRAVENOUS at 17:47

## 2021-02-09 RX ADMIN — IOHEXOL 50 ML: 240 INJECTION, SOLUTION INTRATHECAL; INTRAVASCULAR; INTRAVENOUS; ORAL at 17:47

## 2021-02-18 DIAGNOSIS — Z13.31 POSITIVE DEPRESSION SCREENING: ICD-10-CM

## 2021-02-19 RX ORDER — BUSPIRONE HYDROCHLORIDE 10 MG/1
TABLET ORAL
Qty: 90 TABLET | Refills: 11 | Status: SHIPPED | OUTPATIENT
Start: 2021-02-19 | End: 2021-04-19 | Stop reason: SDUPTHER

## 2021-02-23 ENCOUNTER — HOSPITAL ENCOUNTER (OUTPATIENT)
Age: 51
Discharge: HOME | End: 2021-02-23
Payer: COMMERCIAL

## 2021-02-23 LAB
HCT VFR BLD CALC: 42.7 % (ref 36–46)
HEMOGLOBIN: 14 G/DL (ref 12–16)
MCH RBC QN AUTO: 29.2 PG (ref 26–34)
MCHC RBC AUTO-ENTMCNC: 32.8 G/DL (ref 31–37)
MCV RBC AUTO: 89 FL (ref 80–100)
NRBC AUTOMATED: NORMAL PER 100 WBC
PDW BLD-RTO: 12.9 % (ref 11.5–14.9)
PLATELET # BLD: 287 K/UL (ref 150–450)
PMV BLD AUTO: 7.9 FL (ref 6–12)
RBC # BLD: 4.8 M/UL (ref 4–5.2)
WBC # BLD: 7.9 K/UL (ref 3.5–11)

## 2021-02-23 PROCEDURE — 85027 COMPLETE CBC AUTOMATED: CPT

## 2021-02-23 PROCEDURE — 36415 COLL VENOUS BLD VENIPUNCTURE: CPT

## 2021-02-28 ENCOUNTER — HOSPITAL ENCOUNTER (OUTPATIENT)
Dept: LAB | Age: 51
Setting detail: SPECIMEN
Discharge: HOME | End: 2021-02-28
Payer: COMMERCIAL

## 2021-02-28 ENCOUNTER — HOSPITAL ENCOUNTER (OUTPATIENT)
Age: 51
Setting detail: SPECIMEN
Discharge: HOME | End: 2021-02-28
Payer: COMMERCIAL

## 2021-02-28 DIAGNOSIS — Z01.818 PREOP TESTING: Primary | ICD-10-CM

## 2021-02-28 DIAGNOSIS — Z01.818 PREOP TESTING: ICD-10-CM

## 2021-03-01 ENCOUNTER — ANESTHESIA EVENT (OUTPATIENT)
Dept: OPERATING ROOM | Age: 51
End: 2021-03-01
Payer: COMMERCIAL

## 2021-03-01 LAB
SARS-COV-2: NORMAL
SARS-COV-2: NOT DETECTED
SOURCE: NORMAL

## 2021-03-03 ENCOUNTER — HOSPITAL ENCOUNTER (OUTPATIENT)
Age: 51
Discharge: HOME | End: 2021-03-05
Payer: COMMERCIAL

## 2021-03-03 NOTE — PROGRESS NOTES
DAY OF SURGERY/PROCEDURE  GUIDELINES    As a patient at the [facility] you can expect quality medical and nursing care that is centered on your individual needs. It is our goal to make your surgical experience as comfortable and excellent as possible.  ________________________________________________________________________    The following instructions are general guidelines, if any information on this sheet is different from what your doctor has instructed you to do, please follow your doctor's instructions. · Please arrive on time or your procedure may be rescheduled. · Enter through front entrance C of the building. · Upon arrival you will be taken to the pre-operative area to get ready for surgery  · You will be given a specific time when you will NOT be able to eat, drink, smoke, suck or chew ANYTHING (no water, gum, mints, cigarettes, cigars, pipes, snuff, chewing tobacco, etc.) or your surgery may be canceled. This will occur during your PAT appointment or by phone 1-2 days before surgery  · Take a shower or bath on the morning of your surgery/procedure (Hibiclens if directed)  · Brush your teeth, but do not swallow any water  · IN CASE OF ILLNESS - If you have a cold or flu symptoms (high fever, runny nose, sore throat, cough, etc.) rash, nausea, vomiting, loose stools, and/or recent contact with someone who has a contagious disease (chick pox, measles, etc.) please call your doctor before coming to the surgery center  · Take a small sip of water with heart, blood pressure, and/or seizure medication the morning of surgery.  (DO NOT take blood pressure medications that contain a diuretic)  · If applicable bring your:  · Inhaler (s)  · Hearing aid(s)  · Eyeglasses and Case (If you wear contacts they have to be removed before surgery, bring case and solution)  · Any paperwork given to you by your doctor  · Any X-rays you were told to bring  · A copy of your Living Will or Durable Power of     · DO NOT take anticoagulants (blood thinners, aspiring or aspirin-containing products) two weeks prior to your surgery. · DO NOT take any diabetic pills or insulin. Please bring your sliding scale instructions and sick day plan with you. If you have a low blood sugar reaction after midnight, drink 4 ounces of apple juice or regular pop. · Wear loose, comfortable clothing that is easy to put on and take off. · You will be returning home the same day as your surgery, you will need to have a responsible adult (25years of age or older) present to drive you home. You will need someone to stay with you at home for the first 24 hours following your surgery. This is due to the anesthesia and the medications given to you during surgery and recovery. · Your doctor may talk with your family immediately following your procedure. Depending on your needs, you will stay in the recovery room between 30 minutes to 2 hours. · While you are recovering, the surgery family waiting room  can answer many of your family's questions. All medically related questions will be answered by a medical professional. If you had outpatient surgery, you will meet your family for discharge instructions before leaving.

## 2021-03-04 ENCOUNTER — HOSPITAL ENCOUNTER (OUTPATIENT)
Dept: PAIN MANAGEMENT | Age: 51
Discharge: HOME | End: 2021-03-04
Payer: COMMERCIAL

## 2021-03-04 ENCOUNTER — ANESTHESIA (OUTPATIENT)
Dept: OPERATING ROOM | Age: 51
End: 2021-03-04
Payer: COMMERCIAL

## 2021-03-04 ENCOUNTER — HOSPITAL ENCOUNTER (OUTPATIENT)
Age: 51
Setting detail: OUTPATIENT SURGERY
Discharge: HOME | End: 2021-03-04
Attending: OBSTETRICS & GYNECOLOGY | Admitting: OBSTETRICS & GYNECOLOGY
Payer: COMMERCIAL

## 2021-03-04 VITALS — OXYGEN SATURATION: 96 % | SYSTOLIC BLOOD PRESSURE: 92 MMHG | DIASTOLIC BLOOD PRESSURE: 56 MMHG | TEMPERATURE: 91.4 F

## 2021-03-04 VITALS
HEIGHT: 60 IN | SYSTOLIC BLOOD PRESSURE: 117 MMHG | WEIGHT: 216 LBS | BODY MASS INDEX: 42.41 KG/M2 | OXYGEN SATURATION: 96 % | HEART RATE: 73 BPM | RESPIRATION RATE: 15 BRPM | DIASTOLIC BLOOD PRESSURE: 87 MMHG | TEMPERATURE: 96.9 F

## 2021-03-04 DIAGNOSIS — G89.29 CHRONIC BILATERAL LOW BACK PAIN WITHOUT SCIATICA: ICD-10-CM

## 2021-03-04 DIAGNOSIS — M47.817 LUMBOSACRAL SPONDYLOSIS WITHOUT MYELOPATHY: ICD-10-CM

## 2021-03-04 DIAGNOSIS — M16.12 PRIMARY OSTEOARTHRITIS OF LEFT HIP: ICD-10-CM

## 2021-03-04 DIAGNOSIS — M47.816 ARTHROPATHY OF LUMBAR FACET JOINT: ICD-10-CM

## 2021-03-04 DIAGNOSIS — M50.30 DEGENERATIVE DISC DISEASE, CERVICAL: ICD-10-CM

## 2021-03-04 DIAGNOSIS — Z79.899 ENCOUNTER FOR MEDICATION MANAGEMENT: ICD-10-CM

## 2021-03-04 DIAGNOSIS — M54.50 CHRONIC BILATERAL LOW BACK PAIN WITHOUT SCIATICA: ICD-10-CM

## 2021-03-04 DIAGNOSIS — M47.896 OTHER OSTEOARTHRITIS OF SPINE, LUMBAR REGION: ICD-10-CM

## 2021-03-04 DIAGNOSIS — M51.36 DEGENERATION OF LUMBAR INTERVERTEBRAL DISC: ICD-10-CM

## 2021-03-04 DIAGNOSIS — F11.90 CHRONIC, CONTINUOUS USE OF OPIOIDS: ICD-10-CM

## 2021-03-04 DIAGNOSIS — M54.16 LUMBAR RADICULOPATHY, CHRONIC: Chronic | ICD-10-CM

## 2021-03-04 DIAGNOSIS — M46.1 SACROILIITIS (HCC): Primary | ICD-10-CM

## 2021-03-04 PROBLEM — Z98.890 POSTOPERATIVE STATE: Status: ACTIVE | Noted: 2021-03-04

## 2021-03-04 LAB
EKG ATRIAL RATE: 74 BPM
EKG P AXIS: 19 DEGREES
EKG P-R INTERVAL: 138 MS
EKG Q-T INTERVAL: 396 MS
EKG QRS DURATION: 82 MS
EKG QTC CALCULATION (BAZETT): 439 MS
EKG R AXIS: 71 DEGREES
EKG T AXIS: 69 DEGREES
EKG VENTRICULAR RATE: 74 BPM
GLUCOSE BLD-MCNC: 84 MG/DL (ref 65–105)

## 2021-03-04 PROCEDURE — 99213 OFFICE O/P EST LOW 20 MIN: CPT | Performed by: NURSE PRACTITIONER

## 2021-03-04 PROCEDURE — 6360000002 HC RX W HCPCS: Performed by: NURSE ANESTHETIST, CERTIFIED REGISTERED

## 2021-03-04 PROCEDURE — 3600000002 HC SURGERY LEVEL 2 BASE: Performed by: OBSTETRICS & GYNECOLOGY

## 2021-03-04 PROCEDURE — 7100000010 HC PHASE II RECOVERY - FIRST 15 MIN: Performed by: OBSTETRICS & GYNECOLOGY

## 2021-03-04 PROCEDURE — 3700000000 HC ANESTHESIA ATTENDED CARE: Performed by: OBSTETRICS & GYNECOLOGY

## 2021-03-04 PROCEDURE — 2709999900 HC NON-CHARGEABLE SUPPLY: Performed by: OBSTETRICS & GYNECOLOGY

## 2021-03-04 PROCEDURE — 2500000003 HC RX 250 WO HCPCS: Performed by: NURSE ANESTHETIST, CERTIFIED REGISTERED

## 2021-03-04 PROCEDURE — 82947 ASSAY GLUCOSE BLOOD QUANT: CPT

## 2021-03-04 PROCEDURE — 3600000012 HC SURGERY LEVEL 2 ADDTL 15MIN: Performed by: OBSTETRICS & GYNECOLOGY

## 2021-03-04 PROCEDURE — 99213 OFFICE O/P EST LOW 20 MIN: CPT

## 2021-03-04 PROCEDURE — 2580000003 HC RX 258: Performed by: ANESTHESIOLOGY

## 2021-03-04 PROCEDURE — 3700000001 HC ADD 15 MINUTES (ANESTHESIA): Performed by: OBSTETRICS & GYNECOLOGY

## 2021-03-04 PROCEDURE — 7100000011 HC PHASE II RECOVERY - ADDTL 15 MIN: Performed by: OBSTETRICS & GYNECOLOGY

## 2021-03-04 PROCEDURE — 87109 MYCOPLASMA: CPT

## 2021-03-04 RX ORDER — PROMETHAZINE HYDROCHLORIDE 25 MG/ML
6.25 INJECTION, SOLUTION INTRAMUSCULAR; INTRAVENOUS
Status: DISCONTINUED | OUTPATIENT
Start: 2021-03-04 | End: 2021-03-04 | Stop reason: HOSPADM

## 2021-03-04 RX ORDER — MIDAZOLAM HYDROCHLORIDE 1 MG/ML
INJECTION INTRAMUSCULAR; INTRAVENOUS PRN
Status: DISCONTINUED | OUTPATIENT
Start: 2021-03-04 | End: 2021-03-04 | Stop reason: SDUPTHER

## 2021-03-04 RX ORDER — TRIAMCINOLONE ACETONIDE 40 MG/ML
INJECTION, SUSPENSION INTRA-ARTICULAR; INTRAMUSCULAR
Status: DISCONTINUED
Start: 2021-03-04 | End: 2021-03-04 | Stop reason: HOSPADM

## 2021-03-04 RX ORDER — FENTANYL CITRATE 50 UG/ML
25 INJECTION, SOLUTION INTRAMUSCULAR; INTRAVENOUS EVERY 5 MIN PRN
Status: DISCONTINUED | OUTPATIENT
Start: 2021-03-04 | End: 2021-03-04 | Stop reason: HOSPADM

## 2021-03-04 RX ORDER — PROPOFOL 10 MG/ML
INJECTION, EMULSION INTRAVENOUS PRN
Status: DISCONTINUED | OUTPATIENT
Start: 2021-03-04 | End: 2021-03-04 | Stop reason: SDUPTHER

## 2021-03-04 RX ORDER — PHENAZOPYRIDINE HYDROCHLORIDE 200 MG/1
200 TABLET, FILM COATED ORAL 3 TIMES DAILY
Qty: 9 TABLET | Refills: 0 | Status: SHIPPED | OUTPATIENT
Start: 2021-03-04 | End: 2021-03-07

## 2021-03-04 RX ORDER — DIPHENHYDRAMINE HYDROCHLORIDE 50 MG/ML
12.5 INJECTION INTRAMUSCULAR; INTRAVENOUS
Status: DISCONTINUED | OUTPATIENT
Start: 2021-03-04 | End: 2021-03-04 | Stop reason: HOSPADM

## 2021-03-04 RX ORDER — HYDRALAZINE HYDROCHLORIDE 20 MG/ML
5 INJECTION INTRAMUSCULAR; INTRAVENOUS EVERY 10 MIN PRN
Status: DISCONTINUED | OUTPATIENT
Start: 2021-03-04 | End: 2021-03-04 | Stop reason: HOSPADM

## 2021-03-04 RX ORDER — SODIUM CHLORIDE 0.9 % (FLUSH) 0.9 %
10 SYRINGE (ML) INJECTION EVERY 12 HOURS SCHEDULED
Status: DISCONTINUED | OUTPATIENT
Start: 2021-03-04 | End: 2021-03-04 | Stop reason: HOSPADM

## 2021-03-04 RX ORDER — SODIUM CHLORIDE 0.9 % (FLUSH) 0.9 %
10 SYRINGE (ML) INJECTION PRN
Status: DISCONTINUED | OUTPATIENT
Start: 2021-03-04 | End: 2021-03-04 | Stop reason: HOSPADM

## 2021-03-04 RX ORDER — SODIUM CHLORIDE 9 MG/ML
INJECTION, SOLUTION INTRAVENOUS CONTINUOUS
Status: DISCONTINUED | OUTPATIENT
Start: 2021-03-04 | End: 2021-03-04 | Stop reason: HOSPADM

## 2021-03-04 RX ORDER — ESTRADIOL 0.1 MG/G
CREAM VAGINAL
COMMUNITY
Start: 2021-02-08

## 2021-03-04 RX ORDER — ONDANSETRON 2 MG/ML
4 INJECTION INTRAMUSCULAR; INTRAVENOUS
Status: DISCONTINUED | OUTPATIENT
Start: 2021-03-04 | End: 2021-03-04 | Stop reason: HOSPADM

## 2021-03-04 RX ORDER — LIDOCAINE HYDROCHLORIDE 10 MG/ML
INJECTION, SOLUTION EPIDURAL; INFILTRATION; INTRACAUDAL; PERINEURAL PRN
Status: DISCONTINUED | OUTPATIENT
Start: 2021-03-04 | End: 2021-03-04 | Stop reason: SDUPTHER

## 2021-03-04 RX ORDER — HEPARIN SODIUM 5000 [USP'U]/ML
INJECTION, SOLUTION INTRAVENOUS; SUBCUTANEOUS
Status: DISCONTINUED
Start: 2021-03-04 | End: 2021-03-04 | Stop reason: HOSPADM

## 2021-03-04 RX ORDER — LIDOCAINE HYDROCHLORIDE 10 MG/ML
INJECTION, SOLUTION INFILTRATION; PERINEURAL
Status: DISCONTINUED
Start: 2021-03-04 | End: 2021-03-04 | Stop reason: HOSPADM

## 2021-03-04 RX ORDER — SODIUM CHLORIDE, SODIUM LACTATE, POTASSIUM CHLORIDE, CALCIUM CHLORIDE 600; 310; 30; 20 MG/100ML; MG/100ML; MG/100ML; MG/100ML
INJECTION, SOLUTION INTRAVENOUS CONTINUOUS
Status: DISCONTINUED | OUTPATIENT
Start: 2021-03-04 | End: 2021-03-04 | Stop reason: HOSPADM

## 2021-03-04 RX ORDER — FENTANYL CITRATE 50 UG/ML
INJECTION, SOLUTION INTRAMUSCULAR; INTRAVENOUS PRN
Status: DISCONTINUED | OUTPATIENT
Start: 2021-03-04 | End: 2021-03-04 | Stop reason: SDUPTHER

## 2021-03-04 RX ORDER — MEPERIDINE HYDROCHLORIDE 50 MG/ML
12.5 INJECTION INTRAMUSCULAR; INTRAVENOUS; SUBCUTANEOUS EVERY 5 MIN PRN
Status: DISCONTINUED | OUTPATIENT
Start: 2021-03-04 | End: 2021-03-04 | Stop reason: HOSPADM

## 2021-03-04 RX ORDER — ONDANSETRON 2 MG/ML
INJECTION INTRAMUSCULAR; INTRAVENOUS PRN
Status: DISCONTINUED | OUTPATIENT
Start: 2021-03-04 | End: 2021-03-04 | Stop reason: SDUPTHER

## 2021-03-04 RX ORDER — HYDROCODONE BITARTRATE AND ACETAMINOPHEN 5; 325 MG/1; MG/1
1 TABLET ORAL 3 TIMES DAILY PRN
Qty: 90 TABLET | Refills: 0 | Status: SHIPPED | OUTPATIENT
Start: 2021-03-07 | End: 2021-04-06

## 2021-03-04 RX ORDER — PROPOFOL 10 MG/ML
INJECTION, EMULSION INTRAVENOUS CONTINUOUS PRN
Status: DISCONTINUED | OUTPATIENT
Start: 2021-03-04 | End: 2021-03-04 | Stop reason: SDUPTHER

## 2021-03-04 RX ADMIN — FENTANYL CITRATE 100 MCG: 50 INJECTION, SOLUTION INTRAMUSCULAR; INTRAVENOUS at 08:59

## 2021-03-04 RX ADMIN — SODIUM CHLORIDE: 9 INJECTION, SOLUTION INTRAVENOUS at 08:59

## 2021-03-04 RX ADMIN — PROPOFOL 30 MG: 10 INJECTION, EMULSION INTRAVENOUS at 08:59

## 2021-03-04 RX ADMIN — ONDANSETRON 4 MG: 2 INJECTION INTRAMUSCULAR; INTRAVENOUS at 09:04

## 2021-03-04 RX ADMIN — PROPOFOL 80 MCG/KG/MIN: 10 INJECTION, EMULSION INTRAVENOUS at 08:59

## 2021-03-04 RX ADMIN — MIDAZOLAM HYDROCHLORIDE 2 MG: 1 INJECTION, SOLUTION INTRAMUSCULAR; INTRAVENOUS at 08:53

## 2021-03-04 RX ADMIN — LIDOCAINE HYDROCHLORIDE 50 MG: 10 INJECTION, SOLUTION EPIDURAL; INFILTRATION; INTRACAUDAL; PERINEURAL at 08:59

## 2021-03-04 ASSESSMENT — PULMONARY FUNCTION TESTS
PIF_VALUE: 0
PIF_VALUE: 1
PIF_VALUE: 0
PIF_VALUE: 1

## 2021-03-04 ASSESSMENT — PAIN SCALES - GENERAL
PAINLEVEL_OUTOF10: 0
PAINLEVEL_OUTOF10: 0

## 2021-03-04 ASSESSMENT — PAIN DESCRIPTION - DESCRIPTORS: DESCRIPTORS: BURNING

## 2021-03-04 ASSESSMENT — ENCOUNTER SYMPTOMS
CONSTIPATION: 1
SHORTNESS OF BREATH: 1
BACK PAIN: 1

## 2021-03-04 ASSESSMENT — PAIN DESCRIPTION - LOCATION: LOCATION: VAGINA

## 2021-03-04 NOTE — ANESTHESIA POSTPROCEDURE EVALUATION
POST- ANESTHESIA EVALUATION       Pt Name: Logan Wolff  MRN: [id number]  YOB: 1970  Date of evaluation: 3/4/2021  Time:  10:41 AM      /87   Pulse 73   Temp 96.9 °F (36.1 °C)   Resp 15   Ht 5' (1.524 m)   Wt 216 lb (98 kg)   SpO2 96%   BMI 42.18 kg/m²      Consciousness Level  Awake  Cardiopulmonary Status  Stable  Pain Adequately Treated YES  Nausea / Vomiting  NO  Adequate Hydration  YES  Anesthesia Related Complications NONE      Electronically signed by Mando Schmidt MD on 3/4/2021 at 10:41 AM       Department of Anesthesiology  Postprocedure Note    Patient: Logan Wolff  MRN: [id number]  YOB: 1970  Date of evaluation: 3/4/2021  Time:  10:41 AM     Procedure Summary     Date: 03/04/21 Room / Location: [address] / [address] [place] [address]    Anesthesia Start: 9857 Anesthesia Stop: 1919    Procedure: CYSTOSCOPY HYDRODISTENTION WITH DMSO AND UREAPLASMA , MYCOPLASMA CULTURES (N/A ) Diagnosis: (INTERSTITIAL CYSTITIS)    Surgeons: Claude Krabbe, DO Responsible Provider: Mando Schmidt MD    Anesthesia Type: MAC, general ASA Status: 3          Anesthesia Type: MAC, general    Yolie Phase I: Yolie Score: 10    Yolie Phase II: Yolie Score: 10    Last vitals: Reviewed and per EMR flowsheets.        Anesthesia Post Evaluation

## 2021-03-04 NOTE — ANESTHESIA PRE PROCEDURE
Department of Anesthesiology  Preprocedure Note       Name:  Gerber Shirley   Age:  53 y.o.  :  1970                                          MRN:  [id number]         Date:  3/4/2021      Surgeon: Gabriela Odell):  Laurie Kiran DO    Procedure: Procedure(s):  CYSTOSCOPY HYDRODISTENTION WITH DMSO AND UREAPLASMA , MYCOPLASMA CULTURES    Medications prior to admission:   Prior to Admission medications    Medication Sig Start Date End Date Taking? Authorizing Provider   busPIRone (BUSPAR) 10 MG tablet TAKE 1 TABLET THREE TIMES A DAY 21  Yes BAR Oliva CNP   tiZANidine (ZANAFLEX) 4 MG tablet TAKE 1 TABLET EVERY 8 HOURS AS NEEDED FOR SPASMS 3/2/21  Yes BAR Clifford CNP   pregabalin (LYRICA) 75 MG capsule TAKE 1 CAPSULE FOUR TIMES A DAY 3/2/21 5/31/21 Yes BAR Clifford CNP   HYDROcodone-acetaminophen (NORCO) 5-325 MG per tablet Take 1 tablet by mouth 3 times daily as needed for Pain for up to 30 days.  Indications: Pain 2/5/21 3/7/21 Yes Tamara Simon MD   Dulaglutide 1.5 MG/0.5ML SOPN Inject 1.5 mg into the skin once a week 12/21/20 3/21/21 Yes BAR Oliva CNP   fluticasone (FLONASE) 50 MCG/ACT nasal spray 1 spray by Each Nostril route 2 times daily 10/15/20  Yes BAR Oliva CNP   omeprazole (PRILOSEC) 40 MG delayed release capsule TAKE 1 CAPSULE DAILY 10/5/20  Yes BAR Oliva CNP   metFORMIN (GLUCOPHAGE-XR) 750 MG extended release tablet TAKE 1 TABLET DAILY WITH BREAKFAST 20  Yes BAR Oliva CNP   lisinopril (PRINIVIL;ZESTRIL) 5 MG tablet TAKE 1 TABLET DAILY 20  Yes BAR Oliva CNP   sertraline (ZOLOFT) 100 MG tablet TAKE 1 TABLET DAILY 20  Yes BAR Oliva CNP   atorvastatin (LIPITOR) 20 MG tablet TAKE 1 TABLET DAILY 20  Yes BAR Oliva - CNP   topiramate (TOPAMAX) 100 MG tablet TAKE 1 TABLET IN THE MORNING AND 2 TABLETS IN THE EVENING 20  Yes Papo Cadet, APRN - CNP   albuterol sulfate (PROAIR RESPICLICK) 034 (90 Base) MCG/ACT aerosol powder inhalation Inhale 2 puffs into the lungs every 4 hours as needed for Wheezing or Shortness of Breath 2/4/20  Yes Edouard Stiles MD   Probiotic Product (PROBIOTIC DAILY PO) Take 1 tablet by mouth daily. Yes Historical Provider, MD   Multiple Vitamins-Calcium (ONE-A-DAY WOMENS FORMULA PO) Take  by mouth. Yes Historical Provider, MD   neomycin-bacitracin-polymyxin (NEOSPORIN) 400-5-5000 ointment Apply topically 2 times daily. 10/8/20   BAR Oliva CNP   Blood Pressure Monitor KIT Use as directed. 9/8/20   BAR Oliva CNP   OneTouch Delica Lancets 60Q MISC USE 1 EACH TWICE A DAY 9/8/20   BAR Oliva CNP   blood glucose test strips (ASCENSIA AUTODISC VI;ONE TOUCH ULTRA TEST VI) strip 1 each by In Vitro route daily verio test strips, PRN 9/8/20   BAR Oliva CNP   docusate sodium (COLACE) 100 MG capsule Take 1 capsule by mouth daily as needed for Constipation 12/1/17   BAR Tucker CNP   Elastic Bandages & Supports (LUMBAR BACK BRACE/SUPPORT PAD) MISC 1 each by Does not apply route daily as needed (pain) 8/4/17   Sraa Murray MD       Current medications:    Current Facility-Administered Medications   Medication Dose Route Frequency Provider Last Rate Last Admin    0.9 % sodium chloride infusion   Intravenous Continuous Dilcia Godinez MD        lactated ringers infusion   Intravenous Continuous Dilcia Godinez MD        sodium chloride flush 0.9 % injection 10 mL  10 mL Intravenous 2 times per day Dilcia Godinez MD        sodium chloride flush 0.9 % injection 10 mL  10 mL Intravenous PRN Dilcia Godinez MD           Allergies:     Allergies   Allergen Reactions    Adhesive Tape Other (See Comments)     blisters    Imitrex [Sumatriptan] Other (See Comments)     \"feels like head is going to explode    Morphine Itching     hives       Problem List:    Patient Active Problem List   Diagnosis Code    Degenerative disc disease, cervical M50.30    Cervical disc herniation M50.20    Lumbar radiculopathy, chronic M54.16    Degeneration of lumbar intervertebral disc M51.36    Essential hypertension I10    Left-sided low back pain with left-sided sciatica M54.42    Osteoarthritis of lumbar spine M47.816    Sacroiliitis ([facility]) M46.1    Rheumatoid arthritis ([facility] M06.9    Primary osteoarthritis of left hip M16.12    Arthropathy of lumbar facet joint M47.816    Hip pain, chronic, right M25.551, G89.29    Hemorrhoids K64.9    Colon polyp K63.5    Tubular adenoma D36.9    Chronic bilateral low back pain without sciatica M54.5, G89.29    Spinal osteoarthritis M47.9    Encounter for medication management Z79.899    Morbid obesity with BMI of 40.0-44.9, adult ([facility]) E66.01, Z68.41    Adjustment disorder with mixed anxiety and depressed mood F43.23    Type 2 diabetes mellitus without complication, without long-term current use of insulin ([facility]) E11.9    Mixed incontinence urge and stress N39.46    Chronic, continuous use of opioids F11.90    Chronic use of opiate drugs therapeutic purposes Z79.891    Lumbosacral spondylosis without myelopathy M47.817    Chronic GERD K21.9    Bacterial sinusitis J32.9, B96.89    Mixed hyperlipidemia E78.2    Acne cystica L70.0    Chronic rhinitis J31.0    Surgical menopause E89.40    Chronic allergic rhinitis J30.9       Past Medical History:        Diagnosis Date    Anxiety     Asthma     Colon polyp 10/31/2016    sessile serated adenoma x2    Depression     Diabetes mellitus ([facility]     Diverticulitis 10/2016    Gastritis     GERD (gastroesophageal reflux disease)     Hemorrhoids     int/ext    Hypertension     Migraines     Osteoarthritis     Shingles 1-22-16    Tubular adenoma 10/31/2016    Urinary leakage        Past Surgical History:        Procedure Laterality Date    CERVICAL DISCECTOMY  12/2013    & fusion     CHOLECYSTECTOMY      COLONOSCOPY  10/31/2016    int/ext hemorrhoids; sessile serated adenomax2; tubular adenoma    COLONOSCOPY N/A 10/22/2019    COLONOSCOPY POLYPECTOMY SNARE/COLD BIOPSY AND RANDOM BIOPSIES performed by Sara Tovar MD at [address] [place] [address]      HAND SURGERY Right     thumb surgery, BONE REMOVED    HYSTERECTOMY      LAPAROSCOPY      CA DEST,PARAVERTEBRAL,L/S,ADDL LVLS  3/25/2019         TONSILLECTOMY      TUBAL LIGATION      UPPER GASTROINTESTINAL ENDOSCOPY  10/31/2016    gastritis    UPPER GASTROINTESTINAL ENDOSCOPY N/A 10/22/2019    EGD BIOPSY performed by Sara Tovar MD at [address] [place] [address] History:    Social History     Tobacco Use    Smoking status: Never Smoker    Smokeless tobacco: Never Used   Substance Use Topics    Alcohol use: No                                Counseling given: Not Answered      Vital Signs (Current):   Vitals:    03/04/21 0745   BP: 119/79   Pulse: 82   Resp: 22   Temp: 96 °F (35.6 °C)   TempSrc: Temporal   SpO2: 97%   Weight: 216 lb (98 kg)   Height: 5' (1.524 m)                                              BP Readings from Last 3 Encounters:   03/04/21 119/79   01/11/21 118/86   01/08/21 110/76       NPO Status: Time of last liquid consumption: 2200                        Time of last solid consumption: 2200                        Date of last liquid consumption: 03/03/21                        Date of last solid food consumption: 03/03/21    BMI:   Wt Readings from Last 3 Encounters:   03/04/21 216 lb (98 kg)   01/11/21 218 lb (98.9 kg)   01/08/21 218 lb 12.8 oz (99.2 kg)     Body mass index is 42.18 kg/m².     CBC:   Lab Results   Component Value Date    WBC 7.9 02/23/2021    RBC 4.80 02/23/2021    RBC 4.74 10/16/2019    HGB 14.0 02/23/2021    HCT 42.7 02/23/2021    MCV 89.0 02/23/2021    RDW 12.9 02/23/2021     02/23/2021       CMP:   Lab Results   Component Value Date     09/10/2020    K 4.2 OA, no interval change and rheumatoid. , .                 Abdominal:   (+) obese,     Abdomen: soft. Vascular: negative vascular ROS. Anesthesia Plan      MAC and general     ASA 3             Anesthetic plan and risks discussed with patient. Plan discussed with CRNA.                   Libby Bustillos MD   3/4/2021

## 2021-03-04 NOTE — PROGRESS NOTES
________________________________________________________________   DRUGS EXPECTED:   NORCO (HYDROCODONE)   ________________________________________________________________   CONSISTENT with medications provided:   [address] (HYDROCODONE): based on hydrocodone, norhydrocodone,   hydromorphone   ________________________________________________________________   Drugs Not Included in this Assay:   Acetaminophen   ________________________________________________________________   INTERPRETIVE INFORMATION: Pain Mgt Ruiz, Mass Spec/EMIT, Ur,                            Interp   Interpretation depends on accuracy and completeness of patient   medication information submitted by client           EXAMINATION:   THREE XRAY VIEWS OF THE LUMBAR SPINE       6/29/2020 10:04 am       COMPARISON:   None.       HISTORY:   ORDERING SYSTEM PROVIDED HISTORY: Osteoarthritis of spine with radiculopathy,   lumbar region   TECHNOLOGIST PROVIDED HISTORY:   S/p fall increased pain, numbness left leg   Reason for Exam: PT CO pain in left lower back that radiates into her L hip   after falling 2 weeks. Pain and swelling. Acuity: Acute   Type of Exam: Initial       FINDINGS:   Three views of the lumbar spine are submitted for review.  No acute fracture   or malalignment.  Right upper abdominal surgical clips are most compatible   with previous cholecystectomy.  Moderate stool burden noted throughout the   colon.  No discrete osseous abnormality.           Impression   No acute osseus abnormality of the lumbar spine. Pill count: appropriate  fill date :3-7-2021    Morphine equivalent dose as reported on [place]RS15:  Periodic Controlled Substance Monitoring: No signs of potential drug abuse or diversion identified. , Possible medication side effects, risk of tolerance/dependence & alternative treatments discussed. , Assessed functional status., Obtaining appropriate analgesic effect of treatment.  Lisa Dickinson, APRN - CNP)  Review ofOARRS does not show any aberrant prescription behavior. Medication is helping the patient stay active. Patient denies any side effects and reports adequate analgesia. No sign of misuse/abuse.             Past Medical History:   Diagnosis Date    Anxiety     Asthma     Colon polyp 10/31/2016    sessile serated adenoma x2    Depression     Diabetes mellitus ([address]     Diverticulitis 10/2016    Gastritis     GERD (gastroesophageal reflux disease)     Hemorrhoids     int/ext    Hypertension     Migraines     Osteoarthritis     Shingles 1-22-16    Tubular adenoma 10/31/2016    Urinary leakage        Past Surgical History:   Procedure Laterality Date    CERVICAL DISCECTOMY  12/2013    & fusion     CHOLECYSTECTOMY      COLONOSCOPY  10/31/2016    int/ext hemorrhoids; sessile serated adenomax2; tubular adenoma    COLONOSCOPY N/A 10/22/2019    COLONOSCOPY POLYPECTOMY SNARE/COLD BIOPSY AND RANDOM BIOPSIES performed by Manjit Palacios MD at [place] [id number]  03/04/2021    CYSTOSCOPY HYDRODISTENTION WITH DMSO AND UREAPLASMA , MYCOPLASMA CULTURES    DILATION AND CURETTAGE OF UTERUS      HAND SURGERY Right     thumb surgery, BONE REMOVED    HYSTERECTOMY      LAPAROSCOPY      MT DEST,PARAVERTEBRAL,L/S,ADDL LVLS  3/25/2019         TONSILLECTOMY      TUBAL LIGATION      UPPER GASTROINTESTINAL ENDOSCOPY  10/31/2016    gastritis    UPPER GASTROINTESTINAL ENDOSCOPY N/A 10/22/2019    EGD BIOPSY performed by Manjit Palacios MD at [facility]       Allergies   Allergen Reactions    Adhesive Tape Other (See Comments)     blisters    Imitrex [Sumatriptan] Other (See Comments)     \"feels like head is going to explode    Morphine Itching     hives         Current Outpatient Medications:     phenazopyridine (PYRIDIUM) 200 MG tablet, Take 1 tablet by mouth 3 times daily for 3 days, Disp: 9 tablet, Rfl: 0    estradiol (ESTRACE) 0.1 MG/GM vaginal cream, , Disp: , Rfl:     busPIRone (BUSPAR) 10 MG tablet, TAKE 1 TABLET THREE TIMES A DAY, Disp: 90 tablet, Rfl: 11    pregabalin (LYRICA) 75 MG capsule, TAKE 1 CAPSULE FOUR TIMES A DAY, Disp: 360 capsule, Rfl: 0    HYDROcodone-acetaminophen (NORCO) 5-325 MG per tablet, Take 1 tablet by mouth 3 times daily as needed for Pain for up to 30 days. Indications: Pain, Disp: 90 tablet, Rfl: 0    Dulaglutide 1.5 MG/0.5ML SOPN, Inject 1.5 mg into the skin once a week, Disp: 12 pen, Rfl: 2    fluticasone (FLONASE) 50 MCG/ACT nasal spray, 1 spray by Each Nostril route 2 times daily, Disp: 2 Bottle, Rfl: 2    omeprazole (PRILOSEC) 40 MG delayed release capsule, TAKE 1 CAPSULE DAILY, Disp: 90 capsule, Rfl: 2    metFORMIN (GLUCOPHAGE-XR) 750 MG extended release tablet, TAKE 1 TABLET DAILY WITH BREAKFAST, Disp: 90 tablet, Rfl: 2    lisinopril (PRINIVIL;ZESTRIL) 5 MG tablet, TAKE 1 TABLET DAILY, Disp: 90 tablet, Rfl: 2    sertraline (ZOLOFT) 100 MG tablet, TAKE 1 TABLET DAILY, Disp: 90 tablet, Rfl: 2    atorvastatin (LIPITOR) 20 MG tablet, TAKE 1 TABLET DAILY, Disp: 90 tablet, Rfl: 2    topiramate (TOPAMAX) 100 MG tablet, TAKE 1 TABLET IN THE MORNING AND 2 TABLETS IN THE EVENING, Disp: 270 tablet, Rfl: 4    Probiotic Product (PROBIOTIC DAILY PO), Take 1 tablet by mouth daily. , Disp: , Rfl:     Multiple Vitamins-Calcium (ONE-A-DAY WOMENS FORMULA PO), Take  by mouth., Disp: , Rfl:     tiZANidine (ZANAFLEX) 4 MG tablet, TAKE 1 TABLET EVERY 8 HOURS AS NEEDED FOR SPASMS, Disp: 270 tablet, Rfl: 0    neomycin-bacitracin-polymyxin (NEOSPORIN) 400-5-5000 ointment, Apply topically 2 times daily. , Disp: 1 Tube, Rfl: 1    Blood Pressure Monitor KIT, Use as directed., Disp: 1 kit, Rfl: 1    OneTouch Delica Lancets 49J MISC, USE 1 EACH TWICE A DAY, Disp: 200 each, Rfl: 5    blood glucose test strips (ASCENSIA AUTODISC VI;ONE TOUCH ULTRA TEST VI) strip, 1 each by In Vitro route daily verio test strips, PRN, Disp: 200 each, Rfl: 1    albuterol sulfate (PROAIR RESPICLICK) 724 (90 Base) MCG/ACT aerosol powder inhalation, Inhale 2 puffs into the lungs every 4 hours as needed for Wheezing or Shortness of Breath, Disp: 1 Inhaler, Rfl: 2    docusate sodium (COLACE) 100 MG capsule, Take 1 capsule by mouth daily as needed for Constipation, Disp: 30 capsule, Rfl: 2    Elastic Bandages & Supports (LUMBAR BACK BRACE/SUPPORT PAD) MISC, 1 each by Does not apply route daily as needed (pain), Disp: 1 each, Rfl: 0    Family History   Problem Relation Age of Onset    Cancer Mother     Heart Disease Father     Diabetes Father     High Blood Pressure Father     Other Other         Celiac Sprue.  Aunt       Social History     Socioeconomic History    Marital status:      Spouse name: Not on file    Number of children: Not on file    Years of education: Not on file    Highest education level: Not on file   Occupational History    Occupation: unemployed   Social Needs    Financial resource strain: Not on file    Food insecurity     Worry: Not on file     Inability: Not on file   [employer] needs     Medical: Not on file     Non-medical: Not on file   Tobacco Use    Smoking status: Never Smoker    Smokeless tobacco: Never Used   Substance and Sexual Activity    Alcohol use: No    Drug use: No    Sexual activity: Yes   Lifestyle    Physical activity     Days per week: Not on file     Minutes per session: Not on file    Stress: Not on file   Relationships    Social connections     Talks on phone: Not on file     Gets together: Not on file     Attends [employer] service: Not on file     Active member of club or organization: Not on file     Attends meetings of clubs or organizations: Not on file     Relationship status: Not on file    Intimate partner violence     Fear of current or ex partner: Not on file     Emotionally abused: Not on file     Physically abused: Not on file     Forced sexual activity: Not on file   Other Topics Concern    Not on file   Social History Narrative    Not on file         Review of Systems:  Review of Systems   Constitution: Negative. HENT: Negative. Eyes: Positive for visual disturbance. Cardiovascular: Negative. Respiratory: Positive for shortness of breath. Endocrine:        Diabetic, blood sugar 89 this am   Hematologic/Lymphatic: Bruises/bleeds easily. Skin: Negative. Musculoskeletal: Positive for back pain and joint pain. Left wrist   Gastrointestinal: Positive for constipation. Genitourinary: Positive for frequency and nocturia. Neurological: Positive for headaches and weakness. Psychiatric/Behavioral: The patient is nervous/anxious. Managing         Physical Exam:  There were no vitals taken for this visit. Physical Exam  HENT:      Head: Normocephalic. Pulmonary:      Effort: Pulmonary effort is normal.   Skin:         Neurological:      Mental Status: She is alert and oriented to person, place, and time. Psychiatric:         Mood and Affect: Mood normal.         Thought Content: Thought content normal.           Assessment:        Problem List Items Addressed This Visit     Sacroiliitis ([facility] [address] - Primary    Primary osteoarthritis of left hip    Osteoarthritis of lumbar spine    Lumbosacral spondylosis without myelopathy    Lumbar radiculopathy, chronic (Chronic)    Encounter for medication management    Degenerative disc disease, cervical    Chronic, continuous use of opioids    Chronic bilateral low back pain without sciatica          Treatment Plan:  DISCUSSION: Treatment options discussed withpatient and all questions answered to patient's satisfaction.      Possible side effects, risk of tolerance and or dependence and alternative treatments discussed    Obtaining appropriate analgesic effect of treatment   No signs of potential drug abuse or diversion identified    [x] Ill effects of being on chronic pain medications such as sleep disturbances, respiratory depression, hormonal changes, withdrawal symptoms, chronic opioid dependence and tolerance as well as risk of taking opioids with Benzodiazepines and taking opioids along with alcohol,  werediscussed with patient. I had asked the patient to minimize medication use and utilize pain medications only for uncontrolled rest pain or pain with exertional activities. I advised patient not to self-escalate painmedications without consulting with us. At each of patient's future visits we will try to taper pain medications, while adjusting the adjunct medications, and re-evaluating for Physical Therapy to improve spinal andjoint strength. We will continue to have discussions to decrease pain medications as tolerated. Counseled patient on effects their pain medication and /or their medical condition mayhave on their  ability to drive or operate machinery. Instructed not to drive or operate machinery if drowsy     I also discussed with the patient regarding the dangers of combining narcotic pain medication with tranquilizers, alcohol or illegal drugs or taking the medication any way other than prescribed. The dangers were discussed  including respiratory depression and death. Patient was told to tell  all  physicians regarding the medications he is getting from pain clinic. Patient is warned not to take any unprescribed medications over-the-countermedications that can depress breathing . Patient is advised to talk to the pharmacist or physicians if planning to take any over-the-counter medications before  takeing them. Patient is strongly advised to avoid tranquilizers or  relaxants, illegal drugs  or any medications that can depress breathing  Patient is also advised to tell us if there is any changes in their medications from other physicians.             TREATMENT OPTIONS:       Medication Agreement Requirements Met  Continue Opioid therapy  Script written for  Hydrocodone  Follow up appointment made

## 2021-03-04 NOTE — H&P
OB/GYN Pre-Op H&P  [facility]    Patient Name: Buddy York     Patient : 1970  Room/Bed: Room/bed info not found  Admission Date/Time: No admission date for patient encounter. Primary Care Physician: Reinaldo Rucker CNP  MRN: [id number]    Date: 3/4/2021  Time: 6:38 AM    The patient was seen in pre-op holding. She is here for elective surgery for history of Interstitial Cystitis. The procedure risks and complications were reviewed. The labs, Consent, and H&P were reviewed and updated. The patient was counseled on the possibility of  the need of a second surgery. The patient voiced understanding and had all of her questions answered. The possibility of incomplete removal of abnormal tissue was discussed. OBSTETRICAL HISTORY:   OB History   No obstetric history on file. PAST MEDICAL HISTORY:   has a past medical history of Anxiety, Asthma, Colon polyp, Depression, Diabetes mellitus ([address] Diverticulitis, Gastritis, GERD (gastroesophageal reflux disease), Hemorrhoids, Hypertension, Migraines, Osteoarthritis, Shingles, Tubular adenoma, and Urinary leakage. PAST SURGICAL HISTORY:   has a past surgical history that includes Hysterectomy; laparoscopy; Tonsillectomy; Hand surgery (Right); Tubal ligation; Cervical discectomy (2013); Cholecystectomy; Dilation and curettage of uterus; Colonoscopy (10/31/2016); Upper gastrointestinal endoscopy (10/31/2016); pr dest,paravertebral,l/s,addl lvls (3/25/2019); Colonoscopy (N/A, 10/22/2019); and Upper gastrointestinal endoscopy (N/A, 10/22/2019). ALLERGIES:  Allergies as of 2021 - Review Complete 2021   Allergen Reaction Noted    Adhesive tape Other (See Comments) 2014    Imitrex [sumatriptan] Other (See Comments) 2014    Morphine Itching 2014       MEDICATIONS:  No current facility-administered medications for this encounter.       Current Outpatient Medications   Medication Sig Dispense Refill    albuterol sulfate (PROAIR RESPICLICK) 314 (90 Base) MCG/ACT aerosol powder inhalation Inhale 2 puffs into the lungs every 4 hours as needed for Wheezing or Shortness of Breath 1 Inhaler 2    busPIRone (BUSPAR) 10 MG tablet TAKE 1 TABLET THREE TIMES A DAY 90 tablet 11    tiZANidine (ZANAFLEX) 4 MG tablet TAKE 1 TABLET EVERY 8 HOURS AS NEEDED FOR SPASMS 270 tablet 0    pregabalin (LYRICA) 75 MG capsule TAKE 1 CAPSULE FOUR TIMES A  capsule 0    HYDROcodone-acetaminophen (NORCO) 5-325 MG per tablet Take 1 tablet by mouth 3 times daily as needed for Pain for up to 30 days. Indications: Pain 90 tablet 0    Dulaglutide 1.5 MG/0.5ML SOPN Inject 1.5 mg into the skin once a week 12 pen 2    Zinc Sulfate (ZINC-220 PO) Take by mouth      fluticasone (FLONASE) 50 MCG/ACT nasal spray 1 spray by Each Nostril route 2 times daily 2 Bottle 2    neomycin-bacitracin-polymyxin (NEOSPORIN) 400-5-5000 ointment Apply topically 2 times daily. 1 Tube 1    omeprazole (PRILOSEC) 40 MG delayed release capsule TAKE 1 CAPSULE DAILY 90 capsule 2    metFORMIN (GLUCOPHAGE-XR) 750 MG extended release tablet TAKE 1 TABLET DAILY WITH BREAKFAST 90 tablet 2    Blood Pressure Monitor KIT Use as directed.  1 kit 1    lisinopril (PRINIVIL;ZESTRIL) 5 MG tablet TAKE 1 TABLET DAILY 90 tablet 2    sertraline (ZOLOFT) 100 MG tablet TAKE 1 TABLET DAILY 90 tablet 2    atorvastatin (LIPITOR) 20 MG tablet TAKE 1 TABLET DAILY 90 tablet 2    OneTouch Delica Lancets 23R MISC USE 1 EACH TWICE A  each 5    blood glucose test strips (ASCENSIA AUTODISC VI;ONE TOUCH ULTRA TEST VI) strip 1 each by In Vitro route daily verio test strips,  each 1    topiramate (TOPAMAX) 100 MG tablet TAKE 1 TABLET IN THE MORNING AND 2 TABLETS IN THE EVENING 270 tablet 4    b complex vitamins capsule Take 1 capsule by mouth daily      docusate sodium (COLACE) 100 MG capsule Take 1 capsule by mouth daily as needed for Constipation 30 capsule 2  Elastic Bandages & Supports (LUMBAR BACK BRACE/SUPPORT PAD) MISC 1 each by Does not apply route daily as needed (pain) 1 each 0    Melatonin ER 5 MG TBCR Take by mouth as needed      vitamin D (CHOLECALCIFEROL) 1000 UNIT TABS tablet Take 1,000 Units by mouth daily      Ascorbic Acid (VITAMIN C) 500 MG CAPS Take by mouth      ECHINACEA PO Take by mouth      Probiotic Product (PROBIOTIC DAILY PO) Take 1 tablet by mouth daily.  Multiple Vitamins-Calcium (ONE-A-DAY WOMENS FORMULA PO) Take  by mouth. FAMILY HISTORY:  family history includes Cancer in her mother; Diabetes in her father; Heart Disease in her father; High Blood Pressure in her father; Other in an other family member. SOCIAL HISTORY:   reports that she has never smoked. She has never used smokeless tobacco. She reports that she does not drink alcohol or use drugs.     VITALS:  Vitals:    03/04/21 0745   BP: 119/79   Pulse: 82   Resp: 22   Temp: 96 °F (35.6 °C)   SpO2: 97%                                                                                                                             PHYSICAL EXAM:     CONSTITUTIONAL:  Alert and oriented, no acute distress  HEAD: normocephalic, atraumatic  EYES: Pupils equal and reactive to light, Extraocular muscles intact, sclera non icteric  ENT: Mucus membranes moist, No otorrhea, no rhinorrhea  NECK:  supple, symmetrical, trachea midline   LUNGS:  Good air movement bilaterally, unlabored respirations, no wheezes or rhonchi  CARDIOVASCULAR: Regular rate and rhythm, no murmurs rubs or gallops  ABDOMEN: soft, non tender, non distended, no rebound or guarding, no hernias, no hepatomegaly, no splenomegly  MUSCULOSKELETAL:  Equal strength bilaterally, normal muscle tone  SKIN: No abscess or rash  NEUROLOGIC:  Cranial nerves 2-12 grossly intact, no focal deficits  PSYCH: affect appropriate  Pelvic Exam: deferred to OR      LAB RESULTS:  Hospital Outpatient Visit on 03/03/2021   Component Date - 5.2 m/uL Final    Hemoglobin 02/23/2021 14.0  12.0 - 16.0 g/dL Final    Hematocrit 02/23/2021 42.7  36 - 46 % Final    MCV 02/23/2021 89.0  80 - 100 fL Final    MCH 02/23/2021 29.2  26 - 34 pg Final    MCHC 02/23/2021 32.8  31 - 37 g/dL Final    RDW 02/23/2021 12.9  11.5 - 14.9 % Final    Platelets 88/75/3446 287  150 - 450 k/uL Final    MPV 02/23/2021 7.9  6.0 - 12.0 fL Final    NRBC Automated 02/23/2021 NOT REPORTED  per 100 WBC Final   Hospital Outpatient Visit on 02/09/2021   Component Date Value Ref Range Status    BUN 02/09/2021 8  6 - 20 mg/dL Final    CREATININE 02/09/2021 0.90  0.50 - 0.90 mg/dL Final    GFR Non- 02/09/2021 >60  >60 mL/min Final    GFR  02/09/2021 >60  >60 mL/min Final    GFR Comment 02/09/2021        Final    Comment: Average GFR for 38-51 years old:   80 mL/min/1.73sq m  Chronic Kidney Disease:   <60 mL/min/1.73sq m  Kidney failure:   <15 mL/min/1.73sq m              eGFR calculated using average adult body mass. Additional eGFR calculator available at:        [email or web address]            GFR Staging 02/09/2021 NOT REPORTED   Final       DIAGNOSTICS:  Ct Abdomen Pelvis W Iv Contrast Additional Contrast? Radiologist Recommendation    Result Date: 2/9/2021  EXAMINATION: CT OF THE ABDOMEN AND PELVIS WITH CONTRAST 2/9/2021 2:36 pm TECHNIQUE: CT of the abdomen and pelvis was performed with the administration of intravenous contrast. Multiplanar reformatted images are provided for review. Dose modulation, iterative reconstruction, and/or weight based adjustment of the mA/kV was utilized to reduce the radiation dose to as low as reasonably achievable.  COMPARISON: 06/07/2019 HISTORY: ORDERING SYSTEM PROVIDED HISTORY: RLQ abdominal pain TECHNOLOGIST PROVIDED HISTORY: Reason for Exam: RLQ abdominal pain; patient states she had discomfort when her doctor pressed on her lower right side of the abdomen Acuity: Acute Type of Exam: Initial FINDINGS: Lower Chest: Lung bases are clear. Organs: Liver is normal in size anddensity. No focal masses identified. No evidence of intrahepatic ductal dilatation. Spleen is normal size. Stable partially calcified splenic artery aneurysms. The gallbladder is surgically absent. .  Both adrenal glands are normal.  Pancreas is normal in appearance. . The kidneys are  normal in size and attenuation without evidence of hydronephrosis or renal calculi. GI/Bowel: The visualized bowel and mesentery show no mass lesions. Appendix is normal. Pelvis: No intrapelvic mass is identified. Uterus is surgically absent. Bladder and rectum are intact. Peritoneum/Retroperitoneum: No free fluid. No lymphadenopathy. No evidence of pneumoperitoneum. Bones/Soft Tissues: Very small fat containing umbilical hernia. .  No acute bony abnormalities. No acute intra-abdominal or intrapelvic abnormalities are noted. DIAGNOSIS & PLAN:  1. Interstitial Cystitis    - Proceed with planned procedure: Cystoscopy w/ Hydrodistension and DMSO   - Consent signed, on chart. - The patient is ready for transport to the operative suite. Counseling: The patient was counseled on all options both medical and surgical, conservative as well as definitive. She has elected to proceed with the procedure as stated above. The patient was counseled on the procedure. Risks and complications were reviewed in detail. The patients orders, labs, consents have been completed. The history and physical as well as all supporting surgical documentation will be forwarded to the pre-operative holding area. The patient is aware that this procedure may not alleviate her symptoms. That there may be a necessity for a second surgery and that there may be an incomplete removal of abnormal tissue.     Asher Albarado DO  Ob/Gyn Resident  Pager: [phone]   [facility], [place]  3/4/2021, 6:38 AM

## 2021-03-04 NOTE — PROGRESS NOTES
CLINICAL PHARMACY NOTE: MEDS TO [address] Select Patient?: Yes  Total # of Prescriptions Filled: 1   The following medications were delivered to the patient:  · Pyridium 200mg  Total # of Interventions Completed: 0  Time Spent (min): 0    Additional Documentation:

## 2021-03-04 NOTE — DISCHARGE SUMMARY
Gyn Discharge Summary  [facility]      Patient Name: Jessenia Mancini  Patient : 1970  Primary Care Physician: BAR Ca - CNP  Admit Date: 3/4/2021    Principal Diagnosis: Interstitial Cystitis     Other Diagnosis:   INTERSTITIAL CYSTITIS  Patient Active Problem List   Diagnosis    Degenerative disc disease, cervical    Cervical disc herniation    Lumbar radiculopathy, chronic    Degeneration of lumbar intervertebral disc    Essential hypertension    Left-sided low back pain with left-sided sciatica    Osteoarthritis of lumbar spine    [facility]    Rheumatoid arthritis ([facility]    Primary osteoarthritis of left hip    Arthropathy of lumbar facet joint    Hip pain, chronic, right    Hemorrhoids    Colon polyp    Tubular adenoma    Chronic bilateral low back pain without sciatica    Spinal osteoarthritis    Encounter for medication management    Morbid obesity with BMI of 40.0-44.9, adult ([facility]    Adjustment disorder with mixed anxiety and depressed mood    Type 2 diabetes mellitus without complication, without long-term current use of insulin (HCC)    Mixed incontinence urge and stress    Chronic, continuous use of opioids    Chronic use of opiate drugs therapeutic purposes    Lumbosacral spondylosis without myelopathy    Chronic GERD    Bacterial sinusitis    Mixed hyperlipidemia    Acne cystica    Chronic rhinitis    Surgical menopause    Chronic allergic rhinitis       Infection: No  Hospital Acquired: No    Surgical Operations & Procedures: Cystoscopy w/ Hydrodistension and DMSO    Consultations: Anesthesia    Pertinent Findings & Procedures:   Jessenia Mancini is a 53 y.o. female admitted for elective surgery of interstitial cystitis; received MAC anesthesia pre-operativer. She underwent Cystoscopy w/ Hydrodistension and DMSO on 3/4/21. Post-op course normal, discharged home on POD#0. Follow up in 1 weeks.  Discharge instructions reviewed and questions answered. Course of patient: normal    Discharge to: Home    Readmission planned: No    Recommendations on Discharge:     Medications:     Medication List      STOP taking these medications    b complex vitamins capsule     ECHINACEA PO     Melatonin ER 5 MG Tbcr     Vitamin C 500 MG Caps     vitamin D 1000 UNIT Tabs tablet  Commonly known as: CHOLECALCIFEROL     ZINC-220 PO        ASK your doctor about these medications    albuterol sulfate 108 (90 Base) MCG/ACT aerosol powder inhalation  Commonly known as: ProAir RespiClick  Inhale 2 puffs into the lungs every 4 hours as needed for Wheezing or Shortness of Breath     atorvastatin 20 MG tablet  Commonly known as: LIPITOR  TAKE 1 TABLET DAILY     blood glucose test strips strip  Commonly known as: ASCENSIA AUTODISC VI;ONE TOUCH ULTRA TEST VI  1 each by In Vitro route daily verio test strips, PRN     Blood Pressure Monitor Kit  Use as directed. busPIRone 10 MG tablet  Commonly known as: BUSPAR  TAKE 1 TABLET THREE TIMES A DAY     docusate sodium 100 MG capsule  Commonly known as: Colace  Take 1 capsule by mouth daily as needed for Constipation     Dulaglutide 1.5 MG/0.5ML Sopn  Inject 1.5 mg into the skin once a week     fluticasone 50 MCG/ACT nasal spray  Commonly known as: FLONASE  1 spray by Each Nostril route 2 times daily     HYDROcodone-acetaminophen 5-325 MG per tablet  Commonly known as: NORCO  Take 1 tablet by mouth 3 times daily as needed for Pain for up to 30 days. Indications: Pain     lisinopril 5 MG tablet  Commonly known as: PRINIVIL;ZESTRIL  TAKE 1 TABLET DAILY     Lumbar Back Brace/Support Pad Misc  1 each by Does not apply route daily as needed (pain)     metFORMIN 750 MG extended release tablet  Commonly known as: GLUCOPHAGE-XR  TAKE 1 TABLET DAILY WITH BREAKFAST     neomycin-bacitracin-polymyxin 400-5-5000 ointment  Commonly known as: NEOSPORIN  Apply topically 2 times daily.      omeprazole 40 MG delayed release

## 2021-03-05 NOTE — OP NOTE
[facility]                                OPERATIVE REPORT    PATIENT NAME: Yazmin Jimenes                   :        1970  MED REC NO:   [id number]                             ROOM:  ACCOUNT NO:   [de-identified]                           ADMIT DATE: 2021  PROVIDER:     Prince Streeter    DATE OF PROCEDURE:  2021    INDICATION FOR SURGERY:  Urethral pain; painful bladder syndrome,  unamenable to conservative therapy, with urgency and frequency. PREOPERATIVE DIAGNOSES:  Urethral pain; painful bladder syndrome,  unamenable to conservative therapy, with urgency and frequency. POSTOPERATIVE DIAGNOSES:  Inflammatory urethritis with mild interstitial  cystitis, physiologic mid-urethral stricture, internal cystocele. PROCEDURES:  Ureaplasma and Mycoplasma urethral cultures,  cystourethroscopy with hydrodistention and DMSO instillation. SURGEON:  Prince Streeter DO    ASSISTANT:  Yady Bernardo DO    ANESTHESIA:  MAC. FINDINGS:  As above. SPECIMENS:  Ureaplasma and Mycoplasma culture. COMPLICATIONS:  None. BLOOD LOSS:  Negligible. COURSE:  Prior to the procedure, risks and benefits of the procedure  were explained to the patient. The patient understood and signed  informed consent under no duress or confusion. She was taken back to  the OR and prepped and draped in sterile fashion in the lithotomy  position under general anesthesia, confirmed to be neutrally positioned  by myself. Ureaplasma and Mycoplasma urethral cultures were taken and  sent for specimen. A 17-Cymraes 30-degree cystourethroscope was primed,  inserted up through the inflamed urethra. There was no evidence of  tumor, stones, obvious infectious urethritis, diverticula, or fistula. The bladder did have an internal cystocele. Ureters were patent and  effluxed urine well.   There was mild fuzzy vasculature of the bladder  base. The bladder was filled to 550 mL and held over 5 minutes and  emptied. Repeat visualization revealed some glomerulations and some  pinkish-tinged irrigant, indicating microvascular hemorrhage. A mixture  of 50 mg of DMSO, 40 mg of Kenalog, 10 mL of 1% lidocaine plain, 25 mg  of bicarb, and 5000 units of heparin were mixed and instilled into the  bladder to be held for 30 minutes. The patient went to Recovery and  tolerated the procedure well. She will go home on Pyridium 200 mg  t.i.d. for three days. If her Ureaplasma culture comes back, we will  treat her for urethritis with Zithromax or doxycycline. Hernan Crespo    D: 03/04/2021 9:46:13       T: 03/04/2021 12:36:32     [email or web address]  Job#: [id number]     Doc#: [id number]    CC:   Ronald Sevilla

## 2021-03-09 LAB — UREAP-MYCOPLASMA CUL: NORMAL

## 2021-03-10 ENCOUNTER — NURSE TRIAGE (OUTPATIENT)
Dept: OTHER | Facility: CLINIC | Age: 51
End: 2021-03-10

## 2021-03-10 NOTE — TELEPHONE ENCOUNTER
Patient called [facility])  with red flag complaint. Brief description of triage: Pt calls in reporting sinus congestion and earache on left. Pt believes she may have a sinus infection. See below assessment. Triage indicates for patient to be seen today. Care advice provided, patient verbalizes understanding; denies any other questions or concerns; instructed to call back for any new or worsening symptoms. Writer provided warm transfer to [facility] for appointment scheduling. Attention Provider: Thank you for allowing me to participate in the care of your patient. The patient was connected to triage in response to information provided to the [facility]. Please do not respond through this encounter as the response is not directed to a shared pool. Reason for Disposition   Earache    Answer Assessment - Initial Assessment Questions  1. LOCATION: \"Where does it hurt? \"       Left side of face under eye     2. ONSET: \"When did the sinus pain start? \"  (e.g., hours, days)       3 days    3. SEVERITY: \"How bad is the pain? \"   (Scale 1-10; mild, moderate or severe)    - MILD (1-3): doesn't interfere with normal activities     - MODERATE (4-7): interferes with normal activities (e.g., work or school) or awakens from sleep    - SEVERE (8-10): excruciating pain and patient unable to do any normal activities         Mild pressure and pain    4. RECURRENT SYMPTOM: \"Have you ever had sinus problems before? \" If so, ask: \"When was the last time? \" and \"What happened that time? \"       Yes, last was about 4 months ago and antibiotic cleared    5. NASAL CONGESTION: \"Is the nose blocked? \" If so, ask, \"Can you open it or must you breathe through the mouth? \"      Pt states nose feels blocked    6. NASAL DISCHARGE: \"Do you have discharge from your nose? \" If so ask, \"What color? \"      Yes, drainage is yellow    7. FEVER: \"Do you have a fever? \" If so, ask: \"What is it, how was it measured, and when did it start? \"       Denies    8. OTHER SYMPTOMS: \"Do you have any other symptoms? \" (e.g., sore throat, cough, earache, difficulty breathing)      Earache on left with \"popping\" and eye watering    9. PREGNANCY: \"Is there any chance you are pregnant? \" \"When was your last menstrual period? \"      Denies (hysterectomy)    Pt states she has to fly to [place] one week from Thursday.     Protocols used: SINUS PAIN OR CONGESTION-ADULT-[place]

## 2021-03-11 ENCOUNTER — VIRTUAL VISIT (OUTPATIENT)
Dept: FAMILY MEDICINE CLINIC | Age: 51
End: 2021-03-11
Payer: COMMERCIAL

## 2021-03-11 DIAGNOSIS — J01.90 ACUTE BACTERIAL SINUSITIS: ICD-10-CM

## 2021-03-11 DIAGNOSIS — Z20.822 SUSPECTED 2019 NOVEL CORONAVIRUS INFECTION: Primary | ICD-10-CM

## 2021-03-11 DIAGNOSIS — J31.0 CHRONIC RHINITIS: ICD-10-CM

## 2021-03-11 DIAGNOSIS — B96.89 ACUTE BACTERIAL SINUSITIS: ICD-10-CM

## 2021-03-11 PROCEDURE — 99213 OFFICE O/P EST LOW 20 MIN: CPT | Performed by: FAMILY MEDICINE

## 2021-03-11 RX ORDER — FLUTICASONE PROPIONATE 50 MCG
1 SPRAY, SUSPENSION (ML) NASAL 2 TIMES DAILY
Qty: 2 BOTTLE | Refills: 2 | Status: SHIPPED | OUTPATIENT
Start: 2021-03-11 | End: 2021-03-26

## 2021-03-11 RX ORDER — AZITHROMYCIN 250 MG/1
250 TABLET, FILM COATED ORAL DAILY
Qty: 6 TABLET | Refills: 0 | Status: SHIPPED | OUTPATIENT
Start: 2021-03-11 | End: 2021-03-16

## 2021-03-11 RX ORDER — PREDNISONE 20 MG/1
40 TABLET ORAL DAILY
Qty: 20 TABLET | Refills: 0 | Status: SHIPPED | OUTPATIENT
Start: 2021-03-11 | End: 2021-03-16

## 2021-03-11 ASSESSMENT — ENCOUNTER SYMPTOMS
SINUS PAIN: 1
COUGH: 0
SINUS PRESSURE: 1
FACIAL SWELLING: 1
SHORTNESS OF BREATH: 0
RHINORRHEA: 1

## 2021-03-11 NOTE — PROGRESS NOTES
[facility] at [address] [place] [address]   O: [phone]  Lee Nelson is a 53 y.o. female evaluated via telephone on 3/11/2021. CONSENT:  She and/or health care decision maker is aware that that she may receive a bill for this telephone service, depending on her insurance coverage, and has provided verbal consent to proceed: Yes    DOCUMENTATION:  Patient scheduled this appointment today due to Sinus Problem and Other (anosmia)  Patient scheduled this appointment today due to some worsening nasal congestion, runny nose, facial pain, facial pressure, ear pressure, runny nose, and anosmia. The symptoms have been going on for over 3 days. Patient denies any sick contact she also denies any contact with any body specifically with COVID-19 virus. However, she does complain of some anosmia. Patient has a known history of nonseasonal allergic rhinitis. Which she takes some Claritin and some Flonase or. This therapy is usually works well for her. However, these are not working well for her at this time. Patient denies any fever or chills. She does not have a way to check this at this point. Patient is not very concerned about the COVID-19 virus since she was just recently screened about a month ago. But because of her anosmia and worsening of her symptoms I will go ahead and get that screen. Patient is also scheduled to fly out in 5 days. When to make sure that her Covid screening is negative before she disciplinary to wherever her vacation is. I communicated with the patient and/or health care decision maker about:      Review of Systems   Constitutional: Positive for fatigue. Negative for chills and fever. HENT: Positive for congestion, facial swelling, postnasal drip, rhinorrhea, sinus pressure and sinus pain. Respiratory: Negative for cough and shortness of breath. Cardiovascular: Negative for chest pain. by mouth daily for 5 days Take 2 tablets on 1st day. Then take 1 tablet daily X 4 days. Dispense: 6 tablet; Refill: 0  - predniSONE (DELTASONE) 20 MG tablet; Take 2 tablets by mouth daily for 5 days  Dispense: 20 tablet; Refill: 0    3. Chronic rhinitis  Failure to Improve  Continue with the same therapy   ADVISED TO:  Avoid known allergens/irritants. Stop smoking or avoid second hand smoke. Stay hydrated. Report for worsening symptoms    - fluticasone (FLONASE) 50 MCG/ACT nasal spray; 1 spray by Each Nostril route 2 times daily  Dispense: 2 Bottle; Refill: 2      I affirm this is a Patient Initiated Episode with a Patient who has not had a related appointment within my department in the past 7 days or scheduled within the next 24 hours.     Patient identification was verified at the start of the visit: Yes    Total Time: minutes: 11-20 minutes    Note: not billable if this call serves to triage the patient into an appointment for the relevant concern  Patient-Reported Vitals 3/11/2021   Patient-Reported Weight 216 lbs   Patient-Reported Height 1.524m     Patient Active Problem List   Diagnosis    Degenerative disc disease, cervical    Cervical disc herniation    Lumbar radiculopathy, chronic    Degeneration of lumbar intervertebral disc    Essential hypertension    Left-sided low back pain with left-sided sciatica    Osteoarthritis of lumbar spine    [address]roiliitis ([address]    Rheumatoid arthritis ([address]    Primary osteoarthritis of left hip    Arthropathy of lumbar facet joint    Hip pain, chronic, right    Hemorrhoids    Colon polyp    Tubular adenoma    Chronic bilateral low back pain without sciatica    Spinal osteoarthritis    Encounter for medication management    Morbid obesity with BMI of 40.0-44.9, adult ([address]    Adjustment disorder with mixed anxiety and depressed mood    Type 2 diabetes mellitus without complication, without long-term current use of insulin ([address]    Mixed incontinence urge and stress    Chronic, continuous use of opioids    Chronic use of opiate drugs therapeutic purposes    Lumbosacral spondylosis without myelopathy    Chronic GERD    Bacterial sinusitis    Mixed hyperlipidemia    Acne cystica    Chronic rhinitis    Surgical menopause    Chronic allergic rhinitis    S/P Cystoscopy w/ Hydrodistension and DMSO     Iqra Blandon is a 53 y.o. female patient  being evaluated by a Virtual Visit (video visit) encounter to address concerns as mentioned above. A caregiver was present when appropriate. Due to this being a TeleHealth encounter (During CPLJW-65 public health emergency), evaluation of the following organ systems was limited:Vitals/Constitutional/EENT/Resp/CV/GI//MS/Neuro/Skin/Heme-Lymph-Imm. Services were provided through a video synchronous discussion virtually to substitute for in-person clinic visit. This is a telehealth visit that was performed with the originating site at Patient Location: home and provider Location of [place], [place]. Pursuant to the emergency declaration under the [address] [place] [address] authority and the [employer] and [employer] Act, this Virtual Visit was conducted with patient's (and/or legal guardian's) consent, to reduce the patient's risk of exposure to COVID-19 and provide necessary medical care. The patient (and/or legal guardian) has also been advised to contact this office for worsening conditions or problems, and seek emergency medical treatment and/or call 911 if deemed necessary. This note was completed by using the assistance of a speech-recognition program. However, inadvertent computerized transcription errors may be present. Although every effort was made to ensure accuracy, no guarantees can be provided that every mistake has been identified and corrected by editing.   Electronically signed by BAR Hughes CNP on 3/11/21 at 2:46 PM EST

## 2021-03-11 NOTE — PATIENT INSTRUCTIONS
[facility] COVID-19 Vaccination Hotline: [phone]    COVID-19 vaccine appointments are not available through our practice. As you're eligible to receive the COVID-19 vaccine, as determined by your state's department of health, you will be able to schedule an appointment through [address] or by calling [phone]. Appointments are required to receive the COVID-19 vaccine. As vaccine supply continues to be limited, we anticipate open appointments to fill up quickly and appreciate your patience as we work through the process of providing vaccines to those in our communities. Schedule a Vaccine  When you qualify to receive the vaccine, call the [facility] COVID-19 Vaccination Hotline to schedule your appointment or to get additional information about the [facility] locations which are offering the COVID-19 vaccine. To be 94% effective, it's important that you receive two doses of one of the COVID-19 vaccines. -If you are receiving the [employer] vaccine, your second shot will be scheduled as close to 21 days after the first shot as possible. -If you are receiving the Moderna vaccine, your second shot will be scheduled as close to 28 days after the first shot as possible. Links to [facility]) website and [facility] website:    EnriqueYola/mercy-[facility]-monitoring-coronavirus-covid-19/covid-19-vaccine/[place]/knight-vaccine    [email or web address]    [facility]  https://sites. google. com/view/wchd[place]-coronavirus/home/vaccines/get-vaccinated  LocalElectrolysis.fi. com/r/[place]OVID_Vaccine_On-Call_List      https://Dropmysite/shared/[email or web address]?:toolbar=n&:display_count=n&:origin=viz_share_link&:embed=y    PHARMACY LOCATIONS  Https://vaccine. coronavirus. [place].gov/    [facility]  Https://Dropmysite/shared/[email or web address]?:toolbar=n&:display_count=n&:origin=viz_share_link&:embed=y    [facility] Https://Tribe/[employer]/[email or web address]?:toolbar=n&:display_count=n&:origin=SocialSign.in_share_link&:embed=y    [address]  Https://Tribe/[employer]/[email or web address]?:toolbar=n&:display_count=n&:origin=GiveNext_link&:embed=y    [address]  https://Tribe/[email or web address]?:toolbar=n&:display_count=n&:origin=SocialSign.in_share_link&:embed=y

## 2021-03-11 NOTE — PROGRESS NOTES
[facility] at [address] [place] [address]   O: [phone]  Billie Phelps is a 53 y.o. female evaluated via telephone on 3/11/2021. CONSENT:  She and/or health care decision maker is aware that that she may receive a bill for this telephone service, depending on her insurance coverage, and has provided verbal consent to proceed: Yes    DOCUMENTATION:  Patient scheduled this appointment today due to No chief complaint on file. Sinus     Congestion,   Ear pain,     I communicated with the patient and/or health care decision maker about: ***     Review of Systems  Details of this discussion including any medical advice provided: Under assessment. PHYSICAL EXAM[INSTRUCTIONS:  \"[x]\" Indicates a positive item  \"[]\" Indicates a negative item  -- DELETE ALL ITEMS NOT EXAMINED]  Constitutional: [x] No apparent distress      [] Abnormal -   Mental status: [x] Alert and awake  [x] Oriented to person/place/time[] Abnormal -   Pulmonary/Chest: [x] Respiratory effort normal         [] Abnormal -          Psychiatric:       [x] Normal Affect [] Abnormal -        [x] No Hallucinations  Other pertinent observable physical exam findings:-***    ASSESSMENT  There are no diagnoses linked to this encounter. I affirm this is a Patient Initiated Episode with a Patient who has not had a related appointment within my department in the past 7 days or scheduled within the next 24 hours.     Patient identification was verified at the start of the visit: Yes    Total Time: {minutes:60894::\"5-10 minutes\"}    Note: not billable if this call serves to triage the patient into an appointment for the relevant concern  Patient-Reported Vitals 9/4/2020   Patient-Reported Weight 225   Patient-Reported Height 5'0     Patient Active Problem List   Diagnosis    Degenerative disc disease, cervical    Cervical disc herniation    Lumbar radiculopathy, chronic    Degeneration of lumbar intervertebral disc    Essential hypertension    Left-sided low back pain with left-sided sciatica    Osteoarthritis of lumbar spine    Sacroiliitis (HCC)    Rheumatoid arthritis ([facility]    Primary osteoarthritis of left hip    Arthropathy of lumbar facet joint    Hip pain, chronic, right    Hemorrhoids    Colon polyp    Tubular adenoma    Chronic bilateral low back pain without sciatica    Spinal osteoarthritis    Encounter for medication management    Morbid obesity with BMI of 40.0-44.9, adult (HCC)    Adjustment disorder with mixed anxiety and depressed mood    Type 2 diabetes mellitus without complication, without long-term current use of insulin (HCC)    Mixed incontinence urge and stress    Chronic, continuous use of opioids    Chronic use of opiate drugs therapeutic purposes    Lumbosacral spondylosis without myelopathy    Chronic GERD    Bacterial sinusitis    Mixed hyperlipidemia    Acne cystica    Chronic rhinitis    Surgical menopause    Chronic allergic rhinitis    S/P Cystoscopy w/ Hydrodistension and DMSO     Iqra Lozoya is a 53 y.o. female patient  being evaluated by a Virtual Visit (video visit) encounter to address concerns as mentioned above. ***A caregiver was present when appropriate. Due to this being a TeleHealth encounter (During OAJJP-13 public health emergency), evaluation of the following organ systems was limited:Vitals/Constitutional/EENT/Resp/CV/GI//MS/Neuro/Skin/Heme-Lymph-Imm. Services were provided through a video synchronous discussion virtually to substitute for in-person clinic visit. This is a telehealth visit that was performed with the originating site at Patient Location: home and provider Location of [place], [place].    Pursuant to the emergency declaration under the [facility] [address] and the [employer] Act, this Virtual Visit was conducted with patient's (and/or legal guardian's) consent, to reduce the patient's risk of exposure to COVID-19 and provide necessary medical care. The patient (and/or legal guardian) has also been advised to contact this office for worsening conditions or problems, and seek emergency medical treatment and/or call 911 if deemed necessary. This note was completed by using the assistance of a speech-recognition program. However, inadvertent computerized transcription errors may be present. Although every effort was made to ensure accuracy, no guarantees can be provided that every mistake has been identified and corrected by editing.   Electronically signed by BAR Live CNP on 3/10/21 at 8:08 PM EST

## 2021-03-26 DIAGNOSIS — J31.0 CHRONIC RHINITIS: ICD-10-CM

## 2021-03-26 RX ORDER — FLUTICASONE PROPIONATE 50 MCG
SPRAY, SUSPENSION (ML) NASAL
Qty: 32 G | Refills: 5 | Status: SHIPPED | OUTPATIENT
Start: 2021-03-26 | End: 2022-04-07

## 2021-03-26 NOTE — TELEPHONE ENCOUNTER
Please Approve or Refuse.   Send to Pharmacy per Pt's Request:     Next Visit Date:  4/15/2021   Last Visit Date: 3/11/2021    Hemoglobin A1C (%)   Date Value   01/08/2021 5.3   09/10/2020 7.2 (H)   02/06/2020 6.6             ( goal A1C is < 7)   BP Readings from Last 3 Encounters:   03/04/21 (!) 92/56   03/04/21 117/87   01/11/21 118/86          (goal 120/80)  BUN   Date Value Ref Range Status   02/09/2021 8 6 - 20 mg/dL Final     CREATININE   Date Value Ref Range Status   02/09/2021 0.90 0.50 - 0.90 mg/dL Final     Potassium   Date Value Ref Range Status   09/10/2020 4.2 3.7 - 5.3 mmol/L Final

## 2021-04-07 ENCOUNTER — HOSPITAL ENCOUNTER (OUTPATIENT)
Dept: PAIN MANAGEMENT | Age: 51
Discharge: HOME | End: 2021-04-07
Payer: COMMERCIAL

## 2021-04-07 DIAGNOSIS — M47.816 ARTHROPATHY OF LUMBAR FACET JOINT: ICD-10-CM

## 2021-04-07 DIAGNOSIS — Z79.891 CHRONIC USE OF OPIATE DRUG FOR THERAPEUTIC PURPOSE: ICD-10-CM

## 2021-04-07 DIAGNOSIS — M46.1 SACROILIITIS (HCC): Primary | ICD-10-CM

## 2021-04-07 DIAGNOSIS — M47.896 OTHER OSTEOARTHRITIS OF SPINE, LUMBAR REGION: ICD-10-CM

## 2021-04-07 DIAGNOSIS — M54.16 LUMBAR RADICULOPATHY, CHRONIC: Chronic | ICD-10-CM

## 2021-04-07 DIAGNOSIS — M51.36 DEGENERATION OF LUMBAR INTERVERTEBRAL DISC: Chronic | ICD-10-CM

## 2021-04-07 DIAGNOSIS — Z79.899 ENCOUNTER FOR MEDICATION MANAGEMENT: ICD-10-CM

## 2021-04-07 DIAGNOSIS — M47.817 LUMBOSACRAL SPONDYLOSIS WITHOUT MYELOPATHY: ICD-10-CM

## 2021-04-07 DIAGNOSIS — G89.29 HIP PAIN, CHRONIC, RIGHT: ICD-10-CM

## 2021-04-07 DIAGNOSIS — M16.12 PRIMARY OSTEOARTHRITIS OF LEFT HIP: ICD-10-CM

## 2021-04-07 DIAGNOSIS — M25.551 HIP PAIN, CHRONIC, RIGHT: ICD-10-CM

## 2021-04-07 DIAGNOSIS — M50.30 DEGENERATIVE DISC DISEASE, CERVICAL: ICD-10-CM

## 2021-04-07 PROCEDURE — 99442 PR PHYS/QHP TELEPHONE EVALUATION 11-20 MIN: CPT | Performed by: NURSE PRACTITIONER

## 2021-04-07 PROCEDURE — 99213 OFFICE O/P EST LOW 20 MIN: CPT

## 2021-04-07 RX ORDER — HYDROCODONE BITARTRATE AND ACETAMINOPHEN 5; 325 MG/1; MG/1
1 TABLET ORAL 3 TIMES DAILY PRN
Qty: 90 TABLET | Refills: 0 | Status: SHIPPED | OUTPATIENT
Start: 2021-04-07 | End: 2021-05-04 | Stop reason: SDUPTHER

## 2021-04-07 ASSESSMENT — ENCOUNTER SYMPTOMS
RESPIRATORY NEGATIVE: 1
CONSTIPATION: 1
BACK PAIN: 1

## 2021-04-07 NOTE — PROGRESS NOTES
[address] PROGRESS NOTE      Patient  completed []  video visit   [x]   phone call:   Due to technical   [id number]      Minutes :       [x]    to  review Medication Agreement    []  Follow up after procedure   []  Discuss treatment options      Location:  Provider:  working from    [x]    home    []   [facility] - TIMMY NUNN ,   patient at  home       Chief Complaint:  Low back and right hip pain    She c/o low back pain which radiates  Down right hip and leg and knee and left leg to knee. which has not changed. She had right si joint injection  1-  with 75% relief. She wants to hold off on any injection right now, She states she is not sleeping well. She does home exercises. She is working on weight loss. She has had no ED visits. She will be getting her first covid vaccine. She is taking buspar for anxiety but feels she needs an increase in the dose. Back Pain  This is a chronic problem. The problem occurs constantly. The problem is unchanged. The pain is present in the lumbar spine (tailbone). The quality of the pain is described as aching. Radiates to: lefgs to knees. The pain is at a severity of 5/10. The pain is moderate. The pain is worse during the night (muscle spasms). Associated symptoms include numbness. (Left leg, numbness , itching,  Pins and needles) She has tried analgesics and heat (change position) for the symptoms.            Treatment goals:  Functional status: get through life    Aberrancy:   Any alcoholic beverages  no          Any illegal drugs   no      Analgesia:      5               Adverse  Effects :constipation, takes colace    ADL;s :home exercises      Data:    When was thelast UDS:  8-1-2020          Was the UDS appropriate:  [x] yes []   no      Record/Diagnostics Review:      As above, I did review the imaging   8/1/2020 12:06 AM - Raymon, Evangelist Incoming Lab Results From [facility]    Component Value Ref Range & Units Status Collected Lab   Pain Management Drug Panel Interp, Urine Consistent   Final 07/28/2020  3:50 PM [facility]   (NOTE)   ________________________________________________________________   DRUGS EXPECTED:   Alton Addison (HYDROCODONE)   ________________________________________________________________   [id number] with medications provided:   Alton Addison (HYDROCODONE): based on hydrocodone, norhydrocodone,   hydromorphone   ________________________________________________________________   Drugs Not Included in this Assay:   Acetaminophen   ________________________________________________________________   INTERPRETIVE INFORMATION: Pain Mgt Ruiz, Mass Spec/EMIT, Ur,                            Interp   Interpretation depends on accuracy and      EXAMINATION:   TWO XRAY VIEWS OF THE RIGHT HIP       11/6/2020 9:27 am       COMPARISON:   None.       HISTORY:   ORDERING SYSTEM PROVIDED HISTORY: Hip pain, chronic, right   TECHNOLOGIST PROVIDED HISTORY:   increasing pain right hip, difficulty ambulating   Reason for Exam: chronic rt hip pain getting worse   Acuity: Unknown   Type of Exam: Unknown       FINDINGS:   Two views of the right hip are submitted for review.  Femoroacetabular joint   space is maintained.  No acute osseous abnormality identified.  There is   osteoarthrosis of the right sacroiliac joint and pubic symphysis.         Impression   No radiographic abnormality of the right femoroacetabular joint.       Osteoarthrosis of the right sacroiliac joint and pubic symphysis.             EXAMINATION:   THREE XRAY VIEWS OF THE LUMBAR SPINE       6/29/2020 10:04 am       COMPARISON:   None.       HISTORY:   ORDERING SYSTEM PROVIDED HISTORY: Osteoarthritis of spine with radiculopathy,   lumbar region   TECHNOLOGIST PROVIDED HISTORY:   S/p fall increased pain, numbness left leg   Reason for Exam: PT CO pain in left lower back that radiates into her L hip   after falling 2 weeks. Pain and swelling.    Acuity: Acute   Type of Exam: Initial       FINDINGS:   Three views of the lumbar spine are submitted for review.  No acute fracture   or malalignment.  Right upper abdominal surgical clips are most compatible   with previous cholecystectomy.  Moderate stool burden noted throughout the   colon.  No discrete osseous abnormality.           Impression   No acute osseus abnormality of the lumbar spine.                       Pill count: appropriate    fill date :4-6-2021    Morphine equivalent dose as reported on [place]ARRS:15  Periodic Controlled Substance Monitoring: Possible medication side effects, risk of tolerance/dependence & alternative treatments discussed., No signs of potential drug abuse or diversion identified. , Assessed functional status., Obtaining appropriate analgesic effect of treatment. Clarence Cho, APRN - CNP)  Review ofOARRS does not show any aberrant prescription behavior. Medication is helping the patient stay active. Patient denies any side effects and reports adequate analgesia. No sign of misuse/abuse.             Past Medical History:   Diagnosis Date    Anxiety     Asthma     Colon polyp 10/31/2016    sessile serated adenoma x2    Depression     Diabetes mellitus ([facility] [address]     Diverticulitis 10/2016    Gastritis     GERD (gastroesophageal reflux disease)     Hemorrhoids     int/ext    Hypertension     Migraines     Osteoarthritis     Shingles 1-22-16    Tubular adenoma 10/31/2016    Urinary leakage        Past Surgical History:   Procedure Laterality Date    CERVICAL DISCECTOMY  12/2013    & fusion     CHOLECYSTECTOMY      COLONOSCOPY  10/31/2016    int/ext hemorrhoids; sessile serated adenomax2; tubular adenoma    COLONOSCOPY N/A 10/22/2019    COLONOSCOPY POLYPECTOMY SNARE/COLD BIOPSY AND RANDOM BIOPSIES performed by Nilsa Michael MD at [place] [address]  03/04/2021    CYSTOSCOPY HYDRODISTENTION WITH DMSO AND UREAPLASMA , MYCOPLASMA CULTURES    CYSTOSCOPY N/A 3/4/2021    CYSTOSCOPY HYDRODISTENTION WITH DMSO AND UREAPLASMA , MYCOPLASMA CULTURES performed by Tyshawn Francis DO at [address] [place] [address]      HAND SURGERY Right     thumb surgery, BONE REMOVED    HYSTERECTOMY      LAPAROSCOPY      IN DEST,PARAVERTEBRAL,L/S,ADDL LVLS  3/25/2019         TONSILLECTOMY      TUBAL LIGATION      UPPER GASTROINTESTINAL ENDOSCOPY  10/31/2016    gastritis    UPPER GASTROINTESTINAL ENDOSCOPY N/A 10/22/2019    EGD BIOPSY performed by Avi Duke MD at [facility]       Allergies   Allergen Reactions    Adhesive Tape Other (See Comments)     blisters    Imitrex [Sumatriptan] Other (See Comments)     \"feels like head is going to explode    Morphine Itching     hives         Current Outpatient Medications:     fluticasone (FLONASE) 50 MCG/ACT nasal spray, USE 1 SPRAY IN EACH NOSTRIL TWICE A DAY, Disp: 32 g, Rfl: 5    estradiol (ESTRACE) 0.1 MG/GM vaginal cream, , Disp: , Rfl:     busPIRone (BUSPAR) 10 MG tablet, TAKE 1 TABLET THREE TIMES A DAY, Disp: 90 tablet, Rfl: 11    pregabalin (LYRICA) 75 MG capsule, TAKE 1 CAPSULE FOUR TIMES A DAY, Disp: 360 capsule, Rfl: 0    omeprazole (PRILOSEC) 40 MG delayed release capsule, TAKE 1 CAPSULE DAILY, Disp: 90 capsule, Rfl: 2    metFORMIN (GLUCOPHAGE-XR) 750 MG extended release tablet, TAKE 1 TABLET DAILY WITH BREAKFAST, Disp: 90 tablet, Rfl: 2    lisinopril (PRINIVIL;ZESTRIL) 5 MG tablet, TAKE 1 TABLET DAILY, Disp: 90 tablet, Rfl: 2    sertraline (ZOLOFT) 100 MG tablet, TAKE 1 TABLET DAILY, Disp: 90 tablet, Rfl: 2    atorvastatin (LIPITOR) 20 MG tablet, TAKE 1 TABLET DAILY, Disp: 90 tablet, Rfl: 2    topiramate (TOPAMAX) 100 MG tablet, TAKE 1 TABLET IN THE MORNING AND 2 TABLETS IN THE EVENING, Disp: 270 tablet, Rfl: 4    Probiotic Product (PROBIOTIC DAILY PO), Take 1 tablet by mouth daily. , Disp: , Rfl:     Multiple Vitamins-Calcium (ONE-A-DAY WOMENS FORMULA PO), Take  by mouth., Disp: , Rfl:     tiZANidine (ZANAFLEX) 4 MG tablet, TAKE 1 TABLET EVERY 8 HOURS AS NEEDED FOR SPASMS, Disp: 270 tablet, Rfl: 0    neomycin-bacitracin-polymyxin (NEOSPORIN) 400-5-5000 ointment, Apply topically 2 times daily. , Disp: 1 Tube, Rfl: 1    Blood Pressure Monitor KIT, Use as directed., Disp: 1 kit, Rfl: 1    OneTouch Delica Lancets 66S MISC, USE 1 EACH TWICE A DAY, Disp: 200 each, Rfl: 5    blood glucose test strips (ASCENSIA AUTODISC VI;ONE TOUCH ULTRA TEST VI) strip, 1 each by In Vitro route daily verio test strips, PRN, Disp: 200 each, Rfl: 1    albuterol sulfate (PROAIR RESPICLICK) 739 (90 Base) MCG/ACT aerosol powder inhalation, Inhale 2 puffs into the lungs every 4 hours as needed for Wheezing or Shortness of Breath, Disp: 1 Inhaler, Rfl: 2    docusate sodium (COLACE) 100 MG capsule, Take 1 capsule by mouth daily as needed for Constipation, Disp: 30 capsule, Rfl: 2    Elastic Bandages & Supports (LUMBAR BACK BRACE/SUPPORT PAD) MISC, 1 each by Does not apply route daily as needed (pain), Disp: 1 each, Rfl: 0    Family History   Problem Relation Age of Onset    Cancer Mother     Heart Disease Father     Diabetes Father     High Blood Pressure Father     Other Other         Celiac Sprue.  Aunt       Social History     Socioeconomic History    Marital status:      Spouse name: Not on file    Number of children: Not on file    Years of education: Not on file    Highest education level: Not on file   Occupational History    Occupation: unemployed   Social Needs    Financial resource strain: Not on file    Food insecurity     Worry: Not on file     Inability: Not on file   [employer] needs     Medical: Not on file     Non-medical: Not on file   Tobacco Use    Smoking status: Never Smoker    Smokeless tobacco: Never Used   Substance and Sexual Activity    Alcohol use: No    Drug use: No    Sexual activity: Yes   Lifestyle    Physical activity     Days per week: Not on file     Minutes per session: Not on file    Stress: Not on file   Relationships    Social connections     Talks on phone: Not on file     Gets together: Not on file     Attends Hinduism service: Not on file     Active member of club or organization: Not on file     Attends meetings of clubs or organizations: Not on file     Relationship status: Not on file    Intimate partner violence     Fear of current or ex partner: Not on file     Emotionally abused: Not on file     Physically abused: Not on file     Forced sexual activity: Not on file   Other Topics Concern    Not on file   Social History Narrative    Not on file         Review of Systems:  Review of Systems   Constitution: Negative. HENT: Negative. Eyes: Positive for visual disturbance. Cardiovascular: Negative. Respiratory: Negative. Endocrine:        Diabetic:blood sugar 96   Hematologic/Lymphatic: Bruises/bleeds easily. Skin: Negative. Musculoskeletal: Positive for back pain and joint pain. Gastrointestinal: Positive for constipation. Genitourinary: Positive for frequency and urgency. Neurological: Positive for numbness. Psychiatric/Behavioral: The patient is nervous/anxious. In therapy         Physical Exam:  There were no vitals taken for this visit. Physical Exam  Skin:         Neurological:      Mental Status: She is alert and oriented to person, place, and time. Psychiatric:         Mood and Affect: Mood normal.         Thought Content:  Thought content normal.           Assessment:    Problem List Items Addressed This Visit     Sacroiliitis ([facility] [address] - Primary    Primary osteoarthritis of left hip    Osteoarthritis of lumbar spine    Lumbosacral spondylosis without myelopathy    Lumbar radiculopathy, chronic (Chronic)    Hip pain, chronic, right    Encounter for medication management    Degenerative disc disease, cervical    Degeneration of lumbar intervertebral disc (Chronic)    Chronic use of opiate drugs therapeutic purposes    Arthropathy of lumbar facet joint              Treatment takeing them. Patient is strongly advised to avoid tranquilizers or  relaxants, illegal drugs  or any medications that can depress breathing  Patient is also advised to tell us if there is any changes in their medications from other physicians.             TREATMENT OPTIONS:       Medication Agreement Requirements Met  Continue Opioid therapy  Script written for  hydrocodone  Follow up appointment made

## 2021-04-09 ENCOUNTER — IMMUNIZATION (OUTPATIENT)
Dept: INTERNAL MEDICINE CLINIC | Age: 51
End: 2021-04-09
Payer: COMMERCIAL

## 2021-04-09 PROCEDURE — 91300 COVID-19, PFIZER VACCINE 30MCG/0.3ML DOSE: CPT | Performed by: INTERNAL MEDICINE

## 2021-04-09 PROCEDURE — 0001A COVID-19, PFIZER VACCINE 30MCG/0.3ML DOSE: CPT | Performed by: INTERNAL MEDICINE

## 2021-04-17 ASSESSMENT — ENCOUNTER SYMPTOMS
NAUSEA: 0
ABDOMINAL PAIN: 0
WHEEZING: 0
SHORTNESS OF BREATH: 0
EYE ITCHING: 0
COUGH: 0

## 2021-04-18 NOTE — PROGRESS NOTES
[facility]  [facility]  [address]  [address] [address]  Dept: [phone]     2021   Chief Complaint   Patient presents with    Hypertension    Diabetes    Other     has a tender spot under her right jawline - went to dentist she states it is not her teeth possible lymph node     Other     pt states she just went through her med list with pain management states everything is still the same      HPI  Mary Ann Valdes (:  1970) is a 53 y.o. female was an established patient. Patient has a history of  Adjustment disorder, incontinence, lymph adenopathy, insomnia. LYMPH ADENOPATHY  Patient presented with pain on right submandibular area. Patient had thought of dental pain and was seen and evaluated by dentist. + small lymph adenopathy, non tender. Had some mild erythema on right pharynx area. No fever or chills  She also just finished her covid vaccine on 2021. DEPRESSION AND ANXIETY  Iqra Valdez reported some ongoing issues with depression and anxiety. Patient reported episode while shopping at [facility] where a rozina was trying to grab her purse she had clipped on her cart. Patient had since have some issues with anxiety and panic attacks. Symptoms includes feelings of losing control, insomnia, irritable, psychomotor agitation, racing thoughts. Current therapy includes Sertraline- Buspirone, which was recently started. We will increase dose. for her. she denies adverse reaction to current therapy. she also denies suicidal/homicidal ideation, plan or intent. PHQ-2 Over the past 2 weeks, how often have you been bothered by any of the following problems? Little interest or pleasure in doing things: Several days  Feeling down, depressed, or hopeless: Several days  PHQ-2 Score: 2  PHQ-9 Over the past 2 weeks, how often have you been bothered by any of the following problems?   Thoughts that you would be better off dead, or of hurting MYCOPLASMA CULTURES    CYSTOSCOPY N/A 3/4/2021    CYSTOSCOPY HYDRODISTENTION WITH DMSO AND UREAPLASMA , MYCOPLASMA CULTURES performed by Margo Blackmon DO at [address] [place] [address]      HAND SURGERY Right     thumb surgery, BONE REMOVED    HYSTERECTOMY      LAPAROSCOPY      VA DEST,PARAVERTEBRAL,L/S,ADDL LVLS  3/25/2019         TONSILLECTOMY      TUBAL LIGATION      UPPER GASTROINTESTINAL ENDOSCOPY  10/31/2016    gastritis    UPPER GASTROINTESTINAL ENDOSCOPY N/A 10/22/2019    EGD BIOPSY performed by Samuel Vega MD at [facility]     Family History   Problem Relation Age of Onset    Cancer Mother     Heart Disease Father     Diabetes Father     High Blood Pressure Father     Other Other         Celiac Sprue. Aunt     Current Outpatient Medications   Medication Sig Dispense Refill    busPIRone (BUSPAR) 10 MG tablet TAKE 2 TABLET THREE TIMES A  tablet 2    traZODone (DESYREL) 50 MG tablet Take 1 tablet by mouth nightly 30 tablet 1    amoxicillin-clavulanate (AUGMENTIN) 875-125 MG per tablet Take 1 tablet by mouth 2 times daily for 7 days 14 tablet 0    HYDROcodone-acetaminophen (NORCO) 5-325 MG per tablet Take 1 tablet by mouth 3 times daily as needed for Pain for up to 30 days. Indications: Pain 90 tablet 0    fluticasone (FLONASE) 50 MCG/ACT nasal spray USE 1 SPRAY IN EACH NOSTRIL TWICE A DAY 32 g 5    estradiol (ESTRACE) 0.1 MG/GM vaginal cream       tiZANidine (ZANAFLEX) 4 MG tablet TAKE 1 TABLET EVERY 8 HOURS AS NEEDED FOR SPASMS 270 tablet 0    pregabalin (LYRICA) 75 MG capsule TAKE 1 CAPSULE FOUR TIMES A  capsule 0    neomycin-bacitracin-polymyxin (NEOSPORIN) 400-5-5000 ointment Apply topically 2 times daily.  1 Tube 1    omeprazole (PRILOSEC) 40 MG delayed release capsule TAKE 1 CAPSULE DAILY 90 capsule 2    metFORMIN (GLUCOPHAGE-XR) 750 MG extended release tablet TAKE 1 TABLET DAILY WITH BREAKFAST 90 tablet 2    Blood Pressure Allergic/Immunologic: Positive for environmental allergies. Neurological: Negative for dizziness, syncope and headaches. Psychiatric/Behavioral: Negative for sleep disturbance. The patient is nervous/anxious. Prior to Visit Medications    Medication Sig Taking? Authorizing Provider   busPIRone (BUSPAR) 10 MG tablet TAKE 2 TABLET THREE TIMES A DAY Yes BAR Oliva CNP   traZODone (DESYREL) 50 MG tablet Take 1 tablet by mouth nightly Yes BAR Oliva CNP   amoxicillin-clavulanate (AUGMENTIN) 875-125 MG per tablet Take 1 tablet by mouth 2 times daily for 7 days Yes BAR Oliva CNP   HYDROcodone-acetaminophen (NORCO) 5-325 MG per tablet Take 1 tablet by mouth 3 times daily as needed for Pain for up to 30 days. Indications: Pain Yes BAR Elliott CNP   fluticasone (FLONASE) 50 MCG/ACT nasal spray USE 1 SPRAY IN EACH NOSTRIL TWICE A DAY Yes BAR Oliva CNP   estradiol (ESTRACE) 0.1 MG/GM vaginal cream  Yes Historical Provider, MD   tiZANidine (ZANAFLEX) 4 MG tablet TAKE 1 TABLET EVERY 8 HOURS AS NEEDED FOR SPASMS Yes BAR Elliott CNP   pregabalin (LYRICA) 75 MG capsule TAKE 1 CAPSULE FOUR TIMES A DAY Yes BAR Elliott CNP   neomycin-bacitracin-polymyxin (NEOSPORIN) 400-5-5000 ointment Apply topically 2 times daily. Yes BAR Oliva CNP   omeprazole (PRILOSEC) 40 MG delayed release capsule TAKE 1 CAPSULE DAILY Yes BAR Oliva CNP   metFORMIN (GLUCOPHAGE-XR) 750 MG extended release tablet TAKE 1 TABLET DAILY WITH BREAKFAST Yes BAR Oliva CNP   Blood Pressure Monitor KIT Use as directed.  Yes BAR Oliva CNP   lisinopril (PRINIVIL;ZESTRIL) 5 MG tablet TAKE 1 TABLET DAILY Yes BAR Oliva CNP   sertraline (ZOLOFT) 100 MG tablet TAKE 1 TABLET DAILY Yes Renella A Gauamis, APRN - CNP   atorvastatin (LIPITOR) 20 MG tablet TAKE 1 TABLET DAILY Yes Marty Cramer, APRN - CNP OneTouch Delica Lancets 43X MISC USE 1 EACH TWICE A DAY Yes BAR Oliva CNP   blood glucose test strips (ASCENSIA AUTODISC VI;ONE TOUCH ULTRA TEST VI) strip 1 each by In Vitro route daily verio test strips, PRN Yes BAR Oliva CNP   topiramate (TOPAMAX) 100 MG tablet TAKE 1 TABLET IN THE MORNING AND 2 TABLETS IN THE EVENING Yes BAR Cochran CNP   albuterol sulfate (PROAIR RESPICLICK) 049 (90 Base) MCG/ACT aerosol powder inhalation Inhale 2 puffs into the lungs every 4 hours as needed for Wheezing or Shortness of Breath Yes Surekha Radford MD   docusate sodium (COLACE) 100 MG capsule Take 1 capsule by mouth daily as needed for Constipation Yes BAR Ball CNP   Elastic Bandages & Supports (LUMBAR BACK BRACE/SUPPORT PAD) MISC 1 each by Does not apply route daily as needed (pain) Yes Chilo Rosario MD   Probiotic Product (PROBIOTIC DAILY PO) Take 1 tablet by mouth daily. Yes Historical Provider, MD   Multiple Vitamins-Calcium (ONE-A-DAY WOMENS FORMULA PO) Take  by mouth. Yes Historical Provider, MD      Social History     Tobacco Use    Smoking status: Never Smoker    Smokeless tobacco: Never Used   Substance Use Topics    Alcohol use: No      Vitals:    04/19/21 1548   BP: 110/80   Pulse: 84   Temp: 97.4 °F (36.3 °C)   SpO2: 96%   Weight: 215 lb (97.5 kg)   Height: 5' (1.524 m)     Estimated body mass index is 41.99 kg/m² as calculated from the following:    Height as of this encounter: 5' (1.524 m). Weight as of this encounter: 215 lb (97.5 kg). Physical Exam  Vitals signs and nursing note reviewed. Constitutional:       Appearance: She is well-developed. She is morbidly obese. HENT:      Head: Normocephalic.       Right Ear: Tympanic membrane and external ear normal.      Left Ear: Tympanic membrane and external ear normal.      Nose: Nose normal.      Mouth/Throat:      Mouth: Mucous membranes are moist.      Pharynx: Posterior oropharyngeal erythema present. Eyes:      Pupils: Pupils are equal, round, and reactive to light. Neck:      Musculoskeletal: Normal range of motion and neck supple. Thyroid: No thyromegaly. Vascular: No JVD. Cardiovascular:      Rate and Rhythm: Normal rate and regular rhythm. Pulses: Normal pulses. Heart sounds: Normal heart sounds. No murmur. Pulmonary:      Effort: Pulmonary effort is normal. No respiratory distress. Breath sounds: Normal breath sounds. Abdominal:      General: Abdomen is protuberant. Bowel sounds are normal. There is no distension. Palpations: Abdomen is soft. Tenderness: There is no abdominal tenderness. Comments: Obese   Musculoskeletal:      Thoracic back: She exhibits decreased range of motion, tenderness and pain. She exhibits no deformity. Lumbar back: She exhibits decreased range of motion, tenderness and pain. She exhibits no deformity. Lymphadenopathy:      Head:      Right side of head: Submandibular adenopathy present. Cervical: No cervical adenopathy. Comments: Small semi soft approx 5 mm in diameter. Skin:     General: Skin is warm and dry. Capillary Refill: Capillary refill takes less than 2 seconds. Findings: Acne (nasal) and rash present. Rash is pustular (inside nares). Neurological:      Mental Status: She is alert and oriented to person, place, and time. Psychiatric:         Mood and Affect: Mood is anxious. Speech: Speech is rapid and pressured. Behavior: Behavior normal.       Most recent labs reviewed with patient, and all questions answered.   Lab Results   Component Value Date    WBC 7.9 02/23/2021    HGB 14.0 02/23/2021    HCT 42.7 02/23/2021    MCV 89.0 02/23/2021     02/23/2021     Lab Results   Component Value Date     09/10/2020    K 4.2 09/10/2020     09/10/2020    CO2 25 09/10/2020    BUN 8 02/09/2021    CREATININE 0.90 02/09/2021    GLUCOSE 146 02/03/2020 GLUCOSE 121 10/05/2019    CALCIUM 9.5 09/10/2020      Lab Results   Component Value Date    ALT 17 09/10/2020    AST 14 09/10/2020    ALKPHOS 71 09/10/2020    BILITOT 0.22 (L) 09/10/2020     Lab Results   Component Value Date    TSH 0.86 09/10/2020     Lab Results   Component Value Date    CHOL 151 10/05/2019    CHOL 207 (H) 02/26/2019    CHOL 217 (H) 10/17/2017     Lab Results   Component Value Date    TRIG 205 (H) 10/05/2019    TRIG 194 (H) 02/26/2019    TRIG 217 (H) 10/17/2017     Lab Results   Component Value Date    HDL 42 09/10/2020    HDL 39 (L) 10/05/2019    HDL 44 02/26/2019     Lab Results   Component Value Date    LDLCALC 71 10/05/2019    LDLCALC 126 10/17/2017    LDLCALC 118 07/14/2015    LDLCHOLESTEROL 58 09/10/2020    LDLCHOLESTEROL 124 02/26/2019    LDLCHOLESTEROL 122 04/20/2016     Lab Results   Component Value Date    CHOLHDLRATIO 3.7 09/10/2020    CHOLHDLRATIO 3.9 10/05/2019    CHOLHDLRATIO 4.7 02/26/2019     Lab Results   Component Value Date    LABA1C 5.3 01/08/2021      No results found for: [id number]  No results found for: FOLATE  Lab Results   Component Value Date    VITD25 67.7 09/10/2020     ASSESSMENT/PLAN:  1. Reactive lymphadenopathy  Failure to Improve  DISCUSSED AND ADVISED TO:  Rest.  Advised to increase fluid intake. Eat more fruits and vegetables. Take medications as discussed. Report for worsening symptoms. - amoxicillin-clavulanate (AUGMENTIN) 875-125 MG per tablet; Take 1 tablet by mouth 2 times daily for 7 days  Dispense: 14 tablet; Refill: 0    2. Adjustment disorder with mixed anxiety and depressed mood  Worsening  Continue current therapy. Discussed how to recognize anxiety. Advised to relieve tension with exercise or a massage. Advised to get enough rest.  Advised to avoid alcohol, caffeine, nicotine, and illegal drugs. Which can increase anxiety level and cause sleep problems.   - busPIRone (BUSPAR) 10 MG tablet; TAKE 2 TABLET THREE TIMES A DAY  Dispense: 180 tablet; Refill: 2    3. Psychophysiological insomnia  Worsening  Continue current routine or therapy. DISCUSSED AND ADVISED TO:  Cut down intake of drinks with caffeine, such as coffee or black tea, for 8 hours before bed. Cut down on smoking or use other types of tobacco near bedtime. Cut down on Nicotine and alcohol   Stop eating a big meal close to bedtime. Stop drinking a lot of  fluids close to bedtime. - traZODone (DESYREL) 50 MG tablet; Take 1 tablet by mouth nightly  Dispense: 30 tablet; Refill: 1    4. Need for hepatitis B vaccination  Recommended by CDC. No current infection. Denies previous adverse reaction to vaccination.      - Hep B Vaccine Adult (ENGERIX B)      Controlled Substance Monitoring:  Acute and Chronic Pain Monitoring:   RX Monitoring 4/7/2021   Attestation -   Acute Pain Prescriptions -   Periodic Controlled Substance Monitoring Possible medication side effects, risk of tolerance/dependence & alternative treatments discussed. ;No signs of potential drug abuse or diversion identified. ;Assessed functional status. ;Obtaining appropriate analgesic effect of treatment. Chronic Pain > 50 MEDD Obtained or confirmed \"Consent for Opioid Use\" on file.    Chronic Pain > 80 MEDD -     Orders Placed This Encounter   Procedures    Hep B Vaccine Adult (ENGERIX B)     Orders Placed This Encounter   Medications    busPIRone (BUSPAR) 10 MG tablet     Sig: TAKE 2 TABLET THREE TIMES A DAY     Dispense:  180 tablet     Refill:  2    traZODone (DESYREL) 50 MG tablet     Sig: Take 1 tablet by mouth nightly     Dispense:  30 tablet     Refill:  1    amoxicillin-clavulanate (AUGMENTIN) 875-125 MG per tablet     Sig: Take 1 tablet by mouth 2 times daily for 7 days     Dispense:  14 tablet     Refill:  0      Medications Discontinued During This Encounter   Medication Reason    busPIRone (BUSPAR) 10 MG tablet REORDER      Health Maintenance Due   Topic Date Due    Diabetic retinal exam  Never done Return in about 4 weeks (around 5/17/2021) for Chronic conditions. Iqra received counseling on the following healthy behaviors: nutrition, exercise and medication adherence  Reviewed prior labs and health maintenance  Continue current medications, diet and exercise. Discussed use, benefit, and side effects of prescribed medications. Barriers to medication compliance addressed. Patient given educational materials - see patient instructions  Was a self-tracking handout given in paper form or via "[employer]hart? Yes    Requested Prescriptions     Signed Prescriptions Disp Refills    busPIRone (BUSPAR) 10 MG tablet 180 tablet 2     Sig: TAKE 2 TABLET THREE TIMES A DAY    traZODone (DESYREL) 50 MG tablet 30 tablet 1     Sig: Take 1 tablet by mouth nightly    amoxicillin-clavulanate (AUGMENTIN) 875-125 MG per tablet 14 tablet 0     Sig: Take 1 tablet by mouth 2 times daily for 7 days       All patient questions answered. Patient voiced understanding. Quality Measures    Body mass index is 41.99 kg/m². Elevated. Weight control planned discussed Healthy diet and regular exercise. BP: 110/80 Blood pressure is normal. Treatment plan consists of No treatment change needed. Lab Results   Component Value Date    LDLCALC 71 10/05/2019    LDLCHOLESTEROL 58 09/10/2020    (goal LDL reduction with dx if diabetes is 50% LDL reduction)      PHQ Scores 4/19/2021 1/8/2021 9/8/2020 2/26/2019 12/18/2018 3/21/2017   PHQ2 Score 2 3 0 4 2 0   PHQ9 Score 2 14 0 16 2 0     Interpretation of Total Score Depression Severity: 1-4 = Minimal depression, 5-9 = Mild depression, 10-14 = Moderate depression, 15-19 = Moderately severe depression, 20-27 = Severe depression   This note was completed by using the assistance of a speech-recognition program. However, inadvertent computerized transcription errors may be present.  Although every effort was made to ensure accuracy, no guarantees can be provided that every mistake has been identified and corrected by editing   An electronic signature was used to authenticate this note.   --Adam Urbano, BAR - CNP on 4/19/2021 at 6:37 PM

## 2021-04-19 ENCOUNTER — OFFICE VISIT (OUTPATIENT)
Dept: FAMILY MEDICINE CLINIC | Age: 51
End: 2021-04-19
Payer: COMMERCIAL

## 2021-04-19 VITALS
HEART RATE: 84 BPM | DIASTOLIC BLOOD PRESSURE: 80 MMHG | HEIGHT: 60 IN | TEMPERATURE: 97.4 F | BODY MASS INDEX: 42.21 KG/M2 | OXYGEN SATURATION: 96 % | WEIGHT: 215 LBS | SYSTOLIC BLOOD PRESSURE: 110 MMHG

## 2021-04-19 DIAGNOSIS — F43.23 ADJUSTMENT DISORDER WITH MIXED ANXIETY AND DEPRESSED MOOD: ICD-10-CM

## 2021-04-19 DIAGNOSIS — Z23 NEED FOR HEPATITIS B VACCINATION: ICD-10-CM

## 2021-04-19 DIAGNOSIS — R59.9 REACTIVE LYMPHADENOPATHY: Primary | ICD-10-CM

## 2021-04-19 DIAGNOSIS — F51.04 PSYCHOPHYSIOLOGICAL INSOMNIA: ICD-10-CM

## 2021-04-19 PROCEDURE — 90471 IMMUNIZATION ADMIN: CPT | Performed by: FAMILY MEDICINE

## 2021-04-19 PROCEDURE — 90746 HEPB VACCINE 3 DOSE ADULT IM: CPT | Performed by: FAMILY MEDICINE

## 2021-04-19 PROCEDURE — 99213 OFFICE O/P EST LOW 20 MIN: CPT | Performed by: FAMILY MEDICINE

## 2021-04-19 RX ORDER — BUSPIRONE HYDROCHLORIDE 10 MG/1
TABLET ORAL
Qty: 180 TABLET | Refills: 2 | Status: SHIPPED | OUTPATIENT
Start: 2021-04-19 | End: 2021-09-13

## 2021-04-19 RX ORDER — TRAZODONE HYDROCHLORIDE 50 MG/1
50 TABLET ORAL NIGHTLY
Qty: 30 TABLET | Refills: 1 | Status: SHIPPED | OUTPATIENT
Start: 2021-04-19 | End: 2021-04-26 | Stop reason: SDUPTHER

## 2021-04-19 RX ORDER — AMOXICILLIN AND CLAVULANATE POTASSIUM 875; 125 MG/1; MG/1
1 TABLET, FILM COATED ORAL 2 TIMES DAILY
Qty: 14 TABLET | Refills: 0 | Status: SHIPPED | OUTPATIENT
Start: 2021-04-19 | End: 2021-04-26

## 2021-04-19 ASSESSMENT — ENCOUNTER SYMPTOMS
FACIAL SWELLING: 1
SORE THROAT: 1

## 2021-04-19 ASSESSMENT — PATIENT HEALTH QUESTIONNAIRE - PHQ9
SUM OF ALL RESPONSES TO PHQ9 QUESTIONS 1 & 2: 2
SUM OF ALL RESPONSES TO PHQ QUESTIONS 1-9: 2

## 2021-04-19 NOTE — PATIENT INSTRUCTIONS
[facility] COVID-19 Vaccination Hotline: [phone]    COVID-19 vaccine appointments are not available through our practice. As you're eligible to receive the COVID-19 vaccine, as determined by your state's department of health, you will be able to schedule an appointment through [address] or by calling [phone]. Appointments are required to receive the COVID-19 vaccine. As vaccine supply continues to be limited, we anticipate open appointments to fill up quickly and appreciate your patience as we work through the process of providing vaccines to those in our communities. Schedule a Vaccine  When you qualify to receive the vaccine, call the [facility] COVID-19 Vaccination Hotline to schedule your appointment or to get additional information about the [facility] locations which are offering the COVID-19 vaccine. To be 94% effective, it's important that you receive two doses of one of the COVID-19 vaccines. -If you are receiving the Aguilera Peter vaccine, your second shot will be scheduled as close to 21 days after the first shot as possible. -If you are receiving the Moderna vaccine, your second shot will be scheduled as close to 28 days after the first shot as possible. Links to [facility]) website and [facility] website:    [employer] Mobile/mercy-[facility]-monitoring-coronavirus-covid-19/covid-19-vaccine/[place]/knight-vaccine    [email or web address]    [facility]  https://sites. google. com/view/wchd[place]-coronavirus/home/vaccines/get-vaccinated  [employer].fi. com/r/[place]_Vaccine_On-Call_List      https://[employer]/shared/[email or web address]?:toolbar=n&:display_count=n&:origin=viz_share_link&:embed=y    PHARMACY LOCATIONS  Https://vaccine. coronavirus. [place].gov/    [facility]  Https://[employer]/shared/[email or web address]?:toolbar=n&:display_count=n&:origin=viz_share_link&:embed=y    [facility]  Https://[employer]/shared/[email or web address]?:toolbar=n&:display_count=n&:origin=Kogn[employer]io_share_link&:embed=y    [address]  Https://[employer]/shared/[email or web address]?:toolbar=n&:display_count=n&:origin=Bundle_link&:embed=y    [address]  https://[employer]/[email or web address]?:toolbar=n&:display_count=n&:origin=[employer]_Think1stBoxing.com_link&:embed=y

## 2021-04-26 DIAGNOSIS — F51.04 PSYCHOPHYSIOLOGICAL INSOMNIA: ICD-10-CM

## 2021-04-26 RX ORDER — TRAZODONE HYDROCHLORIDE 50 MG/1
50 TABLET ORAL NIGHTLY
Qty: 30 TABLET | Refills: 1 | Status: SHIPPED | OUTPATIENT
Start: 2021-04-26 | End: 2021-07-08

## 2021-04-26 NOTE — TELEPHONE ENCOUNTER
Please Approve or Refuse.   Send to Pharmacy per Pt's Request:      Next Visit Date:  5/24/2021   Last Visit Date: 4/19/2021    Hemoglobin A1C (%)   Date Value   01/08/2021 5.3   09/10/2020 7.2 (H)   02/06/2020 6.6             ( goal A1C is < 7)   BP Readings from Last 3 Encounters:   04/19/21 110/80   03/04/21 (!) 92/56   03/04/21 117/87          (goal 120/80)  BUN   Date Value Ref Range Status   02/09/2021 8 6 - 20 mg/dL Final     CREATININE   Date Value Ref Range Status   02/09/2021 0.90 0.50 - 0.90 mg/dL Final     Potassium   Date Value Ref Range Status   09/10/2020 4.2 3.7 - 5.3 mmol/L Final

## 2021-04-30 ENCOUNTER — IMMUNIZATION (OUTPATIENT)
Dept: INTERNAL MEDICINE CLINIC | Age: 51
End: 2021-04-30
Payer: COMMERCIAL

## 2021-04-30 PROCEDURE — 0002A COVID-19, PFIZER VACCINE 30MCG/0.3ML DOSE: CPT | Performed by: INTERNAL MEDICINE

## 2021-04-30 PROCEDURE — 91300 COVID-19, PFIZER VACCINE 30MCG/0.3ML DOSE: CPT | Performed by: INTERNAL MEDICINE

## 2021-05-04 ENCOUNTER — HOSPITAL ENCOUNTER (OUTPATIENT)
Dept: PAIN MANAGEMENT | Age: 51
Discharge: HOME | End: 2021-05-04
Payer: COMMERCIAL

## 2021-05-04 DIAGNOSIS — M50.20 CERVICAL DISC HERNIATION: ICD-10-CM

## 2021-05-04 DIAGNOSIS — G89.29 CHRONIC LEFT-SIDED LOW BACK PAIN WITH LEFT-SIDED SCIATICA: ICD-10-CM

## 2021-05-04 DIAGNOSIS — M16.12 PRIMARY OSTEOARTHRITIS OF LEFT HIP: ICD-10-CM

## 2021-05-04 DIAGNOSIS — M50.30 DEGENERATIVE DISC DISEASE, CERVICAL: ICD-10-CM

## 2021-05-04 DIAGNOSIS — M47.26 OSTEOARTHRITIS OF SPINE WITH RADICULOPATHY, LUMBAR REGION: ICD-10-CM

## 2021-05-04 DIAGNOSIS — Z79.899 ENCOUNTER FOR MEDICATION MANAGEMENT: ICD-10-CM

## 2021-05-04 DIAGNOSIS — M47.816 OSTEOARTHRITIS OF LUMBAR SPINE, UNSPECIFIED SPINAL OSTEOARTHRITIS COMPLICATION STATUS: ICD-10-CM

## 2021-05-04 DIAGNOSIS — M51.36 DEGENERATION OF LUMBAR INTERVERTEBRAL DISC: Chronic | ICD-10-CM

## 2021-05-04 DIAGNOSIS — M54.42 CHRONIC LEFT-SIDED LOW BACK PAIN WITH LEFT-SIDED SCIATICA: ICD-10-CM

## 2021-05-04 DIAGNOSIS — M47.817 LUMBOSACRAL SPONDYLOSIS WITHOUT MYELOPATHY: Primary | ICD-10-CM

## 2021-05-04 DIAGNOSIS — M47.896 OTHER OSTEOARTHRITIS OF SPINE, LUMBAR REGION: ICD-10-CM

## 2021-05-04 DIAGNOSIS — M47.816 ARTHROPATHY OF LUMBAR FACET JOINT: ICD-10-CM

## 2021-05-04 PROCEDURE — 99213 OFFICE O/P EST LOW 20 MIN: CPT | Performed by: NURSE PRACTITIONER

## 2021-05-04 PROCEDURE — 99213 OFFICE O/P EST LOW 20 MIN: CPT

## 2021-05-04 RX ORDER — PREGABALIN 75 MG/1
CAPSULE ORAL
Qty: 360 CAPSULE | Refills: 0 | Status: SHIPPED | OUTPATIENT
Start: 2021-05-04 | End: 2021-08-03 | Stop reason: SDUPTHER

## 2021-05-04 RX ORDER — TIZANIDINE 4 MG/1
TABLET ORAL
Qty: 270 TABLET | Refills: 0 | Status: SHIPPED | OUTPATIENT
Start: 2021-05-04 | End: 2021-08-03 | Stop reason: SDUPTHER

## 2021-05-04 RX ORDER — HYDROCODONE BITARTRATE AND ACETAMINOPHEN 5; 325 MG/1; MG/1
1 TABLET ORAL 3 TIMES DAILY PRN
Qty: 90 TABLET | Refills: 0 | Status: SHIPPED | OUTPATIENT
Start: 2021-05-08 | End: 2021-06-04 | Stop reason: SDUPTHER

## 2021-05-04 ASSESSMENT — ENCOUNTER SYMPTOMS
COUGH: 0
BACK PAIN: 1
CONSTIPATION: 0
SHORTNESS OF BREATH: 0
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
## 2021-05-05 ENCOUNTER — OFFICE VISIT (OUTPATIENT)
Dept: PODIATRY | Age: 51
End: 2021-05-05
Payer: COMMERCIAL

## 2021-05-05 VITALS — WEIGHT: 213 LBS | HEIGHT: 60 IN | BODY MASS INDEX: 41.82 KG/M2

## 2021-05-05 DIAGNOSIS — M79.2 NEUROPATHIC PAIN OF LEFT FOOT: ICD-10-CM

## 2021-05-05 DIAGNOSIS — M92.61 HAGLUND'S DEFORMITY, RIGHT: ICD-10-CM

## 2021-05-05 DIAGNOSIS — M72.2 PLANTAR FASCIITIS, RIGHT: ICD-10-CM

## 2021-05-05 DIAGNOSIS — M77.8 ENTHESOPATHY OF RIGHT FOOT: ICD-10-CM

## 2021-05-05 DIAGNOSIS — M79.672 LEFT FOOT PAIN: ICD-10-CM

## 2021-05-05 DIAGNOSIS — M79.671 RIGHT FOOT PAIN: Primary | ICD-10-CM

## 2021-05-05 DIAGNOSIS — M77.51 TENDINITIS OF RIGHT FOOT: ICD-10-CM

## 2021-05-05 PROCEDURE — 99214 OFFICE O/P EST MOD 30 MIN: CPT | Performed by: PODIATRIST

## 2021-05-05 RX ORDER — PREDNISONE 10 MG/1
1 TABLET ORAL DAILY
Qty: 42 EACH | Refills: 0 | Status: SHIPPED | OUTPATIENT
Start: 2021-05-05 | End: 2021-05-24 | Stop reason: ALTCHOICE

## 2021-05-05 ASSESSMENT — ENCOUNTER SYMPTOMS
COLOR CHANGE: 0
VOMITING: 0
NAUSEA: 0
DIARRHEA: 0
CONSTIPATION: 0

## 2021-05-05 NOTE — PROGRESS NOTES
[facility]  New Patient History and Physical    Chief Complaint:   Chief Complaint   Patient presents with    Foot Pain     top of left foot feels like pins and needles     Other     outside of right foot and heel very painful       HPI: Rica Amaro is a 53 y.o. female who presents to the office today complaining of bilateral foot pain. 1) top of left foot, burning, pins and needles. Has significant back issues, being treated. No injury, toes 2-4 affected, only on top. No swelling, very sensitive . On Lyrica. She is a diabetic. 2) pain side of right foot, some swelling, rubs on shoes, pain after rest. Also pain right heel, pain in the morning. Had orthotics years ago, not wearing them. They helped in the past.   Currently denies F/C/N/V. Allergies   Allergen Reactions    Adhesive Tape Other (See Comments)     blisters    Imitrex [Sumatriptan] Other (See Comments)     \"feels like head is going to explode    Morphine Itching     hives       Past Medical History:   Diagnosis Date    Anxiety     Asthma     Colon polyp 10/31/2016    sessile serated adenoma x2    Depression     Diabetes mellitus ([facility]     Diverticulitis 10/2016    Gastritis     GERD (gastroesophageal reflux disease)     Hemorrhoids     int/ext    Hypertension     Migraines     Osteoarthritis     Shingles 1-22-16    Tubular adenoma 10/31/2016    Urinary leakage        Prior to Admission medications    Medication Sig Start Date End Date Taking?  Authorizing Provider   predniSONE 10 MG (21) TBPK Take 1 tablet by mouth daily 60mg po on day 1 & 2 , 50mg po on day 3 & 4, 40mg po on day 4 & 5,  30mg po on day 6 & 7, 20 mg po on day 8 & 9, 10mg po on day 10 & 11 5/5/21  Yes Chyrl Moose, DPJOCELYNE   diclofenac sodium (VOLTAREN) 1 % GEL Apply 2 g topically 2 times daily 5/5/21  Yes Courtney Webster DPM   tiZANidine (ZANAFLEX) 4 MG tablet TAKE 1 TABLET EVERY 8 HOURS AS NEEDED FOR SPASMS 5/4/21  Yes Waleska Garcia BAR [place] CNP   pregabalin (LYRICA) 75 MG capsule TAKE 1 CAPSULE FOUR TIMES A DAY 5/4/21 8/2/21 Yes BAR Mar CNP   HYDROcodone-acetaminophen (NORCO) 5-325 MG per tablet Take 1 tablet by mouth 3 times daily as needed for Pain for up to 30 days. Indications: Pain 5/8/21 6/7/21 Yes Venia Backbone BAR [place] CNP   traZODone (DESYREL) 50 MG tablet Take 1 tablet by mouth nightly 4/26/21  Yes BAR Oliva CNP   busPIRone (BUSPAR) 10 MG tablet TAKE 2 TABLET THREE TIMES A DAY 4/19/21  Yes BAR Oliva CNP   fluticasone (FLONASE) 50 MCG/ACT nasal spray USE 1 SPRAY IN EACH NOSTRIL TWICE A DAY 3/26/21  Yes BAR Oliva CNP   estradiol (ESTRACE) 0.1 MG/GM vaginal cream  2/8/21  Yes Historical Provider, MD   neomycin-bacitracin-polymyxin (NEOSPORIN) 400-5-5000 ointment Apply topically 2 times daily. 10/8/20  Yes BAR Oliva CNP   omeprazole (PRILOSEC) 40 MG delayed release capsule TAKE 1 CAPSULE DAILY 10/5/20  Yes BAR Oliva CNP   metFORMIN (GLUCOPHAGE-XR) 750 MG extended release tablet TAKE 1 TABLET DAILY WITH BREAKFAST 9/11/20  Yes BAR Oliva CNP   Blood Pressure Monitor KIT Use as directed.  9/8/20  Yes BAR Oliva CNP   lisinopril (PRINIVIL;ZESTRIL) 5 MG tablet TAKE 1 TABLET DAILY 9/8/20  Yes BAR Oliva CNP   sertraline (ZOLOFT) 100 MG tablet TAKE 1 TABLET DAILY 9/8/20  Yes BAR Oliva CNP   atorvastatin (LIPITOR) 20 MG tablet TAKE 1 TABLET DAILY 9/8/20  Yes BAR Oliva CNP   OneTouch Delica Lancets 18F MISC USE 1 EACH TWICE A DAY 9/8/20  Yes BAR Oliva CNP   blood glucose test strips (ASCENSIA AUTODISC VI;ONE TOUCH ULTRA TEST VI) strip 1 each by In Vitro route daily verio test strips, PRN 9/8/20  Yes BAR Oliva CNP   topiramate (TOPAMAX) 100 MG tablet TAKE 1 TABLET IN THE MORNING AND 2 TABLETS IN THE EVENING 2/13/20  Yes Janeth Gregg, APRN - CNP   albuterol sulfate (PROAIR RESPICLICK) 942 (90 Base) MCG/ACT aerosol powder inhalation Inhale 2 puffs into the lungs every 4 hours as needed for Wheezing or Shortness of Breath 2/4/20  Yes Adis Perez MD   docusate sodium (COLACE) 100 MG capsule Take 1 capsule by mouth daily as needed for Constipation 12/1/17  Yes Lonza [place], APRN - CNP   Elastic Bandages & Supports (LUMBAR BACK BRACE/SUPPORT PAD) MISC 1 each by Does not apply route daily as needed (pain) 8/4/17  Yes Sourav Saba MD   Probiotic Product (PROBIOTIC DAILY PO) Take 1 tablet by mouth daily. Yes Historical Provider, MD   Multiple Vitamins-Calcium (ONE-A-DAY WOMENS FORMULA PO) Take  by mouth.    Yes Historical Provider, MD       Past Surgical History:   Procedure Laterality Date    CERVICAL DISCECTOMY  12/2013    & fusion     CHOLECYSTECTOMY      COLONOSCOPY  10/31/2016    int/ext hemorrhoids; sessile serated adenomax2; tubular adenoma    COLONOSCOPY N/A 10/22/2019    COLONOSCOPY POLYPECTOMY SNARE/COLD BIOPSY AND RANDOM BIOPSIES performed by Evans Doss MD at [place] [id number]  03/04/2021    CYSTOSCOPY HYDRODISTENTION WITH DMSO AND UREAPLASMA , MYCOPLASMA CULTURES    CYSTOSCOPY N/A 3/4/2021    CYSTOSCOPY HYDRODISTENTION WITH DMSO AND UREAPLASMA , MYCOPLASMA CULTURES performed by Livan Ortiz DO at [address] [place] [address] SURGERY Right     thumb surgery, BONE REMOVED    HYSTERECTOMY      LAPAROSCOPY      MT DEST,PARAVERTEBRAL,L/S,ADDL LVLS  3/25/2019         TONSILLECTOMY      TUBAL LIGATION      UPPER GASTROINTESTINAL ENDOSCOPY  10/31/2016    gastritis    UPPER GASTROINTESTINAL ENDOSCOPY N/A 10/22/2019    EGD BIOPSY performed by [facility]oss MD at [facility]       Family History   Problem Relation Age of Onset    Cancer Mother     Heart Disease Father     Diabetes Father     High Blood Pressure Father     Other Other 2. Left foot pain    3. Neuropathic pain of left foot    4. Tendinitis of right foot    5. Plantar fasciitis, right    6. Enthesopathy of right foot    7. Sudheer's deformity, right          Plan: Patient examined and evaluated. Current condition and treatment options discussed in detail. Verbal and written instructions given to patient. Contact office with any questions/problems/concerns. 1) good shoes  2) may need to revisit custom orthotics  3) Rx [employer] cream  4) prednisone taper    Discussed her back issues and how this may affect her feet. Discussed weight and weight loss, good shoes and orthotics. Orders Placed This Encounter   Procedures    XR FOOT LEFT (MIN 3 VIEWS)     Standing Status:   Future     Number of Occurrences:   1     Standing Expiration Date:   5/5/2022    XR FOOT RIGHT (MIN 3 VIEWS)     Orders Placed This Encounter   Medications    predniSONE 10 MG (21) TBPK     Sig: Take 1 tablet by mouth daily 60mg po on day 1 & 2 , 50mg po on day 3 & 4, 40mg po on day 4 & 5,  30mg po on day 6 & 7, 20 mg po on day 8 & 9, 10mg po on day 10 & 11     Dispense:  42 each     Refill:  0    diclofenac sodium (VOLTAREN) 1 % GEL     Sig: Apply 2 g topically 2 times daily     Dispense:  100 g     Refill:  0        RTC in 4week(s).     5/5/2021

## 2021-05-11 ENCOUNTER — TELEPHONE (OUTPATIENT)
Dept: PAIN MANAGEMENT | Age: 51
End: 2021-05-11

## 2021-05-13 ENCOUNTER — TELEPHONE (OUTPATIENT)
Dept: PAIN MANAGEMENT | Age: 51
End: 2021-05-13

## 2021-05-13 NOTE — TELEPHONE ENCOUNTER
Per Dr. Mary Dominguez, patient needs to have a lumbar facet before having an RFA. Patient's last procedure was an SI in Jan '21, and last RFA 10 '2020. I called patient and informed her of above and stated I did change her procedure to LFJI same levels. I  gave her the option to move the injection up. Patient states she will keep the same date and time for now; will call if she changes her mind.

## 2021-05-21 DIAGNOSIS — F32.A ANXIETY AND DEPRESSION: ICD-10-CM

## 2021-05-21 DIAGNOSIS — F41.9 ANXIETY AND DEPRESSION: ICD-10-CM

## 2021-05-21 DIAGNOSIS — E11.9 TYPE 2 DIABETES MELLITUS WITHOUT COMPLICATION, WITHOUT LONG-TERM CURRENT USE OF INSULIN (HCC): ICD-10-CM

## 2021-05-21 RX ORDER — METFORMIN HYDROCHLORIDE 750 MG/1
TABLET, EXTENDED RELEASE ORAL
Qty: 90 TABLET | Refills: 3 | Status: SHIPPED | OUTPATIENT
Start: 2021-05-21 | End: 2022-04-07

## 2021-05-21 RX ORDER — SERTRALINE HYDROCHLORIDE 100 MG/1
TABLET, FILM COATED ORAL
Qty: 90 TABLET | Refills: 3 | Status: SHIPPED | OUTPATIENT
Start: 2021-05-21 | End: 2022-05-16

## 2021-05-23 RX ORDER — TROSPIUM CHLORIDE 20 MG/1
1 TABLET, FILM COATED ORAL DAILY
COMMUNITY
Start: 2021-05-18

## 2021-05-23 NOTE — PROGRESS NOTES
treatment thus far has been good. The patient is not known to have coexisting coronary artery disease. The 10-year CVD risk score (D'Agostino, et al., 2008) is: 9.8%    Values used to calculate the score:      Age: 53 years      Sex: Female      Diabetic: Yes      Tobacco smoker: No      Systolic Blood Pressure: 536 mmHg      Is BP treated: Yes      HDL Cholesterol: 42 mg/dL      Total Cholesterol: 154 mg/dL  Lab Results   Component Value Date/Time    CHOLFAST 154 09/10/2020 07:59 AM    CHOL 151 10/05/2019 08:50 AM    CHOL 210 05/13/2014 12:00 AM    HDL 42 09/10/2020 07:59 AM    LDLCHOLESTEROL 58 09/10/2020 07:59 AM    TRIGLYCFAST 268 (H) 09/10/2020 07:59 AM    CHOLHDLRATIO 3.7 09/10/2020 07:59 AM    TRIG 205 (H) 10/05/2019 08:50 AM    VLDL NOT REPORTED (H) 09/10/2020 07:59 AM     RHEUMATOID ARTHRITIS  Iqra Shearer has a known history of rheumatoid arthritis. Patient also has some chronic low back pain. She did have some significant thoracic to lumbar spine disorder. Patient had recently had 2 courses of RFA's with relief for up to 6 months. However, patient continues to have the pain. Patient is encouraged to continue with follow-up with the pain management doctors. She is also morbidly obese. She is reported some weight gain. Patient is currently on Lyrica, tizanidine, and Norco.  Patient is established with the pain management. She sees him on a regular basis. Dr Mariam Pizarro. Facet on the 8th. And an RFA. Nayeli Edwards Patient is also wondering abou Fibromyalgia since she has pain on her parts of her body. We had a long discussion about this condition and the treatment which she is already on several of the treatment including Lyrica. INTERSTITIAL CYSTITIS  Check for hysteroscopy- endometriosis. Continues to have some occasional RLQ pain. See NP but will be evaluated by urogynecologist.   Dr Yusuf Arguello is a 53 y.o. female patient  has a known history of Common allergies. Symptoms include: clear rhinorrhea and postnasal drip and present in a noneseasonal pattern. Patient reports precipitants which includes: Pollen, dust, smoke, etc. Treatment currently includes Flonase,will start SIngulair . Patient reports some relief of symptoms. OBESITY  Patient's BMI is Body mass index is 42.38 kg/m². kg/m2. BMI is increasing. Patient understands that this condition increases the patient's risk for chronic conditions. Patient advised to practice healthy eating habits. Diet should include plenty of fruits, vegetables, whole grains, lean protein, and low-fat dairy. Increase water consumption. Reduce consumption of dietary sugar and sugar-sweetened beverages. Increasing physical exercises at least 3-4 times a week. We will monitor progression of condition. .      Vitals:    05/24/21 1449   BP: 128/80   Pulse: 70   Temp: 97.2 °F (36.2 °C)   SpO2: 98%   Weight: 217 lb (98.4 kg)   Height: 5' (1.524 m)   Estimated body mass index is 42.38 kg/m² as calculated from the following:    Height as of this encounter: 5' (1.524 m). Weight as of this encounter: 217 lb (98.4 kg). Review of Systems   Constitutional: Negative for activity change, appetite change, chills, fatigue, fever and unexpected weight change (losing weight). HENT: Positive for facial swelling, postnasal drip and rhinorrhea. Negative for ear pain, sneezing and sore throat. Eyes: Negative for itching and visual disturbance. Wears glasses   Respiratory: Negative for cough, shortness of breath and wheezing. Cardiovascular: Negative for chest pain and palpitations. Gastrointestinal: Negative for abdominal pain and nausea. Genitourinary: Negative for frequency, pelvic pain, urgency and vaginal discharge. Musculoskeletal: Positive for arthralgias. Back and hips   Skin: Negative for color change, rash and wound. Allergic/Immunologic: Positive for environmental allergies.    Neurological: Negative for dizziness, TAKE 1 TABLET DAILY Yes BAR Oliva CNP   atorvastatin (LIPITOR) 20 MG tablet TAKE 1 TABLET DAILY Yes BAR Oliva CNP   OneTouch Delica Lancets 65F MISC USE 1 EACH TWICE A DAY Yes BAR Oliva CNP   blood glucose test strips (ASCENSIA AUTODISC VI;ONE TOUCH ULTRA TEST VI) strip 1 each by In Vitro route daily verio test strips, PRN Yes BAR Oliva CNP   topiramate (TOPAMAX) 100 MG tablet TAKE 1 TABLET IN THE MORNING AND 2 TABLETS IN THE EVENING Yes BAR Oconnell CNP   albuterol sulfate (PROAIR RESPICLICK) 966 (90 Base) MCG/ACT aerosol powder inhalation Inhale 2 puffs into the lungs every 4 hours as needed for Wheezing or Shortness of Breath Yes Zuleika Chavis MD   docusate sodium (COLACE) 100 MG capsule Take 1 capsule by mouth daily as needed for Constipation Yes BAR Pearson CNP   Elastic Bandages & Supports (LUMBAR BACK BRACE/SUPPORT PAD) MISC 1 each by Does not apply route daily as needed (pain) Yes Kika Ferguson MD   Probiotic Product (PROBIOTIC DAILY PO) Take 1 tablet by mouth daily. Yes Historical Provider, MD       ASSESSMENT/PLAN:  1. Cervical lymphadenopathy  Failure to Improve  Continue to evaluate    - US HEAD NECK SOFT TISSUE THYROID; Future  - methylPREDNISolone (MEDROL DOSEPACK) 4 MG tablet; Take by mouth. Dispense: 1 kit; Refill: 0    2. Essential hypertension  Stable  Continue current therapy. DISCUSSED AND ADVISED TO:  Cut down on your salt intake. Cut down on caffeinated drinks, sports drinks. Instructed to check BP at home regularly. Report for any chest pains, shortness of breath, headaches, and lightheadedness. Call the office if your blood pressure continue to be higher than 140/90 or 90/50. 3. Type 2 diabetes mellitus without complication, without long-term current use of insulin (HCC)  Stable  Continue therapy.   We will continue to monitor Hemoglobin A1c   DISCUSSED and ADVISED TO:  Continue to check Glucose levels at home. Report increased and low levels as discussed. Decrease carbohydrates, sugary drinks, desserts in your diet. Exercise regularly, as tolerated. Try to lose weight.      - POCT glycosylated hemoglobin (Hb A1C)    4. Mixed hyperlipidemia  Stable  Continue therapy. Advised to decrease the consumption of red meats, fried foods, trans fats, sweets, sugary beverages. Advised to increase fish, vegetables, and fruits consumption. Advised to add fiber or OTC supplements in diet. Discussed weight loss which will result in improvement of lipids levels. Advised to increase daily physical activities and add regular exercises. 5. Mild intermittent asthma without complication  Failure to Improve  Continue current therapy. Added Singulair  DISCUSSED and ADVISED TO:  Use prescribed inhalers or medications regularly. Avoid known irritants and exposure to known triggers. Advised to report the need to use your albuterol inhaler more than usual  Stay away from smoking or second hand smoke. Go to the nearest ER for increasing SOB. - montelukast (SINGULAIR) 10 MG tablet; Take 1 tablet by mouth daily  Dispense: 30 tablet; Refill: 0    6. Rheumatoid arthritis, involving unspecified site, unspecified whether rheumatoid factor present ([facility] [address]  Failure to Improve  Continue current therapy. DISCUSSED AND ADVISED TO:  Use heat packs 15 to 20 mins every 2-3 hours. Do some back stretches as tolerated. Refer to hand out for instructions. Call for worsening, numbness, weakness. 7. Chronic allergic rhinitis  Failure to Improve  Continue with the same therapy   ADVISED TO:  Avoid known allergens/irritants. Stop smoking or avoid second hand smoke. Stay hydrated. Report for worsening symptoms    - montelukast (SINGULAIR) 10 MG tablet; Take 1 tablet by mouth daily  Dispense: 30 tablet; Refill: 0    8.  Morbid obesity with BMI of 40.0-44.9, adult (HCC)  BMI increasing  DISCUSSED AND ADVISED TO:  Eat a low-fat and low carbohydrates diet. Avoid fried foods especially fast food. Choose healthier options for snacks. Have 5-6 servings of fruits and vegetables per day. Cut down on eating processed food. Add 30 minutes to 1 hour aerobic exercise for 3-4 days a week. Return in about 4 months (around 9/24/2021) for Chronic conditions, 30mins. On this date 5/24/2021 I have spent 25 minutes reviewing previous notes, test results and face to face with the patient discussing the diagnosis and importance of compliance with the treatment plan as well as documenting on the day of the visit. This note was completed by using the assistance of a speech-recognition program. However, inadvertent computerized transcription errors may be present. Although every effort was made to ensure accuracy, no guarantees can be provided that every mistake has been identified and corrected by editing.   Electronically signed by BAR Vides CNP on 5/23/21 at 7:00 PM EDT     --BAR Oliva CNP

## 2021-05-23 NOTE — PATIENT INSTRUCTIONS
[facility] COVID-19 Vaccination Hotline: [phone]    COVID-19 vaccine appointments are not available through our practice. As you're eligible to receive the COVID-19 vaccine, as determined by your state's department of health, you will be able to schedule an appointment through [address] or by calling [phone]. Appointments are required to receive the COVID-19 vaccine. As vaccine supply continues to be limited, we anticipate open appointments to fill up quickly and appreciate your patience as we work through the process of providing vaccines to those in our communities. Schedule a Vaccine  When you qualify to receive the vaccine, call the [facility] COVID-19 Vaccination Hotline to schedule your appointment or to get additional information about the [facility] locations which are offering the COVID-19 vaccine. To be 94% effective, it's important that you receive two doses of one of the COVID-19 vaccines. -If you are receiving the Aguilera Peter vaccine, your second shot will be scheduled as close to 21 days after the first shot as possible. -If you are receiving the Moderna vaccine, your second shot will be scheduled as close to 28 days after the first shot as possible. Links to [facility]) website and [facility] website:    Enrique[employer]able/mercy-[facility]-monitoring-coronavirus-covid-19/covid-19-vaccine/[place]/knight-vaccine    [email or web address]    [facility]  https://sites. google. com/view/wchd[place]-coronavirus/home/vaccines/get-vaccinated  [email or web address]. com/r/[email or web address]_Vaccine_On-Call_List      [email or web address]?:toolbar=n&:display_count=n&:origin=viz_share_link&:embed=y    PHARMACY LOCATIONS  Https://vaccine. coronavirus. [place].gov/    [facility]  Https://[email or web address]?:toolbar=n&:display_count=n&:origin=viz_share_link&:embed=y    [facility] Https://Privacy[employer]ctor/SwitchForce/[email or web address]?:toolbar=n&:display_count=n&:origin=[employer]_share_link&:embed=y    [address]  Https://Privacy[employer]ctor/[email or web address]?:toolbar=n&:display_count=n&:origin=[employer]_link&:embed=y    [address]  https://Privacy[employer]ctor/[email or web address]?:toolbar=n&:display_count=n&:origin=[employer]_share_link&:embed=y

## 2021-05-24 ENCOUNTER — OFFICE VISIT (OUTPATIENT)
Dept: FAMILY MEDICINE CLINIC | Age: 51
End: 2021-05-24
Payer: COMMERCIAL

## 2021-05-24 VITALS
HEIGHT: 60 IN | HEART RATE: 70 BPM | DIASTOLIC BLOOD PRESSURE: 80 MMHG | TEMPERATURE: 97.2 F | SYSTOLIC BLOOD PRESSURE: 128 MMHG | OXYGEN SATURATION: 98 % | WEIGHT: 217 LBS | BODY MASS INDEX: 42.6 KG/M2

## 2021-05-24 DIAGNOSIS — E66.01 MORBID OBESITY WITH BMI OF 40.0-44.9, ADULT (HCC): ICD-10-CM

## 2021-05-24 DIAGNOSIS — E78.2 MIXED HYPERLIPIDEMIA: ICD-10-CM

## 2021-05-24 DIAGNOSIS — J45.20 MILD INTERMITTENT ASTHMA WITHOUT COMPLICATION: ICD-10-CM

## 2021-05-24 DIAGNOSIS — E11.9 TYPE 2 DIABETES MELLITUS WITHOUT COMPLICATION, WITHOUT LONG-TERM CURRENT USE OF INSULIN (HCC): ICD-10-CM

## 2021-05-24 DIAGNOSIS — J30.9 CHRONIC ALLERGIC RHINITIS: ICD-10-CM

## 2021-05-24 DIAGNOSIS — I10 ESSENTIAL HYPERTENSION: ICD-10-CM

## 2021-05-24 DIAGNOSIS — M06.9 RHEUMATOID ARTHRITIS, INVOLVING UNSPECIFIED SITE, UNSPECIFIED WHETHER RHEUMATOID FACTOR PRESENT (HCC): ICD-10-CM

## 2021-05-24 DIAGNOSIS — R59.0 CERVICAL LYMPHADENOPATHY: Primary | ICD-10-CM

## 2021-05-24 PROBLEM — N30.10 INTERSTITIAL CYSTITIS: Status: ACTIVE | Noted: 2021-05-24

## 2021-05-24 LAB — HBA1C MFR BLD: 5.9 %

## 2021-05-24 PROCEDURE — 99214 OFFICE O/P EST MOD 30 MIN: CPT | Performed by: FAMILY MEDICINE

## 2021-05-24 PROCEDURE — 83036 HEMOGLOBIN GLYCOSYLATED A1C: CPT | Performed by: FAMILY MEDICINE

## 2021-05-24 RX ORDER — METHYLPREDNISOLONE 4 MG/1
TABLET ORAL
Qty: 1 KIT | Refills: 0 | Status: SHIPPED | OUTPATIENT
Start: 2021-05-24 | End: 2021-09-03 | Stop reason: ALTCHOICE

## 2021-05-24 RX ORDER — MONTELUKAST SODIUM 10 MG/1
10 TABLET ORAL DAILY
Qty: 30 TABLET | Refills: 0 | Status: SHIPPED | OUTPATIENT
Start: 2021-05-24 | End: 2021-08-12 | Stop reason: SDUPTHER

## 2021-05-24 ASSESSMENT — PATIENT HEALTH QUESTIONNAIRE - PHQ9
SUM OF ALL RESPONSES TO PHQ9 QUESTIONS 1 & 2: 1
SUM OF ALL RESPONSES TO PHQ QUESTIONS 1-9: 1
2. FEELING DOWN, DEPRESSED OR HOPELESS: 1
SUM OF ALL RESPONSES TO PHQ QUESTIONS 1-9: 1
SUM OF ALL RESPONSES TO PHQ QUESTIONS 1-9: 1

## 2021-05-24 ASSESSMENT — ENCOUNTER SYMPTOMS
SORE THROAT: 0
RHINORRHEA: 1
NAUSEA: 0
FACIAL SWELLING: 1
COUGH: 0
EYE ITCHING: 0
WHEEZING: 0
COLOR CHANGE: 0
SHORTNESS OF BREATH: 0
ABDOMINAL PAIN: 0

## 2021-05-25 DIAGNOSIS — I10 ESSENTIAL HYPERTENSION: ICD-10-CM

## 2021-05-25 RX ORDER — LISINOPRIL 5 MG/1
TABLET ORAL
Qty: 90 TABLET | Refills: 3 | Status: SHIPPED | OUTPATIENT
Start: 2021-05-25 | End: 2022-04-27

## 2021-05-25 NOTE — TELEPHONE ENCOUNTER
Please Approve or Refuse.   Send to Pharmacy per Pt's Request:     Next Visit Date:  9/24/2021   Last Visit Date: 5/24/2021    Hemoglobin A1C (%)   Date Value   05/24/2021 5.9   01/08/2021 5.3   09/10/2020 7.2 (H)             ( goal A1C is < 7)   BP Readings from Last 3 Encounters:   05/24/21 128/80   04/19/21 110/80   03/04/21 (!) 92/56          (goal 120/80)  BUN   Date Value Ref Range Status   02/09/2021 8 6 - 20 mg/dL Final     CREATININE   Date Value Ref Range Status   02/09/2021 0.90 0.50 - 0.90 mg/dL Final     Potassium   Date Value Ref Range Status   09/10/2020 4.2 3.7 - 5.3 mmol/L Final

## 2021-06-03 ENCOUNTER — HOSPITAL ENCOUNTER (OUTPATIENT)
Dept: ULTRASOUND IMAGING | Age: 51
Discharge: HOME | End: 2021-06-05
Payer: COMMERCIAL

## 2021-06-03 DIAGNOSIS — R59.0 CERVICAL LYMPHADENOPATHY: ICD-10-CM

## 2021-06-03 DIAGNOSIS — R59.9 REACTIVE LYMPHADENOPATHY: Primary | ICD-10-CM

## 2021-06-03 PROCEDURE — 76536 US EXAM OF HEAD AND NECK: CPT

## 2021-06-03 RX ORDER — DOXYCYCLINE HYCLATE 100 MG
100 TABLET ORAL 2 TIMES DAILY
Qty: 20 TABLET | Refills: 0 | Status: SHIPPED | OUTPATIENT
Start: 2021-06-03 | End: 2021-06-13

## 2021-06-03 RX ORDER — DULAGLUTIDE 1.5 MG/.5ML
1 INJECTION, SOLUTION SUBCUTANEOUS WEEKLY
COMMUNITY
Start: 2021-06-02 | End: 2021-09-28

## 2021-06-03 NOTE — RESULT ENCOUNTER NOTE
Two lymph nodes found under the jaw and one close to the parotid gland. Radiologist suggested reactive lymph nodes. Which, is what we discussed on her previous visit. I will send doxycycline for her to take for 10 days. But since this is the second time we've given her an antibiotics, I will order a neck CT with contrast. For further evaluation.  Thanks

## 2021-06-04 ENCOUNTER — HOSPITAL ENCOUNTER (OUTPATIENT)
Dept: PAIN MANAGEMENT | Age: 51
Discharge: HOME | End: 2021-06-04
Payer: COMMERCIAL

## 2021-06-04 DIAGNOSIS — M54.16 LUMBAR RADICULOPATHY, CHRONIC: Chronic | ICD-10-CM

## 2021-06-04 DIAGNOSIS — M51.36 DEGENERATION OF LUMBAR INTERVERTEBRAL DISC: Chronic | ICD-10-CM

## 2021-06-04 DIAGNOSIS — M47.817 LUMBOSACRAL SPONDYLOSIS WITHOUT MYELOPATHY: ICD-10-CM

## 2021-06-04 DIAGNOSIS — Z79.899 ENCOUNTER FOR MEDICATION MANAGEMENT: Primary | ICD-10-CM

## 2021-06-04 DIAGNOSIS — M47.816 ARTHROPATHY OF LUMBAR FACET JOINT: ICD-10-CM

## 2021-06-04 PROCEDURE — 99212 OFFICE O/P EST SF 10 MIN: CPT | Performed by: NURSE PRACTITIONER

## 2021-06-04 PROCEDURE — 99213 OFFICE O/P EST LOW 20 MIN: CPT

## 2021-06-04 RX ORDER — HYDROCODONE BITARTRATE AND ACETAMINOPHEN 5; 325 MG/1; MG/1
1 TABLET ORAL 3 TIMES DAILY PRN
Qty: 90 TABLET | Refills: 0 | Status: SHIPPED | OUTPATIENT
Start: 2021-06-07 | End: 2021-07-06 | Stop reason: SDUPTHER

## 2021-06-04 ASSESSMENT — ENCOUNTER SYMPTOMS
COUGH: 0
CONSTIPATION: 0
BACK PAIN: 1
SHORTNESS OF BREATH: 0
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
## 2021-06-07 ENCOUNTER — TELEPHONE (OUTPATIENT)
Dept: PAIN MANAGEMENT | Age: 51
End: 2021-06-07

## 2021-06-07 NOTE — TELEPHONE ENCOUNTER
Spoke with patient via phone and appointment reminder given.  Patient declines need for pre procedure instructions and informed of hospital entrance change by policy and exit

## 2021-06-08 ENCOUNTER — HOSPITAL ENCOUNTER (OUTPATIENT)
Dept: PAIN MANAGEMENT | Age: 51
Discharge: HOME | End: 2021-06-08
Payer: COMMERCIAL

## 2021-06-08 ENCOUNTER — HOSPITAL ENCOUNTER (OUTPATIENT)
Dept: GENERAL RADIOLOGY | Age: 51
Discharge: HOME | End: 2021-06-10
Payer: COMMERCIAL

## 2021-06-08 VITALS
DIASTOLIC BLOOD PRESSURE: 62 MMHG | WEIGHT: 217 LBS | TEMPERATURE: 97.3 F | BODY MASS INDEX: 42.6 KG/M2 | SYSTOLIC BLOOD PRESSURE: 97 MMHG | OXYGEN SATURATION: 98 % | HEART RATE: 66 BPM | RESPIRATION RATE: 20 BRPM | HEIGHT: 60 IN

## 2021-06-08 DIAGNOSIS — M47.816 LUMBAR SPONDYLOSIS: ICD-10-CM

## 2021-06-08 DIAGNOSIS — M51.36 DEGENERATION OF LUMBAR INTERVERTEBRAL DISC: Chronic | ICD-10-CM

## 2021-06-08 DIAGNOSIS — M51.36 LUMBAR DEGENERATIVE DISC DISEASE: ICD-10-CM

## 2021-06-08 DIAGNOSIS — M51.36 DEGENERATION OF LUMBAR INTERVERTEBRAL DISC: ICD-10-CM

## 2021-06-08 DIAGNOSIS — M47.816 ARTHROPATHY OF LUMBAR FACET JOINT: ICD-10-CM

## 2021-06-08 DIAGNOSIS — M47.817 LUMBOSACRAL SPONDYLOSIS WITHOUT MYELOPATHY: Primary | ICD-10-CM

## 2021-06-08 DIAGNOSIS — M47.817 LUMBOSACRAL SPONDYLOSIS WITHOUT MYELOPATHY: ICD-10-CM

## 2021-06-08 PROCEDURE — 3209999900 FLUORO FOR SURGICAL PROCEDURES

## 2021-06-08 PROCEDURE — 64494 INJ PARAVERT F JNT L/S 2 LEV: CPT

## 2021-06-08 PROCEDURE — 6360000002 HC RX W HCPCS: Performed by: PAIN MEDICINE

## 2021-06-08 PROCEDURE — 6360000004 HC RX CONTRAST MEDICATION: Performed by: PAIN MEDICINE

## 2021-06-08 PROCEDURE — 64494 INJ PARAVERT F JNT L/S 2 LEV: CPT | Performed by: PAIN MEDICINE

## 2021-06-08 PROCEDURE — 64493 INJ PARAVERT F JNT L/S 1 LEV: CPT

## 2021-06-08 PROCEDURE — 64495 INJ PARAVERT F JNT L/S 3 LEV: CPT

## 2021-06-08 PROCEDURE — 64493 INJ PARAVERT F JNT L/S 1 LEV: CPT | Performed by: PAIN MEDICINE

## 2021-06-08 PROCEDURE — 2500000003 HC RX 250 WO HCPCS: Performed by: PAIN MEDICINE

## 2021-06-08 PROCEDURE — 64495 INJ PARAVERT F JNT L/S 3 LEV: CPT | Performed by: PAIN MEDICINE

## 2021-06-08 RX ORDER — FENTANYL CITRATE 50 UG/ML
INJECTION, SOLUTION INTRAMUSCULAR; INTRAVENOUS
Status: COMPLETED | OUTPATIENT
Start: 2021-06-08 | End: 2021-06-08

## 2021-06-08 RX ORDER — TRIAMCINOLONE ACETONIDE 40 MG/ML
INJECTION, SUSPENSION INTRA-ARTICULAR; INTRAMUSCULAR
Status: COMPLETED | OUTPATIENT
Start: 2021-06-08 | End: 2021-06-08

## 2021-06-08 RX ORDER — MIDAZOLAM HYDROCHLORIDE 1 MG/ML
INJECTION INTRAMUSCULAR; INTRAVENOUS
Status: COMPLETED | OUTPATIENT
Start: 2021-06-08 | End: 2021-06-08

## 2021-06-08 RX ORDER — BUPIVACAINE HYDROCHLORIDE 5 MG/ML
INJECTION, SOLUTION EPIDURAL; INTRACAUDAL
Status: COMPLETED | OUTPATIENT
Start: 2021-06-08 | End: 2021-06-08

## 2021-06-08 RX ADMIN — Medication 50 MCG: at 08:21

## 2021-06-08 RX ADMIN — Medication 25 MCG: at 08:23

## 2021-06-08 RX ADMIN — MIDAZOLAM 2 MG: 1 INJECTION INTRAMUSCULAR; INTRAVENOUS at 08:17

## 2021-06-08 RX ADMIN — TRIAMCINOLONE ACETONIDE 80 MG: 40 INJECTION, SUSPENSION INTRA-ARTICULAR; INTRAMUSCULAR at 08:35

## 2021-06-08 RX ADMIN — IOHEXOL 3 ML: 180 INJECTION INTRAVENOUS at 08:35

## 2021-06-08 RX ADMIN — BUPIVACAINE HYDROCHLORIDE 20 ML: 5 INJECTION, SOLUTION EPIDURAL; INTRACAUDAL; PERINEURAL at 08:35

## 2021-06-08 RX ADMIN — Medication 25 MCG: at 08:32

## 2021-06-08 ASSESSMENT — PAIN DESCRIPTION - DESCRIPTORS: DESCRIPTORS: ACHING;BURNING

## 2021-06-08 NOTE — PROGRESS NOTES
Discharge criteria met. Post procedure dressing dry and intact. Sensory and motor function intact as per pre-procedure. Patient alert and oriented x3  Instructions and follow up reviewed with pt at patient at discharge. Discharged home transported by wheelchair, family waits at bedside  @ 0618

## 2021-06-08 NOTE — PROCEDURES
Pre-Procedure Note    Patient Name: Asher Stewart   YOB: 1970  Room/Bed: Room/bed info not found  Medical Record Number: [id number]  Date: 6/8/2021       Indication:    1. Lumbosacral spondylosis without myelopathy    2. Lumbar spondylosis    3. Lumbar degenerative disc disease    4. Arthropathy of lumbar facet joint    5. Degeneration of lumbar intervertebral disc        Consent: On file. Vital Signs:   Vitals:    06/08/21 0828   BP: 98/67   Pulse: 65   Resp: 16   Temp:    SpO2: 94%       Past Medical History:   has a past medical history of Anxiety, Asthma, Colon polyp, Depression, Diabetes mellitus ([address] Diverticulitis, Gastritis, GERD (gastroesophageal reflux disease), Hemorrhoids, Hypertension, Migraines, Osteoarthritis, Shingles, Tubular adenoma, and Urinary leakage. Past Surgical History:   has a past surgical history that includes Hysterectomy; laparoscopy; Tonsillectomy; Hand surgery (Right); Tubal ligation; Cervical discectomy (12/2013); Cholecystectomy; Dilation and curettage of uterus; Colonoscopy (10/31/2016); Upper gastrointestinal endoscopy (10/31/2016); pr dest,paravertebral,l/s,addl lvls (3/25/2019); Colonoscopy (N/A, 10/22/2019); Upper gastrointestinal endoscopy (N/A, 10/22/2019); Cystocopy (03/04/2021); and Cystoscopy (N/A, 3/4/2021). Pre-Sedation Documentation and Exam:   Vital signs have been reviewed (see flow sheet for vitals). Mallampati Airway Assessment:  normal    ASA Classification:  Class 3 - A patient with severe systemic disease that limits activity but is not incapacitating    Sedation/ Anesthesia Plan:   intravenous sedation   as needed    Medications Planned:   midazolam (Versed) / Fentanyl  Intravenously  as needed. Patient is an appropriate candidate for plan of sedation: yes  Patient's History and Physical examination was reviewed and there is no change.   Electronically signed by Clint Adame MD on 6/8/2021 at 8:46 AM        Preoperative Patient's vital signs including BP, EKG and SaO2 were monitored by RN and they remained stable during the procedure. A meaningful communication was kept up with the patient throughout   the procedure. The patient is placed in prone position. Skin over the back was prepped and draped in sterile manner. Under fluoroscopy the facet joints were identified and were palpated, and the following joints were found to be tender:  T12-L1, L1-L2, L2-L3, L3-L4, L4-L5, L5-S1 on the Right side. Hence we decided to inject these joints in the following way:  Using fluoroscopy the facet joints were identified and by adjusting the angle of the fluoroscopy, the view of the joint space was optimized. The skin and deep tissues over the joint space were anesthetized with 1 ml of 0.5% Marcaine. The #22-gauge, 3-1/2 inch spinal needle was introduced through the skin wheal under fluoroscopoc guidance such that the tip of the needle lies in the joint space. This was confirmed by injecting about 0.5 ml of Omnipaque-180 through the needle and observing the spread of the contrast along the joint space. Then after negative aspiration a mixture of 0.5% Marcaine with triamcinolone was injected into the joint space. This was done at the levels of T12-L1, L1-L2, L2-L3, L3-L4, L4-L5, L5-S1 on the Right side. A total of 80 mg of triamcinolone and 12 ml of 0.5% Marcaine was used and was divided in equal amounts among the joints. After removing the needles Band-Aids were placed over the needle insertion sites. Patient's vital signs remained stable and tolerated the procedure well. The patient was discharged home in stable condition and will be followed in the pain clinic in the next few weeks for further planning.     Electronically signed by Martín Sorenson MD on 6/8/2021 at 8:46 AM

## 2021-06-09 ENCOUNTER — TELEPHONE (OUTPATIENT)
Dept: PAIN MANAGEMENT | Age: 51
End: 2021-06-09

## 2021-06-10 ENCOUNTER — HOSPITAL ENCOUNTER (OUTPATIENT)
Dept: CT IMAGING | Age: 51
Discharge: HOME | End: 2021-06-12
Payer: COMMERCIAL

## 2021-06-10 ENCOUNTER — TELEPHONE (OUTPATIENT)
Dept: FAMILY MEDICINE CLINIC | Age: 51
End: 2021-06-10

## 2021-06-10 DIAGNOSIS — R59.9 REACTIVE LYMPHADENOPATHY: ICD-10-CM

## 2021-06-10 DIAGNOSIS — K11.8 PAROTID NODULE: Primary | ICD-10-CM

## 2021-06-10 DIAGNOSIS — R93.89 ABNORMAL CT SCAN, NECK: ICD-10-CM

## 2021-06-10 LAB
BUN BLDV-MCNC: 15 MG/DL (ref 6–20)
CREAT SERPL-MCNC: 0.83 MG/DL (ref 0.5–0.9)
GFR AFRICAN AMERICAN: >60 ML/MIN
GFR NON-AFRICAN AMERICAN: >60 ML/MIN
GFR SERPL CREATININE-BSD FRML MDRD: NORMAL ML/MIN/{1.73_M2}
GFR SERPL CREATININE-BSD FRML MDRD: NORMAL ML/MIN/{1.73_M2}

## 2021-06-10 PROCEDURE — 70491 CT SOFT TISSUE NECK W/DYE: CPT

## 2021-06-10 PROCEDURE — 82565 ASSAY OF CREATININE: CPT

## 2021-06-10 PROCEDURE — 84520 ASSAY OF UREA NITROGEN: CPT

## 2021-06-10 PROCEDURE — 2580000003 HC RX 258: Performed by: FAMILY MEDICINE

## 2021-06-10 PROCEDURE — 36415 COLL VENOUS BLD VENIPUNCTURE: CPT

## 2021-06-10 PROCEDURE — 6360000004 HC RX CONTRAST MEDICATION: Performed by: FAMILY MEDICINE

## 2021-06-10 RX ORDER — 0.9 % SODIUM CHLORIDE 0.9 %
80 INTRAVENOUS SOLUTION INTRAVENOUS ONCE
Status: COMPLETED | OUTPATIENT
Start: 2021-06-10 | End: 2021-06-10

## 2021-06-10 RX ORDER — SODIUM CHLORIDE 0.9 % (FLUSH) 0.9 %
10 SYRINGE (ML) INJECTION PRN
Status: DISCONTINUED | OUTPATIENT
Start: 2021-06-10 | End: 2021-06-13 | Stop reason: HOSPADM

## 2021-06-10 RX ADMIN — SODIUM CHLORIDE 80 ML: 9 INJECTION, SOLUTION INTRAVENOUS at 10:59

## 2021-06-10 RX ADMIN — SODIUM CHLORIDE, PRESERVATIVE FREE 10 ML: 5 INJECTION INTRAVENOUS at 11:03

## 2021-06-10 RX ADMIN — IOPAMIDOL 75 ML: 755 INJECTION, SOLUTION INTRAVENOUS at 11:03

## 2021-06-10 NOTE — TELEPHONE ENCOUNTER
Please let the patient know that her neck CT result came in. The radiologist noted that she has a small nodule there is very close to the parotid gland. It Is unsure if this is a parotid nodule. However, the radiologist also noted that this may not be a lymphadenopathy. Therefore, I would love for her to see an ENT specialist.  I did send a referral to Dr. Rosie Scanlon. Patient had his cardiac information with therapy have her follow-up for further evaluation.   Thank you

## 2021-06-14 RX ORDER — OMEPRAZOLE 40 MG/1
CAPSULE, DELAYED RELEASE ORAL
Qty: 90 CAPSULE | Refills: 3 | Status: SHIPPED | OUTPATIENT
Start: 2021-06-14 | End: 2022-06-09

## 2021-06-25 ENCOUNTER — HOSPITAL ENCOUNTER (OUTPATIENT)
Age: 51
Discharge: HOME | End: 2021-06-25
Payer: COMMERCIAL

## 2021-06-25 LAB
HCT VFR BLD CALC: 43 % (ref 36–46)
HEMOGLOBIN: 14.4 G/DL (ref 12–16)
MCH RBC QN AUTO: 29.6 PG (ref 26–34)
MCHC RBC AUTO-ENTMCNC: 33.5 G/DL (ref 31–37)
MCV RBC AUTO: 88.4 FL (ref 80–100)
NRBC AUTOMATED: NORMAL PER 100 WBC
PDW BLD-RTO: 13.6 % (ref 11.5–14.9)
PLATELET # BLD: 308 K/UL (ref 150–450)
PMV BLD AUTO: 7.3 FL (ref 6–12)
RBC # BLD: 4.86 M/UL (ref 4–5.2)
WBC # BLD: 9.3 K/UL (ref 3.5–11)

## 2021-06-25 PROCEDURE — 36415 COLL VENOUS BLD VENIPUNCTURE: CPT

## 2021-06-25 PROCEDURE — 93005 ELECTROCARDIOGRAM TRACING: CPT | Performed by: OBSTETRICS & GYNECOLOGY

## 2021-06-25 PROCEDURE — 85027 COMPLETE CBC AUTOMATED: CPT

## 2021-06-28 LAB
EKG ATRIAL RATE: 70 BPM
EKG P AXIS: 54 DEGREES
EKG P-R INTERVAL: 144 MS
EKG Q-T INTERVAL: 400 MS
EKG QRS DURATION: 86 MS
EKG QTC CALCULATION (BAZETT): 432 MS
EKG R AXIS: 25 DEGREES
EKG T AXIS: 46 DEGREES
EKG VENTRICULAR RATE: 70 BPM

## 2021-07-01 ENCOUNTER — OFFICE VISIT (OUTPATIENT)
Dept: PODIATRY | Age: 51
End: 2021-07-01
Payer: COMMERCIAL

## 2021-07-01 VITALS — HEIGHT: 60 IN | WEIGHT: 217 LBS | BODY MASS INDEX: 42.6 KG/M2

## 2021-07-01 DIAGNOSIS — M77.8 ENTHESOPATHY OF RIGHT FOOT: ICD-10-CM

## 2021-07-01 DIAGNOSIS — M79.671 RIGHT FOOT PAIN: ICD-10-CM

## 2021-07-01 DIAGNOSIS — M79.672 LEFT FOOT PAIN: Primary | ICD-10-CM

## 2021-07-01 DIAGNOSIS — M65.28 CALCIFIC ACHILLES TENDINITIS OF RIGHT LOWER EXTREMITY: ICD-10-CM

## 2021-07-01 DIAGNOSIS — M72.2 PLANTAR FASCIITIS, RIGHT: ICD-10-CM

## 2021-07-01 DIAGNOSIS — M79.2 NEUROPATHIC PAIN OF LEFT FOOT: ICD-10-CM

## 2021-07-01 DIAGNOSIS — M77.31 HEEL SPUR, RIGHT: ICD-10-CM

## 2021-07-01 DIAGNOSIS — M77.51 TENDINITIS OF RIGHT FOOT: ICD-10-CM

## 2021-07-01 PROCEDURE — 1036F TOBACCO NON-USER: CPT | Performed by: PODIATRIST

## 2021-07-01 PROCEDURE — G8417 CALC BMI ABV UP PARAM F/U: HCPCS | Performed by: PODIATRIST

## 2021-07-01 PROCEDURE — G8427 DOCREV CUR MEDS BY ELIG CLIN: HCPCS | Performed by: PODIATRIST

## 2021-07-01 PROCEDURE — 99214 OFFICE O/P EST MOD 30 MIN: CPT | Performed by: PODIATRIST

## 2021-07-01 PROCEDURE — L4360 PNEUMAT WALKING BOOT PRE CST: HCPCS | Performed by: PODIATRIST

## 2021-07-01 ASSESSMENT — ENCOUNTER SYMPTOMS
VOMITING: 0
CONSTIPATION: 0
NAUSEA: 0
DIARRHEA: 0
COLOR CHANGE: 0

## 2021-07-01 NOTE — PROGRESS NOTES
[facility]  New Patient History and Physical    Chief Complaint:   Chief Complaint   Patient presents with    Foot Pain     patient is still having pain in b/l feet        HPI: Sirisha Veliz is a 53 y.o. female who presents to the office today complaining of bilateral foot pain. 1) top of left foot, burning, pins and needles. No changes. Still being seen for her back. Has significant back issues, being treated. No injury, toes 2-4 affected, only on top. No swelling, very sensitive . On Lyrica. She is a diabetic. 2) pain side of right foot, some swelling, rubs on shoes, pain after rest. Wearing clogs, are not helping. The prednisone helped for about 2 weeks. 3) Also pain right heel, in the back, getting worse, no injury. pain in the morning. Had orthotics years ago, not wearing them. They helped in the past.   Currently denies F/C/N/V. Allergies   Allergen Reactions    Adhesive Tape Other (See Comments)     blisters    Imitrex [Sumatriptan] Other (See Comments)     \"feels like head is going to explode    Morphine Itching     hives       Past Medical History:   Diagnosis Date    Anxiety     Asthma     Colon polyp 10/31/2016    sessile serated adenoma x2    Depression     Diabetes mellitus [address] [address]     Diverticulitis 10/2016    Gastritis     GERD (gastroesophageal reflux disease)     Hemorrhoids     int/ext    Hypertension     Migraines     Osteoarthritis     Shingles 1-22-16    Tubular adenoma 10/31/2016    Urinary leakage        Prior to Admission medications    Medication Sig Start Date End Date Taking? Authorizing Provider   Diclofenac Sodium POWD Formula #5: diclo3%+gaba6%+lido2%+prilo2%.  Apply 1-2 gm topically to affected area TID-QID. 7/1/21  Yes Courtney Cortes DPM   omeprazole (PRILOSEC) 40 MG delayed release capsule TAKE 1 CAPSULE DAILY 6/14/21  Yes Marty Cramer APRN - CNP   HYDROcodone-acetaminophen (NORCO) 5-325 MG per tablet Take 1 tablet by mouth 3 times daily as needed for Pain for up to 30 days. Indications: Pain 6/7/21 7/7/21 Yes BAR Egan CNP   TRULICITY 1.5 LT/4.9BU SOPN Inject 1 ampule into the skin once a week 6/2/21  Yes Historical Provider, MD   lisinopril (PRINIVIL;ZESTRIL) 5 MG tablet TAKE 1 TABLET DAILY 5/25/21  Yes BAR Oliva CNP   methylPREDNISolone (MEDROL DOSEPACK) 4 MG tablet Take by mouth. 5/24/21  Yes BAR Oliva CNP   montelukast (SINGULAIR) 10 MG tablet Take 1 tablet by mouth daily 5/24/21  Yes BAR Oliva CNP   trospium (SANCTURA) 20 MG tablet Take 1 tablet by mouth daily 5/18/21  Yes Historical Provider, MD   sertraline (ZOLOFT) 100 MG tablet TAKE 1 TABLET DAILY 5/21/21  Yes BAR Oliva CNP   metFORMIN (GLUCOPHAGE-XR) 750 MG extended release tablet TAKE 1 TABLET DAILY WITH BREAKFAST (PLEASE MAKE APPOINTMENT DR DAVE RETIRED) 5/21/21  Yes BAR Oliva CNP   diclofenac sodium (VOLTAREN) 1 % GEL Apply 2 g topically 2 times daily 5/5/21  Yes Courtney Jaeger, SACHIN   tiZANidine (ZANAFLEX) 4 MG tablet TAKE 1 TABLET EVERY 8 HOURS AS NEEDED FOR SPASMS 5/4/21  Yes BAR Slaughter CNP   pregabalin (LYRICA) 75 MG capsule TAKE 1 CAPSULE FOUR TIMES A DAY 5/4/21 8/2/21 Yes BAR Slaughter CNP   traZODone (DESYREL) 50 MG tablet Take 1 tablet by mouth nightly 4/26/21  Yes BAR Oliva CNP   busPIRone (BUSPAR) 10 MG tablet TAKE 2 TABLET THREE TIMES A DAY 4/19/21  Yes BAR Oliva CNP   fluticasone (FLONASE) 50 MCG/ACT nasal spray USE 1 SPRAY IN EACH NOSTRIL TWICE A DAY 3/26/21  Yes BAR Oliva CNP   estradiol (ESTRACE) 0.1 MG/GM vaginal cream  2/8/21  Yes Historical Provider, MD   Blood Pressure Monitor KIT Use as directed.  9/8/20  Yes BAR Oliva CNP   atorvastatin (LIPITOR) 20 MG tablet TAKE 1 TABLET DAILY 9/8/20  Yes Renella A Gauamis, APRN - CNP   OneTouch Delica Lancets 70J MISC USE 1 EACH TWICE A DAY 9/8/20  Yes BAR Oliva CNP   blood glucose test strips (ASCENSIA AUTODISC VI;ONE TOUCH ULTRA TEST VI) strip 1 each by In Vitro route daily verio test strips, PRN 9/8/20  Yes BAR Oliva CNP   topiramate (TOPAMAX) 100 MG tablet TAKE 1 TABLET IN THE MORNING AND 2 TABLETS IN THE EVENING 2/13/20  Yes BAR Worrell CNP   albuterol sulfate (PROAIR RESPICLICK) 255 (90 Base) MCG/ACT aerosol powder inhalation Inhale 2 puffs into the lungs every 4 hours as needed for Wheezing or Shortness of Breath 2/4/20  Yes Markus Cortes MD   docusate sodium (COLACE) 100 MG capsule Take 1 capsule by mouth daily as needed for Constipation 12/1/17  Yes BAR Hooper CNP   Elastic Bandages & Supports (LUMBAR BACK BRACE/SUPPORT PAD) MISC 1 each by Does not apply route daily as needed (pain) 8/4/17  Yes Margarito Milan MD   Probiotic Product (PROBIOTIC DAILY PO) Take 1 tablet by mouth daily.    Yes Historical Provider, MD       Past Surgical History:   Procedure Laterality Date    CERVICAL DISCECTOMY  12/2013    & fusion     CHOLECYSTECTOMY      COLONOSCOPY  10/31/2016    int/ext hemorrhoids; sessile serated adenomax2; tubular adenoma    COLONOSCOPY N/A 10/22/2019    COLONOSCOPY POLYPECTOMY SNARE/COLD BIOPSY AND RANDOM BIOPSIES performed by Celestina Lopez MD at [place] [id number]  03/04/2021    CYSTOSCOPY HYDRODISTENTION WITH DMSO AND UREAPLASMA , MYCOPLASMA CULTURES    CYSTOSCOPY N/A 3/4/2021    CYSTOSCOPY HYDRODISTENTION WITH DMSO AND UREAPLASMA , MYCOPLASMA CULTURES performed by Catina Foy DO at [address] [place] [address] SURGERY Right     thumb surgery, BONE REMOVED    HYSTERECTOMY      LAPAROSCOPY      VA DEST,PARAVERTEBRAL,L/S,ADDL LVLS  3/25/2019         TONSILLECTOMY      TUBAL LIGATION      UPPER GASTROINTESTINAL ENDOSCOPY  10/31/2016    gastritis    UPPER GASTROINTESTINAL ENDOSCOPY N/A 10/22/2019    EGD BIOPSY performed by Ivis Deutsch MD at [address] [place] [address]       Family History   Problem Relation Age of Onset    Cancer Mother     Heart Disease Father     Diabetes Father     High Blood Pressure Father     Other Other         Celiac Sprue. Aunt       Social History     Tobacco Use    Smoking status: Never Smoker    Smokeless tobacco: Never Used   Substance Use Topics    Alcohol use: No       Review of Systems   Constitutional: Negative for chills, diaphoresis, fatigue, fever and unexpected weight change. Cardiovascular: Negative for leg swelling. Gastrointestinal: Negative for constipation, diarrhea, nausea and vomiting. Musculoskeletal: Positive for gait problem. Negative for arthralgias and joint swelling. Skin: Negative for color change, pallor, rash and wound. Neurological: Negative for weakness and numbness. Lower Extremity Physical Examination:     Vitals: There were no vitals filed for this visit. General: AAO x 3 in NAD. Vascular: DP and PT pulses palpable 2/4, Bilateral.  CFT <3 seconds, Bilateral.  Hair growth absent to the level of the digits, Bilateral.  Edema absent, Bilateral.  Varicosities absent, Bilateral. Erythema absent, Bilateral    Neurological:  Sensation present to light touch to level of digits, Bilateral. Paresthesias present dorsal left foot bases of toes 2, 3, 4, to midshaft of metatarsals. Musculoskeletal: Muscle strength 5/5, Bilateral.  Pain present upon palpation of tip of 5th met tuberosity, medial calcaneal tubercle, insertion of the achilles with prominence at the insertion, Right.  normal medial longitudinal arch, Bilateral.  Ankle ROM normal,Bilateral.  1st MPJ ROM normal, Bilateral.      Integument: Warm, dry, supple, Bilateral.  Open lesion absent, Bilateral.     Radiographs: 3 views right Foot:  3 views foot weightbearing  : no soft tissue deficits, no soft tissue emphysema, no masses, no cortical interruptions, all joints appear well-aligned. Radiographs: 3 views left Foot:  3 views foot weightbearing  : no soft tissue deficits, no soft tissue emphysema, no masses, no cortical interruptions, all joints appear well-aligned. Asessment: Patient is a 53 y.o. female with:   1. Left foot pain    2. Right foot pain    3. Neuropathic pain of left foot    4. Tendinitis of right foot    5. Plantar fasciitis, right    6. Enthesopathy of right foot    7. Calcific Achilles tendinitis of right lower extremity    8. Heel spur, right          Plan: Patient examined and evaluated. Current condition and treatment options discussed in detail. Verbal and written instructions given to patient. Contact office with any questions/problems/concerns. 1) good shoes, discussed tie up shoes  2) may need to revisit custom orthotics  3) Rx [employer] cream, resubmit with new insurance  4) check on custom orthotics. P I feel that these appliances are medically necessary for my patients well being and in my opinion are both reasonable and necessary to the accepted medical practice in the treatment of the above conditions. Custom molded orthotics prevent the over pronation which is leading to excessive tension of the plantar fascia. Abnormal foot/leg functions demands mechanical, functional protections and control. Without custom molded orthotics, the patient may develop chronic pain in her feet. Later on in life, the patient may develop ankle, shin, knee and hip pain as well. In trial usage of felt pads with Lo-Dye ankle strapping to mimic the effects of the orthotics, the patient responded with reduced symptoms and better foot function. Description of the devices: These devices are Root biomechanical orthotic devices made of a plastic polymer with a leather or Spenco-type top cover. These appliances control biomechanical aberrations of the patients feet and accommodate painful areas.   Orthotics are not intended to be worn in lieu of shoes, but are removable devices formed from casts of the patients feet and then sent to an independent laboratory for fabrication. Due to the nature of my patients condition, I feel that this therapy is necessary indefinitely, as this is a form of continuous therapy. This is NOT a convenience item. It is my opinion that these devices will prevent the need for costly hospital surgery, further pain and discomfort. The total fee has been itemized to include biomechanical examination, casting of the feet and actual fabrication by the outside laboratory. 5) I have dispensed a pneumatic equalizer walker. Due to the patient's diagnosis and related symptoms this is medically necessary for the treatment. The function of this device is to restrict and limit motion and provide stabilization and compression to the affected area. This device will allow the patient to slowly increase strength and ROM of the extremity. Specific instructions on weight bearing and ROM exercises were discussed and given to the patient. The goals and function of this device was explained in detail to the patient. Upon gait analysis, the device appeared to be fitting well and the patient states that the device is comfortable at this time. The patient was shown how to properly apply, wear, and care for the device. The patient was able to apply properly and ambulate without distress. At that time, the device was  dispensed, it was suitable for the patient's condition and was not substandard. No guarantees were given and the precautions were reviewed. Written instructions and warranty information was given along with the list of the twenty-one (21) Durable Medical Equipment Supplier Guidelines. All the questions were answered satisfactorily      Discussed her back issues and how this may affect her feet. Discussed weight and weight loss, good shoes and orthotics.      Orders Placed This Encounter   Procedures    CO PNEUMAT WALKING BOOT PRE

## 2021-07-06 ENCOUNTER — HOSPITAL ENCOUNTER (OUTPATIENT)
Dept: PAIN MANAGEMENT | Age: 51
Discharge: HOME | End: 2021-07-06
Payer: COMMERCIAL

## 2021-07-06 DIAGNOSIS — M51.36 DEGENERATION OF LUMBAR INTERVERTEBRAL DISC: Chronic | ICD-10-CM

## 2021-07-06 DIAGNOSIS — M47.817 LUMBOSACRAL SPONDYLOSIS WITHOUT MYELOPATHY: ICD-10-CM

## 2021-07-06 DIAGNOSIS — M54.50 CHRONIC BILATERAL LOW BACK PAIN WITHOUT SCIATICA: ICD-10-CM

## 2021-07-06 DIAGNOSIS — Z79.899 ENCOUNTER FOR MEDICATION MANAGEMENT: ICD-10-CM

## 2021-07-06 DIAGNOSIS — G89.29 CHRONIC BILATERAL LOW BACK PAIN WITHOUT SCIATICA: ICD-10-CM

## 2021-07-06 DIAGNOSIS — M47.816 ARTHROPATHY OF LUMBAR FACET JOINT: ICD-10-CM

## 2021-07-06 DIAGNOSIS — M54.16 LUMBAR RADICULOPATHY, CHRONIC: Primary | Chronic | ICD-10-CM

## 2021-07-06 PROCEDURE — 99213 OFFICE O/P EST LOW 20 MIN: CPT

## 2021-07-06 PROCEDURE — 99213 OFFICE O/P EST LOW 20 MIN: CPT | Performed by: NURSE PRACTITIONER

## 2021-07-06 RX ORDER — HYDROCODONE BITARTRATE AND ACETAMINOPHEN 5; 325 MG/1; MG/1
1 TABLET ORAL 3 TIMES DAILY PRN
Qty: 90 TABLET | Refills: 0 | Status: SHIPPED | OUTPATIENT
Start: 2021-07-08 | End: 2021-08-03 | Stop reason: SDUPTHER

## 2021-07-06 ASSESSMENT — ENCOUNTER SYMPTOMS
SHORTNESS OF BREATH: 0
CONSTIPATION: 0
BACK PAIN: 1
COUGH: 0
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
## 2021-07-08 DIAGNOSIS — F51.04 PSYCHOPHYSIOLOGICAL INSOMNIA: ICD-10-CM

## 2021-07-08 RX ORDER — TRAZODONE HYDROCHLORIDE 50 MG/1
TABLET ORAL
Qty: 30 TABLET | Refills: 11 | Status: SHIPPED | OUTPATIENT
Start: 2021-07-08 | End: 2022-04-04 | Stop reason: ALTCHOICE

## 2021-07-08 NOTE — TELEPHONE ENCOUNTER
Please Approve or Refuse.   Send to Pharmacy per Pt's Request:      Next Visit Date:  9/24/2021   Last Visit Date: 5/24/2021    Hemoglobin A1C (%)   Date Value   05/24/2021 5.9   01/08/2021 5.3   09/10/2020 7.2 (H)             ( goal A1C is < 7)   BP Readings from Last 3 Encounters:   06/08/21 97/62   05/24/21 128/80   04/19/21 110/80          (goal 120/80)  BUN   Date Value Ref Range Status   06/10/2021 15 6 - 20 mg/dL Final     CREATININE   Date Value Ref Range Status   06/10/2021 0.83 0.50 - 0.90 mg/dL Final     Potassium   Date Value Ref Range Status   09/10/2020 4.2 3.7 - 5.3 mmol/L Final

## 2021-07-18 ENCOUNTER — APPOINTMENT (OUTPATIENT)
Dept: CT IMAGING | Age: 51
End: 2021-07-18
Payer: COMMERCIAL

## 2021-07-18 ENCOUNTER — HOSPITAL ENCOUNTER (EMERGENCY)
Age: 51
Discharge: HOME | End: 2021-07-18
Attending: STUDENT IN AN ORGANIZED HEALTH CARE EDUCATION/TRAINING PROGRAM
Payer: COMMERCIAL

## 2021-07-18 VITALS
HEART RATE: 93 BPM | RESPIRATION RATE: 16 BRPM | HEIGHT: 60 IN | DIASTOLIC BLOOD PRESSURE: 88 MMHG | WEIGHT: 212 LBS | OXYGEN SATURATION: 97 % | TEMPERATURE: 98.1 F | BODY MASS INDEX: 41.62 KG/M2 | SYSTOLIC BLOOD PRESSURE: 126 MMHG

## 2021-07-18 DIAGNOSIS — L08.9 WOUND INFECTION: Primary | ICD-10-CM

## 2021-07-18 DIAGNOSIS — L03.311 CELLULITIS OF ABDOMINAL WALL: ICD-10-CM

## 2021-07-18 DIAGNOSIS — T14.8XXA WOUND INFECTION: Primary | ICD-10-CM

## 2021-07-18 LAB
-: ABNORMAL
ABSOLUTE EOS #: 0.2 K/UL (ref 0–0.4)
ABSOLUTE IMMATURE GRANULOCYTE: ABNORMAL K/UL (ref 0–0.3)
ABSOLUTE LYMPH #: 3.5 K/UL (ref 1–4.8)
ABSOLUTE MONO #: 0.7 K/UL (ref 0.1–1.3)
ALBUMIN SERPL-MCNC: 3.8 G/DL (ref 3.5–5.2)
ALBUMIN/GLOBULIN RATIO: ABNORMAL (ref 1–2.5)
ALP BLD-CCNC: 91 U/L (ref 35–104)
ALT SERPL-CCNC: 12 U/L (ref 5–33)
AMORPHOUS: ABNORMAL
ANION GAP SERPL CALCULATED.3IONS-SCNC: 12 MMOL/L (ref 9–17)
AST SERPL-CCNC: 11 U/L
BACTERIA: ABNORMAL
BASOPHILS # BLD: 0 % (ref 0–2)
BASOPHILS ABSOLUTE: 0 K/UL (ref 0–0.2)
BILIRUB SERPL-MCNC: 0.2 MG/DL (ref 0.3–1.2)
BILIRUBIN URINE: NEGATIVE
BUN BLDV-MCNC: 15 MG/DL (ref 6–20)
BUN/CREAT BLD: ABNORMAL (ref 9–20)
CALCIUM SERPL-MCNC: 9.2 MG/DL (ref 8.6–10.4)
CASTS UA: ABNORMAL /LPF
CHLORIDE BLD-SCNC: 105 MMOL/L (ref 98–107)
CO2: 21 MMOL/L (ref 20–31)
COLOR: YELLOW
COMMENT UA: ABNORMAL
CREAT SERPL-MCNC: 0.75 MG/DL (ref 0.5–0.9)
CRYSTALS, UA: ABNORMAL /HPF
DIFFERENTIAL TYPE: ABNORMAL
EOSINOPHILS RELATIVE PERCENT: 2 % (ref 0–4)
EPITHELIAL CELLS UA: ABNORMAL /HPF
GFR AFRICAN AMERICAN: >60 ML/MIN
GFR NON-AFRICAN AMERICAN: >60 ML/MIN
GFR SERPL CREATININE-BSD FRML MDRD: ABNORMAL ML/MIN/{1.73_M2}
GFR SERPL CREATININE-BSD FRML MDRD: ABNORMAL ML/MIN/{1.73_M2}
GLUCOSE BLD-MCNC: 119 MG/DL (ref 70–99)
GLUCOSE URINE: NEGATIVE
HCT VFR BLD CALC: 41.1 % (ref 36–46)
HEMOGLOBIN: 13.4 G/DL (ref 12–16)
IMMATURE GRANULOCYTES: ABNORMAL %
KETONES, URINE: NEGATIVE
LEUKOCYTE ESTERASE, URINE: ABNORMAL
LIPASE: 31 U/L (ref 13–60)
LYMPHOCYTES # BLD: 37 % (ref 24–44)
MCH RBC QN AUTO: 28.7 PG (ref 26–34)
MCHC RBC AUTO-ENTMCNC: 32.5 G/DL (ref 31–37)
MCV RBC AUTO: 88.3 FL (ref 80–100)
MONOCYTES # BLD: 8 % (ref 1–7)
MUCUS: ABNORMAL
NITRITE, URINE: NEGATIVE
NRBC AUTOMATED: ABNORMAL PER 100 WBC
OTHER OBSERVATIONS UA: ABNORMAL
PDW BLD-RTO: 13.1 % (ref 11.5–14.9)
PH UA: 5 (ref 5–8)
PLATELET # BLD: 316 K/UL (ref 150–450)
PLATELET ESTIMATE: ABNORMAL
PMV BLD AUTO: 7.3 FL (ref 6–12)
POTASSIUM SERPL-SCNC: 4 MMOL/L (ref 3.7–5.3)
PROTEIN UA: NEGATIVE
RBC # BLD: 4.66 M/UL (ref 4–5.2)
RBC # BLD: ABNORMAL 10*6/UL
RBC UA: ABNORMAL /HPF
RENAL EPITHELIAL, UA: ABNORMAL /HPF
SEG NEUTROPHILS: 53 % (ref 36–66)
SEGMENTED NEUTROPHILS ABSOLUTE COUNT: 4.9 K/UL (ref 1.3–9.1)
SODIUM BLD-SCNC: 138 MMOL/L (ref 135–144)
SPECIFIC GRAVITY UA: 1.02 (ref 1–1.03)
TOTAL PROTEIN: 6.5 G/DL (ref 6.4–8.3)
TRICHOMONAS: ABNORMAL
TURBIDITY: CLEAR
URINE HGB: NEGATIVE
UROBILINOGEN, URINE: NORMAL
WBC # BLD: 9.4 K/UL (ref 3.5–11)
WBC # BLD: ABNORMAL 10*3/UL
WBC UA: ABNORMAL /HPF
YEAST: ABNORMAL

## 2021-07-18 PROCEDURE — 6360000002 HC RX W HCPCS: Performed by: STUDENT IN AN ORGANIZED HEALTH CARE EDUCATION/TRAINING PROGRAM

## 2021-07-18 PROCEDURE — 2580000003 HC RX 258: Performed by: STUDENT IN AN ORGANIZED HEALTH CARE EDUCATION/TRAINING PROGRAM

## 2021-07-18 PROCEDURE — 6360000004 HC RX CONTRAST MEDICATION: Performed by: STUDENT IN AN ORGANIZED HEALTH CARE EDUCATION/TRAINING PROGRAM

## 2021-07-18 PROCEDURE — 99284 EMERGENCY DEPT VISIT MOD MDM: CPT

## 2021-07-18 PROCEDURE — 80053 COMPREHEN METABOLIC PANEL: CPT

## 2021-07-18 PROCEDURE — 36415 COLL VENOUS BLD VENIPUNCTURE: CPT

## 2021-07-18 PROCEDURE — 74177 CT ABD & PELVIS W/CONTRAST: CPT

## 2021-07-18 PROCEDURE — 96374 THER/PROPH/DIAG INJ IV PUSH: CPT

## 2021-07-18 PROCEDURE — 96372 THER/PROPH/DIAG INJ SC/IM: CPT

## 2021-07-18 PROCEDURE — 6370000000 HC RX 637 (ALT 250 FOR IP): Performed by: STUDENT IN AN ORGANIZED HEALTH CARE EDUCATION/TRAINING PROGRAM

## 2021-07-18 PROCEDURE — 83690 ASSAY OF LIPASE: CPT

## 2021-07-18 PROCEDURE — 81001 URINALYSIS AUTO W/SCOPE: CPT

## 2021-07-18 PROCEDURE — 85025 COMPLETE CBC W/AUTO DIFF WBC: CPT

## 2021-07-18 RX ORDER — FENTANYL CITRATE 50 UG/ML
50 INJECTION, SOLUTION INTRAMUSCULAR; INTRAVENOUS ONCE
Status: COMPLETED | OUTPATIENT
Start: 2021-07-18 | End: 2021-07-18

## 2021-07-18 RX ORDER — 0.9 % SODIUM CHLORIDE 0.9 %
80 INTRAVENOUS SOLUTION INTRAVENOUS ONCE
Status: COMPLETED | OUTPATIENT
Start: 2021-07-18 | End: 2021-07-18

## 2021-07-18 RX ORDER — SULFAMETHOXAZOLE AND TRIMETHOPRIM 800; 160 MG/1; MG/1
1 TABLET ORAL 2 TIMES DAILY
Qty: 14 TABLET | Refills: 0 | Status: SHIPPED | OUTPATIENT
Start: 2021-07-18 | End: 2021-07-25

## 2021-07-18 RX ORDER — ONDANSETRON 2 MG/ML
4 INJECTION INTRAMUSCULAR; INTRAVENOUS ONCE
Status: COMPLETED | OUTPATIENT
Start: 2021-07-18 | End: 2021-07-18

## 2021-07-18 RX ORDER — SODIUM CHLORIDE 0.9 % (FLUSH) 0.9 %
10 SYRINGE (ML) INJECTION PRN
Status: DISCONTINUED | OUTPATIENT
Start: 2021-07-18 | End: 2021-07-18 | Stop reason: HOSPADM

## 2021-07-18 RX ORDER — SULFAMETHOXAZOLE AND TRIMETHOPRIM 800; 160 MG/1; MG/1
1 TABLET ORAL ONCE
Status: COMPLETED | OUTPATIENT
Start: 2021-07-18 | End: 2021-07-18

## 2021-07-18 RX ORDER — FENTANYL CITRATE 50 UG/ML
50 INJECTION, SOLUTION INTRAMUSCULAR; INTRAVENOUS ONCE
Status: DISCONTINUED | OUTPATIENT
Start: 2021-07-18 | End: 2021-07-18

## 2021-07-18 RX ADMIN — SULFAMETHOXAZOLE AND TRIMETHOPRIM 1 TABLET: 800; 160 TABLET ORAL at 19:47

## 2021-07-18 RX ADMIN — IOPAMIDOL 75 ML: 755 INJECTION, SOLUTION INTRAVENOUS at 17:12

## 2021-07-18 RX ADMIN — SODIUM CHLORIDE, PRESERVATIVE FREE 10 ML: 5 INJECTION INTRAVENOUS at 17:12

## 2021-07-18 RX ADMIN — ONDANSETRON 4 MG: 2 INJECTION INTRAMUSCULAR; INTRAVENOUS at 15:57

## 2021-07-18 RX ADMIN — SODIUM CHLORIDE 80 ML: 9 INJECTION, SOLUTION INTRAVENOUS at 17:12

## 2021-07-18 RX ADMIN — FENTANYL CITRATE 50 MCG: 0.05 INJECTION, SOLUTION INTRAMUSCULAR; INTRAVENOUS at 17:36

## 2021-07-18 ASSESSMENT — PAIN SCALES - GENERAL
PAINLEVEL_OUTOF10: 5
PAINLEVEL_OUTOF10: 6

## 2021-07-18 ASSESSMENT — PAIN DESCRIPTION - LOCATION: LOCATION: ABDOMEN

## 2021-07-18 ASSESSMENT — ENCOUNTER SYMPTOMS
ABDOMINAL DISTENTION: 1
NAUSEA: 1
ANAL BLEEDING: 0
SORE THROAT: 0
VOMITING: 0
SHORTNESS OF BREATH: 0
COUGH: 0

## 2021-07-18 ASSESSMENT — PAIN DESCRIPTION - PAIN TYPE: TYPE: ACUTE PAIN

## 2021-07-18 NOTE — ED NOTES
Mode of arrival (squad #, walk in, police, etc) : walk-in        Chief complaint(s): wound check        Arrival Note (brief scenario, treatment PTA, etc). : Patient arrived to ED this afternoon with c/o wound drainage and pain. Patient had lap appendectomy and removal of adhesions 7/8/21 at [facility]. Patient reports new drainage and pain starting on Friday 0/30/75 at umbilical lap site. Patient denies any fevers but states she's taking oral pain medication around the clock. Patient denies any chest pain or sob.         Colon Lines, RN  07/18/21 [id number]

## 2021-07-18 NOTE — ED PROVIDER NOTES
[address] ED  Emergency Department Encounter  Emergency Medicine Resident     Pt Name: Danie Engle  MRN: [id number]  [place] 1970  Date of evaluation: 7/18/21  PCP:  BAR Wright CNP    CHIEF COMPLAINT       Chief Complaint   Patient presents with    Wound Infection       HISTORY OFPRESENT ILLNESS  (Location/Symptom, Timing/Onset, Context/Setting, Quality, Duration, Modifying Factors,Severity.)      Danie Engle is a 53 y. o.yo female who presents with complaints of abdominal wound infection. Patient states that she is postop day #10 from laparoscopic appendectomy with lysis of adhesions at [facility] on 7/8/2021. States that her wound has not been improperly. She did follow-up with her surgeon last week who recommended that she wean her umbilicus due to it having moisture. Patient states that she accidentally probed the wound using Q-tip multiple times. Patient complains of nausea with abdominal pain but no vomiting, changes in her bowel movements. She says she is urinating without any issues. Denies fever/chills. Chart review: Unable to obtain [facility] PAST MEDICAL / SURGICAL / SOCIAL / FAMILY HISTORY      has a past medical history of Anxiety, Asthma, Colon polyp, Depression, Diabetes mellitus ([address] Diverticulitis, Gastritis, GERD (gastroesophageal reflux disease), Hemorrhoids, Hypertension, Migraines, Osteoarthritis, Shingles, Tubular adenoma, and Urinary leakage. has a past surgical history that includes Hysterectomy; laparoscopy; Tonsillectomy; Hand surgery (Right); Tubal ligation; Cervical discectomy (12/2013); Cholecystectomy; Dilation and curettage of uterus; Colonoscopy (10/31/2016); Upper gastrointestinal endoscopy (10/31/2016); pr dest,paravertebral,l/s,addl lvls (3/25/2019); Colonoscopy (N/A, 10/22/2019); Upper gastrointestinal endoscopy (N/A, 10/22/2019); Cystocopy (03/04/2021); and Cystoscopy (N/A, 3/4/2021).      Social History Socioeconomic History    Marital status:      Spouse name: Not on file    Number of children: Not on file    Years of education: Not on file    Highest education level: Not on file   Occupational History    Occupation: unemployed   Tobacco Use    Smoking status: Never Smoker    Smokeless tobacco: Never Used   Vaping Use    Vaping Use: Never used   Substance and Sexual Activity    Alcohol use: No    Drug use: No    Sexual activity: Yes   Other Topics Concern    Not on file   Social History Narrative    Not on file     Social Determinants of Health     Financial Resource Strain:     Difficulty of Paying Living Expenses:    Food Insecurity:     Worried About [employer] [employer] in the Last Year:     [address] [employer] in the Last Year:    Transportation Needs:     Lack of Transportation (Medical):  Lack of Transportation (Non-Medical):    Physical Activity:     Days of Exercise per Week:     Minutes of Exercise per Session:    Stress:     Feeling of Stress :    Social Connections:     Frequency of Communication with Friends and Family:     Frequency of Social Gatherings with Friends and Family:     Attends Buddhist Services:     Active Member of Clubs or Organizations:     Attends Club or Organization Meetings:     Marital Status:    Intimate Partner Violence:     Fear of Current or Ex-Partner:     Emotionally Abused:     Physically Abused:     Sexually Abused:        Family History   Problem Relation Age of Onset    Cancer Mother     Heart Disease Father     Diabetes Father     High Blood Pressure Father     Other Other         Celiac Sprue. Aunt        Allergies:  Adhesive tape, Imitrex [sumatriptan], and Morphine    Home Medications:  Prior to Admission medications    Medication Sig Start Date End Date Taking?  Authorizing Provider   sulfamethoxazole-trimethoprim (BACTRIM DS) 800-160 MG per tablet Take 1 tablet by mouth 2 times daily for 7 days 7/18/21 7/25/21 Yes Marco Hernández MD   traZODone (DESYREL) 50 MG tablet TAKE 1 TABLET NIGHTLY 7/8/21   BAR Oliva CNP   HYDROcodone-acetaminophen (NORCO) 5-325 MG per tablet Take 1 tablet by mouth 3 times daily as needed for Pain for up to 30 days. Indications: Pain 7/8/21 8/7/21  BAR Downey CNP   Diclofenac Sodium POWD Formula #5: diclo3%+gaba6%+lido2%+prilo2%.  Apply 1-2 gm topically to affected area TID-QID. 7/1/21   Alba Castro DPM   omeprazole (PRILOSEC) 40 MG delayed release capsule TAKE 1 CAPSULE DAILY 6/14/21   BAR Oliva CNP   TRULICITY 1.5 BI/5.5CY SOPN Inject 1 ampule into the skin once a week 6/2/21   Historical Provider, MD   lisinopril (PRINIVIL;ZESTRIL) 5 MG tablet TAKE 1 TABLET DAILY 5/25/21   BAR Oliva CNP   methylPREDNISolone (MEDROL DOSEPACK) 4 MG tablet Take by mouth. 5/24/21   BAR Oliva CNP   montelukast (SINGULAIR) 10 MG tablet Take 1 tablet by mouth daily 5/24/21   BAR Oliva CNP   trospium (SANCTURA) 20 MG tablet Take 1 tablet by mouth daily 5/18/21   Historical Provider, MD   sertraline (ZOLOFT) 100 MG tablet TAKE 1 TABLET DAILY 5/21/21   BAR Oliva CNP   metFORMIN (GLUCOPHAGE-XR) 750 MG extended release tablet TAKE 1 TABLET DAILY WITH BREAKFAST (PLEASE MAKE APPOINTMENT DR DAVE RETIRED) 5/21/21   BAR Oliva CNP   diclofenac sodium (VOLTAREN) 1 % GEL Apply 2 g topically 2 times daily 5/5/21   Alba Castro DPM   tiZANidine (ZANAFLEX) 4 MG tablet TAKE 1 TABLET EVERY 8 HOURS AS NEEDED FOR SPASMS 5/4/21   BAR Downey CNP   pregabalin (LYRICA) 75 MG capsule TAKE 1 CAPSULE FOUR TIMES A DAY 5/4/21 8/2/21  BAR Downey CNP   busPIRone (BUSPAR) 10 MG tablet TAKE 2 TABLET THREE TIMES A DAY 4/19/21   BAR Oliva CNP   fluticasone (FLONASE) 50 MCG/ACT nasal spray USE 1 SPRAY IN EACH NOSTRIL TWICE A DAY 3/26/21   Marty Cramer, APRN - CNP   estradiol (ESTRACE) 0.1 MG/GM vaginal cream  2/8/21   Historical Provider, MD   Blood Pressure Monitor KIT Use as directed. 9/8/20   BAR Oliva CNP   atorvastatin (LIPITOR) 20 MG tablet TAKE 1 TABLET DAILY 9/8/20   BAR Oliva CNP   OneTouch Delica Lancets 12L MISC USE 1 EACH TWICE A DAY 9/8/20   BAR Oliva CNP   blood glucose test strips (ASCENSIA AUTODISC VI;ONE TOUCH ULTRA TEST VI) strip 1 each by In Vitro route daily verio test strips, PRN 9/8/20   BAR Oliva CNP   topiramate (TOPAMAX) 100 MG tablet TAKE 1 TABLET IN THE MORNING AND 2 TABLETS IN THE EVENING 2/13/20   Cleatus BAR Medina CNP   albuterol sulfate (PROAIR RESPICLICK) 440 (90 Base) MCG/ACT aerosol powder inhalation Inhale 2 puffs into the lungs every 4 hours as needed for Wheezing or Shortness of Breath 2/4/20   Abelardo Tejeda MD   docusate sodium (COLACE) 100 MG capsule Take 1 capsule by mouth daily as needed for Constipation 12/1/17   BAR Lynch CNP   Elastic Bandages & Supports (LUMBAR BACK BRACE/SUPPORT PAD) MISC 1 each by Does not apply route daily as needed (pain) 8/4/17   Rosa Steen MD   Probiotic Product (PROBIOTIC DAILY PO) Take 1 tablet by mouth daily. Historical Provider, MD       REVIEW OFSYSTEMS    (2-9 systems for level 4, 10 or more for level 5)      Review of Systems   Constitutional: Negative for chills, fatigue and fever. HENT: Negative for sneezing and sore throat. Respiratory: Negative for cough and shortness of breath. Gastrointestinal: Positive for abdominal distention and nausea. Negative for anal bleeding and vomiting. Genitourinary: Negative for dysuria, flank pain and urgency. Musculoskeletal: Negative for neck pain and neck stiffness. Skin: Positive for wound. Negative for rash. Neurological: Negative for light-headedness and headaches.    Psychiatric/Behavioral: Negative for behavioral problems and confusion. PHYSICAL EXAM   (up to 7 for level 4, 8 or more forlevel 5)      INITIAL VITALS:   ED Triage Vitals [07/18/21 1451]   BP Temp Temp Source Pulse Resp SpO2 Height Weight   126/88 98.1 °F (36.7 °C) Oral 93 -- 97 % 5' (1.524 m) 212 lb (96.2 kg)       Physical Exam  Constitutional:       Appearance: She is obese. HENT:      Head: Normocephalic and atraumatic. Nose: Nose normal.      Mouth/Throat:      Mouth: Mucous membranes are moist.   Eyes:      Extraocular Movements: Extraocular movements intact. Pupils: Pupils are equal, round, and reactive to light. Cardiovascular:      Rate and Rhythm: Normal rate and regular rhythm. Pulmonary:      Effort: No respiratory distress. Abdominal:      Palpations: Abdomen is soft. Tenderness: There is abdominal tenderness. There is rebound. Musculoskeletal:         General: No swelling or tenderness. Cervical back: No rigidity or tenderness. Skin:     Capillary Refill: Capillary refill takes less than 2 seconds. Findings: No erythema or lesion. Neurological:      General: No focal deficit present. Mental Status: She is alert and oriented to person, place, and time.    Psychiatric:         Mood and Affect: Mood normal.         Behavior: Behavior normal.         DIFFERENTIAL  DIAGNOSIS     PLAN (LABS / IMAGING / EKG):  Orders Placed This Encounter   Procedures    CT ABDOMEN PELVIS W IV CONTRAST Additional Contrast? None    CBC Auto Differential    Lipase    Comprehensive Metabolic Panel    Urinalysis with Microscopic    Inpatient consult to General Surgery    Insert peripheral IV       MEDICATIONS ORDERED:  Orders Placed This Encounter   Medications    ondansetron (ZOFRAN) injection 4 mg    DISCONTD: fentaNYL (SUBLIMAZE) injection 50 mcg    0.9 % sodium chloride bolus    DISCONTD: sodium chloride flush 0.9 % injection 10 mL    iopamidol (ISOVUE-370) 76 % injection 75 mL    fentaNYL (SUBLIMAZE) injection 50 mcg  sulfamethoxazole-trimethoprim (BACTRIM DS) 800-160 MG per tablet     Sig: Take 1 tablet by mouth 2 times daily for 7 days     Dispense:  14 tablet     Refill:  0    sulfamethoxazole-trimethoprim (BACTRIM DS;SEPTRA DS) 800-160 MG per tablet 1 tablet     Order Specific Question:   Antimicrobial Indications     Answer:   Skin and Soft Tissue Infection       DDX: Wound abscess versus UTI    Initial MDM/Plan: 53 y.o. female who presents with complaints of periumbilical pain secondary to her wound infection. Patient vitals are stable, she is afebrile in no acute distress. Her abdomen is soft however she is tender with rebound in addition to exquisite periumbilical tenderness. Wound alissa-umbilical appears infected with pus drainage and erythematous. Because patient has probed multiple times, concern is periumbilical abscess. And thus will obtain CT abdomen pelvis to rule out. We will also obtain, labs including lites, CMP, urinalysis. Patient disposition pending labs and images. Patient given pain control and antiemetics.        DIAGNOSTIC RESULTS / EMERGENCYDEPARTMENT COURSE / MDM     LABS:  Labs Reviewed   CBC WITH AUTO DIFFERENTIAL - Abnormal; Notable for the following components:       Result Value    Monocytes 8 (*)     All other components within normal limits   COMPREHENSIVE METABOLIC PANEL - Abnormal; Notable for the following components:    Glucose 119 (*)     Total Bilirubin 0.20 (*)     All other components within normal limits   URINALYSIS WITH MICROSCOPIC - Abnormal; Notable for the following components:    Leukocyte Esterase, Urine TRACE (*)     Bacteria, UA FEW (*)     All other components within normal limits   LIPASE         RADIOLOGY:  CT ABDOMEN PELVIS W IV CONTRAST Additional Contrast? None    Addendum Date: 7/18/2021    ADDENDUM: Impression #1 should read as follows: Interval development of subcutaneous fat infiltration and small NON LOCULATED fluid collection at the level of the umbilicus most likely representing cellulitis and infected fluid. Result Date: 7/18/2021  EXAMINATION: CT OF THE ABDOMEN AND PELVIS WITH CONTRAST 7/18/2021 5:03 pm TECHNIQUE: CT of the abdomen and pelvis was performed with the administration of intravenous contrast. Multiplanar reformatted images are provided for review. Dose modulation, iterative reconstruction, and/or weight based adjustment of the mA/kV was utilized to reduce the radiation dose to as low as reasonably achievable. COMPARISON: 02/09/2021 HISTORY: ORDERING SYSTEM PROVIDED HISTORY: concern for abscess TECHNOLOGIST PROVIDED HISTORY: concern for abscess Decision Support Exception - unselect if not a suspected or confirmed emergency medical condition->Emergency Medical Condition (MA) Reason for Exam: concern for abscess Acuity: Unknown Type of Exam: Unknown Relevant Medical/Surgical History: patient had appendectomy 7/8/21 and states the incision near her belly button wont heal and is having incresed drainage from site FINDINGS: Lower Chest: The visualized heart and lungs show no acute abnormalities. Organs: Cholecystectomy. Liver, spleen, pancreas, kidneys, adrenal glands show no significant abnormalities. GI/Bowel: Stomach grossly normal.  Small bowel shows no obstruction or focal lesions. Appendectomy. No acute process in the colon. Pelvis: Hysterectomy. Urinary bladder unremarkable. Peritoneum/Retroperitoneum: No free intraperitoneal fluid or significant lymphadenopathy. Bones/Soft Tissues: Interval development of infiltration and small irregular fluid collection in the umbilical subcutaneous tissues. Most likely infected fluid. No discrete drainable localized collection. Fluid appears to be tracking along the soft tissues. No acute osseous abnormality. 1. Interval development of subcutaneous fat infiltration and small no localized fluid collection at the level of umbilicus likely representing cellulitis and infected fluid.  2. No acute intra-abdominal process. EKG      All EKG's are interpreted by the Emergency Department Physicianwho either signs or Co-signs this chart in the absence of a cardiologist.    EMERGENCY DEPARTMENT COURSE:  ED Course as of Jul 19 0109   Sun Jul 18, 2021   1804 CT abd/pelvis shows interval development of subcutaneous fat infiltration and small localized fluid collection at the level of umbilicus likely representing cellulitis and infected fluid. Patient updated on results. Will contact patient surgeon Dr. Noble Davis at [place] [address].    [AN]   2063 With Dr. Isaac Goldberg, who is covering for Dr. Brooke Ledbetter. Recommended for patient to be started on antibiotics, using a probe to increase the size of wound and closing it with 4 x 4 dressing. Patient to call the office tomorrow to make an appointment to be seen sooner.    Yi Huerta with Dr. Isaac Goldberg, mentioned that we did not feel comfortable being the wound. We will start the patient on antibiotic and she should follow-up with surgeon within 48 hours. [AN]      ED Course User Index  [AN] Noreen Whelan MD          PROCEDURES:  None    CONSULTS:  IP CONSULT TO GENERAL SURGERY    CRITICAL CARE:      FINAL IMPRESSION      1. Wound infection    2.  Cellulitis of abdominal wall          DISPOSITION / PLAN     DISPOSITION        PATIENT REFERRED TO:  [facility]  [facility] [address]  [address] [facility] [address]  [phone]    If symptoms worsen    Maudie Coma, APRN - CNP  [address]  [address]  [address] [facility] [address]  [phone]      As needed      DISCHARGE MEDICATIONS:  Discharge Medication List as of 7/18/2021  7:40 PM      START taking these medications    Details   sulfamethoxazole-trimethoprim (BACTRIM DS) 800-160 MG per tablet Take 1 tablet by mouth 2 times daily for 7 days, Disp-14 tablet, R-0Print             Noreen Whelan MD  Emergency Medicine Resident    (Please note that portions of this note were completed with a voice recognition program.Efforts were made to edit the dictations but occasionally words are mis-transcribed.)        Elizabeth Guardado MD  Resident  07/19/21 3336

## 2021-07-18 NOTE — ED PROVIDER NOTES
EMERGENCY DEPARTMENT ENCOUNTER   ATTENDING ATTESTATION     Pt Name: Twanda Goldmann  MRN: [id number]  [place] 1970  Date of evaluation: 7/18/21       Twanda Goldmann is a 53 y.o. female who presents with Wound Infection      MDM:   52-year-old female about 10 days postop from lysis of adhesions, laparoscopic appendectomy presented for evaluation with some periumbilical pain, discharge, drainage, redness, abdominal pain and vomiting. Work-up for postop infection, seroma. Signs of post-op infection on CT scan, discussed with patient's surgeon will discharge with close follow up and outpatient antibiotics. Vitals:   Vitals:    07/18/21 1451   BP: 126/88   Pulse: 93   Resp: 16   Temp: 98.1 °F (36.7 °C)   TempSrc: Oral   SpO2: 97%   Weight: 212 lb (96.2 kg)   Height: 5' (1.524 m)         I personally evaluated and examined the patient in conjunction with the resident and agree with the assessment, treatment plan, and disposition of the patient as recorded by the resident. I performed a history and physical examination of the patient and discussed management with the resident. I reviewed the residents note and agree with the documented findings and plan of care. Any areas of disagreement are noted on the chart. I was personally present for the key portions of any procedures. I have documented in the chart those procedures where I was not present during the key portions. I have personally reviewed all images and agree with the resident's interpretation. I have reviewed the emergency nurses triage note. I agree with the chief complaint, past medical history, past surgical history, allergies, medications, social and family history as documented unless otherwise noted.     Haris Hurd MD  Attending Emergency Physician            Haris Hurd MD  07/18/21 2004

## 2021-07-19 ENCOUNTER — TELEPHONE (OUTPATIENT)
Dept: FAMILY MEDICINE CLINIC | Age: 51
End: 2021-07-19

## 2021-07-19 NOTE — TELEPHONE ENCOUNTER
[facility] ([facility]) ED Follow up Call          FU appts/Provider:    Future Appointments   Date Time Provider Javier Carlos   8/3/2021  3:20 PM BAR Tobin CNP [address]   9/24/2021 11:15 AM BAR Oliva CNP [facility]       VOICEMAIL DOCUMENTATION - ERASE IF NOT USED  Hi, this message is for  Iqra  This is Talisha from [employer] office. Just calling to see how you are doing after your recent visit to the Emergency Room. [employer] wants to make sure you were able to fill any prescriptions and that you understand your discharge instructions. Please return our call if you need to make a follow up appointment with your provider or have any further needs. Our phone number is [phone]. Have a great day.

## 2021-08-03 ENCOUNTER — HOSPITAL ENCOUNTER (OUTPATIENT)
Dept: PAIN MANAGEMENT | Age: 51
Discharge: HOME | End: 2021-08-03
Payer: COMMERCIAL

## 2021-08-03 DIAGNOSIS — G89.29 CHRONIC LEFT-SIDED LOW BACK PAIN WITH LEFT-SIDED SCIATICA: ICD-10-CM

## 2021-08-03 DIAGNOSIS — M47.816 ARTHROPATHY OF LUMBAR FACET JOINT: ICD-10-CM

## 2021-08-03 DIAGNOSIS — M54.16 LUMBAR RADICULOPATHY, CHRONIC: Primary | ICD-10-CM

## 2021-08-03 DIAGNOSIS — M54.42 CHRONIC LEFT-SIDED LOW BACK PAIN WITH LEFT-SIDED SCIATICA: ICD-10-CM

## 2021-08-03 DIAGNOSIS — Z79.899 ENCOUNTER FOR MEDICATION MANAGEMENT: ICD-10-CM

## 2021-08-03 DIAGNOSIS — M47.816 OSTEOARTHRITIS OF LUMBAR SPINE, UNSPECIFIED SPINAL OSTEOARTHRITIS COMPLICATION STATUS: ICD-10-CM

## 2021-08-03 DIAGNOSIS — M16.12 PRIMARY OSTEOARTHRITIS OF LEFT HIP: ICD-10-CM

## 2021-08-03 DIAGNOSIS — M50.20 CERVICAL DISC HERNIATION: ICD-10-CM

## 2021-08-03 DIAGNOSIS — M51.36 DEGENERATION OF LUMBAR INTERVERTEBRAL DISC: Chronic | ICD-10-CM

## 2021-08-03 DIAGNOSIS — M47.26 OSTEOARTHRITIS OF SPINE WITH RADICULOPATHY, LUMBAR REGION: ICD-10-CM

## 2021-08-03 DIAGNOSIS — M47.896 OTHER OSTEOARTHRITIS OF SPINE, LUMBAR REGION: ICD-10-CM

## 2021-08-03 DIAGNOSIS — M50.30 DEGENERATIVE DISC DISEASE, CERVICAL: ICD-10-CM

## 2021-08-03 PROCEDURE — 99213 OFFICE O/P EST LOW 20 MIN: CPT | Performed by: NURSE PRACTITIONER

## 2021-08-03 PROCEDURE — 99213 OFFICE O/P EST LOW 20 MIN: CPT

## 2021-08-03 RX ORDER — PREGABALIN 75 MG/1
CAPSULE ORAL
Qty: 360 CAPSULE | Refills: 0 | Status: SHIPPED | OUTPATIENT
Start: 2021-08-03 | End: 2021-11-30 | Stop reason: SDUPTHER

## 2021-08-03 RX ORDER — TIZANIDINE 4 MG/1
TABLET ORAL
Qty: 270 TABLET | Refills: 0 | Status: SHIPPED | OUTPATIENT
Start: 2021-08-03 | End: 2021-09-28 | Stop reason: ALTCHOICE

## 2021-08-03 RX ORDER — HYDROCODONE BITARTRATE AND ACETAMINOPHEN 5; 325 MG/1; MG/1
1 TABLET ORAL 3 TIMES DAILY PRN
Qty: 90 TABLET | Refills: 0 | Status: SHIPPED | OUTPATIENT
Start: 2021-08-07 | End: 2021-09-03 | Stop reason: SDUPTHER

## 2021-08-03 ASSESSMENT — ENCOUNTER SYMPTOMS
COUGH: 0
CONSTIPATION: 0
BACK PAIN: 1
SHORTNESS OF BREATH: 0
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
## 2021-08-10 DIAGNOSIS — E78.2 MIXED HYPERLIPIDEMIA: ICD-10-CM

## 2021-08-10 RX ORDER — ATORVASTATIN CALCIUM 20 MG/1
TABLET, FILM COATED ORAL
Qty: 90 TABLET | Refills: 3 | Status: SHIPPED | OUTPATIENT
Start: 2021-08-10 | End: 2022-05-06 | Stop reason: SDUPTHER

## 2021-08-12 DIAGNOSIS — J45.20 MILD INTERMITTENT ASTHMA WITHOUT COMPLICATION: ICD-10-CM

## 2021-08-12 DIAGNOSIS — J30.9 CHRONIC ALLERGIC RHINITIS: ICD-10-CM

## 2021-08-12 RX ORDER — MONTELUKAST SODIUM 10 MG/1
10 TABLET ORAL DAILY
Qty: 90 TABLET | Refills: 0 | Status: SHIPPED | OUTPATIENT
Start: 2021-08-12 | End: 2021-10-25

## 2021-08-12 NOTE — TELEPHONE ENCOUNTER
Please Approve or Refuse.   Send to Pharmacy per Pt's Request:      Next Visit Date:  9/28/2021   Last Visit Date: 5/24/2021    Hemoglobin A1C (%)   Date Value   05/24/2021 5.9   01/08/2021 5.3   09/10/2020 7.2 (H)             ( goal A1C is < 7)   BP Readings from Last 3 Encounters:   07/18/21 126/88   06/08/21 97/62   05/24/21 128/80          (goal 120/80)  BUN   Date Value Ref Range Status   07/18/2021 15 6 - 20 mg/dL Final     CREATININE   Date Value Ref Range Status   07/18/2021 0.75 0.50 - 0.90 mg/dL Final     Potassium   Date Value Ref Range Status   07/18/2021 4.0 3.7 - 5.3 mmol/L Final

## 2021-09-01 RX ORDER — IBUPROFEN 600 MG/1
TABLET ORAL
COMMUNITY
Start: 2021-07-07

## 2021-09-01 RX ORDER — DOCUSATE SODIUM AND SENNOSIDES 8.6; 5 MG/1; MG/1
TABLET, FILM COATED ORAL
COMMUNITY
Start: 2021-07-07

## 2021-09-01 RX ORDER — SIMETHICONE 80 MG/1
TABLET, CHEWABLE ORAL
COMMUNITY
Start: 2021-07-07 | End: 2021-09-03

## 2021-09-01 RX ORDER — IPRATROPIUM BROMIDE 42 UG/1
SPRAY, METERED NASAL
COMMUNITY
Start: 2021-06-23

## 2021-09-01 RX ORDER — ONDANSETRON 4 MG/1
TABLET, ORALLY DISINTEGRATING ORAL
COMMUNITY
Start: 2021-07-07 | End: 2021-09-03

## 2021-09-03 ENCOUNTER — HOSPITAL ENCOUNTER (OUTPATIENT)
Dept: PAIN MANAGEMENT | Age: 51
Discharge: HOME | End: 2021-09-03
Payer: COMMERCIAL

## 2021-09-03 ENCOUNTER — TELEPHONE (OUTPATIENT)
Dept: PAIN MANAGEMENT | Age: 51
End: 2021-09-03

## 2021-09-03 DIAGNOSIS — Z79.899 ENCOUNTER FOR MEDICATION MANAGEMENT: ICD-10-CM

## 2021-09-03 DIAGNOSIS — M50.30 DEGENERATIVE DISC DISEASE, CERVICAL: ICD-10-CM

## 2021-09-03 DIAGNOSIS — M50.20 CERVICAL DISC HERNIATION: ICD-10-CM

## 2021-09-03 DIAGNOSIS — M54.50 CHRONIC BILATERAL LOW BACK PAIN WITHOUT SCIATICA: ICD-10-CM

## 2021-09-03 DIAGNOSIS — M47.817 LUMBOSACRAL SPONDYLOSIS WITHOUT MYELOPATHY: ICD-10-CM

## 2021-09-03 DIAGNOSIS — G89.29 HIP PAIN, CHRONIC, RIGHT: ICD-10-CM

## 2021-09-03 DIAGNOSIS — M51.36 DEGENERATION OF LUMBAR INTERVERTEBRAL DISC: Chronic | ICD-10-CM

## 2021-09-03 DIAGNOSIS — Z79.891 CHRONIC USE OF OPIATE DRUG FOR THERAPEUTIC PURPOSE: ICD-10-CM

## 2021-09-03 DIAGNOSIS — M06.9 RHEUMATOID ARTHRITIS, INVOLVING UNSPECIFIED SITE, UNSPECIFIED WHETHER RHEUMATOID FACTOR PRESENT (HCC): ICD-10-CM

## 2021-09-03 DIAGNOSIS — M47.896 OTHER OSTEOARTHRITIS OF SPINE, LUMBAR REGION: ICD-10-CM

## 2021-09-03 DIAGNOSIS — M16.12 PRIMARY OSTEOARTHRITIS OF LEFT HIP: ICD-10-CM

## 2021-09-03 DIAGNOSIS — G89.29 CHRONIC BILATERAL LOW BACK PAIN WITHOUT SCIATICA: ICD-10-CM

## 2021-09-03 DIAGNOSIS — M47.816 ARTHROPATHY OF LUMBAR FACET JOINT: ICD-10-CM

## 2021-09-03 DIAGNOSIS — M46.1 SACROILIITIS (HCC): Primary | ICD-10-CM

## 2021-09-03 DIAGNOSIS — M25.551 HIP PAIN, CHRONIC, RIGHT: ICD-10-CM

## 2021-09-03 PROCEDURE — 99213 OFFICE O/P EST LOW 20 MIN: CPT

## 2021-09-03 PROCEDURE — 99213 OFFICE O/P EST LOW 20 MIN: CPT | Performed by: NURSE PRACTITIONER

## 2021-09-03 RX ORDER — HYDROCODONE BITARTRATE AND ACETAMINOPHEN 5; 325 MG/1; MG/1
1 TABLET ORAL 3 TIMES DAILY PRN
Qty: 90 TABLET | Refills: 0 | Status: SHIPPED | OUTPATIENT
Start: 2021-09-05 | End: 2021-10-04 | Stop reason: SDUPTHER

## 2021-09-03 ASSESSMENT — ENCOUNTER SYMPTOMS
RESPIRATORY NEGATIVE: 1
BACK PAIN: 1
GASTROINTESTINAL NEGATIVE: 1

## 2021-09-03 NOTE — PROGRESS NOTES
[address] PROGRESS NOTE      Patient  completed [x]  video visit   []   phone call:         Minutes :       [x]    to  review Medication Agreement    []  Follow up after procedure   []  Discuss treatment options      Location:  Provider:  working from    [x]    home    []   [facility] - TIMMY NUNN ,   patient at home       Chief Complaint: low back pain    She c/o mostly right lower back to knee and on left side radiates to her toes. She had right facet T12-S1 6-8-2021   With 50% relief. She wants to proceed with RFA on right side. Her pain has increased, She takes trazodone at night but does not stay asleep. She gets muscle spasms in her back. The tizanidine is not helping. Back Pain  This is a chronic problem. The current episode started more than 1 year ago. The problem occurs constantly. The problem has been gradually improving since onset. The pain is present in the lumbar spine. The quality of the pain is described as aching (sharp shoots to hip). Radiates to: right lefg to knee, left leg to toes. The pain is at a severity of 6/10. The pain is moderate. The pain is worse during the night. Exacerbated by: 1 position too long. Associated symptoms include numbness. (Numbness left leg to knee) She has tried home exercises, analgesics, muscle relaxant, heat and ice for the symptoms.          Treatment goals:  Functional status: get back to being mobile      Aberrancy:   Any alcoholic beverages     no       Any illegal drugs   no      Analgesia:   6                  Adverse  Effects :takes colace for constipation    ADL;s :stretching    Data:    When was thelast UDS:    7-2020        Was the UDS appropriate:  [x] yes []   no      Record/Diagnostics Review:      As above, I did review the imaging     8/1/2020 12:06 AM - Raymon, Evangelist Incoming Lab Results From [facility]    Component Value Ref Range & Units Status Collected Lab   Pain Management Drug Panel Interp, Urine Consistent   Final 07/28/2020  3:50 PM [facility]   (NOTE)   ________________________________________________________________   DRUGS EXPECTED:   [place] Arn (HYDROCODONE)   ________________________________________________________________   CONSISTENT with medications provided:   [place] (HYDROCODONE): based on hydrocodone, norhydrocodone,   hydromorphone   ________________________________________________________________   Drugs Not Included in this Assay:   Acetaminophen   ________________________________________________________________   INTERPRETIVE INFORMATION: Pain Mgt Ruiz, Mass Spec/EMIT, Ur,                            Interp   Interpretation depends on accuracy and completeness of patient   medication information submitted by client. 6-Acetylmorphine, Ur Not Detected   Final 07              EXAMINATION:   THREE XRAY VIEWS OF THE LUMBAR SPINE       6/29/2020 10:04 am       COMPARISON:   None.       HISTORY:   ORDERING SYSTEM PROVIDED HISTORY: Osteoarthritis of spine with radiculopathy,   lumbar region   TECHNOLOGIST PROVIDED HISTORY:   S/p fall increased pain, numbness left leg   Reason for Exam: PT CO pain in left lower back that radiates into her L hip   after falling 2 weeks. Pain and swelling. Acuity: Acute   Type of Exam: Initial       FINDINGS:   Three views of the lumbar spine are submitted for review.  No acute fracture   or malalignment.  Right upper abdominal surgical clips are most compatible   with previous cholecystectomy.  Moderate stool burden noted throughout the   colon.  No discrete osseous abnormality.           Impression   No acute osseus abnormality of the lumbar spine.               Pill count: appropriate    fill date :9-6-2021    [place]ARRS: 15  Periodic Controlled Substance Monitoring: Possible medication side effects, risk of tolerance/dependence & alternative treatments discussed., No signs of potential drug abuse or diversion identified. , Assessed functional status., Obtaining appropriate analgesic effect of treatment. Nahum Lundberg, APRN - CNP)  Review ofOARRS does not show any aberrant prescription behavior. Medication is helping the patient stay active. Patient denies any side effects and reports adequate analgesia. No sign of misuse/abuse.             Past Medical History:   Diagnosis Date    Anxiety     Asthma     Colon polyp 10/31/2016    sessile serated adenoma x2    Depression     Diabetes mellitus ([address]     Diverticulitis 10/2016    Gastritis     GERD (gastroesophageal reflux disease)     Hemorrhoids     int/ext    Hypertension     Migraines     Osteoarthritis     Shingles 1-22-16    Tubular adenoma 10/31/2016    Urinary leakage        Past Surgical History:   Procedure Laterality Date    ABDOMINAL ADHESION SURGERY  07/08/2021    APPENDECTOMY  07/08/2021    CERVICAL DISCECTOMY  12/2013    & fusion     CHOLECYSTECTOMY      COLONOSCOPY  10/31/2016    int/ext hemorrhoids; sessile serated adenomax2; tubular adenoma    COLONOSCOPY N/A 10/22/2019    COLONOSCOPY POLYPECTOMY SNARE/COLD BIOPSY AND RANDOM BIOPSIES performed by Moreno Rodriguez MD at [place] [address]  03/04/2021    CYSTOSCOPY HYDRODISTENTION WITH DMSO AND UREAPLASMA , MYCOPLASMA CULTURES    CYSTOSCOPY N/A 3/4/2021    CYSTOSCOPY HYDRODISTENTION WITH DMSO AND UREAPLASMA , MYCOPLASMA CULTURES performed by Julienne Hays DO at [address] [place] [address]      HAND SURGERY Right     thumb surgery, BONE REMOVED    HYSTERECTOMY      LAPAROSCOPY      FL DEST,PARAVERTEBRAL,L/S,ADDL LVLS  3/25/2019         TONSILLECTOMY      TUBAL LIGATION      UPPER GASTROINTESTINAL ENDOSCOPY  10/31/2016    gastritis    UPPER GASTROINTESTINAL ENDOSCOPY N/A 10/22/2019    EGD BIOPSY performed by Moreno Rodriguez MD at [address] [place] [address] ENDO       Allergies   Allergen Reactions    Adhesive Tape Other (See Comments)     blisters    Imitrex [Sumatriptan] Other (See Comments)     \"feels like head is going to explode    Morphine Itching     hives         Current Outpatient Medications:     ipratropium (ATROVENT) 0.06 % nasal spray, USE 2 SPRAY(S) IN EACH NOSTRIL ONCE DAILY, Disp: , Rfl:     montelukast (SINGULAIR) 10 MG tablet, Take 1 tablet by mouth daily, Disp: 90 tablet, Rfl: 0    atorvastatin (LIPITOR) 20 MG tablet, TAKE 1 TABLET DAILY, Disp: 90 tablet, Rfl: 3    HYDROcodone-acetaminophen (NORCO) 5-325 MG per tablet, Take 1 tablet by mouth 3 times daily as needed for Pain for up to 30 days. Indications: Pain, Disp: 90 tablet, Rfl: 0    pregabalin (LYRICA) 75 MG capsule, TAKE 1 CAPSULE FOUR TIMES A DAY, Disp: 360 capsule, Rfl: 0    traZODone (DESYREL) 50 MG tablet, TAKE 1 TABLET NIGHTLY, Disp: 30 tablet, Rfl: 11    Diclofenac Sodium POWD, Formula #5: diclo3%+gaba6%+lido2%+prilo2%.  Apply 1-2 gm topically to affected area TID-QID., Disp: 120 g, Rfl: 2    TRULICITY 1.5 RI/5.9CS SOPN, Inject 1 ampule into the skin once a week, Disp: , Rfl:     lisinopril (PRINIVIL;ZESTRIL) 5 MG tablet, TAKE 1 TABLET DAILY, Disp: 90 tablet, Rfl: 3    trospium (SANCTURA) 20 MG tablet, Take 1 tablet by mouth daily, Disp: , Rfl:     sertraline (ZOLOFT) 100 MG tablet, TAKE 1 TABLET DAILY, Disp: 90 tablet, Rfl: 3    metFORMIN (GLUCOPHAGE-XR) 750 MG extended release tablet, TAKE 1 TABLET DAILY WITH BREAKFAST (PLEASE MAKE APPOINTMENT DR DAVE RETIRED), Disp: 90 tablet, Rfl: 3    busPIRone (BUSPAR) 10 MG tablet, TAKE 2 TABLET THREE TIMES A DAY, Disp: 180 tablet, Rfl: 2    fluticasone (FLONASE) 50 MCG/ACT nasal spray, USE 1 SPRAY IN EACH NOSTRIL TWICE A DAY, Disp: 32 g, Rfl: 5    estradiol (ESTRACE) 0.1 MG/GM vaginal cream, , Disp: , Rfl:     topiramate (TOPAMAX) 100 MG tablet, TAKE 1 TABLET IN THE MORNING AND 2 TABLETS IN THE EVENING, Disp: 270 tablet, Rfl: 4    ibuprofen (ADVIL;MOTRIN) 600 MG tablet, TAKE 1 TABLET BY MOUTH EVERY 6 HOURS, Disp: , Rfl:     STOOL SOFTENER/LAXATIVE 50-8.6 MG per tablet, TAKE 2 TABLETS BY MOUTH TWICE DAILY, Disp: , Rfl:     tiZANidine (ZANAFLEX) 4 MG tablet, TAKE 1 TABLET EVERY 8 HOURS AS NEEDED FOR SPASMS, Disp: 270 tablet, Rfl: 0    omeprazole (PRILOSEC) 40 MG delayed release capsule, TAKE 1 CAPSULE DAILY, Disp: 90 capsule, Rfl: 3    diclofenac sodium (VOLTAREN) 1 % GEL, Apply 2 g topically 2 times daily, Disp: 100 g, Rfl: 0    Blood Pressure Monitor KIT, Use as directed., Disp: 1 kit, Rfl: 1    OneTouch Delica Lancets 15U MISC, USE 1 EACH TWICE A DAY, Disp: 200 each, Rfl: 5    blood glucose test strips (ASCENSIA AUTODISC VI;ONE TOUCH ULTRA TEST VI) strip, 1 each by In Vitro route daily verio test strips, PRN, Disp: 200 each, Rfl: 1    albuterol sulfate (PROAIR RESPICLICK) 228 (90 Base) MCG/ACT aerosol powder inhalation, Inhale 2 puffs into the lungs every 4 hours as needed for Wheezing or Shortness of Breath, Disp: 1 Inhaler, Rfl: 2    docusate sodium (COLACE) 100 MG capsule, Take 1 capsule by mouth daily as needed for Constipation, Disp: 30 capsule, Rfl: 2    Elastic Bandages & Supports (LUMBAR BACK BRACE/SUPPORT PAD) MISC, 1 each by Does not apply route daily as needed (pain), Disp: 1 each, Rfl: 0    Probiotic Product (PROBIOTIC DAILY PO), Take 1 tablet by mouth daily. , Disp: , Rfl:     Family History   Problem Relation Age of Onset    Cancer Mother     Heart Disease Father     Diabetes Father     High Blood Pressure Father     Other Other         Celiac Sprue.  Aunt       Social History     Socioeconomic History    Marital status:      Spouse name: Not on file    Number of children: Not on file    Years of education: Not on file    Highest education level: Not on file   Occupational History    Occupation: unemployed   Tobacco Use    Smoking status: Never Smoker    Smokeless tobacco: Never Used   Vaping Use    Vaping Use: Never used   Substance and Sexual Activity    Alcohol use: No    Drug use: No    Sexual activity: Yes   Other Topics Concern    Not on file   Social History Narrative    Not on file     Social Determinants of Health     Financial Resource Strain:     Difficulty of Paying Living Expenses:    Food Insecurity:     Worried About Running Out of Food in the Last Year:     [address] in the Last Year:    Transportation Needs:     Lack of Transportation (Medical):  Lack of Transportation (Non-Medical):    Physical Activity:     Days of Exercise per Week:     Minutes of Exercise per Session:    Stress:     Feeling of Stress :    Social Connections:     Frequency of Communication with Friends and Family:     Frequency of Social Gatherings with Friends and Family:     Attends Taoism Services:     Active Member of Clubs or Organizations:     Attends Club or Organization Meetings:     Marital Status:    Intimate Partner Violence:     Fear of Current or Ex-Partner:     Emotionally Abused:     Physically Abused:     Sexually Abused:          Review of Systems:  Review of Systems   Constitutional: Positive for malaise/fatigue. HENT: Negative. Eyes:        Glasses   Cardiovascular: Negative. Respiratory: Negative. Endocrine:        Diabetic; blood sugar 103 last night   Hematologic/Lymphatic: Bruises/bleeds easily. Skin: Negative. Musculoskeletal: Positive for back pain and joint pain. Hips and knees   Gastrointestinal: Negative. Genitourinary: Negative. Neurological: Positive for numbness. Psychiatric/Behavioral: The patient is nervous/anxious. Managing         Physical Exam:  There were no vitals taken for this visit. Physical Exam  Skin:         Neurological:      Mental Status: She is alert and oriented to person, place, and time. Psychiatric:         Mood and Affect: Mood normal.         Thought Content:  Thought content normal.           Assessment:    Problem List Items Addressed This Visit     Sacroiliitis ([facility] [address] - Primary    Relevant Medications    ibuprofen (ADVIL;MOTRIN) 600 MG tablet    Rheumatoid arthritis (HCC)    Relevant Medications    ibuprofen (ADVIL;MOTRIN) 600 MG tablet    Primary osteoarthritis of left hip    Relevant Medications    ibuprofen (ADVIL;MOTRIN) 600 MG tablet    Osteoarthritis of lumbar spine    Relevant Medications    ibuprofen (ADVIL;MOTRIN) 600 MG tablet    Lumbosacral spondylosis without myelopathy    Relevant Medications    ibuprofen (ADVIL;MOTRIN) 600 MG tablet    Hip pain, chronic, right    Relevant Medications    ibuprofen (ADVIL;MOTRIN) 600 MG tablet    Encounter for medication management    Degenerative disc disease, cervical    Relevant Medications    ibuprofen (ADVIL;MOTRIN) 600 MG tablet    Degeneration of lumbar intervertebral disc (Chronic)    Relevant Medications    ibuprofen (ADVIL;MOTRIN) 600 MG tablet    Chronic use of opiate drugs therapeutic purposes    Chronic bilateral low back pain without sciatica    Relevant Medications    ibuprofen (ADVIL;MOTRIN) 600 MG tablet    Cervical disc herniation    Arthropathy of lumbar facet joint            Treatment Plan:  DISCUSSION: Treatment options discussed withpatient and all questions answered to patient's satisfaction. Possible side effects, risk of tolerance and or dependence and alternative treatments discussed    Obtaining appropriate analgesic effect of treatment   No signs of potential drug abuse or diversion identified    [x] Ill effects of being on chronic pain medications such as sleep disturbances, respiratory depression, hormonal changes, withdrawal symptoms, chronic opioid dependence and tolerance as well as risk of taking opioids with Benzodiazepines and taking opioids along with alcohol,  werediscussed with patient. I had asked the patient to minimize medication use and utilize pain medications only for uncontrolled rest pain or pain with exertional activities. I advised patient not to self-escalate painmedications without consulting with us.   At each of patient's future visits we will try to taper pain medications, while adjusting the adjunct medications, and re-evaluating for Physical Therapy to improve spinal andjoint strength. We will continue to have discussions to decrease pain medications as tolerated. Counseled patient on effects their pain medication and /or their medical condition mayhave on their  ability to drive or operate machinery. Instructed not to drive or operate machinery if drowsy     I also discussed with the patient regarding the dangers of combining narcotic pain medication with tranquilizers, alcohol or illegal drugs or taking the medication any way other than prescribed. The dangers were discussed  including respiratory depression and death. Patient was told to tell  all  physicians regarding the medications he is getting from pain clinic. Patient is warned not to take any unprescribed medications over-the-countermedications that can depress breathing . Patient is advised to talk to the pharmacist or physicians if planning to take any over-the-counter medications before  takeing them. Patient is strongly advised to avoid tranquilizers or  relaxants, illegal drugs  or any medications that can depress breathing  Patient is also advised to tell us if there is any changes in their medications from other physicians. 1, She will get UDT done today, states was not told she needed one at her last visit  2. Schedule RFA right lumbar median branch nerves, T12-S1, pain increasing, interfering with sleep she had 50% relief after right lumbar facet injection June, 2021  The patient was counseled about the risks of [place] Covid-19 during their procedure period and any recovery window from their procedure. The patient was made aware that [place] Covid-19 may worsen their prognosis for recovering from their procedure and lend to a higher morbidity and/or mortality risk. All material risks, benefits, and reasonable alternatives including postponing the procedure were discussed. The patient (DOES  ,  wish) to proceed with their procedure at this time.   3. Instructed to take 1 1/2 tabs zanaflex at night for muscle spasms    TREATMENT OPTIONS:     RFA right lumbar  Medication Agreement Requirements Met  Continue Opioid therapy  Script written  Hydrocodone  Follow up appointment made

## 2021-09-08 ENCOUNTER — HOSPITAL ENCOUNTER (OUTPATIENT)
Age: 51
Discharge: HOME | End: 2021-09-08
Payer: COMMERCIAL

## 2021-09-08 DIAGNOSIS — Z79.899 ENCOUNTER FOR MEDICATION MANAGEMENT: ICD-10-CM

## 2021-09-08 DIAGNOSIS — M54.16 LUMBAR RADICULOPATHY, CHRONIC: ICD-10-CM

## 2021-09-08 PROCEDURE — 80307 DRUG TEST PRSMV CHEM ANLYZR: CPT

## 2021-09-11 DIAGNOSIS — F43.23 ADJUSTMENT DISORDER WITH MIXED ANXIETY AND DEPRESSED MOOD: ICD-10-CM

## 2021-09-11 LAB
6-ACETYLMORPHINE, UR: NOT DETECTED
7-AMINOCLONAZEPAM, URINE: NOT DETECTED
ALPHA-OH-ALPRAZ, URINE: NOT DETECTED
ALPHA-OH-MIDAZOLAM, URINE: NOT DETECTED
ALPRAZOLAM, URINE: NOT DETECTED
AMPHETAMINES, URINE: NOT DETECTED
BARBITURATES, URINE: NOT DETECTED
BENZOYLECGONINE, UR: NOT DETECTED
BUPRENORPHINE URINE: NOT DETECTED
CARISOPRODOL, UR: NOT DETECTED
CLONAZEPAM, URINE: NOT DETECTED
CODEINE, URINE: NOT DETECTED
CREATININE URINE: 165.9 MG/DL (ref 20–400)
DIAZEPAM, URINE: NOT DETECTED
DRUGS EXPECTED, UR: NORMAL
EER HI RES INTERP UR: NORMAL
ETHYL GLUCURONIDE UR: NOT DETECTED
FENTANYL URINE: NOT DETECTED
GABAPENTIN: NOT DETECTED
HYDROCODONE, URINE: NOT DETECTED
HYDROMORPHONE, URINE: PRESENT
LORAZEPAM, URINE: NOT DETECTED
MARIJUANA METAB, UR: NOT DETECTED
MDA, UR: NOT DETECTED
MDEA, EVE, UR: NOT DETECTED
MDMA URINE: NOT DETECTED
MEPERIDINE METAB, UR: NOT DETECTED
METHADONE, URINE: NOT DETECTED
METHAMPHETAMINE, URINE: NOT DETECTED
METHYLPHENIDATE: NOT DETECTED
MIDAZOLAM, URINE: NOT DETECTED
MORPHINE URINE: NOT DETECTED
NALOXONE URINE: NOT DETECTED
NORBUPRENORPHINE, URINE: NOT DETECTED
NORDIAZEPAM, URINE: NOT DETECTED
NORFENTANYL, URINE: NOT DETECTED
NORHYDROCODONE, URINE: PRESENT
NOROXYCODONE, URINE: NOT DETECTED
NOROXYMORPHONE, URINE: NOT DETECTED
OXAZEPAM, URINE: NOT DETECTED
OXYCODONE URINE: NOT DETECTED
OXYMORPHONE, URINE: NOT DETECTED
PAIN MANAGEMENT DRUG PANEL INTERP, URINE: NORMAL
PAIN MGT DRUG PANEL, HI RES, UR: NORMAL
PCP,URINE: NOT DETECTED
PHENTERMINE, UR: NOT DETECTED
PREGABALIN: PRESENT
TAPENTADOL, URINE: NOT DETECTED
TAPENTADOL-O-SULFATE, URINE: NOT DETECTED
TEMAZEPAM, URINE: NOT DETECTED
TRAMADOL, URINE: NOT DETECTED
ZOLPIDEM METABOLITE (ZCA), URINE: NOT DETECTED
ZOLPIDEM, URINE: NOT DETECTED

## 2021-09-13 RX ORDER — BUSPIRONE HYDROCHLORIDE 10 MG/1
TABLET ORAL
Qty: 180 TABLET | Refills: 11 | Status: SHIPPED | OUTPATIENT
Start: 2021-09-13 | End: 2022-08-22 | Stop reason: ALTCHOICE

## 2021-09-25 ASSESSMENT — ENCOUNTER SYMPTOMS
EYE ITCHING: 0
SHORTNESS OF BREATH: 0
NAUSEA: 0
COUGH: 0
WHEEZING: 0
SORE THROAT: 0
COLOR CHANGE: 0
RHINORRHEA: 1
ABDOMINAL PAIN: 0

## 2021-09-25 NOTE — PROGRESS NOTES
[facility] PHYSICIANS  [facility] FAMILY PHYSICIANS  [facility] [address].  [address] [address]  Dept: [id number] Lorenza Prabhakar (:  1970) is a 49 y.o. female. Patient is here for evaluation of the following chief complaint(s):  Chief Complaint   Patient presents with    Hypertension        SUBJECTIVE/OBJECTIVE:  ARMANDO Bush is a 49 y.o. female patient. Patient is an established patient of mine. Patient has a known history of , hypertension, DM2, hyperlipidemia,RA, Morbid Obesity, macromastia, vitamin D deficiency, and chronic low back pain. HYPERTENSION  Iqra Burt has a well controlled hypertension. she is currently on lisinopril. Patient's most recent BP in the office was stable. she reports stable BP readings at home. Patient denies any adverse reaction to this therapy. she denies any CP, SOB, HA, or palpitations. Patient admits to exercising and adheres to low salt diet. No history of organ damage due to condition noted. Lab Results   Component Value Date/Time    CREATININE 0.75 2021 03:57 PM     DIABETES MELLITUS  Patient has a  stable Diabetes Mellitus. Current therapy includes metformin and trulicity will increase to 3 for weight loss. Patient is responding well with this therapy. Patient reports home glucose monitoring as Stable readings. Patient denieshypoglycemia episodes such as{symptoms. Patient denies neither vomiting nor diarrhea. Eye Exam: due  Foot Exam:due  Lab Results   Component Value Date/Time    LABA1C 5.5 2021 02:42 PM    LABA1C 5.9 2021 03:09 PM      Allison Licona is currently on atorvastatin (Lipitor). Patient denies adverse reaction to this medication. Compliance with treatment thus far has been good. The patient is not known to have coexisting coronary artery disease.  The 10-year CVD risk score (D'Agostino, et al., 2008) is: 12.5%    Values used to calculate the score:      Age: 49 years      Sex: Female      Diabetic: Yes      Tobacco smoker: No      Systolic Blood Pressure: 113 mmHg      Is BP treated: Yes      HDL Cholesterol: 42 mg/dL      Total Cholesterol: 154 mg/dL  Lab Results   Component Value Date/Time    CHOLFAST 154 09/10/2020 07:59 AM    CHOL 151 10/05/2019 08:50 AM    CHOL 210 05/13/2014 12:00 AM    HDL 42 09/10/2020 07:59 AM    LDLCHOLESTEROL 58 09/10/2020 07:59 AM    TRIGLYCFAST 268 (H) 09/10/2020 07:59 AM    CHOLHDLRATIO 3.7 09/10/2020 07:59 AM    TRIG 205 (H) 10/05/2019 08:50 AM    VLDL NOT REPORTED (H) 09/10/2020 07:59 AM     RHEUMATOID ARTHRITIS/ LOW BACK PAIN  Iqra Dominguez has a known history of rheumatoid arthritis. Patient also has some chronic low back pain. She did have some significant thoracic to lumbar spine disorder. Patient had recently had 2 courses of RFA's with relief for up to 6 months. However, patient continues to have the pain. Patient is encouraged to continue with follow-up with the pain management doctors. She is also morbidly obese. She is reported some weight gain. Patient is currently on Lyrica, tizanidine we will switch to Robaxin, and Norco.  Patient is established with the pain management. She sees him on a regular basis. Dr Ervin Hughes. Facet on the 8th. And an RFA. Heath Khan Patient is also wondering abou Fibromyalgia since she has pain on her parts of her body. We had a long discussion about this condition and the treatment which she is already on several of the treatment including Lyrica. INTERSTITIAL CYSTITIS  Check for hysteroscopy- endometriosis. Continues to have some occasional RLQ pain. See NP but will be evaluated by urogynecologist. Patient had a recent appendectomy. Also had adhesions removed by Dr. Alejandro Pardo and Dr. Ronnie Diamond. Patient reported some improvement with the right lateral abdomen after the procedure. Dr Jessenia Naranjo is a 49 y.o. female patient  has a known history of Common allergies.  Symptoms include: syndrome. Iqra is due for Varicella vaccine. her  indication is age over 48. Benefits of getting immunization against shingles discussed, and patient is agreeable. she  denies side effects to prior immunizations. .      Vitals:    09/28/21 1425   BP: 138/86   Pulse: 74   Temp: 97.8 °F (36.6 °C)   SpO2: 98%   Weight: 218 lb (98.9 kg)   Height: 5' (1.524 m)   Estimated body mass index is 42.58 kg/m² as calculated from the following:    Height as of this encounter: 5' (1.524 m). Weight as of this encounter: 218 lb (98.9 kg). Review of Systems   Constitutional: Negative for activity change, appetite change, chills, fatigue, fever and unexpected weight change (losing weight). HENT: Positive for rhinorrhea. Negative for ear pain, postnasal drip, sneezing and sore throat. Eyes: Negative for itching and visual disturbance. Wears glasses   Respiratory: Negative for cough, shortness of breath and wheezing. Cardiovascular: Negative for chest pain and palpitations. Gastrointestinal: Negative for abdominal pain and nausea. Genitourinary: Negative for frequency, pelvic pain, urgency and vaginal discharge. Musculoskeletal: Positive for arthralgias and back pain. Back and hips   Skin: Negative for color change, rash and wound. Allergic/Immunologic: Positive for environmental allergies. Neurological: Negative for dizziness, syncope and headaches. Hematological: Positive for adenopathy. Psychiatric/Behavioral: Negative for sleep disturbance and suicidal ideas. The patient is nervous/anxious. Physical Exam  Vitals and nursing note reviewed. Constitutional:       Appearance: She is well-developed. She is morbidly obese. HENT:      Head: Normocephalic. Right Ear: External ear normal.      Left Ear: External ear normal.      Nose: Nose normal.      Mouth/Throat:      Mouth: Mucous membranes are moist.      Pharynx: Posterior oropharyngeal erythema present.    Eyes:      Pupils: Pupils are equal, round, and reactive to light. Cardiovascular:      Rate and Rhythm: Normal rate and regular rhythm. Pulses: Normal pulses. Heart sounds: Normal heart sounds. Pulmonary:      Effort: Pulmonary effort is normal. No respiratory distress. Breath sounds: Normal breath sounds. Abdominal:      General: Bowel sounds are normal. There is no distension. Palpations: Abdomen is soft. Tenderness: There is abdominal tenderness in the right lower quadrant. Musculoskeletal:      Cervical back: Normal range of motion and neck supple. Thoracic back: Decreased range of motion. Lumbar back: Spasms (right paralumbar area) and tenderness present. Decreased range of motion. Skin:     General: Skin is warm and dry. Capillary Refill: Capillary refill takes less than 2 seconds. Neurological:      Mental Status: She is alert and oriented to person, place, and time. Psychiatric:         Mood and Affect: Mood is anxious. Speech: Speech is rapid and pressured. Behavior: Behavior normal.         Thought Content: Thought content does not include homicidal or suicidal ideation. Diagnosis Date    Anxiety     Asthma     Colon polyp 10/31/2016    sessile serated adenoma x2    Depression     Diabetes mellitus ([facility] [address]     Diverticulitis 10/2016    Gastritis     GERD (gastroesophageal reflux disease)     Hemorrhoids     int/ext    Hypertension     Migraines     Osteoarthritis     Shingles 1-22-16    Tubular adenoma 10/31/2016    Urinary leakage       Prior to Visit Medications    Medication Sig Taking?  Authorizing Provider   zoster recombinant adjuvanted vaccine (SHINGRIX) 50 MCG/0.5ML SUSR injection Inject 0.5 mLs into the muscle once for 1 dose 1 dose now and repeat in 2-6 months Yes Marty Cramer, BAR - CNP   methocarbamol (ROBAXIN) 500 MG tablet Take 1 tablet by mouth 3 times daily Yes BAR Oliva - CNP   Diclofenac Sodium 3 % GEL Apply 4 g topically 2 times daily Yes BAR Oliva CNP   Dulaglutide (TRULICITY) 3 QB/6.4DY SOPN Inject 3 mg into the skin once a week Yes BAR Oliva CNP   busPIRone (BUSPAR) 10 MG tablet TAKE 2 TABLETS THREE TIMES A DAY Yes BAR Oliva CNP   HYDROcodone-acetaminophen (NORCO) 5-325 MG per tablet Take 1 tablet by mouth 3 times daily as needed for Pain for up to 30 days. Indications: Pain Yes Tamea BAR Cano CNP   ibuprofen (ADVIL;MOTRIN) 600 MG tablet TAKE 1 TABLET BY MOUTH EVERY 6 HOURS Yes Historical Provider, MD   STOOL SOFTENER/LAXATIVE 50-8.6 MG per tablet TAKE 2 TABLETS BY MOUTH TWICE DAILY Yes Historical Provider, MD   ipratropium (ATROVENT) 0.06 % nasal spray USE 2 SPRAY(S) IN EACH NOSTRIL ONCE DAILY Yes Historical Provider, MD   montelukast (SINGULAIR) 10 MG tablet Take 1 tablet by mouth daily Yes BAR Oliva CNP   atorvastatin (LIPITOR) 20 MG tablet TAKE 1 TABLET DAILY Yes BAR Oliva CNP   pregabalin (LYRICA) 75 MG capsule TAKE 1 CAPSULE FOUR TIMES A DAY Yes BAR Mar CNP   traZODone (DESYREL) 50 MG tablet TAKE 1 TABLET NIGHTLY Yes BAR Oliva CNP   Diclofenac Sodium POWD Formula #5: diclo3%+gaba6%+lido2%+prilo2%. Apply 1-2 gm topically to affected area TID-QID.  Yes Ross Echavarria DPM   omeprazole (PRILOSEC) 40 MG delayed release capsule TAKE 1 CAPSULE DAILY Yes BAR Oliva CNP   lisinopril (PRINIVIL;ZESTRIL) 5 MG tablet TAKE 1 TABLET DAILY Yes BAR Oliva CNP   trospium (SANCTURA) 20 MG tablet Take 1 tablet by mouth daily Yes Historical Provider, MD   sertraline (ZOLOFT) 100 MG tablet TAKE 1 TABLET DAILY Yes Renella A Gauamis, APRN - CNP   metFORMIN (GLUCOPHAGE-XR) 750 MG extended release tablet TAKE 1 TABLET DAILY WITH BREAKFAST (PLEASE MAKE APPOINTMENT DR DAVE RETIRED) Yes Marty Cramer, APRN - CNP   diclofenac sodium (VOLTAREN) 1 % GEL Apply 2 g topically 2 times daily Yes Quinn Grullon DPM   fluticasone (FLONASE) 50 MCG/ACT nasal spray USE 1 SPRAY IN EACH NOSTRIL TWICE A DAY Yes BAR Oliva CNP   estradiol (ESTRACE) 0.1 MG/GM vaginal cream  Yes Historical Provider, MD   Blood Pressure Monitor KIT Use as directed. Yes BAR Oliva CNP   OneTouch Delica Lancets 72I MISC USE 1 EACH TWICE A DAY Yes BAR Oliva CNP   blood glucose test strips (ASCENSIA AUTODISC VI;ONE TOUCH ULTRA TEST VI) strip 1 each by In Vitro route daily verio test strips, PRN Yes BAR Oliva CNP   topiramate (TOPAMAX) 100 MG tablet TAKE 1 TABLET IN THE MORNING AND 2 TABLETS IN THE EVENING Yes BAR Gee CNP   albuterol sulfate (PROAIR RESPICLICK) 109 (90 Base) MCG/ACT aerosol powder inhalation Inhale 2 puffs into the lungs every 4 hours as needed for Wheezing or Shortness of Breath Yes Pablo Licea MD   docusate sodium (COLACE) 100 MG capsule Take 1 capsule by mouth daily as needed for Constipation Yes BAR Ochoa CNP   Elastic Bandages & Supports (LUMBAR BACK BRACE/SUPPORT PAD) MISC 1 each by Does not apply route daily as needed (pain) Yes Cole Brar MD   Probiotic Product (PROBIOTIC DAILY PO) Take 1 tablet by mouth daily. Yes Historical Provider, MD       ASSESSMENT/PLAN:  1. Essential hypertension  Stable  Continue current therapy. DISCUSSED AND ADVISED TO:  Cut down on your salt intake. Cut down on caffeinated drinks, sports drinks. Instructed to check BP at home regularly. Report for any chest pains, shortness of breath, headaches, and lightheadedness. Call the office if your blood pressure continue to be higher than 140/90 or 90/50.      - Urinalysis Reflex to Culture; Future    2. Type 2 diabetes mellitus without complication, without long-term current use of insulin (HCC)  Stable  Continue therapy.   We will continue to monitor Hemoglobin A1c   DISCUSSED and ADVISED TO:  Continue to check Glucose levels at home. Report increased and low levels as discussed. Decrease carbohydrates, sugary drinks, desserts in your diet. Exercise regularly, as tolerated. Try to lose weight. - Microalbumin, Ur; Future  - POCT glycosylated hemoglobin (Hb A1C); Future  - Urinalysis Reflex to Culture; Future  - POCT glycosylated hemoglobin (Hb A1C)  - Dulaglutide (TRULICITY) 3 DY/3.0CN SOPN; Inject 3 mg into the skin once a week  Dispense: 12 pen; Refill: 1    3. Rheumatoid arthritis, involving unspecified site, unspecified whether rheumatoid factor present ([facility] [address]  Stable  Encouraged to follow-up with the rheumatologist    - Diclofenac Sodium 3 % GEL; Apply 4 g topically 2 times daily  Dispense: 350 g; Refill: 1    4. Mixed hyperlipidemia  Stable  Continue therapy. Advised to decrease the consumption of red meats, fried foods, trans fats, sweets, sugary beverages. Advised to increase fish, vegetables, and fruits consumption. Advised to add fiber or OTC supplements in diet. Discussed weight loss which will result in improvement of lipids levels. Advised to increase daily physical activities and add regular exercises. - Lipid, Fasting; Future    5. Chronic bilateral low back pain without sciatica  Failure to Improve  Continue current therapy. DISCUSSED AND ADVISED TO:  Use heat packs 15 to 20 mins every 2-3 hours. Do some back stretches as tolerated. Refer to hand out for instructions. Call for worsening, numbness, weakness.      - methocarbamol (ROBAXIN) 500 MG tablet; Take 1 tablet by mouth 3 times daily  Dispense: 90 tablet; Refill: 1  - Diclofenac Sodium 3 % GEL; Apply 4 g topically 2 times daily  Dispense: 350 g; Refill: 1    6. Macromastia  Stable  Consult surgeon    - 4 Clary Cota [facility], [facility]    7.  Vitamin D deficiency  Continue Vitamin D supplementation  DISCUSSED AND ADVISED TO:  Foods that contain a lot of vitamin D includes [place], tuna, and mackerel. Cheese, egg yolks, and beef liver have small amounts of vit D.  Milk, soy drinks, orange juice, yogurt, margarine, and some kinds of cereal have vitamin D added to them. Continue to use sunblock when out in the sun to prevent skin cancer.    - Vitamin D 25 Hydroxy; Future    8. Morbid obesity with BMI of 40.0-44.9, adult (HCC)  Failure to Improve  BMI increasing  DISCUSSED AND ADVISED TO:  Eat a low-fat and low carbohydrates diet. Avoid fried foods especially fast food. Choose healthier options for snacks. Have 5-6 servings of fruits and vegetables per day. Cut down on eating processed food. Add 30 minutes to 1 hour aerobic exercise for 3-4 days a week. - Arina BUCIO DO, General Surgery, [place]    9. Need for influenza vaccination  Recommended by CDC. No current infection. Denies previous adverse reaction to vaccination.      - INFLUENZA, MDCK QUADV, 2 YRS AND OLDER, IM, PF, PREFILL SYR OR SDV, 0.5ML (FLUCELVAX QUADV, PF)    10. Need for varicella vaccine  Recommended by CDC. No current infection. Denies previous adverse reaction to vaccination. - zoster recombinant adjuvanted vaccine [facility]) 50 MCG/0.5ML SUSR injection; Inject 0.5 mLs into the muscle once for 1 dose 1 dose now and repeat in 2-6 months  Dispense: 0.5 mL; Refill: 0    11. Breast cancer screening by mammogram  Recommended    - [facility] MANDEEP DIGITAL SCREEN BILATERAL; Future         I affirm this is a Patient Initiated Episode with a Patient who has not had a related appointment within my department in the past 7 days or scheduled within the next 24 hours. Total Time: minutes: 21-30 minutes          Return in about 4 months (around 1/28/2022) for Chronic conditions, 30mins. This note was completed by using the assistance of a speech-recognition program. However, inadvertent computerized transcription errors may be present.  Although every effort was made to ensure accuracy, no guarantees can be provided that every mistake has been identified and corrected by editing.   Electronically signed by BAR Roque CNP on 5/23/21 at 7:00 PM EDT     --BAR Oliva CNP

## 2021-09-28 ENCOUNTER — OFFICE VISIT (OUTPATIENT)
Dept: FAMILY MEDICINE CLINIC | Age: 51
End: 2021-09-28
Payer: COMMERCIAL

## 2021-09-28 VITALS
DIASTOLIC BLOOD PRESSURE: 86 MMHG | BODY MASS INDEX: 42.8 KG/M2 | WEIGHT: 218 LBS | TEMPERATURE: 97.8 F | HEART RATE: 74 BPM | HEIGHT: 60 IN | OXYGEN SATURATION: 98 % | SYSTOLIC BLOOD PRESSURE: 138 MMHG

## 2021-09-28 DIAGNOSIS — E78.2 MIXED HYPERLIPIDEMIA: ICD-10-CM

## 2021-09-28 DIAGNOSIS — E66.01 MORBID OBESITY WITH BMI OF 40.0-44.9, ADULT (HCC): ICD-10-CM

## 2021-09-28 DIAGNOSIS — Z12.31 BREAST CANCER SCREENING BY MAMMOGRAM: ICD-10-CM

## 2021-09-28 DIAGNOSIS — E11.9 TYPE 2 DIABETES MELLITUS WITHOUT COMPLICATION, WITHOUT LONG-TERM CURRENT USE OF INSULIN (HCC): ICD-10-CM

## 2021-09-28 DIAGNOSIS — I10 ESSENTIAL HYPERTENSION: Primary | ICD-10-CM

## 2021-09-28 DIAGNOSIS — N62 MACROMASTIA: ICD-10-CM

## 2021-09-28 DIAGNOSIS — M06.9 RHEUMATOID ARTHRITIS, INVOLVING UNSPECIFIED SITE, UNSPECIFIED WHETHER RHEUMATOID FACTOR PRESENT (HCC): ICD-10-CM

## 2021-09-28 DIAGNOSIS — G89.29 CHRONIC BILATERAL LOW BACK PAIN WITHOUT SCIATICA: ICD-10-CM

## 2021-09-28 DIAGNOSIS — Z23 NEED FOR INFLUENZA VACCINATION: ICD-10-CM

## 2021-09-28 DIAGNOSIS — M54.50 CHRONIC BILATERAL LOW BACK PAIN WITHOUT SCIATICA: ICD-10-CM

## 2021-09-28 DIAGNOSIS — Z23 NEED FOR VARICELLA VACCINE: ICD-10-CM

## 2021-09-28 DIAGNOSIS — E55.9 VITAMIN D DEFICIENCY: ICD-10-CM

## 2021-09-28 LAB — HBA1C MFR BLD: 5.5 %

## 2021-09-28 PROCEDURE — 2022F DILAT RTA XM EVC RTNOPTHY: CPT | Performed by: FAMILY MEDICINE

## 2021-09-28 PROCEDURE — 90471 IMMUNIZATION ADMIN: CPT | Performed by: FAMILY MEDICINE

## 2021-09-28 PROCEDURE — G8427 DOCREV CUR MEDS BY ELIG CLIN: HCPCS | Performed by: FAMILY MEDICINE

## 2021-09-28 PROCEDURE — 1036F TOBACCO NON-USER: CPT | Performed by: FAMILY MEDICINE

## 2021-09-28 PROCEDURE — G8417 CALC BMI ABV UP PARAM F/U: HCPCS | Performed by: FAMILY MEDICINE

## 2021-09-28 PROCEDURE — 90674 CCIIV4 VAC NO PRSV 0.5 ML IM: CPT | Performed by: FAMILY MEDICINE

## 2021-09-28 PROCEDURE — 83036 HEMOGLOBIN GLYCOSYLATED A1C: CPT | Performed by: FAMILY MEDICINE

## 2021-09-28 PROCEDURE — 3044F HG A1C LEVEL LT 7.0%: CPT | Performed by: FAMILY MEDICINE

## 2021-09-28 PROCEDURE — 81003 URINALYSIS AUTO W/O SCOPE: CPT | Performed by: FAMILY MEDICINE

## 2021-09-28 PROCEDURE — 99214 OFFICE O/P EST MOD 30 MIN: CPT | Performed by: FAMILY MEDICINE

## 2021-09-28 PROCEDURE — 3017F COLORECTAL CA SCREEN DOC REV: CPT | Performed by: FAMILY MEDICINE

## 2021-09-28 RX ORDER — METHOCARBAMOL 500 MG/1
500 TABLET, FILM COATED ORAL 3 TIMES DAILY
Qty: 90 TABLET | Refills: 1 | Status: SHIPPED | OUTPATIENT
Start: 2021-09-28 | End: 2021-10-04 | Stop reason: ALTCHOICE

## 2021-09-28 RX ORDER — DICLOFENAC SODIUM 30 MG/G
4 GEL TOPICAL 2 TIMES DAILY
Qty: 350 G | Refills: 1 | Status: SHIPPED | OUTPATIENT
Start: 2021-09-28 | End: 2022-04-04 | Stop reason: ALTCHOICE

## 2021-09-28 RX ORDER — ZOSTER VACCINE RECOMBINANT, ADJUVANTED 50 MCG/0.5
0.5 KIT INTRAMUSCULAR ONCE
Qty: 0.5 ML | Refills: 0 | Status: SHIPPED | OUTPATIENT
Start: 2021-09-28 | End: 2021-09-28

## 2021-09-28 RX ORDER — DULAGLUTIDE 3 MG/.5ML
3 INJECTION, SOLUTION SUBCUTANEOUS WEEKLY
Qty: 12 PEN | Refills: 1 | Status: SHIPPED | OUTPATIENT
Start: 2021-09-28 | End: 2022-04-07

## 2021-09-28 ASSESSMENT — ENCOUNTER SYMPTOMS: BACK PAIN: 1

## 2021-09-28 NOTE — PROGRESS NOTES
Vaccine Information Sheet, \"Influenza - Inactivated\"  given to [employer], or parent/legal guardian of  [employer] and verbalized understanding. Patient responses:    Have you ever had a reaction to a flu vaccine? No  Do you have any current illness? No  Have you ever had Guillian [place] Syndrome? No  Do you have a serious allergy to any of the following: Neomycin, Polymyxin, Thimerosal, eggs or egg products? No    Flu vaccine given per order. Please see immunization tab. Risks and benefits explained. Current VIS given.

## 2021-09-28 NOTE — PATIENT INSTRUCTIONS
is a healthy choice because people who have diabetes are at higher risk of heart disease. So choose lean cuts of meat and nonfat or low-fat dairy products. Use olive or canola oil instead of butter or shortening when cooking. · Don't skip meals. Your blood sugar may drop too low if you skip meals and take insulin or certain medicines for diabetes. · Check with your doctor before you drink alcohol. Alcohol can cause your blood sugar to drop too low. Alcohol can also cause a bad reaction if you take certain diabetes medicines. Follow-up care is a key part of your treatment and safety. Be sure to make and go to all appointments, and call your doctor if you are having problems. It's also a good idea to know your test results and keep a list of the medicines you take. Where can you learn more? Go to [email or web address] org and sign in to your [email or web address] account. Enter Y202 in the [facility] box to learn more about \"Learning About Carbohydrate (Carb) Counting and Eating Out When You Have Diabetes. \"     If you do not have an account, please click on the \"Sign Up Now\" link. Current as of: December 17, 2020               Content Version: 13.0  © 3045-4401 Healthwise, [employer]. Care instructions adapted under license by [facility] ([facility]). If you have questions about a medical condition or this instruction, always ask your healthcare professional. [place] [address] any warranty or liability for your use of this information. Patient Education        Open Joan-en-Y Gastric Bypass: What to Expect at Home  Your Recovery  A Joan-en-Y (say \"elmo-en-why\") gastric bypass is surgery to make the stomach smaller and change the connection between the stomach and the intestines. It is done to help people lose weight. The surgery limits the amount of food the stomach can hold. This helps you eat less and feel full sooner.   The cut (incision) the doctor made in your belly will probably be sore for several weeks after the surgery. If you have stitches, the doctor will take these out at your follow-up visit. You probably will lose weight very quickly in the first few months after surgery. As time goes on, your weight loss will slow down. You can expect most of your weight loss to happen in the first 12 months after your surgery. You will have regular doctor's appointments during this time to check how you are doing. It is important to think of this surgery as a tool to help you lose weight. It is not an instant fix. You will still need to eat a healthy diet and get regular exercise. This will help you reach your weight goal and avoid regaining the weight you lose. It is common to have many different emotions after this surgery. You may feel happy or excited as you begin to lose weight. But you may also feel overwhelmed or frustrated by the changes that you have to make in your diet, activity, and lifestyle. Talk with your doctor if you have concerns or questions. This care sheet gives you a general idea about how long it will take for you to recover. But each person recovers at a different pace. Follow the steps below to get better as quickly as possible. How can you care for yourself at home? Activity    · Rest when you feel tired. Getting enough sleep will help you recover.     · Try to walk each day. Start by walking a little more than you did the day before. Bit by bit, increase the amount you walk. Walking boosts blood flow and helps prevent pneumonia and constipation.     · Avoid strenuous activities, such as bicycle riding, jogging, weight lifting, or aerobic exercise, until your doctor says it is okay.     · Until your doctor says it is okay, avoid lifting anything that would make you strain.  This may include a child, heavy grocery bags and milk containers, a heavy briefcase or backpack, cat litter or dog food bags, or a vacuum .     · Hold a pillow over your incision when you cough or take deep breaths. This will support your belly and decrease your pain.     · Do breathing exercises at home as instructed by your doctor. This will help prevent pneumonia.     · Ask your doctor when you can drive again.     · You will probably need to take about 2 to 4 weeks off from work. It depends on the type of work you do and how you feel. You will probably return to normal activities within 3 to 5 weeks.     · You may shower, if your doctor okays it. Pat the incision dry. Do not take a bath for the first 2 weeks, or until your doctor tells you it is okay.     · Ask your doctor when it is okay for you to have sex. Diet    · Your doctor will give you specific instructions about what to eat after the surgery. For the first 2 to 6 weeks, you will need to follow a liquid or soft diet. Bit by bit, you will be able to add solid foods back into your diet.     · Your doctor may recommend that you work with a dietitian to plan healthy meals that give you enough protein, vitamins, and minerals while you are losing weight. Even with a healthy diet, you probably will need to take vitamin and mineral supplements for the rest of your life.     · At first you may feel full after just a few sips of water or other liquid. It is important to try to sip water throughout the day to avoid becoming dehydrated.     · You may notice that your bowel movements are not regular right after your surgery. This is common. Try to avoid constipation and straining with bowel movements.     · Sometimes the stomach empties food into the small intestine too quickly. This is called dumping syndrome. It can cause diarrhea and make you feel faint, shaky, and nauseated. It also can make it hard for your body to get enough nutrition. ? High-sugar foods--such as desserts, soda pop, and fruit juices--are most likely to cause dumping syndrome.  Avoid high-sugar foods, or use products that have artificial sweeteners if sugar gives you a problem. ? Do not drink liquids within a half hour before eating and up to an hour after eating. Liquids move food even more quickly into the small intestine. Quick emptying of the stomach increases the chance of diarrhea. ? Eat slowly. Try to chew each bite about 20 times. Allow 20 to 30 minutes for each meal.  ? Eat 5 or 6 small meals or snacks a day. This may keep you from feeling too full after eating and may reduce problems with diarrhea and dumping syndrome. Medicines    · Your doctor will tell you if and when you can restart your medicines. He or she will also give you instructions about taking any new medicines.     · If you take aspirin or some other blood thinner, ask your doctor if and when to start taking it again. Make sure that you understand exactly what your doctor wants you to do.     · Be safe with medicines. Take pain medicines exactly as directed. ? If the doctor gave you a prescription medicine for pain, take it as prescribed. ? If you are not taking a prescription pain medicine, ask your doctor if you can take an over-the-counter medicine.     · If you think your pain medicine is making you sick to your stomach:  ? Take your medicine after meals (unless your doctor has told you not to). ? Ask your doctor for a different pain medicine.     · If your doctor prescribed antibiotics, take them as directed. Do not stop taking them just because you feel better. You need to take the full course of antibiotics. Incision care    · If you have strips of tape on the incision, leave the tape on for a week or until it falls off.     · Wash the area daily with warm, soapy water, and pat it dry. Don't use hydrogen peroxide or alcohol, which can slow healing. You may cover the area with a gauze bandage if it weeps or rubs against clothing. Change the bandage every day.     · Keep the area clean and dry. Follow-up care is a key part of your treatment and safety.  Be sure to make and go to all appointments, and call your doctor if you are having problems. It's also a good idea to know your test results and keep a list of the medicines you take. When should you call for help? Call 911 anytime you think you may need emergency care. For example, call if:    · You passed out (lost consciousness).     · You are short of breath. Call your doctor now or seek immediate medical care if:    · You have pain that does not get better after you take pain medicine.     · You cannot pass stool or gas.     · You are sick to your stomach and cannot drink fluids.     · You have loose stitches, or your incision comes open.     · You have signs of a blood clot, such as:  ? Pain in your calf, back of the knee, thigh, or groin. ? Redness and swelling in your leg or groin.     · You have signs of infection, such as:  ? Increased pain, swelling, warmth, or redness. ? Red streaks leading from the incision. ? Pus draining from the incision. ? A fever. Watch closely for changes in your health, and be sure to contact your doctor if you have any problems. Where can you learn more? Go to [email or web address] org and sign in to your Serus account. Enter L112 in the [facility] box to learn more about \"Open Joan-en-Y Gastric Bypass: What to Expect at Home. \"     If you do not have an account, please click on the \"Sign Up Now\" link. Current as of: March 17, 2021               Content Version: 13.0  © 2006-2021 Healthwise, Incorporated. Care instructions adapted under license by [facility] ([facility]). If you have questions about a medical condition or this instruction, always ask your healthcare professional. [place] [address] any warranty or liability for your use of this information.

## 2021-09-30 ENCOUNTER — HOSPITAL ENCOUNTER (OUTPATIENT)
Age: 51
Discharge: HOME | End: 2021-09-30
Payer: COMMERCIAL

## 2021-09-30 DIAGNOSIS — E55.9 VITAMIN D DEFICIENCY: ICD-10-CM

## 2021-09-30 DIAGNOSIS — I10 ESSENTIAL HYPERTENSION: ICD-10-CM

## 2021-09-30 DIAGNOSIS — E11.9 TYPE 2 DIABETES MELLITUS WITHOUT COMPLICATION, WITHOUT LONG-TERM CURRENT USE OF INSULIN (HCC): ICD-10-CM

## 2021-09-30 DIAGNOSIS — E78.2 MIXED HYPERLIPIDEMIA: ICD-10-CM

## 2021-09-30 LAB
-: ABNORMAL
AMORPHOUS: ABNORMAL
BACTERIA: ABNORMAL
BILIRUBIN URINE: NEGATIVE
CASTS UA: ABNORMAL /LPF (ref 0–2)
CASTS UA: ABNORMAL /LPF (ref 0–2)
CHOLESTEROL, FASTING: 152 MG/DL
CHOLESTEROL/HDL RATIO: 3.4
COLOR: YELLOW
COMMENT UA: NORMAL
CREATININE URINE: 243 MG/DL (ref 28–217)
CRYSTALS, UA: ABNORMAL /HPF
CRYSTALS, UA: ABNORMAL /HPF
EPITHELIAL CELLS UA: ABNORMAL /HPF (ref 0–5)
GLUCOSE URINE: NEGATIVE
HDLC SERPL-MCNC: 45 MG/DL
KETONES, URINE: NEGATIVE
LDL CHOLESTEROL: 62 MG/DL (ref 0–130)
LEUKOCYTE ESTERASE, URINE: NEGATIVE
MICROALBUMIN/CREAT 24H UR: <12 MG/L
MICROALBUMIN/CREAT UR-RTO: ABNORMAL MCG/MG CREAT
MUCUS: ABNORMAL
NITRITE, URINE: NEGATIVE
OTHER OBSERVATIONS UA: ABNORMAL
PH UA: 5 (ref 5–8)
PROTEIN UA: NEGATIVE
RBC UA: ABNORMAL /HPF (ref 0–2)
RENAL EPITHELIAL, UA: ABNORMAL /HPF
SPECIFIC GRAVITY UA: 1.03 (ref 1–1.03)
TRICHOMONAS: ABNORMAL
TRIGLYCERIDE, FASTING: 227 MG/DL
TURBIDITY: CLEAR
URINE HGB: NEGATIVE
UROBILINOGEN, URINE: NORMAL
VITAMIN D 25-HYDROXY: 54.5 NG/ML (ref 30–100)
VLDLC SERPL CALC-MCNC: ABNORMAL MG/DL (ref 1–30)
WBC UA: ABNORMAL /HPF (ref 0–5)
YEAST: ABNORMAL

## 2021-09-30 PROCEDURE — 82306 VITAMIN D 25 HYDROXY: CPT

## 2021-09-30 PROCEDURE — 82043 UR ALBUMIN QUANTITATIVE: CPT

## 2021-09-30 PROCEDURE — 36415 COLL VENOUS BLD VENIPUNCTURE: CPT

## 2021-09-30 PROCEDURE — 80061 LIPID PANEL: CPT

## 2021-09-30 PROCEDURE — 82570 ASSAY OF URINE CREATININE: CPT

## 2021-09-30 PROCEDURE — 81001 URINALYSIS AUTO W/SCOPE: CPT

## 2021-10-04 ENCOUNTER — HOSPITAL ENCOUNTER (OUTPATIENT)
Dept: PAIN MANAGEMENT | Age: 51
Discharge: HOME | End: 2021-10-04
Payer: COMMERCIAL

## 2021-10-04 DIAGNOSIS — M51.36 DEGENERATION OF LUMBAR INTERVERTEBRAL DISC: Chronic | ICD-10-CM

## 2021-10-04 DIAGNOSIS — M54.16 LUMBAR RADICULOPATHY, CHRONIC: Primary | Chronic | ICD-10-CM

## 2021-10-04 DIAGNOSIS — M47.817 LUMBOSACRAL SPONDYLOSIS WITHOUT MYELOPATHY: ICD-10-CM

## 2021-10-04 DIAGNOSIS — M47.816 ARTHROPATHY OF LUMBAR FACET JOINT: ICD-10-CM

## 2021-10-04 DIAGNOSIS — Z79.899 ENCOUNTER FOR MEDICATION MANAGEMENT: ICD-10-CM

## 2021-10-04 PROCEDURE — 99213 OFFICE O/P EST LOW 20 MIN: CPT

## 2021-10-04 PROCEDURE — 99213 OFFICE O/P EST LOW 20 MIN: CPT | Performed by: NURSE PRACTITIONER

## 2021-10-04 RX ORDER — TIZANIDINE 4 MG/1
4 TABLET ORAL EVERY 8 HOURS PRN
COMMUNITY
End: 2021-11-30 | Stop reason: SDUPTHER

## 2021-10-04 RX ORDER — HYDROCODONE BITARTRATE AND ACETAMINOPHEN 5; 325 MG/1; MG/1
1 TABLET ORAL 3 TIMES DAILY PRN
Qty: 90 TABLET | Refills: 0 | Status: SHIPPED | OUTPATIENT
Start: 2021-10-05 | End: 2021-11-04 | Stop reason: SDUPTHER

## 2021-10-04 ASSESSMENT — ENCOUNTER SYMPTOMS
BACK PAIN: 1
CONSTIPATION: 0
COUGH: 0
SHORTNESS OF BREATH: 0
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
## 2021-10-18 NOTE — PRE-PROCEDURE INSTRUCTIONS
[facility] Pt called and pre-procedure instructions reviewed with pt, Pt informed writer she is planning of having right RFL completed, and then would like to schedule left lumbar facet joint injections after. Pt. Informed Dr. Juan Craig stated she would need to have left lumbar facet joint injections prior to scheduling and RFA on left side. Pt. Informed could have RFA on right side tomorrow. Pt. Verbalized understanding. Pt.instructed to wear a mask on entrance into the hospital, instructed on which two entrances open for patients, pt. Stated she does have a  to take home, and pt. Stated she had flu vaccination three weeks ago. No questions or concerns verbalized.

## 2021-10-18 NOTE — PRE-PROCEDURE INSTRUCTIONS
[facility]. Attempted to outreach pt. To review pre-procedure instructions. VM obtained, and message left. Pt. Instructed which entrances are open for patients, to wear a mask on entrance in to the hospital, instructed to check blood sugar, instructed to wear loose fitting clothes, and to hold anti-diabetic medications morning of procedure. Writer encourage pt. To call [facility] if she has additional questions or concerns.

## 2021-10-19 ENCOUNTER — HOSPITAL ENCOUNTER (OUTPATIENT)
Dept: PAIN MANAGEMENT | Age: 51
Discharge: HOME | End: 2021-10-19

## 2021-10-19 ENCOUNTER — NURSE TRIAGE (OUTPATIENT)
Dept: OTHER | Facility: CLINIC | Age: 51
End: 2021-10-19

## 2021-10-19 DIAGNOSIS — M51.36 DEGENERATION OF LUMBAR INTERVERTEBRAL DISC: ICD-10-CM

## 2021-10-19 DIAGNOSIS — M47.817 LUMBOSACRAL SPONDYLOSIS WITHOUT MYELOPATHY: ICD-10-CM

## 2021-10-19 DIAGNOSIS — M47.816 ARTHROPATHY OF LUMBAR FACET JOINT: Primary | ICD-10-CM

## 2021-10-19 NOTE — TELEPHONE ENCOUNTER
antacids, bowel movement)      Nothing is really making this better    10. OTHER SYMPTOMS: \"Has there been any vomiting, diarrhea, constipation, or urine problems? \"        Nausea/vomiting, constipation and diarrhea    11. PREGNANCY: \"Is there any chance you are pregnant? \" \"When was your last menstrual period? \"        No    Protocols used: ABDOMINAL PAIN - FEMALE-ADULT-[place]    Received call from [facility] with [employer] Complaint. Brief description of triage: Patient is having bouts every 3-4 days of lower central abdominal pain (which is tender to the touch) followed by nausea vomiting and watery stools (she takes colace every day). No fever. See triage above. Triage indicates for patient to go to the ED which she wants to do in the morning. Care advice provided, patient verbalizes understanding; denies any other questions or concerns; instructed to call back for any new or worsening symptoms. Writer provided warm transfer to Radha Joel . at [facility] for appointment scheduling (follow up to ED visit). Attention Provider: Thank you for allowing me to participate in the care of your patient. The patient was connected to triage in response to information provided to the ECC/PSC. Please do not respond through this encounter as the response is not directed to a shared pool.

## 2021-10-25 DIAGNOSIS — J45.20 MILD INTERMITTENT ASTHMA WITHOUT COMPLICATION: ICD-10-CM

## 2021-10-25 DIAGNOSIS — E11.9 TYPE 2 DIABETES MELLITUS WITHOUT COMPLICATION, WITHOUT LONG-TERM CURRENT USE OF INSULIN (HCC): ICD-10-CM

## 2021-10-25 DIAGNOSIS — J30.9 CHRONIC ALLERGIC RHINITIS: ICD-10-CM

## 2021-10-25 RX ORDER — MONTELUKAST SODIUM 10 MG/1
TABLET ORAL
Qty: 90 TABLET | Refills: 3 | Status: SHIPPED | OUTPATIENT
Start: 2021-10-25 | End: 2022-06-22 | Stop reason: SDUPTHER

## 2021-10-25 NOTE — TELEPHONE ENCOUNTER
Please Approve or Refuse.   Send to Pharmacy per Pt's Request:      Next Visit Date:  10/28/2021   Last Visit Date: 9/28/2021    Hemoglobin A1C (%)   Date Value   09/28/2021 5.5   05/24/2021 5.9   01/08/2021 5.3             ( goal A1C is < 7)   BP Readings from Last 3 Encounters:   09/28/21 138/86   07/18/21 126/88   06/08/21 97/62          (goal 120/80)  BUN   Date Value Ref Range Status   07/18/2021 15 6 - 20 mg/dL Final     CREATININE   Date Value Ref Range Status   07/18/2021 0.75 0.50 - 0.90 mg/dL Final     Potassium   Date Value Ref Range Status   07/18/2021 4.0 3.7 - 5.3 mmol/L Final

## 2021-10-26 RX ORDER — BLOOD SUGAR DIAGNOSTIC
STRIP MISCELLANEOUS
Qty: 200 STRIP | Refills: 3 | Status: SHIPPED | OUTPATIENT
Start: 2021-10-26 | End: 2022-06-22 | Stop reason: SDUPTHER

## 2021-10-27 ENCOUNTER — APPOINTMENT (OUTPATIENT)
Dept: GENERAL RADIOLOGY | Age: 51
End: 2021-10-27
Payer: COMMERCIAL

## 2021-10-27 ENCOUNTER — HOSPITAL ENCOUNTER (EMERGENCY)
Age: 51
Discharge: HOME | End: 2021-10-27
Attending: EMERGENCY MEDICINE
Payer: COMMERCIAL

## 2021-10-27 VITALS
SYSTOLIC BLOOD PRESSURE: 120 MMHG | OXYGEN SATURATION: 96 % | TEMPERATURE: 98.6 F | RESPIRATION RATE: 16 BRPM | HEART RATE: 78 BPM | DIASTOLIC BLOOD PRESSURE: 89 MMHG

## 2021-10-27 DIAGNOSIS — S93.402A SPRAIN OF LEFT ANKLE, UNSPECIFIED LIGAMENT, INITIAL ENCOUNTER: Primary | ICD-10-CM

## 2021-10-27 PROCEDURE — 99283 EMERGENCY DEPT VISIT LOW MDM: CPT

## 2021-10-27 PROCEDURE — 73610 X-RAY EXAM OF ANKLE: CPT

## 2021-10-27 PROCEDURE — 73630 X-RAY EXAM OF FOOT: CPT

## 2021-10-27 NOTE — ED TRIAGE NOTES
Mode of arrival (squad #, walk in, police, etc) : walk in        Chief complaint(s): Left ankle injury        Arrival Note (brief scenario, treatment PTA, etc). : Pt states she fell rolling her left ankle last night and is now having a hard time bearing weight on it. C= \"Have you ever felt that you should Cut down on your drinking? \"  No  A= \"Have people Annoyed you by criticizing your drinking? \"  No  G= \"Have you ever felt bad or Guilty about your drinking? \"  No  E= \"Have you ever had a drink as an Eye-opener first thing in the morning to steady your nerves or to help a hangover? \"  No      Deferred []      Reason for deferring: N/A    *If yes to two or more: probable alcohol abuse. *

## 2021-10-27 NOTE — ED PROVIDER NOTES
eMERGENCY dEPARTMENT eNCOUnter   Independent Attestation     Pt Name: Dell Fatima  MRN: [id number]  [place] 1970  Date of evaluation: 10/27/21     Dell Fatima is a 49 y.o. female with CC: Ankle Pain (left)      Based on the medical record the care appears appropriate. I was personally available for consultation in the Emergency Department. The care is provided during an unprecedented national emergency due to the novel coronavirus, COVID 19.     Cyrus Wilson MD  Attending Emergency Physician                   Cyrus Wilson MD  10/27/21 9661

## 2021-10-27 NOTE — ED PROVIDER NOTES
[address]  eMERGENCY dEPARTMENT eNCOUnter      Pt Name: Sury Wu  MRN: [id number]  [place] 1970  Date of evaluation: 10/27/2021  Provider: Gregory Emmanuel PA-C    CHIEF COMPLAINT       Chief Complaint   Patient presents with    Ankle Pain     left           HISTORY OF PRESENT ILLNESS  (Location/Symptom, Timing/Onset, Context/Setting, Quality, Duration, Modifying Factors, Severity.)   Sury Wu is a 49 y.o. female who presents to the emergency department for evaluation of left foot and ankle pain. Patient states last night she rolled her ankle. Patient is having pain over the lateral side of her ankle and foot. States pain is severe. Reports she cannot bear weight. She denies any numbness or tingling. States she has broken this foot before. No other complaints. Nursing Notes were reviewed. REVIEW OF SYSTEMS    (2-9 systems for level 4, 10 or more for level 5)     Review of Systems   Foot, ankle pain    Except as noted above the remainder of the review of systems was reviewed and negative.        PAST MEDICAL HISTORY     Past Medical History:   Diagnosis Date    Anxiety     Asthma     Colon polyp 10/31/2016    sessile serated adenoma x2    Depression     Diabetes mellitus ([address]     Diverticulitis 10/2016    Gastritis     GERD (gastroesophageal reflux disease)     Hemorrhoids     int/ext    Hypertension     Migraines     Osteoarthritis     Shingles 1-22-16    Tubular adenoma 10/31/2016    Urinary leakage      None otherwise stated in nurses notes    SURGICAL HISTORY       Past Surgical History:   Procedure Laterality Date    ABDOMINAL ADHESION SURGERY  07/08/2021    APPENDECTOMY  07/08/2021    CERVICAL DISCECTOMY  12/2013    & fusion     CHOLECYSTECTOMY      COLONOSCOPY  10/31/2016    int/ext hemorrhoids; sessile serated adenomax2; tubular adenoma    COLONOSCOPY N/A 10/22/2019    COLONOSCOPY POLYPECTOMY SNARE/COLD BIOPSY AND RANDOM BIOPSIES performed by Shelbie Gallardo MD at [facility] 8  03/04/2021    CYSTOSCOPY HYDRODISTENTION WITH DMSO AND UREAPLASMA , MYCOPLASMA CULTURES    CYSTOSCOPY N/A 3/4/2021    CYSTOSCOPY HYDRODISTENTION WITH DMSO AND UREAPLASMA , MYCOPLASMA CULTURES performed by Richy Cabello DO at [address] [place] [address] SURGERY Right     thumb surgery, BONE REMOVED    HYSTERECTOMY      LAPAROSCOPY      KS DEST,PARAVERTEBRAL,L/S,ADDL LVLS  3/25/2019         TONSILLECTOMY      TUBAL LIGATION      UPPER GASTROINTESTINAL ENDOSCOPY  10/31/2016    gastritis    UPPER GASTROINTESTINAL ENDOSCOPY N/A 10/22/2019    EGD BIOPSY performed by Shelbie Gallardo MD at [place] [address]     None otherwise stated in nurses notes    [address]       Discharge Medication List as of 10/27/2021 10:53 AM      CONTINUE these medications which have NOT CHANGED    Details   ONETOUCH VERIO strip USE 1 STRIP DAILY AS NEEDED, Disp-200 strip, R-3Normal      montelukast (SINGULAIR) 10 MG tablet TAKE 1 TABLET DAILY, Disp-90 tablet, R-3Normal      tiZANidine (ZANAFLEX) 4 MG tablet Take 4 mg by mouth every 8 hours as neededHistorical Med      HYDROcodone-acetaminophen (NORCO) 5-325 MG per tablet Take 1 tablet by mouth 3 times daily as needed for Pain for up to 30 days.  Indications: Pain, Disp-90 tablet, R-0Normal      Diclofenac Sodium 3 % GEL Apply 4 g topically 2 times daily, Disp-350 g, R-1Normal      Dulaglutide (TRULICITY) 3 JX/4.2SF SOPN Inject 3 mg into the skin once a week, Disp-12 pen, R-1Normal      busPIRone (BUSPAR) 10 MG tablet TAKE 2 TABLETS THREE TIMES A DAY, Disp-180 tablet, R-11Normal      ibuprofen (ADVIL;MOTRIN) 600 MG tablet TAKE 1 TABLET BY MOUTH EVERY 6 HOURSHistorical Med      STOOL SOFTENER/LAXATIVE 50-8.6 MG per tablet TAKE 2 TABLETS BY MOUTH TWICE DAILY, DAWHistorical Med      ipratropium (ATROVENT) 0.06 % nasal spray USE 2 SPRAY(S) IN EACH NOSTRIL ONCE DAILYHistorical Med atorvastatin (LIPITOR) 20 MG tablet TAKE 1 TABLET DAILY, Disp-90 tablet, R-3Normal      pregabalin (LYRICA) 75 MG capsule TAKE 1 CAPSULE FOUR TIMES A DAY, Disp-360 capsule, R-0Normal      traZODone (DESYREL) 50 MG tablet TAKE 1 TABLET NIGHTLY, Disp-30 tablet, R-11Normal      Diclofenac Sodium POWD Formula #5: diclo3%+gaba6%+lido2%+prilo2%. Apply 1-2 gm topically to affected area TID-QID., Disp-120 g, R-2Normal      omeprazole (PRILOSEC) 40 MG delayed release capsule TAKE 1 CAPSULE DAILY, Disp-90 capsule, R-3Normal      lisinopril (PRINIVIL;ZESTRIL) 5 MG tablet TAKE 1 TABLET DAILY, Disp-90 tablet, R-3Normal      trospium (SANCTURA) 20 MG tablet Take 1 tablet by mouth dailyHistorical Med      sertraline (ZOLOFT) 100 MG tablet TAKE 1 TABLET DAILY, Disp-90 tablet, R-3Normal      metFORMIN (GLUCOPHAGE-XR) 750 MG extended release tablet TAKE 1 TABLET DAILY WITH BREAKFAST (PLEASE MAKE APPOINTMENT DR DAVE RETIRED), Disp-90 tablet, R-3Normal      diclofenac sodium (VOLTAREN) 1 % GEL Apply 2 g topically 2 times daily, Topical, 2 TIMES DAILY Starting Wed 5/5/2021, Disp-100 g, R-0, Normal      fluticasone (FLONASE) 50 MCG/ACT nasal spray USE 1 SPRAY IN EACH NOSTRIL TWICE A DAY, Disp-32 g, R-5Normal      estradiol (ESTRACE) 0.1 MG/GM vaginal cream [facility] Med      Blood Pressure Monitor KIT Disp-1 kit,R-1, PrintUse as directed.       OneTouch Delica Lancets 44O MISC USE 1 EACH TWICE A DAY, Disp-200 each,R-5Normal      topiramate (TOPAMAX) 100 MG tablet TAKE 1 TABLET IN THE MORNING AND 2 TABLETS IN THE EVENING, Disp-270 tablet, R-4Normal      albuterol sulfate (PROAIR RESPICLICK) 121 (90 Base) MCG/ACT aerosol powder inhalation Inhale 2 puffs into the lungs every 4 hours as needed for Wheezing or Shortness of Breath, Disp-1 Inhaler, R-2Normal      docusate sodium (COLACE) 100 MG capsule Take 1 capsule by mouth daily as needed for Constipation, Disp-30 capsule, R-2Normal      Elastic Bandages & Supports (LUMBAR BACK BRACE/SUPPORT PAD) MISC DAILY PRN Starting 2017, Until Discontinued, Disp-1 each, R-0, Normal      Probiotic Product (PROBIOTIC DAILY PO) Take 1 tablet by mouth daily. ALLERGIES     Adhesive tape, Imitrex [sumatriptan], and Morphine    FAMILY HISTORY           Problem Relation Age of Onset    Cancer Mother     Heart Disease Father     Diabetes Father     High Blood Pressure Father     Other Other         Celiac Sprue. Aunt     Family Status   Relation Name Status    Mother      Father      Other  (Not Specified)      None otherwise stated in nurses notes    SOCIAL HISTORY      reports that she has never smoked. She has never used smokeless tobacco. She reports that she does not drink alcohol and does not use drugs. lives at home with others     PHYSICAL EXAM    (up to 7 for level 4, 8 or more for level 5)     ED Triage Vitals [10/27/21 0931]   BP Temp Temp Source Pulse Resp SpO2 Height Weight   120/89 98.6 °F (37 °C) Oral 78 16 96 % -- --       Physical Exam   Nursing note and vitals reviewed. Constitutional: well-developed, well-nourished, nontoxic, well appearing, not distressed  HEENT:  normocephalic atraumatic, external ears normal appearance, no nasal deformity, no neck masses or edema, patient protecting airway, no stridor, phonating well  Eyes: pupils equal, sclera non-icteric, no discharge  Cardiovascular: no JVD  Respiratory: non-labored breathing, effort normal, no accessory muscle use visulized, no audible wheezing  Gastrointestinal: Abdomen not distended  Musculoskeletal: Examination of left foot and ankle reveals no visible deformities, bruising, swelling, abrasions, erythema. Patient has tenderness over the fifth metatarsal and lateral malleolus. She has decreased range of motion secondary to pain. Achilles is intact. 2/2 DP and PT pulses. Brisk cap refill. Distal sensation intact. Patient unable to bear weight.   Skin: no pallor, no rashes visible  Neuro: alert and oriented times 3, GCS 15, normal coordination, no dysarthria or aphasia  Psych: normal mood and affect, cooperative, normal thought content              DIAGNOSTIC RESULTS     EKG: All EKG's are interpreted by the Emergency Department Physician who either signs or Co-signs this chart in the absence of a cardiologist.        RADIOLOGY:   All plain film, CT, MRI, and formal ultrasound images (except ED bedside ultrasound) are read by the radiologist, see reports below, unless otherwise noted in MDM or here. XR FOOT LEFT (MIN 3 VIEWS)   Final Result   No acute fracture or dislocation. XR ANKLE LEFT (MIN 3 VIEWS)   Final Result   No acute abnormality of the ankle. XR ANKLE LEFT (MIN 3 VIEWS)    Result Date: 10/27/2021  EXAMINATION: THREE XRAY VIEWS OF THE LEFT ANKLE 10/27/2021 9:36 am COMPARISON: May 5, 2021 HISTORY: ORDERING SYSTEM PROVIDED HISTORY: swelling and tenderness after fall TECHNOLOGIST PROVIDED HISTORY: swelling and tenderness after fall Reason for Exam: Left lateal ankle pain, fall 1 day ago. Acuity: Acute Type of Exam: Initial FINDINGS: No evidence of acute fracture or dislocation. Normal alignment of the ankle mortise. No focal osseous lesion. No evidence of joint effusion. No focal soft tissue abnormality. No acute abnormality of the ankle. LABS:  Labs Reviewed - No data to display    All other labs were within normal range or not returned as of this dictation. EMERGENCY DEPARTMENT COURSE and DIFFERENTIAL DIAGNOSIS/MDM:   Vitals:    Vitals:    10/27/21 0931   BP: 120/89   Pulse: 78   Resp: 16   Temp: 98.6 °F (37 °C)   TempSrc: Oral   SpO2: 96%         Patient instructed to return to the emergency room if symptoms worsen, return, or any other concern right away which is agreed by the patient    ED MEDS:  No orders of the defined types were placed in this encounter.         CONSULTS:  None    PROCEDURES:  None      FINAL IMPRESSION 1. Sprain of left ankle, unspecified ligament, initial encounter          DISPOSITION/PLAN   DISPOSITION Decision To Discharge    PATIENT REFERRED TO:  Vicki Wright, [facility]  [address] [facility] [address]  [address] [facility] [address]  [address] [facility] [address] [facility]  [place] [address]  [address], [address] [facility]  [address] [facility] [address]  [phone]    Call         DISCHARGE MEDICATIONS:  Discharge Medication List as of 10/27/2021 10:53 AM            Summation      Patient Course: Left foot and ankle pain. Rolled her ankle last night. She is neurovascularly intact. Unable to bear weight. Will get imaging. Imaging is unremarkable. Suspect sprain. Ace wrap provided. Referred to dr. Toya Boxer. Recommend ice, rest, elevation, OTC medication. Discussed results and plan with the pt. They expressed appropriate understanding. Pt given close follow up, supportive care instructions and strict return instructions at the bedside. ED Medications administered this visit:  Medications - No data to display    New Prescriptions from this visit:    Discharge Medication List as of 10/27/2021 10:53 AM          Follow-up:  Vicki Wright, APRN - [facility] [address]  [place] [address]  [phone]          [facility] ED  [facility] [address]  [address] [facility], [address] [facility]  [address] [facility] [address] [phone]    Call           Final Impression:   1.  Sprain of left ankle, unspecified ligament, initial encounter               (Please note that portions of this note were completed with a voice recognition program )        Abdiel Murillo. [address], PA-C  10/27/21 8825

## 2021-10-27 NOTE — PROGRESS NOTES
[facility] at [facility]    [address] [place] [address]   O: [phone]  Mario Alberto Greene is a 49 y.o. female evaluated via telephone on 10/28/2021. CONSENT:  She and/or health care decision maker is aware that that she may receive a bill for this telephone service, depending on her insurance coverage, and has provided verbal consent to proceed: Yes    DOCUMENTATION:  Patient scheduled this appointment today due to Diarrhea    Patient has a known chronic constipation. Patient reported that she may have been overusing her stool softener laxative and fiber and have had several episodes of diarrhea which was associated with abdominal cramping. Patient had then realized what she was doing and stopped overusing her current therapy and have since improved with loose stools. However, she still continues to have some occasional abdominal cramps. Patient is currently on psyllium which we will be switching over to [address]  Patient had a recent left ankle incident. Patient was seen and evaluated in the emergency room. X-ray showed no fractures or dislocation. However, patient have had several injuries in the past.  She also had a Sudheer deformity when she was younger. Patient had also reported some reinjuries after repair done. Patient is established with Josefina Quach. She does have a call out to the podiatrist for a follow-up. She is currently using a postop shoe. However, patient is encouraged to do some ankle stretching as tolerated. No data recorded  I communicated with the patient and/or health care decision maker about: PLAN     Review of Systems   Constitutional: Positive for activity change. Negative for chills and fever. HENT: Negative. Respiratory: Negative. Negative for shortness of breath. Cardiovascular: Negative. Negative for chest pain. Gastrointestinal: Positive for diarrhea and nausea.  Negative for abdominal pain.   Endocrine: Negative. Musculoskeletal: Positive for arthralgias and gait problem. Neurological: Negative for headaches. Psychiatric/Behavioral: Negative for sleep disturbance and suicidal ideas. The patient is nervous/anxious. Details of this discussion including any medical advice provided: Under assessment. PHYSICAL EXAM[INSTRUCTIONS:  \"[x]\" Indicates a positive item  \"[]\" Indicates a negative item  -- DELETE ALL ITEMS NOT EXAMINED]  Patient-Reported Vitals 4/27/2021   Patient-Reported Weight 215   Patient-Reported Height 5'0\"   Patient-Reported Systolic 426   Patient-Reported Diastolic 69   Patient-Reported Pulse 74   Patient-Reported Temperature 98.6   Patient-Reported SpO2 96     Constitutional: [x] No apparent distress      [] Abnormal -   Mental status: [x] Alert and awake  [x] Oriented to person/place/time[] Abnormal -   Pulmonary/Chest: [x] Respiratory effort normal         [] Abnormal -          Psychiatric:       [x] Normal Affect [] Abnormal -        [x] No Hallucinations  Other pertinent observable physical exam findings:-mildly anxious, left ankle swelling, decreased ROM  Controlled Substance Monitoring:  Acute and Chronic Pain Monitoring:   RX Monitoring 10/4/2021   Attestation -   Acute Pain Prescriptions -   Periodic Controlled Substance Monitoring Possible medication side effects, risk of tolerance/dependence & alternative treatments discussed. ;No signs of potential drug abuse or diversion identified.;Obtaining appropriate analgesic effect of treatment. Chronic Pain > 50 MEDD -   Chronic Pain > 80 MEDD -     ASSESSMENT  1. Strain of left ankle, subsequent encounter  Failure to Improve  Elevate  Ice  Stretch as tolerated  Encouraged to follow-up with the podiatrist  Call if not better    2. Sudheer's deformity of both heels  Stable  Historical    3. Abdominal cramps  Failure to Improve  Bentyl as needed   stay hydrated  No diarrhea  - dicyclomine (BENTYL) 10 MG capsule;  Take 1 capsule by mouth 4 times daily  Dispense: 90 capsule; Refill: 0    4. Chronic idiopathic constipation  Stable  Stop psyllium  Start Citrucel. DISCUSSED and ADVISED TO:  Drink plenty of fluids, 1.5 to 2 liters a day  Increase high-fiber foods  with 25-30 g each day. These include fruits, vegetables, beans, and whole grains. Get at least 30 minutes of exercise on most days of the week. Take a fiber supplement, such as Citrucel (Methylcellulose fiber) or Metamucil (Psyllium), every day. Schedule time each day for a bowel movement. - Methylcellulose POWD; 15 mLs by Does not apply route daily Mix 1 tbsp powder with 8 ounce water Take BID for 1 week. Then daily. Dispense: 1 each; Refill: 5    Total Time: minutes: 11-20 minutes  I affirm this is a Patient Initiated Episode with a Patient who has not had a related appointment within my department in the past 7 days or scheduled within the next 24 hours.   Patient identification was verified at the start of the visit: Yes  Note: not billable if this call serves to triage the patient into an appointment for the relevant concern  Patient-Reported Vitals 4/27/2021   Patient-Reported Weight 215   Patient-Reported Height 5'0\"   Patient-Reported Systolic 659   Patient-Reported Diastolic 69   Patient-Reported Pulse 74   Patient-Reported Temperature 98.6   Patient-Reported SpO2 96     Patient Active Problem List   Diagnosis    Degenerative disc disease, cervical    Cervical disc herniation    Lumbar radiculopathy, chronic    Degeneration of lumbar intervertebral disc    Essential hypertension    Left-sided low back pain with left-sided sciatica    Osteoarthritis of lumbar spine    Sacroiliitis (HCC)    Rheumatoid arthritis ([facility] [address]    Primary osteoarthritis of left hip    Arthropathy of lumbar facet joint    Hip pain, chronic, right    Hemorrhoids    Colon polyp    Tubular adenoma    Chronic bilateral low back pain without sciatica    Spinal osteoarthritis    Encounter for medication management    Morbid obesity with BMI of 40.0-44.9, adult (HCC)    Adjustment disorder with mixed anxiety and depressed mood    Type 2 diabetes mellitus without complication, without long-term current use of insulin (HCC)    Mixed incontinence urge and stress    Chronic, continuous use of opioids    Chronic use of opiate drugs therapeutic purposes    Lumbosacral spondylosis without myelopathy    Chronic GERD    Bacterial sinusitis    Mixed hyperlipidemia    Acne cystica    Chronic rhinitis    Surgical menopause    Chronic allergic rhinitis    S/P Cystoscopy w/ Hydrodistension and DMSO    Cervical lymphadenopathy    Interstitial cystitis    Mild intermittent asthma without complication     Jacinta Alexander is a 49 y.o. female patient  being evaluated by a Virtual Visit (video visit) encounter to address concerns as mentioned above. A caregiver was present when appropriate. Due to this being a TeleHealth encounter (During JUIHG-37 public health emergency), evaluation of the following organ systems was limited:Vitals/Constitutional/EENT/Resp/CV/GI//MS/Neuro/Skin/Heme-Lymph-Imm. Services were provided through a video synchronous discussion virtually to substitute for in-person clinic visit. This is a telehealth visit that was performed with the originating site at Patient Location: home and provider Location of [place], [place]. Pursuant to the emergency declaration under the [address] [place] [address] authority and the [employer] and [employer] Act, this Virtual Visit was conducted with patient's (and/or legal guardian's) consent, to reduce the patient's risk of exposure to COVID-19 and provide necessary medical care.   The patient (and/or legal guardian) has also been advised to contact this office for worsening conditions or problems, and seek emergency medical treatment and/or call 911 if deemed necessary. This note was completed by using the assistance of a speech-recognition program. However, inadvertent computerized transcription errors may be present. Although every effort was made to ensure accuracy, no guarantees can be provided that every mistake has been identified and corrected by editing.   Electronically signed by BAR Shahid CNP on 10/26/21 at 9:24 PM EDT

## 2021-10-28 ENCOUNTER — VIRTUAL VISIT (OUTPATIENT)
Dept: FAMILY MEDICINE CLINIC | Age: 51
End: 2021-10-28
Payer: COMMERCIAL

## 2021-10-28 ENCOUNTER — TELEPHONE (OUTPATIENT)
Dept: FAMILY MEDICINE CLINIC | Age: 51
End: 2021-10-28

## 2021-10-28 DIAGNOSIS — R10.9 ABDOMINAL CRAMPS: ICD-10-CM

## 2021-10-28 DIAGNOSIS — K59.04 CHRONIC IDIOPATHIC CONSTIPATION: ICD-10-CM

## 2021-10-28 DIAGNOSIS — M92.61 HAGLUND'S DEFORMITY OF BOTH HEELS: ICD-10-CM

## 2021-10-28 DIAGNOSIS — S96.912D STRAIN OF LEFT ANKLE, SUBSEQUENT ENCOUNTER: Primary | ICD-10-CM

## 2021-10-28 DIAGNOSIS — M92.62 HAGLUND'S DEFORMITY OF BOTH HEELS: ICD-10-CM

## 2021-10-28 PROCEDURE — G8427 DOCREV CUR MEDS BY ELIG CLIN: HCPCS | Performed by: FAMILY MEDICINE

## 2021-10-28 PROCEDURE — 3017F COLORECTAL CA SCREEN DOC REV: CPT | Performed by: FAMILY MEDICINE

## 2021-10-28 PROCEDURE — 99213 OFFICE O/P EST LOW 20 MIN: CPT | Performed by: FAMILY MEDICINE

## 2021-10-28 RX ORDER — DICYCLOMINE HYDROCHLORIDE 10 MG/1
10 CAPSULE ORAL 4 TIMES DAILY
Qty: 90 CAPSULE | Refills: 0 | Status: SHIPPED | OUTPATIENT
Start: 2021-10-28 | End: 2021-12-06 | Stop reason: SDUPTHER

## 2021-10-28 RX ORDER — METHYLCELLULOSE 4000CPS 30 %
15 POWDER (GRAM) MISCELLANEOUS DAILY
Qty: 1 EACH | Refills: 5 | Status: SHIPPED | OUTPATIENT
Start: 2021-10-28 | End: 2022-04-07

## 2021-10-28 ASSESSMENT — ENCOUNTER SYMPTOMS
ABDOMINAL PAIN: 0
RESPIRATORY NEGATIVE: 1
SHORTNESS OF BREATH: 0
DIARRHEA: 1
NAUSEA: 1

## 2021-10-28 NOTE — PATIENT INSTRUCTIONS
Patient Education        Strain or Sprain: Care Instructions  Your Care Instructions     A strain happens when you overstretch, or pull, a muscle. A sprain occurs when you stretch or tear a ligament, the tough tissue that connects one bone to another. These problems can happen when you exercise or lift something or when you are in an accident. Rest and other home care can help strains and sprains heal.  The doctor has checked you carefully, but problems can develop later. If you notice any problems or new symptoms,  get medical treatment right away. Follow-up care is a key part of your treatment and safety. Be sure to make and go to all appointments, and call your doctor if you are having problems. It's also a good idea to know your test results and keep a list of the medicines you take. How can you care for yourself at home? · If your doctor gave you a sling, splint, brace, or immobilizer, use it exactly as directed. · Rest the strained or sprained area, and follow your doctor's advice about when you can be active again. · Put ice or a cold pack on the sore area for 10 to 20 minutes at a time to stop swelling. Try this every 1 to 2 hours for 3 days (when you are awake) or until the swelling goes down. Put a thin cloth between the ice pack and your skin. Keep your splint or brace dry. · Prop up a sore arm or leg on a pillow when you ice it or anytime you sit or lie down. Try to keep it higher than the level of your heart. This will help reduce swelling. · Take pain medicines exactly as directed. ? If the doctor gave you a prescription medicine for pain, take it as prescribed. ? If you are not taking a prescription pain medicine, ask your doctor if you can take an over-the-counter medicine. · Do exercises as directed by your doctor or physical therapist.  · Return to your usual level of activity slowly. · Do not do anything that makes the pain worse. When should you call for help?    Call your doctor now or seek immediate medical care if:    · You have severe or increasing pain.     · You have tingling, weakness, or numbness in the area.     · The area turns cold or changes color.     · Your cast or splint feels too tight.     · You have symptoms of a blood clot, such as:  ? Pain in your calf, back of the knee, thigh, or groin. ? Redness and swelling in your leg or groin.     · You cannot move the strained part of your body. Watch closely for changes in your health, and be sure to contact your doctor if:    · You do not get better as expected. Where can you learn more? Go to [email or web address] org and sign in to your [employer] account. Enter C348 in the BakedCode box to learn more about \"Strain or Sprain: Care Instructions. \"     If you do not have an account, please click on the \"Sign Up Now\" link. Current as of: July 1, 2021               Content Version: 13.0  © 2006-2021 Healthwise, Inc[employer]. Care instructions adapted under license by [facility] ([facility]). If you have questions about a medical condition or this instruction, always ask your healthcare professional. [address] any warranty or liability for your use of this information.

## 2021-11-03 ASSESSMENT — ENCOUNTER SYMPTOMS
VOMITING: 0
NAUSEA: 0
BOWEL INCONTINENCE: 0
RESPIRATORY NEGATIVE: 1
CONSTIPATION: 0
ALLERGIC/IMMUNOLOGIC NEGATIVE: 1
BACK PAIN: 1
EYE REDNESS: 0
PHOTOPHOBIA: 0
SHORTNESS OF BREATH: 0
ABDOMINAL PAIN: 0
CHEST TIGHTNESS: 0
RHINORRHEA: 0
CHOKING: 0

## 2021-11-03 NOTE — PROGRESS NOTES
Renetta Hill is a 49 y.o. female evaluated on 11/4/2021. Modality of virtual service provided -via  telephone   Consent:  Patient and/or health care decision maker is aware that that patient may receive a bill for this telephone service, depending on one's insurance coverage, and has provided verbal consent to proceed: Yes    Patient identification was verified at the start of the visit: Yes    Chief complaint: Renetta Hill is 49 y.o.,  female, with  with chief complaint of pain involving     Patient is complaining of pain involving the low back area. He reports he had a fall and landed on the knees and he might have sprained tendon in the foot. He went to the emergency room and was referred to physician. Patient reports he has not seen. He had some increased pain since the fall but the pain is slowly getting better. Patient reports that she would like to schedule for the RFA on the left side in the near future. Back Pain  This is a chronic problem. The current episode started more than 1 year ago. The problem occurs constantly. The problem is unchanged. The pain is present in the lumbar spine and sacro-iliac. The quality of the pain is described as aching and shooting (Sharp, pins-and-needles in the foot). Radiates to: Radiates to the hips mostly on the right side. The pain is at a severity of 6/10 (3-8). The pain is moderate. The pain is worse during the day. The symptoms are aggravated by bending, position, standing and twisting (Walking lifting her ADLs). Associated symptoms include weakness. Pertinent negatives include no abdominal pain, bladder incontinence, bowel incontinence, chest pain, dysuria, fever, numbness, paresthesias or tingling. Risk factors include lack of exercise and obesity.         Alleviating factors:heat and rest   Lifestyle changes experienced with pain: Prevents or limits ADLs, Increases w/activity. , Increases w/prolonged sitting/standing/walking  Mood changes,none  Patient currently unemployed. Physical therapy did not help the pain. Are you under psychological counseling at present: No  Goals for treatment include:  Decrease in pain  Enjoy daily and recreational activities, return to previous status. Last procedure was  Lumbar facet joint injections at the levels of T12-L1, L1-L2, L2-L3, L3-L4, L4-L5, L5-S1 on the Right side on 6/8/2021    Patient relates current medications are helping the pain. Patient reports taking pain medications as prescribed, denies obtaining medications from different sources and denies use of illegal drugs. Patient denies side effects from medications like nausea, vomiting, constipation or drowsiness. Patient reports current activities of daily living ar possible due to medications and would like to continue them.        ACTIVITY/SOCIAL/EMOTIONAL:  Sleep Pattern: 6 hours per night. nightime awakenings  Home Exercises:  no regular exercise  Activity:unchanged  Emotional Issues: normal.   Currently seeing a Psychiatrist or Psychologist:  No     ADVERSE MEDICATION EFFECTS:   Nausea and vomiting: no   Constipation: no-Undercontrol-: yes  Dizziness/drowsy/sleepy--no  Urinary Retention: no    ABERRANT BEHAVIORS SINCE LAST VISIT  Lost rx/pills:------------------------------------------ no  Taking  medication as prescribed: ----------- yes  Urine Drug Screen ---------------------------------  yes             Date------------------------------------------------- 8/20/2021              Results as expected ---------------------yes    Recent ER visits: -------------------------------------No  Pill count is appropriate: ---------------------------yes   Refills for prescriptions appropriate:---------- yes      Past Medical History:   Diagnosis Date    Anxiety     Asthma     Colon polyp 10/31/2016    sessile serated adenoma x2    Depression     [facility]     Diverticulitis 10/2016    Gastritis     GERD (gastroesophageal reflux disease)     Hemorrhoids     int/ext    Hypertension     Migraines     Osteoarthritis     Shingles 1-22-16    Tubular adenoma 10/31/2016    Urinary leakage        Past Surgical History:   Procedure Laterality Date    ABDOMINAL ADHESION SURGERY  07/08/2021    APPENDECTOMY  07/08/2021    CERVICAL DISCECTOMY  12/2013    & fusion     CHOLECYSTECTOMY      COLONOSCOPY  10/31/2016    int/ext hemorrhoids; sessile serated adenomax2; tubular adenoma    COLONOSCOPY N/A 10/22/2019    COLONOSCOPY POLYPECTOMY SNARE/COLD BIOPSY AND RANDOM BIOPSIES performed by Viky Hyde MD at [place] [address]  03/04/2021    CYSTOSCOPY HYDRODISTENTION WITH DMSO AND UREAPLASMA , MYCOPLASMA CULTURES    CYSTOSCOPY N/A 3/4/2021    CYSTOSCOPY HYDRODISTENTION WITH DMSO AND UREAPLASMA , MYCOPLASMA CULTURES performed by Harlan Mann DO at [address] [place] [address]      HAND SURGERY Right     thumb surgery, BONE REMOVED    HYSTERECTOMY      LAPAROSCOPY      ND DEST,PARAVERTEBRAL,L/S,ADDL LVLS  3/25/2019         TONSILLECTOMY      TUBAL LIGATION      UPPER GASTROINTESTINAL ENDOSCOPY  10/31/2016    gastritis    UPPER GASTROINTESTINAL ENDOSCOPY N/A 10/22/2019    EGD BIOPSY performed by [facility] MD at [facility]       Family History   Problem Relation Age of Onset    Cancer Mother     Heart Disease Father     Diabetes Father     High Blood Pressure Father     Other Other         Celiac Sprue.  Aunt       Social History     Socioeconomic History    Marital status:      Spouse name: None    Number of children: None    Years of education: None    Highest education level: None   Occupational History    Occupation: unemployed   Tobacco Use    Smoking status: Never Smoker    Smokeless tobacco: Never Used   Vaping Use    Vaping Use: Never used   Substance and Sexual Activity    Alcohol use: No    Drug use: No    Sexual activity: Yes   Other Topics Concern  None   Social History Narrative    None     Social Determinants of Health     Financial Resource Strain:     Difficulty of Paying Living Expenses: Not on file   Food Insecurity:     Worried About Running Out of Food in the Last Year: Not on file    Beth of Food in the Last Year: Not on file   Transportation Needs:     Lack of Transportation (Medical): Not on file    Lack of Transportation (Non-Medical): Not on file   Physical Activity:     Days of Exercise per Week: Not on file    Minutes of Exercise per Session: Not on file   Stress:     Feeling of Stress : Not on file   Social Connections:     Frequency of Communication with Friends and Family: Not on file    Frequency of Social Gatherings with Friends and Family: Not on file    Attends Anabaptist Services: Not on file    Active Member of [address] [place] [address] [employer] or Organizations: Not on file    Attends Club or Organization Meetings: Not on file    Marital Status: Not on file   Intimate Partner Violence:     Fear of Current or Ex-Partner: Not on file    Emotionally Abused: Not on file    Physically Abused: Not on file    Sexually Abused: Not on file   Housing Stability:     Unable to Pay for Housing in the Last Year: Not on file    Number of [place] in the Last Year: Not on file    Unstable Housing in the Last Year: Not on file       Allergies   Allergen Reactions    Adhesive Tape Other (See Comments)     blisters    Imitrex [Sumatriptan] Other (See Comments)     \"feels like head is going to explode    Morphine Itching     hives       Current Outpatient Medications on File Prior to Encounter   Medication Sig Dispense Refill    dicyclomine (BENTYL) 10 MG capsule Take 1 capsule by mouth 4 times daily 90 capsule 0    Methylcellulose POWD 15 mLs by Does not apply route daily Mix 1 tbsp powder with 8 ounce water Take BID for 1 week. Then daily.  1 each 5    ONETOUCH VERIO strip USE 1 STRIP DAILY AS NEEDED 200 strip 3    montelukast (SINGULAIR) 10 MG tablet TAKE 1 TABLET DAILY 90 tablet 3    tiZANidine (ZANAFLEX) 4 MG tablet Take 4 mg by mouth every 8 hours as needed      Diclofenac Sodium 3 % GEL Apply 4 g topically 2 times daily 350 g 1    Dulaglutide (TRULICITY) 3 JH/5.3RZ SOPN Inject 3 mg into the skin once a week 12 pen 1    busPIRone (BUSPAR) 10 MG tablet TAKE 2 TABLETS THREE TIMES A  tablet 11    ibuprofen (ADVIL;MOTRIN) 600 MG tablet TAKE 1 TABLET BY MOUTH EVERY 6 HOURS      STOOL SOFTENER/LAXATIVE 50-8.6 MG per tablet TAKE 2 TABLETS BY MOUTH TWICE DAILY      ipratropium (ATROVENT) 0.06 % nasal spray USE 2 SPRAY(S) IN EACH NOSTRIL ONCE DAILY      atorvastatin (LIPITOR) 20 MG tablet TAKE 1 TABLET DAILY 90 tablet 3    pregabalin (LYRICA) 75 MG capsule TAKE 1 CAPSULE FOUR TIMES A  capsule 0    traZODone (DESYREL) 50 MG tablet TAKE 1 TABLET NIGHTLY 30 tablet 11    Diclofenac Sodium POWD Formula #5: diclo3%+gaba6%+lido2%+prilo2%. Apply 1-2 gm topically to affected area TID-QID. 120 g 2    omeprazole (PRILOSEC) 40 MG delayed release capsule TAKE 1 CAPSULE DAILY 90 capsule 3    lisinopril (PRINIVIL;ZESTRIL) 5 MG tablet TAKE 1 TABLET DAILY 90 tablet 3    trospium (SANCTURA) 20 MG tablet Take 1 tablet by mouth daily      sertraline (ZOLOFT) 100 MG tablet TAKE 1 TABLET DAILY 90 tablet 3    metFORMIN (GLUCOPHAGE-XR) 750 MG extended release tablet TAKE 1 TABLET DAILY WITH BREAKFAST (PLEASE MAKE APPOINTMENT DR DAVE RETIRED) 90 tablet 3    diclofenac sodium (VOLTAREN) 1 % GEL Apply 2 g topically 2 times daily 100 g 0    fluticasone (FLONASE) 50 MCG/ACT nasal spray USE 1 SPRAY IN EACH NOSTRIL TWICE A DAY 32 g 5    estradiol (ESTRACE) 0.1 MG/GM vaginal cream       Blood Pressure Monitor KIT Use as directed.  1 kit 1    OneTouch Delica Lancets 87J MISC USE 1 EACH TWICE A  each 5    topiramate (TOPAMAX) 100 MG tablet TAKE 1 TABLET IN THE MORNING AND 2 TABLETS IN THE EVENING 270 tablet 4    albuterol sulfate (PROAIR RESPICLICK) 108 (90 Base) MCG/ACT aerosol powder inhalation Inhale 2 puffs into the lungs every 4 hours as needed for Wheezing or Shortness of Breath 1 Inhaler 2    docusate sodium (COLACE) 100 MG capsule Take 1 capsule by mouth daily as needed for Constipation 30 capsule 2    Elastic Bandages & Supports (LUMBAR BACK BRACE/SUPPORT PAD) MISC 1 each by Does not apply route daily as needed (pain) 1 each 0    Probiotic Product (PROBIOTIC DAILY PO) Take 1 tablet by mouth daily. No current facility-administered medications on file prior to encounter. Review of Systems   Constitutional: Positive for fatigue. Negative for activity change, fever and unexpected weight change. HENT: Negative for congestion, ear pain, rhinorrhea and sore throat. Eyes: Negative for photophobia, redness and visual disturbance. Respiratory: Negative. Negative for choking, chest tightness and shortness of breath. Cardiovascular: Negative. Negative for chest pain and palpitations. Gastrointestinal: Negative for abdominal pain, bowel incontinence, constipation, nausea and vomiting. Endocrine: Negative. Negative for heat intolerance and polyuria. Genitourinary: Negative. Negative for bladder incontinence, dysuria and hematuria. Musculoskeletal: Positive for back pain. Negative for arthralgias. Skin: Negative for rash and wound. Allergic/Immunologic: Negative. Neurological: Positive for weakness. Negative for tingling, tremors, numbness and paresthesias. Hematological: Negative. Psychiatric/Behavioral: Positive for sleep disturbance. Negative for self-injury and suicidal ideas. The patient is not nervous/anxious. Patient denies any change in the review of systems since LASTVISIT     Physical Exam  Constitutional:       Appearance: She is obese. Skin:         Neurological:      Mental Status: She is alert and oriented to person, place, and time.    Psychiatric:         Mood and Affect: Mood normal.        Ortho Exam     DATA:  LAB.:   Ref Range & Units 9/8/21 0848 Resulting Agency Comments   Pain Management Drug Panel Interp, Urine  Inconsistent  [facility] (NOTE)   ________________________________________________________________   DRUGS EXPECTED:   NORCO (HYDROCODONE)   ________________________________________________________________   NDPVIBKMIC with medications provided:   Purnima Mason (HYDROCODONE): based on norhydrocodone, hydromorphone   ________________________________________________________________   INCONSISTENT with medications provided:   Pregabalin   ________________________________________________________________   Drugs Not Included in this Assay:   Acetaminophen   ________________________________________________________________   INTERPRETIVE INFORMATION: Targeted drug profile Interp   Interpretation depends on accuracy and completeness        X-Ray reports:  Technique: Multiplanar multisequence MR imaging of the lumbar spine was performed without intravenous contrast.   Findings: Straightening of normal lumbar lordotic curvature. Visualized spinal cord demonstrates no evidence for cord edema. Conus terminus at approximately L1 level. Cauda equina nerve roots follow spinal curvature. Vertebral body heights are maintained slight heterogeneity of the marrow signal without evidence for marrow edema. L1-L2 level: Bilateral facet hypertrophy without significant central canal stenosis or neuroforaminal narrowing. L2-L3 level: Bilateral facet hypertrophy with broad-based disc bulge and ligamentum flavum thickening and a superimposed right foraminal and extraforaminal levels disc protrusion with mild to moderate right-sided neuroforaminal narrowing without significant central canal stenosis. L3-L4 level: Bilateral facet hypertrophy with broad-based disc bulge and left extraforaminal level disc protrusion with mild approximation of the left L3 nerve root without significant central canal stenosis.    L4-5 level: Broad-based disc bulge with facet hypertrophy without significant central canal stenosis and mild bilateral neuroforaminal narrowing. L5-S1 level: Broad-based disc bulge and facet hypertrophy and mild to moderate bilateral neuroforaminal narrowing without significant central canal stenosis. Paraspinous soft tissues demonstrate no discrete mass. Impression: Lumbar spondylotic changes without evidence for significant central canal stenosis. Please see above level by level complete analysis     Final report electronically signed by Asif Loyd M.D. on 3/27/2016 12:07 PM    Clinical  impression:  1. Degeneration of lumbar intervertebral disc    2. Lumbosacral spondylosis without myelopathy    3. Lumbar radiculopathy, chronic    4. Rheumatoid arthritis, involving unspecified site, unspecified whether rheumatoid factor present ([facility]    5. Primary osteoarthritis of left hip    6. Hip pain, chronic, right    7. Degenerative disc disease, cervical    8. Cervical disc herniation    9. Lumbar spondylosis    10. Chronic use of opiate drugs therapeutic purposes    11. Encounter for medication management    12. Arthropathy of lumbar facet joint        Plan of care:  Patient is scheduled for RFA in  We will continue current pain medications  Current medications are being tolerated without any Adverse side effects. Orders Placed This Encounter   Medications    HYDROcodone-acetaminophen (NORCO) 5-325 MG per tablet     Sig: Take 1 tablet by mouth 3 times daily as needed for Pain for up to 30 days. Indications: Pain     Dispense:  90 tablet     Refill:  0     Reduce doses taken as pain becomes manageable     Urine drug screens have been appropriate. No aberrant activity noted. Analgesia is achieved. Activities of daily living are possible because of medications. Safe use of medications explained to patient.     PDMP Monitoring:    Last PDMP Roberto Conrad as Reviewed [facility]):  Review User Review Instant Review Result   SANDRITA NEYDA JACQUES 11/3/2021  3:49 AM Reviewed PDMP [1]     Counselling/Preventive measures for pain  Control:    [x]  Spine strengthening exercises are discussed with patient in detail. [x] Ill effects of being on chronic pain medications such as sleep disturbances, hormonal changes, withdrawal symptoms,  chronic opioid dependence and tolerance were discussed with patient. I had asked the patient to minimize medication use and utilize pain medications only for uncontrolled rest pain or pain with exertional activities. I advised patient not to self escalate pain medications without consulting with us. At each of patient's future visits we will try to taper pain medications, while adjusting the adjunct medications, and re-evaluating for Physical Therapy to improve spinal and joint strength. We will continue to have discussions to decrease pain medications as tolerated. I also discussed with the patient regarding the dangers of combining narcotic pain medication with tranquilizers, alcohol or illegal drugs or taking the medication any other than prescribed. The dangers including the respiratory depression and death. Patient was told to tell  to all  physicians regarding the medications he is getting from pain clinic. Patient is warned not to take any unprescribed medications over-the-counter medications that can depress breathing . Patient is advised to talk to the pharmacist or physicians if planning to take any over-the-counter medications before  takeing them. Patient is strongly advised to avoid tranquilizers or  Relaxants for any medications that can depress breathing or recreational drugs. Patient is also advised to tell us if there is any changes in his medications from other physicians. We discussed the same at today's visit and have not been to implement it, as the patient's pain is not under control with current medications. No orders of the defined types were placed in this encounter.       Decision Making Process : Patient's health history and referral records thoroughly reviewed before focused physical examination and discussion with patient. Over 50% of today's visit is spent on examining the patient and counseling and coordinating the care. Level of complexity of date to be reviewed is Moderate. The chart date reviewed include the following: Imaging Reports. Summary of Care. Time spent reviewing with patient the below reports:   Medication safety, Treatment options. Level of diagnosis and management options of this case is multiple: involving the following management options: Interventions as needed, medication management as appropriate, future visits, activity modification, heat/ice as needed, Urine drug screen as required. [x]The patient's questions were answered to the best of my abilities. This note was created using voice recognition software. There may be inaccuracies of transcription  that are inadvertently overlooked prior to the signature. There is any questions about the transcription please contact me. Return in  4 weeks  with physician / CNP  for further plan of treatment. Due to the COVID-19 pandemic and the appropriate interventions by Rico Guardado, our non-urgent pain management patients will not be seen in the office at this time for their protection and the protection of our staff. To offer continuity of care, their prescriptions will be escribed this month after a careful chart review and review of their OARRS report  Pursuant to the emergency declaration under the [employer] and [place], [employer] waiver authority and the [employer] and [employer] Act, this Virtual Visit was conducted, with patient's consent, to reduce the patient's risk of exposure to COVID-19 and provide continuity of care for an established patient.       Services were provided through a video synchronous discussion virtually to substitute for in-person appointment. \"  Documentation:  I communicated with the patient and/or health care decision maker about plan of care  Details of this discussion including any medical advice provided: Total Time: minutes: 11-20 minutes    I affirm this is a Patient Initiated Episode with an Established Patient who has not had a related appointment within my department in the past 7 days or scheduled within the next 24 hours.     Electronically signed by Aimee Palacios MD on 11/6/2021 at 9:31 AM

## 2021-11-04 ENCOUNTER — HOSPITAL ENCOUNTER (OUTPATIENT)
Dept: PAIN MANAGEMENT | Age: 51
Discharge: HOME | End: 2021-11-04
Payer: COMMERCIAL

## 2021-11-04 DIAGNOSIS — M16.12 PRIMARY OSTEOARTHRITIS OF LEFT HIP: ICD-10-CM

## 2021-11-04 DIAGNOSIS — M47.816 ARTHROPATHY OF LUMBAR FACET JOINT: ICD-10-CM

## 2021-11-04 DIAGNOSIS — M06.9 RHEUMATOID ARTHRITIS, INVOLVING UNSPECIFIED SITE, UNSPECIFIED WHETHER RHEUMATOID FACTOR PRESENT (HCC): ICD-10-CM

## 2021-11-04 DIAGNOSIS — Z79.899 ENCOUNTER FOR MEDICATION MANAGEMENT: ICD-10-CM

## 2021-11-04 DIAGNOSIS — M50.20 CERVICAL DISC HERNIATION: ICD-10-CM

## 2021-11-04 DIAGNOSIS — Z79.891 CHRONIC USE OF OPIATE DRUG FOR THERAPEUTIC PURPOSE: ICD-10-CM

## 2021-11-04 DIAGNOSIS — M47.816 LUMBAR SPONDYLOSIS: ICD-10-CM

## 2021-11-04 DIAGNOSIS — G89.29 HIP PAIN, CHRONIC, RIGHT: ICD-10-CM

## 2021-11-04 DIAGNOSIS — M54.16 LUMBAR RADICULOPATHY, CHRONIC: ICD-10-CM

## 2021-11-04 DIAGNOSIS — M25.551 HIP PAIN, CHRONIC, RIGHT: ICD-10-CM

## 2021-11-04 DIAGNOSIS — M50.30 DEGENERATIVE DISC DISEASE, CERVICAL: ICD-10-CM

## 2021-11-04 DIAGNOSIS — M51.36 DEGENERATION OF LUMBAR INTERVERTEBRAL DISC: Primary | ICD-10-CM

## 2021-11-04 DIAGNOSIS — M47.817 LUMBOSACRAL SPONDYLOSIS WITHOUT MYELOPATHY: ICD-10-CM

## 2021-11-04 PROCEDURE — 99213 OFFICE O/P EST LOW 20 MIN: CPT

## 2021-11-04 PROCEDURE — 99214 OFFICE O/P EST MOD 30 MIN: CPT | Performed by: PAIN MEDICINE

## 2021-11-04 RX ORDER — HYDROCODONE BITARTRATE AND ACETAMINOPHEN 5; 325 MG/1; MG/1
1 TABLET ORAL 3 TIMES DAILY PRN
Qty: 90 TABLET | Refills: 0 | Status: SHIPPED | OUTPATIENT
Start: 2021-11-04 | End: 2021-11-30 | Stop reason: SDUPTHER

## 2021-11-06 ASSESSMENT — ENCOUNTER SYMPTOMS: SORE THROAT: 0

## 2021-11-11 ENCOUNTER — TELEPHONE (OUTPATIENT)
Dept: BARIATRICS/WEIGHT MGMT | Age: 51
End: 2021-11-11

## 2021-11-11 NOTE — TELEPHONE ENCOUNTER
Online Info Session Completed:  on 10/1/21 okay to schedule with Dr. Geeta Jackson   with Angela Blake    Patient informed the following: This is NOT a guarantee of payment  When stating that you have a Benefit or Coverage for Bariatric Surgery - that means that you may qualify for the surgery  Bariatric Surgery is considered an elective procedure, patient is responsible to know their benefits . Any information we obtain when calling your insurance  is not  a guarantee of  coverage  and/or  benefit. Appointment Note :   New Patient , BC/BS,   6   month visits,  PG Fee $200,  Mailed Packet or advised to arrive  early      Remind Patient of $200 Program fee with $ 100 required at Second visit with office on initial dietician visit. Remind Patient they must be nicotine free. They will be tested at the beginning of the program and prior to surgery. Advise Patient Responsible for out of pocket, copay at medical visits, Deductible and coinsurance applied to medical visits and procedure. You will be responsible for any of the following:  · Copays $20.00  · Deductibles $150/$300  · Co insurances 80/20  · OOP: $2515/$5030    The items mentioned above are indicated or required by your insurance plan. Your deductible and coinsurance are applied to medical visits and procedures. Verified with patient if he or she has had any previous bariatric surgery? ( If yes ,advise patient of transfer of care process and program fee)     Tried to call patient-voicemail full. Hayder Mitchell

## 2021-11-16 ENCOUNTER — HOSPITAL ENCOUNTER (OUTPATIENT)
Dept: GENERAL RADIOLOGY | Age: 51
Discharge: HOME | End: 2021-11-18
Payer: COMMERCIAL

## 2021-11-16 ENCOUNTER — HOSPITAL ENCOUNTER (OUTPATIENT)
Dept: PAIN MANAGEMENT | Age: 51
Discharge: HOME | End: 2021-11-16
Payer: COMMERCIAL

## 2021-11-16 VITALS
DIASTOLIC BLOOD PRESSURE: 61 MMHG | TEMPERATURE: 97.7 F | BODY MASS INDEX: 42.21 KG/M2 | RESPIRATION RATE: 18 BRPM | WEIGHT: 215 LBS | SYSTOLIC BLOOD PRESSURE: 101 MMHG | HEIGHT: 60 IN | OXYGEN SATURATION: 96 % | HEART RATE: 79 BPM

## 2021-11-16 DIAGNOSIS — M51.36 DEGENERATION OF LUMBAR INTERVERTEBRAL DISC: ICD-10-CM

## 2021-11-16 DIAGNOSIS — M47.896 OTHER OSTEOARTHRITIS OF SPINE, LUMBAR REGION: ICD-10-CM

## 2021-11-16 DIAGNOSIS — M47.817 LUMBOSACRAL SPONDYLOSIS WITHOUT MYELOPATHY: Primary | ICD-10-CM

## 2021-11-16 DIAGNOSIS — M47.816 ARTHROPATHY OF LUMBAR FACET JOINT: ICD-10-CM

## 2021-11-16 PROCEDURE — 3209999900 FLUORO FOR SURGICAL PROCEDURES

## 2021-11-16 PROCEDURE — 64495 INJ PARAVERT F JNT L/S 3 LEV: CPT | Performed by: PAIN MEDICINE

## 2021-11-16 PROCEDURE — 64636 DESTROY L/S FACET JNT ADDL: CPT

## 2021-11-16 PROCEDURE — 64494 INJ PARAVERT F JNT L/S 2 LEV: CPT | Performed by: PAIN MEDICINE

## 2021-11-16 PROCEDURE — 2500000003 HC RX 250 WO HCPCS: Performed by: PAIN MEDICINE

## 2021-11-16 PROCEDURE — 64493 INJ PARAVERT F JNT L/S 1 LEV: CPT | Performed by: PAIN MEDICINE

## 2021-11-16 PROCEDURE — 64490 INJ PARAVERT F JNT C/T 1 LEV: CPT | Performed by: PAIN MEDICINE

## 2021-11-16 PROCEDURE — 64635 DESTROY LUMB/SAC FACET JNT: CPT

## 2021-11-16 PROCEDURE — 6360000002 HC RX W HCPCS: Performed by: PAIN MEDICINE

## 2021-11-16 RX ORDER — TRIAMCINOLONE ACETONIDE 40 MG/ML
INJECTION, SUSPENSION INTRA-ARTICULAR; INTRAMUSCULAR
Status: COMPLETED | OUTPATIENT
Start: 2021-11-16 | End: 2021-11-16

## 2021-11-16 RX ORDER — MIDAZOLAM HYDROCHLORIDE 1 MG/ML
INJECTION INTRAMUSCULAR; INTRAVENOUS
Status: COMPLETED | OUTPATIENT
Start: 2021-11-16 | End: 2021-11-16

## 2021-11-16 RX ORDER — BUPIVACAINE HYDROCHLORIDE 5 MG/ML
INJECTION, SOLUTION EPIDURAL; INTRACAUDAL
Status: COMPLETED | OUTPATIENT
Start: 2021-11-16 | End: 2021-11-16

## 2021-11-16 RX ORDER — FENTANYL CITRATE 50 UG/ML
INJECTION, SOLUTION INTRAMUSCULAR; INTRAVENOUS
Status: COMPLETED | OUTPATIENT
Start: 2021-11-16 | End: 2021-11-16

## 2021-11-16 RX ADMIN — MIDAZOLAM 2 MG: 1 INJECTION INTRAMUSCULAR; INTRAVENOUS at 08:24

## 2021-11-16 RX ADMIN — TRIAMCINOLONE ACETONIDE 40 MG: 40 INJECTION, SUSPENSION INTRA-ARTICULAR; INTRAMUSCULAR at 09:17

## 2021-11-16 RX ADMIN — BUPIVACAINE HYDROCHLORIDE 12 ML: 5 INJECTION, SOLUTION EPIDURAL; INTRACAUDAL; PERINEURAL at 09:17

## 2021-11-16 RX ADMIN — Medication 50 MCG: at 08:30

## 2021-11-16 RX ADMIN — Medication 50 MCG: at 08:45

## 2021-11-16 ASSESSMENT — PAIN DESCRIPTION - DESCRIPTORS: DESCRIPTORS: ACHING;BURNING;THROBBING

## 2021-11-16 ASSESSMENT — PAIN SCALES - GENERAL: PAINLEVEL_OUTOF10: 5

## 2021-11-16 ASSESSMENT — PAIN DESCRIPTION - ORIENTATION: ORIENTATION: RIGHT;LEFT

## 2021-11-16 ASSESSMENT — PAIN DESCRIPTION - PROGRESSION: CLINICAL_PROGRESSION: GRADUALLY WORSENING

## 2021-11-16 ASSESSMENT — PAIN - FUNCTIONAL ASSESSMENT
PAIN_FUNCTIONAL_ASSESSMENT: 0-10
PAIN_FUNCTIONAL_ASSESSMENT: PREVENTS OR INTERFERES SOME ACTIVE ACTIVITIES AND ADLS

## 2021-11-16 ASSESSMENT — PAIN DESCRIPTION - PAIN TYPE: TYPE: CHRONIC PAIN

## 2021-11-16 ASSESSMENT — PAIN DESCRIPTION - ONSET: ONSET: ON-GOING

## 2021-11-16 ASSESSMENT — PAIN DESCRIPTION - LOCATION: LOCATION: BACK;HIP

## 2021-11-16 ASSESSMENT — PAIN DESCRIPTION - FREQUENCY: FREQUENCY: CONTINUOUS

## 2021-11-16 NOTE — PROCEDURES
Pre-Procedure Note    Patient Name: Sheeba Vegas   YOB: 1970  Room/Bed: Room/bed info not found  Medical Record Number: [id number]  Date: 11/16/2021       Indication:    1. Lumbosacral spondylosis without myelopathy    2. Arthropathy of lumbar facet joint    3. Degeneration of lumbar intervertebral disc    4. Other osteoarthritis of spine, lumbar region        Consent: On file. Vital Signs:   Vitals:    11/16/21 0927   BP: 101/61   Pulse: 79   Resp: 18   Temp:    SpO2: 96%       Past Medical History:   has a past medical history of Anxiety, Asthma, Colon polyp, Depression, Diabetes mellitus ([facility] [address] Diverticulitis, Gastritis, GERD (gastroesophageal reflux disease), Hemorrhoids, Hypertension, Migraines, Osteoarthritis, Shingles, Tubular adenoma, and Urinary leakage. Past Surgical History:   has a past surgical history that includes Hysterectomy; laparoscopy; Tonsillectomy; Hand surgery (Right); Tubal ligation; Cervical discectomy (12/2013); Cholecystectomy; Dilation and curettage of uterus; Colonoscopy (10/31/2016); Upper gastrointestinal endoscopy (10/31/2016); pr dest,paravertebral,l/s,addl lvls (3/25/2019); Colonoscopy (N/A, 10/22/2019); Upper gastrointestinal endoscopy (N/A, 10/22/2019); Cystocopy (03/04/2021); Cystoscopy (N/A, 3/4/2021); Appendectomy (07/08/2021); and Abdominal adhesion surgery (07/08/2021). Pre-Sedation Documentation and Exam:   Vital signs have been reviewed (see flow sheet for vitals). Mallampati Airway Assessment:  normal    ASA Classification:  Class 3 - A patient with severe systemic disease that limits activity but is not incapacitating    Sedation/ Anesthesia Plan:   intravenous sedation   as needed. Medications Planned:   midazolam (Versed) / Fentanyl  Intravenously  as needed. Patient is an appropriate candidate for plan of sedation: yes  Patient's History and Physical examination was reviewed and there is no change.   Electronically signed by Wilfredo Duane After starting an IV, the patient was sedated with 2 mg of Midazolam and 100 mcg of Fentanyl intravenously by the RN under my direct supervision. Patient's vital signs including BP, EKG and SaO2 were monitored by the RN and they remained stable during the procedure. A meaningful communication was kept up with the patient throughout the procedure. The patient is placed in prone position and skin over the back was prepped and draped in sterile manner. Then using fluoroscopy the junction of the transverse process of the vertebra with the superior process of the facet joint was observed and the view was optimized. The skin and deep tissues posterior were infiltrated with 2 ml of  0.5% Marcaine. The RF canula with the 5 mm active tip was introduced through the skin wheal under fluoroscopy guidance such that the tip of the needle lies in the groove of the transverse process with the superior processes of the facet joint. Then a lateral view of the lumbar spine was obtained to make sure the tip of needle is not in the neural foramen. Then electric impedence was checked to make sure it is acceptable. Then a sensory stimulus was applied at 50 Hz up to 1 volt and concordant pain symptoms were reproduced. Then a motor stimulus was applied at 2 Hz up to 2 volts and no motor stimulation was seen in lower extremities. Some multifidus stimulus was seen. Then after negative aspiration a mixture of Kenalog and 0.5%  Marcaine was injected through the needle. Then a initial lesion was done at 80 degrees centigrade for 90 seconds. Then the needle was repositioned such that it lies somewaht superior and medial to the origional position and a second lesion was applied. Similarly a third lesion was applied somewhat inferior and lateral to theaspect of the groove. A total of 40 mg.of triamcinolone and 6 ml 0.5% Marcaine was used. This was done at the levels of L1, L2, L3, L4, L5, T12 on the right side in similar fashion. For L5 median branch block the junction of the ala of  the sacrum with the superior articular process of the facet joint was taken as a reference point. For the L4 median branch the junction of the transverse process of L5 with the superolateral possible facet joint was taken as a reference point and. For L3 median branch the junction of L4 transverse process and superior articular process of facet joint was taken as reference point and so on. Patient's vital signs and neurological status remained stable throughout the procedure and post procedural period. The patient tolerated the procedure well and was discharged home in stable condition.     Electronically signed by Savi Momin MD on 11/16/2021 at 10:09 AM

## 2021-11-16 NOTE — PROGRESS NOTES
Discharge criteria met. Patient alert and oriented x3  Post procedure dressing dry and intact. Sensory and motor function intact as per pre-procedure. Instructions and follow up reviewed with pt at patient at discharge.   Patient discharged via wheelchair @ [id number] with  Michael Stark

## 2021-11-17 ENCOUNTER — TELEPHONE (OUTPATIENT)
Dept: PAIN MANAGEMENT | Age: 51
End: 2021-11-17

## 2021-11-17 DIAGNOSIS — E11.9 TYPE 2 DIABETES MELLITUS WITHOUT COMPLICATION, WITHOUT LONG-TERM CURRENT USE OF INSULIN (HCC): ICD-10-CM

## 2021-11-17 RX ORDER — LANCETS 33 GAUGE
EACH MISCELLANEOUS
Qty: 200 EACH | Refills: 3 | Status: SHIPPED | OUTPATIENT
Start: 2021-11-17 | End: 2022-06-22 | Stop reason: SDUPTHER

## 2021-11-17 NOTE — TELEPHONE ENCOUNTER
[facility]. Attempted to outreach pt. For post-procedure instruction. No answer received.

## 2021-11-23 ENCOUNTER — HOSPITAL ENCOUNTER (OUTPATIENT)
Dept: WOMENS IMAGING | Age: 51
Discharge: HOME | End: 2021-11-25
Payer: COMMERCIAL

## 2021-11-23 DIAGNOSIS — Z12.31 ENCOUNTER FOR SCREENING MAMMOGRAM FOR BREAST CANCER: ICD-10-CM

## 2021-11-23 PROCEDURE — 77063 BREAST TOMOSYNTHESIS BI: CPT

## 2021-11-30 ENCOUNTER — HOSPITAL ENCOUNTER (OUTPATIENT)
Dept: PAIN MANAGEMENT | Age: 51
Discharge: HOME | End: 2021-11-30
Payer: COMMERCIAL

## 2021-11-30 DIAGNOSIS — M54.42 CHRONIC LEFT-SIDED LOW BACK PAIN WITH LEFT-SIDED SCIATICA: ICD-10-CM

## 2021-11-30 DIAGNOSIS — M47.816 ARTHROPATHY OF LUMBAR FACET JOINT: ICD-10-CM

## 2021-11-30 DIAGNOSIS — Z79.899 ENCOUNTER FOR MEDICATION MANAGEMENT: ICD-10-CM

## 2021-11-30 DIAGNOSIS — M50.30 DEGENERATIVE DISC DISEASE, CERVICAL: ICD-10-CM

## 2021-11-30 DIAGNOSIS — M51.36 DEGENERATION OF LUMBAR INTERVERTEBRAL DISC: ICD-10-CM

## 2021-11-30 DIAGNOSIS — G89.29 CHRONIC LEFT-SIDED LOW BACK PAIN WITH LEFT-SIDED SCIATICA: ICD-10-CM

## 2021-11-30 DIAGNOSIS — M50.20 CERVICAL DISC HERNIATION: ICD-10-CM

## 2021-11-30 DIAGNOSIS — M47.26 OSTEOARTHRITIS OF SPINE WITH RADICULOPATHY, LUMBAR REGION: ICD-10-CM

## 2021-11-30 DIAGNOSIS — M47.896 OTHER OSTEOARTHRITIS OF SPINE, LUMBAR REGION: ICD-10-CM

## 2021-11-30 DIAGNOSIS — M16.12 PRIMARY OSTEOARTHRITIS OF LEFT HIP: ICD-10-CM

## 2021-11-30 DIAGNOSIS — M47.816 OSTEOARTHRITIS OF LUMBAR SPINE, UNSPECIFIED SPINAL OSTEOARTHRITIS COMPLICATION STATUS: ICD-10-CM

## 2021-11-30 PROCEDURE — 99213 OFFICE O/P EST LOW 20 MIN: CPT | Performed by: NURSE PRACTITIONER

## 2021-11-30 PROCEDURE — 99213 OFFICE O/P EST LOW 20 MIN: CPT

## 2021-11-30 RX ORDER — HYDROCODONE BITARTRATE AND ACETAMINOPHEN 5; 325 MG/1; MG/1
1 TABLET ORAL 3 TIMES DAILY PRN
Qty: 90 TABLET | Refills: 0 | Status: SHIPPED | OUTPATIENT
Start: 2021-12-04 | End: 2022-01-03 | Stop reason: SDUPTHER

## 2021-11-30 RX ORDER — PREGABALIN 75 MG/1
CAPSULE ORAL
Qty: 360 CAPSULE | Refills: 0 | Status: SHIPPED | OUTPATIENT
Start: 2021-11-30 | End: 2022-02-28 | Stop reason: SDUPTHER

## 2021-11-30 RX ORDER — TIZANIDINE 4 MG/1
TABLET ORAL
Qty: 270 TABLET | Refills: 0 | Status: SHIPPED | OUTPATIENT
Start: 2021-11-30 | End: 2022-02-28 | Stop reason: SDUPTHER

## 2021-11-30 ASSESSMENT — ENCOUNTER SYMPTOMS
CONSTIPATION: 0
COUGH: 0
BACK PAIN: 1
SHORTNESS OF BREATH: 0
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
## 2021-12-01 ENCOUNTER — TELEPHONE (OUTPATIENT)
Dept: PAIN MANAGEMENT | Age: 51
End: 2021-12-01

## 2021-12-06 DIAGNOSIS — R10.9 ABDOMINAL CRAMPS: ICD-10-CM

## 2021-12-06 RX ORDER — DICYCLOMINE HYDROCHLORIDE 10 MG/1
10 CAPSULE ORAL 4 TIMES DAILY
Qty: 90 CAPSULE | Refills: 3 | Status: SHIPPED | OUTPATIENT
Start: 2021-12-06 | End: 2021-12-10 | Stop reason: SDUPTHER

## 2021-12-09 NOTE — PRE-PROCEDURE INSTRUCTIONS
Called patient and reviewed chart along with medication list. Pre procedure instructions given. Patient acknowledges an understanding. Covid-19 screening questions asked. Information given where to be dropped off, a person to remain in the car and which door to enter in. Temp must be taken.  Patient advised to avoid vaccines 2 weeks prior and after Injection

## 2021-12-10 DIAGNOSIS — R10.9 ABDOMINAL CRAMPS: ICD-10-CM

## 2021-12-10 RX ORDER — DICYCLOMINE HYDROCHLORIDE 10 MG/1
10 CAPSULE ORAL 4 TIMES DAILY
Qty: 360 CAPSULE | Refills: 1 | Status: SHIPPED | OUTPATIENT
Start: 2021-12-10 | End: 2022-06-22 | Stop reason: SDUPTHER

## 2021-12-10 NOTE — TELEPHONE ENCOUNTER
Please Approve or Refuse.   Send to Pharmacy per Pt's Request: requesting a 90 day supply     Next Visit Date:  1/28/2022   Last Visit Date: 10/28/2021    Hemoglobin A1C (%)   Date Value   09/28/2021 5.5   05/24/2021 5.9   01/08/2021 5.3             ( goal A1C is < 7)   BP Readings from Last 3 Encounters:   11/16/21 101/61   10/27/21 120/89   09/28/21 138/86          (goal 120/80)  BUN   Date Value Ref Range Status   07/18/2021 15 6 - 20 mg/dL Final     CREATININE   Date Value Ref Range Status   07/18/2021 0.75 0.50 - 0.90 mg/dL Final     Potassium   Date Value Ref Range Status   07/18/2021 4.0 3.7 - 5.3 mmol/L Final

## 2021-12-13 ENCOUNTER — HOSPITAL ENCOUNTER (OUTPATIENT)
Dept: PAIN MANAGEMENT | Age: 51
Discharge: HOME | End: 2021-12-13
Payer: COMMERCIAL

## 2021-12-13 ENCOUNTER — HOSPITAL ENCOUNTER (OUTPATIENT)
Dept: GENERAL RADIOLOGY | Age: 51
Discharge: HOME | End: 2021-12-15
Payer: COMMERCIAL

## 2021-12-13 VITALS
HEART RATE: 76 BPM | OXYGEN SATURATION: 98 % | DIASTOLIC BLOOD PRESSURE: 70 MMHG | HEIGHT: 60 IN | TEMPERATURE: 97.5 F | BODY MASS INDEX: 42.21 KG/M2 | RESPIRATION RATE: 18 BRPM | WEIGHT: 215 LBS | SYSTOLIC BLOOD PRESSURE: 110 MMHG

## 2021-12-13 DIAGNOSIS — M51.36 DEGENERATION OF LUMBAR INTERVERTEBRAL DISC: ICD-10-CM

## 2021-12-13 DIAGNOSIS — G89.29 CHRONIC BILATERAL LOW BACK PAIN WITHOUT SCIATICA: Primary | ICD-10-CM

## 2021-12-13 DIAGNOSIS — M47.816 ARTHROPATHY OF LUMBAR FACET JOINT: ICD-10-CM

## 2021-12-13 DIAGNOSIS — M54.50 CHRONIC BILATERAL LOW BACK PAIN WITHOUT SCIATICA: Primary | ICD-10-CM

## 2021-12-13 PROCEDURE — 6360000002 HC RX W HCPCS: Performed by: ANESTHESIOLOGY

## 2021-12-13 PROCEDURE — 64635 DESTROY LUMB/SAC FACET JNT: CPT

## 2021-12-13 PROCEDURE — 64636 DESTROY L/S FACET JNT ADDL: CPT | Performed by: ANESTHESIOLOGY

## 2021-12-13 PROCEDURE — 64636 DESTROY L/S FACET JNT ADDL: CPT

## 2021-12-13 PROCEDURE — 64635 DESTROY LUMB/SAC FACET JNT: CPT | Performed by: ANESTHESIOLOGY

## 2021-12-13 PROCEDURE — 3209999900 FLUORO FOR SURGICAL PROCEDURES

## 2021-12-13 PROCEDURE — 2500000003 HC RX 250 WO HCPCS: Performed by: ANESTHESIOLOGY

## 2021-12-13 RX ORDER — FENTANYL CITRATE 50 UG/ML
INJECTION, SOLUTION INTRAMUSCULAR; INTRAVENOUS
Status: COMPLETED | OUTPATIENT
Start: 2021-12-13 | End: 2021-12-13

## 2021-12-13 RX ORDER — LIDOCAINE HYDROCHLORIDE 10 MG/ML
INJECTION, SOLUTION EPIDURAL; INFILTRATION; INTRACAUDAL; PERINEURAL
Status: COMPLETED | OUTPATIENT
Start: 2021-12-13 | End: 2021-12-13

## 2021-12-13 RX ORDER — MIDAZOLAM HYDROCHLORIDE 1 MG/ML
INJECTION INTRAMUSCULAR; INTRAVENOUS
Status: COMPLETED | OUTPATIENT
Start: 2021-12-13 | End: 2021-12-13

## 2021-12-13 RX ADMIN — Medication 50 MCG: at 08:23

## 2021-12-13 RX ADMIN — LIDOCAINE HYDROCHLORIDE 5 ML: 10 INJECTION, SOLUTION EPIDURAL; INFILTRATION; INTRACAUDAL; PERINEURAL at 08:32

## 2021-12-13 RX ADMIN — MIDAZOLAM 2 MG: 1 INJECTION INTRAMUSCULAR; INTRAVENOUS at 08:22

## 2021-12-13 ASSESSMENT — PAIN DESCRIPTION - ONSET: ONSET: GRADUAL

## 2021-12-13 ASSESSMENT — PAIN DESCRIPTION - DIRECTION: RADIATING_TOWARDS: LEFT GREATER THAN RIGHT

## 2021-12-13 ASSESSMENT — PAIN DESCRIPTION - ORIENTATION: ORIENTATION: RIGHT;LEFT

## 2021-12-13 ASSESSMENT — PAIN DESCRIPTION - DESCRIPTORS: DESCRIPTORS: ACHING;BURNING;THROBBING

## 2021-12-13 ASSESSMENT — PAIN SCALES - GENERAL: PAINLEVEL_OUTOF10: 5

## 2021-12-13 ASSESSMENT — PAIN DESCRIPTION - FREQUENCY: FREQUENCY: CONTINUOUS

## 2021-12-13 ASSESSMENT — PAIN - FUNCTIONAL ASSESSMENT: PAIN_FUNCTIONAL_ASSESSMENT: PREVENTS OR INTERFERES WITH MANY ACTIVE NOT PASSIVE ACTIVITIES

## 2021-12-13 ASSESSMENT — PAIN DESCRIPTION - PAIN TYPE: TYPE: CHRONIC PAIN

## 2021-12-13 ASSESSMENT — PAIN DESCRIPTION - LOCATION: LOCATION: BACK;HIP

## 2021-12-13 ASSESSMENT — PAIN DESCRIPTION - PROGRESSION: CLINICAL_PROGRESSION: RAPIDLY WORSENING

## 2021-12-13 NOTE — H&P
UPDATE:  Office visit pain clinic in [facility] with all required elements of H&P dated 11/16/2021  Patient seen preop, chart reviewed, AAO x 3, in NAD, VSS,. No changes in medical history since last evaluation. Risk / Benefits explained to patient, patient agree to proceed with plan.   ASA 3  MP 3

## 2021-12-13 NOTE — OP NOTE
Preoperative Diagnosis: Lumbar spondylosis w/o myelopathy, chronic low back pain  Postoperative Diagnosis: Lumbar spondylosis w/o myelopathy, chronic low back pain  SEDATION: SEE SEDATION NOTE  BLOOD LOSS: NONE    Procedure Performed:  :Radiofrequency ablation of median branches at the Transverse processes of L4, L5 AND SACRAL; ALA  for L4/5 AND L5/S1 facet joints on Left under fluoroscopic guidance with IV sedation. t    Procedure:    After starting an IV, the patient was taken to procedure room. The patient was placed in prone position and skin over the back was prepped and draped in sterile manner. Standard monitors were connected and vitals were monitored during the case and they remained stable during the procedure. A meaningful communication was kept up with the patient throughout the procedure. Then using fluoroscopy the junction of the transverse process of the target vertebra with the superior process of the facet joint was observed and the view was optimized. The skin and deep tissues were infiltrated with 2 ml of  1 % lidocaine. The RF canula with the 10 mm active tip was introduced through the skin wheal under fluoroscopy guidance such that the tip of the needle lies in the groove of the transverse process with the superior processes of the facet joint. Then a lateral and AP view of the lumbar spine was obtained to make sure the tip of needle is not in the neural foramen. Then electric impedence was checked to make sure it is acceptable. Then a sensory stimulus was applied at 50 Hz up to 0.5 volt and concordant pain symptoms were reproduced. Then a motor stimulus was applied at 2 Hz up to 2 volts or 3 x times the sensory stimulus and no motor stimulation was seen in lower extremities. Some multifidus stimulus was seen. Then after negative aspiration 1 ml of 4% lidocaine was injected through the needle at each level. The radiofrequency lesion was done at 85 degrees centigrade for 110 seconds. Patient's vital signs and neurological status remained stable throughout the procedure and post procedural period. The patient tolerated the procedure well and was discharged home in stable condition.

## 2021-12-13 NOTE — PROGRESS NOTES
Discharge criteria met. Post procedure dressing dry and intact. Sensory and motor function intact as per pre-procedure. Patient alert and oriented x3  Instructions and follow up reviewed with pt at patient at discharge. Discharged home taken to wheelchair with .   Discharged @ 0900

## 2021-12-14 ENCOUNTER — TELEPHONE (OUTPATIENT)
Dept: PAIN MANAGEMENT | Age: 51
End: 2021-12-14

## 2021-12-14 DIAGNOSIS — G89.29 CHRONIC BILATERAL LOW BACK PAIN WITHOUT SCIATICA: ICD-10-CM

## 2021-12-14 DIAGNOSIS — M54.50 CHRONIC BILATERAL LOW BACK PAIN WITHOUT SCIATICA: ICD-10-CM

## 2021-12-23 ENCOUNTER — HOSPITAL ENCOUNTER (OUTPATIENT)
Dept: MRI IMAGING | Facility: CLINIC | Age: 51
Discharge: HOME | End: 2021-12-25
Payer: COMMERCIAL

## 2021-12-23 DIAGNOSIS — M51.36 DEGENERATION OF LUMBAR INTERVERTEBRAL DISC: ICD-10-CM

## 2021-12-23 DIAGNOSIS — G89.29 CHRONIC BILATERAL LOW BACK PAIN WITHOUT SCIATICA: ICD-10-CM

## 2021-12-23 DIAGNOSIS — M54.50 CHRONIC BILATERAL LOW BACK PAIN WITHOUT SCIATICA: ICD-10-CM

## 2021-12-23 PROCEDURE — 72148 MRI LUMBAR SPINE W/O DYE: CPT

## 2022-01-03 ENCOUNTER — HOSPITAL ENCOUNTER (OUTPATIENT)
Dept: PAIN MANAGEMENT | Age: 52
Discharge: HOME | End: 2022-01-03
Payer: COMMERCIAL

## 2022-01-03 DIAGNOSIS — M46.1 SACROILIITIS (HCC): ICD-10-CM

## 2022-01-03 DIAGNOSIS — M47.816 ARTHROPATHY OF LUMBAR FACET JOINT: ICD-10-CM

## 2022-01-03 DIAGNOSIS — M54.16 LUMBAR RADICULOPATHY, CHRONIC: Chronic | ICD-10-CM

## 2022-01-03 DIAGNOSIS — M47.817 LUMBOSACRAL SPONDYLOSIS WITHOUT MYELOPATHY: Primary | ICD-10-CM

## 2022-01-03 DIAGNOSIS — M54.50 CHRONIC BILATERAL LOW BACK PAIN WITHOUT SCIATICA: ICD-10-CM

## 2022-01-03 DIAGNOSIS — Z79.899 ENCOUNTER FOR MEDICATION MANAGEMENT: ICD-10-CM

## 2022-01-03 DIAGNOSIS — G89.29 CHRONIC BILATERAL LOW BACK PAIN WITHOUT SCIATICA: ICD-10-CM

## 2022-01-03 DIAGNOSIS — M51.36 DEGENERATION OF LUMBAR INTERVERTEBRAL DISC: ICD-10-CM

## 2022-01-03 PROCEDURE — 99213 OFFICE O/P EST LOW 20 MIN: CPT | Performed by: NURSE PRACTITIONER

## 2022-01-03 PROCEDURE — 99213 OFFICE O/P EST LOW 20 MIN: CPT

## 2022-01-03 RX ORDER — HYDROCODONE BITARTRATE AND ACETAMINOPHEN 5; 325 MG/1; MG/1
1 TABLET ORAL 3 TIMES DAILY PRN
Qty: 90 TABLET | Refills: 0 | Status: SHIPPED | OUTPATIENT
Start: 2022-01-03 | End: 2022-02-02

## 2022-01-03 ASSESSMENT — ENCOUNTER SYMPTOMS
BACK PAIN: 1
COUGH: 0
SHORTNESS OF BREATH: 0
CONSTIPATION: 0
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
## 2022-01-13 ENCOUNTER — HOSPITAL ENCOUNTER (OUTPATIENT)
Dept: PHYSICAL THERAPY | Age: 52
Setting detail: THERAPIES SERIES
Discharge: HOME | End: 2022-01-13
Payer: COMMERCIAL

## 2022-01-13 PROCEDURE — 97110 THERAPEUTIC EXERCISES: CPT

## 2022-01-13 PROCEDURE — 97162 PT EVAL MOD COMPLEX 30 MIN: CPT

## 2022-01-13 NOTE — CONSULTS
Tests: [] X-Ray: [] MRI:  [] Other:     12/13/21 MRI LUMBAR SPINE WO CONTRAST  Impression   1. Right foraminal and extraforaminal disc protrusion at L2-3 with   impingement of the right L2 nerve. 2. No significant central canal stenosis. 3.  Multilevel neural foraminal stenoses, worst (moderate to severe) at the   right L2-3 and L5-S1 levels. 4.  No fracture or bony destructive lesion.        Medications: [x] Refer to full medical record [] None [] Other:  Allergies:      [x] Refer to full medical record [] None [] Other:    Pain location:  low back pain    Pain rating/description at rest:  6/10 in low back, no current shooting    Pain rating/description at best:  4/10  Improves with: 1 muscle relaxer in morning and 2 at night, norco 3x/day, alternating heat and ice    Pain rating/description at worst:   10/10  Worsens with: increased pain/stiff in morning, muscle spasms at night d/t increased activity (R>L), long durations of sitting/standing, push/pull such as sweeping/vacuuming, poor weather    Sleep quality/quanity  difficulty sleeping d/t discomfort and pain   3-5 hours/night with waking up   Sleeping position:           Function:  Hand Dominance  [x] Right  [] Left              Current level of function:     ADLs Independent in all except:   Dressing: cant tie shoes d/t shooting pain     Mobility: sitting 1 hour, standing tolerance 30 min max     Lifting/carrying: difficulty lifting light activities such as milk; son and  assist with laundry and lifting     Driving: indep     Grocery shopping: online order and delivery d/t OH reaching difficulties and limiting lifting ability       Home Set up 10 steps to laundry in basement (son carries loads up/down) no HR   2 STH, 4 DEE, 25 to second floor with no HR (uses hands/feet on all fours to go up, scoots to go down),      Job Description     [] Normal Duty  [] Light Duty   [] Off D/T Condition  [x] Retired    [] Not Employed    [] Disability  Return to work:    Gait    Device: no AD   Use da cane for 1-2 years until March 2020 but lost   Distance:  20 min max   Assistance: indep   Impairments:           Objective:    ROM  ° A/P STRENGTH TESTS (+/-) Left Right Not Tested    Left Right Left Right Ant. Drawer   []   Hip Flex   4-/5 4/5 Post. Drawer   []   Ext   NT NT Lachmans   []   ER     Valgus Stress   []   IR     Varus Stress   []   ABD   3+/5 3+/5 Pamelas   []   ADD     Apleys Comp.   []   Knee Flex   4/5 4/5 Apleys Dist.   []   Ext   4/5 4/5 Hip Scouring   []   Ankle DF   5/5 5/5 CATHERINEs + + []   PF     Piriformis + + []   INV     Iselas   []   EVER     Talor Tilt   []        Pat-Fem Grind   []   * = mod pain noted with all knee and hip MMT     Lumbar ROM L R  Increased pain in all palnes    Flexion  50%  Hands to knees     Extension  25%      Side-bending  50% 50%     Rotation  75% 75%     Diane Tests ? Pain ? Pain No Change Not Tested   RFIS [x]  []  []  []    ALLEGRA [x]  []  []  []    RFIL []  []  []  [x]    REIL []  []  []  [x]        BALANCE Left  Right   Tandem Stance (Eyes Open) unable Unable    Semi - tandem stance EO  7 sec 12 sec   Single Leg (Eyes Open) 4 sec 2 sec    Single Leg (Eyes Closed unable unable   Other: NBOS EC  2 sec and LOB        OBSERVATION No Deficit Deficit Not Tested Comments   Posture       Forward Head [] [x] []    Rounded Shoulders [] [x] []    LE bony alignment  [x] [] []    Leg Length Discrp [x] [] []    Slumped Sitting [] [x] []    Palpation [] [x] [] Severe TTP to R hip and low back globally; mod TTP to L piriformis    Sensation [] [x] [] Reports of N/T on L digits 2-3 dorsally intermittently     Constant numbness to R low back and lateral thigh  Decreased sensation noted to L inner thigh        Assessment: [x] Patient is a pleasant 48 yo female reporting chronic LBP with B radiculopathy with worsening symptoms. Limited lumbar ROM demonstrated with no directional preference.  Impaired B hip/knee strength noted (L>R) leading to poor static and dynamic balance. Severity of pain limiting overall activity tolerance, restriction in ADLs, and reliance of spouse to perform home tasks. Poor posture, limited core strength, increased abdominal weight, and sedentary lifestyle exacerbating symptoms. Pt would benefit from skilled aquatic therapy to centralize pain, improve core and B hip/knee strength, and increase overall activity tolerance to decrease functional impairment. Functional Assessment tool: Modified Oswestry   Functional Assessment score:  29/50 (58%)    STG: (to be met in 7 treatments)  1. ? Pain: Pt will report decreased pain 6/10 or less to improve standing tolerance to more than 10 min to complete self-grooming  2. ? ROM: Pt will demonstrate improved lumbar ROM with decreased reports of pain to improve mobility   3. ? Strength: Pt will demonstrate improved B knee strength 4+/5 and B hip strength 4-/5 or more to decrease difficulty ascending/descending 4 steps into home for improved safety   4. ? Function: Pt will report decrease occurrences of lumbar muscle spasms to improve QOL  5. Independent with Home Exercise Programs  6. Demonstrate Knowledge of fall prevention    LTG: (to be met in 14 treatments)  1. Pt will demonstrate improved B knee strength 5/5 and B hip strength 4/5 to improve walking tolerance to 20-30 min to improve community participation  2. Pt will demonstrate improved activity tolerance to perform light cleaning and cooking for decreased dependence on spouse  3.  Pt will report decreased functional impairment from 29/50 to 21/50 or less to return to PLOF                   Patient goals: core build up to hopefully support my spine       Evaluation Complexity:  History (Personal factors, comorbidities) [] 0 [] 1-2 [x] 3+   Exam (limitations, restrictions) [] 1-2 [x] 3 [] 4+   Clinical presentation (progression) [] Stable [x] Evolving  [] Unstable   Decision Making [] Low [x] Moderate [] High    [] Low Complexity [x] Moderate Complexity [] High Complexity       Rehab Potential:  [x] Good  [] Fair  [] Poor   Suggested Professional Referral:  [x] No  [] Yes:  Barriers to Goal Achievement[de-identified]  [] No  [x] Yes: chronic pain, sedentary lifestyle   Domestic Concerns:  [x] No  [] Yes:     Pt. Education:  [x] Plans/Goals, Risks/Benefits discussed  [x] Home exercise program    Method of Education: [x] Verbal  [x] Demo  [x] Written  Comprehension of Education:  [x] Verbalizes understanding. [x] Demonstrates understanding. [x] Needs Review. [] Demonstrates/verbalizes understanding of HEP/Ed previously given.     Treatment Plan:  [x] Therapeutic Exercise   58886  [] Iontophoresis: 4 mg/mL Dexamethasone Sodium Phosphate  mAmin  04742   [] Therapeutic Activity  92022 [] Vasopneumatic cold with compression  62599    [] Gait Training   72548 [] Ultrasound   72150   [] Neuromuscular Re-education  69766 [] Electrical Stimulation Unattended  85528   [x] Manual Therapy  61012 [] Electrical Stimulation Attended  79862   [x] Instruction in HEP  [] Lumbar/Cervical Traction  84741   [x] Aquatic Therapy   04177 [x] Cold/hotpack    [] Massage   38191      [] Dry Needling, 1 or 2 muscles  68714   [] Biofeedback, first 15 minutes   62609  [] Biofeedback, additional 15 minutes   80440 [] Dry Needling, 3 or more muscles  66747       Frequency:   2x/week for 14 visits       Todays Treatment:  Modalities:   Precautions: limited activity tolerance   Exercises: monitor B radiculopathy throughout  No directional preference noted but increased pain with extension   Exercise Reps/ Time Weight/ Level Comments         Sitting      Core activation 3x10\"     marches 20x     LAQ 2X10     Hip abd 2x10     Lumbar flex, rotation 2x10           education 20 min  Importance of therapy and HEP  Posture education  Fall prevention handout provided and reviewed  Aquatic handout provided, reviewed, with aquatic tour   Etiology of symptoms Other: provided for written HEP     Specific Instructions for next treatment: AQUATICS DLS PHASE 1  Improve core strength  Progress activity tolerance  Increase B knee/hip strength      Treatment Charges: Mins Units   [x] Evaluation       []  Low       [x]  Moderate       []  High 35 1   []  Modalities     [x]  Ther Exercise 30 2   []  Manual Therapy     []  Ther Activities     []  Aquatics     []  Neuromuscular     []  Gait Training     []  Dry Needling           1-2 muscles     []  Dry Needling           3 or more          muscles     [] Vasocompression     []  Other         TOTAL TREATMENT TIME: 65 min     Time in:   4:45pm   Time Out: 5:50pm     Electronically signed by: Gaudencio Craig PT

## 2022-01-18 ENCOUNTER — HOSPITAL ENCOUNTER (OUTPATIENT)
Dept: PHYSICAL THERAPY | Age: 52
Setting detail: THERAPIES SERIES
Discharge: HOME | End: 2022-01-18
Payer: COMMERCIAL

## 2022-01-18 PROCEDURE — 97113 AQUATIC THERAPY/EXERCISES: CPT

## 2022-01-18 NOTE — FLOWSHEET NOTE
[address] [facility]   [address]   Phone: [phone]   Fax: [phone]    Physical Therapy Daily Treatment Note      Date:  2022  Patient Name:  Andrea Clark    :  1970  MRN: [id number]  Physician:      Funmi Whiting, APRN - CNP                Insurance: Darcie Johnstonreaganyael Breenodalis 150  # of visits allowed/remainin/60 combined with OT and Speech. HARD MAX /   Auth: NPRE  Medical Diagnosis:   M47.817 (ICD-10-CM) - Lumbosacral spondylosis without myelopathy  M46.1 (ICD-10-CM) - Sacroiliitis (HCC)  Rehab Codes: M 54.42, M 54.51, R25 pain , M25.60 stiffness, R53.1 weakness  Onset date:    1/3/22 referral date                Next Dr's appt. :  pain management   Visit Count:                                             Cancel/No Show: 0/0    Subjective:  Patient reports pain is constant in lower back and N/T is constant in (B) thighs and left foot. Reports she was sore for 2 days after evaluation. States she has good days and bad days- questions if she has \"fibro\" due to pain levels and varying symptoms  Pain:  [x] Yes  [] No Location: Lower back . Buttocks, into (B) Hips; N/T inner and outer thighs (B); Numbness/tingling in Left foot - toes 1-3. Pain Rating: (0-10 scale) 6/10  Pain altered Tx:  [x] No  [] Yes  Action:  Comments:Initiated aquatic therapy with emphasis on postural awareness and core stability. Patient educated to avoid increasing pain and performing small, controlled movements with aquatic exercise    Objective:  Modalities:   Precautions: limited activity tolerance   Exercises: monitor B radiculopathy throughout  No directional preference noted but increased pain with extension     [address] [facility] J Exercise Log  Aquatic, Hip & DLS Program- Phase 1    Date of Eval:                                Primary PT: Francisca Bales, PT  Diagnosis: Things to Focus On (goals):  Improve core strength, Progress activity tolerance, Increase B knee/hip strength   Surgical Precautions:  Medical Precautions:  [] C-9 dates  [] Occ Med   [] Medicare     Date 1/18/22       Visit # 2/12       Walk F/L/R 2 Laps       Marching 10x       Squats 10x3\"       Step-Ups F/L        Step Down F/L        Heel-toe raises 10x       SLR F/L/R 10x       Hip/Knee Flex/Ext        F/L Lunges                Kickboard Ex. Iso Abd. Push-pull        Paddling                UE Format: Chest deep       Horiz Abd/Add 10x       IR/ER (wipers)        Alt Flex/Ext 10x       Alt Press Down        Abd/Add 10x               Deep Water: 1 Noodle       Hang 5'       Cycling        MARTTILA        X-Country                Balance        SLS                Stretches        Achllies        Hamstring                Cool Down        Pain Rating 6           Specific Instructions for next treatment: Assess response to initial aquatics visit and progress as able with core/LE strength along with endurance      Assessment: [x] Progressing toward goals. Emphasis on posture and technique throughout program. Patient easily distracted and given verbal cues to keep on task/avoid over doing exercise. Performed exercise in 30% WB aquatic environment. Notes increased pain post standing exercise- all over/generalized. Denies increased radicular pain throughout program. Finished with deep water hang to unload spine - patient noted great relief in spine and (B) LE's    [] No change.      [] Other:  Pt would benefit from skilled aquatic therapy to centralize pain, improve core and B hip/knee strength, and increase overall activity tolerance to decrease functional impairment.                                STG: (to be met in 7 treatments)  1. ? Pain: Pt will report decreased pain 6/10 or less to improve standing tolerance to more than 10 min to complete self-grooming  2. ? ROM: Pt will demonstrate improved lumbar ROM with decreased reports of pain to improve mobility   3. ? Strength: Pt will demonstrate improved B knee strength 4+/5 and B hip strength 4-/5 or more to decrease difficulty ascending/descending 4 steps into home for improved safety   4. ? Function: Pt will report decrease occurrences of lumbar muscle spasms to improve QOL  5. Independent with Home Exercise Programs  6. Demonstrate Knowledge of fall prevention     LTG: (to be met in 14 treatments)  1. Pt will demonstrate improved B knee strength 5/5 and B hip strength 4/5 to improve walking tolerance to 20-30 min to improve community participation  2. Pt will demonstrate improved activity tolerance to perform light cleaning and cooking for decreased dependence on spouse  3. Pt will report decreased functional impairment from 29/50 to 21/50 or less to return to PLOF                    Patient goals: core build up to hopefully support my spine     Pt. Education:  [x] Yes  [] No  [x] Reviewed Prior HEP/Ed  Method of Education: [x] Verbal  [x] Demo  [] Written  Comprehension of Education:  [x] Verbalizes understanding. [] Demonstrates understanding. [] Needs review. [] Demonstrates/verbalizes HEP/Ed previously given. Plan: [x] Continue per plan of care.    [] Other:      Treatment Charges: Mins Units   []  Modalities     []  Ther Exercise     []  Manual Therapy     []  Ther Activities     [x]  Aquatics 25 2   []  Neuromuscular     [] Vasocompression     [] Gait Training     [] Dry needling        [] 1 or 2 muscles        [] 3 or more muscles     []  Other     Total Treatment time 25 2     Time In: 230 PM            Time Out: 300 PM    Electronically signed by:  Daniel Hay PTA

## 2022-01-20 ENCOUNTER — HOSPITAL ENCOUNTER (OUTPATIENT)
Dept: PHYSICAL THERAPY | Age: 52
Setting detail: THERAPIES SERIES
Discharge: HOME | End: 2022-01-20
Payer: COMMERCIAL

## 2022-01-20 PROCEDURE — 97113 AQUATIC THERAPY/EXERCISES: CPT

## 2022-01-20 NOTE — FLOWSHEET NOTE
[facility]   [address]   Phone: [phone]   Fax: [phone]    Physical Therapy Daily Treatment Note      Date:  2022  Patient Name:  Juan Francisco Sevilla    :  1970  MRN: [id number]  Physician:      BAR Clark - ИРИНА                Insurance: [employer]  # of visits allowed/remainin/60 combined with OT and Speech. HARD MAX /   Auth: NPRE  Medical Diagnosis:   M47.817 (ICD-10-CM) - Lumbosacral spondylosis without myelopathy  M46.1 (ICD-10-CM) - Sacroiliitis (HCC)  Rehab Codes: M 54.42, M 54.51, R25 pain , M25.60 stiffness, R53.1 weakness  Onset date:    1/3/22 referral date                Next Dr's appt. :  pain management   Visit Count: 3/12                                            Cancel/No Show: 0/0    Subjective: Noted increased soreness after last visit- especially in stomach and lower back. Reports she awoke a few nights ago with vertigo/room spinning and nausea the next day. States she saw ENT in the past however symptoms persist  Pain:  [x] Yes  [] No Location: Lower back . Buttocks, into (B) Hips; N/T inner and outer thighs (B); Numbness/tingling in Left foot - toes 1-3. Pain Rating: (0-10 scale) 4/10  Pain altered Tx:  [x] No  [] Yes  Action:  Comments:Initiated aquatic therapy with emphasis on postural awareness and core stability. Patient educated to avoid increasing pain and performing small, controlled movements with aquatic exercise    Objective:  Modalities:   Precautions: limited activity tolerance   Exercises: monitor B radiculopathy throughout  No directional preference noted but increased pain with extension     [address] [facility] Exercise Log  Aquatic, Hip & DLS Program- Phase 1    Date of Eval:                                Primary PT: aJmes Mojica PT  Diagnosis: Things to Focus On (goals):  Improve core strength, Progress activity tolerance, Increase B knee/hip strength Surgical Precautions:  Medical Precautions:  [] C-9 dates  [] Occ Med   [] Medicare     Date 1/18/22 1/20/22      Visit # 2/12 3/12      Walk F/L/R 2 Laps 2 Laps      Marching 10x 10x      Squats 10x3\" 10x3\"      Step-Ups F/L        Step Down F/L        Heel-toe raises 10x 10x      SLR F/L/R 10x 10x      Hip/Knee Flex/Ext  10x      F/L Lunges                Kickboard Ex. Small      Iso Abd.  10x5\"      Push-pull  10x      Paddling                UE Format: Chest deep Chest Deep      Horiz Abd/Add 10x 10x      IR/ER (wipers)   Add     Alt Flex/Ext 10x 10x      Alt Press Down   Add     Abd/Add 10x 10x              Deep Water: 1 Noodle 1 Noodle      Hang 5' 5'      Cycling   Add     Jacks        X-Country                Balance        SLS                Stretches        Achllies  2x20\"      Hamstring  2x20'              Breast Stroke  1 Lap      Pain Rating 6 4          Specific Instructions for next treatment: Progress with UE format and add deep water cycling      Assessment: [x] Progressing toward goals. Emphasis on posture and technique throughout program. Continues to require cues to keep on task/avoid over working. Added LE stretching and kickboard exercise to challenge core. Patient tolerated well- some guarding with squats and stretches noted. Finished with deep water hang to unload spine/decrease pain. [] No change. [] Other:  Pt would benefit from skilled aquatic therapy to centralize pain, improve core and B hip/knee strength, and increase overall activity tolerance to decrease functional impairment. STG: (to be met in 7 treatments)  ? Pain: Pt will report decreased pain 6/10 or less to improve standing tolerance to more than 10 min to complete self-grooming  ? ROM: Pt will demonstrate improved lumbar ROM with decreased reports of pain to improve mobility   ?  Strength: Pt will demonstrate improved B knee strength 4+/5 and B hip strength 4-/5 or more to decrease difficulty ascending/descending 4 steps into home for improved safety   ? Function: Pt will report decrease occurrences of lumbar muscle spasms to improve QOL  Independent with Home Exercise Programs  Demonstrate Knowledge of fall prevention     LTG: (to be met in 14 treatments)  Pt will demonstrate improved B knee strength 5/5 and B hip strength 4/5 to improve walking tolerance to 20-30 min to improve community participation  Pt will demonstrate improved activity tolerance to perform light cleaning and cooking for decreased dependence on spouse  Pt will report decreased functional impairment from 29/50 to 21/50 or less to return to PLOF                    Patient goals: core build up to hopefully support my spine     Pt. Education:  [x] Yes  [] No  [x] Reviewed Prior HEP/Ed  Method of Education: [x] Verbal  [x] Demo  [] Written  Comprehension of Education:  [x] Verbalizes understanding. [] Demonstrates understanding. [] Needs review. [] Demonstrates/verbalizes HEP/Ed previously given. Plan: [x] Continue per plan of care.    [] Other:      Treatment Charges: Mins Units   []  Modalities     []  Ther Exercise     []  Manual Therapy     []  Ther Activities     [x]  Aquatics 30 2   []  Neuromuscular     [] Vasocompression     [] Gait Training     [] Dry needling        [] 1 or 2 muscles        [] 3 or more muscles     []  Other     Total Treatment time 30 2     Time In: 320 PM            Time Out: 288 PM    Electronically signed by:  Edda Lyman PTA

## 2022-01-25 ENCOUNTER — HOSPITAL ENCOUNTER (OUTPATIENT)
Dept: PHYSICAL THERAPY | Age: 52
Setting detail: THERAPIES SERIES
Discharge: HOME | End: 2022-01-25
Payer: COMMERCIAL

## 2022-01-25 PROCEDURE — 97113 AQUATIC THERAPY/EXERCISES: CPT

## 2022-01-25 NOTE — FLOWSHEET NOTE
[address] [facility]   [address]   Phone: [phone]   Fax: [phone]    Physical Therapy Daily Treatment Note      Date:  2022  Patient Name:  Rao Aiken    :  1970  MRN: [id number]  Physician:      BAR Bland - ИРИНА                Insurance: Darcie Minerjnnima [employer]ENNIFERmathewodalis 150  # of visits allowed/remainin/60 combined with OT and Speech. HARD MAX /   Auth: NPRE  Medical Diagnosis:   M47.817 (ICD-10-CM) - Lumbosacral spondylosis without myelopathy  M46.1 (ICD-10-CM) - Sacroiliitis (HCC)  Rehab Codes: M 54.42, M 54.51, R25 pain , M25.60 stiffness, R53.1 weakness  Onset date:    1/3/22 referral date                Next Dr's appt. :  pain management   Visit Count:                                             Cancel/No Show: 0/0    Subjective: Reports doing some stretching at home but feels she moves much better in the water. Soreness noted after last visit but tolerable. Pain:  [x] Yes  [] No Location: Lower back . Buttocks, into (B) Hips; N/T inner and outer thighs (B); Numbness/tingling in Left foot - toes 1-3 but less intense. Pain Rating: (0-10 scale) 4-5/10  Pain altered Tx:  [x] No  [] Yes  Action:  Comments:     Objective:  Modalities:   Precautions: limited activity tolerance   Exercises: monitor B radiculopathy throughout  No directional preference noted but increased pain with extension     [address] [facility] J Exercise Log  Aquatic, Hip & DLS Program- Phase 1    Date of Eval:                                Primary PT: Brandee Patillas, PT  Diagnosis: Things to Focus On (goals):  Improve core strength, Progress activity tolerance, Increase B knee/hip strength   Surgical Precautions:  Medical Precautions:  [] C-9 dates  [] Occ Med   [] Medicare     Date 22     Visit # 2/12 3     Walk F/L/R 2 Laps 2 Laps 2 Laps     Marching 10x 10x 1 Lap     Squats 10x3\" 10x3\" 10x3\"     Step-Ups F/L Step Down F/L        Heel-toe raises 10x 10x 10x     SLR F/L/R 10x 10x 10x     Hip/Knee Flex/Ext  10x 10x     F/L Lunges                Kickboard Ex. Small Small     Iso Abd.  10x5\" 10x5\"     Push-pull  10x 10X     Paddling                UE Format: Chest deep Chest Deep Chest Deep     Horiz Abd/Add 10x 10x 10x     IR/ER (wipers)   10x     Alt Flex/Ext 10x 10x 10x     Alt Press Down   10x     Abd/Add 10x 10x 10x             Deep Water: 1 Noodle 1 Noodle 1 Noodle     Hang 5' 5' 5'     Cycling   2'     Jacks        X-Country                Balance        SLS                Stretches        Achllies  2x20\" 2x20\"     Hamstring  2x20' 2x20\"             Breast Stroke  1 Lap 2 Laps     Pain Rating 6 4 4-5         Specific Instructions for next treatment: Progress with deep water aerobics      Assessment: [x] Progressing toward goals. Emphasis on posture and technique throughout program. Added marching laps and progressed UE format to tolerance. Added deep water cycling for aerobic activity. Overall patient tolerated well- less guarding noted this date. [] No change. [] Other:  Pt would benefit from skilled aquatic therapy to centralize pain, improve core and B hip/knee strength, and increase overall activity tolerance to decrease functional impairment. STG: (to be met in 7 treatments)  1. ? Pain: Pt will report decreased pain 6/10 or less to improve standing tolerance to more than 10 min to complete self-grooming  2. ? ROM: Pt will demonstrate improved lumbar ROM with decreased reports of pain to improve mobility   3. ? Strength: Pt will demonstrate improved B knee strength 4+/5 and B hip strength 4-/5 or more to decrease difficulty ascending/descending 4 steps into home for improved safety   4. ? Function: Pt will report decrease occurrences of lumbar muscle spasms to improve QOL  5. Independent with Home Exercise Programs  6.  Demonstrate Knowledge of fall prevention     LTG: (to be met in 14 treatments)  1. Pt will demonstrate improved B knee strength 5/5 and B hip strength 4/5 to improve walking tolerance to 20-30 min to improve community participation  2. Pt will demonstrate improved activity tolerance to perform light cleaning and cooking for decreased dependence on spouse  3. Pt will report decreased functional impairment from 29/50 to 21/50 or less to return to PLOF                    Patient goals: core build up to hopefully support my spine     Pt. Education:  [x] Yes  [] No  [x] Reviewed Prior HEP/Ed  Method of Education: [x] Verbal  [x] Demo  [] Written  Comprehension of Education:  [x] Verbalizes understanding. [] Demonstrates understanding. [] Needs review. [] Demonstrates/verbalizes HEP/Ed previously given. Plan: [x] Continue per plan of care.    [] Other:      Treatment Charges: Mins Units   []  Modalities     []  Ther Exercise     []  Manual Therapy     []  Ther Activities     [x]  Aquatics 32 2   []  Neuromuscular     [] Vasocompression     [] Gait Training     [] Dry needling        [] 1 or 2 muscles        [] 3 or more muscles     []  Other     Total Treatment time 32 2     Time In: 347 PM            Time Out: 300 PM    Electronically signed by:  Carlyle Mann PTA

## 2022-01-27 ENCOUNTER — HOSPITAL ENCOUNTER (OUTPATIENT)
Dept: PHYSICAL THERAPY | Age: 52
Setting detail: THERAPIES SERIES
End: 2022-01-27
Payer: COMMERCIAL

## 2022-01-27 NOTE — FLOWSHEET NOTE
[] [facility]) - [facility]  [address]  [place]: [phone]   F: [phone]     Physical Therapy Cancel/No Show note    Date: 2022  Patient: Andrea Clark  : 1970  MRN: [id number]    Visit Count:   Cancels/No Shows to date:     For today's appointment patient:    [x]  Cancelled    [] Rescheduled appointment    [] No-show     Reason given by patient:    []  Patient ill    []  Conflicting appointment    [] No transportation      [] Conflict with work    [x] No reason given    [] Weather related    [] COVID-19    [] Other:      Comments:        [x] Next appointment was confirmed    Electronically signed by: Jessenia Asif PTA

## 2022-01-28 RX ORDER — CREAM BASE NO.135
CREAM (GRAM) MISCELLANEOUS
COMMUNITY
Start: 2022-01-07 | End: 2022-08-22

## 2022-01-28 NOTE — PROGRESS NOTES
[address] PROGRESS NOTE      Patient  completed []  video visit   []   phone call:         Minutes :   [] in person visit to pain clinic    [x]    to  review Medication Agreement    []  Follow up after procedure   []  Discuss treatment options      Location:  Provider:  working from    []    home    [x]   [facility] - TIMMY NUNN ,   patient at   [x] pain clinic     []  home         Chief Complaint:    She c/o low back pain. She had lumbar RFA right side 7/2020 and left side 10/2020 and reported moderate relief.  She had right SI joint injection 1/11/2021 with 75% relief. She had lumbar RFA right side 11/16/21 and reports 70-80% relief. She had lumbar RFA left side 12/13/21 and reports 70-80% relief. She currently is in P_T. IN THE POOL, PAIN IS WORSE AFTER SHE GETS OUT OF THE POOL, HER PAIN IS WORSE THE DAY After. .She is working on her weight loss. She states saw Dr Madelyn Ballesteros for her back pain, but he wanted her to strengthen her core first. She is on lyrica, the hydrocodone is not helping. She rartes her sleep is poor. Courtney Orozco had lumbar MRI   12-23-21  which  read as   1. Right foraminal and extraforaminal disc protrusion at L2-3 with   impingement of the right L2 nerve. 2. No significant central canal stenosis. 3.  Multilevel neural foraminal stenoses, worst (moderate to severe) at the   right L2-3 and L5-S1 levels. 4.  No fracture or bony destructive lesion     Back Pain  This is a chronic problem. The problem occurs constantly. The problem has been gradually worsening since onset. The pain is present in the lumbar spine. Quality: sharp. Radiates to: left leg to toes. The pain is at a severity of 7/10. The pain is moderate. The pain is worse during the night (morning). Exacerbated by: sitting, standing or laying down long. Associated symptoms include headaches, numbness and weakness. Risk factors include obesity. She has tried analgesics, heat and ice for the symptoms.  The treatment provided mild relief. Treatment goals:  Functional status: ease the pain to get through PT      Aberrancy:   Any alcoholic beverages     no       Any illegal drugs   no      Analgesia:     7                Adverse  Effects constipation, colace    ADL;s : in PT, hard to do ADL's. Paces self      Data:    When was thelast UDS: 10-6-2021           Was the UDS appropriate:  [x] yes []   no      Record/Diagnostics Review:      As above, I did review the imaging     DRUG SCREEN, PAIN  Order: [id number]   Status: Final result     Visible to patient: Yes (seen)     Next appt: Today at 01:30 PM in Family Medicine (Marty Cramer, APRN - CNP)     Dx: Lumbar radiculopathy, chronic; Encoun. ..     0 Result Notes    Component Ref Range & Units 9/8/21 0848 9/10/20 0758 7/28/20 1550 6/11/19 1000 12/18/18 2001 9/14/18 1530 6/19/18 1500   Pain Management Drug Panel Interp, Urine  Inconsistent ARUP   Consistent CMARUP  Consistent CM   Consistent CMARUP  Inconsistent CM    Comment: (NOTE)   ________________________________________________________________   DRUGS EXPECTED:   NORCO (HYDROCODONE)   ________________________________________________________________   CONSISTENT with medications provided:   [address] (HYDROCODONE): based on norhydrocodone, hydromorphone   ________________________________________________________________   INCONSISTENT with medications provided:   Pregabalin   ________________________________________________________________   Drugs Not Included in this Assay:   Acetaminophen   ________________________________________________________________   INTERPRETIVE INFORMATION: Targeted drug profile Interp   Interpretation depends on accuracy and completeness of patient   medication information submitted by client.            EXAMINATION:   MRI OF THE LUMBAR SPINE WITHOUT CONTRAST, 12/23/2021 11:31 am       TECHNIQUE:   Multiplanar multisequence MRI of the lumbar spine was performed without the   administration of intravenous contrast.       COMPARISON:   None.       HISTORY:   ORDERING SYSTEM PROVIDED HISTORY: Degeneration of lumbar intervertebral disc   TECHNOLOGIST PROVIDED HISTORY:   Is the patient pregnant?->No   Reason for Exam: Pt states low back x many years pt states pain into   bilateral back and buttocks right side foot       FINDINGS:   BONES/ALIGNMENT: There is normal alignment of the spine. The vertebral body   heights are maintained. The bone marrow signal appears unremarkable.       SPINAL CORD: The conus terminates normally.       SOFT TISSUES: No paraspinal mass identified.       L1-L2: Minimal disc bulge.  No significant central canal, lateral recess or   neural foraminal stenoses.       L2-L3: Disc desiccation with small disc bulge.  Right neural foraminal and   extraforaminal disc protrusion with impingement of the right L2 nerve.  Mild   facet and ligamentous hypertrophy.  No significant central canal or lateral   recess stenosis.  Moderate to severe right neural foraminal stenosis.       L3-L4: Mild loss of disc height with small disc bulge.  Left greater than   right facet hypertrophy.  No significant central canal or lateral recess   stenosis.  Mild right and moderate left neural foraminal stenoses.       L4-L5: Mild loss of disc height with small disc bulge.  Mild facet and   ligamentous hypertrophy.  No significant central canal or lateral recess   stenosis.  Mild-to-moderate neural foraminal stenoses.       L5-S1: Small disc bulge.  Mild facet and ligamentous hypertrophy.  No   significant central canal or lateral recess stenosis.  Moderate to severe   right and moderate left neural foraminal stenoses.           Impression   1. Right foraminal and extraforaminal disc protrusion at L2-3 with   impingement of the right L2 nerve. 2. No significant central canal stenosis. 3.  Multilevel neural foraminal stenoses, worst (moderate to severe) at the   right L2-3 and L5-S1 levels.    4.  No fracture or bony destructive lesion.       RECOMMENDATIONS:   Unavailable                         Pill count: appropriate    fill date :2-2-22 10 norco tabs lkeft  Morphine equivalent dose as reported on OARRS:15  Periodic Controlled Substance Monitoring: Possible medication side effects, risk of tolerance/dependence & alternative treatments discussed. ,No signs of potential drug abuse or diversion identified. ,Assessed functional status. ,Obtaining appropriate analgesic effect of treatment. Rustam Corona, APRN - CNP)  Review ofOARRS does not show any aberrant prescription behavior. Medication is helping the patient stay active. Patient denies any side effects and reports adequate analgesia. No sign of misuse/abuse.             Past Medical History:   Diagnosis Date    Anxiety     Asthma     Colon polyp 10/31/2016    sessile serated adenoma x2    Depression     Diabetes mellitus ([facility] [address]     Diverticulitis 10/2016    Gastritis     GERD (gastroesophageal reflux disease)     Hemorrhoids     int/ext    Hypertension     Migraines     Osteoarthritis     Shingles 1-22-16    Tubular adenoma 10/31/2016    Urinary leakage        Past Surgical History:   Procedure Laterality Date    ABDOMINAL ADHESION SURGERY  07/08/2021    APPENDECTOMY  07/08/2021    CERVICAL DISCECTOMY  12/2013    & fusion     CHOLECYSTECTOMY      COLONOSCOPY  10/31/2016    int/ext hemorrhoids; sessile serated adenomax2; tubular adenoma    COLONOSCOPY N/A 10/22/2019    COLONOSCOPY POLYPECTOMY SNARE/COLD BIOPSY AND RANDOM BIOPSIES performed by Shanna Valencia MD at [place] [id number]  03/04/2021    CYSTOSCOPY HYDRODISTENTION WITH DMSO AND UREAPLASMA , MYCOPLASMA CULTURES    CYSTOSCOPY N/A 3/4/2021    CYSTOSCOPY HYDRODISTENTION WITH DMSO AND UREAPLASMA , MYCOPLASMA CULTURES performed by Atnonio Schmitt DO at [address] [place] [address]      HAND SURGERY Right     thumb surgery, BONE REMOVED    HYSTERECTOMY      LAPAROSCOPY      OK DEST,PARAVERTEBRAL,L/S,ADDL LVLS  3/25/2019         TONSILLECTOMY      TUBAL LIGATION      UPPER GASTROINTESTINAL ENDOSCOPY  10/31/2016    gastritis    UPPER GASTROINTESTINAL ENDOSCOPY N/A 10/22/2019    EGD BIOPSY performed by Abraham Granados MD at [facility]       Allergies   Allergen Reactions    Adhesive Tape Other (See Comments)     blisters    Imitrex [Sumatriptan] Other (See Comments)     \"feels like head is going to explode    Morphine Itching     hives    Seasonal          Current Outpatient Medications:     cetirizine (ZYRTEC) 10 MG tablet, Take 10 mg by mouth daily, Disp: , Rfl:     HYDROcodone-acetaminophen (NORCO) 5-325 MG per tablet, Take 1 tablet by mouth 3 times daily as needed for Pain for up to 30 days. Indications: Pain, Disp: 90 tablet, Rfl: 0    pregabalin (LYRICA) 75 MG capsule, TAKE 1 CAPSULE FOUR TIMES A DAY, Disp: 360 capsule, Rfl: 0    Methylcellulose POWD, 15 mLs by Does not apply route daily Mix 1 tbsp powder with 8 ounce water Take BID for 1 week. Then daily. , Disp: 1 each, Rfl: 5    montelukast (SINGULAIR) 10 MG tablet, TAKE 1 TABLET DAILY, Disp: 90 tablet, Rfl: 3    Dulaglutide (TRULICITY) 3 TP/1.5WL SOPN, Inject 3 mg into the skin once a week, Disp: 12 pen, Rfl: 1    busPIRone (BUSPAR) 10 MG tablet, TAKE 2 TABLETS THREE TIMES A DAY, Disp: 180 tablet, Rfl: 11    ipratropium (ATROVENT) 0.06 % nasal spray, USE 2 SPRAY(S) IN EACH NOSTRIL ONCE DAILY, Disp: , Rfl:     atorvastatin (LIPITOR) 20 MG tablet, TAKE 1 TABLET DAILY, Disp: 90 tablet, Rfl: 3    Diclofenac Sodium POWD, Formula #5: diclo3%+gaba6%+lido2%+prilo2%.  Apply 1-2 gm topically to affected area TID-QID., Disp: 120 g, Rfl: 2    omeprazole (PRILOSEC) 40 MG delayed release capsule, TAKE 1 CAPSULE DAILY, Disp: 90 capsule, Rfl: 3    lisinopril (PRINIVIL;ZESTRIL) 5 MG tablet, TAKE 1 TABLET DAILY, Disp: 90 tablet, Rfl: 3    trospium (SANCTURA) 20 MG tablet, Take 1 tablet by mouth daily, Disp: , Rfl:     sertraline (ZOLOFT) 100 MG tablet, TAKE 1 TABLET DAILY, Disp: 90 tablet, Rfl: 3    metFORMIN (GLUCOPHAGE-XR) 750 MG extended release tablet, TAKE 1 TABLET DAILY WITH BREAKFAST (PLEASE MAKE APPOINTMENT DR DAVE RETIRED), Disp: 90 tablet, Rfl: 3    fluticasone (FLONASE) 50 MCG/ACT nasal spray, USE 1 SPRAY IN EACH NOSTRIL TWICE A DAY, Disp: 32 g, Rfl: 5    estradiol (ESTRACE) 0.1 MG/GM vaginal cream, , Disp: , Rfl:     topiramate (TOPAMAX) 100 MG tablet, TAKE 1 TABLET IN THE MORNING AND 2 TABLETS IN THE EVENING, Disp: 270 tablet, Rfl: 4    Cream Base (SALT STABLE LS ADVANCED) CREA, , Disp: , Rfl:     dicyclomine (BENTYL) 10 MG capsule, Take 1 capsule by mouth 4 times daily, Disp: 360 capsule, Rfl: 1    tiZANidine (ZANAFLEX) 4 MG tablet, TAKE 1 TABLET EVERY 8 HOURS AS NEEDED FOR SPASMS, Disp: 270 tablet, Rfl: 0    Lancets (ONETOUCH DELICA PLUS [id number]) MISC, USE 1 EACH TWICE A DAY, Disp: 200 each, Rfl: 3    ONETOUCH VERIO strip, USE 1 STRIP DAILY AS NEEDED, Disp: 200 strip, Rfl: 3    Diclofenac Sodium 3 % GEL, Apply 4 g topically 2 times daily, Disp: 350 g, Rfl: 1    ibuprofen (ADVIL;MOTRIN) 600 MG tablet, TAKE 1 TABLET BY MOUTH EVERY 6 HOURS, Disp: , Rfl:     STOOL SOFTENER/LAXATIVE 50-8.6 MG per tablet, TAKE 2 TABLETS BY MOUTH TWICE DAILY, Disp: , Rfl:     traZODone (DESYREL) 50 MG tablet, TAKE 1 TABLET NIGHTLY, Disp: 30 tablet, Rfl: 11    diclofenac sodium (VOLTAREN) 1 % GEL, Apply 2 g topically 2 times daily, Disp: 100 g, Rfl: 0    Blood Pressure Monitor KIT, Use as directed., Disp: 1 kit, Rfl: 1    albuterol sulfate (PROAIR RESPICLICK) 963 (90 Base) MCG/ACT aerosol powder inhalation, Inhale 2 puffs into the lungs every 4 hours as needed for Wheezing or Shortness of Breath, Disp: 1 Inhaler, Rfl: 2    docusate sodium (COLACE) 100 MG capsule, Take 1 capsule by mouth daily as needed for Constipation, Disp: 30 capsule, Rfl: 2    Elastic Bandages & Supports (LUMBAR BACK BRACE/SUPPORT PAD) MISC, 1 each by Does not apply route daily as needed (pain), Disp: 1 each, Rfl: 0    Probiotic Product (PROBIOTIC DAILY PO), Take 1 tablet by mouth daily. , Disp: , Rfl:     Family History   Problem Relation Age of Onset    Cancer Mother     Heart Disease Father     Diabetes Father     High Blood Pressure Father     Other Other         Celiac Sprue. Aunt       Social History     Socioeconomic History    Marital status:      Spouse name: Not on file    Number of children: Not on file    Years of education: Not on file    Highest education level: Not on file   Occupational History    Occupation: unemployed   Tobacco Use    Smoking status: Never Smoker    Smokeless tobacco: Never Used   Vaping Use    Vaping Use: Never used   Substance and Sexual Activity    Alcohol use: No    Drug use: No    Sexual activity: Yes   Other Topics Concern    Not on file   Social History Narrative    Not on file     Social Determinants of Health     Financial Resource Strain:     Difficulty of Paying Living Expenses: Not on file   Food Insecurity:     Worried About [address] in the Last Year: Not on file    [address] in the Last Year: Not on file   Transportation Needs:     Lack of Transportation (Medical): Not on file    Lack of Transportation (Non-Medical):  Not on file   Physical Activity:     Days of Exercise per Week: Not on file    Minutes of Exercise per Session: Not on file   Stress:     Feeling of Stress : Not on file   Social Connections:     Frequency of Communication with Friends and Family: Not on file    Frequency of Social Gatherings with Friends and Family: Not on file    Attends Oriental orthodox Services: Not on file    Active Member of Clubs or Organizations: Not on file    Attends Club or Organization Meetings: Not on file    Marital Status: Not on file   Intimate Partner Violence:     Fear of Current or Ex-Partner: Not on file    Emotionally Abused: Not on file   Michael Chatman Physically Abused: Not on file    Sexually Abused: Not on file   Housing Stability:     Unable to Pay for Housing in the Last Year: Not on file    Number of Places Lived in the Last Year: Not on file    Unstable Housing in the Last Year: Not on file         Review of Systems:  Review of Systems   Constitutional: Negative. HENT: Negative. Eyes: Positive for blurred vision. Glasses   Cardiovascular: Negative. Respiratory: Negative. Endocrine:        Diabetic   Hematologic/Lymphatic: Negative. Skin: Negative. Musculoskeletal: Positive for back pain and joint pain. Gastrointestinal: Positive for constipation and heartburn. Genitourinary: Negative. Interstitial cystitis   Neurological: Positive for dizziness, headaches, loss of balance, numbness and weakness. Psychiatric/Behavioral: The patient is nervous/anxious. Managing         Physical Exam:  BP (!) 154/96   Pulse 88   Temp 98.4 °F (36.9 °C) (Skin)   Resp 20   SpO2 96%     Physical Exam  Constitutional:       Appearance: She is obese. HENT:      Head: Normocephalic. Pulmonary:      Effort: Pulmonary effort is normal.   Skin:         Neurological:      Mental Status: She is alert and oriented to person, place, and time. Psychiatric:         Mood and Affect: Mood normal.         Thought Content:  Thought content normal.           Assessment:    Problem List Items Addressed This Visit     Sacroiliitis ([facility] [address]    Rheumatoid arthritis ([facility] [address]    Primary osteoarthritis of left hip    Osteoarthritis of lumbar spine    Morbid obesity with BMI of 40.0-44.9, adult (HCC)    Lumbosacral spondylosis without myelopathy    Lumbar radiculopathy, chronic (Chronic)    Hip pain, chronic, right    Encounter for medication management    Degenerative disc disease, cervical - Primary    Degeneration of lumbar intervertebral disc (Chronic)    Chronic, continuous use of opioids    Chronic bilateral low back pain without sciatica (Chronic)    Cervical disc herniation    Arthropathy of lumbar facet joint              Treatment Plan:  DISCUSSION: Treatment options discussed withpatient and all questions answered to patient's satisfaction. Possible side effects, risk of tolerance and or dependence and alternative treatments discussed    Obtaining appropriate analgesic effect of treatment   No signs of potential drug abuse or diversion identified    [x] Ill effects of being on chronic pain medications such as sleep disturbances, respiratory depression, hormonal changes, withdrawal symptoms, chronic opioid dependence and tolerance as well as risk of taking opioids with Benzodiazepines and taking opioids along with alcohol,  werediscussed with patient. I had asked the patient to minimize medication use and utilize pain medications only for uncontrolled rest pain or pain with exertional activities. I advised patient not to self-escalate painmedications without consulting with us. At each of patient's future visits we will try to taper pain medications, while adjusting the adjunct medications, and re-evaluating for Physical Therapy to improve spinal andjoint strength. We will continue to have discussions to decrease pain medications as tolerated. Counseled patient on effects their pain medication and /or their medical condition mayhave on their  ability to drive or operate machinery. Instructed not to drive or operate machinery if drowsy     I also discussed with the patient regarding the dangers of combining narcotic pain medication with tranquilizers, alcohol or illegal drugs or taking the medication any way other than prescribed. The dangers were discussed  including respiratory depression and death. Patient was told to tell  all  physicians regarding the medications he is getting from pain clinic. Patient is warned not to take any unprescribed medications over-the-countermedications that can depress breathing .  Patient is advised to talk to the pharmacist or physicians if planning to take any over-the-counter medications before  takeing them. Patient is strongly advised to avoid tranquilizers or  relaxants, illegal drugs  or any medications that can depress breathing  Patient is also advised to tell us if there is any changes in their medications from other physicians.     1. Will switch to percocet as norco not helping    TREATMENT OPTIONS:       Medication Agreement Requirements Met  Continue Opioid therapy  Script written for  percocet  Follow up appointment made

## 2022-01-31 ENCOUNTER — HOSPITAL ENCOUNTER (OUTPATIENT)
Dept: PAIN MANAGEMENT | Age: 52
Discharge: HOME | End: 2022-01-31
Payer: COMMERCIAL

## 2022-01-31 VITALS
HEART RATE: 88 BPM | OXYGEN SATURATION: 96 % | DIASTOLIC BLOOD PRESSURE: 96 MMHG | TEMPERATURE: 98.4 F | SYSTOLIC BLOOD PRESSURE: 154 MMHG | RESPIRATION RATE: 20 BRPM

## 2022-01-31 DIAGNOSIS — M47.817 LUMBOSACRAL SPONDYLOSIS WITHOUT MYELOPATHY: ICD-10-CM

## 2022-01-31 DIAGNOSIS — M54.50 CHRONIC BILATERAL LOW BACK PAIN WITHOUT SCIATICA: Chronic | ICD-10-CM

## 2022-01-31 DIAGNOSIS — Z79.899 ENCOUNTER FOR MEDICATION MANAGEMENT: ICD-10-CM

## 2022-01-31 DIAGNOSIS — M47.896 OTHER OSTEOARTHRITIS OF SPINE, LUMBAR REGION: ICD-10-CM

## 2022-01-31 DIAGNOSIS — M51.36 DEGENERATION OF LUMBAR INTERVERTEBRAL DISC: Chronic | ICD-10-CM

## 2022-01-31 DIAGNOSIS — M25.551 HIP PAIN, CHRONIC, RIGHT: ICD-10-CM

## 2022-01-31 DIAGNOSIS — F11.90 CHRONIC, CONTINUOUS USE OF OPIOIDS: ICD-10-CM

## 2022-01-31 DIAGNOSIS — M50.30 DEGENERATIVE DISC DISEASE, CERVICAL: Primary | ICD-10-CM

## 2022-01-31 DIAGNOSIS — M06.9 RHEUMATOID ARTHRITIS, INVOLVING UNSPECIFIED SITE, UNSPECIFIED WHETHER RHEUMATOID FACTOR PRESENT (HCC): ICD-10-CM

## 2022-01-31 DIAGNOSIS — G89.29 CHRONIC BILATERAL LOW BACK PAIN WITHOUT SCIATICA: Chronic | ICD-10-CM

## 2022-01-31 DIAGNOSIS — M16.12 PRIMARY OSTEOARTHRITIS OF LEFT HIP: ICD-10-CM

## 2022-01-31 DIAGNOSIS — M46.1 SACROILIITIS (HCC): ICD-10-CM

## 2022-01-31 DIAGNOSIS — M47.816 ARTHROPATHY OF LUMBAR FACET JOINT: ICD-10-CM

## 2022-01-31 DIAGNOSIS — M54.16 LUMBAR RADICULOPATHY, CHRONIC: Chronic | ICD-10-CM

## 2022-01-31 DIAGNOSIS — E66.01 MORBID OBESITY WITH BMI OF 40.0-44.9, ADULT (HCC): ICD-10-CM

## 2022-01-31 DIAGNOSIS — M50.20 CERVICAL DISC HERNIATION: ICD-10-CM

## 2022-01-31 DIAGNOSIS — G89.29 HIP PAIN, CHRONIC, RIGHT: ICD-10-CM

## 2022-01-31 PROCEDURE — 99213 OFFICE O/P EST LOW 20 MIN: CPT

## 2022-01-31 PROCEDURE — 99213 OFFICE O/P EST LOW 20 MIN: CPT | Performed by: NURSE PRACTITIONER

## 2022-01-31 RX ORDER — OXYCODONE HYDROCHLORIDE AND ACETAMINOPHEN 5; 325 MG/1; MG/1
1 TABLET ORAL EVERY 8 HOURS PRN
Qty: 90 TABLET | Refills: 0 | Status: SHIPPED | OUTPATIENT
Start: 2022-01-31 | End: 2022-02-28 | Stop reason: SDUPTHER

## 2022-01-31 ASSESSMENT — ENCOUNTER SYMPTOMS
HEARTBURN: 1
BACK PAIN: 1
BLURRED VISION: 1
RESPIRATORY NEGATIVE: 1
CONSTIPATION: 1

## 2022-02-01 ENCOUNTER — HOSPITAL ENCOUNTER (OUTPATIENT)
Dept: PHYSICAL THERAPY | Age: 52
Setting detail: THERAPIES SERIES
End: 2022-02-01
Payer: COMMERCIAL

## 2022-02-01 NOTE — FLOWSHEET NOTE
[] [facility]) - [facility] & Therapy  [address]  [place]: [phone]   F: [phone]     Physical Therapy Cancel/No Show note    Date: 2022  Patient: Rao Aiken  : 1970  MRN: [id number]    Visit Count:   Cancels/No Shows to date:     For today's appointment patient:    [x]  Cancelled    [] Rescheduled appointment    [] No-show     Reason given by patient:    []  Patient ill    []  Conflicting appointment    [] No transportation      [] Conflict with work    [] No reason given    [] Weather related    [] [id number]    [x] Other:   Car issues for today and weather for 2/3/22   Comments:        [x] Next appointment was confirmed    Electronically signed by: Nicky Moran PTA

## 2022-02-03 ENCOUNTER — APPOINTMENT (OUTPATIENT)
Dept: PHYSICAL THERAPY | Age: 52
End: 2022-02-03
Payer: COMMERCIAL

## 2022-02-08 ENCOUNTER — HOSPITAL ENCOUNTER (OUTPATIENT)
Dept: PHYSICAL THERAPY | Age: 52
Setting detail: THERAPIES SERIES
Discharge: HOME | End: 2022-02-08
Payer: COMMERCIAL

## 2022-02-08 ENCOUNTER — APPOINTMENT (OUTPATIENT)
Dept: PHYSICAL THERAPY | Age: 52
End: 2022-02-08
Payer: COMMERCIAL

## 2022-02-08 PROCEDURE — 97113 AQUATIC THERAPY/EXERCISES: CPT

## 2022-02-08 NOTE — FLOWSHEET NOTE
[facility]   [address] [facility] [address]   Phone: [phone]   Fax: [phone]    Physical Therapy Daily Treatment Note      Date:  2022  Patient Name:  Kanchan Juarez    :  1970  MRN: [id number]  Physician:      BAR Sandoval - CNP                Insurance: Darcie Johnstonreaganyael Breenodalis 150  # of visits allowed/remainin/60 combined with OT and Speech. HARD MAX /   Auth: NPRE  Medical Diagnosis:   M47.817 (ICD-10-CM) - Lumbosacral spondylosis without myelopathy  M46.1 (ICD-10-CM) - Sacroiliitis (HCC)  Rehab Codes: M 54.42, M 54.51, R25 pain , M25.60 stiffness, R53.1 weakness  Onset date:    1/3/22 referral date                Next Dr's appt. :  pain management   Visit Count:                                             Cancel/No Show: 2/0    Subjective: Reports she has had transportation issues and then inclement weather causing her to miss some visits. States she did some snow shoveling last week and was sore. Also is noting some pain in left fingers this date  Pain:  [x] Yes  [] No Location: Lower back . Buttocks, into (B) Hips; N/T (B) inner and outer thighs ; (B) knees;  Numbness/tingling in Left foot - toes 1-3 but less intense. Pain Rating: (0-10 scale) 7/10  Pain altered Tx:  [x] No  [] Yes  Action:  Comments:     Objective:  Modalities:   Precautions: limited activity tolerance   Exercises: monitor B radiculopathy throughout  No directional preference noted but increased pain with extension     [facility] J Exercise Log  Aquatic, Hip & DLS Program- Phase 1    Date of Eval:                                Primary PT: Gaudencio Craig PT  Diagnosis: Things to Focus On (goals):  Improve core strength, Progress activity tolerance, Increase B knee/hip strength   Surgical Precautions:  Medical Precautions:  [] C-9 dates  [] Occ Med   [] Medicare     Date 22    Visit # 2/12 3/12 4/12 5/12    Walk F/L/R 2 Laps 2 Laps 2 Laps 2 Laps    Marching 10x 10x 1 Lap 2 Laps    Squats 10x3\" 10x3\" 10x3\" 10x5\"    Step-Ups F/L        Step Down F/L        Heel-toe raises 10x 10x 10x 10x    SLR F/L/R 10x 10x 10x 10x    Hip/Knee Flex/Ext  10x 10x 10x    F/L Lunges                Kickboard Ex. Small Small Med    Iso Abd.  10x5\" 10x5\" 10x5\"    Push-pull  10x 10X 10x    Paddling                UE Format: Chest deep Chest Deep Chest Deep Chest Deep    Horiz Abd/Add 10x 10x 10x 10x    IR/ER (wipers)   10x 10x    Alt Flex/Ext 10x 10x 10x 10x    Alt Press Down   10x 10x    Abd/Add 10x 10x 10x 10x            Deep Water: 1 Noodle 1 Noodle 1 Noodle 1 Noodle    Hang 5' 5' 5' 5'    Cycling   2' 2'    Jacks     Add   X-Country     Add           Balance        SLS                Stretches        Achllies  2x20\" 2x20\" 2x20\"    Hamstring  2x20' 2x20\" 2x20\"            Breast Stroke  1 Lap 2 Laps 2 Laps    Pain Rating 6 4 4-5 7        Specific Instructions for next treatment: Progress with deep water aerobics      Assessment: [x] Progressing toward goals. Emphasis on posture and technique throughout program- resumed program to tolerance. Progressed marching laps and resistance with kick board exercise. Patient noted fatigue and soreness post exercise but felt relief throughout body with deep water hang/unloading. [] No change. [] Other:  Pt would benefit from skilled aquatic therapy to centralize pain, improve core and B hip/knee strength, and increase overall activity tolerance to decrease functional impairment.                                 STG: (to be met in 7 treatments)  1. ? Pain: Pt will report decreased pain 6/10 or less to improve standing tolerance to more than 10 min to complete self-grooming  2. ? ROM: Pt will demonstrate improved lumbar ROM with decreased reports of pain to improve mobility   3. ? Strength: Pt will demonstrate improved B knee strength 4+/5 and B hip strength 4-/5 or more to decrease difficulty ascending/descending 4 steps into home for improved safety   4. ? Function: Pt will report decrease occurrences of lumbar muscle spasms to improve QOL  5. Independent with Home Exercise Programs  6. Demonstrate Knowledge of fall prevention     LTG: (to be met in 14 treatments)  1. Pt will demonstrate improved B knee strength 5/5 and B hip strength 4/5 to improve walking tolerance to 20-30 min to improve community participation  2. Pt will demonstrate improved activity tolerance to perform light cleaning and cooking for decreased dependence on spouse  3. Pt will report decreased functional impairment from 29/50 to 21/50 or less to return to PLOF                    Patient goals: core build up to hopefully support my spine     Pt. Education:  [x] Yes  [] No  [x] Reviewed Prior HEP/Ed  Method of Education: [x] Verbal  [x] Demo  [] Written  Comprehension of Education:  [x] Verbalizes understanding. [] Demonstrates understanding. [] Needs review. [] Demonstrates/verbalizes HEP/Ed previously given. Plan: [x] Continue per plan of care.    [] Other:      Treatment Charges: Mins Units   []  Modalities     []  Ther Exercise     []  Manual Therapy     []  Ther Activities     [x]  Aquatics 38 3   []  Neuromuscular     [] Vasocompression     [] Gait Training     [] Dry needling        [] 1 or 2 muscles        [] 3 or more muscles     []  Other     Total Treatment time 38 3     Time In: 308 PM            Time Out: 306 PM    Electronically signed by:  Collin Damico PTA

## 2022-02-10 ENCOUNTER — HOSPITAL ENCOUNTER (OUTPATIENT)
Dept: PHYSICAL THERAPY | Age: 52
Setting detail: THERAPIES SERIES
Discharge: HOME | End: 2022-02-10
Payer: COMMERCIAL

## 2022-02-10 PROCEDURE — 97113 AQUATIC THERAPY/EXERCISES: CPT

## 2022-02-10 NOTE — FLOWSHEET NOTE
[address] [facility]   [address]   Phone: [phone]   Fax: [phone]    Physical Therapy Daily Treatment Note      Date:  2/10/2022  Patient Name:  Faviola Mayer    :  1970  MRN: [id number]  Physician:      BAR Kumar - CNP                Insurance: Darcie Isidrapeteremre Breenodalis 150  # of visits allowed/remainin/60 combined with OT and Speech. HARD MAX /   Auth: NPRE  Medical Diagnosis:   M47.817 (ICD-10-CM) - Lumbosacral spondylosis without myelopathy  M46.1 (ICD-10-CM) - Sacroiliitis (HCC)  Rehab Codes: M 54.42, M 54.51, R25 pain , M25.60 stiffness, R53.1 weakness  Onset date:    1/3/22 referral date                Next Dr's appt. :  pain management   Visit Count:                                             Cancel/No Show: 2/0    Subjective: Reports pain is elevated today- almost cancelled PT. States she was very sore after last visit due to missing a few appts. Pain:  [x] Yes  [] No Location: Lower back . Buttocks, into (B) Hips; N/T (B) inner and outer thighs ; (B) knees;  Numbness/tingling in Left foot - toes 1-3 but less intense. Pain Rating: (0-10 scale) 6/10  Pain altered Tx:  [x] No  [] Yes  Action:  Comments:     Objective:  Modalities:   Precautions: limited activity tolerance   Exercises: monitor B radiculopathy throughout  No directional preference noted but increased pain with extension     [facility] J Exercise Log  Aquatic, Hip & DLS Program- Phase 1    Date of Eval:                                Primary PT: Tory Lanes, PT  Diagnosis: Things to Focus On (goals):  Improve core strength, Progress activity tolerance, Increase B knee/hip strength   Surgical Precautions:  Medical Precautions:  [] C-9 dates  [] Occ Med   [] Medicare     Date 1/18/22 1/20/22 1/25/22 2/8/22 2/10/22    Visit # 2/12 3/12 4/12 5/12 6/12    Walk F/L/R 2 Laps 2 Laps 2 Laps 2 Laps 2 Laps    Marching 10x 10x 1 Lap 2 Laps 2 Laps    Squats 10x3\" 10x3\" 10x3\" 10x5\" 10x5\"    Step-Ups F/L      Add   Step Down F/L         Heel-toe raises 10x 10x 10x 10x 10x    SLR F/L/R 10x 10x 10x 10x 10x    Hip/Knee Flex/Ext  10x 10x 10x 10x    F/L Lunges                  Kickboard Ex. Small Small Med Med    Iso Abd.  10x5\" 10x5\" 10x5\" 10x5\"    Push-pull  10x 10X 10x 10x    Paddling      Add            UE Format: Chest deep Chest Deep Chest Deep Chest Deep Chest Deep    Horiz Abd/Add 10x 10x 10x 10x 10x    IR/ER (wipers)   10x 10x 10x    Alt Flex/Ext 10x 10x 10x 10x 10x    Alt Press Down   10x 10x 10x    Abd/Add 10x 10x 10x 10x 10x             Deep Water: 1 Noodle 1 Noodle 1 Noodle 1 Noodle 1 Noodle    Hang 5' 5' 5' 5' 5'    Cycling   2' 2' 2'    Jacks     1'    X-Country     1'             Balance         SLS                  Stretches         Achllies  2x20\" 2x20\" 2x20\" 2x20\"    Hamstring  2x20' 2x20\" 2x20\" 2x20\"             Breast Stroke  1 Lap 2 Laps 2 Laps 2 Laps    Pain Rating 6 4 4-5 7 6        Specific Instructions for next treatment: Add step ups and kickboard paddling to challenge core stability      Assessment: [x] Progressing toward goals. Progressed with deep water aerobics to tolerance. Continued program as noted with emphasis on posture and technique. Overall patient tolerated well. Patient finished with deep water hang to unload spine- which continues to alleviate LE and back symptoms     [] No change. [] Other:  Pt would benefit from skilled aquatic therapy to centralize pain, improve core and B hip/knee strength, and increase overall activity tolerance to decrease functional impairment.                                 STG: (to be met in 7 treatments)  1. ? Pain: Pt will report decreased pain 6/10 or less to improve standing tolerance to more than 10 min to complete self-grooming  2. ? ROM: Pt will demonstrate improved lumbar ROM with decreased reports of pain to improve mobility   3. ? Strength: Pt will demonstrate improved B knee strength 4+/5 and B hip strength 4-/5 or more to decrease difficulty ascending/descending 4 steps into home for improved safety   4. ? Function: Pt will report decrease occurrences of lumbar muscle spasms to improve QOL  5. Independent with Home Exercise Programs  6. Demonstrate Knowledge of fall prevention     LTG: (to be met in 14 treatments)  1. Pt will demonstrate improved B knee strength 5/5 and B hip strength 4/5 to improve walking tolerance to 20-30 min to improve community participation  2. Pt will demonstrate improved activity tolerance to perform light cleaning and cooking for decreased dependence on spouse  3. Pt will report decreased functional impairment from 29/50 to 21/50 or less to return to PLOF                    Patient goals: core build up to hopefully support my spine     Pt. Education:  [] Yes  [] No  [x] Reviewed Prior HEP/Ed  Method of Education: [x] Verbal  [x] Demo  [] Written  Comprehension of Education:  [x] Verbalizes understanding. [] Demonstrates understanding. [] Needs review. [] Demonstrates/verbalizes HEP/Ed previously given. Plan: [x] Continue per plan of care.    [] Other:      Treatment Charges: Mins Units   []  Modalities     []  Ther Exercise     []  Manual Therapy     []  Ther Activities     [x]  Aquatics 39 3   []  Neuromuscular     [] Vasocompression     [] Gait Training     [] Dry needling        [] 1 or 2 muscles        [] 3 or more muscles     []  Other     Total Treatment time 39 3     Time In: 245 PM            Time Out: 330 PM    Electronically signed by:  Drew Valencia PTA

## 2022-02-15 ENCOUNTER — HOSPITAL ENCOUNTER (OUTPATIENT)
Dept: PHYSICAL THERAPY | Age: 52
Setting detail: THERAPIES SERIES
Discharge: HOME | End: 2022-02-15
Payer: COMMERCIAL

## 2022-02-15 PROCEDURE — 97113 AQUATIC THERAPY/EXERCISES: CPT

## 2022-02-15 NOTE — FLOWSHEET NOTE
[facility]   [address]   Phone: [phone]   Fax: [phone]    Physical Therapy Daily Treatment Note      Date:  2/15/2022  Patient Name:  Jairon Adkins    :  1970  MRN: [id number]  Physician:      Bessy Lucas APRN - CNP                Insurance: Chris Solares 150  # of visits allowed/remainin/60 combined with OT and Speech. HARD MAX /   Auth: NPRE  Medical Diagnosis:   M47.817 (ICD-10-CM) - Lumbosacral spondylosis without myelopathy  M46.1 (ICD-10-CM) - Sacroiliitis (HCC)  Rehab Codes: M 54.42, M 54.51, R25 pain , M25.60 stiffness, R53.1 weakness  Onset date:    1/3/22 referral date                Next Dr's appt. :  pain management   Visit Count:                                             Cancel/No Show: 2/0    Subjective: Increased pain after last visit into next day. Patient feels pool has helped to strengthen her core but feels pain is unchanged. Does notice improved balance and endurance. Pain:  [x] Yes  [] No Location: Lower back . Buttocks, into (B) Hips; N/T (B) inner and outer thighs ; (B) knees;  Numbness/tingling in Left foot - toes 1-3 but less intense. Pain Rating: (0-10 scale) 5/10  Pain altered Tx:  [x] No  [] Yes  Action:  Comments:     Objective:  Modalities:   Precautions: limited activity tolerance   Exercises: monitor B radiculopathy throughout  No directional preference noted but increased pain with extension     [facility] J Exercise Log  Aquatic, Hip & DLS Program- Phase 1    Date of Eval:                                Primary PT: Ray Cat PT  Diagnosis: Things to Focus On (goals):  Improve core strength, Progress activity tolerance, Increase B knee/hip strength   Surgical Precautions:  Medical Precautions:  [] C-9 dates  [] Occ Med   [] Medicare     Date 1/25/22 2/8/22 2/10/22 2/15/22    Visit # /12     Walk F/L/R 2 Laps 2 Laps 2 Laps 2 Laps Marching 1 Lap 2 Laps 2 Laps 2 Laps    Squats 10x3\" 10x5\" 10x5\" 10x5\"    Step-Ups F/L    Low 5x each    Step Down F/L        Heel-toe raises 10x 10x 10x 10x    SLR F/L/R 10x 10x 10x 10x    Hip/Knee Flex/Ext 10x 10x 10x 10x    F/L Lunges                Kickboard Ex. Small Med Med Med    Iso Abd. 10x5\" 10x5\" 10x5\" 10x5\"    Push-pull 10X 10x 10x 10x    Paddling    10x            UE Format: Chest Deep Chest Deep Chest Deep Chest Deep     Horiz Abd/Add 10x 10x 10x 12x    IR/ER (wipers) 10x 10x 10x 12x    Alt Flex/Ext 10x 10x 10x 12x    Alt Press Down 10x 10x 10x 12x    Abd/Add 10x 10x 10x 12x            Deep Water: 1 Noodle 1 Noodle 1 Noodle 1 Noodle    Hang 5' 5' 5' 5'    Cycling 2' 2' 2' 2'    Jacks   1' 1'    X-Country   1' 1'            Balance        SLS                Stretches        Achllies 2x20\" 2x20\" 2x20\" 2x20\"    Hamstring 2x20\" 2x20\" 2x20\" 2x20\"            Breast Stroke 2 Laps 2 Laps 2 Laps 2 Laps    Pain Rating 4-5 7 6 5        Specific Instructions for next treatment: Increase reps and speed with LE exercise      Assessment: [x] Progressing toward goals. Increased reps/speed to tolerance continuing to encourage trunk stabilization and proper technique. Added step ups and kick board paddling; patient noted fatigue after exercise. Finished with deep water aerobic exercise and unloading to assist with pain- patient notes deep water hanging alleviates/abloishes joint pain and N/T in L lower leg and foot. [] No change. [] Other:  Pt would benefit from skilled aquatic therapy to centralize pain, improve core and B hip/knee strength, and increase overall activity tolerance to decrease functional impairment.                                 STG: (to be met in 7 treatments)  1. ? Pain: Pt will report decreased pain 6/10 or less to improve standing tolerance to more than 10 min to complete self-grooming  2. ? ROM: Pt will demonstrate improved lumbar ROM with decreased reports of pain to improve mobility 3. ? Strength: Pt will demonstrate improved B knee strength 4+/5 and B hip strength 4-/5 or more to decrease difficulty ascending/descending 4 steps into home for improved safety   4. ? Function: Pt will report decrease occurrences of lumbar muscle spasms to improve QOL  5. Independent with Home Exercise Programs  6. Demonstrate Knowledge of fall prevention     LTG: (to be met in 14 treatments)  1. Pt will demonstrate improved B knee strength 5/5 and B hip strength 4/5 to improve walking tolerance to 20-30 min to improve community participation  2. Pt will demonstrate improved activity tolerance to perform light cleaning and cooking for decreased dependence on spouse  3. Pt will report decreased functional impairment from 29/50 to 21/50 or less to return to PLOF                    Patient goals: core build up to hopefully support my spine     Pt. Education:  [] Yes  [] No  [x] Reviewed Prior HEP/Ed  Method of Education: [x] Verbal  [x] Demo  [] Written  Comprehension of Education:  [x] Verbalizes understanding. [] Demonstrates understanding. [] Needs review. [] Demonstrates/verbalizes HEP/Ed previously given. Plan: [x] Continue per plan of care.    [] Other:      Treatment Charges: Mins Units   []  Modalities     []  Ther Exercise     []  Manual Therapy     []  Ther Activities     [x]  Aquatics 43 3   []  Neuromuscular     [] Vasocompression     [] Gait Training     [] Dry needling        [] 1 or 2 muscles        [] 3 or more muscles     []  Other     Total Treatment time 43 3     Time In: 147 PM            Time Out: 235 PM    Electronically signed by:  Manuel Bose PTA

## 2022-02-17 ENCOUNTER — HOSPITAL ENCOUNTER (OUTPATIENT)
Dept: PHYSICAL THERAPY | Age: 52
Setting detail: THERAPIES SERIES
End: 2022-02-17
Payer: COMMERCIAL

## 2022-02-17 ENCOUNTER — APPOINTMENT (OUTPATIENT)
Dept: PHYSICAL THERAPY | Age: 52
End: 2022-02-17
Payer: COMMERCIAL

## 2022-02-17 NOTE — FLOWSHEET NOTE
[] [facility]) - [facility]  [address]  [place]: [phone]   F: [phone]     Physical Therapy Cancel/No Show note    Date: 2022  Patient: Dimas Song  : 1970  MRN: [id number]    Visit Count:   Cancels/No Shows to date: 3/0    For today's appointment patient:    [x]  Cancelled    [] Rescheduled appointment    [] No-show     Reason given by patient:    []  Patient ill    []  Conflicting appointment    [] No transportation      [] Conflict with work    [] No reason given    [] Weather related    [] [id number]    [x] Other:   Car wont start   Comments:        [x] Next appointment was confirmed    Electronically signed by: Jessie Shah PTA

## 2022-02-24 ENCOUNTER — HOSPITAL ENCOUNTER (OUTPATIENT)
Dept: PHYSICAL THERAPY | Age: 52
Setting detail: THERAPIES SERIES
Discharge: HOME | End: 2022-02-24
Payer: COMMERCIAL

## 2022-02-24 PROCEDURE — 97113 AQUATIC THERAPY/EXERCISES: CPT

## 2022-02-24 PROCEDURE — 97110 THERAPEUTIC EXERCISES: CPT

## 2022-02-24 NOTE — FLOWSHEET NOTE
[facility]   [address]   Phone: [phone]   Fax: [phone]    Physical Therapy Daily Treatment Note      Date:  2022  Patient Name:  Bharath Mackey    :  1970  MRN: [id number]  Physician:      BAR Blanco - ИРИНА                Insurance: Darcie Isidrajnnima JENNIFERmathewrai 150  # of visits allowed/remainin/60 combined with OT and Speech. HARD MAX /   Auth: NPRE  Medical Diagnosis:   M47.817 (ICD-10-CM) - Lumbosacral spondylosis without myelopathy  M46.1 (ICD-10-CM) - Sacroiliitis (HCC)  Rehab Codes: M 54.42, M 54.51, R25 pain , M25.60 stiffness, R53.1 weakness  Onset date:    1/3/22 referral date                Next Dr's appt. :  pain management   Visit Count:                                             Cancel/No Show: 3/0    Subjective: Normal achyness after aquatics but no increased pain. States symptoms remain about the same and feels the only thing that will help her is surgery  Pain:  [x] Yes  [] No Location: Lower back . Buttocks, into (B) Hips; N/T (B) inner and outer thighs ; (B) knees;  Numbness/tingling in Left foot - toes 1-3 but less intense. Pain Rating: (0-10 scale) 5-6/10  Pain altered Tx:  [x] No  [] Yes  Action:  Comments:     Objective:  Modalities:   Precautions: limited activity tolerance   Exercises: monitor B radiculopathy throughout  No directional preference noted but increased pain with extension     [facility] J Exercise Log  Aquatic, Hip & DLS Program- Phase 1    Date of Eval:                                Primary PT: Analilia Stone PT  Diagnosis: Things to Focus On (goals):  Improve core strength, Progress activity tolerance, Increase B knee/hip strength   Surgical Precautions:  Medical Precautions:  [] C-9 dates  [] Occ Med   [] Medicare     Date 1/25/22 2/8/22 2/10/22 2/15/22 2/24/22   Visit # 12    Walk F/L/R 2 Laps 2 Laps 2 Laps 2 Laps 2 Laps   Marching 1 Lap 2 Laps 2 Laps 2 Laps 2 Laps        Low Box   Squats 10x3\" 10x5\" 10x5\" 10x5\" 10x5\"   Step-Ups F/L    Low 5x each Low 10x   Step Down F/L        Heel-toe raises 10x 10x 10x 10x 10x   SLR F/L/R 10x 10x 10x 10x 10x   Hip/Knee Flex/Ext 10x 10x 10x 10x 10x   F/L Lunges                Kickboard Ex. Small Med Med Med Med   Iso Abd. 10x5\" 10x5\" 10x5\" 10x5\" 10x5\"   Push-pull 10X 10x 10x 10x 12x   Paddling    10x 12x           UE Format: Chest Deep Chest Deep Chest Deep Chest Deep  Chest Deep   Horiz Abd/Add 10x 10x 10x 12x 12x   IR/ER (wipers) 10x 10x 10x 12x 12x   Alt Flex/Ext 10x 10x 10x 12x 12x   Alt Press Down 10x 10x 10x 12x 12x   Abd/Add 10x 10x 10x 12x 12x           Deep Water: 1 Noodle 1 Noodle 1 Noodle 1 Noodle 1 Noodle   Hang 5' 5' 5' 5' 2'   Cycling 2' 2' 2' 2' 2'   Jacks   1' 1' 1'   X-Country   1' 1' 1'           Balance        SLS                Stretches        Achllies 2x20\" 2x20\" 2x20\" 2x20\" 2x20\"   Hamstring 2x20\" 2x20\" 2x20\" 2x20\" 2x20\"           Breast Stroke 2 Laps 2 Laps 2 Laps 2 Laps 2 Laps   Pain Rating 4-5 7 6 5 5-6       Specific Instructions for next treatment: Progress to land based therapy per PT re-assessment      Assessment: [x] Progressing toward goals. Progressed LE exercise to step level to increase WB- patient 25-30% WB in aquatic therapy. Overall patient tolerated well; occasional cues needed for posture and technique (patient tends to forward flex). Encouraged increased speed with UE format to challenge trunk stability. [] No change. [] Other:  Pt would benefit from skilled aquatic therapy to centralize pain, improve core and B hip/knee strength, and increase overall activity tolerance to decrease functional impairment.                                 STG: (to be met in 7 treatments)  1. ? Pain: Pt will report decreased pain 6/10 or less to improve standing tolerance to more than 10 min to complete self-grooming  2. ? ROM: Pt will demonstrate improved lumbar ROM with decreased reports of pain to improve mobility   3. ? Strength: Pt will demonstrate improved B knee strength 4+/5 and B hip strength 4-/5 or more to decrease difficulty ascending/descending 4 steps into home for improved safety   4. ? Function: Pt will report decrease occurrences of lumbar muscle spasms to improve QOL  5. Independent with Home Exercise Programs  6. Demonstrate Knowledge of fall prevention     LTG: (to be met in 14 treatments)  1. Pt will demonstrate improved B knee strength 5/5 and B hip strength 4/5 to improve walking tolerance to 20-30 min to improve community participation  2. Pt will demonstrate improved activity tolerance to perform light cleaning and cooking for decreased dependence on spouse  3. Pt will report decreased functional impairment from 29/50 to 21/50 or less to return to PLOF                    Patient goals: core build up to hopefully support my spine     Pt. Education:  [] Yes  [] No  [x] Reviewed Prior HEP/Ed  Method of Education: [x] Verbal  [x] Demo  [] Written  Comprehension of Education:  [x] Verbalizes understanding. [] Demonstrates understanding. [] Needs review. [] Demonstrates/verbalizes HEP/Ed previously given. Plan: [x] Continue per plan of care. [] Other:      Treatment Charges: Mins Units   []  Modalities     [x]  Ther Exercise 30 2    []  Manual Therapy     []  Ther Activities     [x]  Aquatics 38 3   []  Neuromuscular     [] Vasocompression     [] Gait Training     [] Dry needling        [] 1 or 2 muscles        [] 3 or more muscles     []  Other     Total Treatment time 68 5     Time In: 338 PM            Time Out:  418 PM  Time in with PT: 3:00 - 3:30PM     Treatment times reflect total treatment time combined by both PT and PTA for today's service. Physical therapy treatment completed today in part by physical therapist assistant (PTA).     Electronically signed by:  Kaya Ac PTA

## 2022-02-24 NOTE — PROGRESS NOTES
[facility]  [address] [place] [address]. [address]         Phone: [phone]       Fax: [phone]    Physical Therapy Progress Note    Date: 2022      Patient: Juan Geiger  : 1970  MRN: [id number]    BAR Cantu - CNP                Insurance: [employer]  # of visits allowed/remainin/60 combined with OT and Speech. HARD [employer] /   Auth: NARDAE  Medical Diagnosis:   M47.817 (ICD-10-CM) - Lumbosacral spondylosis without myelopathy  M46.1 (ICD-10-CM) - Sacroiliitis (HCC)  Rehab Codes: M 54.42, M 54.51, R25 pain , M25.60 stiffness, R53.1 weakness  Onset date:    1/3/22 referral date                Next 's appt. :  pain management   Visit Count:                                             Cancel/No Show: 3/0      Subjective:  Patient comes to clinic prior to aquatic therapy session noting mild low back pain with radiculopathy to L anterior thigh. Numbness reported to R hip that occurs intermittently. Overall, patient reports no change in pain since starting therapy but improve cored strength and postural awareness. Pt received SI joint injection on  noting 75% improvement of symptoms. Pt missed scheduled appointment PCP but will reschedule soon for f/u. Two additional falls noted over the last month when standing in the shower and looking up. Continued difficulty with sleeping leading to nightly issues and fatigue. Pt states she has never been referred to neuro but wishes to know options for non-conservative treatment. Improved overall balance reported. Pain:  [x]? Yes  []? No   Location: Lower back . Buttocks, into (B) Hips; N/T (B) inner and outer thighs ; (B) knees;  Numbness/tingling in Left foot - toes 1-3 but less intense. Pain Rating: (0-10 scale) 5/10  Pain altered Tx:  [x]? No  []?  Yes  Action:  Comments:      Objective:               ROM  ° A/P STRENGTH TESTS (+/-) Left Right Not Tested     Left Right Left Right Ant. Drawer     []?    Hip Flex     4-/5 4/5 Post. Drawer     []?    Ext     NT NT Lachmans     []?    ER         Valgus Stress     []?    IR         Varus Stress     []?    ABD     3+/5 3+/5 Pamelas     []?    ADD         Apleys Comp.     []?    Knee Flex     4/5 4/5 Apleys Dist.     []?    Ext     4/5 4/5 Hip Scouring     []?    Ankle DF     5/5 5/5 CATHERINEs + + []?    PF         Piriformis + + []?    INV         Iselas     []?    EVER         Talor Tilt     []?              Pat-Fem Grind     []?    * = mod pain noted with all knee and hip MMT      Lumbar ROM L R  Patient reports:      Flexion  50%   Hands to knees - difficulty rising      Extension  50%    severe pain      Side-bending  50% 50% Pain/tightness     Rotation  75% 75%  pain/tightness     Diane Tests ? Pain ? Pain No Change Not Tested   RFIS [x]? ?  []??  []??  []??    ALLEGRA [x]? ?  []??  []??  []??    RFIL []??  []??  []??  [x]? ?    REIL []??  []??  []??  [x]? ?        Balance not assessed 2/24, readings below taken at initial eval 1/13:   BALANCE Left  Right   Tandem Stance (Eyes Open) unable Unable    Semi - tandem stance EO  7 sec 12 sec   Single Leg (Eyes Open) 4 sec 2 sec    Single Leg (Eyes Closed unable unable   Other: NBOS EC  2 sec and LOB           Function:      Current level of function:     ADLs Independent in all except:   Dressing: cant tie shoes d/t shooting pain      Mobility: sitting 1 hour, standing tolerance 30 min max      Lifting/carrying: difficulty lifting light activities such as milk; son and  assist with laundry and lifting      Driving: indep      Grocery shopping: online order and delivery d/t [place] reaching difficulties and limiting lifting ability       Home Set up 10 steps to laundry in basement (son carries loads up/down) no HR   2 STH, 4 DEE, 25 to second floor with no HR (uses hands/feet on all fours to go up, scoots to go down),       Job Description     []? ? Normal Duty  []? ? Light Duty   []? ? Off D/T Condition  [x]? ? Retired    []? ? Not Employed    []? ?  Disability  Return to work:    Gait    Device: no AD   Use cane for 1-2 years until March 2020 but lost   Distance:  20 min max   Assistance: indep   Impairments:        Assessment: Patient has completed 8/12 therapy sessions so re-assessment performed this date. Patient demonstrates minimal improvement in overall pain, LE strength, or walking/standing tolerance but reports improved core strength and postural awareness. Pt does note decrease low back pain and resolved LE radiculopathy during deep water pool hangs but reports severe pain post pool that lasts up to one day. Patient would benefit from referral to neuro to evaluate radiculopathy, imaging results, frequent falls, and further assess neurological deficits. Patient would benefit from completing remaining sessions on lands to further progress core strength and improve balance to decrease risk of falls. Functional Assessment tool: Modified Oswestry   Functional Assessment score:  29/50 (58%)     STG: (to be met in 7 treatments) - assessed 2/24   1. ? Pain: Pt will report decreased pain 6/10 or less to improve standing tolerance to more than 10 min to complete self-grooming  - MET   2. ? ROM: Pt will demonstrate improved lumbar ROM with decreased reports of pain to improve mobility   - ONGOING   3. ? Strength: Pt will demonstrate improved B knee strength 4+/5 and B hip strength 4-/5 or more to decrease difficulty ascending/descending 4 steps into home for improved safety   - ONGOING   4. ? Function: Pt will report decrease occurrences of lumbar muscle spasms to improve QOL  - ONGOING   5. Independent with Home Exercise Programs  - ONGOING pt notes poor adherence to HEP (education provided this date)   6. Demonstrate Knowledge of fall prevention  - MET      LTG: (to be met in 14 treatments)  1.  Pt will demonstrate improved B knee strength 5/5 and B hip strength 4/5 to improve walking tolerance to 20-30 min to improve community participation  2. Pt will demonstrate improved activity tolerance to perform light cleaning and cooking for decreased dependence on spouse  3. Pt will report decreased functional impairment from 29/50 to 21/50 or less to return to PLOF                    Patient goals: core build up to hopefully support my spine     Treatment to Date:  [x] Therapeutic Exercise   80308  [] Iontophoresis: 4 mg/mL Dexamethasone Sodium Phosphate  mAmin  91039   [] Therapeutic Activity  93990 [] Vasopneumatic cold with compression  74965    [] Gait Training   84218 [] Ultrasound   10766   [x] Neuromuscular Re-education  02525 [] Electrical Stimulation Unattended  45754   [x] Manual Therapy  04953 [] Electrical Stimulation Attended  20577   [x] Instruction in HEP  [] Lumbar/Cervical Traction  70035   [x] Aquatic Therapy   08738 [x] Cold/hotpack    [] Massage   41468      [] Dry Needling, 1 or 2 muscles  44241   [] Biofeedback, first 15 minutes   67856  [] Biofeedback, additional 15 minutes   91973 [] Dry Needling, 3 or more muscles  23242      Patient Status:     [x] Continue per initial plan of care. [] Additional visits necessary. [x] Other: transition from aquatic to land therapy for remaining 4 visits      Requested Frequency/Duration: 2 times per week for 12 treatments.     Modalities:   Precautions: limited activity tolerance   Exercises: monitor B radiculopathy throughout  No directional preference noted but increased pain with extension   Exercise Reps/ Time Weight/ Level Comments             Sitting         Core activation 3x10\"       marches 20x       LAQ 2X10       Hip abd 2x10       Lumbar flex, rotation 2x10                 education 20 min   Importance of therapy and HEP  Posture education  Fall prevention handout provided and reviewed  Aquatic handout provided, reviewed, with aquatic tour   Etiology of symptoms                      Other:     Specific Instructions for next treatment:   Improve core strength  Progress activity tolerance  Increase B knee/hip strength  Progress balance    Electronically signed by: Anette Camejo PT    If you have any questions or concerns, please don't hesitate to call. Thank you for your referral.    Physician Signature:________________________________Date:__________________  By signing above or cosigning this note, I have reviewed this plan of care and certify a need for medically necessary rehabilitation services.      *PLEASE SIGN ABOVE AND FAX BACK ALL PAGES*

## 2022-02-27 NOTE — PROGRESS NOTES
[address] PROGRESS NOTE      Patient  completed []  video visit   []   phone call:         Minutes :   [x] in person visit to pain clinic    [x]    to  review Medication Agreement    []  Follow up after procedure   []  Discuss treatment options      Location:  Provider:  working from    []    home    [x]   [facility] - TIMMY NUNN ,   patient at   [x] pain clinic     []  home         Chief Complaint: low back pain    She c/o low back pain. She currently is in PT. She c/o numbness in buttocks and legs. She gets spasms in her back and legs. She states she has had some falls. Her back pain has increasedShe had lumbar RFA right side 7/2020 and left side 10/2020 and reported moderate relief.  She had right SI joint injection 1/11/2021 with 75% relief. She had lumbar RFA right side 11/16/21 and reports 70-80% relief. She had lumbar RFA left side 12/13/21 and reports 70-80% relief. .She would like a referral to see a Neurosurgeon. Back Pain  This is a chronic problem. The current episode started more than 1 year ago. The problem occurs constantly. The problem has been gradually worsening since onset. The pain is present in the lumbar spine. The quality of the pain is described as aching and shooting (sharp, throbbing). Radiates to: down legs and to left foot. The pain is at a severity of 5/10. The pain is moderate. The pain is worse during the night. Exacerbated by: PT, standing too long or sitting too long, Associated symptoms include headaches, numbness and weakness. (Spasms back and legs) Risk factors include obesity. She has tried heat, ice and analgesics for the symptoms. The treatment provided mild relief.            Treatment goals:  Functional status: get through PT, get core strength      Aberrancy:   Any alcoholic beverages    no        Any illegal drugs   no      Analgesia:  5                   Adverse  Effects :no      ADL;s : in PT paces activities      Data:    When was thelast UDS: 9-8-21         Was the UDS appropriate:  [] yes []   no      Record/Diagnostics Review:      As above, I did review the imaging     DRUG SCREEN, PAIN  Order: [id number]   Status: Final result     Visible to patient: Yes (seen)     Next appt: 02/28/2022 at 02:40 PM in Pain Management Marino HOWARD, APRN - CNP)     Dx: Lumbar radiculopathy, chronic; Encoun. ..     0 Result Notes    Component Ref Range & Units 9/8/21 0848 9/10/20 0758 7/28/20 1550 6/11/19 1000 12/18/18 2001 9/14/18 1530 6/19/18 1500   Pain Management Drug Panel Interp, Urine  Inconsistent ARUP   Consistent CMARUP  Consistent CM   Consistent CMARUP  Inconsistent CM    Comment: (NOTE)   ________________________________________________________________   DRUGS EXPECTED:   NORCO (HYDROCODONE)   ________________________________________________________________   CONSISTENT with medications provided:   [address] (HYDROCODONE): based on norhydrocodone, hydromorphone   ________________________________________________________________   INCONSISTENT with medications provided:   Pregabalin   ________________________________________________________________   Drugs Not Included in this Assay:   Acetaminophen   ________________________________________________________________   INTERPRETIVE INFORMATION: Targeted drug profile Interp   Interpretation depends on accuracy and completeness of patient   medication information submitted by client.                  EXAMINATION:   MRI OF THE LUMBAR SPINE WITHOUT CONTRAST, 12/23/2021 11:31 am       TECHNIQUE:   Multiplanar multisequence MRI of the lumbar spine was performed without the   administration of intravenous contrast.       COMPARISON:   None.       HISTORY:   ORDERING SYSTEM PROVIDED HISTORY: Degeneration of lumbar intervertebral disc   TECHNOLOGIST PROVIDED HISTORY:   Is the patient pregnant?->No   Reason for Exam: Pt states low back x many years pt states pain into   bilateral back and buttocks right side foot     FINDINGS:   BONES/ALIGNMENT: There is normal alignment of the spine. The vertebral body   heights are maintained. The bone marrow signal appears unremarkable.       SPINAL CORD: The conus terminates normally.       SOFT TISSUES: No paraspinal mass identified.       L1-L2: Minimal disc bulge.  No significant central canal, lateral recess or   neural foraminal stenoses.       L2-L3: Disc desiccation with small disc bulge.  Right neural foraminal and   extraforaminal disc protrusion with impingement of the right L2 nerve.  Mild   facet and ligamentous hypertrophy.  No significant central canal or lateral   recess stenosis.  Moderate to severe right neural foraminal stenosis.       L3-L4: Mild loss of disc height with small disc bulge.  Left greater than   right facet hypertrophy.  No significant central canal or lateral recess   stenosis.  Mild right and moderate left neural foraminal stenoses.       L4-L5: Mild loss of disc height with small disc bulge.  Mild facet and   ligamentous hypertrophy.  No significant central canal or lateral recess   stenosis.  Mild-to-moderate neural foraminal stenoses.       L5-S1: Small disc bulge.  Mild facet and ligamentous hypertrophy.  No   significant central canal or lateral recess stenosis.  Moderate to severe   right and moderate left neural foraminal stenoses.           Impression   1. Right foraminal and extraforaminal disc protrusion at L2-3 with   impingement of the right L2 nerve. 2. No significant central canal stenosis. 3.  Multilevel neural foraminal stenoses, worst (moderate to severe) at the   right L2-3 and L5-S1 levels.    4.  No fracture or bony destructive lesion.       RECOMMENDATIONS:   Unavailable                     Pill count: appropriate    fill date : 3-4-22    Morphine equivalent dose as reported on OARRS: 22.50  Periodic Controlled Substance Monitoring: Possible medication side effects, risk of tolerance/dependence & alternative treatments discussed. ,No signs of potential drug abuse or diversion identified. ,Assessed functional status. ,Obtaining appropriate analgesic effect of treatment. Sanjuanita Chen, APRN - CNP)  Review ofOARRS does not show any aberrant prescription behavior. Medication is helping the patient stay active. Patient denies any side effects and reports adequate analgesia. No sign of misuse/abuse.             Past Medical History:   Diagnosis Date    Anxiety     Asthma     Colon polyp 10/31/2016    sessile serated adenoma x2    Depression     Diabetes mellitus ([address]     Diverticulitis 10/2016    Gastritis     GERD (gastroesophageal reflux disease)     Hemorrhoids     int/ext    Hypertension     Migraines     Osteoarthritis     Shingles 1-22-16    Tubular adenoma 10/31/2016    Urinary leakage        Past Surgical History:   Procedure Laterality Date    ABDOMINAL ADHESION SURGERY  07/08/2021    APPENDECTOMY  07/08/2021    CERVICAL DISCECTOMY  12/2013    & fusion     CHOLECYSTECTOMY      COLONOSCOPY  10/31/2016    int/ext hemorrhoids; sessile serated adenomax2; tubular adenoma    COLONOSCOPY N/A 10/22/2019    COLONOSCOPY POLYPECTOMY SNARE/COLD BIOPSY AND RANDOM BIOPSIES performed by Irena Burdick MD at [place] [address]  03/04/2021    CYSTOSCOPY HYDRODISTENTION WITH DMSO AND UREAPLASMA , MYCOPLASMA CULTURES    CYSTOSCOPY N/A 3/4/2021    CYSTOSCOPY HYDRODISTENTION WITH DMSO AND UREAPLASMA , MYCOPLASMA CULTURES performed by Chilo Anderson DO at [address] [place] [address]      HAND SURGERY Right     thumb surgery, BONE REMOVED    HYSTERECTOMY      LAPAROSCOPY      NM DEST,PARAVERTEBRAL,L/S,ADDL LVLS  3/25/2019         TONSILLECTOMY      TUBAL LIGATION      UPPER GASTROINTESTINAL ENDOSCOPY  10/31/2016    gastritis    UPPER GASTROINTESTINAL ENDOSCOPY N/A 10/22/2019    EGD BIOPSY performed by Irena Burdick MD at [address] [place] [address] Adhesive Tape Other (See Comments)     blisters    Imitrex [Sumatriptan] Other (See Comments)     \"feels like head is going to explode    Morphine Itching     hives    Seasonal          Current Outpatient Medications:     oxyCODONE-acetaminophen (PERCOCET) 5-325 MG per tablet, Take 1 tablet by mouth every 8 hours as needed for Pain for up to 30 days. , Disp: 90 tablet, Rfl: 0    cetirizine (ZYRTEC) 10 MG tablet, Take 10 mg by mouth daily, Disp: , Rfl:     pregabalin (LYRICA) 75 MG capsule, TAKE 1 CAPSULE FOUR TIMES A DAY, Disp: 360 capsule, Rfl: 0    Methylcellulose POWD, 15 mLs by Does not apply route daily Mix 1 tbsp powder with 8 ounce water Take BID for 1 week. Then daily. , Disp: 1 each, Rfl: 5    montelukast (SINGULAIR) 10 MG tablet, TAKE 1 TABLET DAILY, Disp: 90 tablet, Rfl: 3    Dulaglutide (TRULICITY) 3 CD/1.1VU SOPN, Inject 3 mg into the skin once a week, Disp: 12 pen, Rfl: 1    busPIRone (BUSPAR) 10 MG tablet, TAKE 2 TABLETS THREE TIMES A DAY, Disp: 180 tablet, Rfl: 11    ipratropium (ATROVENT) 0.06 % nasal spray, USE 2 SPRAY(S) IN EACH NOSTRIL ONCE DAILY, Disp: , Rfl:     atorvastatin (LIPITOR) 20 MG tablet, TAKE 1 TABLET DAILY, Disp: 90 tablet, Rfl: 3    omeprazole (PRILOSEC) 40 MG delayed release capsule, TAKE 1 CAPSULE DAILY, Disp: 90 capsule, Rfl: 3    lisinopril (PRINIVIL;ZESTRIL) 5 MG tablet, TAKE 1 TABLET DAILY, Disp: 90 tablet, Rfl: 3    trospium (SANCTURA) 20 MG tablet, Take 1 tablet by mouth daily, Disp: , Rfl:     sertraline (ZOLOFT) 100 MG tablet, TAKE 1 TABLET DAILY, Disp: 90 tablet, Rfl: 3    metFORMIN (GLUCOPHAGE-XR) 750 MG extended release tablet, TAKE 1 TABLET DAILY WITH BREAKFAST (PLEASE MAKE APPOINTMENT DR JOLIE DOMÍNGUEZ), Disp: 90 tablet, Rfl: 3    fluticasone (FLONASE) 50 MCG/ACT nasal spray, USE 1 SPRAY IN EACH NOSTRIL TWICE A DAY, Disp: 32 g, Rfl: 5    estradiol (ESTRACE) 0.1 MG/GM vaginal cream, , Disp: , Rfl:     topiramate (TOPAMAX) 100 MG tablet, TAKE 1 TABLET IN THE MORNING AND 2 TABLETS IN THE EVENING, Disp: 270 tablet, Rfl: 4    Cream Base (SALT STABLE LS ADVANCED) CREA, , Disp: , Rfl:     dicyclomine (BENTYL) 10 MG capsule, Take 1 capsule by mouth 4 times daily, Disp: 360 capsule, Rfl: 1    tiZANidine (ZANAFLEX) 4 MG tablet, TAKE 1 TABLET EVERY 8 HOURS AS NEEDED FOR SPASMS, Disp: 270 tablet, Rfl: 0    Lancets (ONETOUCH DELICA PLUS PAXYNL51M) MISC, USE 1 EACH TWICE A DAY, Disp: 200 each, Rfl: 3    ONETOUCH VERIO strip, USE 1 STRIP DAILY AS NEEDED, Disp: 200 strip, Rfl: 3    Diclofenac Sodium 3 % GEL, Apply 4 g topically 2 times daily, Disp: 350 g, Rfl: 1    ibuprofen (ADVIL;MOTRIN) 600 MG tablet, TAKE 1 TABLET BY MOUTH EVERY 6 HOURS, Disp: , Rfl:     STOOL SOFTENER/LAXATIVE 50-8.6 MG per tablet, TAKE 2 TABLETS BY MOUTH TWICE DAILY, Disp: , Rfl:     traZODone (DESYREL) 50 MG tablet, TAKE 1 TABLET NIGHTLY, Disp: 30 tablet, Rfl: 11    Diclofenac Sodium POWD, Formula #5: diclo3%+gaba6%+lido2%+prilo2%. Apply 1-2 gm topically to affected area TID-QID., Disp: 120 g, Rfl: 2    diclofenac sodium (VOLTAREN) 1 % GEL, Apply 2 g topically 2 times daily, Disp: 100 g, Rfl: 0    Blood Pressure Monitor KIT, Use as directed., Disp: 1 kit, Rfl: 1    albuterol sulfate (PROAIR RESPICLICK) 498 (90 Base) MCG/ACT aerosol powder inhalation, Inhale 2 puffs into the lungs every 4 hours as needed for Wheezing or Shortness of Breath, Disp: 1 Inhaler, Rfl: 2    docusate sodium (COLACE) 100 MG capsule, Take 1 capsule by mouth daily as needed for Constipation, Disp: 30 capsule, Rfl: 2    Elastic Bandages & Supports (LUMBAR BACK BRACE/SUPPORT PAD) MISC, 1 each by Does not apply route daily as needed (pain), Disp: 1 each, Rfl: 0    Probiotic Product (PROBIOTIC DAILY PO), Take 1 tablet by mouth daily. , Disp: , Rfl:     Family History   Problem Relation Age of Onset    Cancer Mother     Heart Disease Father     Diabetes Father     High Blood Pressure Father     Other Other Vivek Sprue. Aunt       Social History     Socioeconomic History    Marital status:      Spouse name: Not on file    Number of children: Not on file    Years of education: Not on file    Highest education level: Not on file   Occupational History    Occupation: unemployed   Tobacco Use    Smoking status: Never Smoker    Smokeless tobacco: Never Used   Vaping Use    Vaping Use: Never used   Substance and Sexual Activity    Alcohol use: No    Drug use: No    Sexual activity: Yes   Other Topics Concern    Not on file   Social History Narrative    Not on file     Social Determinants of Health     Financial Resource Strain:     Difficulty of Paying Living Expenses: Not on file   Food Insecurity:     Worried About [address] [employer] in the Last Year: Not on file    [address] [employer] in the Last Year: Not on file   Transportation Needs:     Lack of Transportation (Medical): Not on file    Lack of Transportation (Non-Medical): Not on file   Physical Activity:     Days of Exercise per Week: Not on file    Minutes of Exercise per Session: Not on file   Stress:     Feeling of Stress : Not on file   Social Connections:     Frequency of Communication with Friends and Family: Not on file    Frequency of Social Gatherings with Friends and Family: Not on file    Attends Muslim Services: Not on file    Active Member of [address] [place] [address] or Organizations: Not on file    Attends Club or Organization Meetings: Not on file    Marital Status: Not on file   Intimate Partner Violence:     Fear of Current or Ex-Partner: Not on file    Emotionally Abused: Not on file    Physically Abused: Not on file    Sexually Abused: Not on file   Housing Stability:     Unable to Pay for Housing in the Last Year: Not on file    Number of [place] in the Last Year: Not on file    Unstable Housing in the Last Year: Not on file         Review of Systems:  Review of Systems   Constitutional: Negative. HENT: Negative. Eyes: Positive for visual disturbance. Glasses   Cardiovascular: Negative. Respiratory: Negative. Endocrine:        Diabetic blood sugar 111   Hematologic/Lymphatic: Bruises/bleeds easily. Skin: Negative. Musculoskeletal: Positive for back pain and joint pain. Gastrointestinal: Negative. Genitourinary:        Interstitial cystitis   Neurological: Positive for headaches, loss of balance, numbness and weakness. Psychiatric/Behavioral: The patient is nervous/anxious. Managing         Physical Exam:  BP (!) 151/84   Pulse 82   Temp 98.2 °F (36.8 °C) (Infrared)   Resp 18   Ht 5' (1.524 m)   Wt 215 lb (97.5 kg)   SpO2 96%   BMI 41.99 kg/m²     Physical Exam  Constitutional:       Appearance: She is obese. HENT:      Head: Normocephalic. Pulmonary:      Effort: Pulmonary effort is normal.   Skin:         Neurological:      Mental Status: She is alert and oriented to person, place, and time. Comments: Gait antalgic  Decreased strength left quadriceps   Psychiatric:         Mood and Affect: Mood normal.         Thought Content: Thought content normal.           Assessment:    Problem List Items Addressed This Visit     Rheumatoid arthritis ([facility] [address]    Osteoarthritis of lumbar spine    Lumbosacral spondylosis without myelopathy    Lumbar radiculopathy, chronic (Chronic)    Encounter for medication management    Degeneration of lumbar intervertebral disc (Chronic)    Chronic, continuous use of opioids    Chronic use of opiate drugs therapeutic purposes    Chronic bilateral low back pain without sciatica (Chronic)    Cervical disc herniation    Arthropathy of lumbar facet joint - Primary            Treatment Plan:  DISCUSSION: Treatment options discussed withpatient and all questions answered to patient's satisfaction.      Possible side effects, risk of tolerance and or dependence and alternative treatments discussed    Obtaining appropriate analgesic effect of treatment   No signs of potential drug abuse or diversion identified    [x] Ill effects of being on chronic pain medications such as sleep disturbances, respiratory depression, hormonal changes, withdrawal symptoms, chronic opioid dependence and tolerance as well as risk of taking opioids with Benzodiazepines and taking opioids along with alcohol,  werediscussed with patient. I had asked the patient to minimize medication use and utilize pain medications only for uncontrolled rest pain or pain with exertional activities. I advised patient not to self-escalate painmedications without consulting with us. At each of patient's future visits we will try to taper pain medications, while adjusting the adjunct medications, and re-evaluating for Physical Therapy to improve spinal andjoint strength. We will continue to have discussions to decrease pain medications as tolerated. Counseled patient on effects their pain medication and /or their medical condition mayhave on their  ability to drive or operate machinery. Instructed not to drive or operate machinery if drowsy     I also discussed with the patient regarding the dangers of combining narcotic pain medication with tranquilizers, alcohol or illegal drugs or taking the medication any way other than prescribed. The dangers were discussed  including respiratory depression and death. Patient was told to tell  all  physicians regarding the medications he is getting from pain clinic. Patient is warned not to take any unprescribed medications over-the-countermedications that can depress breathing . Patient is advised to talk to the pharmacist or physicians if planning to take any over-the-counter medications before  takeing them. Patient is strongly advised to avoid tranquilizers or  relaxants, illegal drugs  or any medications that can depress breathing  Patient is also advised to tell us if there is any changes in their medications from other physicians.     1. Will increase percocet from every 8 hours to every 6 hours prn while going through PT    2.  Referral to Neurosurgery, Dr Festus Arnold, pain despite intervention and PT    TREATMENT OPTIONS:       Medication Agreement Requirements Met  Continue Opioid therapy  Script written for  Percocet, lyrica, tizanidine  Follow up appointment made

## 2022-02-28 ENCOUNTER — HOSPITAL ENCOUNTER (OUTPATIENT)
Dept: PAIN MANAGEMENT | Age: 52
Discharge: HOME | End: 2022-02-28
Payer: COMMERCIAL

## 2022-02-28 VITALS
OXYGEN SATURATION: 96 % | RESPIRATION RATE: 18 BRPM | HEART RATE: 82 BPM | WEIGHT: 215 LBS | SYSTOLIC BLOOD PRESSURE: 151 MMHG | HEIGHT: 60 IN | BODY MASS INDEX: 42.21 KG/M2 | DIASTOLIC BLOOD PRESSURE: 84 MMHG | TEMPERATURE: 98.2 F

## 2022-02-28 DIAGNOSIS — M47.816 ARTHROPATHY OF LUMBAR FACET JOINT: Primary | ICD-10-CM

## 2022-02-28 DIAGNOSIS — G89.29 CHRONIC LEFT-SIDED LOW BACK PAIN WITH LEFT-SIDED SCIATICA: ICD-10-CM

## 2022-02-28 DIAGNOSIS — M47.817 LUMBOSACRAL SPONDYLOSIS WITHOUT MYELOPATHY: ICD-10-CM

## 2022-02-28 DIAGNOSIS — M47.896 OTHER OSTEOARTHRITIS OF SPINE, LUMBAR REGION: ICD-10-CM

## 2022-02-28 DIAGNOSIS — G89.29 CHRONIC BILATERAL LOW BACK PAIN WITHOUT SCIATICA: Chronic | ICD-10-CM

## 2022-02-28 DIAGNOSIS — M50.20 CERVICAL DISC HERNIATION: ICD-10-CM

## 2022-02-28 DIAGNOSIS — M54.42 CHRONIC LEFT-SIDED LOW BACK PAIN WITH LEFT-SIDED SCIATICA: ICD-10-CM

## 2022-02-28 DIAGNOSIS — M16.12 PRIMARY OSTEOARTHRITIS OF LEFT HIP: ICD-10-CM

## 2022-02-28 DIAGNOSIS — M47.26 OSTEOARTHRITIS OF SPINE WITH RADICULOPATHY, LUMBAR REGION: ICD-10-CM

## 2022-02-28 DIAGNOSIS — M06.9 RHEUMATOID ARTHRITIS, INVOLVING UNSPECIFIED SITE, UNSPECIFIED WHETHER RHEUMATOID FACTOR PRESENT (HCC): ICD-10-CM

## 2022-02-28 DIAGNOSIS — M46.1 SACROILIITIS (HCC): ICD-10-CM

## 2022-02-28 DIAGNOSIS — Z79.899 ENCOUNTER FOR MEDICATION MANAGEMENT: ICD-10-CM

## 2022-02-28 DIAGNOSIS — M50.30 DEGENERATIVE DISC DISEASE, CERVICAL: ICD-10-CM

## 2022-02-28 DIAGNOSIS — M54.50 CHRONIC BILATERAL LOW BACK PAIN WITHOUT SCIATICA: Chronic | ICD-10-CM

## 2022-02-28 DIAGNOSIS — Z79.891 CHRONIC USE OF OPIATE DRUG FOR THERAPEUTIC PURPOSE: ICD-10-CM

## 2022-02-28 DIAGNOSIS — M51.36 DEGENERATION OF LUMBAR INTERVERTEBRAL DISC: Chronic | ICD-10-CM

## 2022-02-28 DIAGNOSIS — M47.816 OSTEOARTHRITIS OF LUMBAR SPINE, UNSPECIFIED SPINAL OSTEOARTHRITIS COMPLICATION STATUS: ICD-10-CM

## 2022-02-28 DIAGNOSIS — M54.16 LUMBAR RADICULOPATHY, CHRONIC: Chronic | ICD-10-CM

## 2022-02-28 DIAGNOSIS — F11.90 CHRONIC, CONTINUOUS USE OF OPIOIDS: ICD-10-CM

## 2022-02-28 PROCEDURE — 99213 OFFICE O/P EST LOW 20 MIN: CPT | Performed by: NURSE PRACTITIONER

## 2022-02-28 PROCEDURE — 99213 OFFICE O/P EST LOW 20 MIN: CPT

## 2022-02-28 RX ORDER — OXYCODONE HYDROCHLORIDE AND ACETAMINOPHEN 5; 325 MG/1; MG/1
1 TABLET ORAL EVERY 6 HOURS PRN
Qty: 120 TABLET | Refills: 0 | Status: SHIPPED | OUTPATIENT
Start: 2022-03-04 | End: 2022-03-30 | Stop reason: SDUPTHER

## 2022-02-28 RX ORDER — TIZANIDINE 4 MG/1
TABLET ORAL
Qty: 270 TABLET | Refills: 0 | Status: SHIPPED | OUTPATIENT
Start: 2022-02-28 | End: 2022-06-02 | Stop reason: SDUPTHER

## 2022-02-28 RX ORDER — PREGABALIN 75 MG/1
CAPSULE ORAL
Qty: 360 CAPSULE | Refills: 0 | Status: SHIPPED | OUTPATIENT
Start: 2022-02-28 | End: 2022-08-03 | Stop reason: SDUPTHER

## 2022-02-28 ASSESSMENT — ENCOUNTER SYMPTOMS
BACK PAIN: 1
GASTROINTESTINAL NEGATIVE: 1
RESPIRATORY NEGATIVE: 1

## 2022-03-04 ENCOUNTER — HOSPITAL ENCOUNTER (OUTPATIENT)
Dept: PHYSICAL THERAPY | Age: 52
Setting detail: THERAPIES SERIES
Discharge: HOME | End: 2022-03-04
Payer: COMMERCIAL

## 2022-03-04 NOTE — FLOWSHEET NOTE
[] [facility] ([facility]) - [facility] &  Therapy  [address]    [phone]  F: [phone]   [] [address]  [facility] [address]   [address]  P: [phone]  F: [phone]  [] [address] &  Therapy  [address] [facility]  P: [phone]  F: [phone] [] [address]  P: [phone]  F: [phone]  [] [address] [facility]   [address]   [place]: [phone]  F: [phone]   [x] [facility]  [address] [facility] [address]  [place]: [phone]   F: [phone]     Physical Therapy Cancel/No Show note    Date: 3/4/2022  Patient: Kanchan Juarez  : 1970  MRN: [id number]    Cancels/No Shows to date:     For today's appointment patient:    [x]  Cancelled    [] Rescheduled appointment    [] No-show     Reason given by patient:    []  Patient ill    []  Conflicting appointment    [] No transportation      [] Conflict with work    [x] No reason given    [] Weather related    [] [id number]    [] Other:      Comments:        [] Next appointment was confirmed    Electronically signed by: Fay Cee PTA

## 2022-03-08 ENCOUNTER — APPOINTMENT (OUTPATIENT)
Dept: PHYSICAL THERAPY | Age: 52
End: 2022-03-08
Payer: COMMERCIAL

## 2022-03-08 ENCOUNTER — OFFICE VISIT (OUTPATIENT)
Dept: NEUROSURGERY | Age: 52
End: 2022-03-08
Payer: COMMERCIAL

## 2022-03-08 VITALS
HEART RATE: 78 BPM | HEIGHT: 60 IN | OXYGEN SATURATION: 95 % | SYSTOLIC BLOOD PRESSURE: 134 MMHG | DIASTOLIC BLOOD PRESSURE: 78 MMHG | WEIGHT: 215 LBS | BODY MASS INDEX: 42.21 KG/M2

## 2022-03-08 DIAGNOSIS — M43.06 PARS DEFECT OF LUMBAR SPINE: Primary | ICD-10-CM

## 2022-03-08 DIAGNOSIS — M47.816 LUMBAR SPONDYLOSIS: ICD-10-CM

## 2022-03-08 DIAGNOSIS — M54.16 LUMBAR RADICULOPATHY, CHRONIC: ICD-10-CM

## 2022-03-08 PROCEDURE — G8427 DOCREV CUR MEDS BY ELIG CLIN: HCPCS | Performed by: NEUROLOGICAL SURGERY

## 2022-03-08 PROCEDURE — 99204 OFFICE O/P NEW MOD 45 MIN: CPT | Performed by: NEUROLOGICAL SURGERY

## 2022-03-08 PROCEDURE — G8482 FLU IMMUNIZE ORDER/ADMIN: HCPCS | Performed by: NEUROLOGICAL SURGERY

## 2022-03-08 PROCEDURE — G8417 CALC BMI ABV UP PARAM F/U: HCPCS | Performed by: NEUROLOGICAL SURGERY

## 2022-03-08 PROCEDURE — 3017F COLORECTAL CA SCREEN DOC REV: CPT | Performed by: NEUROLOGICAL SURGERY

## 2022-03-08 PROCEDURE — 1036F TOBACCO NON-USER: CPT | Performed by: NEUROLOGICAL SURGERY

## 2022-03-08 NOTE — PROGRESS NOTES
Department of Neurosurgery                                                 New patient clinic note      Reason for Consult: Arthropathy of Lumbar Facet Joint  Requesting Physician: Joe Ngo, APRN - CNP  Neurosurgeon: Carloz Bernard DO      History Obtained From: patient    CHIEF COMPLAINT:         Chief Complaint   Patient presents with    New Patient     arthropathy of lumbar facet joint       HISTORY OF PRESENT ILLNESS:       The patient is a 49 y.o. female who presents with arthropathy of lumbar facet joint    Patient reports lower back pain and numbness for about 5 years. She reports that the pain occasionally shoots down her left or right leg. Patient reports numbness in her left and right posterior thigh. She states that her back pain is worse than her leg pain. Patient rates the shooting pain a 9/10, without the shooting she rates it a 5/10. Patient has been attending pool therapy, she reports relief when she's in the water. Patient reports that she uses ice and heat to relieve the pain. She also receives injections via pain management on both sides of her lower back. She reports that she has a back brace that she doesn't use very often. Patient reports that bending back is worse than bending forward. She states that walking, standing, and laying down for long periods of time worsen the pain as well. More so laying down. She reports that laying on her sides feel better than laying on her back.      PAST MEDICAL HISTORY :       Past Medical History:        Diagnosis Date    Anxiety     Asthma     Colon polyp 10/31/2016    sessile serated adenoma x2    Depression     Diabetes mellitus ([address]     Diverticulitis 10/2016    Gastritis     GERD (gastroesophageal reflux disease)     Hemorrhoids     int/ext    Hypertension     Migraines     Osteoarthritis     Shingles 1-22-16    Tubular adenoma 10/31/2016    Urinary leakage        Past Surgical History:        Procedure Laterality Date    ABDOMINAL ADHESION SURGERY  07/08/2021    APPENDECTOMY  07/08/2021    CERVICAL DISCECTOMY  12/2013    & fusion     CHOLECYSTECTOMY      COLONOSCOPY  10/31/2016    int/ext hemorrhoids; sessile serated adenomax2; tubular adenoma    COLONOSCOPY N/A 10/22/2019    COLONOSCOPY POLYPECTOMY SNARE/COLD BIOPSY AND RANDOM BIOPSIES performed by Irena Burdick MD at [place] [address]  03/04/2021    CYSTOSCOPY HYDRODISTENTION WITH DMSO AND UREAPLASMA , MYCOPLASMA CULTURES    CYSTOSCOPY N/A 3/4/2021    CYSTOSCOPY HYDRODISTENTION WITH DMSO AND UREAPLASMA , MYCOPLASMA CULTURES performed by Chilo Anderson DO at [address] [place] [address]      HAND SURGERY Right     thumb surgery, BONE REMOVED    HYSTERECTOMY      LAPAROSCOPY      NH DEST,PARAVERTEBRAL,L/S,ADDL LVLS  3/25/2019         TONSILLECTOMY      TUBAL LIGATION      UPPER GASTROINTESTINAL ENDOSCOPY  10/31/2016    gastritis    UPPER GASTROINTESTINAL ENDOSCOPY N/A 10/22/2019    EGD BIOPSY performed by Irena Burdick MD at [address] [place] [address] History:   Social History     Socioeconomic History    Marital status:      Spouse name: Not on file    Number of children: Not on file    Years of education: Not on file    Highest education level: Not on file   Occupational History    Occupation: unemployed   Tobacco Use    Smoking status: Never Smoker    Smokeless tobacco: Never Used   Vaping Use    Vaping Use: Never used   Substance and Sexual Activity    Alcohol use: No    Drug use: No    Sexual activity: Yes   Other Topics Concern    Not on file   Social History Narrative    Not on file     Social Determinants of Health     Financial Resource Strain:     Difficulty of Paying Living Expenses: Not on file   Food Insecurity:     Worried About Running Out of Food in the Last Year: Not on file    Beth of Food in the Last Year: Not on file   Transportation Needs:     Lack of Transportation (Medical): Not on file    Lack of Transportation (Non-Medical): Not on file   Physical Activity:     Days of Exercise per Week: Not on file    Minutes of Exercise per Session: Not on file   Stress:     Feeling of Stress : Not on file   Social Connections:     Frequency of Communication with Friends and Family: Not on file    Frequency of Social Gatherings with Friends and Family: Not on file    Attends Jewish Services: Not on file    Active Member of [address] [place] [address] [employer] or Organizations: Not on file    Attends Club or Organization Meetings: Not on file    Marital Status: Not on file   Intimate Partner Violence:     Fear of Current or Ex-Partner: Not on file    Emotionally Abused: Not on file    Physically Abused: Not on file    Sexually Abused: Not on file   Housing Stability:     Unable to Pay for Housing in the Last Year: Not on file    Number of [place] in the Last Year: Not on file    Unstable Housing in the Last Year: Not on file       Family History:       Problem Relation Age of Onset    Cancer Mother     Heart Disease Father     Diabetes Father     High Blood Pressure Father     Other Other         Celiac Sprue. Aunt       Allergies:  Adhesive tape, Imitrex [sumatriptan], Morphine, and Seasonal    Home Medications:  Prior to Admission medications    Medication Sig Start Date End Date Taking? Authorizing Provider   tiZANidine (ZANAFLEX) 4 MG tablet TAKE 1 TABLET EVERY 8 HOURS AS NEEDED FOR SPASMS 2/28/22  Yes BAR Trammell CNP   pregabalin (LYRICA) 75 MG capsule TAKE 1 CAPSULE FOUR TIMES A DAY 2/28/22 6/27/22 Yes BAR Trammell CNP   oxyCODONE-acetaminophen (PERCOCET) 5-325 MG per tablet Take 1 tablet by mouth every 6 hours as needed for Pain for up to 30 days.  3/4/22 4/3/22 Yes BAR Trammell CNP   Cream Base (SALT STABLE LS ADVANCED) CREA  1/7/22  Yes Historical Provider, MD   cetirizine (ZYRTEC) 10 MG tablet Take 10 mg by mouth daily 6/29/21  Yes Historical Provider, MD dicyclomine (BENTYL) 10 MG capsule Take 1 capsule by mouth 4 times daily 12/10/21  Yes BAR Oliva CNP   Lancets (ONETOUCH DELICA PLUS [id number]) MISC USE 1 EACH TWICE A DAY 11/17/21  Yes BAR Oliva CNP   ONETOUCH VERIO strip USE 1 STRIP DAILY AS NEEDED 10/26/21  Yes BAR Oliva CNP   montelukast (SINGULAIR) 10 MG tablet TAKE 1 TABLET DAILY 10/25/21  Yes BAR Oliva CNP   Dulaglutide (TRULICITY) 3 CL/0.0TX SOPN Inject 3 mg into the skin once a week 9/28/21  Yes BAR Oliva CNP   busPIRone (BUSPAR) 10 MG tablet TAKE 2 TABLETS THREE TIMES A DAY 9/13/21  Yes BAR Oliva CNP   ibuprofen (ADVIL;MOTRIN) 600 MG tablet TAKE 1 TABLET BY MOUTH EVERY 6 HOURS 7/7/21  Yes Historical Provider, MD   STOOL SOFTENER/LAXATIVE 50-8.6 MG per tablet TAKE 2 TABLETS BY MOUTH TWICE DAILY 7/7/21  Yes Historical Provider, MD   ipratropium (ATROVENT) 0.06 % nasal spray USE 2 SPRAY(S) IN EACH NOSTRIL ONCE DAILY 6/23/21  Yes Historical Provider, MD   atorvastatin (LIPITOR) 20 MG tablet TAKE 1 TABLET DAILY 8/10/21  Yes BAR Oliva CNP   Diclofenac Sodium POWD Formula #5: diclo3%+gaba6%+lido2%+prilo2%.  Apply 1-2 gm topically to affected area TID-QID. 7/1/21  Yes Courtney Paulino DPM   omeprazole (PRILOSEC) 40 MG delayed release capsule TAKE 1 CAPSULE DAILY 6/14/21  Yes BAR Oliva CNP   lisinopril (PRINIVIL;ZESTRIL) 5 MG tablet TAKE 1 TABLET DAILY 5/25/21  Yes BAR Oliva CNP   trospium (SANCTURA) 20 MG tablet Take 1 tablet by mouth daily 5/18/21  Yes Historical Provider, MD   sertraline (ZOLOFT) 100 MG tablet TAKE 1 TABLET DAILY 5/21/21  Yes BAR Oliva CNP   metFORMIN (GLUCOPHAGE-XR) 750 MG extended release tablet TAKE 1 TABLET DAILY WITH BREAKFAST (PLEASE MAKE APPOINTMENT DR DAVE RETIRED) 5/21/21  Yes BAR Oliva CNP   fluticasone (FLONASE) 50 MCG/ACT nasal spray USE 1 SPRAY IN [facility] NOSTRIL TWICE A DAY 3/26/21  Yes BAR Oliva CNP   estradiol (ESTRACE) 0.1 MG/GM vaginal cream  2/8/21  Yes Historical Provider, MD   Blood Pressure Monitor KIT Use as directed. 9/8/20  Yes BAR Oliva CNP   topiramate (TOPAMAX) 100 MG tablet TAKE 1 TABLET IN THE MORNING AND 2 TABLETS IN THE EVENING 2/13/20  Yes BAR Paul CNP   albuterol sulfate (PROAIR RESPICLICK) 261 (90 Base) MCG/ACT aerosol powder inhalation Inhale 2 puffs into the lungs every 4 hours as needed for Wheezing or Shortness of Breath 2/4/20  Yes Arjun Benjamin MD   docusate sodium (COLACE) 100 MG capsule Take 1 capsule by mouth daily as needed for Constipation 12/1/17  Yes BAR Clark CNP   Elastic Bandages & Supports (LUMBAR BACK BRACE/SUPPORT PAD) MISC 1 each by Does not apply route daily as needed (pain) 8/4/17  Yes Laurent Gonzalez MD   Methylcellulose POWD 15 mLs by Does not apply route daily Mix 1 tbsp powder with 8 ounce water Take BID for 1 week. Then daily. 10/28/21 2/28/22  BAR Oliva CNP   Diclofenac Sodium 3 % GEL Apply 4 g topically 2 times daily 9/28/21   BAR Oliva CNP   traZODone (DESYREL) 50 MG tablet TAKE 1 TABLET NIGHTLY 7/8/21   BAR Oliva CNP   diclofenac sodium (VOLTAREN) 1 % GEL Apply 2 g topically 2 times daily 5/5/21   Cristal Buck DPM   Probiotic Product (PROBIOTIC DAILY PO) Take 1 tablet by mouth daily. Historical Provider, MD       Current Medications:   No current facility-administered medications for this visit.     REVIEW OF SYSTEMS:       CONSTITUTIONAL: negative for fatigue and malaise   EYES: negative for double vision and photophobia    HEENT: negative for tinnitus and sore throat   RESPIRATORY: negative for cough, shortness of breath   CARDIOVASCULAR: negative for chest pain, palpitations   GASTROINTESTINAL: negative for nausea, vomiting   GENITOURINARY: negative for incontinence   MUSCULOSKELETAL: negative for neck or back pain   NEUROLOGICAL: negative for seizures   PSYCHIATRIC: negative for agitated     Review of systems otherwise negative. PHYSICAL EXAM:       /78   Pulse 78   Ht 5' (1.524 m)   Wt 215 lb (97.5 kg)   SpO2 95%   BMI 41.99 kg/m²     Gen: NAD, comfortable  HEENT: moist mucus membranes  Cardio: RRR  Pulm: chest rise symmetrically  GI: abd soft  Ext: no edema  Skin: warm    Neuro:    AOX3  CN 2-12 grossly intact  Speech articulate  Motor 5/5  Sensation symmetrical   No wiggins or clonus  Some midline palpation to the lower lumbar spine. LABS AND IMAGING:     CBC with Differential:    Lab Results   Component Value Date    WBC 9.4 07/18/2021    RBC 4.66 07/18/2021    RBC 4.74 10/16/2019    HGB 13.4 07/18/2021    HCT 41.1 07/18/2021     07/18/2021    MCV 88.3 07/18/2021    MCH 28.7 07/18/2021    MCHC 32.5 07/18/2021    RDW 13.1 07/18/2021    LYMPHOPCT 37 07/18/2021    LYMPHOPCT 46.3 10/16/2019    MONOPCT 8 07/18/2021    EOSPCT 0.9 10/16/2019    BASOPCT 0 07/18/2021    BASOPCT 0.7 10/16/2019    MONOSABS 0.70 07/18/2021    LYMPHSABS 3.50 07/18/2021    EOSABS 0.20 07/18/2021    BASOSABS 0.00 07/18/2021    DIFFTYPE NOT REPORTED 07/18/2021     BMP:    Lab Results   Component Value Date     07/18/2021    K 4.0 07/18/2021     07/18/2021    CO2 21 07/18/2021    BUN 15 07/18/2021    LABALBU 3.8 07/18/2021    CREATININE 0.75 07/18/2021    CALCIUM 9.2 07/18/2021    GFRAA >60 07/18/2021    LABGLOM >60 07/18/2021    GLUCOSE 119 07/18/2021    GLUCOSE 121 10/05/2019       Radiology Review:      MRI Lumbar Spine WO Contrast  Ordered By: Gus Cruz MD  12/23/2021  Official Read:  1. Right foraminal and extraforaminal disc protrusion at L2-3 with  impingement of the right L2 nerve. 2. No significant central canal stenosis. 3.  Multilevel neural foraminal stenoses, worst (moderate to severe) at the  right L2-3 and L5-S1 levels. 4.  No fracture or bony destructive lesion.     My Read:    Right L5-S1 foraminal stenosis moderate to severe  CT chest and pelvis shows a pars defect at L5  ASSESSMENT AND PLAN:       Patient Active Problem List   Diagnosis    Degenerative disc disease, cervical    Cervical disc herniation    Lumbar radiculopathy, chronic    Degeneration of lumbar intervertebral disc    Lumbar spondylosis    Essential hypertension    Left-sided low back pain with left-sided sciatica    Osteoarthritis of lumbar spine    Sacroiliitis (HCC)    Rheumat[facility] arthritis ([facility]    Primary osteoarthritis of left hip    Arthropathy of lumbar facet joint    Hip pain, chronic, right    Hemorrhoids    Colon polyp    Tubular adenoma    Chronic bilateral low back pain without sciatica    Spinal osteoarthritis    Encounter for medication management    Morbid obesity with BMI of 40.0-44.9, adult ([facility]    Adjustment disorder with mixed anxiety and depressed mood    Type 2 diabetes mellitus without complication, without long-term current use of insulin ([facility])    Mixed incontinence urge and stress    Chronic, continuous use of opioids    Chronic use of opiate drugs therapeutic purposes    Lumbosacral spondylosis without myelopathy    Chronic GERD    Bacterial sinusitis    Mixed hyperlipidemia    Acne cystica    Chronic rhinitis    Surgical menopause    Chronic allergic rhinitis    S/P Cystoscopy w/ Hydrodistension and DMSO    Cervical lymphadenopathy    Interstitial cystitis    Mild intermittent asthma without complication    Pars defect of lumbar spine       Pars defect of lumbar spine  -     EMG; Future  -     XR LUMBAR SPINE FLEXION AND EXTENSION ONLY; Future  Lumbar radiculopathy, chronic  -     EMG; Future  Lumbar spondylosis  -     EMG; Future       A/P:  This is a 49 y.o. female with    Diagnosis Orders   1. Pars defect of lumbar spine  EMG    XR LUMBAR SPINE FLEXION AND EXTENSION ONLY   2. Lumbar radiculopathy, chronic  EMG   3.  Lumbar spondylosis  EMG Years of axial low back pain that is worsened with activity though she cannot sleep on her back or belly either  Multiple facet blocks and RFA's with temporary relief    she does have a L5 pars defect which can be a source of her pain  I recommend obtaining an XR flexion-extension to evaluate for stability and an EMG to localize level of radiculopathy. I will follow-up in 2 months. I showed the patient the imagings and explained the diagnosis and the various treatment options; surgical and non-surgical.    Explained to her that a lumbar fusion may be an option though with only a 50% success rate. Her back pain improvement with brace use is suggested that fusion may be successful. Alternatively a spinal cord stimulator can be trialed as well    By signing my name below, I, Jean-Claude Mabry DO, attest that this documentation has been prepared under the direction and in the presence of Ismael Garcia DO. Electronically signed: Jean-Claude Mabry DO, Scribe, 3/8/22     I, Gretel Mahajan, personally performed the services described in this documentation. All medical record entries made by the scribe were at my direction and in my presence. I have reviewed the chart and discharge instructions and agree that the records reflect my personal performance and is accurate and complete. Gretel Mahajan DO. Board certified neurosurgeon 3/8/22       This note was created using voice recognition software. There may be inaccuracies of transcription  that are inadvertently overlooked prior to the signature. There is any questions about the transcription please contact me.

## 2022-03-10 ENCOUNTER — HOSPITAL ENCOUNTER (OUTPATIENT)
Dept: PHYSICAL THERAPY | Age: 52
Setting detail: THERAPIES SERIES
Discharge: HOME | End: 2022-03-10
Payer: COMMERCIAL

## 2022-03-10 NOTE — FLOWSHEET NOTE
[x] [facility]) - [facility] & Therapy  [address]  [place]: [phone]   F: [phone]     Physical Therapy Cancel/No Show note    Date: 3/10/2022  Patient: Erwin Vilchis  : 1970  MRN: [id number]      Cancels/No Shows to date:     For today's appointment patient:    [x]  Cancelled         Reason given by patient:    [x] No transportation            [x] Next appointment was confirmed    Electronically signed by:  Lita Alejandro PT

## 2022-03-15 ENCOUNTER — HOSPITAL ENCOUNTER (OUTPATIENT)
Dept: PHYSICAL THERAPY | Age: 52
Setting detail: THERAPIES SERIES
Discharge: HOME | End: 2022-03-15
Payer: COMMERCIAL

## 2022-03-15 PROCEDURE — 97110 THERAPEUTIC EXERCISES: CPT

## 2022-03-15 NOTE — FLOWSHEET NOTE
[facility]   [address]   Phone: [phone]   Fax: [phone]    Physical Therapy Daily Treatment Note      Date:  3/15/2022  Patient Name:  Raleigh Dominguez    :  1970  MRN: [id number]  Physician: BAR Christopher CNP                Insurance: [facility] # of visits allowed/remainin/60 combined with OT and Speech. HARD MAX /   Auth: NPRE  Medical Diagnosis:   M47.817 (ICD-10-CM) - Lumbosacral spondylosis without myelopathy  M46.1 (ICD-10-CM) - Sacroiliitis (HCC)  Rehab Codes: M 54.42, M 54.51, R25 pain , M25.60 stiffness, R53.1 weakness  Onset date: 1/3/22 referral date                Next 's appt. :  pain management   Visit Count:  (total visit count corrected on 3/15/22)    Subjective: Initial land therapy session     Pain:  [x] Yes  [] No Location: Low back and R hip   Pain Rating: (0-10 scale) 5/10 at back and hip  Pain altered Tx:  [] No  [] Yes  Action:    Comments: Pt arrived to 1st land therapy session with c/o increased pain in low back and R hip, stated R hip is due to arthritis, noted overall pain rated at 5/10. Pt reported cont to have constant muscle spasm in R low back. Pt reported went to see Rowdy Pizarro recently, noted is scheduled for EMG to further assess neurological deficits and will soon be scheduled to receive nerve stimulator as potential solution to resolve LBP and radicular symptoms.         Objective:  Modalities:   Precautions:  Exercises:  Exercise Reps/ Time Weight/ Level Completed  Today Comments   Supine       PPT   x    PPT w/ marching   x    PPT w/ knee fallout   x    Deadbug in hooklying 10x ea arm Red p.b. x    Iso hip add w/ pb 10x Yellow p.b.  x    SAQ with bolster  2x10 ea 3#  x    LTR 10x   x                                                     Other: pillow under back when laying in supine to help with pain     Laying in prone   HP: 5 mins to low back after completing therex program    Specific Instructions for next treatment:   - Improve core strength   - Increase B knee/hip strength  - Sciatic nerve glide   - Progress to sitting and standing activities per pt tolerance   - HP to low back PRN   - May include trial of e-stim to lumbar paraspinals to decrease muscle spasm in upcoming session per pt tolerance  - Review HEP and f/u on compliance and consistency with completing HEP.      Assessment: [] Progressing toward goals. [x] No change. 1st land therapy session. Pt was able to complete therex activities per chart above this date to initiate core strengthening and B knee strength with increased quad recruitment. Pt required frequent verbal and tactile cues on B iliac crest to properly perform posterior pelvic tilt to promote core stabilizers engagement without holding breath and encouraged pt to count out loud to promote normal breathing. Pt required short rest break in between due to increased SOB and to avoid muscle fatigue. Pt was advise to complete all activities that incorporate core activation in a slow and controlled movement to promote spinal stability. Updated HEP today including various core stabilization exs to maximize rehab potential.  5 mins HP in low back after completing therex program this date to manage pain and muscle spasm as pt noted improvement in lumbar muscle spasm and pain afterward. [] Other:    [x] Patient would continue to benefit from skilled physical therapy services in order to: improve core and B hip/knee strength, and increase overall activity tolerance to decrease functional impairment.                                  Functional Assessment tool: Modified Oswestry   Functional Assessment score:  29/50 (58%)       STG: (to be met in 7 treatments) - assessed 2/24   1. ? Pain: Pt will report decreased pain 6/10 or less to improve standing tolerance to more than 10 min to complete self-grooming  - MET   2. ? ROM: Pt will demonstrate improved lumbar ROM with decreased reports of pain to improve mobility   - ONGOING   3. ? Strength: Pt will demonstrate improved B knee strength 4+/5 and B hip strength 4-/5 or more to decrease difficulty ascending/descending 4 steps into home for improved safety   - ONGOING   4. ? Function: Pt will report decrease occurrences of lumbar muscle spasms to improve QOL  - ONGOING   5. Independent with Home Exercise Programs  - ONGOING pt notes poor adherence to HEP (education provided this date)   6. Demonstrate Knowledge of fall prevention  - MET      LTG: (to be met in 14 treatments)  1. Pt will demonstrate improved B knee strength 5/5 and B hip strength 4/5 to improve walking tolerance to 20-30 min to improve community participation  2. Pt will demonstrate improved activity tolerance to perform light cleaning and cooking for decreased dependence on spouse  3. Pt will report decreased functional impairment from 29/50 to 21/50 or less to return to PLOF                    Patient goals: core build up to hopefully support my spine     Pt. Education:  [x] Yes  [] No  [] Reviewed Prior HEP/Ed  Method of Education: [x] Verbal  [x] Demo  [x] Written  Comprehension of Education:  [x] Verbalizes understanding. [] Demonstrates understanding. [] Needs review. [] Demonstrates/verbalizes HEP/Ed previously given. Access Code: [email or web address]  URL: [email or web address]  Date: 03/15/2022  Prepared by: Lita Gallegos Crest    Exercises  Supine Posterior Pelvic Tilt - 1 x daily - 7 x weekly - 1 sets - 10 reps - 5s hold  Supine March with Posterior Pelvic Tilt - 1 x daily - 7 x weekly - 1 sets - 10 reps  Bent Knee Fallouts - 1 x daily - 7 x weekly - 1 sets - 10 reps      Plan: [x] Continue per plan of care.    [x] Other:      Treatment Charges: Mins Units   []  Modalities     [x]  Ther Exercise 38 3   []  Manual Therapy     []  Ther Activities     []  Aquatics     []  Neuromuscular     [] Vasocompression     [] Gait Training     [] Dry needling        [] 1 or 2 muscles        [] 3 or more muscles     [x]  Other - HP 5 --   Total Treatment time 38 3     Time In: 2:00 pm            Time Out: 2:45 pm    Electronically signed by:   Lita Grimes PT

## 2022-03-16 ENCOUNTER — TELEPHONE (OUTPATIENT)
Dept: FAMILY MEDICINE CLINIC | Age: 52
End: 2022-03-16

## 2022-03-16 NOTE — TELEPHONE ENCOUNTER
Called patient and rescheduled appointment for 4/7/2022 for 30 minutes. Patient scheduled this appointment herself through [employer].

## 2022-03-16 NOTE — TELEPHONE ENCOUNTER
----- Message from Patty Mcmahan, BAR - CNP sent at 3/15/2022 11:09 PM EDT -----  Since this is my lunch break and this patient needs 30 mins can we reschedule her please. Can we also find out who scheduled her. THANK YOU. Ruslan Deutsch

## 2022-03-18 ENCOUNTER — HOSPITAL ENCOUNTER (OUTPATIENT)
Dept: PHYSICAL THERAPY | Age: 52
Setting detail: THERAPIES SERIES
Discharge: HOME | End: 2022-03-18
Payer: COMMERCIAL

## 2022-03-18 NOTE — FLOWSHEET NOTE
[] [facility]  [address]    [phone]  F: [phone]   [] [address] [place] [address]   [address]  P: [phone]  F: [phone]  [] [address] &  Therapy  [address] [facility]  P: [phone]  F: [phone] [] [address]  P: [phone]  F: [phone]  [] [address] [facility]   [address]   [place] Borjas: [phone]  F: [phone]   [x] [place] [id number] [facility] [address]  J[place]yn Borjas: [phone]   F: [phone]     Physical Therapy Cancel/No Show note    Date: 3/18/2022  Patient: Addis Light  : 1970  MRN: [id number]    Cancels/No Shows to date:     For today's appointment patient:    [x]  Cancelled    [] Rescheduled appointment    [] No-show     Reason given by patient:    []  Patient ill    []  Conflicting appointment    [] No transportation      [] Conflict with work    [x] No reason given    [] Weather related    [] COVID-19    [] Other:      Comments:        [] Next appointment was confirmed    Electronically signed by:  Hugo Stubbs PTA

## 2022-03-22 ENCOUNTER — HOSPITAL ENCOUNTER (OUTPATIENT)
Dept: PHYSICAL THERAPY | Age: 52
Setting detail: THERAPIES SERIES
Discharge: HOME | End: 2022-03-22
Payer: COMMERCIAL

## 2022-03-22 NOTE — FLOWSHEET NOTE
- [facility]  [address]    [phone]  F: [phone] - [address] [facility] [address] [facility] [address]   [address]  P: [phone]  F: [phone] - [address] &  Therapy  [address]  P: [phone]  F: [phone] - [address]  P: [phone]  F: [phone] - [address]  [address] [address] [facility] [address]   [address]   [place]: [phone]  F: [phone]   [x] [facility]  [address] [facility]  [place]: [phone]   F: [phone]     Physical Therapy Cancel/No Show note    Date: 3/22/2022  Patient: Elvira Sterling  : 1970  MRN: [id number]    Cancels/No Shows to date:     For today's appointment patient:    [x]  Cancelled    [] Rescheduled appointment    [] No-show     Reason given by patient:    []  Patient ill    []  Conflicting appointment    [x] No transportation - cancel appt for this week and did not want to reschedule due to not knowing how long pt will not have a car.      [] Conflict with work    [] No reason given    [] Weather related    [] COVID-19    [] Other:      Comments:         [] Next appointment was confirmed    Electronically signed by:  Lita Chapman, PT

## 2022-03-24 ENCOUNTER — APPOINTMENT (OUTPATIENT)
Dept: PHYSICAL THERAPY | Age: 52
End: 2022-03-24
Payer: COMMERCIAL

## 2022-03-30 ENCOUNTER — HOSPITAL ENCOUNTER (OUTPATIENT)
Dept: PAIN MANAGEMENT | Age: 52
Discharge: HOME | End: 2022-03-30
Payer: COMMERCIAL

## 2022-03-30 VITALS
BODY MASS INDEX: 42.21 KG/M2 | HEIGHT: 60 IN | WEIGHT: 215 LBS | HEART RATE: 74 BPM | OXYGEN SATURATION: 95 % | DIASTOLIC BLOOD PRESSURE: 74 MMHG | SYSTOLIC BLOOD PRESSURE: 104 MMHG

## 2022-03-30 DIAGNOSIS — M54.50 CHRONIC BILATERAL LOW BACK PAIN WITHOUT SCIATICA: ICD-10-CM

## 2022-03-30 DIAGNOSIS — M50.20 CERVICAL DISC HERNIATION: ICD-10-CM

## 2022-03-30 DIAGNOSIS — G89.29 CHRONIC BILATERAL LOW BACK PAIN WITHOUT SCIATICA: ICD-10-CM

## 2022-03-30 DIAGNOSIS — M54.16 LUMBAR RADICULOPATHY, CHRONIC: Chronic | ICD-10-CM

## 2022-03-30 DIAGNOSIS — M46.1 SACROILIITIS (HCC): ICD-10-CM

## 2022-03-30 DIAGNOSIS — M50.30 DEGENERATIVE DISC DISEASE, CERVICAL: ICD-10-CM

## 2022-03-30 DIAGNOSIS — M51.36 DEGENERATION OF LUMBAR INTERVERTEBRAL DISC: Chronic | ICD-10-CM

## 2022-03-30 DIAGNOSIS — M16.12 PRIMARY OSTEOARTHRITIS OF LEFT HIP: ICD-10-CM

## 2022-03-30 PROCEDURE — 99213 OFFICE O/P EST LOW 20 MIN: CPT | Performed by: NURSE PRACTITIONER

## 2022-03-30 PROCEDURE — 99213 OFFICE O/P EST LOW 20 MIN: CPT

## 2022-03-30 RX ORDER — OXYCODONE HYDROCHLORIDE AND ACETAMINOPHEN 5; 325 MG/1; MG/1
1 TABLET ORAL EVERY 6 HOURS PRN
Qty: 120 TABLET | Refills: 0 | Status: SHIPPED | OUTPATIENT
Start: 2022-04-04 | End: 2022-04-29 | Stop reason: SDUPTHER

## 2022-03-30 ASSESSMENT — ENCOUNTER SYMPTOMS
BACK PAIN: 1
BOWEL INCONTINENCE: 0
CONSTIPATION: 0
COUGH: 0
SHORTNESS OF BREATH: 0
ABDOMINAL PAIN: 0

## 2022-03-30 ASSESSMENT — PAIN SCALES - GENERAL: PAINLEVEL_OUTOF10: 4

## 2022-03-30 ASSESSMENT — PAIN DESCRIPTION - FREQUENCY: FREQUENCY: CONTINUOUS

## 2022-03-30 ASSESSMENT — PAIN DESCRIPTION - LOCATION: LOCATION: BACK;HIP;PELVIS

## 2022-03-30 ASSESSMENT — PAIN DESCRIPTION - ORIENTATION: ORIENTATION: RIGHT;LEFT;LOWER

## 2022-03-30 ASSESSMENT — PAIN DESCRIPTION - PAIN TYPE: TYPE: CHRONIC PAIN
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
## 2022-04-04 ENCOUNTER — OFFICE VISIT (OUTPATIENT)
Dept: FAMILY MEDICINE CLINIC | Age: 52
End: 2022-04-04
Payer: COMMERCIAL

## 2022-04-04 VITALS
OXYGEN SATURATION: 97 % | DIASTOLIC BLOOD PRESSURE: 88 MMHG | SYSTOLIC BLOOD PRESSURE: 138 MMHG | TEMPERATURE: 97 F | HEART RATE: 70 BPM

## 2022-04-04 DIAGNOSIS — R42 DIZZINESS: ICD-10-CM

## 2022-04-04 DIAGNOSIS — J02.9 SORE THROAT: Primary | ICD-10-CM

## 2022-04-04 DIAGNOSIS — J01.90 ACUTE BACTERIAL SINUSITIS: ICD-10-CM

## 2022-04-04 DIAGNOSIS — B96.89 ACUTE BACTERIAL SINUSITIS: ICD-10-CM

## 2022-04-04 DIAGNOSIS — E11.9 TYPE 2 DIABETES MELLITUS WITHOUT COMPLICATION, WITHOUT LONG-TERM CURRENT USE OF INSULIN (HCC): ICD-10-CM

## 2022-04-04 LAB
CHP ED QC CHECK: NORMAL
GLUCOSE BLD-MCNC: 105 MG/DL
Lab: NORMAL
QC PASS/FAIL: NORMAL
SARS-COV-2 RDRP RESP QL NAA+PROBE: NEGATIVE

## 2022-04-04 PROCEDURE — G8427 DOCREV CUR MEDS BY ELIG CLIN: HCPCS | Performed by: NURSE PRACTITIONER

## 2022-04-04 PROCEDURE — 99214 OFFICE O/P EST MOD 30 MIN: CPT | Performed by: NURSE PRACTITIONER

## 2022-04-04 PROCEDURE — G8417 CALC BMI ABV UP PARAM F/U: HCPCS | Performed by: NURSE PRACTITIONER

## 2022-04-04 PROCEDURE — 3046F HEMOGLOBIN A1C LEVEL >9.0%: CPT | Performed by: NURSE PRACTITIONER

## 2022-04-04 PROCEDURE — 3017F COLORECTAL CA SCREEN DOC REV: CPT | Performed by: NURSE PRACTITIONER

## 2022-04-04 PROCEDURE — 87635 SARS-COV-2 COVID-19 AMP PRB: CPT | Performed by: NURSE PRACTITIONER

## 2022-04-04 PROCEDURE — 1036F TOBACCO NON-USER: CPT | Performed by: NURSE PRACTITIONER

## 2022-04-04 PROCEDURE — 82962 GLUCOSE BLOOD TEST: CPT | Performed by: NURSE PRACTITIONER

## 2022-04-04 PROCEDURE — 2022F DILAT RTA XM EVC RTNOPTHY: CPT | Performed by: NURSE PRACTITIONER

## 2022-04-04 RX ORDER — BENZONATATE 100 MG/1
100 CAPSULE ORAL 3 TIMES DAILY PRN
Qty: 21 CAPSULE | Refills: 0 | Status: SHIPPED | OUTPATIENT
Start: 2022-04-04 | End: 2022-04-11

## 2022-04-04 RX ORDER — AMOXICILLIN 875 MG/1
875 TABLET, COATED ORAL 2 TIMES DAILY
Qty: 20 TABLET | Refills: 0 | Status: SHIPPED | OUTPATIENT
Start: 2022-04-04 | End: 2022-04-14

## 2022-04-04 RX ORDER — FLUTICASONE PROPIONATE 50 MCG
2 SPRAY, SUSPENSION (ML) NASAL DAILY
Qty: 16 G | Refills: 0 | Status: SHIPPED | OUTPATIENT
Start: 2022-04-04 | End: 2022-08-17 | Stop reason: SDUPTHER

## 2022-04-04 ASSESSMENT — ENCOUNTER SYMPTOMS
SHORTNESS OF BREATH: 0
SORE THROAT: 1
CHANGE IN BOWEL HABIT: 0
VOMITING: 0
ABDOMINAL PAIN: 0
COUGH: 1
ABDOMINAL PAIN: 0
NAUSEA: 0
SWOLLEN GLANDS: 0
COUGH: 0
BACK PAIN: 1
NAUSEA: 0
SORE THROAT: 0
VISUAL CHANGE: 0
WHEEZING: 0
COLOR CHANGE: 0

## 2022-04-04 NOTE — PATIENT INSTRUCTIONS
wash out mucus and bacteria. You can buy saline nose drops at a grocery store or drugstore. Or you can make your own at home by adding 1 teaspoon of salt and 1 teaspoon of baking soda to 2 cups of distilled water. If you make your own, fill a bulb syringe with the solution, insert the tip into your nostril, and squeeze gently. [place] your nose.  Put a hot, wet towel or a warm gel pack on your face 3 or 4 times a day for 5 to 10 minutes each time.  Try a decongestant nasal spray like oxymetazoline (Afrin). Do not use it for more than 3 days in a row. Using it for more than 3 days can make your congestion worse. When should you call for help? Call your doctor now or seek immediate medical care if:     You have new or worse swelling or redness in your face or around your eyes.      You have a new or higher fever. Watch closely for changes in your health, and be sure to contact your doctor if:     You have new or worse facial pain.      The mucus from your nose becomes thicker (like pus) or has new blood in it.      You are not getting better as expected. Where can you learn more? Go to https://[employer]. org and sign in to your [employer] account. Enter R002 in the [facility] box to learn more about \"Sinusitis: Care Instructions. \"     If you do not have an account, please click on the \"Sign Up Now\" link. Current as of: September 8, 2021               Content Version: 13.2  © 2006-2022 [employer]. Care instructions adapted under license by [facility] [place] ([facility]). If you have questions about a medical condition or this instruction, always ask your healthcare professional. [place] [address] any warranty or liability for your use of this information.

## 2022-04-04 NOTE — PROGRESS NOTES
[facility] PHYSICIANS  [facility] PHYSICIANS [facility]  [address] [facility] [address]  [address] [facility] [id number]  Dept: 270 Lorenza Prabhakar (:  1970) is a 49 y.o. female. Patient is here for evaluation of the following chief complaint(s):  Chief Complaint   Patient presents with    Depression    Hypertension    Weight Management     WEIGHT LOSS        SUBJECTIVE/OBJECTIVE:  ARMANDO Hightower is a 49 y.o. female patient. Patient is an established patient of mine. Patient has a known history of hypertension, diabetes, hyperlipidemia, rheumatoid arthritis, asthma, adjustment disorder, constipation, morbid obesity    HYPERTENSION  Iqra Bansal has a well controlled hypertension. she is currently on lisinopril. She has been on this therapy, patient's most recent BP in the office was stable. she reports stable BP readings at home. Patient denies any adverse reaction to this therapy. she denies any CP, SOB, HA, or palpitations. Patient admits to exercising and adheres to low salt diet. No history of organ damage due to condition noted. Lab Results   Component Value Date/Time    CREATININE 0.75 2021 03:57 PM     DIABETES MELLITUS  Patient has a  stable Diabetes Mellitus. Current therapy includes trulicity will increase to 4.5 for weight loss. We will stop Metformin since A1c is improved and had some mild hypoglycemic episode patient is responding well with this therapy. Patient reports home glucose monitoring as Stable readings. {symptoms. Patient denies neither vomiting nor diarrhea. Eye Exam: due  Foot Exam:due  Lab Results   Component Value Date/Time    LABA1C 5.6 2022 10:49 AM    LABA1C 5.5 2021 02:42 PM      Andrew Kelsey is currently on atorvastatin (Lipitor). Patient denies adverse reaction to this medication. Compliance with treatment thus far has been good. The patient is not known to have coexisting coronary artery disease.  The 10-year CVD risk score (Karthikeyan, et al., 2008) is: 10%    Values used to calculate the score:      Age: 49 years      Sex: Female      Diabetic: Yes      Tobacco smoker: No      Systolic Blood Pressure: 566 mmHg      Is BP treated: Yes      HDL Cholesterol: 45 mg/dL      Total Cholesterol: 152 mg/dL  Lab Results   Component Value Date/Time    CHOLFAST 152 09/30/2021 09:03 AM    CHOL 151 10/05/2019 08:50 AM    CHOL 210 05/13/2014 12:00 AM    HDL 45 09/30/2021 09:03 AM    LDLCHOLESTEROL 62 09/30/2021 09:03 AM    TRIGLYCFAST 227 (H) 09/30/2021 09:03 AM    CHOLHDLRATIO 3.4 09/30/2021 09:03 AM    TRIG 205 (H) 10/05/2019 08:50 AM    VLDL NOT REPORTED (H) 09/30/2021 09:03 AM     Lab Results   Component Value Date/Time    ALKPHOS 91 07/18/2021 03:57 PM    ALT 12 07/18/2021 03:57 PM    AST 11 07/18/2021 03:57 PM    BILITOT 0.20 (L) 07/18/2021 03:57 PM    PROT 6.5 07/18/2021 03:57 PM    LABALBU 3.8 07/18/2021 03:57 PM      RHEUMATOID ARTHRITIS/ LOW BACK PAIN  Patient continues to see the neurosurgeon and has had several injections. She had a visit with the neurosurgeon and was told that she is a surgical candidate but was told that she needs to lose weight. She started the process of meeting with the bariatrics office however, she was told that it can take at least 8 months for her to have the surgery therefore she was advised to see me and start on a diet medication to help her lose weight. Zin zin- concerned about weight  8 months before surgery due to the process. Kajal Galvan has a known history of rheumatoid arthritis. Patient also has some chronic low back pain. She did have some significant thoracic to lumbar spine disorder. Patient had recently had 2 courses of RFA's with relief for up to 6 months. However, patient continues to have the pain. Patient is encouraged to continue with follow-up with the pain management doctors. She is also morbidly obese. She is reported some weight gain.   Patient is currently on Lyrica, tizanidine we will switch to Robaxin, and Norco.  Patient is established with the pain management. She sees him on a regular basis. Dr Luis Orta. Facet on the 8th. And an RFA. Marco Antonio Elizalde Patient is also wondering abou Fibromyalgia since she has pain on her parts of her body. We had a long discussion about this condition and the treatment which she is already on several of the treatment including Lyrica. NON ALLERGIC RHINITIS  Thursday got sick- walk in clinic  Sinus infection- amoxicillin. Still taking  Nose still runny. Complains of some mild pain with the Flonase we will switch over to saline  Kwadwo Cerrato is a 49 y.o. female patient  has a known history of Common allergies. Symptoms include: clear rhinorrhea and postnasal drip and present in a noneseasonal pattern. Patient reports precipitants which includes: Pollen, dust, smoke, etc. Treatment currently includes Flonase . Patient reports some relief of symptoms. VITAMIN D  Patient has a known Vitamin D Deficiency. she had a course of supplementation for 12 weeks. she is due to get levels check. We continue to discuss ways to manage this condition. We also discussed ways to improve the levels. Such as learning food groups that are high in Vitamin D. We also discuss that sun exposure is beneficial however, too much sun and sun damage can potentially result in skin cancer. Lab Results   Component Value Date/Time    VITD25 54.5 09/30/2021 09:03 AM      OBESITY/MACROMASTIA  Patient's BMI is Body mass index is 44.25 kg/m². kg/m2. BMI is increasing. Patient understands that this condition increases the patient's risk for chronic conditions. Patient does not want to have any surgery that has not reversible we had another discussion about this. This is usually decided by the surgeon depending on which procedure is appropriate for her depending on her needs and her case. She does not want Joan en Y  Dr. Inez Gary had introduced the discussion for bariatric surgery. However, patient is afraid of the procedure since she has had some family member that had an adverse reaction after having a gastric sleeve done. Patient is also interested on getting a breast reduction procedure. At this point, we will go ahead and send her to bariatric specialist. Dr. Claudine Lee  Met them in January bu does not want to wait in October    Wt Readings from Last 3 Encounters:   04/07/22 226 lb 9.6 oz (102.8 kg)   03/30/22 215 lb (97.5 kg)   03/08/22 215 lb (97.5 kg)       Vitals:    04/07/22 1021   BP: 130/76   Pulse: 95   Temp: 97.3 °F (36.3 °C)   SpO2: 98%   Weight: 226 lb 9.6 oz (102.8 kg)   Height: 5' (1.524 m)   Estimated body mass index is 44.25 kg/m² as calculated from the following:    Height as of this encounter: 5' (1.524 m). Weight as of this encounter: 226 lb 9.6 oz (102.8 kg). Review of Systems   Constitutional: Negative for activity change, chills, fatigue and unexpected weight change (losing weight). HENT: Positive for trouble swallowing. Negative for ear pain, postnasal drip, sneezing and sore throat. Eyes:        Wears glasses   Respiratory: Negative for cough, chest tightness, shortness of breath and wheezing. Cardiovascular: Negative for chest pain and palpitations. Gastrointestinal: Negative for abdominal pain and nausea. Genitourinary: Negative for frequency, pelvic pain, urgency and vaginal discharge. Musculoskeletal: Positive for arthralgias and back pain. Back and hips   Skin: Negative for color change, rash and wound. Allergic/Immunologic: Positive for environmental allergies. Neurological: Negative for dizziness, syncope and headaches. Hematological: Positive for adenopathy. Psychiatric/Behavioral: Negative for sleep disturbance and suicidal ideas. The patient is nervous/anxious. Physical Exam  Vitals and nursing note reviewed. Constitutional:       Appearance: She is well-developed. She is morbidly obese. HENT:      Head: Normocephalic. Right Ear: External ear normal.      Left Ear: External ear normal.      Nose: Nose normal.      Mouth/Throat:      Mouth: Mucous membranes are moist.      Pharynx: Posterior oropharyngeal erythema present. Eyes:      Pupils: Pupils are equal, round, and reactive to light. Cardiovascular:      Rate and Rhythm: Normal rate. Pulses: Normal pulses. Heart sounds: Normal heart sounds. Pulmonary:      Effort: Pulmonary effort is normal. No respiratory distress. Breath sounds: Normal breath sounds. Abdominal:      General: Bowel sounds are normal.      Palpations: Abdomen is soft. Musculoskeletal:      Cervical back: Normal range of motion and neck supple. Thoracic back: Decreased range of motion. Lumbar back: Spasms (right paralumbar area) and tenderness present. Decreased range of motion. Skin:     Capillary Refill: Capillary refill takes less than 2 seconds. Neurological:      Mental Status: She is alert and oriented to person, place, and time. Psychiatric:         Mood and Affect: Mood is anxious. Speech: Speech is rapid and pressured. Behavior: Behavior normal.         Thought Content: Thought content does not include homicidal or suicidal ideation. Diagnosis Date    Anxiety     Asthma     Colon polyp 10/31/2016    sessile serated adenoma x2    Depression     Diabetes mellitus ([facility] [address]     Diverticulitis 10/2016    Gastritis     GERD (gastroesophageal reflux disease)     Hemorrhoids     int/ext    Hypertension     Migraines     Osteoarthritis     Shingles 1-22-16    Tubular adenoma 10/31/2016    Urinary leakage       Prior to Visit Medications    Medication Sig Taking?  Authorizing Provider   Probiotic Acidophilus (FLORANEX) TABS Take 1 tablet by mouth 2 times daily Yes Marty Cramer, APRN - CNP   sodium chloride (ALTAMIST SPRAY) 0.65 % nasal spray 1 spray by Nasal route as needed for Congestion Yes BAR Oliva CNP   Dulaglutide (TRULICITY) 4.5 BX/4.4PG SOPN Inject 4.5 mg into the skin once a week Yes BAR Oliva CNP   buPROPion (WELLBUTRIN XL) 150 MG extended release tablet Take 1 tablet by mouth every morning Yes BAR Oliva CNP   fluticasone (FLONASE) 50 MCG/ACT nasal spray 2 sprays by Nasal route daily  BAR Alexis CNP   amoxicillin (AMOXIL) 875 MG tablet Take 1 tablet by mouth 2 times daily for 10 days  BAR Alexis CNP   benzonatate (TESSALON PERLES) 100 MG capsule Take 1 capsule by mouth 3 times daily as needed for Cough  BAR Alexis CNP   oxyCODONE-acetaminophen (PERCOCET) 5-325 MG per tablet Take 1 tablet by mouth every 6 hours as needed for Pain for up to 30 days.   BAR Ca CNP   tiZANidine (ZANAFLEX) 4 MG tablet TAKE 1 TABLET EVERY 8 HOURS AS NEEDED FOR SPASMS  Syliva Gowers, APRN - CNP   pregabalin (LYRICA) 75 MG capsule TAKE 1 CAPSULE FOUR TIMES A DAY  Syliva Gowers, APRN - CNP   Cream Base (SALT STABLE LS ADVANCED) CREA   Historical Provider, MD   cetirizine (ZYRTEC) 10 MG tablet Take 10 mg by mouth daily  Historical Provider, MD   dicyclomine (BENTYL) 10 MG capsule Take 1 capsule by mouth 4 times daily  BAR Oliva CNP   Lancets (ONETOUCH DELICA PLUS [id number]) MISC USE 1 EACH TWICE A DAY  BAR Oliva CNP   ONETOUCH VERIO strip USE 1 STRIP DAILY AS NEEDED  BAR Oliva CNP   montelukast (SINGULAIR) 10 MG tablet TAKE 1 TABLET DAILY  BAR Oliva CNP   busPIRone (BUSPAR) 10 MG tablet TAKE 2 TABLETS THREE TIMES A DAY  BAR Oliva CNP   ibuprofen (ADVIL;MOTRIN) 600 MG tablet TAKE 1 TABLET BY MOUTH EVERY 6 HOURS  Historical Provider, MD   STOOL SOFTENER/LAXATIVE 50-8.6 MG per tablet TAKE 2 TABLETS BY MOUTH TWICE DAILY  Historical Provider, MD   ipratropium (ATROVENT) 0.06 % nasal spray USE 2 SPRAY(S) IN EACH NOSTRIL ONCE DAILY Historical Provider, MD   atorvastatin (LIPITOR) 20 MG tablet TAKE 1 TABLET DAILY  BAR Oliva CNP   Diclofenac Sodium POWD Formula #5: diclo3%+gaba6%+lido2%+prilo2%. Apply 1-2 gm topically to affected area TID-QID. Ross Echavarria DPM   omeprazole (PRILOSEC) 40 MG delayed release capsule TAKE 1 CAPSULE DAILY  BAR Oliva CNP   lisinopril (PRINIVIL;ZESTRIL) 5 MG tablet TAKE 1 TABLET DAILY  BAR Oliva CNP   trospium (SANCTURA) 20 MG tablet Take 1 tablet by mouth daily  Historical Provider, MD   sertraline (ZOLOFT) 100 MG tablet TAKE 1 TABLET DAILY  BAR Oliva CNP   estradiol (ESTRACE) 0.1 MG/GM vaginal cream   Historical Provider, MD   Blood Pressure Monitor KIT Use as directed. BAR Oliva CNP   topiramate (TOPAMAX) 100 MG tablet TAKE 1 TABLET IN THE MORNING AND 2 TABLETS IN THE EVENING  BAR Amaro CNP   albuterol sulfate (PROAIR RESPICLICK) 420 (90 Base) MCG/ACT aerosol powder inhalation Inhale 2 puffs into the lungs every 4 hours as needed for Wheezing or Shortness of Breath  Deya Polk MD   docusate sodium (COLACE) 100 MG capsule Take 1 capsule by mouth daily as needed for Constipation  BAR Smith CNP   Elastic Bandages & Supports (LUMBAR BACK BRACE/SUPPORT PAD) MISC 1 each by Does not apply route daily as needed (pain)  Feng Mckenzie MD       ASSESSMENT/PLAN:  1. Essential hypertension  Stable  Continue current therapy. DISCUSSED AND ADVISED TO:  Cut down on your salt intake. Cut down on caffeinated drinks, sports drinks. Instructed to check BP at home regularly. Report for any chest pains, shortness of breath, headaches, and lightheadedness. Call the office if your blood pressure continue to be higher than 140/90 or 90/50.        2. Type 2 diabetes mellitus without complication, without long-term current use of insulin (HCC)  Stable  Continue therapy.   We will continue to monitor Hemoglobin A1c   DISCUSSED and ADVISED TO:  Continue to check Glucose levels at home. Report increased and low levels as discussed. Decrease carbohydrates, sugary drinks, desserts in your diet. Exercise regularly, as tolerated. Try to lose weight.      - POCT glycosylated hemoglobin (Hb A1C); Future  - Dulaglutide (TRULICITY) 4.5 VZ/8.5LP SOPN; Inject 4.5 mg into the skin once a week  Dispense: 4 pen; Refill: 2  - POCT glycosylated hemoglobin (Hb A1C)    3. Rheumatoid arthritis, involving unspecified site, unspecified whether rheumatoid factor present ([address]  Stable  Continue current therapy. DISCUSSED and ADVISED TO:  Stay at a healthy weight. Continue exercises/PT  Stretch to help prevent stiffness and to prevent injury before exercise. Gentle forms of yoga help keep joints and muscles flexible. Walk instead of jog, ride a bike, swim, and water exercise. Lift weights as tolerated. strong muscles help reduce stress on joints. Take pain medicines exactly as directed and only as needed. 4. Mixed hyperlipidemia  Stable  Continue therapy. Advised to decrease the consumption of red meats, fried foods, trans fats, sweets, sugary beverages. Advised to increase fish, vegetables, and fruits consumption. Advised to add fiber or OTC supplements in diet. Discussed weight loss which will result in improvement of lipids levels. Advised to increase daily physical activities and add regular exercises. 5. Mild intermittent asthma without complication  Stable  Continue current therapy. DISCUSSED and ADVISED TO:  Use prescribed inhalers or medications regularly. Avoid known irritants and exposure to known triggers. Advised to report the need to use your albuterol inhaler more than usual  Stay away from smoking or second hand smoke. Go to the nearest ER for increasing SOB.       6. Non-seasonal allergic rhinitis due to other allergic trigger  Failure to Improve  Continue with the same therapy   ADVISED TO:  Avoid known allergens/irritants. Stop smoking or avoid second hand smoke. Stay hydrated. Report for worsening symptoms    - sodium chloride (ALTAMIST SPRAY) 0.65 % nasal spray; 1 spray by Nasal route as needed for Congestion  Dispense: 1 each; Refill: 3    7. Adjustment disorder with mixed anxiety and depressed mood  Failure to Improve  Continue current therapy. Discussed how to recognize anxiety. Advised to relieve tension with exercise or a massage. Advised to get enough rest.  Advised to avoid alcohol, caffeine, nicotine, and illegal drugs. Which can increase anxiety level and cause sleep problems. - buPROPion (WELLBUTRIN XL) 150 MG extended release tablet; Take 1 tablet by mouth every morning  Dispense: 90 tablet; Refill: 2    8. Chronic idiopathic constipation  Stable  Continue therapy. DISCUSSED and ADVISED TO:  Drink plenty of fluids, 1.5 to 2 liters a day  Increase high-fiber foods  with 25-30 g each day. These include fruits, vegetables, beans, and whole grains. Get at least 30 minutes of exercise on most days of the week. Take a fiber supplement, such as Citrucel (Methylcellulose fiber) or Metamucil (Psyllium), every day. Schedule time each day for a bowel movement. - Probiotic Acidophilus (FLORANEX) TABS; Take 1 tablet by mouth 2 times daily  Dispense: 90 tablet; Refill: 2    9. Morbid obesity with BMI of 40.0-44.9, adult (HCC)  Stable  BMI increasing  DISCUSSED AND ADVISED TO:  Eat a low-fat and low carbohydrates diet. Avoid fried foods especially fast food. Choose healthier options for snacks. Have 5-6 servings of fruits and vegetables per day. Cut down on eating processed food. Add 30 minutes to 1 hour aerobic exercise for 3-4 days a week. 10. Weight gain  Worsening  Start wellbutrin  Encouraged to call pain management to restart Topamax  Discussed intermittent fasting but reminded her on hypoglycemia  Discussed clean keto diet    11.  Unable to lose weight  Worsening  Start wellbutrin  Encouraged to call pain management to restart Topamax  Discussed intermittent fasting but reminded her on hypoglycemia  Discussed clean keto diet    Return in about 4 months (around 8/7/2022) for Chronic conditions, 30mins. This note was completed by using the assistance of a speech-recognition program. However, inadvertent computerized transcription errors may be present. Although every effort was made to ensure accuracy, no guarantees can be provided that every mistake has been identified and corrected by editing.   Electronically signed by BAR Davis Asa, CNP on 5/23/21 at 7:00 PM EDT     --BAR Oliva CNP

## 2022-04-04 NOTE — PROGRESS NOTES
[address] [address]  Dept: [phone]  Dept Fax: [phone]    Marcel Lesches is a 49 y.o. female who presents to the urgent care today for her medical conditions/complaints as notedbelow. Marcel Lesches is c/o of Pharyngitis (onset Thursday), Headache, Cough, Generalized Body Aches, and Dizziness      HPI:     49 yr old female presents for st, HA, np cough, nasal congestion, plugged ears, gen body aches, intermittent dizziness for 4 days. Denies heart palpitations or chest pain or fevers. Spouse had same sx last week, checked for covid and was neg  Her sx seem to be getting worse  Taking zyrtec daily for allergies. Worst symptom is sore throat. Hx asthma - denies sob, wheezing or trouble breathing. Pt is T2 Diabetic and has not checked her sugars    Pharyngitis  This is a new problem. The current episode started in the past 7 days (x4d). The problem occurs constantly. The problem has been gradually worsening. Associated symptoms include congestion, coughing, fatigue, headaches, myalgias and a sore throat. Pertinent negatives include no abdominal pain, anorexia, arthralgias, change in bowel habit, chest pain, chills, diaphoresis, fever, joint swelling, nausea, neck pain, numbness, rash, swollen glands, urinary symptoms, vertigo, visual change, vomiting or weakness. Associated symptoms comments: dizziness. Nothing aggravates the symptoms. She has tried NSAIDs (corocidin HBP, alkaseltzer plus, NSAIDs) for the symptoms. The treatment provided no relief.        Past Medical History:   Diagnosis Date    Anxiety     Asthma     Colon polyp 10/31/2016    sessile serated adenoma x2    Depression     Diabetes mellitus ([facility] [address]     Diverticulitis 10/2016    Gastritis     GERD (gastroesophageal reflux disease)     Hemorrhoids     int/ext    Hypertension     Migraines     Osteoarthritis     Shingles 1-22-16    Tubular adenoma 10/31/2016    Urinary leakage         Current Outpatient Medications   Medication Sig Dispense Refill    fluticasone (FLONASE) 50 MCG/ACT nasal spray 2 sprays by Nasal route daily 16 g 0    amoxicillin (AMOXIL) 875 MG tablet Take 1 tablet by mouth 2 times daily for 10 days 20 tablet 0    benzonatate (TESSALON PERLES) 100 MG capsule Take 1 capsule by mouth 3 times daily as needed for Cough 21 capsule 0    oxyCODONE-acetaminophen (PERCOCET) 5-325 MG per tablet Take 1 tablet by mouth every 6 hours as needed for Pain for up to 30 days. 120 tablet 0    tiZANidine (ZANAFLEX) 4 MG tablet TAKE 1 TABLET EVERY 8 HOURS AS NEEDED FOR SPASMS 270 tablet 0    pregabalin (LYRICA) 75 MG capsule TAKE 1 CAPSULE FOUR TIMES A  capsule 0    Cream Base (SALT STABLE LS ADVANCED) CREA       cetirizine (ZYRTEC) 10 MG tablet Take 10 mg by mouth daily      dicyclomine (BENTYL) 10 MG capsule Take 1 capsule by mouth 4 times daily 360 capsule 1    Lancets (ONETOUCH DELICA PLUS [id number]) MISC USE 1 EACH TWICE A  each 3    ONETOUCH VERIO strip USE 1 STRIP DAILY AS NEEDED 200 strip 3    montelukast (SINGULAIR) 10 MG tablet TAKE 1 TABLET DAILY 90 tablet 3    Dulaglutide (TRULICITY) 3 NC/0.1IQ SOPN Inject 3 mg into the skin once a week 12 pen 1    busPIRone (BUSPAR) 10 MG tablet TAKE 2 TABLETS THREE TIMES A  tablet 11    ibuprofen (ADVIL;MOTRIN) 600 MG tablet TAKE 1 TABLET BY MOUTH EVERY 6 HOURS      STOOL SOFTENER/LAXATIVE 50-8.6 MG per tablet TAKE 2 TABLETS BY MOUTH TWICE DAILY      ipratropium (ATROVENT) 0.06 % nasal spray USE 2 SPRAY(S) IN EACH NOSTRIL ONCE DAILY      atorvastatin (LIPITOR) 20 MG tablet TAKE 1 TABLET DAILY 90 tablet 3    Diclofenac Sodium POWD Formula #5: diclo3%+gaba6%+lido2%+prilo2%. Apply 1-2 gm topically to affected area TID-QID.  120 g 2    omeprazole (PRILOSEC) 40 MG delayed release capsule TAKE 1 CAPSULE DAILY 90 capsule 3    lisinopril (PRINIVIL;ZESTRIL) 5 MG tablet TAKE 1 TABLET DAILY 90 tablet 3    trospium (SANCTURA) 20 MG tablet Take 1 tablet by mouth daily      sertraline (ZOLOFT) 100 MG tablet TAKE 1 TABLET DAILY 90 tablet 3    metFORMIN (GLUCOPHAGE-XR) 750 MG extended release tablet TAKE 1 TABLET DAILY WITH BREAKFAST (PLEASE MAKE APPOINTMENT DR DAVE RETIRED) 90 tablet 3    fluticasone (FLONASE) 50 MCG/ACT nasal spray USE 1 SPRAY IN EACH NOSTRIL TWICE A DAY 32 g 5    estradiol (ESTRACE) 0.1 MG/GM vaginal cream       Blood Pressure Monitor KIT Use as directed. 1 kit 1    topiramate (TOPAMAX) 100 MG tablet TAKE 1 TABLET IN THE MORNING AND 2 TABLETS IN THE EVENING 270 tablet 4    albuterol sulfate (PROAIR RESPICLICK) 785 (90 Base) MCG/ACT aerosol powder inhalation Inhale 2 puffs into the lungs every 4 hours as needed for Wheezing or Shortness of Breath 1 Inhaler 2    docusate sodium (COLACE) 100 MG capsule Take 1 capsule by mouth daily as needed for Constipation 30 capsule 2    Elastic Bandages & Supports (LUMBAR BACK BRACE/SUPPORT PAD) MISC 1 each by Does not apply route daily as needed (pain) 1 each 0    Methylcellulose POWD 15 mLs by Does not apply route daily Mix 1 tbsp powder with 8 ounce water Take BID for 1 week. Then daily. 1 each 5    Diclofenac Sodium 3 % GEL Apply 4 g topically 2 times daily 350 g 1    traZODone (DESYREL) 50 MG tablet TAKE 1 TABLET NIGHTLY 30 tablet 11    diclofenac sodium (VOLTAREN) 1 % GEL Apply 2 g topically 2 times daily 100 g 0    Probiotic Product (PROBIOTIC DAILY PO) Take 1 tablet by mouth daily. No current facility-administered medications for this visit. Allergies   Allergen Reactions    Adhesive Tape Other (See Comments)     blisters    Imitrex [Sumatriptan] Other (See Comments)     \"feels like head is going to explode    Morphine Itching     hives    Seasonal        Subjective:      Review of Systems   Constitutional: Positive for fatigue.  Negative for chills, diaphoresis and fever. HENT: Positive for congestion and sore throat. Respiratory: Positive for cough. Cardiovascular: Negative for chest pain. Gastrointestinal: Negative for abdominal pain, anorexia, change in bowel habit, nausea and vomiting. Musculoskeletal: Positive for myalgias. Negative for arthralgias, joint swelling and neck pain. Skin: Negative for rash. Neurological: Positive for headaches. Negative for vertigo, weakness and numbness. All other systems reviewed and are negative. 14 systems reviewed and negative except as listed in HPI. Objective:     Physical Exam  Vitals and nursing note reviewed. Constitutional:       General: She is not in acute distress. Appearance: Normal appearance. She is well-developed. She is obese. She is not ill-appearing, toxic-appearing or diaphoretic. Comments: nontoxic   HENT:      Head: Normocephalic and atraumatic. Right Ear: Ear canal and external ear normal.      Left Ear: Ear canal and external ear normal.      Ears:      Comments: milagros tm bulging with effusion, no injection     Nose: Congestion present. Comments: milagros maxillary sinus tenderness  No facial swelling     Mouth/Throat:      Mouth: Mucous membranes are moist.      Pharynx: Posterior oropharyngeal erythema present. No oropharyngeal exudate. Comments: Posterior pharynx injected  + cobblestoning  Swallows without difficulty  Hx tonsillectomy  Eyes:      General: No scleral icterus. Right eye: No discharge. Left eye: No discharge. Extraocular Movements: Extraocular movements intact. Conjunctiva/sclera: Conjunctivae normal.      Pupils: Pupils are equal, round, and reactive to light. Cardiovascular:      Rate and Rhythm: Normal rate and regular rhythm. Pulses: Normal pulses. Heart sounds: Normal heart sounds. Pulmonary:      Effort: Pulmonary effort is normal. No respiratory distress. Breath sounds: Normal breath sounds. No stridor. No wheezing, rhonchi or rales. Chest:      Chest wall: No tenderness. Abdominal:      General: Bowel sounds are normal. There is no distension. Palpations: Abdomen is soft. Tenderness: There is no abdominal tenderness. Musculoskeletal:         General: No tenderness or deformity. Normal range of motion. Cervical back: Normal range of motion and neck supple. Lymphadenopathy:      Cervical: No cervical adenopathy. Skin:     General: Skin is warm and dry. Capillary Refill: Capillary refill takes less than 2 seconds. Findings: No rash ( no rash to visible skin). Neurological:      General: No focal deficit present. Mental Status: She is alert and oriented to person, place, and time. Motor: No abnormal muscle tone. Coordination: Coordination normal.   Psychiatric:         Mood and Affect: Mood normal.         Behavior: Behavior normal.       /88 (Site: Left Upper Arm, Position: Sitting, Cuff Size: Large Adult)   Pulse 70   Temp 97 °F (36.1 °C) (Infrared)   SpO2 97%     Assessment:       Diagnosis Orders   1. Sore throat  POCT COVID-19 Rapid, NAAT   2. Acute bacterial sinusitis  POCT COVID-19 Rapid, NAAT   3. Dizziness  POCT Glucose   4. Type 2 diabetes mellitus without complication, without long-term current use of insulin ([facility])  POCT Glucose       Plan:      Results for POC orders placed in visit on 04/04/22   POCT Glucose   Result Value Ref Range    Glucose 105 mg/dL    QC OK? pass        POCT COVID neg  Keep PCP appt this week  FBS blood sugar 105  I believe pt st is due to the PND  Her ears are plugged and sinuses are congested causing sinus pressure and intermittent dizziness. She is neuro intact, vss,   Based on sx severity and duration, will tx as bacterial  Amoxil rx  flonasr rx  Tessalon perles rx  Cont zyrtec otc from home for year around allergies  Return for Make an Appt. with your Primary Care in 1 week.     Orders Placed This Encounter Medications    fluticasone (FLONASE) 50 MCG/ACT nasal spray     Si sprays by Nasal route daily     Dispense:  16 g     Refill:  0    amoxicillin (AMOXIL) 875 MG tablet     Sig: Take 1 tablet by mouth 2 times daily for 10 days     Dispense:  20 tablet     Refill:  0    benzonatate (TESSALON PERLES) 100 MG capsule     Sig: Take 1 capsule by mouth 3 times daily as needed for Cough     Dispense:  21 capsule     Refill:  0         Patient given educational materials - see patient instructions. Discussed use, benefit, and side effects of prescribed medications. All patient questions answered. Pt voicedunderstanding.     Electronically signed by BAR Mckeon CNP on 2022 at 1:06 PM

## 2022-04-07 ENCOUNTER — OFFICE VISIT (OUTPATIENT)
Dept: FAMILY MEDICINE CLINIC | Age: 52
End: 2022-04-07
Payer: COMMERCIAL

## 2022-04-07 VITALS
HEIGHT: 60 IN | SYSTOLIC BLOOD PRESSURE: 130 MMHG | OXYGEN SATURATION: 98 % | DIASTOLIC BLOOD PRESSURE: 76 MMHG | TEMPERATURE: 97.3 F | WEIGHT: 226.6 LBS | BODY MASS INDEX: 44.49 KG/M2 | HEART RATE: 95 BPM

## 2022-04-07 DIAGNOSIS — I10 ESSENTIAL HYPERTENSION: Primary | ICD-10-CM

## 2022-04-07 DIAGNOSIS — R63.5 WEIGHT GAIN: ICD-10-CM

## 2022-04-07 DIAGNOSIS — F43.23 ADJUSTMENT DISORDER WITH MIXED ANXIETY AND DEPRESSED MOOD: ICD-10-CM

## 2022-04-07 DIAGNOSIS — K59.04 CHRONIC IDIOPATHIC CONSTIPATION: ICD-10-CM

## 2022-04-07 DIAGNOSIS — J30.89 NON-SEASONAL ALLERGIC RHINITIS DUE TO OTHER ALLERGIC TRIGGER: ICD-10-CM

## 2022-04-07 DIAGNOSIS — M06.9 RHEUMATOID ARTHRITIS, INVOLVING UNSPECIFIED SITE, UNSPECIFIED WHETHER RHEUMATOID FACTOR PRESENT (HCC): ICD-10-CM

## 2022-04-07 DIAGNOSIS — E11.9 TYPE 2 DIABETES MELLITUS WITHOUT COMPLICATION, WITHOUT LONG-TERM CURRENT USE OF INSULIN (HCC): ICD-10-CM

## 2022-04-07 DIAGNOSIS — R63.8 UNABLE TO LOSE WEIGHT: ICD-10-CM

## 2022-04-07 DIAGNOSIS — E66.01 MORBID OBESITY WITH BMI OF 40.0-44.9, ADULT (HCC): ICD-10-CM

## 2022-04-07 DIAGNOSIS — J45.20 MILD INTERMITTENT ASTHMA WITHOUT COMPLICATION: ICD-10-CM

## 2022-04-07 DIAGNOSIS — E78.2 MIXED HYPERLIPIDEMIA: ICD-10-CM

## 2022-04-07 LAB — HBA1C MFR BLD: 5.6 %

## 2022-04-07 PROCEDURE — G8427 DOCREV CUR MEDS BY ELIG CLIN: HCPCS | Performed by: FAMILY MEDICINE

## 2022-04-07 PROCEDURE — 1036F TOBACCO NON-USER: CPT | Performed by: FAMILY MEDICINE

## 2022-04-07 PROCEDURE — 83036 HEMOGLOBIN GLYCOSYLATED A1C: CPT | Performed by: FAMILY MEDICINE

## 2022-04-07 PROCEDURE — 2022F DILAT RTA XM EVC RTNOPTHY: CPT | Performed by: FAMILY MEDICINE

## 2022-04-07 PROCEDURE — 3017F COLORECTAL CA SCREEN DOC REV: CPT | Performed by: FAMILY MEDICINE

## 2022-04-07 PROCEDURE — 3044F HG A1C LEVEL LT 7.0%: CPT | Performed by: FAMILY MEDICINE

## 2022-04-07 PROCEDURE — 99214 OFFICE O/P EST MOD 30 MIN: CPT | Performed by: FAMILY MEDICINE

## 2022-04-07 PROCEDURE — G8417 CALC BMI ABV UP PARAM F/U: HCPCS | Performed by: FAMILY MEDICINE

## 2022-04-07 RX ORDER — ECHINACEA PURPUREA EXTRACT 125 MG
1 TABLET ORAL PRN
Qty: 1 EACH | Refills: 3 | Status: SHIPPED | OUTPATIENT
Start: 2022-04-07

## 2022-04-07 RX ORDER — GREEN TEA/HOODIA GORDONII 315-12.5MG
1 CAPSULE ORAL 2 TIMES DAILY
Qty: 90 TABLET | Refills: 2 | Status: SHIPPED | OUTPATIENT
Start: 2022-04-07 | End: 2022-07-06

## 2022-04-07 RX ORDER — BUPROPION HYDROCHLORIDE 150 MG/1
150 TABLET ORAL EVERY MORNING
Qty: 90 TABLET | Refills: 2 | Status: SHIPPED | OUTPATIENT
Start: 2022-04-07 | End: 2022-10-11

## 2022-04-07 RX ORDER — DULAGLUTIDE 4.5 MG/.5ML
4.5 INJECTION, SOLUTION SUBCUTANEOUS WEEKLY
Qty: 4 PEN | Refills: 2 | Status: SHIPPED | OUTPATIENT
Start: 2022-04-07 | End: 2022-06-20

## 2022-04-07 SDOH — ECONOMIC STABILITY: FOOD INSECURITY: WITHIN THE PAST 12 MONTHS, THE FOOD YOU BOUGHT JUST DIDN'T LAST AND YOU DIDN'T HAVE MONEY TO GET MORE.: NEVER TRUE

## 2022-04-07 SDOH — ECONOMIC STABILITY: FOOD INSECURITY: WITHIN THE PAST 12 MONTHS, YOU WORRIED THAT YOUR FOOD WOULD RUN OUT BEFORE YOU GOT MONEY TO BUY MORE.: NEVER TRUE

## 2022-04-07 ASSESSMENT — PATIENT HEALTH QUESTIONNAIRE - PHQ9
SUM OF ALL RESPONSES TO PHQ QUESTIONS 1-9: 10
6. FEELING BAD ABOUT YOURSELF - OR THAT YOU ARE A FAILURE OR HAVE LET YOURSELF OR YOUR FAMILY DOWN: 0
3. TROUBLE FALLING OR STAYING ASLEEP: 3
4. FEELING TIRED OR HAVING LITTLE ENERGY: 3
10. IF YOU CHECKED OFF ANY PROBLEMS, HOW DIFFICULT HAVE THESE PROBLEMS MADE IT FOR YOU TO DO YOUR WORK, TAKE CARE OF THINGS AT HOME, OR GET ALONG WITH OTHER PEOPLE: 0
SUM OF ALL RESPONSES TO PHQ QUESTIONS 1-9: 10
1. LITTLE INTEREST OR PLEASURE IN DOING THINGS: 3
9. THOUGHTS THAT YOU WOULD BE BETTER OFF DEAD, OR OF HURTING YOURSELF: 0
SUM OF ALL RESPONSES TO PHQ9 QUESTIONS 1 & 2: 3
SUM OF ALL RESPONSES TO PHQ QUESTIONS 1-9: 10
2. FEELING DOWN, DEPRESSED OR HOPELESS: 0
8. MOVING OR SPEAKING SO SLOWLY THAT OTHER PEOPLE COULD HAVE NOTICED. OR THE OPPOSITE, BEING SO FIGETY OR RESTLESS THAT YOU HAVE BEEN MOVING AROUND A LOT MORE THAN USUAL: 1
5. POOR APPETITE OR OVEREATING: 0
SUM OF ALL RESPONSES TO PHQ QUESTIONS 1-9: 10

## 2022-04-07 ASSESSMENT — SOCIAL DETERMINANTS OF HEALTH (SDOH): HOW HARD IS IT FOR YOU TO PAY FOR THE VERY BASICS LIKE FOOD, HOUSING, MEDICAL CARE, AND HEATING?: NOT HARD AT ALL

## 2022-04-07 ASSESSMENT — ENCOUNTER SYMPTOMS
TROUBLE SWALLOWING: 1
CHEST TIGHTNESS: 0

## 2022-04-07 NOTE — PROGRESS NOTES
Visit Information    Have you changed or started any medications since your last visit including any over-the-counter medicines, vitamins, or herbal medicines? no   Have you stopped taking any of your medications? Is so, why? -  no  Are you having any side effects from any of your medications? - no    Have you seen any other physician or provider since your last visit? yes -    Have you had any other diagnostic tests since your last visit?  no   Have you been seen in the emergency room and/or had an admission in a hospital since we last saw you?  no   Have you had your routine dental cleaning in the past 6 months?  yes -      Do you have an active MyChart account? If no, what is the barrier?   Yes    Patient Care Team:  BAR Singh CNP as PCP - General (Family Medicine)  BAR Singh CNP as PCP - [facility]  Jenny Aiken MD as Orthopedic Surgeon (Orthopedic Surgery)  Osman Albarran MD as Consulting Physician (Gastroenterology)    Medical History Review  Past Medical, Family, and Social History reviewed and does contribute to the patient presenting condition    Health Maintenance   Topic Date Due    Diabetic retinal exam  Never done    Diabetic foot exam  10/03/2020    Depression Screen  05/24/2022    Potassium monitoring  07/18/2022    Creatinine monitoring  07/18/2022    A1C test (Diabetic or Prediabetic)  09/28/2022    Diabetic microalbuminuria test  09/30/2022    Lipid screen  09/30/2022    Colorectal Cancer Screen  10/22/2022    Breast cancer screen  11/23/2023    DTaP/Tdap/Td vaccine (2 - Td or Tdap) 01/11/2028    Pneumococcal 0-64 years Vaccine (2 of 2 - PPSV23) 08/08/2036    Hepatitis B vaccine  Completed    Flu vaccine  Completed    Shingles Vaccine  Completed    COVID-19 Vaccine  Completed    Hepatitis C screen  Completed    HIV screen  Completed    Hepatitis A vaccine  Aged Out    Hib vaccine  Aged Out    Meningococcal (ACWY) vaccine Aged Out

## 2022-04-07 NOTE — PATIENT INSTRUCTIONS
Patient Education        phentermine  Pronunciation: GRAYSON Leeredd Steveer  Brand: Adipex-Radha STRAUSS  What is the most important information I should know about phentermine? You should not use this medicine if you have glaucoma, overactive thyroid, severe heart problems, uncontrolled high blood pressure, advanced coronaryartery disease, extreme agitation, or a history of drug abuse. Do not use this medicine if you have used an MAO inhibitor in the past 14 days, such as isocarboxazid, linezolid, methylene blue injection, phenelzine,rasagiline, selegiline, or tranylcypromine. What is phentermine? Phentermine is similar to an amphetamine. Phentermine stimulates the central nervous system (nerves and brain), which increases your heart rate and bloodpressure and decreases your appetite. Phentermine is used together with diet and exercise to treat obesity, especially in people with risk factors such as high blood pressure, highcholesterol, or diabetes. Phentermine may also be used for purposes not listed in this medication guide. What should I discuss with my healthcare provider before taking phentermine? You should not use phentermine if you are allergic to it, or if you have:   a history of heart disease (coronary artery disease, heart rhythm problems, congestive heart failure, stroke);   severe or uncontrolled high blood pressure;   overactive thyroid;   glaucoma;   extreme agitation or nervousness;   a history of drug abuse; or   if you take other diet pills. Do not use phentermine if you have used an MAO inhibitor in the past 14 days. A dangerous drug interaction could occur. MAO inhibitors include isocarboxazid, linezolid, methylene blue injection, phenelzine, rasagiline,selegiline, tranylcypromine, and others. Weight loss during pregnancy can harm an unborn baby, even if you are overweight. Do not use phentermine if you are pregnant.  Tell your doctor right away if you become pregnant during treatment. You should not breast-feed while using phentermine. Tell your doctor if you have ever had:   heart disease or coronary artery disease;   a heart valve disorder;   high blood pressure;   diabetes (your diabetes medication dose may need to be adjusted); or   kidney disease. Phentermine is not approved for use by anyone younger than 12years old. How should I take phentermine? Follow all directions on your prescription label and read all medication guides or instruction sheets. Your doctor may occasionally change your dose. Use themedicine exactly as directed. Phentermine is usually taken before breakfast, or 1 to 2 hours after breakfast. Follow your doctor's dosing instructions very carefully. Never use phentermine in larger amounts, or for longer than prescribed. Taking more of this medication will not make it more effective and can causeserious, life-threatening side effects. Phentermine is for short-term use only. The effects of appetite suppression may wear off after a few weeks. Phentermine may be habit-forming. Misuse can cause addiction, overdose, or death. Selling or giving away this medicine is against the law. Call your doctor at once if you think this medicine is not working as well, orif you have not lost at least 4 pounds within 4 weeks. Do not stop using phentermine suddenly, or you could have unpleasant withdrawal symptoms. Ask your doctor how tosafely stop using this medicine. Store at room temperature away from moisture and heat. Keep the bottle tightlyclosed when not in use. What happens if I miss a dose? Take the medicine as soon as you can, but skip the missed dose if it is late in the day. Do not take two doses at one time. What happens if I overdose? Seek emergency medical attention or call the Poison Help line at 1-864.425.1873.  An overdose of phentermine can be fatal.  Overdose symptoms may include confusion, panic, hallucinations, extreme restlessness, nausea, using.  Where can I get more information? Your pharmacist can provide more information about phentermine. Remember, keep this and all other medicines out of the reach of children, never share your medicines with others, and use this medication only for the indication prescribed. Every effort has been made to ensure that the information provided by [address] is accurate, up-to-date, and complete, but no guarantee is made to that effect. Drug information contained herein may be time sensitive. [facility] information has been compiled for use by healthcare practitioners and consumers in the [place] and therefore [facility] does not warrant that uses outside of the [place] are appropriate, unless specifically indicated otherwise. [facility]'s drug information does not endorse drugs, diagnose patients or recommend therapy. [facility]'s drug information is an informational resource designed to assist licensed healthcare practitioners in caring for their patients and/or to serve consumers viewing this service as a supplement to, and not a substitute for, the expertise, skill, knowledge and judgment of healthcare practitioners. The absence of a warning for a given drug or drug combination in no way should be construed to indicate that the drug or drug combination is safe, effective or appropriate for any given patient. [facility] does not assume any responsibility for any aspect of healthcare administered with the aid of information [facility] provides. The information contained herein is not intended to cover all possible uses, directions, precautions, warnings, drug interactions, allergic reactions, or adverse effects. If you have questions about the drugs you are taking, check with yourdoctor, nurse or pharmacist.  Copyright 9426-4160 [address]. Version: 10.01. Revision date:7/26/2018. Care instructions adapted under license by [facility] ([facility]).  If you have questions about a medical condition or this instruction, always ask your healthcare professional. [place] [address] any warranty or liability for your use of this information.

## 2022-04-08 ENCOUNTER — TELEPHONE (OUTPATIENT)
Dept: PAIN MANAGEMENT | Age: 52
End: 2022-04-08

## 2022-04-20 ENCOUNTER — OFFICE VISIT (OUTPATIENT)
Dept: FAMILY MEDICINE CLINIC | Age: 52
End: 2022-04-20
Payer: COMMERCIAL

## 2022-04-20 VITALS
WEIGHT: 221 LBS | DIASTOLIC BLOOD PRESSURE: 70 MMHG | OXYGEN SATURATION: 97 % | BODY MASS INDEX: 43.39 KG/M2 | HEIGHT: 60 IN | TEMPERATURE: 97.3 F | HEART RATE: 80 BPM | SYSTOLIC BLOOD PRESSURE: 120 MMHG

## 2022-04-20 DIAGNOSIS — Z00.00 ENCOUNTER FOR ANNUAL HEALTH EXAMINATION: Primary | ICD-10-CM

## 2022-04-20 DIAGNOSIS — Z23 NEED FOR PNEUMOCOCCAL VACCINATION: ICD-10-CM

## 2022-04-20 DIAGNOSIS — E78.2 MIXED HYPERLIPIDEMIA: ICD-10-CM

## 2022-04-20 DIAGNOSIS — G43.719 INTRACTABLE CHRONIC MIGRAINE WITHOUT AURA AND WITHOUT STATUS MIGRAINOSUS: ICD-10-CM

## 2022-04-20 DIAGNOSIS — A05.9 FOOD POISONING: ICD-10-CM

## 2022-04-20 DIAGNOSIS — I10 ESSENTIAL HYPERTENSION: ICD-10-CM

## 2022-04-20 DIAGNOSIS — Z11.1 VISIT FOR TB SKIN TEST: ICD-10-CM

## 2022-04-20 DIAGNOSIS — E66.01 MORBID OBESITY WITH BMI OF 45.0-49.9, ADULT (HCC): ICD-10-CM

## 2022-04-20 DIAGNOSIS — Z71.89 ACP (ADVANCE CARE PLANNING): ICD-10-CM

## 2022-04-20 PROCEDURE — 90471 IMMUNIZATION ADMIN: CPT | Performed by: FAMILY MEDICINE

## 2022-04-20 PROCEDURE — 99396 PREV VISIT EST AGE 40-64: CPT | Performed by: FAMILY MEDICINE

## 2022-04-20 PROCEDURE — 90732 PPSV23 VACC 2 YRS+ SUBQ/IM: CPT | Performed by: FAMILY MEDICINE

## 2022-04-20 PROCEDURE — 86580 TB INTRADERMAL TEST: CPT | Performed by: FAMILY MEDICINE

## 2022-04-20 RX ORDER — CIPROFLOXACIN 250 MG/1
250 TABLET, FILM COATED ORAL 2 TIMES DAILY
Qty: 10 TABLET | Refills: 0 | Status: SHIPPED | OUTPATIENT
Start: 2022-04-20 | End: 2022-04-25

## 2022-04-20 RX ORDER — TOPIRAMATE 100 MG/1
TABLET, FILM COATED ORAL
Qty: 180 TABLET | Refills: 2 | Status: SHIPPED | OUTPATIENT
Start: 2022-04-20

## 2022-04-20 ASSESSMENT — ENCOUNTER SYMPTOMS
NAUSEA: 1
SHORTNESS OF BREATH: 0
ABDOMINAL PAIN: 1
DIARRHEA: 1
RESPIRATORY NEGATIVE: 1

## 2022-04-20 NOTE — PROGRESS NOTES
[facility] [address]  [place] [id number]  Dept: [phone]     Well Adult Note  Name: Sujit Dee Date: 2022   MRN: [id number] Sex: Female   Age: 49 y.o. Ethnicity: Non- / Non    : 1970 Race: White (non-)      Marnie Man is here for well adult exam.  History:    FOOD POISONING  Reporting eating a very old taco meat a week ago patient had nausea and vomiting and still complains of some abdominal pain and bloating. No fever chills tolerating food well. Still has some mild loose stools. MIGRAINE  Patient was taking some Topamax and was discontinued inadvertently by pain management. We will go ahead and continue this prescription for her today patient was having some issues with sleeping since she is discontinued his medication. TB SKIN TEST  Patient wants to start a new job and requires a TB skin test which we will be doing today. Iqra is due for pneumonia vaccine. she   indication is age/risk  she  denies side effects from prior immunizations. Advance Care Planning   The patient has the following advanced directives on file:  Advance Directives     Power of [address] Will ACP-Advance Directive ACP-Power of     Not on File Not on File Not on File Not on File          The patient has appointed the following active healthcare agents:    Primary Decision Maker: Stefan Goodrich - [place] - [phone]    Secondary Decision Maker: Edgardo Muller - [phone]    Supplemental (Other) Decision Maker: Maddie Cai - [phone]    The Patient has the following current code status:    Code Status: Prior    Visit Documentation:  I discussed [address] with Marnie Man today which included the importance of making their choices for care and treatment in the case of a health event that adversely affects their decision-making abilities. She has not completed the Advance Care Directives. She does not have an active health care agent at this time. Marcel Lesches was encouraged to complete the declaration forms and provide a signed copy of her medical records. She was referred to acp to assist with completion. I advised patient we would continue this discussion at future visits. BAR Oliva CNP  4/20/2022       Review of Systems   Constitutional: Positive for activity change. Negative for chills and fever. HENT: Negative. Respiratory: Negative. Negative for shortness of breath. Cardiovascular: Negative. Negative for chest pain. Gastrointestinal: Positive for abdominal pain, diarrhea and nausea. Endocrine: Negative. Musculoskeletal: Negative for arthralgias. Neurological: Positive for headaches. Psychiatric/Behavioral: Positive for sleep disturbance. Negative for suicidal ideas. The patient is nervous/anxious. Allergies   Allergen Reactions    Adhesive Tape Other (See Comments)     blisters    Imitrex [Sumatriptan] Other (See Comments)     \"feels like head is going to explode    Morphine Itching     hives    Seasonal          Prior to Visit Medications    Medication Sig Taking?  Authorizing Provider   ciprofloxacin (CIPRO) 250 MG tablet Take 1 tablet by mouth 2 times daily for 5 days Yes BAR Oliva CNP   topiramate (TOPAMAX) 100 MG tablet Take 2 at night Yes BAR Oliva CNP   Probiotic Acidophilus (FLORANEX) TABS Take 1 tablet by mouth 2 times daily Yes BAR Oliva CNP   sodium chloride (ALTAMIST SPRAY) 0.65 % nasal spray 1 spray by Nasal route as needed for Congestion Yes BAR Oliva CNP   Dulaglutide (TRULICITY) 4.5 TN/7.5QX SOPN Inject 4.5 mg into the skin once a week Yes BAR Oliva CNP   buPROPion (WELLBUTRIN XL) 150 MG extended release tablet Take 1 tablet by mouth every morning Yes BAR Oliva CNP fluticasone (FLONASE) 50 MCG/ACT nasal spray 2 sprays by Nasal route daily Yes BAR Spencer CNP   oxyCODONE-acetaminophen (PERCOCET) 5-325 MG per tablet Take 1 tablet by mouth every 6 hours as needed for Pain for up to 30 days. Yes BAR Bradshaw CNP   tiZANidine (ZANAFLEX) 4 MG tablet TAKE 1 TABLET EVERY 8 HOURS AS NEEDED FOR SPASMS Yes BAR Pearce CNP   pregabalin (LYRICA) 75 MG capsule TAKE 1 CAPSULE FOUR TIMES A DAY Yes BAR Pearce CNP   Cream Base (SALT STABLE LS ADVANCED) CREA  Yes Historical Provider, MD   cetirizine (ZYRTEC) 10 MG tablet Take 10 mg by mouth daily Yes Historical Provider, MD   dicyclomine (BENTYL) 10 MG capsule Take 1 capsule by mouth 4 times daily Yes BAR Oliva CNP   Lancets (ONETOUCH DELICA PLUS [id number]) MISC USE 1 EACH TWICE A DAY Yes BAR Oliva CNP   ONETOUCH VERIO strip USE 1 STRIP DAILY AS NEEDED Yes BAR Oliva CNP   montelukast (SINGULAIR) 10 MG tablet TAKE 1 TABLET DAILY Yes BAR Oliva CNP   busPIRone (BUSPAR) 10 MG tablet TAKE 2 TABLETS THREE TIMES A DAY Yes BAR Oliva CNP   ibuprofen (ADVIL;MOTRIN) 600 MG tablet TAKE 1 TABLET BY MOUTH EVERY 6 HOURS Yes Historical Provider, MD   STOOL SOFTENER/LAXATIVE 50-8.6 MG per tablet TAKE 2 TABLETS BY MOUTH TWICE DAILY Yes Historical Provider, MD   ipratropium (ATROVENT) 0.06 % nasal spray USE 2 SPRAY(S) IN EACH NOSTRIL ONCE DAILY Yes Historical Provider, MD   atorvastatin (LIPITOR) 20 MG tablet TAKE 1 TABLET DAILY Yes BAR Oliva CNP   Diclofenac Sodium POWD Formula #5: diclo3%+gaba6%+lido2%+prilo2%. Apply 1-2 gm topically to affected area TID-QID.  Yes Kaylie Jones DPM   omeprazole (PRILOSEC) 40 MG delayed release capsule TAKE 1 CAPSULE DAILY Yes BAR Oliva CNP   lisinopril (PRINIVIL;ZESTRIL) 5 MG tablet TAKE 1 TABLET DAILY Yes BAR Oliva CNP   trospium (SANCTURA) 20 MG tablet Take 1 tablet by mouth daily Yes Historical Provider, MD   sertraline (ZOLOFT) 100 MG tablet TAKE 1 TABLET DAILY Yes BAR Oliva CNP   estradiol (ESTRACE) 0.1 MG/GM vaginal cream  Yes Historical Provider, MD   Blood Pressure Monitor KIT Use as directed.  Yes BAR Oliva CNP   albuterol sulfate (PROAIR RESPICLICK) 874 (90 Base) MCG/ACT aerosol powder inhalation Inhale 2 puffs into the lungs every 4 hours as needed for Wheezing or Shortness of Breath Yes Abelardo Tejeda MD   docusate sodium (COLACE) 100 MG capsule Take 1 capsule by mouth daily as needed for Constipation Yes BAR Lynch CNP   Elastic Bandages & Supports (LUMBAR BACK BRACE/SUPPORT PAD) MISC 1 each by Does not apply route daily as needed (pain) Yes Rosa Steen MD         Past Medical History:   Diagnosis Date    Anxiety     Asthma     Colon polyp 10/31/2016    sessile serated adenoma x2    Depression     Diabetes mellitus ([address]     Diverticulitis 10/2016    Gastritis     GERD (gastroesophageal reflux disease)     Hemorrhoids     int/ext    Hypertension     Migraines     Osteoarthritis     Shingles 1-22-16    Tubular adenoma 10/31/2016    Urinary leakage        Past Surgical History:   Procedure Laterality Date    ABDOMINAL ADHESION SURGERY  07/08/2021    APPENDECTOMY  07/08/2021    CERVICAL DISCECTOMY  12/2013    & fusion     CHOLECYSTECTOMY      COLONOSCOPY  10/31/2016    int/ext hemorrhoids; sessile serated adenomax2; tubular adenoma    COLONOSCOPY N/A 10/22/2019    COLONOSCOPY POLYPECTOMY SNARE/COLD BIOPSY AND RANDOM BIOPSIES performed by Keiko Kaminski MD at [place] [address]  03/04/2021    CYSTOSCOPY HYDRODISTENTION WITH DMSO AND UREAPLASMA , MYCOPLASMA CULTURES    CYSTOSCOPY N/A 3/4/2021    CYSTOSCOPY HYDRODISTENTION WITH DMSO AND UREAPLASMA , MYCOPLASMA CULTURES performed by Romaine Lindsey DO at [address] [place] [address]  HAND SURGERY Right     thumb surgery, BONE REMOVED    HYSTERECTOMY      LAPAROSCOPY      AR DEST,PARAVERTEBRAL,L/S,ADDL LVLS  3/25/2019         TONSILLECTOMY      TUBAL LIGATION      UPPER GASTROINTESTINAL ENDOSCOPY  10/31/2016    gastritis    UPPER GASTROINTESTINAL ENDOSCOPY N/A 10/22/2019    EGD BIOPSY performed by Roni Gutiérrez MD at [facility]         Family History   Problem Relation Age of Onset    Cancer Mother     Heart Disease Father     Diabetes Father     High Blood Pressure Father     Other Other         Celiac Sprue. Aunt       Social History     Tobacco Use    Smoking status: Never Smoker    Smokeless tobacco: Never Used   Vaping Use    Vaping Use: Never used   Substance Use Topics    Alcohol use: No    Drug use: No       Objective   /70   Pulse 80   Temp 97.3 °F (36.3 °C)   Ht 5' (1.524 m)   Wt 221 lb (100.2 kg)   SpO2 97%   BMI 43.16 kg/m²   Wt Readings from Last 3 Encounters:   04/20/22 221 lb (100.2 kg)   04/07/22 226 lb 9.6 oz (102.8 kg)   03/30/22 215 lb (97.5 kg)     There were no vitals filed for this visit. Physical Exam  Vitals and nursing note reviewed. Constitutional:       Appearance: Normal appearance. HENT:      Head: Normocephalic. Nose: Nose normal.   Cardiovascular:      Rate and Rhythm: Normal rate and regular rhythm. Pulmonary:      Effort: Pulmonary effort is normal.      Breath sounds: Normal breath sounds. Abdominal:      General: Abdomen is protuberant. Bowel sounds are increased. Comments: obese   Skin:     General: Skin is warm and dry. Neurological:      Mental Status: She is alert and oriented to person, place, and time. Psychiatric:         Mood and Affect: Mood is anxious. Assessment   Plan   1. Encounter for annual health examination  2. Food poisoning  Worsening  DISCUSSED and ADVISED TO:  Drink plenty of fluids, enough until urine is light yellow or clear like water.    Choose water and other caffeine-free clear liquids. If you do not feel like eating or drinking, try taking small sips of water, sports drinks, or other rehydration drinks. Get plenty of rest.  Go to the Emergency Room if you are not able to tolerate any food or water. -     ciprofloxacin (CIPRO) 250 MG tablet; Take 1 tablet by mouth 2 times daily for 5 days, Disp-10 tablet, R-0Normal  3. Essential hypertension  Stable  Continue current therapy. DISCUSSED AND ADVISED TO:  Cut down on your salt intake. Cut down on caffeinated drinks, sports drinks. Instructed to check BP at home regularly. Report for any chest pains, shortness of breath, headaches, and lightheadedness. Call the office if your blood pressure continue to be higher than 140/90 or 90/50.      4. Mixed hyperlipidemia  Stable  Continue therapy. Advised to decrease the consumption of red meats, fried foods, trans fats, sweets, sugary beverages. Advised to increase fish, vegetables, and fruits consumption. Advised to add fiber or OTC supplements in diet. Discussed weight loss which will result in improvement of lipids levels. Advised to increase daily physical activities and add regular exercises. 5. Intractable chronic migraine without aura and without status migrainosus  Stable  Take medications as discussed. DISCUSSED AND ADVISED TO:  Find ways to manage stress. Rest well, cut down on caffeine, and alcohol intake  Report for increasing incidences and worsening symptoms. -     topiramate (TOPAMAX) 100 MG tablet; Take 2 at night, Disp-180 tablet, R-2Normal  6. Need for pneumococcal vaccination  Recommended by CDC. No current infection. Denies previous adverse reaction to vaccination.      -     Pneumococcal polysaccharide vaccine 23-valent greater than or equal to 3yo subcutaneous/IM  7. ACP (advance care planning)  Stable  Consult [facility] Referral to ACP Clinical Specialist  8. Visit for TB skin test  Stable  Recommended by CDC.   No current infection. Denies previous adverse reaction to vaccination.      -     Mantoux testing   9. Morbid obesity with BMI of 45.0-49.9, adult ([address]  Worsening  BMI increasing  DISCUSSED AND ADVISED TO:  Eat a low-fat and low carbohydrates diet. Avoid fried foods especially fast food. Choose healthier options for snacks. Have 5-6 servings of fruits and vegetables per day. Cut down on eating processed food. Add 30 minutes to 1 hour aerobic exercise for 3-4 days a week.           Personalized Preventive Plan   Current Health Maintenance Status  Immunization History   Administered Date(s) Administered    COVID-19, Pfizer Purple top, DILUTE for use, 12+ yrs, 30mcg/0.3mL dose 04/09/2021, 04/30/2021, 12/20/2021    Hepatitis B Adult (Engerix-B) 10/08/2020, 11/13/2020, 04/19/2021    Influenza Virus Vaccine 10/31/2013, 11/03/2014, 10/22/2015    Influenza, MDCK Quadv, IM, PF (Flucelvax 2 yrs and older) 09/28/2021    Influenza, Dauphin Blight, 6-35 Months, IM (Fluzone,Afluria) 10/20/2017    Influenza, Dauphin Blight, IM, PF (6 mo and older Fluzone, Flulaval, Fluarix, and 3 yrs and older Afluria) 09/18/2018, 10/03/2019, 10/08/2020    PPD Test 04/20/2022    Pneumococcal Polysaccharide ([id number]) 02/26/2019, 04/20/2022    Tdap (Boostrix, Adacel) 01/11/2018    Zoster Recombinant (Shingrix) 11/03/2021, 02/01/2022        Health Maintenance   Topic Date Due    Diabetic retinal exam  Never done    Diabetic foot exam  10/03/2020    Potassium  07/18/2022    Creatinine  07/18/2022    Diabetic microalbuminuria test  09/30/2022    Lipids  09/30/2022    Colorectal Cancer Screen  10/22/2022    A1C test (Diabetic or Prediabetic)  04/07/2023    Depression Monitoring  04/07/2023    Pneumococcal 0-64 years Vaccine (2 - PCV) 04/20/2023    Breast cancer screen  11/23/2023    DTaP/Tdap/Td vaccine (2 - Td or Tdap) 01/11/2028    Hepatitis B vaccine  Completed    Flu vaccine  Completed    Shingles Vaccine  Completed    COVID-19 Vaccine may be present. Although every effort was made to ensure accuracy, no guarantees can be provided that every mistake has been identified and corrected by editing.   Electronically signed by BAR Lance CNP on 4/20/22 at 9:56 AM MAKENNAT

## 2022-04-20 NOTE — PROGRESS NOTES
Visit Information    Have you changed or started any medications since your last visit including any over-the-counter medicines, vitamins, or herbal medicines? no   Are you having any side effects from any of your medications? -  no  Have you stopped taking any of your medications? Is so, why? -  no    Have you seen any other physician or provider since your last visit? No  Have you had any other diagnostic tests since your last visit? No  Have you been seen in the emergency room and/or had an admission to a hospital since we last saw you? No  Have you had your routine dental cleaning in the past 6 months? yes    Have you activated your [employer] account? If not, what are your barriers?  Yes     Patient Care Team:  BAR Oliva CNP as PCP - General (Family Medicine)  BAR Lantigua CNP as PCP - [facility] as Orthopedic Surgeon (Orthopedic Surgery)  Eveline Gonzalez MD as Consulting Physician (Gastroenterology)    Medical History Review  Past Medical, Family, and Social History reviewed and does contribute to the patient presenting condition    Health Maintenance   Topic Date Due    Diabetic retinal exam  Never done    Pneumococcal 0-64 years Vaccine (2 - PCV) 02/26/2020    Diabetic foot exam  10/03/2020    Potassium  07/18/2022    Creatinine  07/18/2022    Diabetic microalbuminuria test  09/30/2022    Lipids  09/30/2022    Colorectal Cancer Screen  10/22/2022    A1C test (Diabetic or Prediabetic)  04/07/2023    Depression Monitoring  04/07/2023    Breast cancer screen  11/23/2023    DTaP/Tdap/Td vaccine (2 - Td or Tdap) 01/11/2028    Hepatitis B vaccine  Completed    Flu vaccine  Completed    Shingles Vaccine  Completed    COVID-19 Vaccine  Completed    Hepatitis C screen  Completed    HIV screen  Completed    Hepatitis A vaccine  Aged Out    Hib vaccine  Aged Out    Meningococcal (ACWY) vaccine  Aged Out

## 2022-04-20 NOTE — PATIENT INSTRUCTIONS
Advance Directives: Care Instructions  Overview  An advance directive is a legal way to state your wishes at the end of your life. It tells your family and your doctor what to do if you can't say what youwant. There are two main types of advance directives. You can change them any timeyour wishes change. Living will. This form tells your family and your doctor your wishes about life support and other treatment. The form is also called a declaration. Medical power of . This form lets you name a person to make treatment decisions for you when you can't speak for yourself. This person is called a health care agent (health care proxy, health care surrogate). The form is also called a durable power of  for health care. If you do not have an advance directive, decisions about your medical care maybe made by a family member, or by a doctor or a  who doesn't know you. It may help to think of an advance directive as a gift to the people who carefor you. If you have one, they won't have to make tough decisions by themselves. Follow-up care is a key part of your treatment and safety. Be sure to make and go to all appointments, and call your doctor if you are having problems. It's also a good idea to know your test results and keep alist of the medicines you take. What should you include in an advance directive? Many states have a unique advance directive form. (It may ask you to address specific issues.) Or you might use a universal form that's approved by manystates. If your form doesn't tell you what to address, it may be hard to know what to include in your advance directive. Use the questions below to help you getstarted.  Who do you want to make decisions about your medical care if you are not able to?  What life-support measures do you want if you have a serious illness that gets worse over time or can't be cured?  What are you most afraid of that might happen?  (Maybe you're afraid of having pain, losing your independence, or being kept alive by machines.)   Where would you prefer to die? (Your home? A hospital? A nursing home?)   Do you want to donate your organs when you die?  Do you want certain Orthodoxy practices performed before you die? When should you call for help? Be sure to contact your doctor if you have any questions. Where can you learn more? Go to [email or web address]://[email or web address] org and sign in to your AMCAD account. Enter R264 in the [employer] box to learn more about \"Advance Directives: Care Instructions. \"     If you do not have an account, please click on the \"Sign Up Now\" link. Current as of: October 18, 2021               Content Version: 13.2  © 2006-2022 [employer]. Care instructions adapted under license by [facility] ([facility]). If you have questions about a medical condition or this instruction, always ask your healthcare professional. [place] [address] any warranty or liability for your use of this information. Learning About Medical Power of   What is a medical power of ? A medical power of , also called a durable power of  for health care, is one type of the legal forms called advance directives. It lets you name the person you want to make treatment decisions for you if you can't speak or decide for yourself. The person you choose is called your health care agent. This person is also called a health care proxy or health care surrogate. A medical power of  may be called something else in your state. How do you choose a health care agent? Choose your health care agent carefully. This person may or may not be a familymember. Talk to the person before you make your final decision. Make sure he or she iscomfortable with this responsibility. It's a good idea to choose someone who:   Is at least 25years old.    Knows you well and understands what makes life meaningful for you.  Understands your Religion and moral values.  Will do what you want, not what he or she wants.  Will be able to make difficult choices at a stressful time.  Will be able to refuse or stop treatment, if that is what you would want, even if you could die.  Will be firm and confident with health professionals if needed.  Will ask questions to get needed information.  Lives near you or agrees to travel to you if needed. Your family may help you make medical decisions while you can still be part of that process. But it's important to choose one person to be your health careagent in case you aren't able to make decisions for yourself. If you don't fill out the legal form and name a health care agent, thedecisions your family can make may be limited. A health care agent may be called something else in your state. Who will make decisions for you if you don't have a health care agent? If you don't have a health care agent or a living will, you may not get the care you want. Decisions may be made by family members who disagree about your medical care. Or decisions may be made by a medical professional who doesn'tknow you well. In some cases, a  makes the decisions. When you name a health care agent, it is very clear who has the power to [place] decisions for you. How do you name a health care agent? You name your health care agent on a legal form. This form is usually called a medical power of . Ask your hospital, state bar association, or officeon aging where to find these forms. You must sign the form to make it legal. Some states require you to get the form notarized. This means that a person called a  watches you sign the form and then he or she signs the form. Some states also require thattwo or more witnesses sign the form. Be sure to tell your family members and doctors who your health care agent is. Where can you learn more?   Go to [email or web address] org and sign in to your AboutMyStar account. Enter [id number] in the [facility] box to learn more about \"Learning About [address]. \"     If you do not have an account, please click on the \"Sign Up Now\" link. Current as of: October 18, 2021               Content Version: 13.2  © 6267-2542 Healthwise, [employer]. Care instructions adapted under license by [facility] ([facility]). If you have questions about a medical condition or this instruction, always ask your healthcare professional. [place]ace [place]es any warranty or liability for your use of this information. Learning About Living [place]  What is a living will? A living will, also called a declaration, is a legal form. It tells your family and your doctor your wishes when you can't speak for yourself. It's used by the health professionals who will treat you as you near the end of your life or ifyou get seriously hurt or ill. If you put your wishes in writing, your loved ones and others will know what kind of care you want. They won't need to guess. This can ease your mind and behelpful to others. And you can change or cancel your living will at any time. A living will is not the same as an estate or property will. An estate willexplains what you want to happen with your money and property after you die. How do you use it? Keep these facts in mind about how a living will is used.  Your living will is used only if you can't speak or make decisions for yourself. Most often, one or more doctors must certify that you can't speak or decide for yourself before your living will takes effect.  If you get better and can speak for yourself again, you can accept or refuse any treatment. It doesn't matter what you said in your living will.  Some states may limit your right to refuse treatment in certain cases.  For example, you may need to clearly state in your living will that you don't want artificial hydration and nutrition, such as being fed through a tube. Is a living will a legal document? A living will is a legal document. Each state has its own laws about livingwills. And a living will may be called something else in your state. Here are some things to know about living [place].  You don't need an  to complete a living will. But legal advice can be helpful if your state's laws are unclear. It can also help if your health history is complicated or your family can't agree on what should be in your living will.  You can change your living will at any time. Some people find that their wishes about end-of-life care change as their health changes. If you make big changes to your living will, complete a new form.  If you move to another state, make sure that your living will is legal in the state where you now live. In most cases, doctors will respect your wishes even if you have a form from a different state.  You might use a universal form that has been approved by many states. This kind of form can sometimes be filled out and stored online. Your digital copy will then be available wherever you have a connection to the internet. The doctors and nurses who need to treat you can find it right away.  Your state may offer an online registry. This is another place where you can store your living will online.  It's a good idea to get your living will notarized. This means using a person called a  to watch two people sign, or witness, your living will. What should you know when you create a living will? Here are some questions to ask yourself as you make your living will.  Do you know enough about life support methods that might be used? If not, talk to your doctor so you know what might be done if you can't breathe on your own, your heart stops, or you can't swallow.  What things would you still want to be able to do after you receive life-support methods?  Would you want to be able to walk? To speak? To eat on your own? To live without the help of machines?  Do you want certain Episcopalian practices performed if you become very ill?  If you have a choice, where do you want to be cared for? In your home? At a hospital or nursing home?  If you have a choice at the end of your life, where would you prefer to die? At home? In a hospital or nursing home? Somewhere else?  Would you prefer to be buried or cremated?  Do you want your organs to be donated after you die? What should you do with your living will?  Make sure that your family members and your health care agent have copies of your living will (also called a declaration).  Give your doctor a copy of your living will. Ask to have it kept as part of your medical record. If you have more than one doctor, make sure that each one has a copy.  Put a copy of your living will where it can be easily found. For example, some people may put a copy on their refrigerator door. If you are using a digital copy, be sure your doctor, family members, and health care agent know how to find and access it. Where can you learn more? Go to https://[email or web address].[email or web address] org and sign in to your [employer] account. Enter N323 in the [employer] box to learn more about \"Learning About Living Callie Eder. \"     If you do not have an account, please click on the \"Sign Up Now\" link. Current as of: October 18, 2021               Content Version: 13.2  © 2006-2022 Healthwise, Incorporated. Care instructions adapted under license by [facility] ([facility]). If you have questions about a medical condition or this instruction, always ask your healthcare professional. [place] [address] any warranty or liability for your use of this information. Body Mass Index: Care Instructions  Your Care Instructions     Body mass index (BMI) can help you see if your weight is raising your risk for health problems.  It uses a formula to compare how much you weigh with how tallyou are.  A BMI lower than 18.5 is considered underweight.  A BMI between 18.5 and 24.9 is considered healthy.  A BMI between 25 and 29.9 is considered overweight. A BMI of 30 or higher is considered obese. If your BMI is in the normal range, it means that you have a lower risk for weight-related health problems. If your BMI is in the overweight or obese range, you may be at increased risk for weight-related health problems, such as high blood pressure, heart disease, stroke, arthritis or joint pain, and diabetes. If your BMI is in the underweight range, you may be at increased risk for health problems such as fatigue, lower protection (immunity) againstillness, muscle loss, bone loss, hair loss, and hormone problems. BMI is just one measure of your risk for weight-related health problems. You may be at higher risk for health problems if you are not active, you eat anunhealthy diet, or you drink too much alcohol or use tobacco products. Follow-up care is a key part of your treatment and safety. Be sure to make and go to all appointments, and call your doctor if you are having problems. It's also a good idea to know your test results and keep alist of the medicines you take. How can you care for yourself at home?  Practice healthy eating habits. This includes eating plenty of fruits, vegetables, whole grains, lean protein, and low-fat dairy.  If your doctor recommends it, get more exercise. Walking is a good choice. Bit by bit, increase the amount you walk every day. Try for at least 30 minutes on most days of the week.  Do not smoke. Smoking can increase your risk for health problems. If you need help quitting, talk to your doctor about stop-smoking programs and medicines. These can increase your chances of quitting for good.  Limit alcohol to 2 drinks a day for men and 1 drink a day for women. Too much alcohol can cause health problems.   If you have a BMI higher than 25   Your doctor may do other tests to check your risk for weight-related health problems. This may include measuring the distance around your waist. A waist measurement of more than 40 inches in men or 35 inches in women can increase the risk of weight-related health problems.  Talk with your doctor about steps you can take to stay healthy or improve your health. You may need to make lifestyle changes to lose weight and stay healthy, such as changing your diet and getting regular exercise. If you have a BMI lower than 18.5   Your doctor may do other tests to check your risk for health problems.  Talk with your doctor about steps you can take to stay healthy or improve your health. You may need to make lifestyle changes to gain or maintain weight and stay healthy, such as getting more healthy foods in your diet and doing exercises to build muscle. Where can you learn more? Go to [email or web address] [email or web address] org and sign in to your Ravenflow account. Enter S176 in the [employer] box to learn more about \"Body Mass Index: Care Instructions. \"     If you do not have an account, please click on the \"Sign Up Now\" link. Current as of: December 27, 2021               Content Version: 13.2  © 2006-2022 Healthwise, [employer]. Care instructions adapted under license by [facility] [address] If you have questions about a medical condition or this instruction, always ask your healthcare professional. [place] [address] any warranty or liability for your use of this information. Learning About Healthy Weight  What is a healthy weight? A healthy weight is the weight at which you feel good about yourself and have energy for work and play. It's also one that lowers your risk for healthproblems. What can you do to stay at a healthy weight?   It can be hard to stay at a healthy weight, especially when fast food, vending-machine snacks, and processed foods are so easy to find. And with your busy lifestyle, activity may be low on your list of things to do. But stayingat a healthy weight may be easier than you think. Here are some dos and don'ts for staying at a healthy weight. Do eat healthy foods  The kinds of foods you eat have a big impact on both your weight and your health. Reaching and staying at a healthy weight is not about going on a diet. It's about making healthier food choices every day and changing your diet forgood. Healthy eating means eating a variety of foods so that you get all the nutrients you need. Your body needs protein, carbohydrate, and fats for energy. They keep your heart beating, your brain active, and your muscles working. On most days, try to eat from each food group. This means eating a variety of:   Whole grains, such as whole wheat breads and pastas.  Fruits and vegetables.  Dairy products, such as low-fat milk, yogurt, and cheese.  Lean proteins, such as all types of fish, chicken without the skin, and beans. Don't have too much or too little of one thing. All foods, if eaten inmoderation, can be part of healthy eating. Even sweets can be okay. If your favorite foods are high in fat, salt, sugar, or calories, limit howoften you eat them. Eat smaller servings, or look for healthy substitutes. Do watch what you eat  Many people eat more than their bodies need. Part of staying at a healthy weight means learning how much food you really need from day to day and not eating more than that. Even with healthy foods, eating too much can make yougain weight. Having a well-balanced diet means that you eat enough, but not too much, and that your food gives you the nutrients you need to stay healthy. So listen toyour body. Eat when you're hungry. Stop when you feel satisfied. It's a good idea to have healthy snacks ready for when you get hungry. Keep healthy snacks with you at work, in your car, and at home.  If you have a healthy snack easily available, you'll be less likely to pick a candy bar orbag of chips from a vending machine instead. Some healthy snacks you might want to keep on hand are fruit, low-fat yogurt, string cheese, low-fat microwave popcorn, raisins and other dried fruit, nuts,whole wheat crackers, pretzels, carrots, celery sticks, and broccoli. Do some physical activity  A big part of reaching and staying at a healthy weight is being active. When you're active, you burn calories. This makes it easier to reach and stay at a healthy weight. When you're active on a regular basis, your body burns more calories, even when you're at rest. Being active helps you lose fat andbuild lean muscle. Try to be active for at least 1 hour every day. This may sound like a lot, but it's okay to be active in smaller blocks of time that add up to 1 hour a day. Any activity that makes your heart beat faster and keeps it there for a while counts. A brisk walk, run, or swim will get your heart beating faster. So will climbing stairs, shooting baskets, or cycling. Even some household chores likevacuuming and mowing the lawn will get your heart rate up. Pick activities that you enjoy--ones that make your heart beat faster, your muscles stronger, and your muscles and joints more flexible. If you find more than one thing you like doing, do them all. You don't have to do the same thingevery day. Don't diet  Diets don't work. Diets are temporary. Because you give up so much when you diet, you may be hungry and think about food all the time. And after you stop dieting, you also may overeat to make up for what you missed. Most people who diet end up gainingback the pounds they lost--and more. Remember that healthy bodies come in lots of shapes and sizes. Everyone can gethealthier by eating better and being more active. Where can you learn more? Go to https://chemo.healthN4MD. org and sign in to your [employer] account.  Enter 404 [id number] in the Search Health Information box to learn more about \"Learning About Healthy Weight. \"     If you do not have an account, please click on the \"Sign Up Now\" link. Current as of: December 27, 2021               Content Version: 13.2  © 7477-7760 Healthwise, [employer]. Care instructions adapted under license by [facility] ([facility]). If you have questions about a medical condition or this instruction, always ask your healthcare professional. [address] any warranty or liability for your use of this information. Eating Healthy Foods: Care Instructions  Your Care Instructions     Eating healthy foods can help lower your risk for disease. Healthy food gives you energy and keeps your heart strong, your brain active, your musclesworking, and your bones strong. A healthy diet includes a variety of foods from the basic food groups: grains, vegetables, fruits, milk and milk products, and meat and beans. Some people may eat more of their favorite foods from only one food group and, as a result, miss getting the nutrients they need. So, it is important to pay attention not only to what you eat but also to what you are missing from your diet. You caneat a healthy, balanced diet by making a few small changes. Follow-up care is a key part of your treatment and safety. Be sure to make and go to all appointments, and call your doctor if you are having problems. It's also a good idea to know your test results and keep alist of the medicines you take. How can you care for yourself at home? Look at what you eat   Keep a food diary for a week or two and record everything you eat or drink. Track the number of servings you eat from each food group.  For a balanced diet every day, eat a variety of:  ? 6 or more ounce-equivalents of grains, such as cereals, breads, crackers, rice, or pasta, every day.  An ounce-equivalent is 1 slice of bread, 1 cup of ready-to-eat cereal, or ½ cup of cooked rice, cooked pasta, or cooked cereal.  ? 2½ cups of vegetables, especially:  - Dark-green vegetables such as broccoli and spinach.  - Orange vegetables such as carrots and sweet potatoes. - Dry beans (such as shelton and kidney beans) and peas (such as lentils). ? 2 cups of fresh, frozen, or canned fruit. A small apple or 1 banana or orange equals 1 cup. ? 3 cups of nonfat or low-fat milk, yogurt, or other milk products. ? 5½ ounces of meat and beans, such as chicken, fish, lean meat, beans, nuts, and seeds. One egg, 1 tablespoon of peanut butter, ½ ounce nuts or seeds, or ¼ cup of cooked beans equals 1 ounce of meat.  Learn how to read food labels for serving sizes and ingredients. Fast-food and convenience-food meals often contain few or no fruits or vegetables. Make sure you eat some fruits and vegetables to make the meal more nutritious.  Look at your food diary. For each food group, add up what you have eaten and then divide the total by the number of days. This will give you an idea of how much you are eating from each food group. See if you can find some ways to change your diet to make it more healthy. Start small   Do not try to make dramatic changes to your diet all at once. You might feel that you are missing out on your favorite foods and then be more likely to fail.  Start slowly, and gradually change your habits. Try some of the following:  ? Use whole wheat bread instead of white bread. ? Use nonfat or low-fat milk instead of whole milk. ? Eat brown rice instead of white rice, and eat whole wheat pasta instead of white-flour pasta. ? Try low-fat cheeses and low-fat yogurt. ? Add more fruits and vegetables to meals and have them for snacks. ? Add lettuce, tomato, cucumber, and onion to sandwiches. ? Add fruit to yogurt and cereal.  Enjoy food   You can still eat your favorite foods. You just may need to eat less of them.  If your favorite foods are high in fat, salt, and sugar, limit how often you eat them, but do not cut them out entirely.  Eat a wide variety of foods. Make healthy choices when eating out   The type of restaurant you choose can help you make healthy choices. Even fast-food chains are now offering more low-fat or healthier choices on the menu.  Choose smaller portions, or take half of your meal home.  When eating out, try:  ? A veggie pizza with a whole wheat crust or grilled chicken (instead of sausage or pepperoni). ? Pasta with roasted vegetables, grilled chicken, or marinara sauce instead of cream sauce. ? A vegetable wrap or grilled chicken wrap. ? Broiled or poached food instead of fried or breaded items. Make healthy choices easy   Buy packaged, prewashed, ready-to-eat fresh vegetables and fruits, such as baby carrots, salad mixes, and chopped or shredded broccoli and cauliflower.  Buy packaged, presliced fruits, such as melon or pineapple.  Choose 100% fruit or vegetable juice instead of soda. Limit juice intake to 4 to 6 oz (½ to ¾ cup) a day.  Blend low-fat yogurt, fruit juice, and canned or frozen fruit to make a smoothie for breakfast or a snack. Where can you learn more? Go to https://[employer]. org and sign in to your [employer] account. Enter S478 in the [facility] box to learn more about \"Eating Healthy Foods: Care Instructions. \"     If you do not have an account, please click on the \"Sign Up Now\" link. Current as of: September 8, 2021               Content Version: 13.2  © 0939-1563 Healthwise, Incorporated. Care instructions adapted under license by [facility] ([facility]). If you have questions about a medical condition or this instruction, always ask your healthcare professional. [place] [address] any warranty or liability for your use of this information.            Abnormal Weight Gain: Care Instructions  Your Care Instructions     There are two types of weight gain--normal and abnormal. Normal weight gain is usually caused by eating too much or exercising too little. It can also happenas you get older. But abnormal weight gain has other causes. It can be caused by a problem with your thyroid gland, called hypothyroidism. Or it can be caused by a problem with your adrenal glands, called Cushing's syndrome. Or your body could be holding too much fluid because of kidney, liver, or heart problems. In somecases, a medicine you take can cause you to gain weight. You can work with your doctor to find out the cause of your weight gain. [place] probably need tests to do this. Follow-up care is a key part of your treatment and safety. Be sure to make and go to all appointments, and call your doctor if you are having problems. It's also a good idea to know your test results and keep alist of the medicines you take. How can you care for yourself at home?  Weigh yourself at the same time every day. It's best to do it first thing in the morning after you empty your bladder. Be sure to always wear the same amount of clothing.  Write down any changes in your weight and the possible causes. Discuss these with your doctor.  Your doctor may want you to change your diet and exercise habits. A good way to lose weight is to reduce calories and increase exercise.  Walking is an easy way to get exercise. Try to walk a little longer every day. You also may want to swim, bike, or do other activities.  Ask your doctor if you should see a dietitian. This is a person who can help you plan meals that work best for your lifestyle.  If your doctor prescribed medicines, take them exactly as prescribed. Call your doctor if you think you are having a problem with your medicine. You will get more details on the specific medicines your doctor prescribes. When should you call for help? Watch closely for changes in your health, and be sure to contact your doctor if:     You do not get better as expected.      You continue to gain weight. Where can you learn more? Go to https://chpepiceweb.healthVurbpartners. org and sign in to your [employer] account. Enter A175 in the [employer] box to learn more about \"Abnormal Weight Gain: Care Instructions. \"     If you do not have an account, please click on the \"Sign Up Now\" link. Current as of: December 27, 2021               Content Version: 13.2  © 2006-2022 [employer]. Care instructions adapted under license by [facility] ([facility]). If you have questions about a medical condition or this instruction, always ask your healthcare professional. [place] [address] any warranty or liability for your use of this information. Well Visit, Ages 25 to 48: Care Instructions  Overview     Well visits can help you stay healthy. Your doctor has checked your overall health and may have suggested ways to take good care of yourself. Your doctor also may have recommended tests. At home, you can help prevent illness withhealthy eating, regular exercise, and other steps. Follow-up care is a key part of your treatment and safety. Be sure to make and go to all appointments, and call your doctor if you are having problems. It's also a good idea to know your test results and keep alist of the medicines you take. How can you care for yourself at home?  Get screening tests that you and your doctor decide on. Screening helps find diseases before any symptoms appear.  Eat healthy foods. Choose fruits, vegetables, whole grains, protein, and low-fat dairy foods. Limit fat, especially saturated fat. Reduce salt in your diet.  Limit alcohol. If you are a man, have no more than 2 drinks a day or 14 drinks a week. If you are a woman, have no more than 1 drink a day or 7 drinks a week.  Get at least 30 minutes of physical activity on most days of the week. Walking is a good choice.  You also may want to do other activities, such as running, swimming, cycling, or playing tennis or team sports. Discuss any changes in your exercise program with your doctor.  Reach and stay at a healthy weight. This will lower your risk for many problems, such as obesity, diabetes, heart disease, and high blood pressure.  Do not smoke or allow others to smoke around you. If you need help quitting, talk to your doctor about stop-smoking programs and medicines. These can increase your chances of quitting for good.  Care for your mental health. It is easy to get weighed down by worry and stress. Learn strategies to manage stress, like deep breathing and mindfulness, and stay connected with your family and community. If you find you often feel sad or hopeless, talk with your doctor. Treatment can help.  Talk to your doctor about whether you have any risk factors for sexually transmitted infections (STIs). You can help prevent STIs if you wait to have sex with a new partner (or partners) until you've each been tested for STIs. It also helps if you use condoms (male or female condoms) and if you limit your sex partners to one person who only has sex with you. Vaccines are available for some STIs, such as HPV.  Use birth control if it's important to you to prevent pregnancy. Talk with your doctor about the choices available and what might be best for you.  If you think you may have a problem with alcohol or drug use, talk to your doctor. This includes prescription medicines (such as amphetamines and opioids) and illegal drugs (such as cocaine and methamphetamine). Your doctor can help you figure out what type of treatment is best for you.  Protect your skin from too much sun. When you're outdoors from 10 a.m. to 4 p.m., stay in the shade or cover up with clothing and a hat with a wide brim. Wear sunglasses that block UV rays. Even when it's cloudy, put broad-spectrum sunscreen (SPF 30 or higher) on any exposed skin.  See a dentist one or two times a year for checkups and to have your teeth cleaned.    Wear a serving of meat, for example, is about the size of a deck of cards. Fill the rest of your plate with vegetables and whole grains. ? Limit the amount of soda and sports drinks you have every day. Drink more water when you are thirsty. ? Eat plenty of fruits and vegetables every day. Have an apple or some carrot sticks as an afternoon snack instead of a candy bar. Try to have fruits and/or vegetables at every meal.   Make exercise part of your daily routine. You may want to start with simple activities, such as walking, bicycling, or slow swimming. Try to be active 30 to 60 minutes every day. You do not need to do all 30 to 60 minutes all at once. For example, you can exercise 3 times a day for 10 or 20 minutes. Moderate exercise is safe for most people, but it is always a good idea to talk to your doctor before starting an exercise program.   Keep moving. Navya Plough the lawn, work in the garden, or [employer]. Take the stairs instead of the elevator at work.  If you smoke, quit. People who smoke have an increased risk for heart attack, stroke, cancer, and other lung illnesses. Quitting is hard, but there are ways to boost your chance of quitting tobacco for good. ? Use nicotine gum, patches, or lozenges. ? Ask your doctor about stop-smoking programs and medicines. ? Keep trying. In addition to reducing your risk of diseases in the future, you will notice some benefits soon after you stop using tobacco. If you have shortness of breath or asthma symptoms, they will likely get better within a few weeks after you quit.  Limit how much alcohol you drink. Moderate amounts of alcohol (up to 2 drinks a day for men, 1 drink a day for women) are okay. But drinking too much can lead to liver problems, high blood pressure, and other health problems. Family health  If you have a family, there are many things you can do together to improve yourhealth.  Eat meals together as a family as often as possible.    Eat healthy foods. This includes fruits, vegetables, lean meats and dairy, and whole grains.  Include your family in your fitness plan. Most people think of activities such as jogging or tennis as the way to fitness, but there are many ways you and your family can be more active. Anything that makes you breathe hard and gets your heart pumping is exercise. Here are some tips:  ? Walk to do errands or to take your child to school or the bus.  ? Go for a family bike ride after dinner instead of watching TV. Where can you learn more? Go to https://Skig[employer]Internal Gaming.[employer]. org and sign in to your [email or web address] account. Enter W630 in the [employer] box to learn more about \"A Healthy Lifestyle: Care Instructions. \"     If you do not have an account, please click on the \"Sign Up Now\" link. Current as of: June 16, 2021               Content Version: 13.2  © 2006-2022 [employer]. Care instructions adapted under license by [facility] ([facility]). If you have questions about a medical condition or this instruction, always ask your healthcare professional. [place] [address] any warranty or liability for your use of this information. DASH Diet: After Your Visit  Your Care Instructions  The DASH diet is an eating plan that can help lower your blood pressure. DASH stands for Dietary Approaches to Stop Hypertension. Hypertension is high blood pressure. The DASH diet focuses on eating foods that are high in calcium, potassium, and magnesium. These nutrients can lower blood pressure. The foods that are highest in these nutrients are fruits, vegetables, low-fat dairy products, nuts, seeds, and legumes. But taking calcium, potassium, and magnesium supplements instead of eating foods that are high in those nutrients does not have the same effect. The DASH diet also includes whole grains, fish, and poultry.   The DASH diet is one of several lifestyle changes your doctor may recommend to lower your high blood pressure. Your doctor may also want you to decrease the amount of sodium in your diet. Lowering sodium while following the DASH diet can lower blood pressure even further than just the DASH diet alone. Follow-up care is a key part of your treatment and safety. Be sure to make and go to all appointments, and call your doctor if you are having problems. Its also a good idea to know your test results and keep a list of the medicines you take. How can you care for yourself at home? Following the DASH diet  · Eat 4 to 5 servings of fruit each day. A serving is 1 medium-sized piece of fruit, ½ cup chopped or canned fruit, 1/4 cup dried fruit, or 4 ounces (½ cup) of fruit juice. Choose fruit more often than fruit juice. · Eat 4 to 5 servings of vegetables each day. A serving is 1 cup of lettuce or raw leafy vegetables, ½ cup of chopped or cooked vegetables, or 4 ounces (½ cup) of vegetable juice. Choose vegetables more often than vegetable juice. · Get 2 to 3 servings of low-fat and fat-free dairy each day. A serving is 8 ounces of milk, 1 cup of yogurt, or 1 ½ ounces of cheese. · Eat 7 to 8 servings of grains each day. A serving is 1 slice of bread, 1 ounce of dry cereal, or ½ cup of cooked rice, pasta, or cooked cereal. Try to choose whole-grain products as much as possible. · Limit lean meat, poultry, and fish to 6 ounces each day. Six ounces is about the size of two decks of cards. · Eat 4 to 5 servings of nuts, seeds, and legumes (cooked dried beans, lentils, and split peas) each week. A serving is 1/3 cup of nuts, 2 tablespoons of seeds, or ½ cup cooked dried beans or peas. · Limit sweets and added sugars to 5 servings or less a week. A serving is 1 tablespoon jelly or jam, ½ cup sorbet, or 1 cup of lemonade. Tips for success  · Start small. Do not try to make dramatic changes to your diet all at once.  You might feel that you are missing out on your favorite foods and then be more likely to not follow the plan. Make small changes, and stick with them. Once those changes become habit, add a few more changes. · Try some of the following:  ¨ Make it a goal to eat a fruit or vegetable at every meal and at snacks. This will make it easy to get the recommended amount of fruits and vegetables each day. ¨ Try yogurt topped with fruit and nuts for a snack or healthy dessert. ¨ Add lettuce, tomato, cucumber, and onion to sandwiches. ¨ Combine a ready-made pizza crust with low-fat mozzarella cheese and lots of vegetable toppings. Try using tomatoes, squash, spinach, broccoli, carrots, cauliflower, and onions. ¨ Have a variety of cut-up vegetables with a low-fat dip as an appetizer instead of chips and dip. ¨ Sprinkle sunflower seeds or chopped almonds over salads. Or try adding chopped walnuts or almonds to cooked vegetables. Try some vegetarian meals using beans and peas. Add garbanzo or kidney beans to salads. Make burritos and tacos with mashed shelton beans or black beans    © 7222-6510 Healthwise, Incorporated. Care instructions adapted under license by [facility]. This care instruction is for use with your licensed healthcare professional. If you have questions about a medical condition or this instruction, always ask your healthcare professional. [address] any warranty or liability for your use of this information. Content Version: 9.4.30429; Last Revised: March 15, 2012                Patient information: Weight loss treatments    INTRODUCTION -- Obesity is a major international problem, and Americans are among the heaviest people in the world. The percentage of obese people in the [place] has risen steadily. Many people find that although they initially lose weight by dieting, they quickly regain the weight after the diet ends.  Because it so hard to keep weight off over time, it is important to have as much information and support as possible before starting a diet. You are most likely to be successful in losing weight and keeping it off when you believe that your body weight can be controlled. STARTING A WEIGHT LOSS PROGRAM -- Some people like to talk to their doctor or nurse to get help choosing the best plan, monitoring progress, and getting advice and support along the way. To know what treatment (or combination of treatments) will work best, determine your body mass index (BMI) and waist circumference (measurement). The BMI is calculated from your height and weight. A person with a BMI between 25 and 29.9 is considered overweight   A person with a BMI of 30 or greater is considered to be obese  A waist circumference greater than 35 inches (88 cm) in women and 40 inches (102 cm) in men increases the risk of obesity-related complications, such as heart disease and diabetes. People who are obese and who have a larger waist size may need more aggressive weight loss treatment than others. Talk to your doctor or nurse for advice. Types of treatment -- Based on your measurements and your medical history, your doctor or nurse can determine what combination of weight loss treatments would work best for you. Treatments may include changes in lifestyle, exercise, dieting, and, in some cases, weight loss medicines or weight loss surgery. Weight loss surgery, also called bariatric surgery, is reserved for people with severe obesity who have not responded to other weight loss treatments. SETTING A WEIGHT LOSS GOAL -- It is important to set a realistic weight loss goal. Your first goal should be to avoid gaining more weight and staying at your current weight (or within 5 percent). Many people have a \"dream\" weight that is difficult or impossible to achieve.   People at high risk of developing diabetes who are able to lose 5 percent of their body weight and maintain this weight will reduce their risk of developing diabetes by about 50 percent and reduce their blood eating choices to reinforce the value of the good behavior. You need to have clear behavior goals, and you must have a time frame for reaching your goals. Reward small changes along the way to your final goal.  Other factors that contribute to successful weight loss -- Changing your behavior involves more than just changing unhealthy eating habits; it also involves finding people around you to support your weight loss, reducing stress, and learning to be strong when tempted by food. Establish a \"catina\" system -- Having a friend or family member available to provide support and reinforce good behavior is very helpful. The support person needs to understand your goals. Learn to be strong -- Learning to be strong when tempted by food is an important part of losing weight. As an example, you will need to learn how to say \"no\" and continue to say no when urged to eat at parties and social gatherings. Develop strategies for events before you go, such as eating before you go or taking low-calorie snacks and drinks with you. Develop a support system -- Having a support system is helpful when losing weight. This is why many [employer] groups are successful. Family support is also essential; if your family does not support your efforts to lose weight, this can slow your progress or even keep you from losing weight. Positive thinking -- People often have conversations with themselves in their head; these conversations can be positive or negative. If you eat a piece of cake that was not planned, you may respond by thinking, \"Oh, you stupid idiot, you've blown your diet! \" and as a result, you may eat more cake. A positive thought for the same event could be, \"Well, I ate cake when it was not on my plan. Now I should do something to get back on track. \" A positive approach is much more likely to be successful than a negative one.   Reduce stress -- Although stress is a part of everyday life, it can trigger uncontrolled eating in some people. It is important to find a way to get through these difficult times without eating or by eating low-calorie food, like raw vegetables. It may be helpful to imagine a relaxing place that allows you to temporarily escape from stress. With deep breaths and closed eyes, you can imagine this relaxing place for a few minutes. Self-help programs -- Self-help programs like [employer] Watchers®, Overeaters Anonymous®, and Take Off [place] (Freenom)© work for some people. As with all weight loss programs, you are most likely to be successful with these plans if you make long-term changes in how you eat. CHOOSING A DIET -- A calorie is a unit of energy found in food. Your body needs calories to function. The goal of any diet is to burn up more calories than you eat. How quickly you lose weight depends upon several factors, such as your age, gender, and starting weight. Older people have a slower metabolism than young people, so they lose weight more slowly. Men lose more weight than women of similar height and weight when dieting because they use more energy. People who are extremely overweight lose weight more quickly than those who are only mildly overweight. Try not to drink alcohol or drinks with added sugar, and most sweets (candy, cakes, cookies), since they rarely contain important nutrients. Portion-controlled diets -- One simple way to diet is to buy packaged foods, like frozen low-calorie meals or meal-replacement canned drinks. A typical meal plan for one day may include:  A meal-replacement drink or breakfast bar for breakfast   A meal-replacement drink or a frozen low-calorie (250 to 350 calories) meal for lunch   A frozen low-calorie meal or other prepackaged, calorie-controlled meal, along with extra vegetables for dinner  This would give you 1000 to 1500 calories per day. Low-fat diet -- To reduce the amount of fat in your diet, you can:  Eat low-fat foods.  Low-fat foods are compared different diets, including:  Very-low-carbohydrate (Atkins)   Macronutrient balance controlling glycemic load (Zone®)   Reduced-calorie (Weight Watchers®)   Very-low-fat (Ornish)  No one diet is \"best\" for weight loss. Any diet will help you to lose weight if you stick with the diet. Therefore, it is important to choose a diet that includes foods you like. Fad diets -- Fad diets often promise quick weight loss (more than 1 to 2 pounds per week) and may claim that you do not need to exercise or give up favorite foods. Some fad diets cost a lot of money, because you have to pay for seminars or pills. Fad diets generally lack any scientific evidence that they are safe and effective, but instead rely on \"before\" and \"after\" photos or testimonials. Diets that sound too good to be true usually are. These plans are a waste of time and money and are not recommended. A doctor, nurse, or nutritionist can help you find a safe and effective way to lose weight and keep it off. WEIGHT LOSS MEDICINES -- Taking a weight loss medicine may be helpful when used in combination with diet, exercise, and lifestyle changes. However, it is important to understand the risks and benefits of these medicines. It is also important to be realistic about your goal weight using a weight loss medicine; you may not reach your \"dream\" weight, but you may be able to reduce your risk of diabetes or heart disease. Weight loss medicines may be recommended for people who have not been able to lose weight with diet and exercise who have a:  BMI of 30 or more    BMI between 27 and 29.9 and have other medical problems, such as diabetes, high cholesterol, or high blood pressure  Two weight loss medicines are approved in the [place] for long-term use. These are sibutramine and orlistat.   Other weight loss medicines (phentermine, diethylpropion) are available but are only approved for short-term use (up to 12 weeks). Sibutramine -- Sibutramine (Meridia®, Reductil®) is a medicine that reduces your appetite. In people who take the medicine for one year, the average weight loss is 10 percent of the initial body weight (5 percent more than those who took a placebo treatment). Side effects of sibutramine include insomnia, dry mouth, and constipation. Increases in blood pressure can occur. Therefore, blood pressure is usually monitored during treatment. There is no evidence that sibutramine causes heart or lung problems (like dexfenfluramine and fenfluramine (Phen/Fen)). However, experts agree that sibutramine should not used by people with coronary heart disease, heart failure, uncontrolled hypertension, stroke, irregular heart rhythms, or peripheral vascular disease (poor circulation in the legs). Orlistat -- Orlistat (Xenical® 120 mg capsules) is a medicine that reduces the amount of fat your body absorbs from the foods you eat. A lower-dose version is now available without a prescription (Ronak® 60 mg capsules) in many countries, including the [place]. The medicine is recommended three times per day, taken with a meal; you can skip a dose if you skip a meal or if the meal contains no fat. After one year of treatment with orlistat, the average weight loss is approximately 8 to 10 percent of initial body weight (4 percent more than in those who took a placebo). Cholesterol levels often improve, and blood pressure sometimes falls. In people with diabetes, orlistat may help control blood sugar levels. Side effects occur in 15 to 10 percent of people and may include stomach cramps, gas, diarrhea, leakage of stool, or oily stools. These problems are more likely when you take orlistat with a high-fat meal (if more than 30 percent of calories in the meal are from fat). Side effects usually improve as you learn to avoid high-fat foods.  Severe liver injury has been reported rarely in patients taking orlistat, but it is not known if orlistat caused the liver problems. Diet supplements -- Diet supplements are widely used by people who are trying to lose weight, although the safety and efficacy of these supplements are often unproven. A few of the more common diet supplements are discussed below; none of these are recommended because they have not been studied carefully, and there is no proof they are safe or effective. Chitosan and wheat dextrin are ineffective for weight loss, and their use is not recommended. Ephedra, a compound related to ephedrine, is no longer available in the [facility] due to safety concerns. Many nonprescription diet pills previously contained ephedra. Although some studies have shown that ephedra helps with weight loss, there can be serious side effects (psychiatric symptoms, palpitations, and stomach upset), including death. There are not enough data about the safety and efficacy of chromium, ginseng, glucomannan, green tea, hydroxycitric acid, L carnitine, psyllium, pyruvate supplements, Andrew wort, and conjugated linoleic acid. Two supplements from [facility], [address] [place] (also known as the Richard Chappell 15 pill) and Herbathin dietary supplement, have been shown to contain prescription drugs. Hoodia gordonii is a dietary supplement derived from a plant in [place]. It is not recommended because there is no proof that it is safe or effective. Bitter orange (Citrus aurantium) can increase your heart rate and blood pressure and is not recommended.   SHOULD I HAVE SURGERY TO LOSE WEIGHT? -- Weight loss surgery is recommended ONLY for people with one of the following:  Severe obesity (body mass index above 40) (calculator 1 and calculator 2) who have not responded to diet, exercise, or weight loss medicines   Body mass index between 35 and 40, along with a serious medical problem (including diabetes, severe joint pain, or sleep apnea) that would improve with weight loss  You should be sure that you understand the potential risks and benefits of weight loss surgery. You must be motivated and willing to make lifelong changes in how you eat to reach and maintain a healthier weight after surgery. You must also be realistic about weight loss after surgery (see 'Effectiveness of weight loss surgery' below). PREPARING FOR WEIGHT LOSS SURGERY -- Most people who have weight loss surgery will meet with several specialists before surgery is scheduled. This often includes a dietitian, mental health counselor, a doctor who specializes in care of obese people, and a surgeon who performs weight loss surgery (bariatric surgeon). You may need to work with these providers for several weeks or months before surgery. The nutritionist will explain what and how much you will be able to eat after surgery. You may also need to lose a small amount of weight before surgery. The mental health specialist will help you to cope with stress and other factors that can make it harder to lose weight or trigger you to eat   The medical doctor will determine whether you need other tests, counseling, or treatment before surgery. He or she might also help you begin a medical weight loss program so that you can lose some weight before surgery. The bariatric surgeon will meet with you to discuss the surgeries available to treat obesity. He or she will also make sure you are a good candidate for surgery. TYPES OF WEIGHT LOSS SURGERY -- There are several types of weight loss surgeries, the most common being lap banding, gastric bypass, and gastric sleeve. Lap banding -- Laparoscopic adjustable gastric banding (LAGB), or lap banding, is a surgery that uses an adjustable band around the opening to the stomach (figure 1). This reduces the amount of food that you can eat at one time. Lap banding is done through small incisions, with a laparoscope. The band can be adjusted after surgery, allowing you to eat more or less food.  Adjustments to the size and tightness of the band are made by using a needle to add or remove fluid from a port (a small container under the skin that is connected to the band). Adding fluid to the band makes it tighter which restricts the amount of food you can eat and may help you to lose more weight. Lap banding is a popular choice because it is relatively simple to perform, can be adjusted or removed, and has a low risk of serious complications immediately after surgery. However, weight loss with the lap band depends on your ability to follow the program closely. You will need to prepare nutritious meals that [facility] with\" the band, not against it. For example, the lap band will not work well if you eat or drink a large amount of liquid calories (like ice cream). The band will not help you to feel full when you eat/drink liquid calories. Weight loss ranges from 45 to 75 percent after two years. As an example, a person who is 120 pounds overweight could expect to lose approximately 54 to 90 pounds in the two years after lap banding. Gastric bypass -- Joan-en-Y gastric bypass, also called gastric bypass, helps you to lose weight by reducing the amount of food you can eat and reducing the number of calories and nutrients you absorb from the food you eat. To perform gastric bypass, a surgeon creates a small stomach pouch by dividing the stomach and attaching it to the small intestine. This helps you to lose weight in two ways: The smaller stomach can hold less food than before surgery. This causes you to feel full after eating a very small amount of food or liquid. Over time, the pouch might stretch, allowing you to eat more food. The body absorbs fewer calories, since food bypasses most of the stomach as well as the upper small intestine. This new arrangement seems to decrease your appetite and change how you break down foods by changing the release of various hormones.   Gastric bypass can be performed as open surgery (through an incision on the abdomen) or laparoscopically, which uses smaller incisions and smaller instruments. Both the laparoscopic and open techniques have risks and benefits. You and your surgeon should work together to decide which surgery, if any, is right for you. Gastric bypass has a high success rate, and people lose an average of 62 to 68 percent of their excess body weight in the first year. Weight loss typically levels off after one to two years, with an overall excess weight loss between 50 and 75 percent. For a person who is 120 pounds overweight, an average of 60 to 90 pounds of weight loss would be expected. Gastric sleeve -- Gastric sleeve, also known as sleeve gastrectomy, is a surgery that reduces the size of the stomach and makes it into a narrow tube (figure 3). The new stomach is much smaller and produces less of the hormone (ghrelin) that causes hunger, helping you feel satisfied with less food. Sleeve gastrectomy is safer than gastric bypass because the intestines are not rearranged, and there is less chance of malnutrition. It also appears to control hunger better than lap banding. It might be safer than the lap banding because no foreign materials are used. The gastric sleeve has a good success rate, and people lose an average of 33 percent of their excess body weight in the first year. For a person who is 120 pounds overweight, this would mean losing about 40 pounds in the first year. WEIGHT LOSS SURGERY COMPLICATIONS -- A variety of complications can occur with weight loss surgery. The risks of surgery depend upon which surgery you have and any medical problems you had before surgery.  Some of the more common early surgical complications (one to six weeks after surgery) include:  Bleeding   Infection   Blockage or tear in the bowels   Need for further surgery  Important medical complications after surgery can include blood clots in the legs or lungs, heart attack, pneumonia, and urinary tract infection. Complications are less likely when surgery is performed in centers that are experienced in weight loss surgery. In general, centers with experience in weight loss surgery have:  Board-certified doctors and surgeons   A team of support staff (dietitians, counselors, nurses)   Long-term follow-up after surgery   Hospital staff experienced with the care of weight loss patients. This includes nurses who are trained in the care of patients immediately after surgery and anesthesiologists who are experienced in caring for the morbidly obese. EFFECTIVENESS OF WEIGHT LOSS SURGERY -- The goal of weight loss surgery is to reduce the risk of illness or death associated with obesity. Weight loss surgery can also help you to feel and look better, reduce the amount of money you spend on medicines, and cut down on sick days. As an example, weight loss surgery can improve health problems related to obesity (diabetes, high blood pressure, high cholesterol, sleep apnea) to the point that you need less or no medicine. Finally, weight loss surgery might reduce your risk of developing heart disease, cancer, and certain infections. AFTER WEIGHT LOSS SURGERY -- You will need to stay in the hospital until your team feels that it is safe for you to leave (on average, one to three days). Do not drive if you are taking prescription pain medicine. Begin exercising as soon as possible once you have healed; most weight loss centers will design an exercise program for you. Once you are home, it is important to eat and drink exactly what your doctor and dietitian recommend. You will see your doctor, nurse, and dietitian on a regular basis after surgery to monitor your health, diet, and weight loss.    You will be able to slowly increase how much you eat over time, although it will always be important to:  Eat small, frequent meals and not skip meals   Chew your food slowly and completely   Avoid eating while \"distracted\" (such as eating while watching TV)   Stop eating when you feel full   Drink liquids at least 30 minutes before or after eating   Avoid foods high in fat or sugar   Take vitamin supplements, as recommended  It can take several months to learn to listen to your body so that you know when you are hungry and when you are full. You may dislike foods you previously loved, and you may begin to prefer new foods. This can be a frustrating process for some people, so talk to your dietitian if you are having trouble. It usually takes between one and two years to lose weight after surgery. After reaching their goal weight, some people have plastic surgery (called \"body contouring\") to remove excess skin from the body, particularly in the abdominal area. Before you decide to have weight loss surgery, you must commit to staying healthy for life. This includes following up with your healthcare team, exercising most days of the week, and eating a sensible diet every day. It can be difficult to develop new eating and exercise habits after weight loss surgery, and you will have to work hard to stick to your goals. Recovering from surgery and losing weight can be stressful and emotional, and it is important to have the support of family and friends. Working with a , therapist, or support group can help you through the ups and downs. WHERE TO GET MORE INFORMATION -- Your healthcare provider is the best source of information for questions and concerns related to your medical problem. This article will be updated as needed every four months on our Web site ([email or web address])         [facility] is a medical power of ? A medical power of , also called a durable power of  for health care, is one type of the legal forms called advance directives.  It lets you name the person you want to make treatment decisions for you if you can't speak or decide for yourself. The person you choose is called your health care agent. This person is also called a health care proxy or health care surrogate. A medical power of  may be called something else in your state. How do you choose a health care agent? Choose your health care agent carefully. This person may or may not be a familymember. Talk to the person before you make your final decision. Make sure he or she iscomfortable with this responsibility. It's a good idea to choose someone who:   Is at least 25years old.  Knows you well and understands what makes life meaningful for you.  Understands your Sikhism and moral values.  Will do what you want, not what he or she wants.  Will be able to make difficult choices at a stressful time.  Will be able to refuse or stop treatment, if that is what you would want, even if you could die.  Will be firm and confident with health professionals if needed.  Will ask questions to get needed information.  Lives near you or agrees to travel to you if needed. Your family may help you make medical decisions while you can still be part of that process. But it's important to choose one person to be your health careagent in case you aren't able to make decisions for yourself. If you don't fill out the legal form and name a health care agent, thedecisions your family can make may be limited. A health care agent may be called something else in your state. Who will make decisions for you if you don't have a health care agent? If you don't have a health care agent or a living will, you may not get the care you want. Decisions may be made by family members who disagree about your medical care. Or decisions may be made by a medical professional who doesn'tknow you well. In some cases, a  makes the decisions. When you name a health care agent, it is very clear who has the power to [place] decisions for you.   How do you name a health care agent? You name your health care agent on a legal form. This form is usually called a medical power of . Ask your hospital, state bar association, or officeon aging where to find these forms. You must sign the form to make it legal. Some states require you to get the form notarized. This means that a person called a  watches you sign the form and then he or she signs the form. Some states also require thattwo or more witnesses sign the form. Be sure to tell your family members and doctors who your health care agent is. Where can you learn more? Go to [email or web address] and sign in to your HelpHive account. Enter [id number] in the Starline box to learn more about \"Learning About [address]. \"     If you do not have an account, please click on the \"Sign Up Now\" link. Current as of: October 18, 2021               Content Version: 13.2  © 2006-2022 Healthwise, Inc[employer]. Care instructions adapted under license by [facility] ([facility]). If you have questions about a medical condition or this instruction, always ask your healthcare professional. [place] [address] any warranty or liability for your use of this information.

## 2022-04-21 ENCOUNTER — CLINICAL DOCUMENTATION (OUTPATIENT)
Dept: SPIRITUAL SERVICES | Age: 52
End: 2022-04-21

## 2022-04-27 DIAGNOSIS — I10 ESSENTIAL HYPERTENSION: ICD-10-CM

## 2022-04-27 RX ORDER — LISINOPRIL 5 MG/1
TABLET ORAL
Qty: 90 TABLET | Refills: 0 | Status: SHIPPED | OUTPATIENT
Start: 2022-04-27 | End: 2022-05-06 | Stop reason: SDUPTHER

## 2022-04-28 ENCOUNTER — CLINICAL DOCUMENTATION (OUTPATIENT)
Dept: SPIRITUAL SERVICES | Age: 52
End: 2022-04-28

## 2022-04-28 NOTE — ACP (ADVANCE CARE PLANNING)
Advance Care Planning   Ambulatory ACP Specialist Patient Outreach    Date:  4/28/2022  ACP Specialist:  DERRICK Jordan    Outreach call to patient in follow-up to ACP Specialist referral from: BAR Wright CNP    [x] PCP  [] Provider   [] Ambulatory Care Management [] Other for Reason:    [x] Advance Directive Assistance  [] Code Status Discussion  [] Complete Portable DNR Order  [] Discuss Goals of Care  [] Complete POST/MOST  [] Early ACP Decision-Making  [] Other    Date Referral Received: 04/20/2022    Today's Outreach:  Edwin Cevallos CALL   [] First   [x] Second  [] Third                               Third outreach made by []  phone  [] email []   Vivevet     Intervention:  [] Spoke with Patient  [x] Left VM requesting return call      Outcome: Placed call and left VM to remind pt of 4/29/2022 11am ACP conversation scheduled for both pt and spouse. 4/29/2022 ADDENDUM: Placed call to pt as scheduled 11am ACP call set for today. No answer; was able to leave VM on \"Iqra\" mailbox. Placed call to spouse, Krysta Liao, as ACP conversation to be held with pt and spouse. Iqra answered that call and stated they [place] a lot going on\" and requested SW check back in about 1 month. Will follow up as per pt request.     Next Step:   [x] ACP scheduled conversation  [x] Outreach again in about one month              [] Email / Mail ACP Info Sheets  [] Email / Mail Advance Directive            [] Close Referral. Routing closure to referring provider/staff and to ACP Specialist .      Thank you for this referral.

## 2022-04-29 ENCOUNTER — CLINICAL DOCUMENTATION (OUTPATIENT)
Dept: SPIRITUAL SERVICES | Age: 52
End: 2022-04-29

## 2022-04-29 ENCOUNTER — HOSPITAL ENCOUNTER (OUTPATIENT)
Dept: PAIN MANAGEMENT | Age: 52
Discharge: HOME | End: 2022-04-29
Payer: COMMERCIAL

## 2022-04-29 VITALS
BODY MASS INDEX: 43.19 KG/M2 | DIASTOLIC BLOOD PRESSURE: 87 MMHG | TEMPERATURE: 97.1 F | HEIGHT: 60 IN | OXYGEN SATURATION: 97 % | SYSTOLIC BLOOD PRESSURE: 130 MMHG | HEART RATE: 76 BPM | WEIGHT: 220 LBS

## 2022-04-29 DIAGNOSIS — M50.30 DEGENERATIVE DISC DISEASE, CERVICAL: ICD-10-CM

## 2022-04-29 DIAGNOSIS — G89.29 CHRONIC BILATERAL LOW BACK PAIN WITHOUT SCIATICA: ICD-10-CM

## 2022-04-29 DIAGNOSIS — M54.16 LUMBAR RADICULOPATHY, CHRONIC: Chronic | ICD-10-CM

## 2022-04-29 DIAGNOSIS — M16.12 PRIMARY OSTEOARTHRITIS OF LEFT HIP: ICD-10-CM

## 2022-04-29 DIAGNOSIS — M51.36 DEGENERATION OF LUMBAR INTERVERTEBRAL DISC: Chronic | ICD-10-CM

## 2022-04-29 DIAGNOSIS — M50.20 CERVICAL DISC HERNIATION: ICD-10-CM

## 2022-04-29 DIAGNOSIS — M47.817 LUMBOSACRAL SPONDYLOSIS WITHOUT MYELOPATHY: ICD-10-CM

## 2022-04-29 DIAGNOSIS — M47.816 LUMBAR SPONDYLOSIS: Primary | Chronic | ICD-10-CM

## 2022-04-29 DIAGNOSIS — M54.50 CHRONIC BILATERAL LOW BACK PAIN WITHOUT SCIATICA: ICD-10-CM

## 2022-04-29 DIAGNOSIS — M46.1 SACROILIITIS (HCC): ICD-10-CM

## 2022-04-29 PROCEDURE — 99213 OFFICE O/P EST LOW 20 MIN: CPT | Performed by: NURSE PRACTITIONER

## 2022-04-29 PROCEDURE — 99213 OFFICE O/P EST LOW 20 MIN: CPT

## 2022-04-29 RX ORDER — OXYCODONE HYDROCHLORIDE AND ACETAMINOPHEN 5; 325 MG/1; MG/1
1 TABLET ORAL EVERY 6 HOURS PRN
Qty: 120 TABLET | Refills: 0 | Status: SHIPPED | OUTPATIENT
Start: 2022-05-04 | End: 2022-06-02 | Stop reason: SDUPTHER

## 2022-04-29 ASSESSMENT — PAIN SCALES - GENERAL: PAINLEVEL_OUTOF10: 6

## 2022-04-29 ASSESSMENT — ENCOUNTER SYMPTOMS
BOWEL INCONTINENCE: 0
BACK PAIN: 1
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
## 2022-05-06 DIAGNOSIS — I10 ESSENTIAL HYPERTENSION: ICD-10-CM

## 2022-05-06 DIAGNOSIS — E78.2 MIXED HYPERLIPIDEMIA: ICD-10-CM

## 2022-05-06 RX ORDER — ATORVASTATIN CALCIUM 20 MG/1
TABLET, FILM COATED ORAL
Qty: 90 TABLET | Refills: 2 | Status: SHIPPED | OUTPATIENT
Start: 2022-05-06

## 2022-05-06 RX ORDER — LISINOPRIL 5 MG/1
TABLET ORAL
Qty: 90 TABLET | Refills: 2 | Status: SHIPPED | OUTPATIENT
Start: 2022-05-06 | End: 2022-07-26

## 2022-05-06 NOTE — TELEPHONE ENCOUNTER
Please Approve or Refuse.   Send to Pharmacy per Pt's Request: [facility]     Next Visit Date:  8/19/2022   Last Visit Date: 4/20/2022    Hemoglobin A1C (%)   Date Value   04/07/2022 5.6   09/28/2021 5.5   05/24/2021 5.9             ( goal A1C is < 7)   BP Readings from Last 3 Encounters:   04/29/22 130/87   04/20/22 120/70   04/07/22 130/76          (goal 120/80)  BUN   Date Value Ref Range Status   07/18/2021 15 6 - 20 mg/dL Final     CREATININE   Date Value Ref Range Status   07/18/2021 0.75 0.50 - 0.90 mg/dL Final     Potassium   Date Value Ref Range Status   07/18/2021 4.0 3.7 - 5.3 mmol/L Final

## 2022-05-10 NOTE — DISCHARGE SUMMARY
[facility]   [id number] [facility] [address]   Phone: [phone]   Fax: [phone]      Physical Therapy Discharge Note    Date: 5/10/2022      Patient: Juan Francisco [place]  : 1970  MRN: [id number]    Physician: BAR Clark CNP                Insurance: [facility] # of visits allowed/remainin/60 combined with OT and Speech. [employer] /   Auth: NARDAE  Medical Diagnosis:   M47.817 (ICD-10-CM) - Lumbosacral spondylosis without myelopathy  M46.1 (ICD-10-CM) - Sacroiliitis (HCC)  Rehab Codes: M 54.42, M 54.51, R25 pain , M25.60 stiffness, R53.1 weakness  Onset date: 1/3/22 referral date                Next 's appt. :  pain management   Total visits attended: 9  Cancels/No shows:   Date of initial visit: 22                Date of final visit: 3/15/22       Discharge Status:     Pt failed to make additional appointments for therapy. Pt. Is now discharged. Electronically signed by: An Tara Severance, PT    If you have any questions or concerns, please don't hesitate to call.   Thank you for your referral.

## 2022-05-16 ENCOUNTER — HOSPITAL ENCOUNTER (OUTPATIENT)
Dept: GENERAL RADIOLOGY | Age: 52
Discharge: HOME | End: 2022-05-18
Payer: COMMERCIAL

## 2022-05-16 ENCOUNTER — HOSPITAL ENCOUNTER (OUTPATIENT)
Age: 52
Discharge: HOME | End: 2022-05-18
Payer: COMMERCIAL

## 2022-05-16 ENCOUNTER — HOSPITAL ENCOUNTER (OUTPATIENT)
Dept: NEUROLOGY | Age: 52
Discharge: HOME | End: 2022-05-16
Payer: COMMERCIAL

## 2022-05-16 DIAGNOSIS — F32.A ANXIETY AND DEPRESSION: ICD-10-CM

## 2022-05-16 DIAGNOSIS — F41.9 ANXIETY AND DEPRESSION: ICD-10-CM

## 2022-05-16 DIAGNOSIS — M43.06 PARS DEFECT OF LUMBAR SPINE: ICD-10-CM

## 2022-05-16 PROCEDURE — 95909 NRV CNDJ TST 5-6 STUDIES: CPT | Performed by: PHYSICAL MEDICINE & REHABILITATION

## 2022-05-16 PROCEDURE — 95886 MUSC TEST DONE W/N TEST COMP: CPT | Performed by: PHYSICAL MEDICINE & REHABILITATION

## 2022-05-16 PROCEDURE — 72120 X-RAY BEND ONLY L-S SPINE: CPT

## 2022-05-16 RX ORDER — SERTRALINE HYDROCHLORIDE 100 MG/1
TABLET, FILM COATED ORAL
Qty: 90 TABLET | Refills: 3 | Status: SHIPPED | OUTPATIENT
Start: 2022-05-16 | End: 2022-05-27 | Stop reason: SDUPTHER

## 2022-05-17 DIAGNOSIS — M43.06 PARS DEFECT OF LUMBAR SPINE: ICD-10-CM

## 2022-05-17 DIAGNOSIS — M54.16 LUMBAR RADICULOPATHY, CHRONIC: ICD-10-CM

## 2022-05-17 DIAGNOSIS — M47.816 LUMBAR SPONDYLOSIS: ICD-10-CM

## 2022-05-26 ENCOUNTER — CLINICAL DOCUMENTATION (OUTPATIENT)
Dept: SPIRITUAL SERVICES | Age: 52
End: 2022-05-26

## 2022-05-26 NOTE — ACP (ADVANCE CARE PLANNING)
Advance Care Planning   Ambulatory ACP Specialist Patient Outreach    Date:  5/26/2022  ACP Specialist:  DERRICK Jordan    Outreach call to patient in follow-up to ACP Specialist referral from: BAR Wright CNP    [x] PCP  [] Provider   [] Ambulatory Care Management [] Other for Reason:    [x] Advance Directive Assistance  [] Code Status Discussion  [] Complete Portable DNR Order  [] Discuss Goals of Care  [] Complete POST/MOST  [] Early ACP Decision-Making  [] Other    Date Referral Received: 04/20/2022    Today's Outreach:  [] First   [x] Second  [] Third                               Third outreach made by []  phone  [] email []   [employer]     Intervention:  [] Spoke with Patient  [x] Left VM requesting return call      Outcome: Placed call to pt to discuss interest in ACP education and support. Placed call about 1 month after last conversation as pt requested. No answer; will make next attempt outreach as per process. Next Step:   [] ACP scheduled conversation  [x] Outreach again in one week               [] Email / Mail ACP Info Sheets  [] Email / Mail Advance Directive            [] Close Referral. Routing closure to referring provider/staff and to ACP Specialist .      Thank you for this referral.

## 2022-05-27 ENCOUNTER — OFFICE VISIT (OUTPATIENT)
Dept: NEUROSURGERY | Age: 52
End: 2022-05-27
Payer: COMMERCIAL

## 2022-05-27 VITALS
OXYGEN SATURATION: 96 % | HEIGHT: 60 IN | TEMPERATURE: 98.4 F | BODY MASS INDEX: 43 KG/M2 | SYSTOLIC BLOOD PRESSURE: 110 MMHG | RESPIRATION RATE: 22 BRPM | WEIGHT: 219 LBS | DIASTOLIC BLOOD PRESSURE: 67 MMHG | HEART RATE: 71 BPM

## 2022-05-27 DIAGNOSIS — R20.0 ARM NUMBNESS: ICD-10-CM

## 2022-05-27 DIAGNOSIS — M43.07 SPONDYLOLYSIS, LUMBOSACRAL REGION: Primary | ICD-10-CM

## 2022-05-27 DIAGNOSIS — Z98.1 S/P CERVICAL SPINAL FUSION: ICD-10-CM

## 2022-05-27 PROCEDURE — G8417 CALC BMI ABV UP PARAM F/U: HCPCS | Performed by: NEUROLOGICAL SURGERY

## 2022-05-27 PROCEDURE — 3017F COLORECTAL CA SCREEN DOC REV: CPT | Performed by: NEUROLOGICAL SURGERY

## 2022-05-27 PROCEDURE — G8427 DOCREV CUR MEDS BY ELIG CLIN: HCPCS | Performed by: NEUROLOGICAL SURGERY

## 2022-05-27 PROCEDURE — 99214 OFFICE O/P EST MOD 30 MIN: CPT | Performed by: NEUROLOGICAL SURGERY

## 2022-05-27 PROCEDURE — 1036F TOBACCO NON-USER: CPT | Performed by: NEUROLOGICAL SURGERY

## 2022-05-27 NOTE — PROGRESS NOTES
Department of Neurosurgery                                                      Follow up visit      History Obtained From: patient    CHIEF COMPLAINT:         Chief Complaint   Patient presents with    Follow-up       HISTORY OF PRESENT ILLNESS:       The patient is a 49 y.o. female who presents for follow up for pars defects of lumbar spine. Patient reports lower back pain and numbness for about 5 years. She also reports constant numbness in her right buttock, occasionally in right posterior thigh, and in the right foot, specifically her 2nd and 3rd toe. As for pain, it occasionally shoots down bilateral lower extremities, not beyond her knees. Occasionally wakes up throughout the night from leg and hand numbness. Reports that the hand numbness is provoked when raising them for a long period of time. Patient reports that she's been compliant with the back brace and reports that it has improved her back pain. She reports that she received facet blocks and RFA injections that provided relief for up to 6 months from T12-S1 years ago. She also reports that walking, standing, or laying down for long periods of time provokes her pain. Currently takes pain medication 3 times a day.     PAST MEDICAL HISTORY :       Past Medical History:        Diagnosis Date    Anxiety     Asthma     Colon polyp 10/31/2016    sessile serated adenoma x2    Depression     Diabetes mellitus ([facility]     Diverticulitis 10/2016    Gastritis     GERD (gastroesophageal reflux disease)     Hemorrhoids     int/ext    Hypertension     Migraines     Osteoarthritis     Shingles 1-22-16    Tubular adenoma 10/31/2016    Urinary leakage        Past Surgical History:        Procedure Laterality Date    ABDOMINAL ADHESION SURGERY  07/08/2021    APPENDECTOMY  07/08/2021    CERVICAL DISCECTOMY  12/2013    & fusion     CHOLECYSTECTOMY      COLONOSCOPY  10/31/2016    int/ext hemorrhoids; sessile serated adenomax2; tubular adenoma    COLONOSCOPY N/A 10/22/2019    COLONOSCOPY POLYPECTOMY SNARE/COLD BIOPSY AND RANDOM BIOPSIES performed by Serafin Chanel MD at [place] [address]  03/04/2021    CYSTOSCOPY HYDRODISTENTION WITH DMSO AND UREAPLASMA , MYCOPLASMA CULTURES    CYSTOSCOPY N/A 3/4/2021    CYSTOSCOPY HYDRODISTENTION WITH DMSO AND UREAPLASMA , MYCOPLASMA CULTURES performed by Harley Pelletier DO at [address] [place] [address]      HAND SURGERY Right     thumb surgery, BONE REMOVED    HYSTERECTOMY      LAPAROSCOPY      SC DEST,PARAVERTEBRAL,L/S,ADDL LVLS  3/25/2019         TONSILLECTOMY      TUBAL LIGATION      UPPER GASTROINTESTINAL ENDOSCOPY  10/31/2016    gastritis    UPPER GASTROINTESTINAL ENDOSCOPY N/A 10/22/2019    EGD BIOPSY performed by Serafin Chanel MD at [address] [place] [address] History:   Social History     Socioeconomic History    Marital status:      Spouse name: Not on file    Number of children: Not on file    Years of education: Not on file    Highest education level: Not on file   Occupational History    Occupation: unemployed   Tobacco Use    Smoking status: Never Smoker    Smokeless tobacco: Never Used   Vaping Use    Vaping Use: Never used   Substance and Sexual Activity    Alcohol use: No    Drug use: No    Sexual activity: Yes   Other Topics Concern    Not on file   Social History Narrative    Not on file     Social Determinants of Health     Financial Resource Strain: Low Risk     Difficulty of Paying Living Expenses: Not hard at all   Food Insecurity: No Food Insecurity    Worried About [facility] in the Last Year: Never true    Beth of Food in the Last Year: Never true   Transportation Needs:     Lack of Transportation (Medical): Not on file    Lack of Transportation (Non-Medical):  Not on file   Physical Activity:     Days of Exercise per Week: Not on file    Minutes of Exercise per Session: Not on file   Stress:     Feeling of Stress : Not on file   Social Connections:     Frequency of Communication with Friends and Family: Not on file    Frequency of Social Gatherings with Friends and Family: Not on file    Attends Gnosticist Services: Not on file    Active Member of Clubs or Organizations: Not on file    Attends Club or Organization Meetings: Not on file    Marital Status: Not on file   Intimate Partner Violence:     Fear of Current or Ex-Partner: Not on file    Emotionally Abused: Not on file    Physically Abused: Not on file    Sexually Abused: Not on file   Housing Stability:     Unable to Pay for Housing in the Last Year: Not on file    Number of [place] in the Last Year: Not on file    Unstable Housing in the Last Year: Not on file       Family History:       Problem Relation Age of Onset    Cancer Mother     Heart Disease Father     Diabetes Father     High Blood Pressure Father     Other Other         Celiac Sprue. Aunt       Allergies:  Adhesive tape, Imitrex [sumatriptan], Morphine, and Seasonal    Home Medications:  Prior to Admission medications    Medication Sig Start Date End Date Taking? Authorizing Provider   sertraline (ZOLOFT) 100 MG tablet TAKE 1 TABLET DAILY 5/16/22  Yes BAR Oliva CNP   lisinopril (PRINIVIL;ZESTRIL) 5 MG tablet TAKE 1 TABLET DAILY 5/6/22  Yes BAR Oliva CNP   atorvastatin (LIPITOR) 20 MG tablet TAKE 1 TABLET DAILY 5/6/22  Yes BAR Oliva CNP   oxyCODONE-acetaminophen (PERCOCET) 5-325 MG per tablet Take 1 tablet by mouth every 6 hours as needed for Pain for up to 30 days.  5/4/22 6/3/22 Yes BAR Mar CNP   topiramate (TOPAMAX) 100 MG tablet Take 2 at night 4/20/22  Yes BAR Oliva CNP   Probiotic Acidophilus (FLORANEX) TABS Take 1 tablet by mouth 2 times daily 4/7/22 7/6/22 Yes BAR Oliva CNP   sodium chloride (ALTAMIST SPRAY) 0.65 % nasal spray 1 spray by Nasal route as needed for Congestion 4/7/22  Yes BAR Oliva CNP   Dulaglutide (TRULICITY) 4.5 CP/4.5NJ SOPN Inject 4.5 mg into the skin once a week 4/7/22  Yes BAR Oliva CNP   buPROPion (WELLBUTRIN XL) 150 MG extended release tablet Take 1 tablet by mouth every morning 4/7/22  Yes BAR Oliva CNP   fluticasone (FLONASE) 50 MCG/ACT nasal spray 2 sprays by Nasal route daily 4/4/22  Yes BAR Alexis CNP   tiZANidine (ZANAFLEX) 4 MG tablet TAKE 1 TABLET EVERY 8 HOURS AS NEEDED FOR SPASMS 2/28/22  Yes Syliva Gowers, APRN - CNP   pregabalin (LYRICA) 75 MG capsule TAKE 1 CAPSULE FOUR TIMES A DAY 2/28/22 6/27/22 Yes Syliva Gowers, APRN - CNP   Cream Base (SALT STABLE LS ADVANCED) CREA  1/7/22  Yes Historical Provider, MD   cetirizine (ZYRTEC) 10 MG tablet Take 10 mg by mouth daily 6/29/21  Yes Historical Provider, MD   dicyclomine (BENTYL) 10 MG capsule Take 1 capsule by mouth 4 times daily 12/10/21  Yes BAR Oliva CNP   Lancets ([address]) [address] USE 1 EACH TWICE A DAY 11/17/21  Yes BAR Oliva CNP   ONETOUCH VERIO strip USE 1 STRIP DAILY AS NEEDED 10/26/21  Yes BAR Oliva CNP   montelukast (SINGULAIR) 10 MG tablet TAKE 1 TABLET DAILY 10/25/21  Yes BAR Oliva CNP   busPIRone (BUSPAR) 10 MG tablet TAKE 2 TABLETS THREE TIMES A DAY 9/13/21  Yes BAR Oliva CNP   ibuprofen (ADVIL;MOTRIN) 600 MG tablet TAKE 1 TABLET BY MOUTH EVERY 6 HOURS 7/7/21  Yes Historical Provider, MD   STOOL SOFTENER/LAXATIVE 50-8.6 MG per tablet TAKE 2 TABLETS BY MOUTH TWICE DAILY 7/7/21  Yes Historical Provider, MD   ipratropium (ATROVENT) 0.06 % nasal spray USE 2 SPRAY(S) IN EACH NOSTRIL ONCE DAILY 6/23/21  Yes Historical Provider, MD   Diclofenac Sodium POWD Formula #5: diclo3%+gaba6%+lido2%+prilo2%.  Apply 1-2 gm topically to affected area TID-QID. 7/1/21  Yes Courtney Tim DPM   omeprazole (PRILOSEC) 40 MG delayed release capsule TAKE 1 CAPSULE DAILY 6/14/21  Yes BAR Oliva CNP   trospium (SANCTURA) 20 MG tablet Take 1 tablet by mouth daily 5/18/21  Yes Historical Provider, MD   estradiol (ESTRACE) 0.1 MG/GM vaginal cream  2/8/21  Yes Historical Provider, MD   Blood Pressure Monitor KIT Use as directed. 9/8/20  Yes BAR Oliva CNP   albuterol sulfate (PROAIR RESPICLICK) 261 (90 Base) MCG/ACT aerosol powder inhalation Inhale 2 puffs into the lungs every 4 hours as needed for Wheezing or Shortness of Breath 2/4/20  Yes Evangelina Rangel MD   docusate sodium (COLACE) 100 MG capsule Take 1 capsule by mouth daily as needed for Constipation 12/1/17  Yes BAR Leone CNP   Elastic Bandages & Supports (LUMBAR BACK BRACE/SUPPORT PAD) MISC 1 each by Does not apply route daily as needed (pain) 8/4/17  Yes Dylon Bauer MD       Current Medications:   No current facility-administered medications for this visit. PHYSICAL EXAM:       /67 (Site: Right Upper Arm, Position: Sitting, Cuff Size: Large Adult)   Pulse 71   Temp 98.4 °F (36.9 °C) (Oral)   Resp 22   Ht 5' (1.524 m)   Wt 219 lb (99.3 kg)   SpO2 96%   BMI 42.77 kg/m²   Physical Exam     Gen: NAD  HEENT: moist mucus membranes  Cardio: RRR  Pulm: chest rise symmetrically  GI: abd soft  Ext: no edema  Skin: warm    Neuro:    AOX3  CN 2-12 grossly intact  Speech articulate  Motor 5/5  No pronator drift  Sensation symmetrical   Pain to right internal and external rotation of the hip   Straight Leg Test Negative  Tenderness Midline Low Lumbar Area  DTR Slightly Brisk and Symmetrical   Slight Bilateral Llamas's Sign   Left Tinel's Sign Cubital Tunnel   Left Tinel's Sign Carpal Tunnel  Thumb AB Duction 4+/5 Bilaterally       Radiology Review:      XR Lumbar Spine Flexion and Extension   5/16/2022  Official Read:  No pathologic motion with flexion or extension. EMG   5/16/2022  Official Read:  1.  A chronic bilateral L4-L5 radiculopathy with the right more affected than the left with noted some acute denervation changes in the right lumbosacral paraspinal.   2. There is currently no electrophysiologic evidence of a significant lower extremity plexopathy, myopathy or peripheral polyneuropathy noted at this time.      My Read:  Reviewed    ASSESSMENT AND PLAN:       Patient Active Problem List   Diagnosis    Degenerative disc disease, cervical    Cervical disc herniation    Lumbar radiculopathy, chronic    Degeneration of lumbar intervertebral disc    Lumbar spondylosis    Essential hypertension    Left-sided low back pain with left-sided sciatica    Osteoarthritis of lumbar spine    Sacroiliitis (HCC)    Rheumatoid arthritis ([facility] [address]    Primary osteoarthritis of left hip    Arthropathy of lumbar facet joint    Hip pain, chronic, right    Hemorrhoids    Colon polyp    Tubular adenoma    Chronic bilateral low back pain without sciatica    Spinal osteoarthritis    Encounter for medication management    Morbid obesity with BMI of 40.0-44.9, adult ([facility] [address]    Adjustment disorder with mixed anxiety and depressed mood    Type 2 diabetes mellitus without complication, without long-term current use of insulin ([facility])    Mixed incontinence urge and stress    Chronic, continuous use of opioids    Chronic use of opiate drugs therapeutic purposes    Lumbosacral spondylosis without myelopathy    Chronic GERD    Bacterial sinusitis    Mixed hyperlipidemia    Acne cystica    Chronic rhinitis    Surgical menopause    Allergic rhinitis due to allergen    S/P Cystoscopy w/ Hydrodistension and DMSO    Cervical lymphadenopathy    Interstitial cystitis    Mild intermittent asthma without complication    Pars defect of lumbar spine    Chronic idiopathic constipation    Intractable chronic migraine without aura and without status migrainosus         A/P:  This is a 49 y.o. female with Spondylolysis, lumbosacral region  -     NM BONE SCAN WHOLE BODY; Future  She is symptomatic with radiculopathy at this level and back pain  S/P cervical spinal fusion, by another surgeon years ago for cervical myelopathy.  -     MRI CERVICAL SPINE [address]; Future  Arm numbness  -     MRI CERVICAL SPINE WO CONTRAST; Future  She does have Tinel signs in the cubital tunnel and carpal tunnel left side suggesting a peripheral neuropathy    Repeat MRI cervical spine to exclude adjacent segment disease from her prior fusion    Overall, patient has been experiencing years of axial low back pain that has worsened with activity. We will get a SPECT CT of her lumbar spine to identify abnormal uptake which may help identify the source of pain which can be the L5-S1 spondylolysis    Patient and/or family was counseled on the diagnosis and treatment plan    By signing my name below, I, Fernie Cordon, attest that this documentation has been prepared under the direction and in the presence of Ghassan Marcelino DO. Electronically signed: Jono Damon, 5/27/22     This note was created using voice recognition software. There may be inaccuracies of transcription  that are inadvertently overlooked prior to the signature. There is any questions about the transcription please contact me.

## 2022-06-02 ENCOUNTER — HOSPITAL ENCOUNTER (OUTPATIENT)
Dept: PAIN MANAGEMENT | Age: 52
Discharge: HOME | End: 2022-06-02
Payer: COMMERCIAL

## 2022-06-02 ENCOUNTER — CLINICAL DOCUMENTATION (OUTPATIENT)
Dept: SPIRITUAL SERVICES | Age: 52
End: 2022-06-02

## 2022-06-02 VITALS
DIASTOLIC BLOOD PRESSURE: 73 MMHG | WEIGHT: 218 LBS | OXYGEN SATURATION: 95 % | HEIGHT: 60 IN | BODY MASS INDEX: 42.8 KG/M2 | HEART RATE: 86 BPM | SYSTOLIC BLOOD PRESSURE: 114 MMHG

## 2022-06-02 DIAGNOSIS — M51.36 DEGENERATION OF LUMBAR INTERVERTEBRAL DISC: Chronic | ICD-10-CM

## 2022-06-02 DIAGNOSIS — M16.12 PRIMARY OSTEOARTHRITIS OF LEFT HIP: ICD-10-CM

## 2022-06-02 DIAGNOSIS — M47.816 LUMBAR SPONDYLOSIS: Chronic | ICD-10-CM

## 2022-06-02 DIAGNOSIS — M50.20 CERVICAL DISC HERNIATION: ICD-10-CM

## 2022-06-02 DIAGNOSIS — F11.90 CHRONIC, CONTINUOUS USE OF OPIOIDS: ICD-10-CM

## 2022-06-02 DIAGNOSIS — M54.50 CHRONIC BILATERAL LOW BACK PAIN WITHOUT SCIATICA: ICD-10-CM

## 2022-06-02 DIAGNOSIS — M43.06 PARS DEFECT OF LUMBAR SPINE: ICD-10-CM

## 2022-06-02 DIAGNOSIS — M46.1 SACROILIITIS (HCC): ICD-10-CM

## 2022-06-02 DIAGNOSIS — G89.29 CHRONIC BILATERAL LOW BACK PAIN WITHOUT SCIATICA: ICD-10-CM

## 2022-06-02 DIAGNOSIS — M50.30 DEGENERATIVE DISC DISEASE, CERVICAL: ICD-10-CM

## 2022-06-02 DIAGNOSIS — M54.16 LUMBAR RADICULOPATHY, CHRONIC: Primary | Chronic | ICD-10-CM

## 2022-06-02 PROCEDURE — 99213 OFFICE O/P EST LOW 20 MIN: CPT | Performed by: NURSE PRACTITIONER

## 2022-06-02 PROCEDURE — 99213 OFFICE O/P EST LOW 20 MIN: CPT

## 2022-06-02 RX ORDER — OXYCODONE HYDROCHLORIDE AND ACETAMINOPHEN 5; 325 MG/1; MG/1
1 TABLET ORAL EVERY 6 HOURS PRN
Qty: 120 TABLET | Refills: 0 | Status: SHIPPED | OUTPATIENT
Start: 2022-06-04 | End: 2022-06-28 | Stop reason: SDUPTHER

## 2022-06-02 RX ORDER — TIZANIDINE 4 MG/1
TABLET ORAL
Qty: 270 TABLET | Refills: 0 | Status: SHIPPED | OUTPATIENT
Start: 2022-06-02

## 2022-06-02 ASSESSMENT — PAIN DESCRIPTION - LOCATION: LOCATION: BACK;HIP

## 2022-06-02 ASSESSMENT — PAIN SCALES - GENERAL: PAINLEVEL_OUTOF10: 5

## 2022-06-02 ASSESSMENT — ENCOUNTER SYMPTOMS
ABDOMINAL PAIN: 0
BOWEL INCONTINENCE: 0
BACK PAIN: 1

## 2022-06-02 ASSESSMENT — PAIN DESCRIPTION - FREQUENCY: FREQUENCY: CONTINUOUS

## 2022-06-02 ASSESSMENT — PAIN DESCRIPTION - PAIN TYPE: TYPE: CHRONIC PAIN

## 2022-06-02 ASSESSMENT — PAIN DESCRIPTION - PROGRESSION: CLINICAL_PROGRESSION: NOT CHANGED

## 2022-06-02 ASSESSMENT — PAIN DESCRIPTION - ORIENTATION: ORIENTATION: LOWER

## 2022-06-02 NOTE — ACP (ADVANCE CARE PLANNING)
Advance Care Planning   Ambulatory ACP Specialist Patient Outreach    Date:  6/2/2022  ACP Specialist:  DERRICK Lozada    Outreach call to patient in follow-up to ACP Specialist referral from: BAR Roque CNP    [x] PCP  [] Provider   [] Ambulatory Care Management [] Other for Reason:    [x] Advance Directive Assistance  [] Code Status Discussion  [] Complete Portable DNR Order  [] Discuss Goals of Care  [] Complete POST/MOST  [] Early ACP Decision-Making  [] Other    Date Referral Received: 04/20/2022    Today's Outreach:  [] First   [] Second  [x] Third                               Third outreach made by [x]  phone  [x] email []   W[employer]ou[employer]art     Intervention:  [] Spoke with Patient  [x] Left VM requesting return call      Outcome: Placed call to pt to offer ACP education and support. No answer and left VM on \"Iqra\" mailbox. As per process, will send closure letter and close this referral.     Next Step:   [] ACP scheduled conversation  [] Outreach again in one week               [] Email / Mail ACP Info Sheets  [] Email / Mail Advance Directive            [x] Close Referral. Routing closure to referring provider/staff and to ACP Specialist .      Thank you for this referral.
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
## 2022-06-09 RX ORDER — OMEPRAZOLE 40 MG/1
CAPSULE, DELAYED RELEASE ORAL
Qty: 90 CAPSULE | Refills: 0 | Status: SHIPPED | OUTPATIENT
Start: 2022-06-09 | End: 2022-08-17 | Stop reason: SDUPTHER

## 2022-06-14 ENCOUNTER — TELEPHONE (OUTPATIENT)
Dept: PAIN MANAGEMENT | Age: 52
End: 2022-06-14

## 2022-06-18 DIAGNOSIS — E11.9 TYPE 2 DIABETES MELLITUS WITHOUT COMPLICATION, WITHOUT LONG-TERM CURRENT USE OF INSULIN (HCC): ICD-10-CM

## 2022-06-20 RX ORDER — DULAGLUTIDE 4.5 MG/.5ML
INJECTION, SOLUTION SUBCUTANEOUS
Qty: 4 ML | Refills: 2 | Status: SHIPPED | OUTPATIENT
Start: 2022-06-20 | End: 2022-06-22 | Stop reason: SDUPTHER

## 2022-06-22 ENCOUNTER — TELEPHONE (OUTPATIENT)
Dept: FAMILY MEDICINE CLINIC | Age: 52
End: 2022-06-22

## 2022-06-22 DIAGNOSIS — E11.9 TYPE 2 DIABETES MELLITUS WITHOUT COMPLICATION, WITHOUT LONG-TERM CURRENT USE OF INSULIN (HCC): ICD-10-CM

## 2022-06-22 DIAGNOSIS — J30.9 CHRONIC ALLERGIC RHINITIS: ICD-10-CM

## 2022-06-22 DIAGNOSIS — R10.9 ABDOMINAL CRAMPS: ICD-10-CM

## 2022-06-22 DIAGNOSIS — J45.20 MILD INTERMITTENT ASTHMA WITHOUT COMPLICATION: ICD-10-CM

## 2022-06-22 RX ORDER — DICYCLOMINE HYDROCHLORIDE 10 MG/1
10 CAPSULE ORAL 4 TIMES DAILY
Qty: 360 CAPSULE | Refills: 1 | Status: SHIPPED | OUTPATIENT
Start: 2022-06-22

## 2022-06-22 RX ORDER — DULAGLUTIDE 4.5 MG/.5ML
4.5 INJECTION, SOLUTION SUBCUTANEOUS WEEKLY
Qty: 4 ML | Refills: 2 | Status: SHIPPED | OUTPATIENT
Start: 2022-06-22 | End: 2022-09-21

## 2022-06-22 RX ORDER — BLOOD SUGAR DIAGNOSTIC
1 STRIP MISCELLANEOUS 2 TIMES DAILY
Qty: 200 STRIP | Refills: 3 | Status: SHIPPED | OUTPATIENT
Start: 2022-06-22

## 2022-06-22 RX ORDER — MONTELUKAST SODIUM 10 MG/1
10 TABLET ORAL NIGHTLY
Qty: 90 TABLET | Refills: 3 | Status: SHIPPED | OUTPATIENT
Start: 2022-06-22

## 2022-06-22 RX ORDER — LANCETS 33 GAUGE
EACH MISCELLANEOUS
Qty: 200 EACH | Refills: 3 | Status: SHIPPED | OUTPATIENT
Start: 2022-06-22

## 2022-06-22 NOTE — TELEPHONE ENCOUNTER
CLARIFICATION ON DIRECTION ON HOW MANY TIMES TO TEST PLEASE ADVISE      RX: blood glucose test strips (ONETOUCH VERIO) strip     Sig: Inject 1 each into the skin 2 times daily As needed. USE 1 STRIP DAILY AS NEEDED

## 2022-06-28 ENCOUNTER — TELEPHONE (OUTPATIENT)
Dept: PAIN MANAGEMENT | Age: 52
End: 2022-06-28

## 2022-06-28 DIAGNOSIS — M46.1 SACROILIITIS (HCC): ICD-10-CM

## 2022-06-28 DIAGNOSIS — M16.12 PRIMARY OSTEOARTHRITIS OF LEFT HIP: ICD-10-CM

## 2022-06-28 DIAGNOSIS — M50.30 DEGENERATIVE DISC DISEASE, CERVICAL: ICD-10-CM

## 2022-06-28 DIAGNOSIS — M54.16 LUMBAR RADICULOPATHY, CHRONIC: Chronic | ICD-10-CM

## 2022-06-28 DIAGNOSIS — M50.20 CERVICAL DISC HERNIATION: ICD-10-CM

## 2022-06-28 DIAGNOSIS — M51.36 DEGENERATION OF LUMBAR INTERVERTEBRAL DISC: Chronic | ICD-10-CM

## 2022-06-28 RX ORDER — OXYCODONE HYDROCHLORIDE AND ACETAMINOPHEN 5; 325 MG/1; MG/1
1 TABLET ORAL EVERY 6 HOURS PRN
Qty: 120 TABLET | Refills: 0 | Status: SHIPPED | OUTPATIENT
Start: 2022-07-03 | End: 2022-08-03 | Stop reason: SDUPTHER

## 2022-06-28 NOTE — TELEPHONE ENCOUNTER
Pt calls the office stating she will be out of medication before next appt.  Will route message to NP.

## 2022-06-30 ENCOUNTER — TELEPHONE (OUTPATIENT)
Dept: FAMILY MEDICINE CLINIC | Age: 52
End: 2022-06-30

## 2022-06-30 NOTE — TELEPHONE ENCOUNTER
blood glucose test strips (ONETOUCH VERIO) strip [[id number]]     Order Details  Dose: 1 each Route: SubCUTAneous Frequency: 2 TIMES DAILY   Dispense Quantity: 200 strip Refills: 3          Sig: Inject 1 each into the skin 2 times daily As needed. USE 1 STRIP DAILY AS NEEDED         Start Date: 06/22/22 End Date: --   Written Date: 06/22/22       Instructions need to be clarified.

## 2022-07-06 ENCOUNTER — TELEPHONE (OUTPATIENT)
Dept: FAMILY MEDICINE CLINIC | Age: 52
End: 2022-07-06

## 2022-07-06 NOTE — TELEPHONE ENCOUNTER
[facility] called and stated that they need clarification on the Blood glucose one touch verio order. Pharmacy stated there are 2 directions and just needs clarified.  Thank you

## 2022-07-26 DIAGNOSIS — I10 ESSENTIAL HYPERTENSION: ICD-10-CM

## 2022-07-26 RX ORDER — LISINOPRIL 5 MG/1
TABLET ORAL
Qty: 90 TABLET | Refills: 2 | Status: SHIPPED | OUTPATIENT
Start: 2022-07-26

## 2022-07-26 NOTE — TELEPHONE ENCOUNTER
Please Approve or Refuse.   [facility] to Pharm[facility]      Next Visit Date:  8/19/2022   Last Visit Date: 4/20/2022    Hemoglobin A1C (%)   Date Value   04/07/2022 5.6   09/28/2021 5.5   05/24/2021 5.9             ( goal A1C is < 7)   BP Readings from Last 3 Encounters:   06/02/22 114/73   05/27/22 110/67   04/29/22 130/87          (goal 120/80)  BUN   Date Value Ref Range Status   07/18/2021 15 6 - 20 mg/dL Final     Creatinine   Date Value Ref Range Status   07/18/2021 0.75 0.50 - 0.90 mg/dL Final     Potassium   Date Value Ref Range Status   07/18/2021 4.0 3.7 - 5.3 mmol/L Final

## 2022-08-03 ENCOUNTER — HOSPITAL ENCOUNTER (OUTPATIENT)
Dept: PAIN MANAGEMENT | Age: 52
Discharge: HOME | End: 2022-08-03
Payer: COMMERCIAL

## 2022-08-03 VITALS
TEMPERATURE: 97.3 F | RESPIRATION RATE: 18 BRPM | BODY MASS INDEX: 42.8 KG/M2 | HEART RATE: 78 BPM | HEIGHT: 60 IN | WEIGHT: 218 LBS | DIASTOLIC BLOOD PRESSURE: 97 MMHG | OXYGEN SATURATION: 96 % | SYSTOLIC BLOOD PRESSURE: 163 MMHG

## 2022-08-03 DIAGNOSIS — M47.896 OTHER OSTEOARTHRITIS OF SPINE, LUMBAR REGION: ICD-10-CM

## 2022-08-03 DIAGNOSIS — G89.29 CHRONIC BILATERAL LOW BACK PAIN WITHOUT SCIATICA: Primary | ICD-10-CM

## 2022-08-03 DIAGNOSIS — M54.16 LUMBAR RADICULOPATHY, CHRONIC: Chronic | ICD-10-CM

## 2022-08-03 DIAGNOSIS — Z79.899 ENCOUNTER FOR MEDICATION MANAGEMENT: ICD-10-CM

## 2022-08-03 DIAGNOSIS — M47.817 LUMBOSACRAL SPONDYLOSIS WITHOUT MYELOPATHY: ICD-10-CM

## 2022-08-03 DIAGNOSIS — M16.12 PRIMARY OSTEOARTHRITIS OF LEFT HIP: ICD-10-CM

## 2022-08-03 DIAGNOSIS — M54.50 CHRONIC BILATERAL LOW BACK PAIN WITHOUT SCIATICA: Primary | ICD-10-CM

## 2022-08-03 DIAGNOSIS — M47.26 OSTEOARTHRITIS OF SPINE WITH RADICULOPATHY, LUMBAR REGION: ICD-10-CM

## 2022-08-03 DIAGNOSIS — M50.20 CERVICAL DISC HERNIATION: ICD-10-CM

## 2022-08-03 DIAGNOSIS — M54.42 CHRONIC LEFT-SIDED LOW BACK PAIN WITH LEFT-SIDED SCIATICA: ICD-10-CM

## 2022-08-03 DIAGNOSIS — M50.30 DEGENERATIVE DISC DISEASE, CERVICAL: ICD-10-CM

## 2022-08-03 DIAGNOSIS — M46.1 SACROILIITIS (HCC): ICD-10-CM

## 2022-08-03 DIAGNOSIS — M47.816 LUMBAR SPONDYLOSIS: Chronic | ICD-10-CM

## 2022-08-03 DIAGNOSIS — M51.36 DEGENERATION OF LUMBAR INTERVERTEBRAL DISC: Chronic | ICD-10-CM

## 2022-08-03 DIAGNOSIS — G89.29 CHRONIC LEFT-SIDED LOW BACK PAIN WITH LEFT-SIDED SCIATICA: ICD-10-CM

## 2022-08-03 DIAGNOSIS — M47.816 OSTEOARTHRITIS OF LUMBAR SPINE, UNSPECIFIED SPINAL OSTEOARTHRITIS COMPLICATION STATUS: ICD-10-CM

## 2022-08-03 PROCEDURE — 99213 OFFICE O/P EST LOW 20 MIN: CPT | Performed by: NURSE PRACTITIONER

## 2022-08-03 PROCEDURE — 99213 OFFICE O/P EST LOW 20 MIN: CPT

## 2022-08-03 RX ORDER — OXYCODONE HYDROCHLORIDE AND ACETAMINOPHEN 5; 325 MG/1; MG/1
1 TABLET ORAL EVERY 6 HOURS PRN
Qty: 120 TABLET | Refills: 0 | Status: SHIPPED | OUTPATIENT
Start: 2022-08-03 | End: 2022-08-22 | Stop reason: SDUPTHER

## 2022-08-03 RX ORDER — PREGABALIN 75 MG/1
CAPSULE ORAL
Qty: 120 CAPSULE | Refills: 2 | Status: SHIPPED | OUTPATIENT
Start: 2022-08-03 | End: 2022-09-20 | Stop reason: SDUPTHER

## 2022-08-03 ASSESSMENT — ENCOUNTER SYMPTOMS
BACK PAIN: 1
BOWEL INCONTINENCE: 0

## 2022-08-03 ASSESSMENT — PAIN SCALES - GENERAL: PAINLEVEL_OUTOF10: 8
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
## 2022-08-17 DIAGNOSIS — J30.9 CHRONIC ALLERGIC RHINITIS: ICD-10-CM

## 2022-08-17 DIAGNOSIS — E11.9 TYPE 2 DIABETES MELLITUS WITHOUT COMPLICATION, WITHOUT LONG-TERM CURRENT USE OF INSULIN (HCC): Primary | ICD-10-CM

## 2022-08-17 DIAGNOSIS — K21.9 CHRONIC GERD: ICD-10-CM

## 2022-08-17 RX ORDER — FLUTICASONE PROPIONATE 50 MCG
2 SPRAY, SUSPENSION (ML) NASAL DAILY
Qty: 16 G | Refills: 0 | Status: SHIPPED | OUTPATIENT
Start: 2022-08-17 | End: 2022-08-24 | Stop reason: SDUPTHER

## 2022-08-17 RX ORDER — OMEPRAZOLE 40 MG/1
CAPSULE, DELAYED RELEASE ORAL
Qty: 90 CAPSULE | Refills: 1 | Status: SHIPPED | OUTPATIENT
Start: 2022-08-17

## 2022-08-17 NOTE — TELEPHONE ENCOUNTER
Please Approve or Refuse.   Send to Pharmacy per Pt's Request:      Next Visit Date:  8/19/2022   Last Visit Date: 4/20/2022    Hemoglobin A1C (%)   Date Value   04/07/2022 5.6   09/28/2021 5.5   05/24/2021 5.9             ( goal A1C is < 7)   BP Readings from Last 3 Encounters:   08/03/22 (!) 163/97   06/02/22 114/73   05/27/22 110/67          (goal 120/80)  BUN   Date Value Ref Range Status   07/18/2021 15 6 - 20 mg/dL Final     Creatinine   Date Value Ref Range Status   07/18/2021 0.75 0.50 - 0.90 mg/dL Final     Potassium   Date Value Ref Range Status   07/18/2021 4.0 3.7 - 5.3 mmol/L Final

## 2022-08-18 DIAGNOSIS — J30.9 CHRONIC ALLERGIC RHINITIS: ICD-10-CM

## 2022-08-19 RX ORDER — FLUTICASONE PROPIONATE 50 MCG
SPRAY, SUSPENSION (ML) NASAL
Qty: 16 G | Refills: 10 | OUTPATIENT
Start: 2022-08-19

## 2022-08-22 ENCOUNTER — HOSPITAL ENCOUNTER (OUTPATIENT)
Dept: GENERAL RADIOLOGY | Age: 52
Discharge: HOME | End: 2022-08-24
Payer: COMMERCIAL

## 2022-08-22 ENCOUNTER — HOSPITAL ENCOUNTER (OUTPATIENT)
Dept: PAIN MANAGEMENT | Age: 52
Discharge: HOME | End: 2022-08-22
Payer: COMMERCIAL

## 2022-08-22 VITALS
HEIGHT: 60 IN | TEMPERATURE: 97.8 F | DIASTOLIC BLOOD PRESSURE: 73 MMHG | RESPIRATION RATE: 10 BRPM | BODY MASS INDEX: 41.03 KG/M2 | OXYGEN SATURATION: 95 % | HEART RATE: 73 BPM | WEIGHT: 209 LBS | SYSTOLIC BLOOD PRESSURE: 132 MMHG

## 2022-08-22 DIAGNOSIS — R52 PAIN MANAGEMENT: ICD-10-CM

## 2022-08-22 DIAGNOSIS — M46.1 SACROILIITIS (HCC): ICD-10-CM

## 2022-08-22 DIAGNOSIS — M54.16 LUMBAR RADICULOPATHY, CHRONIC: Chronic | ICD-10-CM

## 2022-08-22 DIAGNOSIS — M51.36 DEGENERATION OF LUMBAR INTERVERTEBRAL DISC: Chronic | ICD-10-CM

## 2022-08-22 DIAGNOSIS — M47.817 LUMBOSACRAL SPONDYLOSIS WITHOUT MYELOPATHY: Primary | ICD-10-CM

## 2022-08-22 DIAGNOSIS — M50.20 CERVICAL DISC HERNIATION: ICD-10-CM

## 2022-08-22 DIAGNOSIS — M50.30 DEGENERATIVE DISC DISEASE, CERVICAL: ICD-10-CM

## 2022-08-22 DIAGNOSIS — M16.12 PRIMARY OSTEOARTHRITIS OF LEFT HIP: ICD-10-CM

## 2022-08-22 PROCEDURE — 6360000002 HC RX W HCPCS: Performed by: ANESTHESIOLOGY

## 2022-08-22 PROCEDURE — 2500000003 HC RX 250 WO HCPCS: Performed by: ANESTHESIOLOGY

## 2022-08-22 PROCEDURE — 64635 DESTROY LUMB/SAC FACET JNT: CPT | Performed by: ANESTHESIOLOGY

## 2022-08-22 PROCEDURE — 99152 MOD SED SAME PHYS/QHP 5/>YRS: CPT | Performed by: ANESTHESIOLOGY

## 2022-08-22 PROCEDURE — 3209999900 FLUORO FOR SURGICAL PROCEDURES

## 2022-08-22 PROCEDURE — 64636 DESTROY L/S FACET JNT ADDL: CPT | Performed by: ANESTHESIOLOGY

## 2022-08-22 PROCEDURE — 64636 DESTROY L/S FACET JNT ADDL: CPT

## 2022-08-22 PROCEDURE — 64635 DESTROY LUMB/SAC FACET JNT: CPT

## 2022-08-22 PROCEDURE — 2580000003 HC RX 258: Performed by: ANESTHESIOLOGY

## 2022-08-22 RX ORDER — SODIUM CHLORIDE 0.9 % (FLUSH) 0.9 %
SYRINGE (ML) INJECTION
Status: COMPLETED | OUTPATIENT
Start: 2022-08-22 | End: 2022-08-22

## 2022-08-22 RX ORDER — MIDAZOLAM HYDROCHLORIDE 1 MG/ML
INJECTION INTRAMUSCULAR; INTRAVENOUS
Status: COMPLETED | OUTPATIENT
Start: 2022-08-22 | End: 2022-08-22

## 2022-08-22 RX ORDER — LIDOCAINE HYDROCHLORIDE 40 MG/ML
INJECTION, SOLUTION RETROBULBAR; TOPICAL
Status: COMPLETED | OUTPATIENT
Start: 2022-08-22 | End: 2022-08-22

## 2022-08-22 RX ORDER — OXYCODONE HYDROCHLORIDE AND ACETAMINOPHEN 5; 325 MG/1; MG/1
1 TABLET ORAL EVERY 6 HOURS PRN
Qty: 120 TABLET | Refills: 0 | Status: SHIPPED | OUTPATIENT
Start: 2022-08-25 | End: 2022-09-20 | Stop reason: SDUPTHER

## 2022-08-22 RX ORDER — FENTANYL CITRATE 50 UG/ML
INJECTION, SOLUTION INTRAMUSCULAR; INTRAVENOUS
Status: COMPLETED | OUTPATIENT
Start: 2022-08-22 | End: 2022-08-22

## 2022-08-22 RX ORDER — LIDOCAINE HYDROCHLORIDE 10 MG/ML
INJECTION, SOLUTION EPIDURAL; INFILTRATION; INTRACAUDAL; PERINEURAL
Status: COMPLETED | OUTPATIENT
Start: 2022-08-22 | End: 2022-08-22

## 2022-08-22 RX ADMIN — Medication 3 ML: at 12:55

## 2022-08-22 RX ADMIN — LIDOCAINE HYDROCHLORIDE 5 ML: 40 INJECTION, SOLUTION RETROBULBAR; TOPICAL at 13:07

## 2022-08-22 RX ADMIN — Medication 50 MCG: at 12:55

## 2022-08-22 RX ADMIN — Medication 50 MCG: at 13:03

## 2022-08-22 RX ADMIN — MIDAZOLAM 2 MG: 1 INJECTION INTRAMUSCULAR; INTRAVENOUS at 12:55

## 2022-08-22 RX ADMIN — Medication 3 ML: at 13:03

## 2022-08-22 RX ADMIN — LIDOCAINE HYDROCHLORIDE 10 ML: 10 INJECTION, SOLUTION EPIDURAL; INFILTRATION; INTRACAUDAL; PERINEURAL at 12:57

## 2022-08-22 ASSESSMENT — PAIN DESCRIPTION - DESCRIPTORS: DESCRIPTORS: ACHING;BURNING;SHOOTING;THROBBING

## 2022-08-22 ASSESSMENT — PAIN - FUNCTIONAL ASSESSMENT
PAIN_FUNCTIONAL_ASSESSMENT: NONE - DENIES PAIN
PAIN_FUNCTIONAL_ASSESSMENT: 0-10

## 2022-08-22 NOTE — DISCHARGE INSTRUCTIONS
Discharge Instructions following Sedation or Anesthesia:  You have  received  a sedative/anesthetic therefore, you should not consume any alcoholic beverages for minimum of 12 hours. Do not drive or operate machinery for 24 hours. Do not sign legal documents for 24 hours. Dizziness, drowsiness, and unsteadiness may occur. Rest when need to. Increase diet as tolerated. Keep up on fluids if diet allows. General Instructions:  Do not take a tub bath for 72 hours after procedure (this includes hot tubs and swimming pools). You may shower, but avoid hot water to injection site. Avoid strenuous activity TODAY especially if you experience dizziness. Remove band-aid the next day. Wash off any residual iodine   Do not use heat, heating pad, or any other heating device over the injection site for 3 days after the procedure. If you experience pain after your procedure, you may continue with your current pain medication as prescribed. (DO NOT INCREASE YOUR PAIN MEDICATION WITHOUT TALKING TO DOCTOR)  Soreness and pain at injection site is common, may use ice to reduce soreness.     Call Isauro Lucio at [phone] if you experience:   Fever, chills or temperature over 100    Vomiting, Headache, persistent stiff neck, nausea, blurred vision   Difficulty in urinating or unable to urinate with 8 hours   Increase in weakness, numbness or loss of function   Increased redness, swelling or drainage at the injection site

## 2022-08-22 NOTE — OP NOTE
Preoperative Diagnosis: Lumbar spondylosis w/o myelopathy, chronic low back pain  Postoperative Diagnosis: Lumbar spondylosis w/o myelopathy, chronic low back pain  SEDATION: SEE SEDATION NOTE  BLOOD LOSS: NONE    Procedure Performed:  :Radiofrequency ablation of median branches at the Transverse processes of L3, L4 and l5 for L3/4 AND L4/5  facet joints on Bilateral under fluoroscopic guidance with IV sedation. t    Procedure:    After starting an IV, the patient was taken to procedure room. The patient was placed in prone position and skin over the back was prepped and draped in sterile manner. Standard monitors were connected and vitals were monitored during the case and they remained stable during the procedure. A meaningful communication was kept up with the patient throughout the procedure. Then using fluoroscopy the junction of the transverse process of the target vertebra with the superior process of the facet joint was observed and the view was optimized. The skin and deep tissues were infiltrated with 2 ml of  1 % lidocaine. The RF canula with the 10 mm active tip was introduced through the skin wheal under fluoroscopy guidance such that the tip of the needle lies in the groove of the transverse process with the superior processes of the facet joint. Then a lateral and AP view of the lumbar spine was obtained to make sure the tip of needle is not in the neural foramen. Then electric impedence was checked to make sure it is acceptable. Then a sensory stimulus was applied at 50 Hz up to 0.5 volt and concordant pain symptoms were reproduced. Then a motor stimulus was applied at 2 Hz up to 2 volts or 3 x times the sensory stimulus and no motor stimulation was seen in lower extremities. Some multifidus stimulus was seen. Then after negative aspiration 1 ml of 4% lidocaine was injected through the needle at each level. The radiofrequency lesion was done at 85 degrees centigrade for 110 seconds. Patient's vital signs and neurological status remained stable throughout the procedure and post procedural period. The patient tolerated the procedure well and was discharged home in stable condition. SEDATION NOTE:    ASA CLASSIFICATION  3  MP   CLASSIFICATION  3    Moderate intravenous conscious sedation was supervised by Dr. Radhika Abdi  The patient was independently monitored by a Registered Nurse assigned to the Procedure Room  Monitoring included automated blood pressure, continuous EKG, Capnography and continuous pulse oximetry. The detailed Conscious Record is permanently stored in the [place] [id number].      The following is the conscious sedation record;  Start Time:  1252  End times:  1312  Duration:  20 MINUTES  MEDS GIVEN 2 MG VERSED  MCG FENTANYL

## 2022-08-23 DIAGNOSIS — J30.9 CHRONIC ALLERGIC RHINITIS: ICD-10-CM

## 2022-08-24 DIAGNOSIS — J30.9 CHRONIC ALLERGIC RHINITIS: ICD-10-CM

## 2022-08-24 RX ORDER — FLUTICASONE PROPIONATE 50 MCG
1 SPRAY, SUSPENSION (ML) NASAL DAILY
Qty: 16 G | Refills: 10 | Status: SHIPPED | OUTPATIENT
Start: 2022-08-24 | End: 2022-08-25

## 2022-08-24 RX ORDER — SELENIUM 50 MCG
TABLET ORAL
Qty: 30 CAPSULE | Refills: 10 | Status: SHIPPED | OUTPATIENT
Start: 2022-08-24

## 2022-08-25 RX ORDER — FLUTICASONE PROPIONATE 50 MCG
SPRAY, SUSPENSION (ML) NASAL
Qty: 16 G | Refills: 2 | Status: SHIPPED | OUTPATIENT
Start: 2022-08-25

## 2022-09-01 ENCOUNTER — HOSPITAL ENCOUNTER (OUTPATIENT)
Dept: NUCLEAR MEDICINE | Age: 52
Discharge: HOME | End: 2022-09-03
Payer: COMMERCIAL

## 2022-09-01 ENCOUNTER — HOSPITAL ENCOUNTER (OUTPATIENT)
Dept: MRI IMAGING | Age: 52
Discharge: HOME | End: 2022-09-03
Payer: COMMERCIAL

## 2022-09-01 ENCOUNTER — HOSPITAL ENCOUNTER (OUTPATIENT)
Dept: NUCLEAR MEDICINE | Age: 52
Discharge: HOME | End: 2022-09-03

## 2022-09-01 ENCOUNTER — E-VISIT (OUTPATIENT)
Dept: FAMILY MEDICINE CLINIC | Age: 52
End: 2022-09-01
Payer: COMMERCIAL

## 2022-09-01 DIAGNOSIS — J30.9 CHRONIC ALLERGIC RHINITIS: Primary | ICD-10-CM

## 2022-09-01 DIAGNOSIS — R20.0 ARM NUMBNESS: ICD-10-CM

## 2022-09-01 DIAGNOSIS — Z98.1 S/P CERVICAL SPINAL FUSION: ICD-10-CM

## 2022-09-01 DIAGNOSIS — M47.897 OTHER OSTEOARTHRITIS OF SPINE, LUMBOSACRAL REGION: ICD-10-CM

## 2022-09-01 PROCEDURE — 78803 RP LOCLZJ TUM SPECT 1 AREA: CPT

## 2022-09-01 PROCEDURE — 72141 MRI NECK SPINE W/O DYE: CPT

## 2022-09-01 PROCEDURE — 99421 OL DIG E/M SVC 5-10 MIN: CPT | Performed by: FAMILY MEDICINE

## 2022-09-01 PROCEDURE — 78300 BONE IMAGING LIMITED AREA: CPT

## 2022-09-01 RX ORDER — FEXOFENADINE HCL 180 MG/1
180 TABLET ORAL DAILY
Qty: 30 TABLET | Refills: 1 | Status: SHIPPED | OUTPATIENT
Start: 2022-09-01

## 2022-09-01 RX ORDER — TRIAMCINOLONE ACETONIDE 55 UG/1
2 SPRAY, METERED NASAL DAILY
Qty: 1 EACH | Refills: 1 | Status: SHIPPED | OUTPATIENT
Start: 2022-09-01

## 2022-09-01 ASSESSMENT — LIFESTYLE VARIABLES: SMOKING_STATUS: NO, I'VE NEVER SMOKED

## 2022-09-01 NOTE — PROGRESS NOTES
[address] [facility] [address]  [address] [place] [facility] [address]  Dept: [phone]      E-VISIT PROGRESS NOTE    HPI  Alejandro Bryant is a 47 y.o. female patient  has requested an audio/video evaluation for the following concern(s): See below    PATIENT MESSAGE  Patient Questionnaire Submission  --------------------------------     Questionnaire:  EVISIT SINUS/COUGH/COLD QUESTIONNAIRE 1     Question: Have you been experiencing nasal congestion? Answer:   Yes     Question: When you blow your nose, what color is the mucus? Answer:   Mostly thick and yellow or green     Question: Have you had pain or pressure around your nose and face or do your upper teeth hurt? Answer:   Yes     Question: Has your throat been sore? Answer:   No     Question: Have you noticed any swollen glands in your neck? Answer:   No     Question: Have you been sneezing? Answer:   No     Question: Have you been coughing? Answer:   No     Questionnaire:  EVISIT SINUS/COUGH/COLD QUESTIONNAIRE 3     Question: Have you been hoarse or lost your voice? Answer:   No     Question: Have your ears been bothering you? Answer:   Yes     Question: If yes, please describe. Answer:   Popping     Question: Have your eyes been bothering you? Answer:   Yes     Question: If yes, please describe. Answer:   Pressure     Question: Have you been experiencing significant body aches? Answer:   No     Question: Have you had severe headaches of stiff neck? Answer:   No     Question: Have you been experiencing consistent nausea, vomitting, or dizziness? Answer:   No     Question: Have you been experiencing swelling of your mouth or tongue, or difficulty swallowing? Answer:   No     Question: Have you been experiencing chest pain or shortness of breath? Answer:   No     Question: Have you developed a new rash? Answer:   No     Question: How long have you been having these symptoms?   Answer:   Month or Colon polyp 10/31/2016    sessile serated adenoma x2    Depression     Diabetes mellitus ([facility] [address]     Diverticulitis 10/2016    Gastritis     GERD (gastroesophageal reflux disease)     Hemorrhoids     int/ext    Hypertension     Migraines     Osteoarthritis     Shingles 1-22-16    Tubular adenoma 10/31/2016    Urinary leakage       Allergies   Allergen Reactions    Adhesive Tape Other (See Comments)     blisters    Imitrex [Sumatriptan] Other (See Comments)     \"feels like head is going to explode    Morphine Itching     hives    Seasonal          Medication List            Accurate as of September 1, 2022  5:58 PM. If you have any questions, ask your nurse or doctor. START taking these medications      fexofenadine 180 MG tablet  Commonly known as: ALLEGRA  Take 1 tablet by mouth daily  Started by: BAR Oliva CNP     Phenylephrine-Acetaminophen 5-325 MG Tabs  Take 2 tablets by mouth every 6 hours as needed (congestion)  Started by: BAR Phelps CNP     triamcinolone 55 MCG/ACT nasal inhaler  Commonly known as: NASACORT  2 sprays by Each Nostril route daily  Started by: BAR Phelps CNP            CONTINUE taking these medications      acidophilus Caps capsule  TAKE 1 CAPSULE BY MOUTH TWICE DAILY     albuterol sulfate 108 (90 Base) MCG/ACT aerosol powder inhalation  Commonly known as: ProAir RespiClick  Inhale 2 puffs into the lungs every 4 hours as needed for Wheezing or Shortness of Breath     atorvastatin 20 MG tablet  Commonly known as: LIPITOR  TAKE 1 TABLET DAILY     Blood Pressure Monitor Kit  Use as directed. buPROPion 150 MG extended release tablet  Commonly known as: WELLBUTRIN XL  Take 1 tablet by mouth every morning     cetirizine 10 MG tablet  Commonly known as: ZYRTEC     Diclofenac Sodium Powd  Formula #5: diclo3%+gaba6%+lido2%+prilo2%. Apply 1-2 gm topically to affected area TID-QID.      dicyclomine 10 MG capsule  Commonly known as: Where to Get Your Medications        These medications were sent to [facility], [address] [place] [id number]      Phone: [phone]   fexofenadine 180 MG tablet  Phenylephrine-Acetaminophen 5-325 MG Tabs  triamcinolone 55 MCG/ACT nasal inhaler          Based on the symptoms reported by the patient, it seems that patient has   CURRENT MEDS W/ ASSOC DIAG           Start Date End Date     albuterol sulfate (PROAIR RESPICLICK) 527 (90 Base) MCG/ACT aerosol powder inhalation  02/04/20  --     Inhale 2 puffs into the lungs every 4 hours as needed for Wheezing or Shortness of Breath     Associated Diagnoses:  --     atorvastatin (LIPITOR) 20 MG tablet  05/06/22  --     TAKE 1 TABLET DAILY     Associated Diagnoses:  Mixed hyperlipidemia     blood glucose test strips (ONETOUCH VERIO) strip  06/22/22  --     Inject 1 each into the skin 2 times daily As needed. USE 1 STRIP DAILY AS NEEDED     Associated Diagnoses:  Type 2 diabetes mellitus without complication, without long-term current use of insulin ([facility])     Blood Pressure Monitor KIT  09/08/20  --     Use as directed. Associated Diagnoses:  Essential hypertension     buPROPion (WELLBUTRIN XL) 150 MG extended release tablet  04/07/22  --     Take 1 tablet by mouth every morning     Associated Diagnoses:  Adjustment disorder with mixed anxiety and depressed mood     cetirizine (ZYRTEC) 10 MG tablet  06/29/21  --     Associated Diagnoses:  --     Diclofenac Sodium POWD  07/01/21  --     Formula #5: diclo3%+gaba6%+lido2%+prilo2%. Apply 1-2 gm topically to affected area TID-QID.      Patient not taking: No sig reported     Associated Diagnoses:  --     dicyclomine (BENTYL) 10 MG capsule  06/22/22  --     Take 1 capsule by mouth 4 times daily     Associated Diagnoses:  Abdominal cramps     docusate sodium (COLACE) 100 MG capsule  12/01/17  --     Take 1 capsule by mouth daily as needed for Constipation     Associated Diagnoses:  --     Dulaglutide (TRULICITY) 4.5 EU/7.7OQ SOPN  06/22/22  --     Inject 4.5 mg into the skin once a week INJECT 4.5MG SUB-Q ONCE A WEEK     Associated Diagnoses:  Type 2 diabetes mellitus without complication, without long-term current use of insulin ([address]     Elastic Bandages & Supports (LUMBAR BACK BRACE/SUPPORT PAD) MISC  08/04/17  --     1 each by Does not apply route daily as needed (pain)     Associated Diagnoses:  --     estradiol (ESTRACE) 0.1 MG/GM vaginal cream  02/08/21  --     Associated Diagnoses:  --     fluticasone (FLONASE) 50 MCG/ACT nasal spray  08/25/22  --     INHALE 2 SPRAYS IN EACH NOSTRIL DAILY     Associated Diagnoses:  Chronic allergic rhinitis     ibuprofen (ADVIL;MOTRIN) 600 MG tablet  07/07/21  --     Associated Diagnoses:  --     ipratropium (ATROVENT) 0.06 % nasal spray  06/23/21  --     Associated Diagnoses:  --     Lactobacillus (ACIDOPHILUS) CAPS capsule  08/24/22  --     TAKE 1 CAPSULE BY MOUTH TWICE DAILY     Associated Diagnoses:  --     Lancets ([address]) [address]  06/22/22  --     USE 1 EACH TWICE A DAY     Associated Diagnoses:  Type 2 diabetes mellitus without complication, without long-term current use of insulin (HCC)     lisinopril (PRINIVIL;ZESTRIL) 5 MG tablet  07/26/22  --     TAKE 1 TABLET DAILY     Associated Diagnoses:  Essential hypertension     montelukast (SINGULAIR) 10 MG tablet  06/22/22  --     Take 1 tablet by mouth nightly TAKE 1 TABLET DAILY     Associated Diagnoses:  Chronic allergic rhinitis, Mild intermittent asthma without complication     omeprazole (PRILOSEC) 40 MG delayed release capsule  08/17/22  --     TAKE 1 CAPSULE DAILY     Associated Diagnoses:  Chronic GERD     oxyCODONE-acetaminophen (PERCOCET) 5-325 MG per tablet  08/25/22 09/24/22     Take 1 tablet by mouth every 6 hours as needed for Pain for up to 30 days.      Associated Diagnoses:  Lumbar radiculopathy, chronic, Degeneration of lumbar intervertebral disc, Sacroiliitis (HCC), Cervical disc herniation, Degenerative disc disease, cervical, Primary osteoarthritis of left hip     pregabalin (LYRICA) 75 MG capsule  22     TAKE 1 CAPSULE FOUR TIMES A DAY     Associated Diagnoses:  Degeneration of lumbar intervertebral disc, Cervical disc herniation, Degenerative disc disease, cervical, Primary osteoarthritis of left hip, Encounter for medication management, Osteoarthritis of spine with radiculopathy, lumbar region, Other osteoarthritis of spine, lumbar region, Osteoarthritis of lumbar spine, unspecified spinal osteoarthritis complication status, Chronic left-sided low back pain with left-sided sciatica     sertraline (ZOLOFT) 100 MG tablet  22  --     TAKE 1 TABLET DAILY     Associated Diagnoses:  Anxiety and depression     sodium chloride (ALTAMIST SPRAY) 0.65 % nasal spray  22  --     1 spray by Nasal route as needed for Congestion     Associated Diagnoses:  Non-seasonal allergic rhinitis due to other allergic trigger     STOOL SOFTENER/LAXATIVE 50-8.6 MG per tablet  21  --     Associated Diagnoses:  --     tiZANidine (ZANAFLEX) 4 MG tablet  22  --     TAKE 1 TABLET EVERY 8 HOURS AS NEEDED FOR SPASMS     Associated Diagnoses:  --     topiramate (TOPAMAX) 100 MG tablet  22  --     Take 2 at night     Associated Diagnoses:  Intractable chronic migraine without aura and without status migrainosus     trospium (SANCTURA) 20 MG tablet  21  --     Associated Diagnoses:  --       Associated Diagnoses:         Orders Placed This Encounter   Medications    triamcinolone (NASACORT) 55 MCG/ACT nasal inhaler     Si sprays by Each Nostril route daily     Dispense:  1 each     Refill:  1    fexofenadine (ALLEGRA) 180 MG tablet     Sig: Take 1 tablet by mouth daily     Dispense:  30 tablet     Refill:  1    Phenylephrine-Acetaminophen 5-325 MG TABS     Sig: Take 2 tablets by mouth every 6 hours as needed (congestion)     Dispense:  120 tablet     Refill:  0     No orders of the defined types were placed in this encounter. Diagnosis Orders   1. Chronic allergic rhinitis  triamcinolone (NASACORT) 55 MCG/ACT nasal inhaler    fexofenadine (ALLEGRA) 180 MG tablet    Phenylephrine-Acetaminophen 5-325 MG TABS         Patient was advised to contact me if symptoms not improving or getting worse, or to call us for an appointment. On this date 9/1/2022 I have spent 15 minutes reviewing previous notes, test results and face to face with the patient discussing the diagnosis and importance of compliance with the treatment plan as well as documenting on the day of the visit. This note was completed by using the assistance of a speech-recognition program. However, inadvertent computerized transcription errors may be present.  Although every effort was made to ensure accuracy, no guarantees can be provided that every mistake has been identified and corrected by editing   Electronically signed by BAR Shahid CNP on 9/1/22 at 5:51 PM EDT

## 2022-09-20 ENCOUNTER — HOSPITAL ENCOUNTER (OUTPATIENT)
Dept: PAIN MANAGEMENT | Age: 52
Discharge: HOME | End: 2022-09-20
Payer: COMMERCIAL

## 2022-09-20 VITALS
RESPIRATION RATE: 18 BRPM | OXYGEN SATURATION: 96 % | BODY MASS INDEX: 40.25 KG/M2 | DIASTOLIC BLOOD PRESSURE: 82 MMHG | SYSTOLIC BLOOD PRESSURE: 123 MMHG | TEMPERATURE: 97.3 F | HEIGHT: 60 IN | WEIGHT: 205 LBS | HEART RATE: 73 BPM

## 2022-09-20 DIAGNOSIS — M46.1 SACROILIITIS (HCC): ICD-10-CM

## 2022-09-20 DIAGNOSIS — G89.29 CHRONIC PAIN OF LEFT KNEE: ICD-10-CM

## 2022-09-20 DIAGNOSIS — M51.36 DEGENERATION OF LUMBAR INTERVERTEBRAL DISC: Chronic | ICD-10-CM

## 2022-09-20 DIAGNOSIS — M54.42 CHRONIC LEFT-SIDED LOW BACK PAIN WITH LEFT-SIDED SCIATICA: ICD-10-CM

## 2022-09-20 DIAGNOSIS — M47.896 OTHER OSTEOARTHRITIS OF SPINE, LUMBAR REGION: ICD-10-CM

## 2022-09-20 DIAGNOSIS — F11.90 CHRONIC, CONTINUOUS USE OF OPIOIDS: ICD-10-CM

## 2022-09-20 DIAGNOSIS — G89.29 CHRONIC BILATERAL LOW BACK PAIN WITHOUT SCIATICA: Primary | ICD-10-CM

## 2022-09-20 DIAGNOSIS — M47.26 OSTEOARTHRITIS OF SPINE WITH RADICULOPATHY, LUMBAR REGION: ICD-10-CM

## 2022-09-20 DIAGNOSIS — M54.16 LUMBAR RADICULOPATHY, CHRONIC: Chronic | ICD-10-CM

## 2022-09-20 DIAGNOSIS — M25.551 HIP PAIN, CHRONIC, RIGHT: ICD-10-CM

## 2022-09-20 DIAGNOSIS — M25.562 CHRONIC PAIN OF LEFT KNEE: ICD-10-CM

## 2022-09-20 DIAGNOSIS — G89.29 CHRONIC LEFT-SIDED LOW BACK PAIN WITH LEFT-SIDED SCIATICA: ICD-10-CM

## 2022-09-20 DIAGNOSIS — M16.12 PRIMARY OSTEOARTHRITIS OF LEFT HIP: ICD-10-CM

## 2022-09-20 DIAGNOSIS — M50.20 CERVICAL DISC HERNIATION: ICD-10-CM

## 2022-09-20 DIAGNOSIS — Z79.899 ENCOUNTER FOR MEDICATION MANAGEMENT: ICD-10-CM

## 2022-09-20 DIAGNOSIS — M50.30 DEGENERATIVE DISC DISEASE, CERVICAL: ICD-10-CM

## 2022-09-20 DIAGNOSIS — M47.816 OSTEOARTHRITIS OF LUMBAR SPINE, UNSPECIFIED SPINAL OSTEOARTHRITIS COMPLICATION STATUS: ICD-10-CM

## 2022-09-20 DIAGNOSIS — G89.29 HIP PAIN, CHRONIC, RIGHT: ICD-10-CM

## 2022-09-20 DIAGNOSIS — M47.816 LUMBAR SPONDYLOSIS: Chronic | ICD-10-CM

## 2022-09-20 DIAGNOSIS — M54.50 CHRONIC BILATERAL LOW BACK PAIN WITHOUT SCIATICA: Primary | ICD-10-CM

## 2022-09-20 PROCEDURE — 99214 OFFICE O/P EST MOD 30 MIN: CPT | Performed by: NURSE PRACTITIONER

## 2022-09-20 PROCEDURE — G0481 DRUG TEST DEF 8-14 CLASSES: HCPCS

## 2022-09-20 PROCEDURE — 80307 DRUG TEST PRSMV CHEM ANLYZR: CPT

## 2022-09-20 PROCEDURE — 99213 OFFICE O/P EST LOW 20 MIN: CPT

## 2022-09-20 RX ORDER — OXYCODONE HYDROCHLORIDE AND ACETAMINOPHEN 5; 325 MG/1; MG/1
1 TABLET ORAL EVERY 6 HOURS PRN
Qty: 120 TABLET | Refills: 0 | Status: SHIPPED | OUTPATIENT
Start: 2022-09-24 | End: 2022-10-20 | Stop reason: SDUPTHER

## 2022-09-20 RX ORDER — PREGABALIN 150 MG/1
150 CAPSULE ORAL 2 TIMES DAILY
Qty: 60 CAPSULE | Refills: 2 | Status: SHIPPED | OUTPATIENT
Start: 2022-09-20 | End: 2022-10-08

## 2022-09-20 ASSESSMENT — ENCOUNTER SYMPTOMS
BOWEL INCONTINENCE: 0
SHORTNESS OF BREATH: 0
BACK PAIN: 1
CONSTIPATION: 0
COUGH: 0
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
## 2022-09-21 DIAGNOSIS — E11.9 TYPE 2 DIABETES MELLITUS WITHOUT COMPLICATION, WITHOUT LONG-TERM CURRENT USE OF INSULIN (HCC): ICD-10-CM

## 2022-09-21 RX ORDER — DULAGLUTIDE 4.5 MG/.5ML
INJECTION, SOLUTION SUBCUTANEOUS
Qty: 2 ML | Refills: 1 | Status: SHIPPED | OUTPATIENT
Start: 2022-09-21

## 2022-09-22 ENCOUNTER — OFFICE VISIT (OUTPATIENT)
Dept: NEUROSURGERY | Age: 52
End: 2022-09-22
Payer: COMMERCIAL

## 2022-09-22 VITALS
WEIGHT: 205 LBS | BODY MASS INDEX: 38.71 KG/M2 | HEIGHT: 61 IN | DIASTOLIC BLOOD PRESSURE: 77 MMHG | OXYGEN SATURATION: 96 % | HEART RATE: 75 BPM | SYSTOLIC BLOOD PRESSURE: 122 MMHG

## 2022-09-22 DIAGNOSIS — M46.1 SACROILIAC INFLAMMATION (HCC): Primary | ICD-10-CM

## 2022-09-22 DIAGNOSIS — M43.06 PARS DEFECT OF LUMBAR SPINE: ICD-10-CM

## 2022-09-22 DIAGNOSIS — Z98.1 S/P CERVICAL SPINAL FUSION: ICD-10-CM

## 2022-09-22 PROCEDURE — G8417 CALC BMI ABV UP PARAM F/U: HCPCS | Performed by: NEUROLOGICAL SURGERY

## 2022-09-22 PROCEDURE — 1036F TOBACCO NON-USER: CPT | Performed by: NEUROLOGICAL SURGERY

## 2022-09-22 PROCEDURE — G8427 DOCREV CUR MEDS BY ELIG CLIN: HCPCS | Performed by: NEUROLOGICAL SURGERY

## 2022-09-22 PROCEDURE — 3074F SYST BP LT 130 MM HG: CPT | Performed by: NEUROLOGICAL SURGERY

## 2022-09-22 PROCEDURE — 3017F COLORECTAL CA SCREEN DOC REV: CPT | Performed by: NEUROLOGICAL SURGERY

## 2022-09-22 PROCEDURE — 3078F DIAST BP <80 MM HG: CPT | Performed by: NEUROLOGICAL SURGERY

## 2022-09-22 PROCEDURE — 99214 OFFICE O/P EST MOD 30 MIN: CPT | Performed by: NEUROLOGICAL SURGERY

## 2022-09-22 NOTE — PROGRESS NOTES
MYCOPLASMA CULTURES performed by Ritu Garza DO at [address]      HAND SURGERY Right     thumb surgery, BONE REMOVED    HYSTERECTOMY (CERVIX STATUS UNKNOWN)      LAPAROSCOPY      NM DEST,PARAVERTEBRAL,L/S,ADDL LVLS  3/25/2019         TONSILLECTOMY      TUBAL LIGATION      UPPER GASTROINTESTINAL ENDOSCOPY  10/31/2016    gastritis    UPPER GASTROINTESTINAL ENDOSCOPY N/A 10/22/2019    EGD BIOPSY performed by Savi Wilson MD at [facility]       Social History:   Social History     Socioeconomic History    Marital status:      Spouse name: Not on file    Number of children: Not on file    Years of education: Not on file    Highest education level: Not on file   Occupational History    Occupation: unemployed   Tobacco Use    Smoking status: Never    Smokeless tobacco: Never   Vaping Use    Vaping Use: Never used   Substance and Sexual Activity    Alcohol use: No    Drug use: No    Sexual activity: Yes   Other Topics Concern    Not on file   Social History Narrative    Not on file     Social Determinants of Health     Financial Resource Strain: Low Risk     Difficulty of Paying Living Expenses: Not hard at all   Food Insecurity: No Food Insecurity    Worried About Running Out of Food in the Last Year: Never true    Ran Out of Food in the Last Year: Never true   Transportation Needs: Not on file   Physical Activity: Not on file   Stress: Not on file   Social Connections: Not on file   Intimate Partner Violence: Not on file   Housing Stability: Not on file       Family History:       Problem Relation Age of Onset    Cancer Mother     Heart Disease Father     Diabetes Father     High Blood Pressure Father     Other Other         Celiac Sprue. Aunt       Allergies:  Adhesive tape, Imitrex [sumatriptan], Morphine, and Seasonal    Home Medications:  Prior to Admission medications    Medication Sig Start Date End Date Taking?  Authorizing Provider   TRULICITY 4.5 MG/0.5ML SOPN INJECT 4.5 MG SUBCUTANEOUSLY ONCE WEEKLY 9/21/22  Yes BAR Oliva CNP   oxyCODONE-acetaminophen (PERCOCET) 5-325 MG per tablet Take 1 tablet by mouth every 6 hours as needed for Pain for up to 30 days. 9/24/22 10/24/22 Yes BAR Rice CNP   pregabalin (LYRICA) 150 MG capsule Take 1 capsule by mouth 2 times daily for 30 days. TAKE 1 CAPSULE FOUR TIMES A DAY 9/20/22 10/20/22 Yes BAR Rice CNP   triamcinolone (NASACORT) 55 MCG/ACT nasal inhaler 2 sprays by Each Nostril route daily 9/1/22  Yes BAR Oliva CNP   fexofenadine (ALLEGRA) 180 MG tablet Take 1 tablet by mouth daily 9/1/22  Yes BAR Oliva CNP   fluticasone (FLONASE) 50 MCG/ACT nasal spray INHALE 2 SPRAYS IN EACH NOSTRIL DAILY 8/25/22  Yes BAR Oliva - CNP   Lactobacillus (ACIDOPHILUS) CAPS capsule TAKE 1 CAPSULE BY MOUTH TWICE DAILY 8/24/22  Yes BAR Oliva CNP   omeprazole (PRILOSEC) 40 MG delayed release capsule TAKE 1 CAPSULE DAILY 8/17/22  Yes BAR Oliva CNP   lisinopril (PRINIVIL;ZESTRIL) 5 MG tablet TAKE 1 TABLET DAILY 7/26/22  Yes BAR Oliva CNP   montelukast (SINGULAIR) 10 MG tablet Take 1 tablet by mouth nightly TAKE 1 TABLET DAILY 6/22/22  Yes BAR Oliva CNP   blood glucose test strips (ONETOUCH VERIO) strip Inject 1 each into the skin 2 times daily As needed. USE 1 STRIP DAILY AS NEEDED 6/22/22  Yes BAR Oliva CNP   Lancets (ONETOUCH DELICA PLUS ORVRIL90V) MISC USE 1 EACH TWICE A DAY 6/22/22  Yes BAR Oliva CNP   dicyclomine (BENTYL) 10 MG capsule Take 1 capsule by mouth 4 times daily 6/22/22  Yes BAR Oliva CNP   tiZANidine (ZANAFLEX) 4 MG tablet TAKE 1 TABLET EVERY 8 HOURS AS NEEDED FOR SPASMS 6/2/22  Yes BAR Rice CNP   sertraline (ZOLOFT) 100 MG tablet TAKE 1 TABLET DAILY 5/27/22  Yes BAR Oliva CNP   atorvastatin (LIPITOR) 20 MG tablet TAKE 1 TABLET DAILY 5/6/22  Yes BAR Oliva CNP   topiramate (TOPAMAX) 100 MG tablet Take 2 at night 4/20/22  Yes BAR Oliva CNP   sodium chloride (ALTAMIST SPRAY) 0.65 % nasal spray 1 spray by Nasal route as needed for Congestion 4/7/22  Yes BAR Oliva CNP   buPROPion (WELLBUTRIN XL) 150 MG extended release tablet Take 1 tablet by mouth every morning 4/7/22  Yes BAR Oliva CNP   cetirizine (ZYRTEC) 10 MG tablet Take 10 mg by mouth daily 6/29/21  Yes Historical Provider, MD   ibuprofen (ADVIL;MOTRIN) 600 MG tablet TAKE 1 TABLET BY MOUTH EVERY 6 HOURS 7/7/21  Yes Historical Provider, MD   STOOL SOFTENER/LAXATIVE 50-8.6 MG per tablet TAKE 2 TABLETS BY MOUTH TWICE DAILY 7/7/21  Yes Historical Provider, MD   ipratropium (ATROVENT) 0.06 % nasal spray USE 2 SPRAY(S) IN EACH NOSTRIL ONCE DAILY 6/23/21  Yes Historical Provider, MD   trospium (SANCTURA) 20 MG tablet Take 1 tablet by mouth daily 5/18/21  Yes Historical Provider, MD   estradiol (ESTRACE) 0.1 MG/GM vaginal cream  2/8/21  Yes Historical Provider, MD   Blood Pressure Monitor KIT Use as directed. 9/8/20  Yes BAR Oliva CNP   albuterol sulfate (PROAIR RESPICLICK) 090 (90 Base) MCG/ACT aerosol powder inhalation Inhale 2 puffs into the lungs every 4 hours as needed for Wheezing or Shortness of Breath 2/4/20  Yes Enrrique Schmitz MD   docusate sodium (COLACE) 100 MG capsule Take 1 capsule by mouth daily as needed for Constipation 12/1/17  Yes BAR Parra CNP   Elastic Bandages & Supports (LUMBAR BACK BRACE/SUPPORT PAD) MISC 1 each by Does not apply route daily as needed (pain) 8/4/17  Yes Gary Carrillo MD   Cream Base (SALT STABLE LS ADVANCED) CREA  1/7/22 8/22/22  Historical Provider, MD   Diclofenac Sodium POWD Formula #5: diclo3%+gaba6%+lido2%+prilo2%. Apply 1-2 gm topically to affected area TID-QID.   Patient not taking: No sig reported 7/1/21   Carmella BARRIOS Joy Renae DPM       Current Medications:   No current facility-administered medications for this visit. PHYSICAL EXAM:       /77   Pulse 75   Ht 5' 1\" (1.549 m)   Wt 205 lb (93 kg)   SpO2 96%   BMI 38.73 kg/m²   Physical Exam     Gen: NAD  HEENT: moist mucus membranes  Cardio: RRR  Pulm: chest rise symmetrically  GI: abd soft  Ext: no edema  Skin: warm    Neuro:    AOX3  CN 2-12 grossly intact  Speech articulate  Motor 5/5  No pronator drift  Sensation symmetrical   Positive Right Fabres Test  Positive Left Fabres Test  Positive Pelvic Compression  Positive Pelvic Distraction   Larry Test  Left Tinel's Sign Cubital Tunnel   Left Tinel's Sign Carpal Tunnel  Thumb AB Duction 4+/5 Bilaterally     Radiology Review:      MRI Cervical Spine WO Contrast  9/02/2022  Official Read: Anterior fixation at D6-5 without complication. Multilevel degenerative change with mild canal stenosis at C3-4 and foraminal  narrowing as described above.     My Read:  agree    ASSESSMENT AND PLAN:       Patient Active Problem List   Diagnosis    Degenerative disc disease, cervical    Cervical disc herniation    Lumbar radiculopathy, chronic    Degeneration of lumbar intervertebral disc    Lumbar spondylosis    Essential hypertension    Left-sided low back pain with left-sided sciatica    Osteoarthritis of lumbar spine    Sacroiliac inflammation (HCC)    Rheumatoid arthritis (HCC)    Primary osteoarthritis of left hip    Hip pain, chronic, right    Hemorrhoids    Colon polyp    Tubular adenoma    Chronic bilateral low back pain without sciatica    Spinal osteoarthritis    Morbid obesity with BMI of 40.0-44.9, adult ([facility] [address]    Adjustment disorder with mixed anxiety and depressed mood    Type 2 diabetes mellitus without complication, without long-term current use of insulin (HCC)    Mixed incontinence urge and stress    Chronic, continuous use of opioids    Lumbosacral spondylosis without myelopathy    Chronic GERD Bacterial sinusitis    Mixed hyperlipidemia    Acne cystica    Chronic rhinitis    Surgical menopause    Allergic rhinitis due to allergen    S/P Cystoscopy w/ Hydrodistension and DMSO    Cervical lymphadenopathy    Interstitial cystitis    Mild intermittent asthma without complication    Pars defect of lumbar spine    Chronic idiopathic constipation    Intractable chronic migraine without aura and without status migrainosus    Chronic pain of left knee    S/P cervical spinal fusion         A/P:  This is a 47 y.o. female with Sacroiliac inflammation (HCC)  S/P cervical spinal fusion  Pars defect of lumbar spine     I recommend continuing to see pain management for injections. Follow-up in 2 months. Patient and/or family was counseled on the diagnosis and treatment plan    By signing my name below, I, Cara Villanueva, attest that this documentation has been prepared under the direction and in the presence of Wendy Sequeira DO. Electronically signed: Jono Rea, 9/22/22     This note was created using voice recognition software. There may be inaccuracies of transcription  that are inadvertently overlooked prior to the signature. There is any questions about the transcription please contact me.

## 2022-09-23 ENCOUNTER — TELEPHONE (OUTPATIENT)
Dept: FAMILY MEDICINE CLINIC | Age: 52
End: 2022-09-23

## 2022-09-23 NOTE — TELEPHONE ENCOUNTER
SPOKE WITH PATIENT SHE IS CALLING INSURANCE FOR SPECIALIST NAME AND WILL LET US KNOW WHO TO REFER TO.

## 2022-09-23 NOTE — TELEPHONE ENCOUNTER
Pt called in stating she no longer sees the doctor who prescribed her trospium (SANCTURA) 20 MG tablet and would like to know if it can be refilled for her. Please Approve or Refuse.   Send to Pharmacy per Pt's Request: [facility]     Next Visit Date:  10/11/2022   Last Visit Date: 9/1/2022    Hemoglobin A1C (%)   Date Value   04/07/2022 5.6   09/28/2021 5.5   05/24/2021 5.9             ( goal A1C is < 7)   BP Readings from Last 3 Encounters:   09/22/22 122/77   09/20/22 123/82   08/22/22 132/73          (goal 120/80)  BUN   Date Value Ref Range Status   07/18/2021 15 6 - 20 mg/dL Final     Creatinine   Date Value Ref Range Status   07/18/2021 0.75 0.50 - 0.90 mg/dL Final     Potassium   Date Value Ref Range Status   07/18/2021 4.0 3.7 - 5.3 mmol/L Final

## 2022-09-23 NOTE — TELEPHONE ENCOUNTER
We can certainly find her another specialist. Have her call her insurance to find out where we can send her

## 2022-09-24 LAB
6-ACETYLMORPHINE, UR: NOT DETECTED
7-AMINOCLONAZEPAM, URINE: NOT DETECTED
ALPHA-OH-ALPRAZ, URINE: NOT DETECTED
ALPHA-OH-MIDAZOLAM, URINE: NOT DETECTED
ALPRAZOLAM, URINE: NOT DETECTED
AMPHETAMINES, URINE: NOT DETECTED
BARBITURATES, URINE: NOT DETECTED
BENZOYLECGONINE, UR: NOT DETECTED
BUPRENORPHINE URINE: NOT DETECTED
CARISOPRODOL, UR: NOT DETECTED
CLONAZEPAM, URINE: NOT DETECTED
CODEINE, URINE: NOT DETECTED
CREATININE URINE: 213.6 MG/DL (ref 20–400)
DIAZEPAM, URINE: NOT DETECTED
DRUGS EXPECTED, UR: NORMAL
EER HI RES INTERP UR: NORMAL
ETHYL GLUCURONIDE UR: PRESENT
FENTANYL URINE: NOT DETECTED
GABAPENTIN: NOT DETECTED
HYDROCODONE, URINE: NOT DETECTED
HYDROMORPHONE, URINE: NOT DETECTED
LORAZEPAM, URINE: NOT DETECTED
MARIJUANA METAB, UR: NOT DETECTED
MDA, UR: NOT DETECTED
MDEA, EVE, UR: NOT DETECTED
MDMA URINE: NOT DETECTED
MEPERIDINE METAB, UR: NOT DETECTED
METHADONE, URINE: NOT DETECTED
METHAMPHETAMINE, URINE: NOT DETECTED
METHYLPHENIDATE: NOT DETECTED
MIDAZOLAM, URINE: NOT DETECTED
MORPHINE URINE: NOT DETECTED
NALOXONE URINE: NOT DETECTED
NORBUPRENORPHINE, URINE: NOT DETECTED
NORDIAZEPAM, URINE: NOT DETECTED
NORFENTANYL, URINE: NOT DETECTED
NORHYDROCODONE, URINE: NOT DETECTED
NOROXYCODONE, URINE: PRESENT
NOROXYMORPHONE, URINE: PRESENT
OXAZEPAM, URINE: NOT DETECTED
OXYCODONE URINE: PRESENT
OXYMORPHONE, URINE: PRESENT
PAIN MANAGEMENT DRUG PANEL INTERP, URINE: NORMAL
PAIN MGT DRUG PANEL, HI RES, UR: NORMAL
PCP,URINE: NOT DETECTED
PHENTERMINE, UR: NOT DETECTED
PREGABALIN: PRESENT
TAPENTADOL, URINE: NOT DETECTED
TAPENTADOL-O-SULFATE, URINE: NOT DETECTED
TEMAZEPAM, URINE: NOT DETECTED
TRAMADOL, URINE: NOT DETECTED
ZOLPIDEM METABOLITE (ZCA), URINE: NOT DETECTED
ZOLPIDEM, URINE: NOT DETECTED

## 2022-09-27 ENCOUNTER — TELEPHONE (OUTPATIENT)
Dept: PAIN MANAGEMENT | Age: 52
End: 2022-09-27

## 2022-09-27 RX ORDER — PREGABALIN 75 MG/1
CAPSULE ORAL
Qty: 88 CAPSULE | Refills: 1 | OUTPATIENT
Start: 2022-09-27

## 2022-09-28 ENCOUNTER — TELEPHONE (OUTPATIENT)
Dept: PAIN MANAGEMENT | Age: 52
End: 2022-09-28

## 2022-09-28 NOTE — TELEPHONE ENCOUNTER
Spoke with pt in regards to recent UDS results, let pt know with pain medication contract no alcohol consumption is aloud at any time.  Pt verbalized understanding

## 2022-10-08 RX ORDER — PREGABALIN 75 MG/1
1 CAPSULE ORAL 4 TIMES DAILY
COMMUNITY
Start: 2022-09-26

## 2022-10-08 ASSESSMENT — ENCOUNTER SYMPTOMS
SORE THROAT: 0
CHEST TIGHTNESS: 0
COLOR CHANGE: 0
WHEEZING: 0
SHORTNESS OF BREATH: 0
BACK PAIN: 1
COUGH: 0

## 2022-10-09 NOTE — PROGRESS NOTES
[facility] PHYSICIANS  [facility] PHYSICIANS  [place] [facility] [address].  [address] [address]  Dept: 270 Lorenza Prabhakar (:  1970) is a 47 y.o. female. Patient is here for evaluation of the following chief complaint(s):  Chief Complaint   Patient presents with    Hypertension    Diabetes    Anxiety      SUBJECTIVE/OBJECTIVE:  ARMANDO Chopra is a 47 y.o. female patient. Patient is an established patient of mine. Patient has a known history of hypertension, diabetes, hyperlipidemia, rheumatoid arthritis, asthma, hip arthritis, low back pain, adjustment disorder, vitamin D deficiency, and, morbid obesity    HYPERTENSION  Iqra Stanford has a well controlled hypertension. she is currently on lisinopril. She has been on this therapy, patient's most recent BP in the office was stable. she reports stable BP readings at home. Patient denies any adverse reaction to this therapy. she denies any CP, SOB, HA, or palpitations. Patient admits to exercising and adheres to low salt diet. No history of organ damage due to condition noted. Lab Results   Component Value Date/Time    CREATININE 0.75 2021 03:57 PM     DIABETES MELLITUS- improving  Patient has a  stable Diabetes Mellitus. Current therapy includes trulicity 4.5 for . patient is responding well with this therapy. Patient reports home glucose monitoring as Stable readings. {symptoms. Patient denies neither vomiting nor diarrhea. Eye Exam: due  Foot Exam:due  Lab Results   Component Value Date/Time    LABA1C 5.3 10/11/2022 02:55 PM    LABA1C 5.6 2022 10:49 AM      HYPERLIPIDEMIA- stable  Iqra Goodrich is currently on atorvastatin (Lipitor). Patient denies adverse reaction to this medication. Compliance with treatment thus far has been good. The patient is not known to have coexisting coronary artery disease.  The 10-year CVD risk score (D'Agostino, et al., 2008) is: 6.7%    Values used to calculate the score: Age: 47 years      Sex: Female      Diabetic: Yes      Tobacco smoker: No      Systolic Blood Pressure: 248 mmHg      Is BP treated: Yes      HDL Cholesterol: 45 mg/dL      Total Cholesterol: 152 mg/dL  Lab Results   Component Value Date/Time    CHOLFAST 152 09/30/2021 09:03 AM    CHOL 151 10/05/2019 08:50 AM    CHOL 210 05/13/2014 12:00 AM    HDL 45 09/30/2021 09:03 AM    LDLCHOLESTEROL 62 09/30/2021 09:03 AM    TRIGLYCFAST 227 (H) 09/30/2021 09:03 AM    CHOLHDLRATIO 3.4 09/30/2021 09:03 AM    TRIG 205 (H) 10/05/2019 08:50 AM    VLDL NOT REPORTED (H) 09/30/2021 09:03 AM     Lab Results   Component Value Date/Time    ALKPHOS 91 07/18/2021 03:57 PM    ALT 12 07/18/2021 03:57 PM    AST 11 07/18/2021 03:57 PM    BILITOT 0.20 (L) 07/18/2021 03:57 PM    PROT 6.5 07/18/2021 03:57 PM    LABALBU 3.8 07/18/2021 03:57 PM      RHEUMATOID ARTHRITIS/ LOW BACK PAIN/ HIP PAIN/ SI JOINT PAIN  Pain Management Dr. Clayborne Harada injected SI Joint both hips recently. Too soon to realize if it helps. Dr Pedrito Barrett does not think it is her spine. Patient also had RFA last month helped. Patient continues to see the neurosurgeon regularly. She had a visit with the neurosurgeon and was told that she is a surgical candidate but was told that she needs to lose weight. She started the process of meeting with the bariatrics office however, she was told that it can take at least 8 months for her to have the surgery therefore she was advised to see me and start on a diet medication to help her lose weight. Dr Izzy Linton- concerned about weight    PREVIOUS NOTES  Iqra Romo has a known history of rheumatoid arthritis. Patient also has some chronic low back pain. She did have some significant thoracic to lumbar spine disorder. Patient had recently had 2 courses of RFA's with relief for up to 6 months. However, patient continues to have the pain. Patient is encouraged to continue with follow-up with the pain management doctors.  She is also morbidly obese. She is reported some weight gain. Patient is currently on Lyrica, tizanidine we will switch to Robaxin, and Norco.  Patient is established with the pain management. She sees him on a regular basis. Dr Rupal Chang  n about this condition and the treatment which she is already on several of the treatment including Lyrica. OBESITY- weight gain/ NAUSEA/ VOMITING/ DIARRHEA  Patient's BMI is Body mass index is 38.92 kg/m². kg/m2. BMI is increasing. Patient was complaining that she had some bouts of nausea, vomiting and diarrhea. He also had some abdominal pain. Wt Readings from Last 3 Encounters:   10/11/22 206 lb (93.4 kg)   10/10/22 205 lb (93 kg)   09/22/22 205 lb (93 kg)     DEPRESSION AND ANXIETY-- Worsening. Iqra TRANG Ricojayro Tripp reported some ongoing issues with depression and anxiety. SCurrent therapy includes  wellbutrin, Zoloft we will restart buspirone 30 3 times daily, which is working well for her. she denies adverse reaction to current therapy. she also denies suicidal/homicidal ideation, plan or intent. PHQ-2 Over the past 2 weeks, how often have you been bothered by any of the following problems? Little interest or pleasure in doing things: Not at all  Feeling down, depressed, or hopeless: Not at all  PHQ-2 Score: 0  PHQ-9 Over the past 2 weeks, how often have you been bothered by any of the following problems? Trouble falling or staying asleep, or sleeping too much: Not at all  Feeling tired or having little energy: Not at all  Poor appetite or overeating: Not at all  Feeling bad about yourself - or that you are a failure or have let yourself or your family down: Not at all  Trouble concentrating on things, such as reading the newspaper or watching television: Not at all  Moving or speaking so slowly that other people could have noticed.  Or the opposite - being so fidgety or restless that you have been moving around a lot more than usual: Not at all  Thoughts that you would be better off dead, or of hurting yourself in some way: Not at all  If you checked off any problems, how difficult have these problems made it for you to do your work, take care of things at home, or get along with other people?: Not difficult at all  PHQ-9 Total Score: 0  PHQ-9 Total Score: 0   KAITLIN-7 SCREENING 10/11/2022 1/8/2021 9/8/2020   Feeling nervous, anxious, or on edge Nearly every day - -   Not being able to stop or control worrying Nearly every day - -   Worrying too much about different things Nearly every day - -   Trouble relaxing Nearly every day - -   Being so restless that it is hard to sit still Nearly every day - -   Becoming easily annoyed or irritable Nearly every day - -   Feeling afraid as if something awful might happen Nearly every day - -   KAITLIN-7 Total Score 21 - -   How difficult have these problems made it for you to do your work, take care of things at home, or get along with other people? Very difficult - -   Feeling nervous, anxious, or on edge - 1-Several days 1-Several days   Not able to stop or control worrying - 1-Several days 2-Over half the days   Worrying too much about different things - 1-Several days 1-Several days   Trouble relaxing - 1-Several days 1-Several days   Being so restless that it's hard to sit still - 0-Not at all 1-Several days   Becoming easily annoyed or irritable - 1-Several days 1-Several days   Feeling afraid as if something awful might happen - 2-Over half the days 2-Over half the days   KAITLIN-7 Total Score - 7 9       Iqra is due for Influenza vaccine. Denies side effects from prior flu shots. Denies allergy to eggs. Denies history of Guillain-Barré syndrome. Vitals:    10/11/22 1429   BP: 110/70   Pulse: 74   Temp: 97.4 °F (36.3 °C)   SpO2: 98%   Weight: 206 lb (93.4 kg)   Height: 5' 1\" (1.549 m)   Estimated body mass index is 38.92 kg/m² as calculated from the following:    Height as of this encounter: 5' 1\" (1.549 m).     Weight as of this encounter: 206 lb (93.4 kg). Review of Systems   Constitutional:  Positive for activity change, fatigue and unexpected weight change (losing weight). Negative for chills and fever. HENT:  Negative for ear pain, postnasal drip, sneezing, sore throat and trouble swallowing. Eyes:         Wears glasses   Respiratory:  Negative for cough, chest tightness, shortness of breath and wheezing. Cardiovascular:  Negative for chest pain and palpitations. Gastrointestinal:  Positive for abdominal pain, diarrhea, nausea and vomiting. Genitourinary:  Negative for frequency, pelvic pain, urgency and vaginal discharge. Musculoskeletal:  Positive for arthralgias, back pain, gait problem, joint swelling and myalgias. Back and hips   Skin:  Negative for color change, rash and wound. Allergic/Immunologic: Positive for environmental allergies. Neurological:  Positive for numbness. Negative for dizziness, syncope and headaches. Hematological:  Positive for adenopathy. Psychiatric/Behavioral:  Positive for dysphoric mood. Negative for suicidal ideas. The patient is nervous/anxious. Physical Exam  Vitals and nursing note reviewed. Constitutional:       Appearance: She is well-developed. She is morbidly obese. HENT:      Head: Normocephalic. Right Ear: External ear normal.      Left Ear: External ear normal.      Nose: Nose normal.      Mouth/Throat:      Mouth: Mucous membranes are moist.      Pharynx: Posterior oropharyngeal erythema present. Eyes:      Pupils: Pupils are equal, round, and reactive to light. Cardiovascular:      Rate and Rhythm: Normal rate. Pulses: Normal pulses. Heart sounds: Normal heart sounds. Pulmonary:      Effort: Pulmonary effort is normal.      Breath sounds: Normal breath sounds. Abdominal:      General: Abdomen is protuberant. Bowel sounds are normal. There is distension. Palpations: Abdomen is soft. Tenderness:  There is abdominal tenderness in the epigastric area and left lower quadrant. Comments: obese   Musculoskeletal:      Cervical back: Normal range of motion and neck supple. Thoracic back: Decreased range of motion. Lumbar back: Spasms (right paralumbar area) and tenderness present. Decreased range of motion. Skin:     Capillary Refill: Capillary refill takes less than 2 seconds. Neurological:      Mental Status: She is alert and oriented to person, place, and time. Psychiatric:         Mood and Affect: Mood is anxious. Speech: Speech is rapid and pressured. Behavior: Behavior is withdrawn. Thought Content: Thought content does not include suicidal ideation. Diagnosis Date    Anxiety     Asthma     Colon polyp 10/31/2016    sessile serated adenoma x2    Depression     Diabetes mellitus ([facility] [address]     Diverticulitis 10/2016    Gastritis     GERD (gastroesophageal reflux disease)     Hemorrhoids     int/ext    Hypertension     Migraines     Osteoarthritis     Shingles 1-22-16    Tubular adenoma 10/31/2016    Urinary leakage           ASSESSMENT/PLAN:  1. Essential hypertension  Stable  Continue current therapy. DISCUSSED AND ADVISED TO:  Cut down on your salt intake. Cut down on caffeinated drinks, sports drinks. Instructed to check BP at home regularly. Report for any chest pains, shortness of breath, headaches, and lightheadedness. Call the office if your blood pressure continue to be higher than 140/90 or 90/50.    - CBC with Auto Differential; Future  - Comprehensive Metabolic Panel, Fasting; Future  - Urinalysis with Reflex to Culture; Future    2. Type 2 diabetes mellitus without complication, without long-term current use of insulin (HCC)  Stable  Continue therapy. We will continue to monitor Hemoglobin A1c   DISCUSSED and ADVISED TO:  Continue to check Glucose levels at home. Report increased and low levels as discussed.   Decrease carbohydrates, sugary drinks, desserts in your diet. Exercise regularly, as tolerated. Try to lose weight.    - POCT glycosylated hemoglobin (Hb A1C)  - CBC with Auto Differential; Future  - Hemoglobin A1C; Future  - Comprehensive Metabolic Panel, Fasting; Future  - Urinalysis with Reflex to Culture; Future    3. Mixed hyperlipidemia  Stable  Continue therapy. Advised to decrease the consumption of red meats, fried foods, trans fats, sweets, sugary beverages. Advised to increase fish, vegetables, and fruits consumption. Advised to add fiber or OTC supplements in diet. Discussed weight loss which will result in improvement of lipids levels. Advised to increase daily physical activities and add regular exercises. - Comprehensive Metabolic Panel, Fasting; Future  - Lipid, Fasting; Future    4. Rheumatoid arthritis, involving unspecified site, unspecified whether rheumatoid factor present ([address]  Stable  Continue current therapy. DISCUSSED and ADVISED TO:  Stay at a healthy weight. Continue exercises/PT  Stretch to help prevent stiffness and to prevent injury before exercise. Gentle forms of yoga help keep joints and muscles flexible. Walk instead of jog, ride a bike, swim, and water exercise. Lift weights as tolerated. strong muscles help reduce stress on joints. Take pain medicines exactly as directed and only as needed. 5. Mild intermittent asthma without complication  Stable  Continue current therapy. DISCUSSED and ADVISED TO:  Use prescribed inhalers or medications regularly. Avoid known irritants and exposure to known triggers. Advised to report the need to use your albuterol inhaler more than usual  Stay away from smoking or second hand smoke. Go to the nearest ER for increasing SOB. 6. Lumbosacral spondylosis without myelopathy  Stable  Continue current therapy. DISCUSSED AND ADVISED TO:  Use heat packs 15 to 20 mins every 2-3 hours. Do some back stretches as tolerated.   Refer to hand out for instructions. Call for worsening, numbness, weakness. - Vitamin B12 & Folate; Future    7. Bilateral hip joint arthritis  Stable  Continue current therapy. DISCUSSED and ADVISED TO:  Stay at a healthy weight. Continue exercises/PT  Stretch to help prevent stiffness and to prevent injury before exercise. Gentle forms of yoga help keep joints and muscles flexible. Walk instead of jog, ride a bike, swim, and water exercise. Lift weights as tolerated. strong muscles help reduce stress on joints. Take pain medicines exactly as directed and only as needed. - Vitamin B12 & Folate; Future    8. Adjustment disorder with mixed anxiety and depressed mood  Worsening  Continue current therapy. Discussed how to recognize anxiety. Advised to relieve tension with exercise or a massage. Advised to get enough rest.  Advised to avoid alcohol, caffeine, nicotine, and illegal drugs. Which can increase anxiety level and cause sleep problems. - buPROPion (WELLBUTRIN XL) 150 MG extended release tablet; Take 1 tablet by mouth every morning  Dispense: 30 tablet; Refill: 2  - busPIRone (BUSPAR) 30 MG tablet; Take 30 mg by mouth 3 times daily  Dispense: 90 tablet; Refill: 2    9. Vitamin D deficiency  Stable  Continue Vitamin D supplementation  DISCUSSED AND ADVISED TO:  Foods that contain a lot of vitamin D includes [place], tuna, and mackerel. Cheese, egg yolks, and beef liver have small amounts of vit D.  Milk, soy drinks, orange juice, yogurt, margarine, and some kinds of cereal have vitamin D added to them. Continue to use sunblock when out in the sun to prevent skin cancer.    - Vitamin D 25 Hydroxy; Future  - Vitamin B12 & Folate; Future    10. Class 2 severe obesity due to excess calories with serious comorbidity and body mass index (BMI) of 38.0 to 38.9 in adult (HCC)  Worsening  BMI increasing  DISCUSSED AND ADVISED TO:  Eat a low-fat and low carbohydrates diet.   Avoid fried foods especially fast food.  Choose healthier options for snacks. Have 5-6 servings of fruits and vegetables per day. Cut down on eating processed food. Add 30 minutes to 1 hour aerobic exercise for 3-4 days a week. - Vitamin B12 & Folate; Future    11. Weight gain  Failure to Improve  Continue to evaluate  Recommend   labs  - TSH; Future  - Vitamin B12 & Folate; Future    12. Breast cancer screening by mammogram  Failure to Improve  Continue to evaluate  Recommend   Repeat mammogra,  - DONNA MANDEEP DIGITAL SCREEN BILATERAL; Future    13. Need for influenza vaccination  Stable  Recommended by CDC. No current infection. Denies previous adverse reaction to vaccination.    - Influenza, FLUCELVAX, (age 10 mo+), IM, PF, 0.5 mL      Return in about 4 months (around 2/11/2023) for Chronic conditions, 30mins. This note was completed by using the assistance of a speech-recognition program. However, inadvertent computerized transcription errors may be present. Although every effort was made to ensure accuracy, no guarantees can be provided that every mistake has been identified and corrected by editing.   Electronically signed by BAR Pete CNP on 5/23/21 at 7:00 PM EDT     --BAR Oliva CNP

## 2022-10-10 ENCOUNTER — HOSPITAL ENCOUNTER (OUTPATIENT)
Dept: GENERAL RADIOLOGY | Age: 52
Discharge: HOME | End: 2022-10-12
Payer: COMMERCIAL

## 2022-10-10 ENCOUNTER — HOSPITAL ENCOUNTER (OUTPATIENT)
Dept: PAIN MANAGEMENT | Age: 52
Discharge: HOME | End: 2022-10-10
Payer: COMMERCIAL

## 2022-10-10 VITALS
HEART RATE: 75 BPM | OXYGEN SATURATION: 94 % | WEIGHT: 205 LBS | TEMPERATURE: 97.9 F | DIASTOLIC BLOOD PRESSURE: 67 MMHG | HEIGHT: 61 IN | RESPIRATION RATE: 16 BRPM | SYSTOLIC BLOOD PRESSURE: 104 MMHG | BODY MASS INDEX: 38.71 KG/M2

## 2022-10-10 DIAGNOSIS — R52 PAIN MANAGEMENT: ICD-10-CM

## 2022-10-10 DIAGNOSIS — G89.29 CHRONIC SI JOINT PAIN: Chronic | ICD-10-CM

## 2022-10-10 DIAGNOSIS — M53.3 CHRONIC SI JOINT PAIN: Chronic | ICD-10-CM

## 2022-10-10 PROCEDURE — G0260 INJ FOR SACROILIAC JT ANESTH: HCPCS

## 2022-10-10 PROCEDURE — 27096 INJECT SACROILIAC JOINT: CPT | Performed by: ANESTHESIOLOGY

## 2022-10-10 PROCEDURE — 3209999900 FLUORO FOR SURGICAL PROCEDURES

## 2022-10-10 PROCEDURE — 6360000002 HC RX W HCPCS: Performed by: ANESTHESIOLOGY

## 2022-10-10 PROCEDURE — 2500000003 HC RX 250 WO HCPCS: Performed by: ANESTHESIOLOGY

## 2022-10-10 PROCEDURE — 99152 MOD SED SAME PHYS/QHP 5/>YRS: CPT | Performed by: ANESTHESIOLOGY

## 2022-10-10 RX ORDER — DEXAMETHASONE SODIUM PHOSPHATE 10 MG/ML
INJECTION, SOLUTION INTRAMUSCULAR; INTRAVENOUS
Status: COMPLETED | OUTPATIENT
Start: 2022-10-10 | End: 2022-10-10

## 2022-10-10 RX ORDER — BUPIVACAINE HYDROCHLORIDE 5 MG/ML
INJECTION, SOLUTION EPIDURAL; INTRACAUDAL
Status: COMPLETED | OUTPATIENT
Start: 2022-10-10 | End: 2022-10-10

## 2022-10-10 RX ORDER — FENTANYL CITRATE 50 UG/ML
INJECTION, SOLUTION INTRAMUSCULAR; INTRAVENOUS
Status: COMPLETED | OUTPATIENT
Start: 2022-10-10 | End: 2022-10-10

## 2022-10-10 RX ORDER — LIDOCAINE HYDROCHLORIDE 10 MG/ML
INJECTION, SOLUTION EPIDURAL; INFILTRATION; INTRACAUDAL; PERINEURAL
Status: COMPLETED | OUTPATIENT
Start: 2022-10-10 | End: 2022-10-10

## 2022-10-10 RX ORDER — MIDAZOLAM HYDROCHLORIDE 1 MG/ML
INJECTION INTRAMUSCULAR; INTRAVENOUS
Status: COMPLETED | OUTPATIENT
Start: 2022-10-10 | End: 2022-10-10

## 2022-10-10 RX ADMIN — MIDAZOLAM 2 MG: 1 INJECTION INTRAMUSCULAR; INTRAVENOUS at 12:55

## 2022-10-10 RX ADMIN — DEXAMETHASONE SODIUM PHOSPHATE 10 MG: 10 INJECTION, SOLUTION INTRAMUSCULAR; INTRAVENOUS at 12:55

## 2022-10-10 RX ADMIN — BUPIVACAINE HYDROCHLORIDE 3 ML: 5 INJECTION, SOLUTION EPIDURAL; INTRACAUDAL; PERINEURAL at 12:55

## 2022-10-10 RX ADMIN — FENTANYL CITRATE 50 MCG: 50 INJECTION, SOLUTION INTRAMUSCULAR; INTRAVENOUS at 12:55

## 2022-10-10 RX ADMIN — LIDOCAINE HYDROCHLORIDE 5 ML: 10 INJECTION, SOLUTION EPIDURAL; INFILTRATION; INTRACAUDAL; PERINEURAL at 12:55

## 2022-10-10 ASSESSMENT — PAIN SCALES - GENERAL: PAINLEVEL_OUTOF10: 4

## 2022-10-10 ASSESSMENT — PAIN - FUNCTIONAL ASSESSMENT
PAIN_FUNCTIONAL_ASSESSMENT: PREVENTS OR INTERFERES WITH MANY ACTIVE NOT PASSIVE ACTIVITIES
PAIN_FUNCTIONAL_ASSESSMENT: 0-10

## 2022-10-10 ASSESSMENT — PAIN DESCRIPTION - ONSET: ONSET: AWAKENED FROM SLEEP

## 2022-10-10 ASSESSMENT — PAIN DESCRIPTION - LOCATION: LOCATION: BACK

## 2022-10-10 ASSESSMENT — PAIN DESCRIPTION - DESCRIPTORS: DESCRIPTORS: ACHING;BURNING;SHOOTING;THROBBING

## 2022-10-10 ASSESSMENT — PAIN DESCRIPTION - ORIENTATION: ORIENTATION: RIGHT;LEFT;LOWER

## 2022-10-10 ASSESSMENT — PAIN DESCRIPTION - PAIN TYPE: TYPE: CHRONIC PAIN

## 2022-10-10 ASSESSMENT — PAIN DESCRIPTION - FREQUENCY: FREQUENCY: CONTINUOUS

## 2022-10-10 NOTE — DISCHARGE INSTRUCTIONS
Discharge Instructions following Sedation or Anesthesia:  You have  received  a sedative/anesthetic therefore, you should not consume any alcoholic beverages for minimum of 12 hours. Do not drive or operate machinery for 24 hours. Do not sign legal documents for 24 hours. Dizziness, drowsiness, and unsteadiness may occur. Rest when need to. Increase diet as tolerated. Keep up on fluids if diet allows. General Instructions:  Do not take a tub bath for 72 hours after procedure (this includes hot tubs and swimming pools). You may shower, but avoid hot water to injection site. Avoid strenuous activity TODAY especially if you experience dizziness. Remove band-aid the next day. Wash off any residual iodine   Do not use heat, heating pad, or any other heating device over the injection site for 3 days after the procedure. If you experience pain after your procedure, you may continue with your current pain medication as prescribed. (DO NOT INCREASE YOUR PAIN MEDICATION WITHOUT TALKING TO DOCTOR)  Soreness and pain at injection site is common, may use ice to reduce soreness. What To Expect After A Steroid Injection  You should start to feel the effects of the steroid injection in about 48-72 hours  You may experience facial flushing, night sweats and irritability. Other common steroid related side effects are increased appetite, mood elevation, insomnia and fluid retention. These effects usually subside in a few days. Steroids used in epidural injections may cause muscle spasms for a few days. If you are diabetic, your blood sugar may be elevated after your procedure due to the steroids. You will need to monitor your blood sugar more closely while going through a series of injections (Check blood sugar at meals and bedtime for 5 days). You may require adjustment in your diabetic medications, contact your PCP office to discuss.     Call Isauro Lucio at [phone] if you experience:   Fever, chills or temperature over 100    Vomiting, Headache, persistent stiff neck, nausea, blurred vision   Difficulty in urinating or unable to urinate with 8 hours   Increase in weakness, numbness or loss of function   Increased redness, swelling or drainage at the injection site

## 2022-10-10 NOTE — H&P
UPDATE:  Office visit pain clinic in [facility] with all required elements of H&P dated 09/20/2022  Patient seen preop, chart reviewed,   No changes in medical history and health assessment since last evaluation. PE:  AAO x 3, in NAD, VSS,. Resp: breathing on RA, no respiratory distress  Cardiac: rate normal    Risk / Benefits explained to patient, patient agree to proceed with plan.   ASA 3  MP 3

## 2022-10-10 NOTE — OP NOTE
Pre Op Diagnoses: BilateralSacroiliac joint pain  Post Op Diagnoses:Bilateral Sacroiliac joint pain     Procedure: BilateralSI joint steroid injection with flouro guidance     Blood Loss: None  Procedure: The Patient was seen in the preop area, chart was reviewed, informed consent was obtained. Patient was taken to procedure room and was placed in prone position. Vital signs were monitored through out the Procedure. A time out was completed. The skin over the back was prepped and draped in sterile manner. The target point was marked at the left SI joint. Skin and deep tissues were anesthetized with 1 % lidocaine. A 22 G spinal needlele was advanced under fluoroscopy guidance in AP view. Positon was confirmed by injecting small amount of contrast dye  Finally 3 ml of treatment solution containing 5 ml of 0.5 % Bupivacaine and 1 ml of Dexamethasone 10 mg was injected  The needle was removed and a Band-Aid was placed over the needle insertion site. The same procedure was then repeated on the other side with same technique, the remaining 1.5 ml of treatment solution was injected on that side. The patient's vital signs remained stable and the patient tolerated the procedure well. SEDATION NOTE:    ASA CLASSIFICATION  3    MP   CLASSIFICATION  3    Moderate intravenous conscious sedation was supervised by Dr. Rut Gregg  The patient was independently monitored by a Registered Nurse assigned to the Procedure Room  Monitoring included automated blood pressure, continuous EKG, Capnography and continuous pulse oximetry. The detailed Conscious Record is permanently stored in the [place] [id number].      The following is the conscious sedation record;  Start Time:  1251  End times:  1301  Duration:  10 minutes  MEDS GIVEN 2 MG VERSED AND 50 MCG FENTANYL

## 2022-10-11 ENCOUNTER — OFFICE VISIT (OUTPATIENT)
Dept: FAMILY MEDICINE CLINIC | Age: 52
End: 2022-10-11
Payer: COMMERCIAL

## 2022-10-11 VITALS
DIASTOLIC BLOOD PRESSURE: 70 MMHG | BODY MASS INDEX: 38.89 KG/M2 | WEIGHT: 206 LBS | HEIGHT: 61 IN | OXYGEN SATURATION: 98 % | HEART RATE: 74 BPM | SYSTOLIC BLOOD PRESSURE: 110 MMHG | TEMPERATURE: 97.4 F

## 2022-10-11 DIAGNOSIS — Z23 NEED FOR INFLUENZA VACCINATION: ICD-10-CM

## 2022-10-11 DIAGNOSIS — R63.5 WEIGHT GAIN: ICD-10-CM

## 2022-10-11 DIAGNOSIS — Z12.31 BREAST CANCER SCREENING BY MAMMOGRAM: ICD-10-CM

## 2022-10-11 DIAGNOSIS — E78.2 MIXED HYPERLIPIDEMIA: ICD-10-CM

## 2022-10-11 DIAGNOSIS — E55.9 VITAMIN D DEFICIENCY: ICD-10-CM

## 2022-10-11 DIAGNOSIS — I10 ESSENTIAL HYPERTENSION: Primary | ICD-10-CM

## 2022-10-11 DIAGNOSIS — E11.9 TYPE 2 DIABETES MELLITUS WITHOUT COMPLICATION, WITHOUT LONG-TERM CURRENT USE OF INSULIN (HCC): ICD-10-CM

## 2022-10-11 DIAGNOSIS — J45.20 MILD INTERMITTENT ASTHMA WITHOUT COMPLICATION: ICD-10-CM

## 2022-10-11 DIAGNOSIS — M16.0 BILATERAL HIP JOINT ARTHRITIS: ICD-10-CM

## 2022-10-11 DIAGNOSIS — M06.9 RHEUMATOID ARTHRITIS, INVOLVING UNSPECIFIED SITE, UNSPECIFIED WHETHER RHEUMATOID FACTOR PRESENT (HCC): ICD-10-CM

## 2022-10-11 DIAGNOSIS — F43.23 ADJUSTMENT DISORDER WITH MIXED ANXIETY AND DEPRESSED MOOD: ICD-10-CM

## 2022-10-11 DIAGNOSIS — E66.01 CLASS 2 SEVERE OBESITY DUE TO EXCESS CALORIES WITH SERIOUS COMORBIDITY AND BODY MASS INDEX (BMI) OF 38.0 TO 38.9 IN ADULT (HCC): ICD-10-CM

## 2022-10-11 DIAGNOSIS — M47.817 LUMBOSACRAL SPONDYLOSIS WITHOUT MYELOPATHY: ICD-10-CM

## 2022-10-11 LAB — HBA1C MFR BLD: 5.3 %

## 2022-10-11 PROCEDURE — 90674 CCIIV4 VAC NO PRSV 0.5 ML IM: CPT | Performed by: FAMILY MEDICINE

## 2022-10-11 PROCEDURE — G8482 FLU IMMUNIZE ORDER/ADMIN: HCPCS | Performed by: FAMILY MEDICINE

## 2022-10-11 PROCEDURE — 3044F HG A1C LEVEL LT 7.0%: CPT | Performed by: FAMILY MEDICINE

## 2022-10-11 PROCEDURE — 99214 OFFICE O/P EST MOD 30 MIN: CPT | Performed by: FAMILY MEDICINE

## 2022-10-11 PROCEDURE — 83036 HEMOGLOBIN GLYCOSYLATED A1C: CPT | Performed by: FAMILY MEDICINE

## 2022-10-11 PROCEDURE — 2022F DILAT RTA XM EVC RTNOPTHY: CPT | Performed by: FAMILY MEDICINE

## 2022-10-11 PROCEDURE — G8427 DOCREV CUR MEDS BY ELIG CLIN: HCPCS | Performed by: FAMILY MEDICINE

## 2022-10-11 PROCEDURE — G8417 CALC BMI ABV UP PARAM F/U: HCPCS | Performed by: FAMILY MEDICINE

## 2022-10-11 PROCEDURE — 3017F COLORECTAL CA SCREEN DOC REV: CPT | Performed by: FAMILY MEDICINE

## 2022-10-11 PROCEDURE — 1036F TOBACCO NON-USER: CPT | Performed by: FAMILY MEDICINE

## 2022-10-11 RX ORDER — BUPROPION HYDROCHLORIDE 150 MG/1
150 TABLET ORAL EVERY MORNING
Qty: 30 TABLET | Refills: 2 | Status: SHIPPED | OUTPATIENT
Start: 2022-10-11

## 2022-10-11 RX ORDER — BUSPIRONE HYDROCHLORIDE 30 MG/1
30 TABLET ORAL 3 TIMES DAILY
Qty: 90 TABLET | Refills: 2 | Status: SHIPPED | OUTPATIENT
Start: 2022-10-11 | End: 2022-11-10

## 2022-10-11 ASSESSMENT — PATIENT HEALTH QUESTIONNAIRE - PHQ9
7. TROUBLE CONCENTRATING ON THINGS, SUCH AS READING THE NEWSPAPER OR WATCHING TELEVISION: 0
6. FEELING BAD ABOUT YOURSELF - OR THAT YOU ARE A FAILURE OR HAVE LET YOURSELF OR YOUR FAMILY DOWN: 0
SUM OF ALL RESPONSES TO PHQ9 QUESTIONS 1 & 2: 0
5. POOR APPETITE OR OVEREATING: 0
3. TROUBLE FALLING OR STAYING ASLEEP: 0
4. FEELING TIRED OR HAVING LITTLE ENERGY: 0
SUM OF ALL RESPONSES TO PHQ QUESTIONS 1-9: 0
2. FEELING DOWN, DEPRESSED OR HOPELESS: 0
SUM OF ALL RESPONSES TO PHQ QUESTIONS 1-9: 0
SUM OF ALL RESPONSES TO PHQ QUESTIONS 1-9: 0
10. IF YOU CHECKED OFF ANY PROBLEMS, HOW DIFFICULT HAVE THESE PROBLEMS MADE IT FOR YOU TO DO YOUR WORK, TAKE CARE OF THINGS AT HOME, OR GET ALONG WITH OTHER PEOPLE: 0
8. MOVING OR SPEAKING SO SLOWLY THAT OTHER PEOPLE COULD HAVE NOTICED. OR THE OPPOSITE, BEING SO FIGETY OR RESTLESS THAT YOU HAVE BEEN MOVING AROUND A LOT MORE THAN USUAL: 0
1. LITTLE INTEREST OR PLEASURE IN DOING THINGS: 0
SUM OF ALL RESPONSES TO PHQ QUESTIONS 1-9: 0
9. THOUGHTS THAT YOU WOULD BE BETTER OFF DEAD, OR OF HURTING YOURSELF: 0

## 2022-10-11 ASSESSMENT — ENCOUNTER SYMPTOMS
TROUBLE SWALLOWING: 0
DIARRHEA: 1
NAUSEA: 1
ABDOMINAL PAIN: 1
VOMITING: 1

## 2022-10-11 ASSESSMENT — ANXIETY QUESTIONNAIRES
IF YOU CHECKED OFF ANY PROBLEMS ON THIS QUESTIONNAIRE, HOW DIFFICULT HAVE THESE PROBLEMS MADE IT FOR YOU TO DO YOUR WORK, TAKE CARE OF THINGS AT HOME, OR GET ALONG WITH OTHER PEOPLE: VERY DIFFICULT
5. BEING SO RESTLESS THAT IT IS HARD TO SIT STILL: 3
7. FEELING AFRAID AS IF SOMETHING AWFUL MIGHT HAPPEN: 3
GAD7 TOTAL SCORE: 21
2. NOT BEING ABLE TO STOP OR CONTROL WORRYING: 3
3. WORRYING TOO MUCH ABOUT DIFFERENT THINGS: 3
4. TROUBLE RELAXING: 3
1. FEELING NERVOUS, ANXIOUS, OR ON EDGE: 3
6. BECOMING EASILY ANNOYED OR IRRITABLE: 3

## 2022-10-11 NOTE — PROGRESS NOTES
Visit Information    Have you changed or started any medications since your last visit including any over-the-counter medicines, vitamins, or herbal medicines? no   Are you having any side effects from any of your medications? -  no  Have you stopped taking any of your medications? Is so, why? -  no    Have you seen any other physician or provider since your last visit? No  Have you had any other diagnostic tests since your last visit? No  Have you been seen in the emergency room and/or had an admission to a hospital since we last saw you? No  Have you had your routine dental cleaning in the past 6 months? no    Have you activated your [employer] account? If not, what are your barriers?  Yes     Patient Care Team:  BAR Canas CNP as PCP - General (Family Medicine)  BAR Canas CNP as PCP - [facility]  Geoffrey Wolf MD as Orthopedic Surgeon (Orthopedic Surgery)  Haydee Gross MD as Consulting Physician (Gastroenterology)    Medical History Review  Past Medical, Family, and Social History reviewed and does contribute to the patient presenting condition    Health Maintenance   Topic Date Due    Diabetic retinal exam  Never done    Cervical cancer screen  07/03/2017    Diabetic foot exam  10/03/2020    COVID-19 Vaccine (4 - Booster for Pfizer series) 04/20/2022    Flu vaccine (1) 08/01/2022    Diabetic microalbuminuria test  09/30/2022    Lipids  09/30/2022    Colorectal Cancer Screen  10/22/2022    A1C test (Diabetic or Prediabetic)  04/07/2023    Depression Monitoring  04/07/2023    Pneumococcal 0-64 years Vaccine (2 - PCV) 04/20/2023    Breast cancer screen  11/23/2023    DTaP/Tdap/Td vaccine (2 - Td or Tdap) 01/11/2028    Hepatitis B vaccine  Completed    Shingles vaccine  Completed    Hepatitis C screen  Completed    HIV screen  Completed    Hepatitis A vaccine  Aged Out    Hib vaccine  Aged Out    Meningococcal (ACWY) vaccine  Aged Out

## 2022-10-11 NOTE — PATIENT INSTRUCTIONS
New Updates for [facility] MyChart/ [employer] ([place] DIEGO    Thank you for choosing US to give you the best care! [employer] ([place]) is always trying to think of new ways to help their patients. We are asking all patients to try out the new digital registration that is now available through your [facility] account or the new DIEGO, [employer] ([facility]). Via the diego you're now able to update your personal and registration information prior to your upcoming appointment. This will save you time once you arrive at the office to check-in, not to mention your information remains safe!! Many other perks come from signing up for an account, such as:  Requesting refills  Scheduling an appointment  Completing an E-Visit  Sending a message to the office/provider  Having access to your medication list  Paying your bill/copay prior to your appointment  Scheduling your yearly mammogram  Review your test results    If you are not familiar with [facility] or the [employer] [facility] DIEGO, please ask one of us and we will be happy to answer any questions or help you set-up your account.       Your [facility] office,  [place]

## 2022-10-14 ENCOUNTER — HOSPITAL ENCOUNTER (OUTPATIENT)
Dept: GENERAL RADIOLOGY | Age: 52
Discharge: HOME | End: 2022-10-16
Payer: COMMERCIAL

## 2022-10-14 ENCOUNTER — TELEPHONE (OUTPATIENT)
Dept: FAMILY MEDICINE CLINIC | Age: 52
End: 2022-10-14

## 2022-10-14 ENCOUNTER — HOSPITAL ENCOUNTER (OUTPATIENT)
Age: 52
Discharge: HOME | End: 2022-10-14
Payer: COMMERCIAL

## 2022-10-14 ENCOUNTER — HOSPITAL ENCOUNTER (OUTPATIENT)
Age: 52
Discharge: HOME | End: 2022-10-16
Payer: COMMERCIAL

## 2022-10-14 DIAGNOSIS — G89.29 CHRONIC PAIN OF LEFT KNEE: ICD-10-CM

## 2022-10-14 DIAGNOSIS — M25.562 CHRONIC PAIN OF LEFT KNEE: ICD-10-CM

## 2022-10-14 DIAGNOSIS — E78.2 MIXED HYPERLIPIDEMIA: ICD-10-CM

## 2022-10-14 DIAGNOSIS — M47.817 LUMBOSACRAL SPONDYLOSIS WITHOUT MYELOPATHY: ICD-10-CM

## 2022-10-14 DIAGNOSIS — E66.01 CLASS 2 SEVERE OBESITY DUE TO EXCESS CALORIES WITH SERIOUS COMORBIDITY AND BODY MASS INDEX (BMI) OF 38.0 TO 38.9 IN ADULT (HCC): ICD-10-CM

## 2022-10-14 DIAGNOSIS — G89.29 HIP PAIN, CHRONIC, RIGHT: ICD-10-CM

## 2022-10-14 DIAGNOSIS — E55.9 VITAMIN D DEFICIENCY: ICD-10-CM

## 2022-10-14 DIAGNOSIS — E11.9 TYPE 2 DIABETES MELLITUS WITHOUT COMPLICATION, WITHOUT LONG-TERM CURRENT USE OF INSULIN (HCC): ICD-10-CM

## 2022-10-14 DIAGNOSIS — M25.551 HIP PAIN, CHRONIC, RIGHT: ICD-10-CM

## 2022-10-14 DIAGNOSIS — M16.0 BILATERAL HIP JOINT ARTHRITIS: ICD-10-CM

## 2022-10-14 DIAGNOSIS — I10 ESSENTIAL HYPERTENSION: ICD-10-CM

## 2022-10-14 DIAGNOSIS — R63.5 WEIGHT GAIN: ICD-10-CM

## 2022-10-14 LAB
ABSOLUTE BANDS #: 0.08 K/UL (ref 0–1)
ABSOLUTE EOS #: 0 K/UL (ref 0–0.4)
ABSOLUTE LYMPH #: 3.23 K/UL (ref 1–4.8)
ABSOLUTE MONO #: 0.53 K/UL (ref 0.1–1.3)
ALBUMIN SERPL-MCNC: 3.9 G/DL (ref 3.5–5.2)
ALP BLD-CCNC: 93 U/L (ref 35–104)
ALT SERPL-CCNC: 16 U/L (ref 5–33)
ANION GAP SERPL CALCULATED.3IONS-SCNC: 10 MMOL/L (ref 9–17)
AST SERPL-CCNC: 13 U/L
ATYPICAL LYMPHOCYTE ABSOLUTE COUNT: 0.15 K/UL
ATYPICAL LYMPHOCYTES: 2 %
BACTERIA: ABNORMAL
BANDS: 1 % (ref 0–10)
BASOPHILS # BLD: 0 % (ref 0–2)
BASOPHILS ABSOLUTE: 0 K/UL (ref 0–0.2)
BILIRUB SERPL-MCNC: 0.2 MG/DL (ref 0.3–1.2)
BILIRUBIN URINE: NEGATIVE
BUN BLDV-MCNC: 8 MG/DL (ref 6–20)
CALCIUM SERPL-MCNC: 9.4 MG/DL (ref 8.6–10.4)
CASTS UA: ABNORMAL /LPF
CHLORIDE BLD-SCNC: 104 MMOL/L (ref 98–107)
CHOLESTEROL, FASTING: 143 MG/DL
CHOLESTEROL/HDL RATIO: 3.5
CO2: 25 MMOL/L (ref 20–31)
COLOR: YELLOW
CREAT SERPL-MCNC: 1.03 MG/DL (ref 0.5–0.9)
EOSINOPHILS RELATIVE PERCENT: 0 % (ref 0–4)
EPITHELIAL CELLS UA: ABNORMAL /HPF
ESTIMATED AVERAGE GLUCOSE: 111 MG/DL
FOLATE: >20 NG/ML
GFR SERPL CREATININE-BSD FRML MDRD: >60 ML/MIN/1.73M2
GLUCOSE FASTING: 100 MG/DL (ref 70–99)
GLUCOSE URINE: NEGATIVE
HBA1C MFR BLD: 5.5 % (ref 4–6)
HCT VFR BLD CALC: 42.2 % (ref 36–46)
HDLC SERPL-MCNC: 41 MG/DL
HEMOGLOBIN: 14 G/DL (ref 12–16)
KETONES, URINE: NEGATIVE
LDL CHOLESTEROL: 51 MG/DL (ref 0–130)
LEUKOCYTE ESTERASE, URINE: ABNORMAL
LYMPHOCYTES # BLD: 43 % (ref 24–44)
MCH RBC QN AUTO: 29 PG (ref 26–34)
MCHC RBC AUTO-ENTMCNC: 33.1 G/DL (ref 31–37)
MCV RBC AUTO: 87.6 FL (ref 80–100)
MONOCYTES # BLD: 7 % (ref 1–7)
MORPHOLOGY: NORMAL
NITRITE, URINE: NEGATIVE
PDW BLD-RTO: 13.3 % (ref 11.5–14.9)
PH UA: 7.5 (ref 5–8)
PLATELET # BLD: 318 K/UL (ref 150–450)
PMV BLD AUTO: 7.2 FL (ref 6–12)
POTASSIUM SERPL-SCNC: 4.4 MMOL/L (ref 3.7–5.3)
PROTEIN UA: NEGATIVE
RBC # BLD: 4.82 M/UL (ref 4–5.2)
RBC UA: ABNORMAL /HPF
SEG NEUTROPHILS: 47 % (ref 36–66)
SEGMENTED NEUTROPHILS ABSOLUTE COUNT: 3.51 K/UL (ref 1.3–9.1)
SODIUM BLD-SCNC: 139 MMOL/L (ref 135–144)
SPECIFIC GRAVITY UA: 1.01 (ref 1–1.03)
TOTAL PROTEIN: 6.8 G/DL (ref 6.4–8.3)
TRIGLYCERIDE, FASTING: 257 MG/DL
TSH SERPL DL<=0.05 MIU/L-ACNC: 0.9 UIU/ML (ref 0.3–5)
TURBIDITY: CLEAR
URINE HGB: NEGATIVE
UROBILINOGEN, URINE: NORMAL
VITAMIN B-12: 646 PG/ML (ref 232–1245)
VITAMIN D 25-HYDROXY: 44.7 NG/ML
WBC # BLD: 7.5 K/UL (ref 3.5–11)
WBC UA: ABNORMAL /HPF

## 2022-10-14 PROCEDURE — 81001 URINALYSIS AUTO W/SCOPE: CPT

## 2022-10-14 PROCEDURE — 73562 X-RAY EXAM OF KNEE 3: CPT

## 2022-10-14 PROCEDURE — 82746 ASSAY OF FOLIC ACID SERUM: CPT

## 2022-10-14 PROCEDURE — 82607 VITAMIN B-12: CPT

## 2022-10-14 PROCEDURE — 80061 LIPID PANEL: CPT

## 2022-10-14 PROCEDURE — 36415 COLL VENOUS BLD VENIPUNCTURE: CPT

## 2022-10-14 PROCEDURE — 82306 VITAMIN D 25 HYDROXY: CPT

## 2022-10-14 PROCEDURE — 85025 COMPLETE CBC W/AUTO DIFF WBC: CPT

## 2022-10-14 PROCEDURE — 84443 ASSAY THYROID STIM HORMONE: CPT

## 2022-10-14 PROCEDURE — 80053 COMPREHEN METABOLIC PANEL: CPT

## 2022-10-14 PROCEDURE — 83036 HEMOGLOBIN GLYCOSYLATED A1C: CPT

## 2022-10-14 PROCEDURE — 73502 X-RAY EXAM HIP UNI 2-3 VIEWS: CPT

## 2022-10-14 NOTE — TELEPHONE ENCOUNTER
Please let the patient know of the recent results. These can also be discussed further on the future visits. Patient's blood count is WNL    Lab Results   Component Value Date    WBC 7.5 10/14/2022    HGB 14.0 10/14/2022    HCT 42.2 10/14/2022    MCV 87.6 10/14/2022     10/14/2022    LYMPHOPCT 43 10/14/2022    RBC 4.82 10/14/2022    MCH 29.0 10/14/2022    MCHC 33.1 10/14/2022    RDW 13.3 10/14/2022       Patient's thyroid function is WNL    Lab Results   Component Value Date    TSH 0.90 10/14/2022       Patient's kidney function is increased . Adv to drink lots of fluids cut down salt intake and OTC NSAIDS      Patient's liver function is WNL  .   Lab Results   Component Value Date     10/14/2022    K 4.4 10/14/2022     10/14/2022    CO2 25 10/14/2022    BUN 8 10/14/2022    CREATININE 1.03 (H) 10/14/2022    GLUCOSE 105 04/04/2022    CALCIUM 9.4 10/14/2022    PROT 6.8 10/14/2022    LABALBU 3.9 10/14/2022    BILITOT 0.2 (L) 10/14/2022    ALKPHOS 93 10/14/2022    AST 13 10/14/2022    ALT 16 10/14/2022    LABGLOM >60 10/14/2022    GFRAA >60 07/18/2021       Patient's urinalysis results small white cells will wait for cultures  Lab Results   Component Value Date/Time    COLORU Yellow 10/14/2022 09:23 AM    NITRU NEGATIVE 10/14/2022 09:23 AM    LEUKOCYTESUR SMALL 10/14/2022 09:23 AM    GLUCOSEU NEGATIVE 10/14/2022 09:23 AM    KETUA NEGATIVE 10/14/2022 09:23 AM    UROBILINOGEN Normal 10/14/2022 09:23 AM    BILIRUBINUR NEGATIVE 10/14/2022 09:23 AM    BILIRUBINUR neg 06/03/2019 11:30 AM           Vitamin D Screening  Please let the patient know that the patient's recent vitamin d level sufficient  Lab Results   Component Value Date/Time    VITD25 44.7 10/14/2022 09:14 AM         Patient's lipids test results continue lipitor  Lab Results   Component Value Date/Time    CHOLFAST 143 10/14/2022 09:14 AM    CHOL 151 10/05/2019 08:50 AM    CHOL 210 05/13/2014 12:00 AM    HDL 41 10/14/2022 09:14 AM LDLCHOLESTEROL 51 10/14/2022 09:14 AM    TRIG 205 (H) 10/05/2019 08:50 AM    CHOLHDLRATIO 3.5 10/14/2022 09:14 AM    VLDL NOT REPORTED (H) 09/30/2021 09:03 AM

## 2022-10-20 ENCOUNTER — HOSPITAL ENCOUNTER (OUTPATIENT)
Dept: PAIN MANAGEMENT | Age: 52
Discharge: HOME | End: 2022-10-20
Payer: COMMERCIAL

## 2022-10-20 VITALS
DIASTOLIC BLOOD PRESSURE: 89 MMHG | SYSTOLIC BLOOD PRESSURE: 123 MMHG | RESPIRATION RATE: 18 BRPM | HEIGHT: 60 IN | TEMPERATURE: 97.3 F | BODY MASS INDEX: 40.25 KG/M2 | OXYGEN SATURATION: 98 % | HEART RATE: 76 BPM | WEIGHT: 205 LBS

## 2022-10-20 DIAGNOSIS — M51.36 DEGENERATION OF LUMBAR INTERVERTEBRAL DISC: Chronic | ICD-10-CM

## 2022-10-20 DIAGNOSIS — M54.16 LUMBAR RADICULOPATHY, CHRONIC: Chronic | ICD-10-CM

## 2022-10-20 DIAGNOSIS — M16.12 PRIMARY OSTEOARTHRITIS OF LEFT HIP: ICD-10-CM

## 2022-10-20 DIAGNOSIS — G89.29 CHRONIC BILATERAL LOW BACK PAIN WITHOUT SCIATICA: Primary | ICD-10-CM

## 2022-10-20 DIAGNOSIS — M50.30 DEGENERATIVE DISC DISEASE, CERVICAL: ICD-10-CM

## 2022-10-20 DIAGNOSIS — M54.50 CHRONIC BILATERAL LOW BACK PAIN WITHOUT SCIATICA: Primary | ICD-10-CM

## 2022-10-20 DIAGNOSIS — M46.1 SACROILIITIS (HCC): ICD-10-CM

## 2022-10-20 DIAGNOSIS — M50.20 CERVICAL DISC HERNIATION: ICD-10-CM

## 2022-10-20 PROCEDURE — 99213 OFFICE O/P EST LOW 20 MIN: CPT

## 2022-10-20 PROCEDURE — 99213 OFFICE O/P EST LOW 20 MIN: CPT | Performed by: ANESTHESIOLOGY

## 2022-10-20 RX ORDER — OXYCODONE HYDROCHLORIDE AND ACETAMINOPHEN 5; 325 MG/1; MG/1
1 TABLET ORAL EVERY 6 HOURS PRN
Qty: 120 TABLET | Refills: 0 | Status: SHIPPED | OUTPATIENT
Start: 2022-10-29 | End: 2022-11-28

## 2022-10-20 ASSESSMENT — ENCOUNTER SYMPTOMS
VOMITING: 1
EYES NEGATIVE: 1
DIARRHEA: 1
SORE THROAT: 1
ABDOMINAL PAIN: 1
NAUSEA: 1
RESPIRATORY NEGATIVE: 1

## 2022-10-20 ASSESSMENT — PAIN SCALES - GENERAL: PAINLEVEL_OUTOF10: 6

## 2022-10-20 NOTE — PROGRESS NOTES
The patient is a 47 y. o. Non- / non  female. Chief Complaint   Patient presents with    Back Pain     Med refill        HPI  61-year-old female with history of chronic low back pain onset many years ago located in the lower lumbar area across midline affect both side aggravated with routine activity  She is on chronic opioid therapy with Percocet 5 mg 4 times a day  Daily morphine equivalent 30 MME a day  Reports no side effect  Finds the medication helpful  Last urine toxicology was positive for alcohol  Patient states that she drinks alcohol at charge  Advised her not to use alcohol because of the risk of respiratory depression and sedation  Patient agreeable  Refill ordered  Follow-up appointment made in 4 weeks with nurse practitioner for medication management  Medication Refill: Oxycodone -     Pain score Today:  6  Adverse effects (Constipation / Nausea / Sedation / sexual Dysfunction / others) : none  Mood: fair  Sleep pattern and quality: poor  Activity level: fair    Pill count Today: pt states that she did not bring her meds today, states she has about 20 but pt is not sure. Last dose taken  10/15/2022  OARRS report reviewed today: yes  ER/Hospitalizations/PCP visit related to pain since last visit:no   Any legal problems e.g. DUI etc.:No  Satisfied with current management: Yes    Opioid Contract: 01/31/2022  Last Urine Dug screen dated: 09/20/2022    Hemoglobin A1C   Date Value Ref Range Status   10/14/2022 5.5 4.0 - 6.0 % Final       Past Medical History, Past Surgical History, Social History, Allergies and Medications reviewed and updated in EPIC as indicated    Family History reviewed and is noncontributory.         Past Medical History:   Diagnosis Date    Anxiety     Asthma     Colon polyp 10/31/2016    sessile serated adenoma x2    Depression     Diabetes mellitus ([address]     Diverticulitis 10/2016    Gastritis     GERD (gastroesophageal reflux disease)     Hemorrhoids     int/ext Family History   Problem Relation Age of Onset    Cancer Mother     Heart Disease Father     Diabetes Father     High Blood Pressure Father     Other Other         Celiac Sprue. Aunt       Allergies   Allergen Reactions    Adhesive Tape Other (See Comments)     blisters    Imitrex [Sumatriptan] Other (See Comments)     \"feels like head is going to explode    Morphine Itching     hives    Seasonal        Vitals:    10/20/22 1410   BP: 123/89   Pulse: 76   Resp: 18   Temp: 97.3 °F (36.3 °C)   SpO2: 98%       Current Outpatient Medications   Medication Sig Dispense Refill    [START ON 10/29/2022] oxyCODONE-acetaminophen (PERCOCET) 5-325 MG per tablet Take 1 tablet by mouth every 6 hours as needed for Pain for up to 30 days. 120 tablet 0    buPROPion (WELLBUTRIN XL) 150 MG extended release tablet Take 1 tablet by mouth every morning 30 tablet 2    busPIRone (BUSPAR) 30 MG tablet Take 30 mg by mouth 3 times daily 90 tablet 2    pregabalin (LYRICA) 75 MG capsule Take 1 capsule by mouth 4 times daily. TRULICITY 4.5 DN/0.6XT SOPN INJECT 4.5 MG SUBCUTANEOUSLY ONCE WEEKLY 2 mL 1    triamcinolone (NASACORT) 55 MCG/ACT nasal inhaler 2 sprays by Each Nostril route daily 1 each 1    fexofenadine (ALLEGRA) 180 MG tablet Take 1 tablet by mouth daily 30 tablet 1    fluticasone (FLONASE) 50 MCG/ACT nasal spray INHALE 2 SPRAYS IN EACH NOSTRIL DAILY 16 g 2    Lactobacillus (ACIDOPHILUS) CAPS capsule TAKE 1 CAPSULE BY MOUTH TWICE DAILY 30 capsule 10    omeprazole (PRILOSEC) 40 MG delayed release capsule TAKE 1 CAPSULE DAILY 90 capsule 1    lisinopril (PRINIVIL;ZESTRIL) 5 MG tablet TAKE 1 TABLET DAILY 90 tablet 2    montelukast (SINGULAIR) 10 MG tablet Take 1 tablet by mouth nightly TAKE 1 TABLET DAILY 90 tablet 3    blood glucose test strips (ONETOUCH VERIO) strip Inject 1 each into the skin 2 times daily As needed. USE 1 STRIP DAILY AS NEEDED 200 strip 3    Lancets (ONETOUCH DELICA PLUS [id number]) MISC USE 1 EACH TWICE A DAY 200 each 3    dicyclomine (BENTYL) 10 MG capsule Take 1 capsule by mouth 4 times daily 360 capsule 1    tiZANidine (ZANAFLEX) 4 MG tablet TAKE 1 TABLET EVERY 8 HOURS AS NEEDED FOR SPASMS 270 tablet 0    sertraline (ZOLOFT) 100 MG tablet TAKE 1 TABLET DAILY 90 tablet 2    atorvastatin (LIPITOR) 20 MG tablet TAKE 1 TABLET DAILY 90 tablet 2    topiramate (TOPAMAX) 100 MG tablet Take 2 at night 180 tablet 2    sodium chloride (ALTAMIST SPRAY) 0.65 % nasal spray 1 spray by Nasal route as needed for Congestion 1 each 3    cetirizine (ZYRTEC) 10 MG tablet Take 10 mg by mouth daily      ibuprofen (ADVIL;MOTRIN) 600 MG tablet TAKE 1 TABLET BY MOUTH EVERY 6 HOURS      STOOL SOFTENER/LAXATIVE 50-8.6 MG per tablet TAKE 2 TABLETS BY MOUTH TWICE DAILY      ipratropium (ATROVENT) 0.06 % nasal spray USE 2 SPRAY(S) IN EACH NOSTRIL ONCE DAILY      trospium (SANCTURA) 20 MG tablet Take 1 tablet by mouth daily      estradiol (ESTRACE) 0.1 MG/GM vaginal cream  (Patient not taking: No sig reported)      Blood Pressure Monitor KIT Use as directed. 1 kit 1    albuterol sulfate (PROAIR RESPICLICK) 189 (90 Base) MCG/ACT aerosol powder inhalation Inhale 2 puffs into the lungs every 4 hours as needed for Wheezing or Shortness of Breath 1 Inhaler 2    docusate sodium (COLACE) 100 MG capsule Take 1 capsule by mouth daily as needed for Constipation 30 capsule 2    Elastic Bandages & Supports (LUMBAR BACK BRACE/SUPPORT PAD) MISC 1 each by Does not apply route daily as needed (pain) 1 each 0     No current facility-administered medications for this encounter. Review of Systems   Constitutional: Negative. Negative for fever. HENT:  Positive for sore throat. Eyes: Negative. Respiratory: Negative. Cardiovascular: Negative. Gastrointestinal:  Positive for abdominal pain, diarrhea, nausea and vomiting. Objective:  General Appearance: In no acute distress, in pain and uncomfortable.     Vital signs: (most recent): Blood pressure 123/89, pulse 76, temperature 97.3 °F (36.3 °C), resp. rate 18, height 5' (1.524 m), weight 205 lb (93 kg), SpO2 98 %. Vital signs are normal.  No fever. Output: Producing urine and producing stool. Lungs:  Normal effort. She is not in respiratory distress. Heart: Normal rate. Neurological: Patient is alert and oriented to person, place and time. Assessment & Plan  69-year-old female with history of chronic low back pain onset many years ago located in the lower lumbar area across midline affect both side aggravated with routine activity  She is on chronic opioid therapy with Percocet 5 mg 4 times a day  Daily morphine equivalent 30 MME a day  Reports no side effect  Finds the medication helpful  Last urine toxicology was positive for alcohol  Patient states that she drinks alcohol at charge  Advised her not to use alcohol because of the risk of respiratory depression and sedation  Patient agreeable  Refill ordered  Follow-up appointment made in 4 weeks with nurse practitioner for medication management    1. Chronic bilateral low back pain without sciatica    2. Degeneration of lumbar intervertebral disc    3. Sacroiliitis (HCC)    4. Cervical disc herniation    5. Degenerative disc disease, cervical    6. Lumbar radiculopathy, chronic    7. Primary osteoarthritis of left hip        No orders of the defined types were placed in this encounter. Orders Placed This Encounter   Medications    oxyCODONE-acetaminophen (PERCOCET) 5-325 MG per tablet     Sig: Take 1 tablet by mouth every 6 hours as needed for Pain for up to 30 days.      Dispense:  120 tablet     Refill:  0     Reduce doses taken as pain becomes manageable     Controlled Substance Monitoring:    Acute and Chronic Pain Monitoring:   RX Monitoring 9/20/2022   Attestation -   Acute Pain Prescriptions -   Periodic Controlled Substance Monitoring Possible medication side effects, risk of tolerance/dependence & alternative treatments discussed. ;No signs of potential drug abuse or diversion identified. ;Assessed functional status. ;Obtaining appropriate analgesic effect of treatment.    Chronic Pain > 50 MEDD -   Chronic Pain > 80 MEDD -                  Electronically signed by Marquise Rodas MD on 10/20/2022 at 2:29 PM

## 2022-10-20 NOTE — PROGRESS NOTES
The patient is a 47 y. o. Non- / non  female. Chief Complaint   Patient presents with    Back Pain     Med refill        HPI    Medication Refill: Oxycodone -     Pain score Today:  6  Adverse effects (Constipation / Nausea / Sedation / sexual Dysfunction / others) : none  Mood: fair  Sleep pattern and quality: poor  Activity level: fair    Pill count Today: pt states that she did not bring her meds today, states she has about 20 but pt is not sure. Last dose taken  10/15/2022  OARRS report reviewed today: yes  ER/Hospitalizations/PCP visit related to pain since last visit:no   Any legal problems e.g. DUI etc.:No  Satisfied with current management: Yes    Opioid Contract: 01/31/2022  Last Urine Dug screen dated: 09/20/2022    Hemoglobin A1C   Date Value Ref Range Status   10/14/2022 5.5 4.0 - 6.0 % Final       Past Medical History, Past Surgical History, Social History, Allergies and Medications reviewed and updated in EPIC as indicated    Family History reviewed and is noncontributory.         Past Medical History:   Diagnosis Date    Anxiety     Asthma     Colon polyp 10/31/2016    sessile serated adenoma x2    Depression     Diabetes mellitus ([facility] [address]     Diverticulitis 10/2016    Gastritis     GERD (gastroesophageal reflux disease)     Hemorrhoids     int/ext    Hypertension     Migraines     Osteoarthritis     Shingles 1-22-16    Tubular adenoma 10/31/2016    Urinary leakage         Past Surgical History:   Procedure Laterality Date    ABDOMINAL ADHESION SURGERY  07/08/2021    APPENDECTOMY  07/08/2021    CERVICAL DISCECTOMY  12/2013    & fusion     CHOLECYSTECTOMY      COLONOSCOPY  10/31/2016    int/ext hemorrhoids; sessile serated adenomax2; tubular adenoma    COLONOSCOPY N/A 10/22/2019    COLONOSCOPY POLYPECTOMY SNARE/COLD BIOPSY AND RANDOM BIOPSIES performed by Wily Bazan MD at [address] [place] [address]  03/04/2021    CYSTOSCOPY HYDRODISTENTION WITH DMSO AND UREAPLASMA , MYCOPLASMA CULTURES    CYSTOSCOPY N/A 3/4/2021    CYSTOSCOPY HYDRODISTENTION WITH DMSO AND UREAPLASMA , MYCOPLASMA CULTURES performed by Sol Belle DO at [address]      HAND SURGERY Right     thumb surgery, BONE REMOVED    HYSTERECTOMY (CERVIX STATUS UNKNOWN)      LAPAROSCOPY      NH DEST,PARAVERTEBRAL,L/S,ADDL LVLS  3/25/2019         TONSILLECTOMY      TUBAL LIGATION      UPPER GASTROINTESTINAL ENDOSCOPY  10/31/2016    gastritis    UPPER GASTROINTESTINAL ENDOSCOPY N/A 10/22/2019    EGD BIOPSY performed by Amador Betancourt MD at [address] [facility] History     Socioeconomic History    Marital status:      Spouse name: None    Number of children: None    Years of education: None    Highest education level: None   Occupational History    Occupation: unemployed   Tobacco Use    Smoking status: Never    Smokeless tobacco: Never   Vaping Use    Vaping Use: Never used   Substance and Sexual Activity    Alcohol use: No    Drug use: No    Sexual activity: Yes     Social Determinants of Health     Financial Resource Strain: Low Risk     Difficulty of Paying Living Expenses: Not hard at all   Food Insecurity: No Food Insecurity    Worried About Running Out of Food in the Last Year: Never true    Ran Out of Food in the Last Year: Never true       Family History   Problem Relation Age of Onset    Cancer Mother     Heart Disease Father     Diabetes Father     High Blood Pressure Father     Other Other         Celiac Sprue. Aunt       Allergies   Allergen Reactions    Adhesive Tape Other (See Comments)     blisters    Imitrex [Sumatriptan] Other (See Comments)     \"feels like head is going to explode    Morphine Itching     hives    Seasonal        There were no vitals filed for this visit.     Current Outpatient Medications   Medication Sig Dispense Refill    buPROPion (WELLBUTRIN XL) 150 MG extended release tablet Take 1 tablet by mouth every morning 30 tablet 2    busPIRone (BUSPAR) 30 MG tablet Take 30 mg by mouth 3 times daily 90 tablet 2    pregabalin (LYRICA) 75 MG capsule Take 1 capsule by mouth 4 times daily. TRULICITY 4.5 SR/4.0MJ SOPN INJECT 4.5 MG SUBCUTANEOUSLY ONCE WEEKLY 2 mL 1    oxyCODONE-acetaminophen (PERCOCET) 5-325 MG per tablet Take 1 tablet by mouth every 6 hours as needed for Pain for up to 30 days. 120 tablet 0    triamcinolone (NASACORT) 55 MCG/ACT nasal inhaler 2 sprays by Each Nostril route daily 1 each 1    fexofenadine (ALLEGRA) 180 MG tablet Take 1 tablet by mouth daily 30 tablet 1    fluticasone (FLONASE) 50 MCG/ACT nasal spray INHALE 2 SPRAYS IN EACH NOSTRIL DAILY 16 g 2    Lactobacillus (ACIDOPHILUS) CAPS capsule TAKE 1 CAPSULE BY MOUTH TWICE DAILY 30 capsule 10    omeprazole (PRILOSEC) 40 MG delayed release capsule TAKE 1 CAPSULE DAILY 90 capsule 1    lisinopril (PRINIVIL;ZESTRIL) 5 MG tablet TAKE 1 TABLET DAILY 90 tablet 2    montelukast (SINGULAIR) 10 MG tablet Take 1 tablet by mouth nightly TAKE 1 TABLET DAILY 90 tablet 3    blood glucose test strips (ONETOUCH VERIO) strip Inject 1 each into the skin 2 times daily As needed. USE 1 STRIP DAILY AS NEEDED 200 strip 3    Lancets (ONETOUCH DELICA PLUS [id number]) MISC USE 1 EACH TWICE A  each 3    dicyclomine (BENTYL) 10 MG capsule Take 1 capsule by mouth 4 times daily 360 capsule 1    tiZANidine (ZANAFLEX) 4 MG tablet TAKE 1 TABLET EVERY 8 HOURS AS NEEDED FOR SPASMS 270 tablet 0    sertraline (ZOLOFT) 100 MG tablet TAKE 1 TABLET DAILY 90 tablet 2    atorvastatin (LIPITOR) 20 MG tablet TAKE 1 TABLET DAILY 90 tablet 2    topiramate (TOPAMAX) 100 MG tablet Take 2 at night 180 tablet 2    sodium chloride (ALTAMIST SPRAY) 0.65 % nasal spray 1 spray by Nasal route as needed for Congestion 1 each 3    cetirizine (ZYRTEC) 10 MG tablet Take 10 mg by mouth daily      ibuprofen (ADVIL;MOTRIN) 600 MG tablet TAKE 1 TABLET BY MOUTH EVERY 6 HOURS      STOOL SOFTENER/LAXATIVE 50-8.6 MG per tablet TAKE 2 TABLETS BY MOUTH TWICE DAILY      ipratropium (ATROVENT) 0.06 % nasal spray USE 2 SPRAY(S) IN EACH NOSTRIL ONCE DAILY      trospium (SANCTURA) 20 MG tablet Take 1 tablet by mouth daily      estradiol (ESTRACE) 0.1 MG/GM vaginal cream  (Patient not taking: No sig reported)      Blood Pressure Monitor KIT Use as directed. 1 kit 1    albuterol sulfate (PROAIR RESPICLICK) 888 (90 Base) MCG/ACT aerosol powder inhalation Inhale 2 puffs into the lungs every 4 hours as needed for Wheezing or Shortness of Breath 1 Inhaler 2    docusate sodium (COLACE) 100 MG capsule Take 1 capsule by mouth daily as needed for Constipation 30 capsule 2    Elastic Bandages & Supports (LUMBAR BACK BRACE/SUPPORT PAD) MISC 1 each by Does not apply route daily as needed (pain) 1 each 0     No current facility-administered medications for this encounter. Review of Systems   Constitutional: Negative. HENT:  Positive for sore throat. Eyes: Negative. Respiratory: Negative. Cardiovascular: Negative. Gastrointestinal:  Positive for abdominal pain, diarrhea, nausea and vomiting. Objective:  Vital signs: (most recent): There were no vitals taken for this visit. Assessment & Plan  1. Chronic bilateral low back pain without sciatica        No orders of the defined types were placed in this encounter. No orders of the defined types were placed in this encounter.            Electronically signed by Leonardo Juarez MA on 10/20/2022 at 1:55 PM

## 2022-11-03 ENCOUNTER — OFFICE VISIT (OUTPATIENT)
Dept: NEUROSURGERY | Age: 52
End: 2022-11-03
Payer: COMMERCIAL

## 2022-11-03 VITALS
SYSTOLIC BLOOD PRESSURE: 118 MMHG | BODY MASS INDEX: 40.23 KG/M2 | OXYGEN SATURATION: 98 % | RESPIRATION RATE: 18 BRPM | TEMPERATURE: 98 F | HEART RATE: 72 BPM | DIASTOLIC BLOOD PRESSURE: 85 MMHG | WEIGHT: 206 LBS

## 2022-11-03 DIAGNOSIS — M43.07 SPONDYLOLYSIS, LUMBOSACRAL REGION: ICD-10-CM

## 2022-11-03 DIAGNOSIS — M46.1 SACROILIAC INFLAMMATION (HCC): Primary | ICD-10-CM

## 2022-11-03 PROCEDURE — 1036F TOBACCO NON-USER: CPT | Performed by: NEUROLOGICAL SURGERY

## 2022-11-03 PROCEDURE — G8482 FLU IMMUNIZE ORDER/ADMIN: HCPCS | Performed by: NEUROLOGICAL SURGERY

## 2022-11-03 PROCEDURE — 3074F SYST BP LT 130 MM HG: CPT | Performed by: NEUROLOGICAL SURGERY

## 2022-11-03 PROCEDURE — 99214 OFFICE O/P EST MOD 30 MIN: CPT | Performed by: NEUROLOGICAL SURGERY

## 2022-11-03 PROCEDURE — 3078F DIAST BP <80 MM HG: CPT | Performed by: NEUROLOGICAL SURGERY

## 2022-11-03 PROCEDURE — 3017F COLORECTAL CA SCREEN DOC REV: CPT | Performed by: NEUROLOGICAL SURGERY

## 2022-11-03 PROCEDURE — G8427 DOCREV CUR MEDS BY ELIG CLIN: HCPCS | Performed by: NEUROLOGICAL SURGERY

## 2022-11-03 PROCEDURE — G8417 CALC BMI ABV UP PARAM F/U: HCPCS | Performed by: NEUROLOGICAL SURGERY

## 2022-11-03 NOTE — PROGRESS NOTES
Department of Neurosurgery                                                      Follow up visit      History Obtained From:  patient    CHIEF COMPLAINT:         Chief Complaint   Patient presents with    Follow-up       HISTORY OF PRESENT ILLNESS:       The patient is a 47 y.o. female who presents for follow up for sacroiliac inflammation ([facility] [address] s/p cervical spinal fusion, and pars defect of lumbar spine. Patient reports that the injections provided relief for about 2 weeks. States that the right side is worse than the left side. The injections on the right side provided more relief than the left side. She has an upcoming follow-up with pain management.       PAST MEDICAL HISTORY :       Past Medical History:        Diagnosis Date    Anxiety     Asthma     Colon polyp 10/31/2016    sessile serated adenoma x2    Depression     Diabetes mellitus ([address]     Diverticulitis 10/2016    Gastritis     GERD (gastroesophageal reflux disease)     Hemorrhoids     int/ext    Hypertension     Migraines     Osteoarthritis     Shingles 1-22-16    Tubular adenoma 10/31/2016    Urinary leakage        Past Surgical History:        Procedure Laterality Date    ABDOMINAL ADHESION SURGERY  07/08/2021    APPENDECTOMY  07/08/2021    CERVICAL DISCECTOMY  12/2013    & fusion     CHOLECYSTECTOMY      COLONOSCOPY  10/31/2016    int/ext hemorrhoids; sessile serated adenomax2; tubular adenoma    COLONOSCOPY N/A 10/22/2019    COLONOSCOPY POLYPECTOMY SNARE/COLD BIOPSY AND RANDOM BIOPSIES performed by Aleshia Mcneil MD at [address] [place] [address]  03/04/2021    CYSTOSCOPY HYDRODISTENTION WITH DMSO AND UREAPLASMA , MYCOPLASMA CULTURES    CYSTOSCOPY N/A 3/4/2021    CYSTOSCOPY HYDRODISTENTION WITH DMSO AND UREAPLASMA , MYCOPLASMA CULTURES performed by Elsy Johnson DO at [address] [facility] Right     thumb surgery, BONE REMOVED    HYSTERECTOMY (CERVIX STATUS UNKNOWN) LAPAROSCOPY      IN DEST,PARAVERTEBRAL,L/S,ADDL LVLS  3/25/2019         TONSILLECTOMY      TUBAL LIGATION      UPPER GASTROINTESTINAL ENDOSCOPY  10/31/2016    gastritis    UPPER GASTROINTESTINAL ENDOSCOPY N/A 10/22/2019    EGD BIOPSY performed by Aleshia Mcneil MD at [address] [place] [address] History:   Social History     Socioeconomic History    Marital status:      Spouse name: Not on file    Number of children: Not on file    Years of education: Not on file    Highest education level: Not on file   Occupational History    Occupation: unemployed   Tobacco Use    Smoking status: Never    Smokeless tobacco: Never   Vaping Use    Vaping Use: Never used   Substance and Sexual Activity    Alcohol use: No    Drug use: No    Sexual activity: Yes   Other Topics Concern    Not on file   Social History Narrative    Not on file     Social Determinants of Health     Financial Resource Strain: Low Risk     Difficulty of Paying Living Expenses: Not hard at all   Food Insecurity: No Food Insecurity    Worried About Running Out of Food in the Last Year: Never true    Ran Out of Food in the Last Year: Never true   Transportation Needs: Not on file   Physical Activity: Not on file   Stress: Not on file   Social Connections: Not on file   Intimate Partner Violence: Not on file   Housing Stability: Not on file       Family History:       Problem Relation Age of Onset    Cancer Mother     Heart Disease Father     Diabetes Father     High Blood Pressure Father     Other Other         Celiac Sprue. Aunt       Allergies:  Adhesive tape, Imitrex [sumatriptan], Morphine, and Seasonal    Home Medications:  Prior to Admission medications    Medication Sig Start Date End Date Taking? Authorizing Provider   oxyCODONE-acetaminophen (PERCOCET) 5-325 MG per tablet Take 1 tablet by mouth every 6 hours as needed for Pain for up to 30 days.  10/29/22 11/28/22 Yes Philip Cavanaugh MD   buPROPion (WELLBUTRIN XL) 150 MG extended release tablet Take 1 tablet by mouth every morning 10/11/22  Yes BAR Oliva - CNP   busPIRone (BUSPAR) 30 MG tablet Take 30 mg by mouth 3 times daily 10/11/22 11/10/22 Yes BAR Oliva - CNP   pregabalin (LYRICA) 75 MG capsule Take 1 capsule by mouth 4 times daily. 9/26/22  Yes Historical Provider, MD   TRULICITY 4.5 IW/3.0EX SOPN INJECT 4.5 MG SUBCUTANEOUSLY ONCE WEEKLY 9/21/22  Yes BAR Oliva - CNP   triamcinolone (NASACORT) 55 MCG/ACT nasal inhaler 2 sprays by Each Nostril route daily 9/1/22  Yes BAR Oliva CNP   fexofenadine (ALLEGRA) 180 MG tablet Take 1 tablet by mouth daily 9/1/22  Yes BAR Oliva - CNP   fluticasone (FLONASE) 50 MCG/ACT nasal spray INHALE 2 SPRAYS IN EACH NOSTRIL DAILY 8/25/22  Yes BAR Oliva - CNP   Lactobacillus (ACIDOPHILUS) CAPS capsule TAKE 1 CAPSULE BY MOUTH TWICE DAILY 8/24/22  Yes BAR Oliva CNP   omeprazole (PRILOSEC) 40 MG delayed release capsule TAKE 1 CAPSULE DAILY 8/17/22  Yes BAR Oliva - CNP   lisinopril (PRINIVIL;ZESTRIL) 5 MG tablet TAKE 1 TABLET DAILY 7/26/22  Yes BAR Oliva - CNP   montelukast (SINGULAIR) 10 MG tablet Take 1 tablet by mouth nightly TAKE 1 TABLET DAILY 6/22/22  Yes BAR Oliva - CNP   blood glucose test strips (ONETOUCH VERIO) strip Inject 1 each into the skin 2 times daily As needed. USE 1 STRIP DAILY AS NEEDED 6/22/22  Yes BAR Oliva - CNP   Lancets (ONETOUCH DELICA PLUS [id number]) MISC USE 1 EACH TWICE A DAY 6/22/22  Yes BAR Oliva - CNP   dicyclomine (BENTYL) 10 MG capsule Take 1 capsule by mouth 4 times daily 6/22/22  Yes BAR Oliva CNP   tiZANidine (ZANAFLEX) 4 MG tablet TAKE 1 TABLET EVERY 8 HOURS AS NEEDED FOR SPASMS 6/2/22  Yes BAR Maurice CNP   sertraline (ZOLOFT) 100 MG tablet TAKE 1 TABLET DAILY 5/27/22  Yes BAR Oliva CNP   atorvastatin (LIPITOR) 20 MG tablet TAKE 1 TABLET DAILY 5/6/22  Yes BAR Oliva CNP   topiramate (TOPAMAX) 100 MG tablet Take 2 at night 4/20/22  Yes BAR Oliva CNP   sodium chloride (ALTAMIST SPRAY) 0.65 % nasal spray 1 spray by Nasal route as needed for Congestion 4/7/22  Yes BAR Oliva CNP   cetirizine (ZYRTEC) 10 MG tablet Take 10 mg by mouth daily 6/29/21  Yes Historical Provider, MD   ibuprofen (ADVIL;MOTRIN) 600 MG tablet TAKE 1 TABLET BY MOUTH EVERY 6 HOURS 7/7/21  Yes Historical Provider, MD   STOOL SOFTENER/LAXATIVE 50-8.6 MG per tablet TAKE 2 TABLETS BY MOUTH TWICE DAILY 7/7/21  Yes Historical Provider, MD   ipratropium (ATROVENT) 0.06 % nasal spray USE 2 SPRAY(S) IN EACH NOSTRIL ONCE DAILY 6/23/21  Yes Historical Provider, MD   trospium (SANCTURA) 20 MG tablet Take 1 tablet by mouth daily 5/18/21  Yes Historical Provider, MD   Blood Pressure Monitor KIT Use as directed. 9/8/20  Yes BAR Oliva CNP   albuterol sulfate (PROAIR RESPICLICK) 232 (90 Base) MCG/ACT aerosol powder inhalation Inhale 2 puffs into the lungs every 4 hours as needed for Wheezing or Shortness of Breath 2/4/20  Yes Brigette Sandoval MD   docusate sodium (COLACE) 100 MG capsule Take 1 capsule by mouth daily as needed for Constipation 12/1/17  Yes BAR Hansen CNP   Elastic Bandages & Supports (LUMBAR BACK BRACE/SUPPORT PAD) MISC 1 each by Does not apply route daily as needed (pain) 8/4/17  Yes July Aguilera MD   Cream Base (SALT STABLE LS ADVANCED) CREA  1/7/22 8/22/22  Historical Provider, MD   estradiol (ESTRACE) 0.1 MG/GM vaginal cream  2/8/21   Historical Provider, MD       Current Medications:   No current facility-administered medications for this visit.       PHYSICAL EXAM:       /85 (Site: Right Upper Arm, Position: Sitting)   Pulse 72   Temp 98 °F (36.7 °C) (Oral)   Resp 18   Wt 206 lb (93.4 kg)   SpO2 98%   BMI 40.23 kg/m²   Physical Exam     Gen: NAD  HEENT: moist mucus membranes  Cardio: RRR  Pulm: chest rise symmetrically  GI: abd soft  Ext: no edema  Skin: warm    Neuro:    AOX3  CN 2-12 grossly intact  Speech articulate  Motor 5/5  No pronator drift  Sensation symmetrical           Radiology Review:      None New      ASSESSMENT AND PLAN:       Patient Active Problem List   Diagnosis    Degenerative disc disease, cervical    Cervical disc herniation    Lumbar radiculopathy, chronic    Degeneration of lumbar intervertebral disc    Lumbar spondylosis    Essential hypertension    Left-sided low back pain with left-sided sciatica    Osteoarthritis of lumbar spine    Sacroiliac inflammation (HCC)    Rheumatoid arthritis ([facility])    Primary osteoarthritis of left hip    Hip pain, chronic, right    Hemorrhoids    Colon polyp    Tubular adenoma    Chronic bilateral low back pain without sciatica    Spinal osteoarthritis    Morbid obesity with BMI of 40.0-44.9, adult ([facility])    Adjustment disorder with mixed anxiety and depressed mood    Type 2 diabetes mellitus without complication, without long-term current use of insulin ([facility])    Mixed incontinence urge and stress    Chronic, continuous use of opioids    Lumbosacral spondylosis without myelopathy    Chronic GERD    Bacterial sinusitis    Mixed hyperlipidemia    Acne cystica    Chronic rhinitis    Surgical menopause    Allergic rhinitis due to allergen    S/P Cystoscopy w/ Hydrodistension and DMSO    Cervical lymphadenopathy    Interstitial cystitis    Mild intermittent asthma without complication    Pars defect of lumbar spine    Chronic idiopathic constipation    Intractable chronic migraine without aura and without status migrainosus    Chronic pain of left knee    S/P cervical spinal fusion    Chronic SI joint pain    Bilateral hip joint arthritis         A/P:  This is a 47 y.o. female with Sacroiliac inflammation (HCC)  Comments:  right  Spondylolysis, lumbosacral region   Significant improvement after SI injection    I recommend that the patient follows up with pain management regarding an SI ablation procedure. Patient would like to think about whether she wants to move forward with the ablation or surgery. I thoroughly discussed details of diagnosis, natural histories of disease, and treatment options. We discussed surgical and non-surgical options, namely minimally invasive sacral iliac fusion. I detailed the benefits, risks, recovery period, and alternatives of this surgery. I used the imaging and spinal model to depict the surgery and diagnosis to the patient. Patient and/or family was counseled on the diagnosis and treatment plan    By signing my name below, I, Shannan Tomlinson, attest that this documentation has been prepared under the direction and in the presence of Kate Medina DO. Electronically signed: Jono Greene, 11/3/22     This note was created using voice recognition software. There may be inaccuracies of transcription  that are inadvertently overlooked prior to the signature. There is any questions about the transcription please contact me.

## 2022-11-04 RX ORDER — PREGABALIN 75 MG/1
CAPSULE ORAL
Qty: 32 CAPSULE | Refills: 1 | OUTPATIENT
Start: 2022-11-04

## 2022-11-07 DIAGNOSIS — J30.9 CHRONIC ALLERGIC RHINITIS: ICD-10-CM

## 2022-11-07 RX ORDER — FEXOFENADINE HCL 180 MG/1
TABLET ORAL
Qty: 30 TABLET | Refills: 1 | Status: SHIPPED | OUTPATIENT
Start: 2022-11-07

## 2022-11-07 NOTE — TELEPHONE ENCOUNTER
Please Approve or Refuse.   Send to Pharmacy per Pt's Request:      Next Visit Date:  2/13/2023   Last Visit Date: 10/11/2022    Hemoglobin A1C (%)   Date Value   10/14/2022 5.5   10/11/2022 5.3   04/07/2022 5.6             ( goal A1C is < 7)   BP Readings from Last 3 Encounters:   11/03/22 118/85   10/20/22 123/89   10/11/22 110/70          (goal 120/80)  BUN   Date Value Ref Range Status   10/14/2022 8 6 - 20 mg/dL Final     Creatinine   Date Value Ref Range Status   10/14/2022 1.03 (H) 0.50 - 0.90 mg/dL Final     Potassium   Date Value Ref Range Status   10/14/2022 4.4 3.7 - 5.3 mmol/L Final

## 2022-11-13 DIAGNOSIS — R10.9 ABDOMINAL CRAMPS: ICD-10-CM

## 2022-11-14 DIAGNOSIS — R10.9 ABDOMINAL CRAMPS: ICD-10-CM

## 2022-11-14 RX ORDER — DICYCLOMINE HYDROCHLORIDE 10 MG/1
CAPSULE ORAL
Qty: 120 CAPSULE | Refills: 1 | Status: SHIPPED | OUTPATIENT
Start: 2022-11-14 | End: 2022-11-15

## 2022-11-15 RX ORDER — DICYCLOMINE HYDROCHLORIDE 10 MG/1
CAPSULE ORAL
Qty: 120 CAPSULE | Refills: 1 | Status: SHIPPED | OUTPATIENT
Start: 2022-11-15

## 2022-11-15 NOTE — TELEPHONE ENCOUNTER
Please Approve or Refuse.   Send to Pharmacy per Pt's Request: [facility]     Next Visit Date:  2/13/2023   Last Visit Date: 10/11/2022    Hemoglobin A1C (%)   Date Value   10/14/2022 5.5   10/11/2022 5.3   04/07/2022 5.6             ( goal A1C is < 7)   BP Readings from Last 3 Encounters:   11/03/22 118/85   10/20/22 123/89   10/11/22 110/70          (goal 120/80)  BUN   Date Value Ref Range Status   10/14/2022 8 6 - 20 mg/dL Final     Creatinine   Date Value Ref Range Status   10/14/2022 1.03 (H) 0.50 - 0.90 mg/dL Final     Potassium   Date Value Ref Range Status   10/14/2022 4.4 3.7 - 5.3 mmol/L Final

## 2022-11-16 RX ORDER — TIZANIDINE 4 MG/1
TABLET ORAL
Qty: 90 TABLET | Refills: 10 | OUTPATIENT
Start: 2022-11-16

## 2022-11-17 ENCOUNTER — HOSPITAL ENCOUNTER (OUTPATIENT)
Dept: PAIN MANAGEMENT | Age: 52
Discharge: HOME | End: 2022-11-17
Payer: COMMERCIAL

## 2022-11-17 VITALS
BODY MASS INDEX: 40.25 KG/M2 | DIASTOLIC BLOOD PRESSURE: 79 MMHG | OXYGEN SATURATION: 96 % | WEIGHT: 205 LBS | HEART RATE: 81 BPM | SYSTOLIC BLOOD PRESSURE: 117 MMHG | HEIGHT: 60 IN | RESPIRATION RATE: 18 BRPM

## 2022-11-17 DIAGNOSIS — Z79.899 ENCOUNTER FOR MEDICATION MANAGEMENT: ICD-10-CM

## 2022-11-17 DIAGNOSIS — M47.896 OTHER OSTEOARTHRITIS OF SPINE, LUMBAR REGION: ICD-10-CM

## 2022-11-17 DIAGNOSIS — G89.29 CHRONIC LEFT-SIDED LOW BACK PAIN WITH LEFT-SIDED SCIATICA: ICD-10-CM

## 2022-11-17 DIAGNOSIS — M16.12 PRIMARY OSTEOARTHRITIS OF LEFT HIP: ICD-10-CM

## 2022-11-17 DIAGNOSIS — M54.42 CHRONIC LEFT-SIDED LOW BACK PAIN WITH LEFT-SIDED SCIATICA: ICD-10-CM

## 2022-11-17 DIAGNOSIS — M51.36 DEGENERATION OF LUMBAR INTERVERTEBRAL DISC: Chronic | ICD-10-CM

## 2022-11-17 DIAGNOSIS — M47.26 OSTEOARTHRITIS OF SPINE WITH RADICULOPATHY, LUMBAR REGION: ICD-10-CM

## 2022-11-17 DIAGNOSIS — M50.20 CERVICAL DISC HERNIATION: ICD-10-CM

## 2022-11-17 DIAGNOSIS — M54.50 CHRONIC BILATERAL LOW BACK PAIN WITHOUT SCIATICA: Primary | ICD-10-CM

## 2022-11-17 DIAGNOSIS — M47.816 OSTEOARTHRITIS OF LUMBAR SPINE, UNSPECIFIED SPINAL OSTEOARTHRITIS COMPLICATION STATUS: ICD-10-CM

## 2022-11-17 DIAGNOSIS — M54.16 LUMBAR RADICULOPATHY, CHRONIC: Chronic | ICD-10-CM

## 2022-11-17 DIAGNOSIS — G89.29 CHRONIC BILATERAL LOW BACK PAIN WITHOUT SCIATICA: Primary | ICD-10-CM

## 2022-11-17 DIAGNOSIS — M50.30 DEGENERATIVE DISC DISEASE, CERVICAL: ICD-10-CM

## 2022-11-17 DIAGNOSIS — M46.1 SACROILIITIS (HCC): ICD-10-CM

## 2022-11-17 PROCEDURE — 99213 OFFICE O/P EST LOW 20 MIN: CPT | Performed by: NURSE PRACTITIONER

## 2022-11-17 PROCEDURE — 99213 OFFICE O/P EST LOW 20 MIN: CPT

## 2022-11-17 RX ORDER — PREGABALIN 150 MG/1
150 CAPSULE ORAL 2 TIMES DAILY
Qty: 60 CAPSULE | Refills: 0 | Status: SHIPPED | OUTPATIENT
Start: 2022-11-17 | End: 2022-12-17

## 2022-11-17 RX ORDER — OXYCODONE HYDROCHLORIDE AND ACETAMINOPHEN 5; 325 MG/1; MG/1
1 TABLET ORAL EVERY 6 HOURS PRN
Qty: 120 TABLET | Refills: 0 | Status: SHIPPED | OUTPATIENT
Start: 2022-12-01 | End: 2022-12-31

## 2022-11-17 ASSESSMENT — ENCOUNTER SYMPTOMS
BOWEL INCONTINENCE: 0
COUGH: 0
BACK PAIN: 1
SHORTNESS OF BREATH: 0
CONSTIPATION: 0

## 2022-11-17 NOTE — PROGRESS NOTES
abuse or diversion identified.;Obtaining appropriate analgesic effect of treatment.    Chronic Pain > 50 MEDD -   Chronic Pain > 80 MEDD -            Past Medical History:   Diagnosis Date    Anxiety     Asthma     Colon polyp 10/31/2016    sessile serated adenoma x2    Depression     Diabetes mellitus ([address]     Diverticulitis 10/2016    Gastritis     GERD (gastroesophageal reflux disease)     Hemorrhoids     int/ext    Hypertension     Migraines     Osteoarthritis     Shingles 1-22-16    Tubular adenoma 10/31/2016    Urinary leakage        Past Surgical History:   Procedure Laterality Date    ABDOMINAL ADHESION SURGERY  07/08/2021    APPENDECTOMY  07/08/2021    CERVICAL DISCECTOMY  12/2013    & fusion     CHOLECYSTECTOMY      COLONOSCOPY  10/31/2016    int/ext hemorrhoids; sessile serated adenomax2; tubular adenoma    COLONOSCOPY N/A 10/22/2019    COLONOSCOPY POLYPECTOMY SNARE/COLD BIOPSY AND RANDOM BIOPSIES performed by Jean Marie Brady MD at [address] [facility]  03/04/2021    CYSTOSCOPY HYDRODISTENTION WITH DMSO AND UREAPLASMA , MYCOPLASMA CULTURES    CYSTOSCOPY N/A 3/4/2021    CYSTOSCOPY HYDRODISTENTION WITH DMSO AND UREAPLASMA , MYCOPLASMA CULTURES performed by Lorena Black DO at [address] [facility] Right     thumb surgery, BONE REMOVED    HYSTERECTOMY (CERVIX STATUS UNKNOWN)      LAPAROSCOPY      WI DEST,PARAVERTEBRAL,L/S,ADDL LVLS  3/25/2019         TONSILLECTOMY      TUBAL LIGATION      UPPER GASTROINTESTINAL ENDOSCOPY  10/31/2016    gastritis    UPPER GASTROINTESTINAL ENDOSCOPY N/A 10/22/2019    EGD BIOPSY performed by Jean Marie Brady MD at [address] [facility] ENDO       Allergies   Allergen Reactions    Adhesive Tape Other (See Comments)     blisters    Imitrex [Sumatriptan] Other (See Comments)     \"feels like head is going to explode    Morphine Itching     hives    Seasonal          Current Outpatient Medications:     dicyclomine (BENTYL) 10 MG capsule, TAKE 1 CAPSULE BY MOUTH FOUR TIMES DAILY, Disp: 120 capsule, Rfl: 1    fexofenadine (ALLEGRA) 180 MG tablet, Take 1 tablet by mouth once daily, Disp: 30 tablet, Rfl: 1    oxyCODONE-acetaminophen (PERCOCET) 5-325 MG per tablet, Take 1 tablet by mouth every 6 hours as needed for Pain for up to 30 days. , Disp: 120 tablet, Rfl: 0    buPROPion (WELLBUTRIN XL) 150 MG extended release tablet, Take 1 tablet by mouth every morning, Disp: 30 tablet, Rfl: 2    pregabalin (LYRICA) 75 MG capsule, Take 1 capsule by mouth 4 times daily. , Disp: , Rfl:     TRULICITY 4.5 IL/3.1UT SOPN, INJECT 4.5 MG SUBCUTANEOUSLY ONCE WEEKLY, Disp: 2 mL, Rfl: 1    triamcinolone (NASACORT) 55 MCG/ACT nasal inhaler, 2 sprays by Each Nostril route daily, Disp: 1 each, Rfl: 1    fluticasone (FLONASE) 50 MCG/ACT nasal spray, INHALE 2 SPRAYS IN EACH NOSTRIL DAILY, Disp: 16 g, Rfl: 2    Lactobacillus (ACIDOPHILUS) CAPS capsule, TAKE 1 CAPSULE BY MOUTH TWICE DAILY, Disp: 30 capsule, Rfl: 10    omeprazole (PRILOSEC) 40 MG delayed release capsule, TAKE 1 CAPSULE DAILY, Disp: 90 capsule, Rfl: 1    lisinopril (PRINIVIL;ZESTRIL) 5 MG tablet, TAKE 1 TABLET DAILY, Disp: 90 tablet, Rfl: 2    montelukast (SINGULAIR) 10 MG tablet, Take 1 tablet by mouth nightly TAKE 1 TABLET DAILY, Disp: 90 tablet, Rfl: 3    blood glucose test strips (ONETOUCH VERIO) strip, Inject 1 each into the skin 2 times daily As needed. USE 1 STRIP DAILY AS NEEDED, Disp: 200 strip, Rfl: 3    Lancets (ONETOUCH DELICA PLUS [id number]) MISC, USE 1 EACH TWICE A DAY, Disp: 200 each, Rfl: 3    tiZANidine (ZANAFLEX) 4 MG tablet, TAKE 1 TABLET EVERY 8 HOURS AS NEEDED FOR SPASMS, Disp: 270 tablet, Rfl: 0    sertraline (ZOLOFT) 100 MG tablet, TAKE 1 TABLET DAILY, Disp: 90 tablet, Rfl: 2    atorvastatin (LIPITOR) 20 MG tablet, TAKE 1 TABLET DAILY, Disp: 90 tablet, Rfl: 2    topiramate (TOPAMAX) 100 MG tablet, Take 2 at night, Disp: 180 tablet, Rfl: 2    sodium chloride (ALTAMIST SPRAY) 0.65 % nasal spray, 1 spray by Nasal route as needed for Congestion, Disp: 1 each, Rfl: 3    cetirizine (ZYRTEC) 10 MG tablet, Take 10 mg by mouth daily, Disp: , Rfl:     ibuprofen (ADVIL;MOTRIN) 600 MG tablet, TAKE 1 TABLET BY MOUTH EVERY 6 HOURS, Disp: , Rfl:     STOOL SOFTENER/LAXATIVE 50-8.6 MG per tablet, TAKE 2 TABLETS BY MOUTH TWICE DAILY, Disp: , Rfl:     ipratropium (ATROVENT) 0.06 % nasal spray, USE 2 SPRAY(S) IN EACH NOSTRIL ONCE DAILY, Disp: , Rfl:     trospium (SANCTURA) 20 MG tablet, Take 1 tablet by mouth daily, Disp: , Rfl:     estradiol (ESTRACE) 0.1 MG/GM vaginal cream, , Disp: , Rfl:     Blood Pressure Monitor KIT, Use as directed., Disp: 1 kit, Rfl: 1    albuterol sulfate (PROAIR RESPICLICK) 119 (90 Base) MCG/ACT aerosol powder inhalation, Inhale 2 puffs into the lungs every 4 hours as needed for Wheezing or Shortness of Breath, Disp: 1 Inhaler, Rfl: 2    docusate sodium (COLACE) 100 MG capsule, Take 1 capsule by mouth daily as needed for Constipation, Disp: 30 capsule, Rfl: 2    Elastic Bandages & Supports (LUMBAR BACK BRACE/SUPPORT PAD) MISC, 1 each by Does not apply route daily as needed (pain), Disp: 1 each, Rfl: 0    Family History   Problem Relation Age of Onset    Cancer Mother     Heart Disease Father     Diabetes Father     High Blood Pressure Father     Other Other         Celiac Sprue.  Aunt       Social History     Socioeconomic History    Marital status:      Spouse name: Not on file    Number of children: Not on file    Years of education: Not on file    Highest education level: Not on file   Occupational History    Occupation: unemployed   Tobacco Use    Smoking status: Never    Smokeless tobacco: Never   Vaping Use    Vaping Use: Never used   Substance and Sexual Activity    Alcohol use: No    Drug use: No    Sexual activity: Yes   Other Topics Concern    Not on file   Social History Narrative    Not on file     Social Determinants of Health     Financial Resource Strain: Low Risk Difficulty of Paying Living Expenses: Not hard at all   Food Insecurity: No Food Insecurity    Worried About Running Out of Food in the Last Year: Never true    Ran Out of Food in the Last Year: Never true   Transportation Needs: Not on file   Physical Activity: Not on file   Stress: Not on file   Social Connections: Not on file   Intimate Partner Violence: Not on file   Housing Stability: Not on file       Review of Systems:  Review of Systems   Constitutional: Negative for chills and fever. Cardiovascular:  Negative for chest pain and palpitations. Respiratory:  Negative for cough and shortness of breath. Musculoskeletal:  Positive for back pain. Gastrointestinal:  Negative for bowel incontinence and constipation. Genitourinary:  Negative for bladder incontinence. Neurological:  Positive for numbness, tingling and weakness. Negative for disturbances in coordination and loss of balance. Physical Exam:  /79   Pulse 81   Resp 18   Ht 5' (1.524 m)   Wt 205 lb (93 kg)   SpO2 96%   BMI 40.04 kg/m²     Physical Exam  HENT:      Head: Normocephalic. Pulmonary:      Effort: Pulmonary effort is normal.   Musculoskeletal:         General: Normal range of motion. Cervical back: Normal range of motion. Lumbar back: Tenderness present. Skin:     General: Skin is warm and dry. Neurological:      Mental Status: She is alert and oriented to person, place, and time.        Record/Diagnostics Review:    Last desirae 9/2022 and was not appropriate +ETOH - warned by MD    Assessment:  Problem List Items Addressed This Visit       Lumbar radiculopathy, chronic (Chronic)    Relevant Medications    pregabalin (LYRICA) 150 MG capsule    oxyCODONE-acetaminophen (PERCOCET) 5-325 MG per tablet (Start on 12/1/2022)    Degeneration of lumbar intervertebral disc (Chronic)    Relevant Medications    pregabalin (LYRICA) 150 MG capsule    oxyCODONE-acetaminophen (PERCOCET) 5-325 MG per tablet (Start on 12/1/2022)    Chronic bilateral low back pain without sciatica - Primary (Chronic)    Relevant Medications    pregabalin (LYRICA) 150 MG capsule    oxyCODONE-acetaminophen (PERCOCET) 5-325 MG per tablet (Start on 12/1/2022)    Degenerative disc disease, cervical    Relevant Medications    pregabalin (LYRICA) 150 MG capsule    oxyCODONE-acetaminophen (PERCOCET) 5-325 MG per tablet (Start on 12/1/2022)    Cervical disc herniation    Relevant Medications    pregabalin (LYRICA) 150 MG capsule    oxyCODONE-acetaminophen (PERCOCET) 5-325 MG per tablet (Start on 12/1/2022)    Left-sided low back pain with left-sided sciatica    Relevant Medications    pregabalin (LYRICA) 150 MG capsule    oxyCODONE-acetaminophen (PERCOCET) 5-325 MG per tablet (Start on 12/1/2022)    Osteoarthritis of lumbar spine    Relevant Medications    pregabalin (LYRICA) 150 MG capsule    oxyCODONE-acetaminophen (PERCOCET) 5-325 MG per tablet (Start on 12/1/2022)    Primary osteoarthritis of left hip    Relevant Medications    pregabalin (LYRICA) 150 MG capsule    oxyCODONE-acetaminophen (PERCOCET) 5-325 MG per tablet (Start on 12/1/2022)    Spinal osteoarthritis    Relevant Medications    pregabalin (LYRICA) 150 MG capsule    oxyCODONE-acetaminophen (PERCOCET) 5-325 MG per tablet (Start on 12/1/2022)     Other Visit Diagnoses       Encounter for medication management        Relevant Medications    pregabalin (LYRICA) 150 MG capsule    Sacroiliitis ([facility] [address]        Relevant Medications    oxyCODONE-acetaminophen (PERCOCET) 5-325 MG per tablet (Start on 12/1/2022)               Treatment Plan:  Patient relates current medications are helping the pain. Patient reports taking pain medications as prescribed, denies obtaining medications from different sources and denies use of illegal drugs. Medication risk and benefits have been discussed. Patient denies side effects from medications like nausea, vomiting, constipation or drowsiness.  Patient reports current activities of daily living are possible due to medications and would like to continue them. As always, we encourage daily stretching and strengthening exercises, and recommend minimizing use of pain medications unless patient cannot get through daily activities due to pain. Due to the high risk nature of this patient's pain medication close monitoring is required. Continue current medication management, pt has been stable and compliant. Script written for percocet  She will be havinf Right SI fusion on 1/28  Follow up appointment made for 4 weeks    I have reviewed the chief complaint and history of present illness (including ROS and [facility]) and vital documentation by my staff and I agree with their documentation and have added where applicable.

## 2022-12-05 DIAGNOSIS — F43.23 ADJUSTMENT DISORDER WITH MIXED ANXIETY AND DEPRESSED MOOD: ICD-10-CM

## 2022-12-06 RX ORDER — BUSPIRONE HYDROCHLORIDE 30 MG/1
TABLET ORAL
Qty: 90 TABLET | Refills: 1 | Status: SHIPPED | OUTPATIENT
Start: 2022-12-06

## 2022-12-12 DIAGNOSIS — E11.9 TYPE 2 DIABETES MELLITUS WITHOUT COMPLICATION, WITHOUT LONG-TERM CURRENT USE OF INSULIN (HCC): ICD-10-CM

## 2022-12-12 RX ORDER — DULAGLUTIDE 4.5 MG/.5ML
INJECTION, SOLUTION SUBCUTANEOUS
Qty: 2 ML | Refills: 1 | Status: SHIPPED | OUTPATIENT
Start: 2022-12-12

## 2022-12-12 NOTE — TELEPHONE ENCOUNTER
Please Approve or Refuse.   Send to Pharmacy per Pt's Request:      Next Visit Date:  2/13/2023   Last Visit Date: 10/11/2022    Hemoglobin A1C (%)   Date Value   10/14/2022 5.5   10/11/2022 5.3   04/07/2022 5.6             ( goal A1C is < 7)   BP Readings from Last 3 Encounters:   11/17/22 117/79   11/03/22 118/85   10/20/22 123/89          (goal 120/80)  BUN   Date Value Ref Range Status   10/14/2022 8 6 - 20 mg/dL Final     Creatinine   Date Value Ref Range Status   10/14/2022 1.03 (H) 0.50 - 0.90 mg/dL Final     Potassium   Date Value Ref Range Status   10/14/2022 4.4 3.7 - 5.3 mmol/L Final

## 2022-12-13 DIAGNOSIS — E78.2 MIXED HYPERLIPIDEMIA: ICD-10-CM

## 2022-12-13 DIAGNOSIS — I10 ESSENTIAL HYPERTENSION: ICD-10-CM

## 2022-12-13 DIAGNOSIS — G43.719 INTRACTABLE CHRONIC MIGRAINE WITHOUT AURA AND WITHOUT STATUS MIGRAINOSUS: ICD-10-CM

## 2022-12-13 DIAGNOSIS — F32.A ANXIETY AND DEPRESSION: ICD-10-CM

## 2022-12-13 DIAGNOSIS — F41.9 ANXIETY AND DEPRESSION: ICD-10-CM

## 2022-12-13 RX ORDER — SERTRALINE HYDROCHLORIDE 100 MG/1
TABLET, FILM COATED ORAL
Qty: 30 TABLET | Refills: 1 | Status: SHIPPED | OUTPATIENT
Start: 2022-12-13

## 2022-12-13 RX ORDER — TOPIRAMATE 100 MG/1
TABLET, FILM COATED ORAL
Qty: 60 TABLET | Refills: 1 | Status: SHIPPED | OUTPATIENT
Start: 2022-12-13

## 2022-12-13 RX ORDER — ATORVASTATIN CALCIUM 20 MG/1
TABLET, FILM COATED ORAL
Qty: 30 TABLET | Refills: 1 | Status: SHIPPED | OUTPATIENT
Start: 2022-12-13

## 2022-12-13 RX ORDER — LISINOPRIL 5 MG/1
TABLET ORAL
Qty: 30 TABLET | Refills: 1 | Status: SHIPPED | OUTPATIENT
Start: 2022-12-13

## 2022-12-15 RX ORDER — TIZANIDINE 4 MG/1
TABLET ORAL
Qty: 90 TABLET | Refills: 10 | OUTPATIENT
Start: 2022-12-15

## 2022-12-22 ENCOUNTER — HOSPITAL ENCOUNTER (OUTPATIENT)
Dept: PAIN MANAGEMENT | Age: 52
Discharge: HOME | End: 2022-12-22
Payer: COMMERCIAL

## 2022-12-22 VITALS
HEART RATE: 93 BPM | BODY MASS INDEX: 40.25 KG/M2 | WEIGHT: 205 LBS | SYSTOLIC BLOOD PRESSURE: 137 MMHG | HEIGHT: 60 IN | OXYGEN SATURATION: 98 % | DIASTOLIC BLOOD PRESSURE: 93 MMHG

## 2022-12-22 DIAGNOSIS — M47.816 OSTEOARTHRITIS OF LUMBAR SPINE, UNSPECIFIED SPINAL OSTEOARTHRITIS COMPLICATION STATUS: ICD-10-CM

## 2022-12-22 DIAGNOSIS — M47.896 OTHER OSTEOARTHRITIS OF SPINE, LUMBAR REGION: ICD-10-CM

## 2022-12-22 DIAGNOSIS — M16.12 PRIMARY OSTEOARTHRITIS OF LEFT HIP: ICD-10-CM

## 2022-12-22 DIAGNOSIS — G89.29 CHRONIC LEFT-SIDED LOW BACK PAIN WITH LEFT-SIDED SCIATICA: ICD-10-CM

## 2022-12-22 DIAGNOSIS — M43.07 SPONDYLOLYSIS, LUMBOSACRAL REGION: ICD-10-CM

## 2022-12-22 DIAGNOSIS — M47.816 LUMBAR SPONDYLOSIS: Chronic | ICD-10-CM

## 2022-12-22 DIAGNOSIS — M50.20 CERVICAL DISC HERNIATION: ICD-10-CM

## 2022-12-22 DIAGNOSIS — M46.1 SACROILIITIS (HCC): ICD-10-CM

## 2022-12-22 DIAGNOSIS — M47.26 OSTEOARTHRITIS OF SPINE WITH RADICULOPATHY, LUMBAR REGION: ICD-10-CM

## 2022-12-22 DIAGNOSIS — M51.36 DEGENERATION OF LUMBAR INTERVERTEBRAL DISC: Chronic | ICD-10-CM

## 2022-12-22 DIAGNOSIS — Z79.899 ENCOUNTER FOR MEDICATION MANAGEMENT: ICD-10-CM

## 2022-12-22 DIAGNOSIS — M54.50 CHRONIC BILATERAL LOW BACK PAIN WITHOUT SCIATICA: Primary | ICD-10-CM

## 2022-12-22 DIAGNOSIS — G89.29 CHRONIC BILATERAL LOW BACK PAIN WITHOUT SCIATICA: Primary | ICD-10-CM

## 2022-12-22 DIAGNOSIS — M46.1 SACROILIAC INFLAMMATION (HCC): ICD-10-CM

## 2022-12-22 DIAGNOSIS — M54.16 LUMBAR RADICULOPATHY, CHRONIC: Chronic | ICD-10-CM

## 2022-12-22 DIAGNOSIS — M47.817 LUMBOSACRAL SPONDYLOSIS WITHOUT MYELOPATHY: ICD-10-CM

## 2022-12-22 DIAGNOSIS — M50.30 DEGENERATIVE DISC DISEASE, CERVICAL: ICD-10-CM

## 2022-12-22 DIAGNOSIS — M54.42 CHRONIC LEFT-SIDED LOW BACK PAIN WITH LEFT-SIDED SCIATICA: ICD-10-CM

## 2022-12-22 PROCEDURE — 99213 OFFICE O/P EST LOW 20 MIN: CPT

## 2022-12-22 RX ORDER — PREGABALIN 150 MG/1
150 CAPSULE ORAL 2 TIMES DAILY
Qty: 60 CAPSULE | Refills: 0 | Status: SHIPPED | OUTPATIENT
Start: 2022-12-22 | End: 2023-01-21

## 2022-12-22 RX ORDER — OXYCODONE HYDROCHLORIDE AND ACETAMINOPHEN 5; 325 MG/1; MG/1
1 TABLET ORAL EVERY 6 HOURS PRN
Qty: 120 TABLET | Refills: 0 | Status: SHIPPED | OUTPATIENT
Start: 2023-01-05 | End: 2023-02-04

## 2022-12-22 ASSESSMENT — ENCOUNTER SYMPTOMS
SHORTNESS OF BREATH: 0
COUGH: 0
CONSTIPATION: 0
BOWEL INCONTINENCE: 0
BACK PAIN: 1

## 2022-12-22 ASSESSMENT — PAIN DESCRIPTION - DESCRIPTORS: DESCRIPTORS: ACHING;STABBING;SHOOTING

## 2022-12-22 ASSESSMENT — PAIN DESCRIPTION - LOCATION: LOCATION: BACK

## 2022-12-22 ASSESSMENT — PAIN SCALES - GENERAL: PAINLEVEL_OUTOF10: 7

## 2022-12-22 ASSESSMENT — PAIN DESCRIPTION - FREQUENCY: FREQUENCY: CONTINUOUS

## 2022-12-22 ASSESSMENT — PAIN DESCRIPTION - PAIN TYPE: TYPE: CHRONIC PAIN
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
## 2022-12-27 ENCOUNTER — OFFICE VISIT (OUTPATIENT)
Dept: FAMILY MEDICINE CLINIC | Age: 52
End: 2022-12-27
Payer: COMMERCIAL

## 2022-12-27 VITALS
DIASTOLIC BLOOD PRESSURE: 78 MMHG | OXYGEN SATURATION: 98 % | HEART RATE: 78 BPM | WEIGHT: 210 LBS | BODY MASS INDEX: 41.01 KG/M2 | TEMPERATURE: 98.8 F | SYSTOLIC BLOOD PRESSURE: 126 MMHG

## 2022-12-27 DIAGNOSIS — R09.89 SINUS SYMPTOM: ICD-10-CM

## 2022-12-27 DIAGNOSIS — J01.00 ACUTE NON-RECURRENT MAXILLARY SINUSITIS: Primary | ICD-10-CM

## 2022-12-27 LAB
INFLUENZA A ANTIBODY: NEGATIVE
INFLUENZA B ANTIBODY: NEGATIVE

## 2022-12-27 PROCEDURE — 99213 OFFICE O/P EST LOW 20 MIN: CPT | Performed by: NURSE PRACTITIONER

## 2022-12-27 PROCEDURE — 3074F SYST BP LT 130 MM HG: CPT | Performed by: NURSE PRACTITIONER

## 2022-12-27 PROCEDURE — G8482 FLU IMMUNIZE ORDER/ADMIN: HCPCS | Performed by: NURSE PRACTITIONER

## 2022-12-27 PROCEDURE — G8427 DOCREV CUR MEDS BY ELIG CLIN: HCPCS | Performed by: NURSE PRACTITIONER

## 2022-12-27 PROCEDURE — 3078F DIAST BP <80 MM HG: CPT | Performed by: NURSE PRACTITIONER

## 2022-12-27 PROCEDURE — 1036F TOBACCO NON-USER: CPT | Performed by: NURSE PRACTITIONER

## 2022-12-27 PROCEDURE — G8417 CALC BMI ABV UP PARAM F/U: HCPCS | Performed by: NURSE PRACTITIONER

## 2022-12-27 PROCEDURE — 87804 INFLUENZA ASSAY W/OPTIC: CPT | Performed by: NURSE PRACTITIONER

## 2022-12-27 PROCEDURE — 3017F COLORECTAL CA SCREEN DOC REV: CPT | Performed by: NURSE PRACTITIONER

## 2022-12-27 RX ORDER — AMOXICILLIN AND CLAVULANATE POTASSIUM 875; 125 MG/1; MG/1
1 TABLET, FILM COATED ORAL 2 TIMES DAILY
Qty: 20 TABLET | Refills: 0 | Status: SHIPPED | OUTPATIENT
Start: 2022-12-27 | End: 2023-01-06

## 2022-12-27 ASSESSMENT — ENCOUNTER SYMPTOMS
SINUS COMPLAINT: 1
SWOLLEN GLANDS: 0
COUGH: 0
SORE THROAT: 0
SHORTNESS OF BREATH: 0
SINUS PRESSURE: 1
HOARSE VOICE: 0

## 2022-12-27 NOTE — PROGRESS NOTES
[address] [facility] [address]  Dept: [phone]  Dept Fax: [phone]    Komal Ospina is a 47 y.o. female who presents to the urgent care today for her medical conditions/complaints as notedbelow. Komal Ospina is c/o of Sinus Problem (Was tested for covid  pt sinus mucus is yellow sx for  week and  days )      HPI:     47 yr old female presents for sinus sx. Pressure in her cheeks is making her teeth hurt  Sx started with flu like sx week ago, then progressed from there  Large amounts green/yellow nasal mucous      Sinus Problem  This is a new problem. The current episode started 1 to 4 weeks ago. The problem has been gradually worsening since onset. There has been no fever. The pain is moderate. Associated symptoms include congestion, headaches (sinus) and sinus pressure. Pertinent negatives include no chills, coughing, diaphoresis, ear pain, hoarse voice, neck pain, shortness of breath, sneezing, sore throat or swollen glands. Past treatments include spray decongestants (neti pot and otc antihistamine). The treatment provided mild relief. Past Medical History:   Diagnosis Date    Anxiety     Asthma     Colon polyp 10/31/2016    sessile serated adenoma x2    Depression     Diabetes mellitus ([facility] [address]     Diverticulitis 10/2016    Gastritis     GERD (gastroesophageal reflux disease)     Hemorrhoids     int/ext    Hypertension     Migraines     Osteoarthritis     Shingles 1-22-16    Tubular adenoma 10/31/2016    Urinary leakage         Current Outpatient Medications   Medication Sig Dispense Refill    amoxicillin-clavulanate (AUGMENTIN) 875-125 MG per tablet Take 1 tablet by mouth 2 times daily for 10 days 20 tablet 0    [START ON 1/5/2023] oxyCODONE-acetaminophen (PERCOCET) 5-325 MG per tablet Take 1 tablet by mouth every 6 hours as needed for Pain for up to 30 days.  120 tablet 0 pregabalin (LYRICA) 150 MG capsule Take 1 capsule by mouth 2 times daily for 30 days. 60 capsule 0    lisinopril (PRINIVIL;ZESTRIL) 5 MG tablet TAKE 1 TABLET BY MOUTH ONCE DAILY 30 tablet 1    atorvastatin (LIPITOR) 20 MG tablet TAKE 1 TABLET BY MOUTH ONCE DAILY 30 tablet 1    topiramate (TOPAMAX) 100 MG tablet TAKE 2 TABLET BY MOUTH NIGHTLY 60 tablet 1    sertraline (ZOLOFT) 100 MG tablet TAKE 1 TABLET BY MOUTH ONCE DAILY 30 tablet 1    busPIRone (BUSPAR) 30 MG tablet TAKE 1 TABLET BY MOUTH 3 TIMES DAILY 90 tablet 1    dicyclomine (BENTYL) 10 MG capsule TAKE 1 CAPSULE BY MOUTH FOUR TIMES DAILY 120 capsule 1    fexofenadine (ALLEGRA) 180 MG tablet Take 1 tablet by mouth once daily 30 tablet 1    buPROPion (WELLBUTRIN XL) 150 MG extended release tablet Take 1 tablet by mouth every morning 30 tablet 2    fluticasone (FLONASE) 50 MCG/ACT nasal spray INHALE 2 SPRAYS IN EACH NOSTRIL DAILY 16 g 2    Lactobacillus (ACIDOPHILUS) CAPS capsule TAKE 1 CAPSULE BY MOUTH TWICE DAILY 30 capsule 10    omeprazole (PRILOSEC) 40 MG delayed release capsule TAKE 1 CAPSULE DAILY 90 capsule 1    montelukast (SINGULAIR) 10 MG tablet Take 1 tablet by mouth nightly TAKE 1 TABLET DAILY 90 tablet 3    blood glucose test strips (ONETOUCH VERIO) strip Inject 1 each into the skin 2 times daily As needed. USE 1 STRIP DAILY AS NEEDED 200 strip 3    Lancets (ONETOUCH DELICA PLUS [id number]) MISC USE 1 EACH TWICE A  each 3    tiZANidine (ZANAFLEX) 4 MG tablet TAKE 1 TABLET EVERY 8 HOURS AS NEEDED FOR SPASMS 270 tablet 0    cetirizine (ZYRTEC) 10 MG tablet Take 10 mg by mouth daily      trospium (SANCTURA) 20 MG tablet Take 1 tablet by mouth daily      Blood Pressure Monitor KIT Use as directed.  1 kit 1    docusate sodium (COLACE) 100 MG capsule Take 1 capsule by mouth daily as needed for Constipation 30 capsule 2    Dulaglutide (TRULICITY) 4.5 XW/5.7PF SOPN INJECT 4.5 MG SUBCUTANEOUSLY ONCE WEEKLY (Patient not taking: Reported on 12/27/2022) 2 mL 1    triamcinolone (NASACORT) 55 MCG/ACT nasal inhaler 2 sprays by Each Nostril route daily (Patient not taking: Reported on 12/27/2022) 1 each 1    sodium chloride (ALTAMIST SPRAY) 0.65 % nasal spray 1 spray by Nasal route as needed for Congestion (Patient not taking: Reported on 12/27/2022) 1 each 3    ibuprofen (ADVIL;MOTRIN) 600 MG tablet TAKE 1 TABLET BY MOUTH EVERY 6 HOURS (Patient not taking: Reported on 12/27/2022)      STOOL SOFTENER/LAXATIVE 50-8.6 MG per tablet TAKE 2 TABLETS BY MOUTH TWICE DAILY      ipratropium (ATROVENT) 0.06 % nasal spray USE 2 SPRAY(S) IN EACH NOSTRIL ONCE DAILY (Patient not taking: Reported on 12/27/2022)      estradiol (ESTRACE) 0.1 MG/GM vaginal cream  (Patient not taking: No sig reported)      albuterol sulfate (PROAIR RESPICLICK) 028 (90 Base) MCG/ACT aerosol powder inhalation Inhale 2 puffs into the lungs every 4 hours as needed for Wheezing or Shortness of Breath (Patient not taking: Reported on 12/27/2022) 1 Inhaler 2    Elastic Bandages & Supports (LUMBAR BACK BRACE/SUPPORT PAD) MISC 1 each by Does not apply route daily as needed (pain) 1 each 0     No current facility-administered medications for this visit. Allergies   Allergen Reactions    Adhesive Tape Other (See Comments)     blisters    Imitrex [Sumatriptan] Other (See Comments)     \"feels like head is going to explode    Morphine Itching     hives    Seasonal        Subjective:      Review of Systems   Constitutional:  Negative for chills and diaphoresis. HENT:  Positive for congestion and sinus pressure. Negative for ear pain, hoarse voice, sneezing and sore throat. Respiratory:  Negative for cough and shortness of breath. Musculoskeletal:  Negative for neck pain. Neurological:  Positive for headaches (sinus). All other systems reviewed and are negative. 14 systems reviewed and negative except as listed in HPI. Objective:     Physical Exam  Vitals and nursing note reviewed.    Constitutional: General: She is not in acute distress. Appearance: Normal appearance. She is not ill-appearing, toxic-appearing or diaphoretic. HENT:      Head: Normocephalic and atraumatic. Right Ear: Tympanic membrane, ear canal and external ear normal.      Left Ear: Tympanic membrane, ear canal and external ear normal.      Nose: Congestion present. No rhinorrhea. Comments: milagros maxillary sinus tenderness  No facial swelling or erythema     Mouth/Throat:      Mouth: Mucous membranes are moist.      Pharynx: No oropharyngeal exudate or posterior oropharyngeal erythema. Comments: + cobblestoning - thin yellow pnd  Uvula midline no edema  Handling oral secretions without difficulty  Eyes:      General: No scleral icterus. Right eye: No discharge. Left eye: No discharge. Conjunctiva/sclera: Conjunctivae normal.      Pupils: Pupils are equal, round, and reactive to light. Cardiovascular:      Rate and Rhythm: Normal rate. Heart sounds: Normal heart sounds. Pulmonary:      Effort: Pulmonary effort is normal. No respiratory distress. Breath sounds: Normal breath sounds. No stridor. No wheezing, rhonchi or rales. Abdominal:      Palpations: Abdomen is soft. Tenderness: There is no abdominal tenderness. Musculoskeletal:         General: No signs of injury. Cervical back: Neck supple. Comments: Ambulated to and from room, gait steady, moving all ext without difficulty   Skin:     General: Skin is warm and dry. Capillary Refill: Capillary refill takes less than 2 seconds. Findings: No rash ( no rash to visible skin). Neurological:      General: No focal deficit present. Mental Status: She is alert and oriented to person, place, and time.    Psychiatric:         Mood and Affect: Mood normal.         Behavior: Behavior normal.     /78   Pulse 78   Temp 98.8 °F (37.1 °C)   Wt 210 lb (95.3 kg)   SpO2 98%   BMI 41.01 kg/m²     Assessment: Diagnosis Orders   1. Acute non-recurrent maxillary sinusitis        2. Sinus symptom  POCT Influenza A/B          Plan:      Results for POC orders placed in visit on 12/27/22   POCT Influenza A/B   Result Value Ref Range    Influenza A Ab negative     Influenza B Ab negative      POCT flu A neg  POCT flu B neg  Based on sx severity and duration, will tx as bacterial sinusitis  Stop the flonase since causing nose to burn and be irritated  Start otc sudafed 0- sign for it at pharmacy  Continue neti pot  Aug rx  Cont otc antihistamine  Return for Make an Appt. with your Primary Care in 1 week. Orders Placed This Encounter   Medications    amoxicillin-clavulanate (AUGMENTIN) 875-125 MG per tablet     Sig: Take 1 tablet by mouth 2 times daily for 10 days     Dispense:  20 tablet     Refill:  0         Patient given educational materials - see patient instructions. Discussed use, benefit, and side effects of prescribed medications. All patient questions answered. Pt voicedunderstanding.     Electronically signed by BAR Beck CNP on 12/27/2022 at 4:21 PM

## 2022-12-27 NOTE — LETTER
[facility]  [place] [facility] [address] [facility] [address]  Phone: [phone]  Fax: [phone]    BAR Hicks CNP        December 27, 2022     Patient: Hiren Cooley   YOB: 1970   Date of Visit: 12/27/2022       To Whom it May Concern:    Wolf Oleary was seen in my clinic on 12/27/2022. She may return to work on 12/30/2022. If you have any questions or concerns, please don't hesitate to call.     Sincerely,         BAR Hicks CNP

## 2022-12-28 DIAGNOSIS — E11.9 TYPE 2 DIABETES MELLITUS WITHOUT COMPLICATION, WITHOUT LONG-TERM CURRENT USE OF INSULIN (HCC): ICD-10-CM

## 2022-12-28 RX ORDER — TIZANIDINE 4 MG/1
TABLET ORAL
Qty: 90 TABLET | Refills: 10 | OUTPATIENT
Start: 2022-12-28

## 2022-12-29 RX ORDER — DULAGLUTIDE 4.5 MG/.5ML
INJECTION, SOLUTION SUBCUTANEOUS
Qty: 2 ML | Refills: 0 | Status: SHIPPED | OUTPATIENT
Start: 2022-12-29 | End: 2023-02-22 | Stop reason: SDUPTHER

## 2022-12-29 NOTE — TELEPHONE ENCOUNTER
Please Approve or Refuse.   Send to Pharmacy per Pt's Request:      Next Visit Date:  2/13/2023   Last Visit Date: 10/11/2022    Hemoglobin A1C (%)   Date Value   10/14/2022 5.5   10/11/2022 5.3   04/07/2022 5.6             ( goal A1C is < 7)   BP Readings from Last 3 Encounters:   12/27/22 126/78   12/22/22 (!) 137/93   11/17/22 117/79          (goal 120/80)  BUN   Date Value Ref Range Status   10/14/2022 8 6 - 20 mg/dL Final     Creatinine   Date Value Ref Range Status   10/14/2022 1.03 (H) 0.50 - 0.90 mg/dL Final     Potassium   Date Value Ref Range Status   10/14/2022 4.4 3.7 - 5.3 mmol/L Final

## 2023-01-03 ENCOUNTER — TELEPHONE (OUTPATIENT)
Dept: NEUROSURGERY | Age: 53
End: 2023-01-03

## 2023-01-04 RX ORDER — SODIUM CHLORIDE, SODIUM LACTATE, POTASSIUM CHLORIDE, CALCIUM CHLORIDE 600; 310; 30; 20 MG/100ML; MG/100ML; MG/100ML; MG/100ML
1000 INJECTION, SOLUTION INTRAVENOUS CONTINUOUS
OUTPATIENT
Start: 2023-01-04

## 2023-01-04 NOTE — DISCHARGE INSTRUCTIONS
Pre-operative Instructions           NOTHING to eat or drink after midnight the night prior to surgery    (This includes gum, candy, mints, chewing tobacco, etc). Please arrive at the surgery center (Entrance B) by 5:45 AM on 1/23/2023 (or as directed by your surgeon's office). See Directons to Surgery Center below. Please take only the following medication(s) the day of surgery with a small sip of water: Lisinopril (Prinivil), Pregabalin (Lyrica), Bupropion (Wellbutrin), Buspirone (Buspar), Omeprazole (Prilosec), Topiramate (Topamax), And if needed/available Percocet    Please stop any blood thinning medications  AS DIRECTED BY PRESCRIBING PROVIDER! : Ibuprofen   Failure to stop these medications as instructed (too soon or too late) may result in injury to you, or your surgery may need to be rescheduled. Below is a list of some examples for your reference. Antiplatelets : (stop blood cells (called platelets) from sticking together and forming a blood clot):   Aspirin (Bufferin, Ecotrin), Clopidogrel (Plavix), Ticagrelor (Brilinta), Prasugrel (Effient), Dipyridamole/aspirin (Aggrenox), Ticlopidine (Ticlid), Eptifibatide (Integrilin)    Anticoagulants: (slow down your body's process of making clots):    Warfarin (Coumadin), Rivaroxaban (Xarelto), Dabigatran (Pradaxa), Apixaban (Eliquis), Edoxaban (Savaysa), Heparin/Enoxaparin (Lovenox), Fondaparinux (Arixtra)    NSAIDS: Aspirin (Bufferin, Ecotrin), Ibuprofen (Motrin, Nuprin,Advil), Naproxen (Aleve),Meloxicam (Mobic), Celecoxib (Celebrex), Diclofenac (Voltaren), Etodolac (Lodine), Indomethacin, Ketorolac, Nabumetone, Oxaprozin (Daypro), Piroxicam (Feldene), Excedrin (has aspirin in it)    Herbal supplements: Bromelain, Cinnamon, Cayenne Pepper, [place] (female ginseng), Fish oil, Garlic, Citlaly,Ginkgo biloba, Grape seed extract, Turmeric, Vitamin E, etc....)    You may continue the rest of your medications through the night before surgery unless instructed otherwise. If applicable:   Do not take diabetic medications on the day of surgery. Please use/bring daily inhalers with you     1/9/23  11:38 AM      ___________________  _______________________  Signature (Provider)              Signature (Patient)     Day of Surgery/Procedure    As a patient at [facility] you can expect quality medical and nursing care that is centered on your individual needs. Our goal is to make your surgical experience as comfortable as possible    Directions to the [address] [place] [address]. [facility] is located in the Emergency Room parking lot on [facility] or there is additional parking across the street. The address is [address] [place] [address]. Please check in at the [facility] upon arrival.     Patient Instructions  In case of any illness please contact your surgeons office for instructions prior to coming to the hospital.    Due to current restrictions you are only allowed 2 adults to be with you. Masks are to be worn and screening will take place on arrival.     Bring your current list of medications, vitamins, herbals and anything you might take on an  \"as needed \" basis. It is important we have a correct list with dosages and frequencies. Please verify your list with your medications at home or bring all of your bottles with you. If you have been given a blood band be sure to bring it with you on the day of surgery. Do not put it on or close the clasp. Use and bring any inhalers if you are currently using one. It is ok to brush your teeth but do not swallow any water. You may be required to provide a urine sample upon your arrival to the pre-op area, so please take this into consideration prior to using the restroom. No jewelry or piercing's to be worn into surgery because you might be injured because of them. No contact lenses to be worn.   It is ok to wear your glasses in pre op but they will be removed prior to going into the operating room.    Dentures/partials will likely need to be removed in pre op depending on your type of anesthesia, please do not use adhesives on the day of surgery.    Bathe as instructed with the special soap given to you.  No lotions, powders or creams after bathing.    Wear loose comfortable clothing / shoes that are easy to get on and off over wounds or casts.    If you are going to be admitted after surgery please bring your Cpap or BiPap if you have it at home.    Keep patient belongings to a minimum and leave valuables at home. If you are staying overnight with us, please bring a SMALL bag of personal items.  We cannot accommodate large items, like suitcases.    If you are going home after anesthesia or sedation then you must have a responsible adult with you to take you home and to be with you for the first 24 hours after surgery. If you do not have someone to stay with you your surgery might get cancelled.  Please contact your surgeon's office to see if other arrangements can be made if you can not find someone to stay with you.    If you have any other questions on the day of surgery please contact [phone] or [phone]    If you have any other questions regarding your procedure/surgery please call  your surgeon's office.

## 2023-01-09 ENCOUNTER — HOSPITAL ENCOUNTER (OUTPATIENT)
Dept: PREADMISSION TESTING | Age: 53
Discharge: HOME | End: 2023-01-13
Payer: COMMERCIAL

## 2023-01-09 VITALS
HEART RATE: 70 BPM | BODY MASS INDEX: 42.01 KG/M2 | HEIGHT: 60 IN | SYSTOLIC BLOOD PRESSURE: 133 MMHG | OXYGEN SATURATION: 96 % | TEMPERATURE: 97.8 F | WEIGHT: 214 LBS | DIASTOLIC BLOOD PRESSURE: 89 MMHG | RESPIRATION RATE: 18 BRPM

## 2023-01-09 LAB
ABO/RH: NORMAL
ANTIBODY SCREEN: NEGATIVE
ARM BAND NUMBER: NORMAL
BUN BLDV-MCNC: 12 MG/DL (ref 6–20)
CREAT SERPL-MCNC: 0.85 MG/DL (ref 0.5–0.9)
EXPIRATION DATE: NORMAL
GFR SERPL CREATININE-BSD FRML MDRD: >60 ML/MIN/1.73M2
GLUCOSE BLD-MCNC: 144 MG/DL (ref 70–99)
HCT VFR BLD CALC: 41.9 % (ref 36.3–47.1)
HEMOGLOBIN: 13.6 G/DL (ref 11.9–15.1)
INR BLD: 1
MCH RBC QN AUTO: 29.3 PG (ref 25.2–33.5)
MCHC RBC AUTO-ENTMCNC: 32.5 G/DL (ref 28.4–34.8)
MCV RBC AUTO: 90.3 FL (ref 82.6–102.9)
NRBC AUTOMATED: 0 PER 100 WBC
PARTIAL THROMBOPLASTIN TIME: 22.6 SEC (ref 20.5–30.5)
PDW BLD-RTO: 12.3 % (ref 11.8–14.4)
PLATELET # BLD: 275 K/UL (ref 138–453)
PMV BLD AUTO: 9.8 FL (ref 8.1–13.5)
PROTHROMBIN TIME: 10.7 SEC (ref 9.1–12.3)
RBC # BLD: 4.64 M/UL (ref 3.95–5.11)
WBC # BLD: 7.5 K/UL (ref 3.5–11.3)

## 2023-01-09 PROCEDURE — 85027 COMPLETE CBC AUTOMATED: CPT

## 2023-01-09 PROCEDURE — 85730 THROMBOPLASTIN TIME PARTIAL: CPT

## 2023-01-09 PROCEDURE — 86900 BLOOD TYPING SEROLOGIC ABO: CPT

## 2023-01-09 PROCEDURE — 93005 ELECTROCARDIOGRAM TRACING: CPT | Performed by: ANESTHESIOLOGY

## 2023-01-09 PROCEDURE — 86901 BLOOD TYPING SEROLOGIC RH(D): CPT

## 2023-01-09 PROCEDURE — 36415 COLL VENOUS BLD VENIPUNCTURE: CPT

## 2023-01-09 PROCEDURE — 85610 PROTHROMBIN TIME: CPT

## 2023-01-09 PROCEDURE — 82947 ASSAY GLUCOSE BLOOD QUANT: CPT

## 2023-01-09 PROCEDURE — 84520 ASSAY OF UREA NITROGEN: CPT

## 2023-01-09 PROCEDURE — 86850 RBC ANTIBODY SCREEN: CPT

## 2023-01-09 PROCEDURE — 82565 ASSAY OF CREATININE: CPT

## 2023-01-09 ASSESSMENT — PAIN DESCRIPTION - FREQUENCY: FREQUENCY: CONTINUOUS

## 2023-01-09 ASSESSMENT — PAIN SCALES - GENERAL: PAINLEVEL_OUTOF10: 10

## 2023-01-09 ASSESSMENT — PAIN DESCRIPTION - LOCATION: LOCATION: BACK;HIP

## 2023-01-09 ASSESSMENT — PAIN DESCRIPTION - ORIENTATION: ORIENTATION: LEFT;RIGHT

## 2023-01-09 ASSESSMENT — PAIN DESCRIPTION - PAIN TYPE: TYPE: CHRONIC PAIN

## 2023-01-09 NOTE — PROGRESS NOTES
Anesthesia Focused Assessment    Hx of anesthesia complications:  state \"trouble keeping oxygen up, they tell me to take deep breaths in recovery\"  Family hx of anesthesia complications:  no    Prior + Covid-19 test? Not confirmed   Covid vaccinated?: yes      STOP-BANG Sleep Apnea Questionnaire    SNORE loudly (heard through closed doors)? Yes  TIRED, fatigued, sleepy during daytime? Yes  OBSERVED stopping breathing during sleep? No  High blood PRESSURE or being treated? Yes    BMI over 35? Yes  AGE over 48? Yes  NECK circumference over 16\"? No  GENDER (male)? No             Total 5  High risk 5-8  Intermediate risk 3-4  Low risk 0-2    ----------------------------------------------------------------------------------------------------------------------  LORRIE:                                             no  If yes, machine?:                              Type 1 DM:                                   no  T2DM:                                           yes, states her Rx of Trulicity has been back ordered. most recent HgA1C 5.5 in October 2022,  has upcoming appt with new PCP in February. Coronary Artery Disease:             no  Hypertension:                               yes  Defib / AICD / Pacemaker:           no    Renal Failure:                               no  If yes, on dialysis? :                           Active smoker:                              no  Drinks Alcohol:                              no  Illicit drugs:                                    no    Dentition:                                      some missing bottom teeth, does not use dentures     Past Medical History:   Diagnosis Date    Anxiety     Matt Jenkins CNP therapist video visit 1/10/2023    Asthma     does not follow with Pulmonology    Colon polyp 10/31/2016    sessile serated adenoma x2    Depression     Diabetes mellitus ([address]     Medication, Trulicity, has been back ordered    Diverticulitis 10/2016 Gastritis     GERD (gastroesophageal reflux disease)     Hemorrhoids     int/ext    Hypertension     Migraines     Osteoarthritis     Pain management     [place] [facility] Dr. Sumit Kerns  last visit 12/2022    PTSD (post-traumatic stress disorder)     states has been VERY anxious lately, many personal stressors, working 2 jobs, etc. has a virtual appt with psychiatrist tomorrow 1/10/23. pt believes she may need meds adjusted. Rheumatoid arthritis ([facility] [address]     states f/w pain mgmt for this    Sacroiliitis ([facility] [address]     Shingles 01/22/2016    Snores     Tubular adenoma 10/31/2016    Under care of team     PCP, Marty Cramer CNP moved. Pt needs new PCP    Urinary leakage     Wears glasses        Patient was evaluated in PAT & anesthesia guidelines were applied. NPO guidelines, medication instructions and scheduled arrival time were reviewed with patient. I advised patient/patient family to please contact surgeons office, ahead of time if possible, if any new signs or symptoms of illness, infection, rash, etc. Patient/ patient family verbalize understanding.                                                                                                                       Anesthesia contacted:   no    Medical or cardiac clearance ordered: BAR Cao CNP  Electronically signed 1/9/2023 at 12:36 PM

## 2023-01-09 NOTE — H&P (VIEW-ONLY)
History and Physical    Pt Name: Sergei Day  MRN: [id number]  YOB: 1970  Date of evaluation: 1/9/2023  Primary Care Physician: BAR Dominguez CNP    SUBJECTIVE:   History of Chief Complaint:    Sergei Day is a 47 y.o. female who presents for PAT appointment. Patient complains of chronic lower back pain, with radiation to bilateral buttocks, bilateral hips, R>L. She rates her pain level a 10/10 today. She has been following with pain management and reports she has received RFAs, and facets. Patient has been scheduled for MINIMALLY INVASIVE SACROILIITIS FUSION. Allergies  is allergic to imitrex [sumatriptan], morphine, adhesive tape, and seasonal.  Medications  Prior to Admission medications    Medication Sig Start Date End Date Taking? Authorizing Provider   TRULICITY 4.5 RA/6.1OC SOPN INJECT 4.5 MG SUBCUTANEOUSLY ONCE WEEKLY 12/29/22   BAR Oliva CNP   oxyCODONE-acetaminophen (PERCOCET) 5-325 MG per tablet Take 1 tablet by mouth every 6 hours as needed for Pain for up to 30 days. 1/5/23 2/4/23  BAR Dias CNP   pregabalin (LYRICA) 150 MG capsule Take 1 capsule by mouth 2 times daily for 30 days.  12/22/22 1/21/23  BAR Dias CNP   lisinopril (PRINIVIL;ZESTRIL) 5 MG tablet TAKE 1 TABLET BY MOUTH ONCE DAILY 12/13/22   BAR Oliva CNP   atorvastatin (LIPITOR) 20 MG tablet TAKE 1 TABLET BY MOUTH ONCE DAILY 12/13/22   BAR Oliva CNP   topiramate (TOPAMAX) 100 MG tablet TAKE 2 TABLET BY MOUTH NIGHTLY 12/13/22   BAR Oliva CNP   sertraline (ZOLOFT) 100 MG tablet TAKE 1 TABLET BY MOUTH ONCE DAILY 12/13/22   BAR Oliva CNP   busPIRone (BUSPAR) 30 MG tablet TAKE 1 TABLET BY MOUTH 3 TIMES DAILY 12/6/22   Renella A Gauamis, APRN - CNP   dicyclomine (BENTYL) 10 MG capsule TAKE 1 CAPSULE BY MOUTH FOUR TIMES DAILY 11/15/22   BAR Oliva - CNP   fexofenadine (ALLEGRA) 180 MG tablet Take 1 tablet by mouth once daily 11/7/22   BAR Oliva CNP   buPROPion (WELLBUTRIN XL) 150 MG extended release tablet Take 1 tablet by mouth every morning 10/11/22   BAR Oliva CNP   fluticasone (FLONASE) 50 MCG/ACT nasal spray INHALE 2 SPRAYS IN EACH NOSTRIL DAILY 8/25/22   BAR Oliva CNP   Lactobacillus (ACIDOPHILUS) CAPS capsule TAKE 1 CAPSULE BY MOUTH TWICE DAILY  Patient taking differently: 1 capsule daily 8/24/22   BAR Oliva CNP   omeprazole (PRILOSEC) 40 MG delayed release capsule TAKE 1 CAPSULE DAILY 8/17/22   BAR Oliva CNP   montelukast (SINGULAIR) 10 MG tablet Take 1 tablet by mouth nightly TAKE 1 TABLET DAILY 6/22/22   BAR Oliva CNP   blood glucose test strips (ONETOUCH VERIO) strip Inject 1 each into the skin 2 times daily As needed. USE 1 STRIP DAILY AS NEEDED 6/22/22   BAR Oliva CNP   Lancets (ONETOUCH DELICA PLUS [id number]) MISC USE 1 EACH TWICE A DAY 6/22/22   BAR Oliva CNP   tiZANidine (ZANAFLEX) 4 MG tablet TAKE 1 TABLET EVERY 8 HOURS AS NEEDED FOR SPASMS 6/2/22   BAR Murguia CNP   Cream Base (SALT STABLE LS ADVANCED) CREA  1/7/22 8/22/22  Historical Provider, MD   cetirizine (ZYRTEC) 10 MG tablet Take 10 mg by mouth daily 6/29/21   Historical Provider, MD   Elastic Bandages & Supports (LUMBAR BACK BRACE/SUPPORT PAD) MISC 1 each by Does not apply route daily as needed (pain) 8/4/17   Bairon Rodriges MD     Past Medical History    has a past medical history of Anxiety, Asthma, Colon polyp, Depression, Diabetes mellitus ([facility] [address] Diverticulitis, Gastritis, GERD (gastroesophageal reflux disease), Hemorrhoids, Hypertension, Migraines, Osteoarthritis, Pain management, PTSD (post-traumatic stress disorder), Rheumatoid arthritis ([facility] [address] Sacroiliitis ([address] Shingles, Snores, Tubular adenoma, Under care of team, Urinary leakage, and Wears glasses.   Past Surgical History   has a past surgical history that includes Hysterectomy; laparoscopy; Tonsillectomy; Hand surgery (Right); Tubal ligation; Cervical discectomy (2013); Cholecystectomy; Dilation and curettage of uterus; Colonoscopy (10/31/2016); Upper gastrointestinal endoscopy (10/31/2016); pr dest,paravertebral,l/s,addl lvls (3/25/2019); Colonoscopy (N/A, 10/22/2019); Upper gastrointestinal endoscopy (N/A, 10/22/2019); Cystocopy (2021); Cystoscopy (N/A, 3/4/2021); Appendectomy (2021); and Abdominal adhesion surgery (2021). Social History   reports that she has never smoked. She has never used smokeless tobacco.    reports no history of alcohol use. reports no history of drug use. 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 Review of Systems:  CONSTITUTIONAL:   negative for fevers, chills, fatigue and malaise    EYES:   negative for double vision, blurred vision and photophobia +glasses   HEENT:   negative for tinnitus, epistaxis and sore throat   RESPIRATORY:   negative for cough, shortness of breath, wheezing     CARDIOVASCULAR:   negative for chest pain, palpitations, syncope, edema     GASTROINTESTINAL:   negative for nausea, vomiting     GENITOURINARY:   negative for incontinence     MUSCULOSKELETAL:   negative for neck +back pain   NEUROLOGICAL:   negative for headaches and dizziness     PSYCHIATRIC:   +anxious, reports PTSD- states has virtual appt tomorrow with provider. OBJECTIVE:   VITALS:  height is 5' (1.524 m) and weight is 214 lb (97.1 kg). Her infrared temperature is 97.8 °F (36.6 °C). Her blood pressure is 133/89 and her pulse is 70.  Her respiration is 18 and oxygen saturation is 96%.   CONSTITUTIONAL:alert & oriented x 3, no acute distress. Friendly. Very pleasant and talkative. Very anxious affect.   SKIN:  Warm and dry, no rashes on exposed areas of skin   HEAD:  Normocephalic, atraumatic   EYES: EOMs intact. PERRL. Wearing glasses.   EARS:  Equal bilaterally, no edema or thickening, skin is intact without lumps or lesions. No discharge. Hearing grossly WNL.    NOSE:  Nares patent.  No rhinorrhea   MOUTH/THROAT:  Mucous membranes moist, tongue is pink, uvula midline, teeth appear to be intact, some lower missing (does not use her dentures)  NECK:full ROM  LUNGS: Respirations even and non-labored. Clear to auscultation bilaterally, no wheezes, rales, or rhonchi.    CARDIOVASCULAR: Regular rate and rhythm, no murmurs/rubs/gallops   ABDOMEN: soft, non-tender, non-distended, bowel sounds active x 4, obese  EXTREMITIES: No edema bilateral lower extremities.  No varicosities bilateral lower extremities.   NEUROLOGIC: CN II-XII are grossly intact.  Gait antalgic, no use of aids today.     Testing:   EK23  Labs pending: drawn 2023   IMPRESSIONS:   Sacroiliitis   PLANS:   MINIMALLY INVASIVE SACROILIITIS FUSION     BAR ARNOLD CNP  Electronically signed 2023 at 12:34 PM

## 2023-01-09 NOTE — H&P
History and Physical    Pt Name: Saqib Deutsch  MRN: [id number]  YOB: 1970  Date of evaluation: 1/9/2023  Primary Care Physician: BAR Layne CNP    SUBJECTIVE:   History of Chief Complaint:    Saqib Deutsch is a 47 y.o. female who presents for PAT appointment. Patient complains of chronic lower back pain, with radiation to bilateral buttocks, bilateral hips, R>L. She rates her pain level a 10/10 today. She has been following with pain management and reports she has received RFAs, and facets. Patient has been scheduled for MINIMALLY INVASIVE SACROILIITIS FUSION. Allergies  is allergic to imitrex [sumatriptan], morphine, adhesive tape, and seasonal.  Medications  Prior to Admission medications    Medication Sig Start Date End Date Taking? Authorizing Provider   TRULICITY 4.5 FZ/7.8XX SOPN INJECT 4.5 MG SUBCUTANEOUSLY ONCE WEEKLY 12/29/22   BAR Oliva CNP   oxyCODONE-acetaminophen (PERCOCET) 5-325 MG per tablet Take 1 tablet by mouth every 6 hours as needed for Pain for up to 30 days. 1/5/23 2/4/23  BAR Barbosa CNP   pregabalin (LYRICA) 150 MG capsule Take 1 capsule by mouth 2 times daily for 30 days.  12/22/22 1/21/23  BAR Barbosa CNP   lisinopril (PRINIVIL;ZESTRIL) 5 MG tablet TAKE 1 TABLET BY MOUTH ONCE DAILY 12/13/22   BAR Oliva CNP   atorvastatin (LIPITOR) 20 MG tablet TAKE 1 TABLET BY MOUTH ONCE DAILY 12/13/22   BAR Oliva CNP   topiramate (TOPAMAX) 100 MG tablet TAKE 2 TABLET BY MOUTH NIGHTLY 12/13/22   BAR Oliva CNP   sertraline (ZOLOFT) 100 MG tablet TAKE 1 TABLET BY MOUTH ONCE DAILY 12/13/22   BAR Oliva CNP   busPIRone (BUSPAR) 30 MG tablet TAKE 1 TABLET BY MOUTH 3 TIMES DAILY 12/6/22   Renella A Gauamis, APRN - CNP   dicyclomine (BENTYL) 10 MG capsule TAKE 1 CAPSULE BY MOUTH FOUR TIMES DAILY 11/15/22   BAR Oliva - CNP   fexofenadine (ALLEGRA) 180 MG tablet Take 1 tablet by mouth once daily 11/7/22   BAR Oliva CNP   buPROPion (WELLBUTRIN XL) 150 MG extended release tablet Take 1 tablet by mouth every morning 10/11/22   BAR Oliva CNP   fluticasone (FLONASE) 50 MCG/ACT nasal spray INHALE 2 SPRAYS IN EACH NOSTRIL DAILY 8/25/22   BAR Oliva CNP   Lactobacillus (ACIDOPHILUS) CAPS capsule TAKE 1 CAPSULE BY MOUTH TWICE DAILY  Patient taking differently: 1 capsule daily 8/24/22   BAR Oliva CNP   omeprazole (PRILOSEC) 40 MG delayed release capsule TAKE 1 CAPSULE DAILY 8/17/22   BAR Oliva CNP   montelukast (SINGULAIR) 10 MG tablet Take 1 tablet by mouth nightly TAKE 1 TABLET DAILY 6/22/22   BAR Oliva CNP   blood glucose test strips (ONETOUCH VERIO) strip Inject 1 each into the skin 2 times daily As needed. USE 1 STRIP DAILY AS NEEDED 6/22/22   BAR Oliva CNP   Lancets (ONETOUCH DELICA PLUS [id number]) MISC USE 1 EACH TWICE A DAY 6/22/22   BAR Oliva CNP   tiZANidine (ZANAFLEX) 4 MG tablet TAKE 1 TABLET EVERY 8 HOURS AS NEEDED FOR SPASMS 6/2/22   Ceci , APRN - CNP   Cream Base (SALT STABLE LS ADVANCED) CREA  1/7/22 8/22/22  Historical Provider, MD   cetirizine (ZYRTEC) 10 MG tablet Take 10 mg by mouth daily 6/29/21   Historical Provider, MD   Elastic Bandages & Supports (LUMBAR BACK BRACE/SUPPORT PAD) MISC 1 each by Does not apply route daily as needed (pain) 8/4/17   Riaz Etienne MD     Past Medical History    has a past medical history of Anxiety, Asthma, Colon polyp, Depression, Diabetes mellitus ([facility] [address] Diverticulitis, Gastritis, GERD (gastroesophageal reflux disease), Hemorrhoids, Hypertension, Migraines, Osteoarthritis, Pain management, PTSD (post-traumatic stress disorder), Rheumatoid arthritis ([facility] [address] Sacroiliitis ([address] Shingles, Snores, Tubular adenoma, Under care of team, Urinary leakage, and Wears glasses.   Past Surgical History   has a past surgical history that includes Hysterectomy; laparoscopy; Tonsillectomy; Hand surgery (Right); Tubal ligation; Cervical discectomy (2013); Cholecystectomy; Dilation and curettage of uterus; Colonoscopy (10/31/2016); Upper gastrointestinal endoscopy (10/31/2016); pr dest,paravertebral,l/s,addl lvls (3/25/2019); Colonoscopy (N/A, 10/22/2019); Upper gastrointestinal endoscopy (N/A, 10/22/2019); Cystocopy (2021); Cystoscopy (N/A, 3/4/2021); Appendectomy (2021); and Abdominal adhesion surgery (2021). Social History   reports that she has never smoked. She has never used smokeless tobacco.    reports no history of alcohol use. reports no history of drug use. 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 Review of Systems:  CONSTITUTIONAL:   negative for fevers, chills, fatigue and malaise    EYES:   negative for double vision, blurred vision and photophobia +glasses   HEENT:   negative for tinnitus, epistaxis and sore throat   RESPIRATORY:   negative for cough, shortness of breath, wheezing     CARDIOVASCULAR:   negative for chest pain, palpitations, syncope, edema     GASTROINTESTINAL:   negative for nausea, vomiting     GENITOURINARY:   negative for incontinence     MUSCULOSKELETAL:   negative for neck +back pain   NEUROLOGICAL:   negative for headaches and dizziness     PSYCHIATRIC:   +anxious, reports PTSD- states has virtual appt tomorrow with provider. OBJECTIVE:   VITALS:  height is 5' (1.524 m) and weight is 214 lb (97.1 kg). Her infrared temperature is 97.8 °F (36.6 °C). Her blood pressure is 133/89 and her pulse is 70.  Her respiration is 18 and oxygen saturation is 96%. CONSTITUTIONAL:alert & oriented x 3, no acute distress. Friendly. Very pleasant and talkative. Very anxious affect. SKIN:  Warm and dry, no rashes on exposed areas of skin   HEAD:  Normocephalic, atraumatic   EYES: EOMs intact. PERRL. Wearing glasses. EARS:  Equal bilaterally, no edema or thickening, skin is intact without lumps or lesions. No discharge. Hearing grossly WNL. NOSE:  Nares patent. No rhinorrhea   MOUTH/THROAT:  Mucous membranes moist, tongue is pink, uvula midline, teeth appear to be intact, some lower missing (does not use her dentures)  NECK:full ROM  LUNGS: Respirations even and non-labored. Clear to auscultation bilaterally, no wheezes, rales, or rhonchi. CARDIOVASCULAR: Regular rate and rhythm, no murmurs/rubs/gallops   ABDOMEN: soft, non-tender, non-distended, bowel sounds active x 4, obese  EXTREMITIES: No edema bilateral lower extremities. No varicosities bilateral lower extremities. NEUROLOGIC: CN II-XII are grossly intact. Gait antalgic, no use of aids today.      Testing:   EK23  Labs pending: drawn 2023   IMPRESSIONS:   Sacroiliitis   PLANS:   MINIMALLY INVASIVE SACROILIITIS FUSION     BAR ARNOLD CNP  Electronically signed 2023 at 12:34 PM

## 2023-01-10 LAB
EKG ATRIAL RATE: 70 BPM
EKG P AXIS: -3 DEGREES
EKG P-R INTERVAL: 122 MS
EKG Q-T INTERVAL: 404 MS
EKG QRS DURATION: 84 MS
EKG QTC CALCULATION (BAZETT): 436 MS
EKG R AXIS: 12 DEGREES
EKG T AXIS: 31 DEGREES
EKG VENTRICULAR RATE: 70 BPM

## 2023-01-10 PROCEDURE — 93010 ELECTROCARDIOGRAM REPORT: CPT | Performed by: INTERNAL MEDICINE

## 2023-01-18 DIAGNOSIS — E11.9 TYPE 2 DIABETES MELLITUS WITHOUT COMPLICATION, WITHOUT LONG-TERM CURRENT USE OF INSULIN (HCC): Primary | ICD-10-CM

## 2023-01-18 RX ORDER — SEMAGLUTIDE 1.34 MG/ML
1 INJECTION, SOLUTION SUBCUTANEOUS
Qty: 4 ADJUSTABLE DOSE PRE-FILLED PEN SYRINGE | Refills: 1 | Status: SHIPPED | OUTPATIENT
Start: 2023-01-18

## 2023-01-18 NOTE — TELEPHONE ENCOUNTER
[facility] PHARMACIST CALLED STATING IN ORDER FOR THEM TO PROVIDE THE PATIENT THE OZEMPIC THE SCRIPT WOULD NEED TO BE WRITTEN FOR THE 4 MG PENS/3ML WITH THE CURRENT INSTRUCTIONS

## 2023-01-18 NOTE — TELEPHONE ENCOUNTER
Patient called and stated that she is has been trying to get medication her insulin filled trulicity, medication is on back order, she is out completely and sugars are running high. Will like a alternative insulin called in ozempic, please advise. Please Approve or Refuse.   Send to Pharmacy per Pt's Request: [facility]      Next Visit Date:  2/13/2023   Last Visit Date: 10/11/2022    Hemoglobin A1C (%)   Date Value   10/14/2022 5.5   10/11/2022 5.3   04/07/2022 5.6             ( goal A1C is < 7)   BP Readings from Last 3 Encounters:   01/09/23 133/89   12/27/22 126/78   12/22/22 (!) 137/93          (goal 120/80)  BUN   Date Value Ref Range Status   01/09/2023 12 6 - 20 mg/dL Final     Creatinine   Date Value Ref Range Status   01/09/2023 0.85 0.50 - 0.90 mg/dL Final     Potassium   Date Value Ref Range Status   10/14/2022 4.4 3.7 - 5.3 mmol/L Final

## 2023-01-22 ENCOUNTER — ANESTHESIA EVENT (OUTPATIENT)
Dept: OPERATING ROOM | Age: 53
End: 2023-01-22
Payer: COMMERCIAL

## 2023-01-23 ENCOUNTER — APPOINTMENT (OUTPATIENT)
Dept: GENERAL RADIOLOGY | Age: 53
End: 2023-01-23
Attending: NEUROLOGICAL SURGERY
Payer: COMMERCIAL

## 2023-01-23 ENCOUNTER — HOSPITAL ENCOUNTER (OUTPATIENT)
Age: 53
Setting detail: SURGERY ADMIT
Discharge: HOME | End: 2023-01-23
Attending: NEUROLOGICAL SURGERY | Admitting: NEUROLOGICAL SURGERY
Payer: COMMERCIAL

## 2023-01-23 ENCOUNTER — ANESTHESIA (OUTPATIENT)
Dept: OPERATING ROOM | Age: 53
End: 2023-01-23
Payer: COMMERCIAL

## 2023-01-23 VITALS
SYSTOLIC BLOOD PRESSURE: 122 MMHG | HEART RATE: 65 BPM | OXYGEN SATURATION: 95 % | TEMPERATURE: 97.2 F | RESPIRATION RATE: 14 BRPM | DIASTOLIC BLOOD PRESSURE: 84 MMHG

## 2023-01-23 DIAGNOSIS — M46.1 SACROILIITIS (HCC): Primary | ICD-10-CM

## 2023-01-23 DIAGNOSIS — G89.29 CHRONIC SI JOINT PAIN: Chronic | ICD-10-CM

## 2023-01-23 DIAGNOSIS — M46.1 BILATERAL SACROILIITIS (HCC): Primary | ICD-10-CM

## 2023-01-23 DIAGNOSIS — Z98.1 S/P FUSION OF SACROILIAC JOINT: ICD-10-CM

## 2023-01-23 DIAGNOSIS — M53.3 CHRONIC SI JOINT PAIN: Chronic | ICD-10-CM

## 2023-01-23 LAB
GLUCOSE BLD-MCNC: 112 MG/DL (ref 65–105)
GLUCOSE BLD-MCNC: 182 MG/DL (ref 65–105)

## 2023-01-23 PROCEDURE — 27279 ARTHRD SI JT PLMT TARTCLR DV: CPT | Performed by: NEUROLOGICAL SURGERY

## 2023-01-23 PROCEDURE — 7100000010 HC PHASE II RECOVERY - FIRST 15 MIN: Performed by: NEUROLOGICAL SURGERY

## 2023-01-23 PROCEDURE — C9290 INJ, BUPIVACAINE LIPOSOME: HCPCS | Performed by: NEUROLOGICAL SURGERY

## 2023-01-23 PROCEDURE — 3600000014 HC SURGERY LEVEL 4 ADDTL 15MIN: Performed by: NEUROLOGICAL SURGERY

## 2023-01-23 PROCEDURE — 2709999900 HC NON-CHARGEABLE SUPPLY: Performed by: NEUROLOGICAL SURGERY

## 2023-01-23 PROCEDURE — 2580000003 HC RX 258: Performed by: ANESTHESIOLOGY

## 2023-01-23 PROCEDURE — 3700000000 HC ANESTHESIA ATTENDED CARE: Performed by: NEUROLOGICAL SURGERY

## 2023-01-23 PROCEDURE — 6360000002 HC RX W HCPCS: Performed by: ANESTHESIOLOGY

## 2023-01-23 PROCEDURE — 2500000003 HC RX 250 WO HCPCS: Performed by: NEUROLOGICAL SURGERY

## 2023-01-23 PROCEDURE — 7100000001 HC PACU RECOVERY - ADDTL 15 MIN: Performed by: NEUROLOGICAL SURGERY

## 2023-01-23 PROCEDURE — 6370000000 HC RX 637 (ALT 250 FOR IP): Performed by: NEUROLOGICAL SURGERY

## 2023-01-23 PROCEDURE — 7100000000 HC PACU RECOVERY - FIRST 15 MIN: Performed by: NEUROLOGICAL SURGERY

## 2023-01-23 PROCEDURE — 82947 ASSAY GLUCOSE BLOOD QUANT: CPT

## 2023-01-23 PROCEDURE — 6360000002 HC RX W HCPCS: Performed by: NEUROLOGICAL SURGERY

## 2023-01-23 PROCEDURE — 2580000003 HC RX 258: Performed by: NURSE ANESTHETIST, CERTIFIED REGISTERED

## 2023-01-23 PROCEDURE — 6360000002 HC RX W HCPCS: Performed by: NURSE ANESTHETIST, CERTIFIED REGISTERED

## 2023-01-23 PROCEDURE — 3700000001 HC ADD 15 MINUTES (ANESTHESIA): Performed by: NEUROLOGICAL SURGERY

## 2023-01-23 PROCEDURE — 6370000000 HC RX 637 (ALT 250 FOR IP)

## 2023-01-23 PROCEDURE — 2720000010 HC SURG SUPPLY STERILE: Performed by: NEUROLOGICAL SURGERY

## 2023-01-23 PROCEDURE — C1776 JOINT DEVICE (IMPLANTABLE): HCPCS | Performed by: NEUROLOGICAL SURGERY

## 2023-01-23 PROCEDURE — 3209999900 FLUORO FOR SURGICAL PROCEDURES

## 2023-01-23 PROCEDURE — 2580000003 HC RX 258: Performed by: NEUROLOGICAL SURGERY

## 2023-01-23 PROCEDURE — 3600000004 HC SURGERY LEVEL 4 BASE: Performed by: NEUROLOGICAL SURGERY

## 2023-01-23 PROCEDURE — 2500000003 HC RX 250 WO HCPCS: Performed by: NURSE ANESTHETIST, CERTIFIED REGISTERED

## 2023-01-23 PROCEDURE — C1713 ANCHOR/SCREW BN/BN,TIS/BN: HCPCS | Performed by: NEUROLOGICAL SURGERY

## 2023-01-23 DEVICE — IMPLANT SPNL L40MM DIA7MM SACROILIAC JT TI POR PLSM SPR: Type: IMPLANTABLE DEVICE | Status: FUNCTIONAL

## 2023-01-23 DEVICE — GRAFT CELLR BNE MATRX INFLUX SPARC 5CC: Type: IMPLANTABLE DEVICE | Status: FUNCTIONAL

## 2023-01-23 DEVICE — IMPLANTABLE DEVICE
Type: IMPLANTABLE DEVICE | Status: FUNCTIONAL
Brand: IFUSE IMPLANT SYSTEM

## 2023-01-23 RX ORDER — NEOSTIGMINE METHYLSULFATE 5 MG/5 ML
SYRINGE (ML) INTRAVENOUS PRN
Status: DISCONTINUED | OUTPATIENT
Start: 2023-01-23 | End: 2023-01-23 | Stop reason: SDUPTHER

## 2023-01-23 RX ORDER — LAMOTRIGINE 25 MG/1
TABLET ORAL
COMMUNITY
Start: 2023-01-10

## 2023-01-23 RX ORDER — DEXAMETHASONE SODIUM PHOSPHATE 10 MG/ML
INJECTION, SOLUTION INTRAMUSCULAR; INTRAVENOUS PRN
Status: DISCONTINUED | OUTPATIENT
Start: 2023-01-23 | End: 2023-01-23 | Stop reason: SDUPTHER

## 2023-01-23 RX ORDER — LIDOCAINE HYDROCHLORIDE AND EPINEPHRINE 10; 10 MG/ML; UG/ML
INJECTION, SOLUTION INFILTRATION; PERINEURAL PRN
Status: DISCONTINUED | OUTPATIENT
Start: 2023-01-23 | End: 2023-01-23 | Stop reason: HOSPADM

## 2023-01-23 RX ORDER — ONDANSETRON 2 MG/ML
INJECTION INTRAMUSCULAR; INTRAVENOUS PRN
Status: DISCONTINUED | OUTPATIENT
Start: 2023-01-23 | End: 2023-01-23 | Stop reason: SDUPTHER

## 2023-01-23 RX ORDER — METOCLOPRAMIDE HYDROCHLORIDE 5 MG/ML
10 INJECTION INTRAMUSCULAR; INTRAVENOUS
Status: DISCONTINUED | OUTPATIENT
Start: 2023-01-23 | End: 2023-01-23 | Stop reason: HOSPADM

## 2023-01-23 RX ORDER — DIPHENHYDRAMINE HYDROCHLORIDE 50 MG/ML
12.5 INJECTION INTRAMUSCULAR; INTRAVENOUS
Status: DISCONTINUED | OUTPATIENT
Start: 2023-01-23 | End: 2023-01-23 | Stop reason: HOSPADM

## 2023-01-23 RX ORDER — HYDROXYZINE PAMOATE 50 MG/1
CAPSULE ORAL
COMMUNITY
Start: 2023-01-10

## 2023-01-23 RX ORDER — IPRATROPIUM BROMIDE AND ALBUTEROL SULFATE 2.5; .5 MG/3ML; MG/3ML
1 SOLUTION RESPIRATORY (INHALATION) ONCE
Status: COMPLETED | OUTPATIENT
Start: 2023-01-23 | End: 2023-01-23

## 2023-01-23 RX ORDER — PHENYLEPHRINE HCL IN 0.9% NACL 1 MG/10 ML
SYRINGE (ML) INTRAVENOUS PRN
Status: DISCONTINUED | OUTPATIENT
Start: 2023-01-23 | End: 2023-01-23 | Stop reason: SDUPTHER

## 2023-01-23 RX ORDER — PROPRANOLOL HYDROCHLORIDE 10 MG/1
TABLET ORAL
COMMUNITY
Start: 2023-01-10

## 2023-01-23 RX ORDER — PROPOFOL 10 MG/ML
INJECTION, EMULSION INTRAVENOUS PRN
Status: DISCONTINUED | OUTPATIENT
Start: 2023-01-23 | End: 2023-01-23 | Stop reason: SDUPTHER

## 2023-01-23 RX ORDER — MEPERIDINE HYDROCHLORIDE 50 MG/ML
12.5 INJECTION INTRAMUSCULAR; INTRAVENOUS; SUBCUTANEOUS EVERY 5 MIN PRN
Status: DISCONTINUED | OUTPATIENT
Start: 2023-01-23 | End: 2023-01-23 | Stop reason: HOSPADM

## 2023-01-23 RX ORDER — MAGNESIUM HYDROXIDE 1200 MG/15ML
LIQUID ORAL CONTINUOUS PRN
Status: DISCONTINUED | OUTPATIENT
Start: 2023-01-23 | End: 2023-01-23 | Stop reason: HOSPADM

## 2023-01-23 RX ORDER — LIDOCAINE HYDROCHLORIDE 10 MG/ML
INJECTION, SOLUTION EPIDURAL; INFILTRATION; INTRACAUDAL; PERINEURAL PRN
Status: DISCONTINUED | OUTPATIENT
Start: 2023-01-23 | End: 2023-01-23 | Stop reason: SDUPTHER

## 2023-01-23 RX ORDER — CEPHALEXIN 500 MG/1
500 CAPSULE ORAL 3 TIMES DAILY
Qty: 9 CAPSULE | Refills: 0 | Status: SHIPPED | OUTPATIENT
Start: 2023-01-23 | End: 2023-01-26

## 2023-01-23 RX ORDER — SODIUM CHLORIDE 0.9 % (FLUSH) 0.9 %
5-40 SYRINGE (ML) INJECTION PRN
Status: DISCONTINUED | OUTPATIENT
Start: 2023-01-23 | End: 2023-01-23 | Stop reason: HOSPADM

## 2023-01-23 RX ORDER — MIDAZOLAM HYDROCHLORIDE 1 MG/ML
INJECTION INTRAMUSCULAR; INTRAVENOUS PRN
Status: DISCONTINUED | OUTPATIENT
Start: 2023-01-23 | End: 2023-01-23 | Stop reason: SDUPTHER

## 2023-01-23 RX ORDER — FENTANYL CITRATE 50 UG/ML
INJECTION, SOLUTION INTRAMUSCULAR; INTRAVENOUS PRN
Status: DISCONTINUED | OUTPATIENT
Start: 2023-01-23 | End: 2023-01-23 | Stop reason: SDUPTHER

## 2023-01-23 RX ORDER — SODIUM CHLORIDE, SODIUM LACTATE, POTASSIUM CHLORIDE, CALCIUM CHLORIDE 600; 310; 30; 20 MG/100ML; MG/100ML; MG/100ML; MG/100ML
1000 INJECTION, SOLUTION INTRAVENOUS CONTINUOUS
Status: DISCONTINUED | OUTPATIENT
Start: 2023-01-23 | End: 2023-01-23 | Stop reason: HOSPADM

## 2023-01-23 RX ORDER — MIDAZOLAM HYDROCHLORIDE 2 MG/2ML
2 INJECTION, SOLUTION INTRAMUSCULAR; INTRAVENOUS ONCE
Status: COMPLETED | OUTPATIENT
Start: 2023-01-23 | End: 2023-01-23

## 2023-01-23 RX ORDER — HYDRALAZINE HYDROCHLORIDE 20 MG/ML
10 INJECTION INTRAMUSCULAR; INTRAVENOUS
Status: DISCONTINUED | OUTPATIENT
Start: 2023-01-23 | End: 2023-01-23 | Stop reason: HOSPADM

## 2023-01-23 RX ORDER — IPRATROPIUM BROMIDE AND ALBUTEROL SULFATE 2.5; .5 MG/3ML; MG/3ML
SOLUTION RESPIRATORY (INHALATION)
Status: COMPLETED
Start: 2023-01-23 | End: 2023-01-23

## 2023-01-23 RX ORDER — SODIUM CHLORIDE, SODIUM LACTATE, POTASSIUM CHLORIDE, CALCIUM CHLORIDE 600; 310; 30; 20 MG/100ML; MG/100ML; MG/100ML; MG/100ML
INJECTION, SOLUTION INTRAVENOUS CONTINUOUS PRN
Status: DISCONTINUED | OUTPATIENT
Start: 2023-01-23 | End: 2023-01-23 | Stop reason: SDUPTHER

## 2023-01-23 RX ORDER — CEFAZOLIN SODIUM 1 G/3ML
INJECTION, POWDER, FOR SOLUTION INTRAMUSCULAR; INTRAVENOUS PRN
Status: DISCONTINUED | OUTPATIENT
Start: 2023-01-23 | End: 2023-01-23 | Stop reason: SDUPTHER

## 2023-01-23 RX ORDER — SODIUM CHLORIDE 9 MG/ML
25 INJECTION, SOLUTION INTRAVENOUS PRN
Status: DISCONTINUED | OUTPATIENT
Start: 2023-01-23 | End: 2023-01-23 | Stop reason: HOSPADM

## 2023-01-23 RX ORDER — GLYCOPYRROLATE 0.2 MG/ML
INJECTION INTRAMUSCULAR; INTRAVENOUS PRN
Status: DISCONTINUED | OUTPATIENT
Start: 2023-01-23 | End: 2023-01-23 | Stop reason: SDUPTHER

## 2023-01-23 RX ORDER — SODIUM CHLORIDE 0.9 % (FLUSH) 0.9 %
5-40 SYRINGE (ML) INJECTION EVERY 12 HOURS SCHEDULED
Status: DISCONTINUED | OUTPATIENT
Start: 2023-01-23 | End: 2023-01-23 | Stop reason: HOSPADM

## 2023-01-23 RX ORDER — DROPERIDOL 2.5 MG/ML
0.62 INJECTION, SOLUTION INTRAMUSCULAR; INTRAVENOUS
Status: DISCONTINUED | OUTPATIENT
Start: 2023-01-23 | End: 2023-01-23 | Stop reason: HOSPADM

## 2023-01-23 RX ORDER — ROCURONIUM BROMIDE 10 MG/ML
INJECTION, SOLUTION INTRAVENOUS PRN
Status: DISCONTINUED | OUTPATIENT
Start: 2023-01-23 | End: 2023-01-23 | Stop reason: SDUPTHER

## 2023-01-23 RX ADMIN — ROCURONIUM BROMIDE 30 MG: 10 INJECTION, SOLUTION INTRAVENOUS at 07:37

## 2023-01-23 RX ADMIN — IPRATROPIUM BROMIDE AND ALBUTEROL SULFATE 1 AMPULE: 2.5; .5 SOLUTION RESPIRATORY (INHALATION) at 13:14

## 2023-01-23 RX ADMIN — Medication 3 MG: at 11:20

## 2023-01-23 RX ADMIN — Medication 100 MCG: at 08:30

## 2023-01-23 RX ADMIN — DEXAMETHASONE SODIUM PHOSPHATE 10 MG: 10 INJECTION INTRAMUSCULAR; INTRAVENOUS at 08:00

## 2023-01-23 RX ADMIN — Medication 50 MCG: at 08:45

## 2023-01-23 RX ADMIN — PROPOFOL 200 MG: 10 INJECTION, EMULSION INTRAVENOUS at 07:36

## 2023-01-23 RX ADMIN — PROPOFOL 30 MG: 10 INJECTION, EMULSION INTRAVENOUS at 09:09

## 2023-01-23 RX ADMIN — GLYCOPYRROLATE 0.4 MG: 0.2 INJECTION INTRAMUSCULAR; INTRAVENOUS at 11:20

## 2023-01-23 RX ADMIN — CEFAZOLIN 2 G: 1 INJECTION, POWDER, FOR SOLUTION INTRAMUSCULAR; INTRAVENOUS at 08:51

## 2023-01-23 RX ADMIN — LIDOCAINE HYDROCHLORIDE 50 MG: 10 INJECTION, SOLUTION EPIDURAL; INFILTRATION; INTRACAUDAL; PERINEURAL at 07:35

## 2023-01-23 RX ADMIN — PROPOFOL 50 MCG/KG/MIN: 10 INJECTION, EMULSION INTRAVENOUS at 10:32

## 2023-01-23 RX ADMIN — Medication 50 MCG: at 10:03

## 2023-01-23 RX ADMIN — FENTANYL CITRATE 50 MCG: 0.05 INJECTION, SOLUTION INTRAMUSCULAR; INTRAVENOUS at 08:58

## 2023-01-23 RX ADMIN — MIDAZOLAM 2 MG: 1 INJECTION INTRAMUSCULAR; INTRAVENOUS at 07:31

## 2023-01-23 RX ADMIN — SODIUM CHLORIDE, POTASSIUM CHLORIDE, SODIUM LACTATE AND CALCIUM CHLORIDE 1000 ML: 600; 310; 30; 20 INJECTION, SOLUTION INTRAVENOUS at 06:50

## 2023-01-23 RX ADMIN — IPRATROPIUM BROMIDE AND ALBUTEROL SULFATE 1 AMPULE: .5; 3 SOLUTION RESPIRATORY (INHALATION) at 13:14

## 2023-01-23 RX ADMIN — PROPOFOL 50 MG: 10 INJECTION, EMULSION INTRAVENOUS at 08:01

## 2023-01-23 RX ADMIN — SODIUM CHLORIDE, POTASSIUM CHLORIDE, SODIUM LACTATE AND CALCIUM CHLORIDE: 600; 310; 30; 20 INJECTION, SOLUTION INTRAVENOUS at 07:46

## 2023-01-23 RX ADMIN — SODIUM CHLORIDE, POTASSIUM CHLORIDE, SODIUM LACTATE AND CALCIUM CHLORIDE: 600; 310; 30; 20 INJECTION, SOLUTION INTRAVENOUS at 07:29

## 2023-01-23 RX ADMIN — Medication 50 MCG: at 09:54

## 2023-01-23 RX ADMIN — MIDAZOLAM 2 MG: 1 INJECTION INTRAMUSCULAR; INTRAVENOUS at 07:24

## 2023-01-23 RX ADMIN — FENTANYL CITRATE 100 MCG: 0.05 INJECTION, SOLUTION INTRAMUSCULAR; INTRAVENOUS at 07:36

## 2023-01-23 RX ADMIN — ONDANSETRON 4 MG: 2 INJECTION INTRAMUSCULAR; INTRAVENOUS at 11:14

## 2023-01-23 RX ADMIN — HYDROMORPHONE HYDROCHLORIDE 0.5 MG: 1 INJECTION, SOLUTION INTRAMUSCULAR; INTRAVENOUS; SUBCUTANEOUS at 12:16

## 2023-01-23 RX ADMIN — PROPOFOL 50 MCG/KG/MIN: 10 INJECTION, EMULSION INTRAVENOUS at 08:02

## 2023-01-23 ASSESSMENT — PAIN SCALES - GENERAL
PAINLEVEL_OUTOF10: 10
PAINLEVEL_OUTOF10: 10
PAINLEVEL_OUTOF10: 5
PAINLEVEL_OUTOF10: 6

## 2023-01-23 ASSESSMENT — PAIN DESCRIPTION - DESCRIPTORS: DESCRIPTORS: SHARP

## 2023-01-23 ASSESSMENT — PAIN DESCRIPTION - ORIENTATION: ORIENTATION: RIGHT

## 2023-01-23 ASSESSMENT — PAIN DESCRIPTION - LOCATION: LOCATION: HIP

## 2023-01-23 ASSESSMENT — PAIN - FUNCTIONAL ASSESSMENT: PAIN_FUNCTIONAL_ASSESSMENT: 0-10

## 2023-01-23 NOTE — DISCHARGE INSTRUCTIONS
Toe touch weight bearing to the right lower extremity utilizing a walker. Continue oral antibiotics for three days. Call, notify Phi Nunez if need a narcotic prescription. Call Dr. Elly Maynard office to schedule an appointment for a wound check in two(2) weeks. Skin incision has been closed using dermabond(glue). Do not peel off, let fall off naturally. Ok to shower with incision site, but protect as much as possible from water. Do NOT immerse incision site in water, ie: bathtub, pool or hot tub. Notify Dr. Elly Maynard  office if you start running a fever, increasing redness to incision site, or drainage from the incision site.

## 2023-01-23 NOTE — PROGRESS NOTES
BLANCA Gomez at bedside to assess pt for low O2 sat. Pt is adamant about going home. O2 sat has been 95% RA for 15 minutes. RN explained importance of using IS 10 times every hour at home. Pt is agreeable. Okay for d/c.

## 2023-01-23 NOTE — PROGRESS NOTES
Gave pt incentive spirometer to practice deep breathing. Pt is currently satting 87% on room air, placed her on 3L of O2 and satting at 92%. Will notify anesthesia.

## 2023-01-23 NOTE — DISCHARGE INSTR - ACTIVITY
.No alcoholic beverages, no driving or operating machinery, no making important decisions for 24 hours. You may have a normal diet but should eat lightly day of surgery. Drink plenty of fluids.   Urinate within 8 hours after surgery, if unable to urinate call your doctor

## 2023-01-23 NOTE — BRIEF OP NOTE
Brief Postoperative Note      Patient: Alejandro Bryant  YOB: 1970  MRN: [id number]    Date of Procedure: 1/23/2023    Pre-Op Diagnosis: SACROILIITIS    Post-Op Diagnosis: Same       Procedure(s): MIS- MINIMALLY INVASIVE SACROILIITIS FUSION    Surgeon(s):  Félix Sapp DO    Assistant:  Physician Assistant: Jaz Stahl PA-C    Anesthesia: General    Estimated Blood Loss (mL): less than 50     Complications: None    Specimens:   * No specimens in log *    Implants:  Implant Name Type Inv.  Item Serial No.  Lot No. LRB No. Used Action   IMPL SPINAL I-FUSE SAC 55 MM 7 MM 3D - [id number] Spine IMPL SPINAL I-FUSE SAC 55 MM 7 MM 3D  [employer]PMM [id number] Right 1 Implanted   GRAFT CELLR BNE MATRX INFLUX SPARC 5CC - [id number]  GRAFT CELLR BNE MATRX INFLUX SPARC 5CC [id number] [employer]WD [id number] Right 1 Implanted   IMPLANT SPNL L40MM DIA7MM SACROILIAC JT TI POR PLSM SPR - [id number]  IMPLANT SPNL L40MM DIA7MM SACROILIAC JT TI POR PLSM SPR  [employer]WD [id number] Right 1 Implanted   IMPLANT SPNL L40MM DIA7MM SACROILIAC JT TI POR PLSM SPR - [id number]  IMPLANT SPNL L40MM DIA7MM SACROILIAC JT TI POR PLSM SPR  [employer]WD [id number] Right 1 Implanted         Drains:   [REMOVED] Urinary Catheter 01/23/23 Burgos (Removed)       Findings: successful SI fusion    Electronically signed by Félix Sapp DO on 1/23/2023 at 12:25 PM

## 2023-01-23 NOTE — INTERVAL H&P NOTE
Pt Name: Khushi Enciso  MRN: [id number]  YOB: 1970  Date of evaluation: 1/23/2023    I have reviewed the patient's history and physical examination completed in pre-admission testing on 1/9/23    No changes to history or on examination today, unless noted below. Patient reports she had a virtual visit with her psychiatric provider and was prescribed 3 new medications, which were added to med list per RN. No other changes.      BAR Alfredo - CNP  1/23/23  6:33 AM

## 2023-01-23 NOTE — PROGRESS NOTES
Orders placed for postop pelvic CT, as well as pelvis x-ray. Images need to be complete in the next 1-2 weeks prior to post-op visit. Called and left voicemail for patient to return call to office.

## 2023-01-23 NOTE — ANESTHESIA PRE PROCEDURE
Department of Anesthesiology  Preprocedure Note       Name:  Cara Flores   Age:  47 y.o.  :  1970                                          MRN:  [id number]         Date:  2023      Surgeon: Diego Mejias):  Maegan Ceron DO    Procedure: Procedure(s): MINIMALLY INVASIVE SACROILIITIS FUSION (FLAT LYUDMILA TABLE, PRONE, C-ARM, O-ARM, SI BONE, FLUOROSCOPY, EVOKES CONF# [id number] - BETTINA)    Medications prior to admission:   Prior to Admission medications    Medication Sig Start Date End Date Taking? Authorizing Provider   hydrOXYzine pamoate (VISTARIL) 50 MG capsule TAKE 1 CAPSULE BY MOUTH THREE TIMES DAILY AS NEEDED FOR ANXIETY 1/10/23   Historical Provider, MD   lamoTRIgine (LAMICTAL) 25 MG tablet TAKE 1 TABLET BY MOUTH ONCE DAILY 1/10/23   Historical Provider, MD   propranolol (INDERAL) 10 MG tablet TAKE 1 TABLET BY MOUTH TWICE DAILY AS NEEDED FOR ANXIETY 1/10/23   Historical Provider, MD   Semaglutide, 1 MG/DOSE, (OZEMPIC, 1 MG/DOSE,) 2 MG/1.5ML SOPN Inject 1 mg into the skin every 7 days 23   Rosilyn Mortimer, MD   Semaglutide, 1 MG/DOSE, 4 MG/3ML SOPN Inject 1 mg into the skin every 7 days 23   Rosilyn Mortimer, MD   TRULICITY 4.5 XO/0.4BW SOPN INJECT 4.5 MG SUBCUTANEOUSLY ONCE WEEKLY 22   BAR Oliva CNP   oxyCODONE-acetaminophen (PERCOCET) 5-325 MG per tablet Take 1 tablet by mouth every 6 hours as needed for Pain for up to 30 days. 23  BAR Gomez CNP   pregabalin (LYRICA) 150 MG capsule Take 1 capsule by mouth 2 times daily for 30 days.  22  BAR Gomez CNP   lisinopril (PRINIVIL;ZESTRIL) 5 MG tablet TAKE 1 TABLET BY MOUTH ONCE DAILY 22   BAR Oliva CNP   atorvastatin (LIPITOR) 20 MG tablet TAKE 1 TABLET BY MOUTH ONCE DAILY 22   BAR Oliva CNP   topiramate (TOPAMAX) 100 MG tablet TAKE 2 TABLET BY MOUTH NIGHTLY 22   BAR Oliva CNP   sertraline (ZOLOFT) 100 MG tablet TAKE 1 TABLET BY MOUTH ONCE DAILY 12/13/22   BAR Oliva CNP   busPIRone (BUSPAR) 30 MG tablet TAKE 1 TABLET BY MOUTH 3 TIMES DAILY 12/6/22   BAR Oliva CNP   dicyclomine (BENTYL) 10 MG capsule TAKE 1 CAPSULE BY MOUTH FOUR TIMES DAILY 11/15/22   BAR Oliva CNP   fexofenadine (ALLEGRA) 180 MG tablet Take 1 tablet by mouth once daily 11/7/22   BAR Oliva CNP   buPROPion (WELLBUTRIN XL) 150 MG extended release tablet Take 1 tablet by mouth every morning 10/11/22   BAR Oliva CNP   fluticasone (FLONASE) 50 MCG/ACT nasal spray INHALE 2 SPRAYS IN EACH NOSTRIL DAILY 8/25/22   BAR Oliva CNP   Lactobacillus (ACIDOPHILUS) CAPS capsule TAKE 1 CAPSULE BY MOUTH TWICE DAILY  Patient taking differently: 1 capsule daily 8/24/22   BAR Oliva CNP   omeprazole (PRILOSEC) 40 MG delayed release capsule TAKE 1 CAPSULE DAILY 8/17/22   BAR Oliva CNP   montelukast (SINGULAIR) 10 MG tablet Take 1 tablet by mouth nightly TAKE 1 TABLET DAILY 6/22/22   BAR Oliva CNP   blood glucose test strips (ONETOUCH VERIO) strip Inject 1 each into the skin 2 times daily As needed. USE 1 STRIP DAILY AS NEEDED 6/22/22   BAR Oliva CNP   Lancets (ONETOUCH DELICA PLUS ZPAQLZ80I) MISC USE 1 EACH TWICE A DAY 6/22/22   BAR Oliva CNP   tiZANidine (ZANAFLEX) 4 MG tablet TAKE 1 TABLET EVERY 8 HOURS AS NEEDED FOR SPASMS 6/2/22   BAR Priest CNP   Cream Base (SALT STABLE LS ADVANCED) CREA  1/7/22 8/22/22  Historical Provider, MD   cetirizine (ZYRTEC) 10 MG tablet Take 10 mg by mouth daily 6/29/21   Historical Provider, MD   Elastic Bandages & Supports (LUMBAR BACK BRACE/SUPPORT PAD) MISC 1 each by Does not apply route daily as needed (pain) 8/4/17   Kayleen Trejo MD       Current medications:    Current Facility-Administered Medications   Medication Dose Route Frequency Provider Last Rate Last Admin    lactated ringers IV soln infusion 1,000 mL  1,000 mL IntraVENous Continuous Lyubov Winchester MD           Allergies:     Allergies   Allergen Reactions    Imitrex [Sumatriptan] Other (See Comments)     \"feels like head is going to explode    Morphine Itching     hives    Adhesive Tape Other (See Comments)     blisters    Seasonal Cough       Problem List:    Patient Active Problem List   Diagnosis Code    Degenerative disc disease, cervical M50.30    Cervical disc herniation M50.20    Lumbar radiculopathy, chronic M54.16    Degeneration of lumbar intervertebral disc M51.36    Lumbar spondylosis M47.816    Essential hypertension I10    Left-sided low back pain with left-sided sciatica M54.42    Osteoarthritis of lumbar spine M47.816    Sacroiliac inflammation ([facility]) M46.1    Rheumatoid arthritis ([facility] [address] M06.9    Primary osteoarthritis of left hip M16.12    Hip pain, chronic, right M25.551, G89.29    Hemorrhoids K64.9    Colon polyp K63.5    Tubular adenoma D36.9    Chronic bilateral low back pain without sciatica M54.50, G89.29    Spinal osteoarthritis M47.9    Morbid obesity with BMI of 40.0-44.9, adult ([facility]) E66.01, Z68.41    Adjustment disorder with mixed anxiety and depressed mood F43.23    Type 2 diabetes mellitus without complication, without long-term current use of insulin ([facility]) E11.9    Mixed incontinence urge and stress N39.46    Chronic, continuous use of opioids F11.90    Lumbosacral spondylosis without myelopathy M47.817    Chronic GERD K21.9    Bacterial sinusitis J32.9, B96.89    Mixed hyperlipidemia E78.2    Acne cystica L70.0    Chronic rhinitis J31.0    Surgical menopause E89.40    Allergic rhinitis due to allergen J30.9    S/P Cystoscopy w/ Hydrodistension and DMSO Z98.890    Cervical lymphadenopathy R59.0    Interstitial cystitis N30.10    Mild intermittent asthma without complication D43.82    Spondylolysis, lumbosacral region M43.07    Chronic idiopathic constipation K59.04   • Intractable chronic migraine without aura and without status migrainosus G43.719   • Chronic pain of left knee M25.562, G89.29   • S/P cervical spinal fusion Z98.1   • Chronic SI joint pain M53.3, G89.29   • Bilateral hip joint arthritis M16.0       Past Medical History:        Diagnosis Date   • Anxiety     Toyin Pierceue CNP therapist video visit 1/10/2023   • Asthma     does not follow with Pulmonology   • Colon polyp 10/31/2016    sessile serated adenoma x2   • Depression    • Diabetes mellitus (HCC)     Medication, Trulicity, has been back ordered   • Diverticulitis 10/2016   • Gastritis    • GERD (gastroesophageal reflux disease)    • Hemorrhoids     int/ext   • Hypertension    • Migraines    • Osteoarthritis    • Pain management     [facility] Dr. Elmer Charles  last visit 12/2022   • PTSD (post-traumatic stress disorder)     states has been VERY anxious lately, many personal stressors, working 2 jobs, etc. has a virtual appt with psychiatrist tomorrow 1/10/23. pt believes she may need meds adjusted.   • Rheumatoid arthritis (HCC)     states f/w pain mgmt for this   • Sacroiliitis (HCC)    • Shingles 01/22/2016   • Snores    • Tubular adenoma 10/31/2016   • Under care of team     PCP, Marty Cramer CNP moved. Pt needs new PCP   • Urinary leakage    • Wears glasses        Past Surgical History:        Procedure Laterality Date   • ABDOMINAL ADHESION SURGERY  07/08/2021   • APPENDECTOMY  07/08/2021   • CERVICAL DISCECTOMY  12/2013    & fusion    • CHOLECYSTECTOMY     • COLONOSCOPY  10/31/2016    int/ext hemorrhoids; sessile serated adenomax2; tubular adenoma   • COLONOSCOPY N/A 10/22/2019    COLONOSCOPY POLYPECTOMY SNARE/COLD BIOPSY AND RANDOM BIOPSIES performed by Gee Richey MD at [facility]   • CYSTOSCOPY  03/04/2021    CYSTOSCOPY HYDRODISTENTION WITH DMSO AND UREAPLASMA , MYCOPLASMA CULTURES   • CYSTOSCOPY N/A 3/4/2021    CYSTOSCOPY  HYDRODISTENTION WITH DMSO AND UREAPLASMA , MYCOPLASMA CULTURES performed by Nilo Knapp DO at [address] [place] [address]      HAND SURGERY Right     thumb surgery, BONE REMOVED    HYSTERECTOMY (CERVIX STATUS UNKNOWN)      LAPAROSCOPY      IL DEST,PARAVERTEBRAL,L/S,ADDL LVLS  3/25/2019         TONSILLECTOMY      TUBAL LIGATION      UPPER GASTROINTESTINAL ENDOSCOPY  10/31/2016    gastritis    UPPER GASTROINTESTINAL ENDOSCOPY N/A 10/22/2019    EGD BIOPSY performed by Victor M Tyson MD at [address] History:    Social History     Tobacco Use    Smoking status: Never    Smokeless tobacco: Never   Substance Use Topics    Alcohol use:  No                                Counseling given: Not Answered      Vital Signs (Current):   Vitals:    01/23/23 0631   BP: 97/71   Pulse: 69   Resp: 20   Temp: 96.8 °F (36 °C)   TempSrc: Temporal   SpO2: 95%                                              BP Readings from Last 3 Encounters:   01/23/23 97/71   01/09/23 133/89   12/27/22 126/78       NPO Status: Time of last liquid consumption: 2000                        Time of last solid consumption: 2000                        Date of last liquid consumption: 01/22/23                        Date of last solid food consumption: 01/22/23    BMI:   Wt Readings from Last 3 Encounters:   01/09/23 214 lb (97.1 kg)   12/27/22 210 lb (95.3 kg)   12/22/22 205 lb (93 kg)     There is no height or weight on file to calculate BMI.    CBC:   Lab Results   Component Value Date/Time    WBC 7.5 01/09/2023 12:12 PM    RBC 4.64 01/09/2023 12:12 PM    RBC 4.74 10/16/2019 05:12 PM    HGB 13.6 01/09/2023 12:12 PM    HCT 41.9 01/09/2023 12:12 PM    MCV 90.3 01/09/2023 12:12 PM    RDW 12.3 01/09/2023 12:12 PM     01/09/2023 12:12 PM       CMP:   Lab Results   Component Value Date/Time     10/14/2022 09:14 AM    K 4.4 10/14/2022 09:14 AM     10/14/2022 09:14 AM    CO2 25 10/14/2022 09:14 AM    BUN 12 01/09/2023 12:12 PM    CREATININE 0.85 01/09/2023 12:12 PM    GFRAA >60 07/18/2021 03:57 PM    LABGLOM >60 01/09/2023 12:12 PM    GLUCOSE 144 01/09/2023 12:12 PM    GLUCOSE 121 10/05/2019 08:50 AM    PROT 6.8 10/14/2022 09:14 AM    CALCIUM 9.4 10/14/2022 09:14 AM    BILITOT 0.2 10/14/2022 09:14 AM    ALKPHOS 93 10/14/2022 09:14 AM    AST 13 10/14/2022 09:14 AM    ALT 16 10/14/2022 09:14 AM       POC Tests: No results for input(s): POCGLU, POCNA, POCK, POCCL, POCBUN, POCHEMO, POCHCT in the last 72 hours. Coags:   Lab Results   Component Value Date/Time    PROTIME 10.7 01/09/2023 12:12 PM    INR 1.0 01/09/2023 12:12 PM    APTT 22.6 01/09/2023 12:12 PM       HCG (If Applicable):   Lab Results   Component Value Date    PREGTESTUR NEGATIVE 02/21/2014        ABGs: No results found for: PHART, PO2ART, THB6UEX, ART0KVU, BEART, M0NHQSKR     Type & Screen (If Applicable):  No results found for: LABABO, LABRH    Drug/Infectious Status (If Applicable):  Lab Results   Component Value Date/Time    HEPCAB Non-Reactive 01/08/2021 12:11 PM       COVID-19 Screening (If Applicable):   Lab Results   Component Value Date/Time    COVID19 Negative 04/04/2022 01:05 PM    COVID19 Not Detected 02/28/2021 10:25 PM    COVID19 Not Detected 07/24/2020 05:22 PM           Anesthesia Evaluation  Patient summary reviewed and Nursing notes reviewed no history of anesthetic complications:   Airway: Mallampati: II  TM distance: >3 FB   Neck ROM: full  Mouth opening: > = 3 FB   Dental: normal exam         Pulmonary:normal exam    (+) asthma:                            Cardiovascular:    (+) hypertension:,                   Neuro/Psych:   (+) depression/anxiety             GI/Hepatic/Renal:   (+) GERD:, morbid obesity          Endo/Other:    (+) DiabetesType II DM, , : arthritis: rheumatoid. , .                 Abdominal:             Vascular:           Other Findings:           Anesthesia Plan      general     ASA 3       Induction: intravenous. MIPS: Postoperative opioids intended and Prophylactic antiemetics administered. Anesthetic plan and risks discussed with patient. Plan discussed with CRNA.                     Mariajose Milligan MD   1/23/2023

## 2023-01-23 NOTE — PROGRESS NOTES
Neurosurgery Post op Progress Note      SUBJECTIVE: Status post fusion, right, sacroiliac joint. Patient seen in recovery. She is having mild to moderate complaints of right-sided hip pain. She is awake and alert. She moves all 4 extremities upon command. She appears to be oriented. She has no nausea or emesis. No headache. OBJECTIVE      Physical exam   VITALS:    Vitals:    01/23/23 1200   BP: 110/61   Pulse: 79   Resp: 14   Temp:    SpO2: 95%     INTAKE:    Intake/Output Summary (Last 24 hours) at 1/23/2023 1227  Last data filed at 1/23/2023 1118  Gross per 24 hour   Intake 1300 ml   Output 50 ml   Net 1250 ml     No Burgos. DRAIN/TUBE OUTPUT: No drain. No evidence of DVT seen on physical exam.  Negative Avery's sign. No cords or calf tenderness. Neurological exam reveals Awake and alert, Responds to voice, and Responds to tactile stimuli  alert, oriented x3, affect appropriate, no focal neurological deficits, moves all extremities well, and no involuntary movements. Patient has good dorsiflexion/plantarflexion to both ankles. She also demonstrates good EHL/TA  function bilaterally. Gross sensation and position sense appears to be intact both feet. alert, oriented, normal speech, no focal findings or movement disorder noted, screening mental status exam normal, neck supple without rigidity, cranial nerves II through XII intact. the upper and lower extremities   no sensory deficits noted      Wound   Post op wound:  lateral and posterior  right lateral hip area. Closed with dermabond. No Dressing overlying the incision site.     Data      LABS:   Lab Results   Component Value Date    WBC 7.5 01/09/2023    HGB 13.6 01/09/2023    HCT 41.9 01/09/2023    MCV 90.3 01/09/2023     01/09/2023      Lab Results   Component Value Date     10/14/2022    K 4.4 10/14/2022     10/14/2022    CO2 25 10/14/2022     Lab Results   Component Value Date    BUN 12 01/09/2023      Lab Results Component Value Date    CREATININE 0.85 01/09/2023          ASSESSMENT AND PLAN:  1. Patient to be touchdown weightbearing right lower extremity only,utilizing a walker. A prescription was given for rolling walker. 2.  Prescription given for antibiotics. 3.  Prior to discharge, will obtain AP/lateral x-rays of pelvis. We will also obtain a noncontrast CT of the pelvis with metal suppression. 4.  Okay for patient to shower starting tomorrow. Protect incision site from water. No immersion of the wound and either the bathtub, swimming pool, or hot tub.  5.  Patient to follow-up with Dr. Demetrius Moyer, office in 2 weeks time or sooner should complications arise. 6.  We will follow patient until discharge. Anticipate discharge to home later today.

## 2023-01-24 NOTE — OP NOTE
Operative Note      Patient: Asim Hutson  YOB: 1970  MRN: [id number]    Date of Procedure: 1/23/2023    Pre-Op Diagnosis: SACROILIITIS    Post-Op Diagnosis: Same       Procedure(s):  MIS PERCUTANEOUS SACROILIITIS FUSION, right    Neuromonitoring using SSEPs MEP's and direct triggered EMGs. Use of fluoroscopy  Surgeon(s):  Tiffanie Guzmán DO    Assistant:   Physician Assistant: Waldo Kyle PA-C    Anesthesia: General    Estimated Blood Loss (mL): less than 50     Complications: None    Specimens:   * No specimens in log *    Implants:  Implant Name Type Inv. Item Serial No.  Lot No. LRB No. Used Action   IMPL SPINAL I-FUSE SAC 55 MM 7 MM 3D - [id number] Spine IMPL SPINAL I-FUSE SAC 55 MM 7 MM 3D  [employer]PMM [id number] Right 1 Implanted   GRAFT CELLR BNE MATRX INFLUX SPARC 5CC - [id number]  GRAFT CELLR BNE MATRX INFLUX SPARC 5C [id number] [employer]WD [id number] Right 1 Implanted   IMPLANT SPNL L40MM DIA7MM SACROILIAC JT TI POR PLSM SPR - [id number]  IMPLANT SPNL L40MM DIA7MM SACROILIAC JT TI POR PLSM SPR  [employer]WD [id number] Right 1 Implanted   IMPLANT SPNL L40MM DIA7MM SACROILIAC JT TI POR PLSM SPR - [id number]  IMPLANT SPNL L40MM DIA7MM SACROILIAC JT TI POR PLSM SPR  [employer]WD [id number] Right 1 Implanted         Drains:   [REMOVED] Urinary Catheter 01/23/23 Burgos (Removed)       Findings: successful percutaneous fusion      Brief history indication for surgery: This is a 69-year-old woman with more than 5 years of low back pain that was treated with multiple modalities including pain medication physical therapy and pain management injection therapy without significant benefit. She was referred to me for spondylosis and back and on clinical examination she has a sacroiliac dysfunction and pain right side worse than left side and SI injection gave her almost total relief for temporary amount of time. I thus recommended SI fusion for cure.   Risk benefits alternative discussed with patient all questions are answered I was asked to proceed. Operative details  Patient was brought in to OR by anesthesiologist.  [facility] IVs were secured, patient was then induced and intubated. Patient was positioned prone on a flat Jagdish table with padding supporting the chest pelvis and leg. Arms were rested in arm pads in anatomical position with elbows flexed at 90 degrees and all points of contact padded. The lateral view inlet view and outlet view of the pelvis sacrum was aligned up to make sure perfect anatomical clarity. Localizing fluoroscopy was done to plan the incision approximately 4 cm along the long axis of the sacrum, lateral fluoroscopy marked on the lateral gluteus. The lateral view inlet view and outlet view of the pelvis sacrum was aligned up to make sure perfect anatomical clarity. This was scrubbed with alcohol and ChloraPrep. Patient was sterilely draped. Preoperative antibodies given. Timeout was called and all members operative team agreed to the procedure. 1% lidocaine with epinephrine was infiltrated on the skin. Skin was incised with a 10 blade. Then the 1st guide pin was placed using fluoroscopy aimed at the most superior aspect of the SI joint approximately 1 cm below the alar line at the anterior third of the sacrum. Trajectory was confirmed with lateral, outlet view and inlet views. The depth was immediately lateral to the first sacral foramen. Triggered EMGs were used to stimulate the plantar ensure no encroachment of the neural structures. Then a tissue dilator was placed over the pin this was then followed with a tissue protector. Measurement tools was used to measure the implant size to be 55 mm in length. Next drill was placed over the guidepin chest deep to the sacral cortex of the SI joint. This was then followed by approach just past the SI joint.   Finally implant was loaded with approximately 1.5cc Influx cellular bone matrix and impacted into the SI joint. Neuro monitoring remained stable. Then using a parallel pin guide placed over the first pin, the second pin was placed slightly more anterior and directly inferior to the first pin and impacted across the SI joint parallel to the first pin, under fluoroscopic guidance. Similarly this trajectory was measured, to be 40mm, drilled, broached and implant placed with cellular bone matrix embedded within it. The same process was used to place a third implant which is also 40 mm. Neuro monitoring remained stable at baseline. Final lateral, inlet, outlet views shows excellent position of implants. Wound was thoroughly irrigated ensuring no bleeding. Exparel was infiltrated into the subcutaneous and deep muscle layers. Wound was then closed in 2 layers with dermis closed with inverted sutures. Dermabond was used to dress the skin. All counts were correct. Patient tolerated procedure well. Patient was extubated and recovered in PACU in stable condition.     Electronically signed by Livia Oreilly DO on 1/24/2023 at 3:14 AM

## 2023-01-24 NOTE — ANESTHESIA POSTPROCEDURE EVALUATION
POST- ANESTHESIA EVALUATION       Pt Name: Iqra Goodrich  MRN: [id number]  YOB: 1970  Date of evaluation: 1/24/2023  Time:  7:05 AM      /84   Pulse 65   Temp 97.2 °F (36.2 °C)   Resp 14   SpO2 95%      Consciousness Level  Awake  Cardiopulmonary Status  Stable  Pain Adequately Treated YES  Nausea / Vomiting  NO  Adequate Hydration  YES  Anesthesia Related Complications NONE      Electronically signed by Sonny Hale MD on 1/24/2023 at 7:05 AM       Department of Anesthesiology  Postprocedure Note    Patient: Iqra Goodrich  MRN: [id number]  YOB: 1970  Date of evaluation: 1/24/2023      Procedure Summary     Date: 01/23/23 Room / Location: [address]    Anesthesia Start: 0728 Anesthesia Stop: 1142    Procedure: MIS- MINIMALLY INVASIVE SACROILIITIS FUSION (Right: Hip) Diagnosis:       Sacroiliitis (HCC)      (SACROILIITIS)    Surgeons: Caty Byrne DO Responsible Provider: Sonny Hale MD    Anesthesia Type: General ASA Status: 3          Anesthesia Type: General    Yolie Phase I: Yolie Score: 10    Yolie Phase II: Yolie Score: 10      Anesthesia Post Evaluation

## 2023-01-26 ENCOUNTER — HOSPITAL ENCOUNTER (OUTPATIENT)
Dept: PAIN MANAGEMENT | Age: 53
Discharge: HOME | End: 2023-01-26
Payer: COMMERCIAL

## 2023-01-26 VITALS
DIASTOLIC BLOOD PRESSURE: 77 MMHG | OXYGEN SATURATION: 98 % | HEIGHT: 55 IN | SYSTOLIC BLOOD PRESSURE: 108 MMHG | BODY MASS INDEX: 49.53 KG/M2 | WEIGHT: 214 LBS | HEART RATE: 86 BPM

## 2023-01-26 DIAGNOSIS — M47.816 LUMBAR SPONDYLOSIS: Chronic | ICD-10-CM

## 2023-01-26 DIAGNOSIS — M51.36 DEGENERATION OF LUMBAR INTERVERTEBRAL DISC: Chronic | ICD-10-CM

## 2023-01-26 DIAGNOSIS — M50.30 DEGENERATIVE DISC DISEASE, CERVICAL: ICD-10-CM

## 2023-01-26 DIAGNOSIS — M47.896 OTHER OSTEOARTHRITIS OF SPINE, LUMBAR REGION: ICD-10-CM

## 2023-01-26 DIAGNOSIS — M54.42 CHRONIC LEFT-SIDED LOW BACK PAIN WITH LEFT-SIDED SCIATICA: ICD-10-CM

## 2023-01-26 DIAGNOSIS — M54.50 CHRONIC BILATERAL LOW BACK PAIN WITHOUT SCIATICA: Primary | ICD-10-CM

## 2023-01-26 DIAGNOSIS — M46.1 SACROILIITIS (HCC): ICD-10-CM

## 2023-01-26 DIAGNOSIS — Z79.899 ENCOUNTER FOR MEDICATION MANAGEMENT: ICD-10-CM

## 2023-01-26 DIAGNOSIS — G89.29 CHRONIC BILATERAL LOW BACK PAIN WITHOUT SCIATICA: Primary | ICD-10-CM

## 2023-01-26 DIAGNOSIS — M50.20 CERVICAL DISC HERNIATION: ICD-10-CM

## 2023-01-26 DIAGNOSIS — M47.26 OSTEOARTHRITIS OF SPINE WITH RADICULOPATHY, LUMBAR REGION: ICD-10-CM

## 2023-01-26 DIAGNOSIS — M47.817 LUMBOSACRAL SPONDYLOSIS WITHOUT MYELOPATHY: ICD-10-CM

## 2023-01-26 DIAGNOSIS — M16.12 PRIMARY OSTEOARTHRITIS OF LEFT HIP: ICD-10-CM

## 2023-01-26 DIAGNOSIS — G89.29 CHRONIC LEFT-SIDED LOW BACK PAIN WITH LEFT-SIDED SCIATICA: ICD-10-CM

## 2023-01-26 DIAGNOSIS — M47.816 OSTEOARTHRITIS OF LUMBAR SPINE, UNSPECIFIED SPINAL OSTEOARTHRITIS COMPLICATION STATUS: ICD-10-CM

## 2023-01-26 DIAGNOSIS — M54.16 LUMBAR RADICULOPATHY, CHRONIC: Chronic | ICD-10-CM

## 2023-01-26 PROCEDURE — 99213 OFFICE O/P EST LOW 20 MIN: CPT | Performed by: NURSE PRACTITIONER

## 2023-01-26 PROCEDURE — 99213 OFFICE O/P EST LOW 20 MIN: CPT

## 2023-01-26 RX ORDER — OXYCODONE HYDROCHLORIDE AND ACETAMINOPHEN 5; 325 MG/1; MG/1
1 TABLET ORAL EVERY 6 HOURS PRN
Qty: 120 TABLET | Refills: 0 | Status: SHIPPED | OUTPATIENT
Start: 2023-01-31 | End: 2023-03-02

## 2023-01-26 RX ORDER — PREGABALIN 150 MG/1
150 CAPSULE ORAL 2 TIMES DAILY
Qty: 60 CAPSULE | Refills: 0 | Status: CANCELLED | OUTPATIENT
Start: 2023-01-26 | End: 2023-02-25

## 2023-01-26 RX ORDER — PREGABALIN 150 MG/1
150 CAPSULE ORAL 2 TIMES DAILY
Qty: 60 CAPSULE | Refills: 0 | Status: SHIPPED | OUTPATIENT
Start: 2023-01-26 | End: 2023-02-25

## 2023-01-26 RX ORDER — ONDANSETRON 4 MG/1
4 TABLET, FILM COATED ORAL EVERY 8 HOURS PRN
Qty: 15 TABLET | Refills: 0 | Status: SHIPPED | OUTPATIENT
Start: 2023-01-26 | End: 2023-01-31

## 2023-01-26 ASSESSMENT — ENCOUNTER SYMPTOMS
BACK PAIN: 1
NAUSEA: 1
COUGH: 0
BOWEL INCONTINENCE: 0
SHORTNESS OF BREATH: 0
CONSTIPATION: 0

## 2023-01-26 ASSESSMENT — PAIN DESCRIPTION - DESCRIPTORS: DESCRIPTORS: ACHING;STABBING;SHOOTING

## 2023-01-26 ASSESSMENT — PAIN DESCRIPTION - PAIN TYPE: TYPE: CHRONIC PAIN

## 2023-01-26 ASSESSMENT — PAIN SCALES - GENERAL: PAINLEVEL_OUTOF10: 10

## 2023-01-26 ASSESSMENT — PAIN DESCRIPTION - LOCATION: LOCATION: BACK

## 2023-01-26 ASSESSMENT — PAIN DESCRIPTION - FREQUENCY: FREQUENCY: CONTINUOUS
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
## 2023-01-27 DIAGNOSIS — K21.9 CHRONIC GERD: ICD-10-CM

## 2023-01-27 NOTE — TELEPHONE ENCOUNTER
Please Approve or Refuse.   Send to Pharmacy per Pt's Request:      Next Visit Date:  2/13/2023   Last Visit Date: 10/11/2022    Hemoglobin A1C (%)   Date Value   10/14/2022 5.5   10/11/2022 5.3   04/07/2022 5.6             ( goal A1C is < 7)   BP Readings from Last 3 Encounters:   01/26/23 108/77   01/23/23 122/84   01/09/23 133/89          (goal 120/80)  BUN   Date Value Ref Range Status   01/09/2023 12 6 - 20 mg/dL Final     Creatinine   Date Value Ref Range Status   01/09/2023 0.85 0.50 - 0.90 mg/dL Final     Potassium   Date Value Ref Range Status   10/14/2022 4.4 3.7 - 5.3 mmol/L Final

## 2023-01-30 RX ORDER — OMEPRAZOLE 40 MG/1
CAPSULE, DELAYED RELEASE ORAL
Qty: 90 CAPSULE | Refills: 1 | Status: SHIPPED | OUTPATIENT
Start: 2023-01-30

## 2023-01-31 ENCOUNTER — HOSPITAL ENCOUNTER (OUTPATIENT)
Dept: CT IMAGING | Age: 53
Discharge: HOME | End: 2023-02-02
Payer: COMMERCIAL

## 2023-01-31 ENCOUNTER — HOSPITAL ENCOUNTER (OUTPATIENT)
Age: 53
Discharge: HOME | End: 2023-02-02
Payer: COMMERCIAL

## 2023-01-31 ENCOUNTER — E-VISIT (OUTPATIENT)
Dept: PRIMARY CARE CLINIC | Age: 53
End: 2023-01-31
Payer: COMMERCIAL

## 2023-01-31 ENCOUNTER — HOSPITAL ENCOUNTER (OUTPATIENT)
Dept: GENERAL RADIOLOGY | Age: 53
Discharge: HOME | End: 2023-02-02
Payer: COMMERCIAL

## 2023-01-31 DIAGNOSIS — R30.0 DYSURIA: Primary | ICD-10-CM

## 2023-01-31 DIAGNOSIS — Z98.1 S/P FUSION OF SACROILIAC JOINT: ICD-10-CM

## 2023-01-31 DIAGNOSIS — M46.1 BILATERAL SACROILIITIS (HCC): ICD-10-CM

## 2023-01-31 PROCEDURE — 99422 OL DIG E/M SVC 11-20 MIN: CPT | Performed by: NURSE PRACTITIONER

## 2023-01-31 PROCEDURE — 72170 X-RAY EXAM OF PELVIS: CPT

## 2023-01-31 PROCEDURE — 72192 CT PELVIS W/O DYE: CPT

## 2023-01-31 RX ORDER — NITROFURANTOIN 25; 75 MG/1; MG/1
100 CAPSULE ORAL 2 TIMES DAILY
Qty: 10 CAPSULE | Refills: 0 | Status: SHIPPED | OUTPATIENT
Start: 2023-01-31 | End: 2023-02-05

## 2023-02-06 ENCOUNTER — OFFICE VISIT (OUTPATIENT)
Dept: NEUROSURGERY | Age: 53
End: 2023-02-06

## 2023-02-06 VITALS
TEMPERATURE: 97.2 F | DIASTOLIC BLOOD PRESSURE: 69 MMHG | OXYGEN SATURATION: 97 % | SYSTOLIC BLOOD PRESSURE: 100 MMHG | WEIGHT: 215.3 LBS | BODY MASS INDEX: 42.27 KG/M2 | HEIGHT: 60 IN | HEART RATE: 86 BPM

## 2023-02-06 DIAGNOSIS — Z98.1 S/P FUSION OF SACROILIAC JOINT: ICD-10-CM

## 2023-02-06 DIAGNOSIS — M46.1 SACROILIAC INFLAMMATION (HCC): Primary | ICD-10-CM

## 2023-02-06 PROCEDURE — 99024 POSTOP FOLLOW-UP VISIT: CPT | Performed by: NURSE PRACTITIONER

## 2023-02-06 NOTE — PROGRESS NOTES
[address]  [facility] [address] [facility] [address]  Dept: [phone]    Patient:  Juany Vargas  YOB: 1970  Date: 2/6/23    The patient is a 47 y.o. female who presents today for consult of the following problems:     Chief Complaint   Patient presents with    Other     [place] pain, since the surgery 01/23         HPI:     Juany Vargas is a 47 y.o. female who presents to the office for post-op evaluation s/p SI fusion. Still with some buttocks pain, but the burning and back pain has resolved. Lying on right side uncomfortable. Incision healing well. No discharge, drainage, fevers, chills. Post-op pain is controlled. No issues going to the bathroom. Utilizing a walker for ambulation. Minimal weightbearing right lower extremity. Sensation intact  Strength 5/5  Incision healing well    Date of surgery: 1/23/2023    Assessment and Plan:      1. Sacroiliac inflammation ([address]    2. S/P fusion of sacroiliac joint          Plan: Incision healing well. Referral provided for physical therapy evaluation through home care to help work on safely mobilizing. Continue to limit weightbearing, bending, twisting, lifting at this time. Patient to return in 6 weeks for next postop with Dr. Alie Ceron as scheduled. Followup: Return in about 6 weeks (around 3/20/2023), or if symptoms worsen or fail to improve. Prescriptions Ordered:  No orders of the defined types were placed in this encounter.        Orders Placed:  Orders Placed This Encounter   Procedures    [address]     Referral Priority:   Routine     Referral Type:   Home Health Care     Referral Reason:   Specialty Services Required     Referral Location:   [address]      Requested Specialty:   [address]     Number of Visits Requested:   1        Electronically signed by Macie Mosher Amy Rucker CNP on 2/6/2023 at 11:40 AM    Please note that this chart was generated using voice recognition Dragon dictation software. Although every effort was made to ensure the accuracy of this automated transcription, some errors in transcription may have occurred.

## 2023-02-13 ENCOUNTER — OFFICE VISIT (OUTPATIENT)
Dept: FAMILY MEDICINE CLINIC | Age: 53
End: 2023-02-13

## 2023-02-13 ENCOUNTER — HOSPITAL ENCOUNTER (OUTPATIENT)
Age: 53
Setting detail: SPECIMEN
Discharge: HOME | End: 2023-02-13
Payer: COMMERCIAL

## 2023-02-13 VITALS
HEIGHT: 60 IN | WEIGHT: 211 LBS | HEART RATE: 85 BPM | TEMPERATURE: 97.8 F | BODY MASS INDEX: 41.43 KG/M2 | SYSTOLIC BLOOD PRESSURE: 120 MMHG | DIASTOLIC BLOOD PRESSURE: 70 MMHG | OXYGEN SATURATION: 98 %

## 2023-02-13 DIAGNOSIS — Z12.11 COLON CANCER SCREENING: ICD-10-CM

## 2023-02-13 DIAGNOSIS — N30.10 INTERSTITIAL CYSTITIS: ICD-10-CM

## 2023-02-13 DIAGNOSIS — E11.9 TYPE 2 DIABETES MELLITUS WITHOUT COMPLICATION, WITHOUT LONG-TERM CURRENT USE OF INSULIN (HCC): Primary | ICD-10-CM

## 2023-02-13 DIAGNOSIS — M47.817 LUMBOSACRAL SPONDYLOSIS WITHOUT MYELOPATHY: ICD-10-CM

## 2023-02-13 DIAGNOSIS — E11.9 TYPE 2 DIABETES MELLITUS WITHOUT COMPLICATION, WITHOUT LONG-TERM CURRENT USE OF INSULIN (HCC): ICD-10-CM

## 2023-02-13 DIAGNOSIS — G47.10 HYPERSOMNIA: ICD-10-CM

## 2023-02-13 DIAGNOSIS — E78.2 MIXED HYPERLIPIDEMIA: ICD-10-CM

## 2023-02-13 DIAGNOSIS — M46.1 SACROILIAC INFLAMMATION (HCC): ICD-10-CM

## 2023-02-13 DIAGNOSIS — M16.12 PRIMARY OSTEOARTHRITIS OF LEFT HIP: ICD-10-CM

## 2023-02-13 DIAGNOSIS — M06.9 RHEUMATOID ARTHRITIS, INVOLVING UNSPECIFIED SITE, UNSPECIFIED WHETHER RHEUMATOID FACTOR PRESENT (HCC): ICD-10-CM

## 2023-02-13 DIAGNOSIS — I10 ESSENTIAL HYPERTENSION: ICD-10-CM

## 2023-02-13 DIAGNOSIS — N95.1 MENOPAUSAL VAGINAL DRYNESS: ICD-10-CM

## 2023-02-13 LAB
CREATININE URINE: 58.1 MG/DL (ref 28–217)
HBA1C MFR BLD: 5.9 %
LIPASE SERPL-CCNC: 57 U/L (ref 13–60)
MICROALBUMIN/CREAT 24H UR: <12 MG/L
MICROALBUMIN/CREAT UR-RTO: NORMAL MCG/MG CREAT

## 2023-02-13 PROCEDURE — 83690 ASSAY OF LIPASE: CPT

## 2023-02-13 PROCEDURE — 82570 ASSAY OF URINE CREATININE: CPT

## 2023-02-13 PROCEDURE — 36415 COLL VENOUS BLD VENIPUNCTURE: CPT

## 2023-02-13 PROCEDURE — 82043 UR ALBUMIN QUANTITATIVE: CPT

## 2023-02-13 RX ORDER — TROSPIUM CHLORIDE 20 MG/1
20 TABLET, FILM COATED ORAL DAILY
Qty: 90 TABLET | Refills: 1 | Status: SHIPPED | OUTPATIENT
Start: 2023-02-13

## 2023-02-13 SDOH — ECONOMIC STABILITY: FOOD INSECURITY: WITHIN THE PAST 12 MONTHS, THE FOOD YOU BOUGHT JUST DIDN'T LAST AND YOU DIDN'T HAVE MONEY TO GET MORE.: NEVER TRUE

## 2023-02-13 SDOH — ECONOMIC STABILITY: INCOME INSECURITY: HOW HARD IS IT FOR YOU TO PAY FOR THE VERY BASICS LIKE FOOD, HOUSING, MEDICAL CARE, AND HEATING?: NOT HARD AT ALL

## 2023-02-13 SDOH — ECONOMIC STABILITY: FOOD INSECURITY: WITHIN THE PAST 12 MONTHS, YOU WORRIED THAT YOUR FOOD WOULD RUN OUT BEFORE YOU GOT MONEY TO BUY MORE.: NEVER TRUE

## 2023-02-13 SDOH — ECONOMIC STABILITY: HOUSING INSECURITY
IN THE LAST 12 MONTHS, WAS THERE A TIME WHEN YOU DID NOT HAVE A STEADY PLACE TO SLEEP OR SLEPT IN A SHELTER (INCLUDING NOW)?: NO

## 2023-02-13 ASSESSMENT — ENCOUNTER SYMPTOMS
NAUSEA: 0
DIARRHEA: 0
CONSTIPATION: 0
COUGH: 0
SINUS PRESSURE: 0
ABDOMINAL DISTENTION: 0
ABDOMINAL PAIN: 0
SORE THROAT: 0
VOMITING: 0
WHEEZING: 0
BACK PAIN: 1
RHINORRHEA: 0
RECTAL PAIN: 0
SHORTNESS OF BREATH: 0
EYE REDNESS: 0
COLOR CHANGE: 0
CHEST TIGHTNESS: 0
TROUBLE SWALLOWING: 0
BLOOD IN STOOL: 0
STRIDOR: 0

## 2023-02-13 ASSESSMENT — PATIENT HEALTH QUESTIONNAIRE - PHQ9
SUM OF ALL RESPONSES TO PHQ QUESTIONS 1-9: 0
1. LITTLE INTEREST OR PLEASURE IN DOING THINGS: 0
SUM OF ALL RESPONSES TO PHQ QUESTIONS 1-9: 0
SUM OF ALL RESPONSES TO PHQ9 QUESTIONS 1 & 2: 0
2. FEELING DOWN, DEPRESSED OR HOPELESS: 0

## 2023-02-13 NOTE — PATIENT INSTRUCTIONS
New Updates for [id number]/ [employer] ([facility]) DIEGO    Thank you for choosing US to give you the best care! [facility] is always trying to think of new ways to help their patients. We are asking all patients to try out the new digital registration that is now available through your [facility] account or the new DIEGO, [employer] ([facility]). Via the diego you're now able to update your personal and registration information prior to your upcoming appointment. This will save you time once you arrive at the office to check-in, not to mention your information remains safe!! Many other perks come from signing up for an account, such as:  Requesting refills  Scheduling an appointment  Completing an E-Visit  Sending a message to the office/provider  Having access to your medication list  Paying your bill/copay prior to your appointment  Scheduling your yearly mammogram  Review your test results    If you are not familiar with [facility] or the [employer] [facility] DIEGO, please ask one of us and we will be happy to answer any questions or help you set-up your account.       Your [id number] [address] office,  [place]

## 2023-02-13 NOTE — PROGRESS NOTES
Visit Information    Have you changed or started any medications since your last visit including any over-the-counter medicines, vitamins, or herbal medicines? no   Are you having any side effects from any of your medications? -  no  Have you stopped taking any of your medications? Is so, why? -  no    Have you seen any other physician or provider since your last visit? Yes - Records Obtained  Have you had any other diagnostic tests since your last visit? Yes - Records Requested  Have you been seen in the emergency room and/or had an admission to a hospital since we last saw you? Yes - Records Obtained  Have you had your routine dental cleaning in the past 6 months? yes     Have you activated your [employer] account? If not, what are your barriers?  Yes     Patient Care Team:  Cranford Riedel, MD as PCP - General (Family Medicine)  Cranford Riedel, MD as PCP - Empaneled Provider  Mariah Cabrera MD as Orthopedic Surgeon (Orthopedic Surgery)  Kayla De La O MD as Consulting Physician (Gastroenterology)    Medical History Review  Past Medical, Family, and Social History reviewed and does contribute to the patient presenting condition    Health Maintenance   Topic Date Due    Diabetic retinal exam  Never done    Diabetic foot exam  10/03/2020    COVID-19 Vaccine (4 - Booster for [place] series) 02/14/2022    Diabetic Alb to Cr ratio (uACR) test  09/30/2022    Colorectal Cancer Screen  10/22/2022    Pneumococcal 0-64 years Vaccine (2 - PCV) 04/20/2023    Depression Screen  10/11/2023    A1C test (Diabetic or Prediabetic)  10/14/2023    Lipids  10/14/2023    Breast cancer screen  11/23/2023    GFR test (Diabetes, CKD 3-4, OR last GFR 15-59)  01/09/2024    DTaP/Tdap/Td vaccine (2 - Td or Tdap) 01/11/2028    Hepatitis B vaccine  Completed    Flu vaccine  Completed    Shingles vaccine  Completed    Hepatitis C screen  Completed    HIV screen  Completed    Hepatitis A vaccine  Aged Out    Hib vaccine  Aged Out    Meningococcal (ACWY) vaccine  Aged Out

## 2023-02-13 NOTE — PROGRESS NOTES
Chief Complaint   Patient presents with    Follow-up Chronic Condition     Had surgery on 1-23-23     Hypertension     New to provider     Other     Needs a sleep study done          Yecenia Sal  here today for follow up on chronic medical problems, go over labs and/or diagnostic studies, and medication refills. Follow-up Chronic Condition (Had surgery on 1-23-23 ), Hypertension (New to provider ), and Other (Needs a sleep study done )      HPI: Patient is scheduled to establish. Patient has history of diabetes controlled. Patient is currently on Ozempic and has taken Trulicity in the past.  Patient reports that medication has worked good for her. She is on highest dose of Trulicity 4.5. Patient does not have much change in weight lost about 10 to 15 pounds in last 6 months. Patient denies any side effects from medication. She denies any thyroid Cancer history. Patient has not seen ophthalmologist.  Today's A1c is 5.9, which is mildly increased from previous. Patient had recent surgery of back done, has sacral ileitis. Patient reports she has sacral fusion done with 3 metal iron plates placed. Patient is currently following with pain management and also neurosurgeon. She has not started rehab yet. Patient has chronic history of hypertension which is under control, patient is currently on lisinopril 5 mg reports compliant denies any side effects. Patient also has history of rheumatoid arthritis reports she has been diagnosed years before and was following with rheumatologist in the past.  She was on methotrexate and has stopped and also has surgery in the right hand done. Patient reports she is currently following with pain management and is on Lyrica, the stopped her methotrexate. Patient reports the pain is under control also takes muscle relaxant as needed. Patient has underlying history of hypersomnia, difficulty during sleep, patient needs evaluation for sleep study.   Patient reports she snores and tosses around during night never had a sleep study before. Patient also reports she has history of interstitial cystitis has seen urologist in the past but he got retired. Patient was taking antispasmodic medication reports that was helping with her bladder frequency and urgency. Previous PCP did not prescribe that medication. Patient wants to restart that medication. Patient is also asking for estrogen cream, reports she was prescribed by gynecologist.  She has total hysterectomy done with bilateral oophorectomy due to history of cervical cancer. Patient also due for colon cancer screening            /70   Pulse 85   Temp 97.8 °F (36.6 °C)   Ht 5' (1.524 m)   Wt 211 lb (95.7 kg)   SpO2 98%   BMI 41.21 kg/m²    Body mass index is 41.21 kg/m². Wt Readings from Last 3 Encounters:   02/13/23 211 lb (95.7 kg)   02/06/23 215 lb 4.8 oz (97.7 kg)   01/26/23 214 lb (97.1 kg)        []Negative depression screening. PHQ Scores 2/13/2023 10/11/2022 4/7/2022 5/24/2021 4/19/2021 1/8/2021 9/8/2020   PHQ2 Score 0 0 3 1 2 3 0   PHQ9 Score 0 0 10 1 2 14 0      []1-4 = Minimal depression   []5-9 = Milddepression   []10-14 = Moderate depression   []15-19 = Moderately severe depression   []20-27 = Severe depression    Discussed testing with the patient and all questions fully answered.     Admission on 01/23/2023, Discharged on 01/23/2023   Component Date Value Ref Range Status    POC Glucose 01/23/2023 112 (A)  65 - 105 mg/dL Final    POC Glucose 01/23/2023 182 (A)  65 - 105 mg/dL Final         Most recent labs reviewed:     Lab Results   Component Value Date    WBC 7.5 01/09/2023    HGB 13.6 01/09/2023    HCT 41.9 01/09/2023    MCV 90.3 01/09/2023     01/09/2023       @BRIEFLAB(NA,K,CL,CO2,BUN,CREATININE,GLUCOSE,CALCIUM)@     Lab Results   Component Value Date    ALT 16 10/14/2022    AST 13 10/14/2022    ALKPHOS 93 10/14/2022    BILITOT 0.2 (L) 10/14/2022       Lab Results Component Value Date    TSH 0.90 10/14/2022       Lab Results   Component Value Date    CHOL 151 10/05/2019    CHOL 207 (H) 02/26/2019    CHOL 217 (H) 10/17/2017     Lab Results   Component Value Date    TRIG 205 (H) 10/05/2019    TRIG 194 (H) 02/26/2019    TRIG 217 (H) 10/17/2017     Lab Results   Component Value Date    HDL 41 10/14/2022    HDL 45 09/30/2021    HDL 42 09/10/2020     Lab Results   Component Value Date    LDLCALC 71 10/05/2019    LDLCALC 126 10/17/2017    LDLCALC 118 07/14/2015    LDLCHOLESTEROL 51 10/14/2022    LDLCHOLESTEROL 62 09/30/2021    LDLCHOLESTEROL 58 09/10/2020     Lab Results   Component Value Date    LABVLDL 41 (H) 10/05/2019    LABVLDL 43 (H) 10/17/2017    LABVLDL 42 (H) 07/14/2015    VLDL NOT REPORTED (H) 09/30/2021    VLDL NOT REPORTED (H) 09/10/2020    VLDL NOT REPORTED (H) 02/26/2019     Lab Results   Component Value Date    CHOLHDLRATIO 3.5 10/14/2022    CHOLHDLRATIO 3.4 09/30/2021    CHOLHDLRATIO 3.7 09/10/2020       Lab Results   Component Value Date    LABA1C 5.9 02/13/2023       Lab Results   Component Value Date    CWSAQZRP00 646 10/14/2022       Lab Results   Component Value Date    FOLATE >20.0 10/14/2022       No results found for: IRON, TIBC, FERRITIN    Lab Results   Component Value Date    VITD25 44.7 10/14/2022             Current Outpatient Medications   Medication Sig Dispense Refill    trospium (SANCTURA) 20 MG tablet Take 1 tablet by mouth daily 90 tablet 1    omeprazole (PRILOSEC) 40 MG delayed release capsule TAKE 1 CAPSULE DAILY 90 capsule 1    oxyCODONE-acetaminophen (PERCOCET) 5-325 MG per tablet Take 1 tablet by mouth every 6 hours as needed for Pain for up to 30 days. 120 tablet 0    pregabalin (LYRICA) 150 MG capsule Take 1 capsule by mouth 2 times daily for 30 days.  60 capsule 0    hydrOXYzine pamoate (VISTARIL) 50 MG capsule TAKE 1 CAPSULE BY MOUTH THREE TIMES DAILY AS NEEDED FOR ANXIETY      lamoTRIgine (LAMICTAL) 25 MG tablet TAKE 1 TABLET BY MOUTH ONCE DAILY      propranolol (INDERAL) 10 MG tablet TAKE 1 TABLET BY MOUTH TWICE DAILY AS NEEDED FOR ANXIETY      TRULICITY 4.5 HI/0.0UK SOPN INJECT 4.5 MG SUBCUTANEOUSLY ONCE WEEKLY 2 mL 0    lisinopril (PRINIVIL;ZESTRIL) 5 MG tablet TAKE 1 TABLET BY MOUTH ONCE DAILY 30 tablet 1    atorvastatin (LIPITOR) 20 MG tablet TAKE 1 TABLET BY MOUTH ONCE DAILY 30 tablet 1    topiramate (TOPAMAX) 100 MG tablet TAKE 2 TABLET BY MOUTH NIGHTLY 60 tablet 1    sertraline (ZOLOFT) 100 MG tablet TAKE 1 TABLET BY MOUTH ONCE DAILY 30 tablet 1    busPIRone (BUSPAR) 30 MG tablet TAKE 1 TABLET BY MOUTH 3 TIMES DAILY 90 tablet 1    dicyclomine (BENTYL) 10 MG capsule TAKE 1 CAPSULE BY MOUTH FOUR TIMES DAILY 120 capsule 1    buPROPion (WELLBUTRIN XL) 150 MG extended release tablet Take 1 tablet by mouth every morning 30 tablet 2    fluticasone (FLONASE) 50 MCG/ACT nasal spray INHALE 2 SPRAYS IN EACH NOSTRIL DAILY 16 g 2    Lactobacillus (ACIDOPHILUS) CAPS capsule TAKE 1 CAPSULE BY MOUTH TWICE DAILY (Patient taking differently: 1 capsule daily) 30 capsule 10    montelukast (SINGULAIR) 10 MG tablet Take 1 tablet by mouth nightly TAKE 1 TABLET DAILY 90 tablet 3    blood glucose test strips (ONETOUCH VERIO) strip Inject 1 each into the skin 2 times daily As needed. USE 1 STRIP DAILY AS NEEDED 200 strip 3    Lancets (ONETOUCH DELICA PLUS [id number]) MISC USE 1 EACH TWICE A  each 3    tiZANidine (ZANAFLEX) 4 MG tablet TAKE 1 TABLET EVERY 8 HOURS AS NEEDED FOR SPASMS 270 tablet 0    cetirizine (ZYRTEC) 10 MG tablet Take 10 mg by mouth daily      Elastic Bandages & Supports (LUMBAR BACK BRACE/SUPPORT PAD) MISC 1 each by Does not apply route daily as needed (pain) 1 each 0     No current facility-administered medications for this visit.              Social History     Socioeconomic History    Marital status:      Spouse name: Not on file    Number of children: Not on file    Years of education: Not on file    Highest education level: Not on file   Occupational History    Occupation: unemployed   Tobacco Use    Smoking status: Never    Smokeless tobacco: Never   Vaping Use    Vaping Use: Never used   Substance and Sexual Activity    Alcohol use: No    Drug use: No    Sexual activity: Yes   Other Topics Concern    Not on file   Social History Narrative    Not on file     Social Determinants of Health     Financial Resource Strain: Low Risk     Difficulty of Paying Living Expenses: Not hard at all   Food Insecurity: No Food Insecurity    Worried About [address] in the Last Year: Never true    [address] in the Last Year: Never true   Transportation Needs: Unknown    Lack of Transportation (Medical): Not on file    Lack of Transportation (Non-Medical): No   Physical Activity: Not on file   Stress: Not on file   Social Connections: Not on file   Intimate Partner Violence: Not on file   Housing Stability: Unknown    Unable to Pay for Housing in the Last Year: Not on file    Number of Places Lived in the Last Year: Not on file    Unstable Housing in the Last Year: No     Counseling given: Not Answered        Family History   Problem Relation Age of Onset    Cancer Mother     Heart Disease Father     Diabetes Father     High Blood Pressure Father     Other Other         Celiac Sprue. Aunt             -rest of complaints with corresponding details per ROS    The patient's past medical, surgical, social, and family history as well as her current medications and allergies were reviewed as documented intoday's encounter. Review of Systems   Constitutional:  Positive for activity change. Negative for appetite change, fatigue, fever and unexpected weight change. HENT:  Negative for congestion, ear pain, postnasal drip, rhinorrhea, sinus pressure, sore throat and trouble swallowing. Eyes:  Negative for redness and visual disturbance. Respiratory:  Negative for cough, chest tightness, shortness of breath, wheezing and stridor. Cardiovascular:  Negative for chest pain, palpitations and leg swelling. Gastrointestinal:  Negative for abdominal distention, abdominal pain, blood in stool, constipation, diarrhea, nausea, rectal pain and vomiting. Endocrine: Negative for polydipsia, polyphagia and polyuria. Genitourinary:  Positive for frequency, urgency and vaginal pain. Negative for decreased urine volume, difficulty urinating and flank pain. Musculoskeletal:  Positive for arthralgias, back pain, gait problem, joint swelling and myalgias. Negative for neck pain. Skin:  Negative for color change, rash and wound. Allergic/Immunologic: Positive for immunocompromised state. Negative for food allergies. Neurological:  Positive for weakness and numbness. Negative for dizziness, speech difficulty, light-headedness and headaches. Psychiatric/Behavioral:  Positive for decreased concentration. Negative for agitation, behavioral problems, dysphoric mood, hallucinations, sleep disturbance and suicidal ideas. The patient is nervous/anxious. Physical Exam  Vitals and nursing note reviewed. Constitutional:       General: She is not in acute distress. Appearance: Normal appearance. She is well-developed. She is obese. She is not diaphoretic. HENT:      Head: Normocephalic and atraumatic. Nose: Nose normal.   Eyes:      General:         Right eye: No discharge. Left eye: No discharge. Extraocular Movements: Extraocular movements intact. Conjunctiva/sclera: Conjunctivae normal.      Pupils: Pupils are equal, round, and reactive to light. Neck:      Thyroid: No thyromegaly. Cardiovascular:      Rate and Rhythm: Normal rate and regular rhythm. Heart sounds: Normal heart sounds. No murmur heard. Pulmonary:      Effort: Pulmonary effort is normal. No respiratory distress. Breath sounds: Normal breath sounds. No wheezing or rhonchi.    Abdominal:      General: Bowel sounds are normal. There is no distension. Palpations: Abdomen is soft. There is no mass. Tenderness: There is no abdominal tenderness. There is no right CVA tenderness, left CVA tenderness, guarding or rebound. Musculoskeletal:         General: Tenderness present. Right hand: Tenderness present. Decreased range of motion. Left hand: Tenderness present. Decreased range of motion. Cervical back: Normal range of motion and neck supple. Spasms present. No rigidity. Thoracic back: Spasms present. Decreased range of motion. Lumbar back: Spasms and tenderness present. Decreased range of motion. Positive right straight leg raise test.   Lymphadenopathy:      Cervical: No cervical adenopathy. Skin:     Coloration: Skin is not jaundiced or pale. Findings: No bruising, erythema or rash. Neurological:      General: No focal deficit present. Mental Status: She is alert and oriented to person, place, and time. Cranial Nerves: No cranial nerve deficit. Sensory: Sensory deficit present. Motor: Weakness present. No tremor. Coordination: Coordination normal.      Gait: Gait abnormal. Tandem walk normal.      Deep Tendon Reflexes: Reflexes are normal and symmetric. Psychiatric:         Attention and Perception: Attention and perception normal. She is attentive. Mood and Affect: Mood is anxious. Mood is not depressed. Affect is not tearful. Speech: She is communicative. Speech is not rapid and pressured, delayed or slurred. Behavior: Behavior is slowed. Behavior is not agitated. Thought Content: Thought content normal. Thought content is not paranoid. Judgment: Judgment normal.           ASSESSMENT AND PLAN      1.  Type 2 diabetes mellitus without complication, without long-term current use of insulin (HCC)  Controlled, A1c is 5.9, discussed with patient in detail about the medication side effects, discussed if Trulicity will help with weight loss, we will continue this dose for 3 more months to see response. If there is not any further weight loss, we will cut down the dose. Check for lipase. Continue to monitor blood sugars.  - POCT glycosylated hemoglobin (Hb A1C)  - Lipase; Future  - Microalbumin / Creatinine Urine Ratio; Future    2. Essential hypertension  Controlled continue lisinopril   discussed and advised  Low-salt diet  Home monitoring of blood pressure      3. Primary osteoarthritis of left hip  Chronic problem follows with orthopedic and pain management    4. Lumbosacral spondylosis without myelopathy  Chronic problem follows with pain management. Currently on Lyrica NSAIDs muscle relaxant    5. Mixed hyperlipidemia  Fairly controlled with some high triglycerides continue statins  Increase physical activity    6. Sacroiliac inflammation (HCC)  Status post surgery follow-up with pain management    7. Rheumatoid arthritis, involving unspecified site, unspecified whether rheumatoid factor present ([facility] [address]  Chronic problem not on any immunomodulators, patient follows with pain management    8. Hypersomnia  Sleep study ordered  - Baseline Diagnostic Sleep Study; Future    9. Interstitial cystitis  Started on trospium antispasmodic as needed basis. - trospium (SANCTURA) 20 MG tablet; Take 1 tablet by mouth daily  Dispense: 90 tablet; Refill: 1    10. Menopausal vaginal dryness  Discussed with patient topical estrogen will not be safer, use over-the-counter vaginal lubricants.     11. Colon cancer screening    - Ranulfo Gonzalez MD, Gastroenterology, [place]      Orders Placed This Encounter   Procedures    Lipase     Standing Status:   Future     Standing Expiration Date:   2/13/2024    Microalbumin / Creatinine Urine Ratio     Standing Status:   Future     Standing Expiration Date:   2/13/2024    Ranulfo Gonzalez MD, Gastroenterology, [place]     Referral Priority:   Routine     Referral Type:   Eval and Treat     Referral Reason: Specialty Services Required     Referred to Provider:   Victor M Tyson MD     Requested Specialty:   Gastroenterology     Number of Visits Requested:   1    POCT glycosylated hemoglobin (Hb A1C)    Baseline Diagnostic Sleep Study     Standing Status:   Future     Standing Expiration Date:   8/13/2024     Order Specific Question:   Adult or Pediatric     Answer:   Adult Study (>7 Years)     Order Specific Question:   [facility] For Sleep Study     [facility] Question:   Select Sleep Lab Location     Answer:   [address]     Order Specific Question:   Pre-Study Patient Questions: Answer:   Complains of daytime sleepiness     Order Specific Question:   Pre-Study Patient Questions: Answer:   Snores loudly during sleep     Order Specific Question:   Pre-Study Patient Questions: Answer:   Reports choking or gasping for breath during sleep     Order Specific Question:   Pre-Study Patient Questions: Answer: Tosses and Turns all night or describes their sleep as restless     Order Specific Question:   Pre-Study Patient Questions: Answer:   Reports multiple awakenings throughout the night         Medications Discontinued During This Encounter   Medication Reason    Semaglutide, 1 MG/DOSE, (OZEMPIC, 1 MG/DOSE,) 2 MG/1.5ML SOPN Alternate therapy    Semaglutide, 1 MG/DOSE, 4 MG/3ML SOPN Alternate therapy       Iqra received counseling on the following healthy behaviors: nutrition, exercise, and medication adherence  Reviewed prior labs and health maintenance  Continue current medications, diet and exercise. Discussed use, benefit, and side effects of prescribed medications. Barriers to medication compliance addressed. Patient given educational materials - see patient instructions  Was a self-tracking handout given in paper form or via [employer]?  Yes    Requested Prescriptions     Signed Prescriptions Disp Refills    trospium (SANCTURA) 20 MG tablet 90 tablet 1 Sig: Take 1 tablet by mouth daily       All patient questions answered. Patient voiced understanding. Quality Measures    Body mass index is 41.21 kg/m². Elevated. Weight control planned discussed daily exercise regimen and Healthy diet and regular exercise. BP: 120/70 Blood pressure is Normal. Treatment plan consists of Weight Reduction, DASH Eating Plan, Dietary Sodium Restriction, and No treatment change needed. Lab Results   Component Value Date    LDLCALC 71 10/05/2019    LDLCHOLESTEROL 51 10/14/2022    (goal LDL reduction with dx if diabetes is 50% LDL reduction)      PHQ Scores 2/13/2023 10/11/2022 4/7/2022 5/24/2021 4/19/2021 1/8/2021 9/8/2020   PHQ2 Score 0 0 3 1 2 3 0   PHQ9 Score 0 0 10 1 2 14 0     Interpretation of Total Score Depression Severity: 1-4 = Minimal depression, 5-9 = Mild depression, 10-14 = Moderate depression, 15-19 = Moderately severe depression, 20-27 = Severe depression    The patient'spast medical, surgical, social, and family history as well as her   current medications and allergies were reviewed as documented in today's encounter. Medications, labs, diagnostic studies, consultations andfollow-up as documented in this encounter. Return in about 3 months (around 5/13/2023) for 30min,always chronic conditons. Patient wasseen with total face to face time of 35 minutes. More than 50% of this visit was counseling and education. Future Appointments   Date Time Provider Javier Carlos   3/2/2023  3:00 PM BAR Pringle - CNP [address]   3/21/2023  2:00 PM Ellin Bamberger, DO [facility]   5/12/2023  1:00 PM Otilia Lehman MD [facility]     This note was completed by using the assistance of a speech-recognition program. However, inadvertent computerized transcription errors may be present.  Althoughevery effort was made to ensure accuracy, no guarantees can be provided that every mistake has been identified and corrected by editing.   Electronically signed by Vickie Ledbetter MD on 2/13/2023  2:18 PM

## 2023-02-22 DIAGNOSIS — I10 ESSENTIAL HYPERTENSION: ICD-10-CM

## 2023-02-22 DIAGNOSIS — E11.9 TYPE 2 DIABETES MELLITUS WITHOUT COMPLICATION, WITHOUT LONG-TERM CURRENT USE OF INSULIN (HCC): ICD-10-CM

## 2023-02-22 DIAGNOSIS — F43.23 ADJUSTMENT DISORDER WITH MIXED ANXIETY AND DEPRESSED MOOD: ICD-10-CM

## 2023-02-22 DIAGNOSIS — G43.719 INTRACTABLE CHRONIC MIGRAINE WITHOUT AURA AND WITHOUT STATUS MIGRAINOSUS: ICD-10-CM

## 2023-02-22 DIAGNOSIS — E78.2 MIXED HYPERLIPIDEMIA: ICD-10-CM

## 2023-02-22 RX ORDER — TOPIRAMATE 100 MG/1
TABLET, FILM COATED ORAL
Qty: 90 TABLET | Refills: 1 | Status: SHIPPED | OUTPATIENT
Start: 2023-02-22

## 2023-02-22 RX ORDER — BUSPIRONE HYDROCHLORIDE 30 MG/1
TABLET ORAL
Qty: 180 TABLET | Refills: 1 | Status: SHIPPED | OUTPATIENT
Start: 2023-02-22

## 2023-02-22 RX ORDER — DULAGLUTIDE 4.5 MG/.5ML
INJECTION, SOLUTION SUBCUTANEOUS
Qty: 2 ML | Refills: 4 | Status: SHIPPED | OUTPATIENT
Start: 2023-02-22

## 2023-02-22 RX ORDER — LISINOPRIL 5 MG/1
TABLET ORAL
Qty: 90 TABLET | Refills: 1 | Status: SHIPPED | OUTPATIENT
Start: 2023-02-22

## 2023-02-22 RX ORDER — BUPROPION HYDROCHLORIDE 150 MG/1
150 TABLET ORAL EVERY MORNING
Qty: 90 TABLET | Refills: 2 | Status: SHIPPED | OUTPATIENT
Start: 2023-02-22

## 2023-02-22 RX ORDER — ATORVASTATIN CALCIUM 20 MG/1
TABLET, FILM COATED ORAL
Qty: 90 TABLET | Refills: 1 | Status: SHIPPED | OUTPATIENT
Start: 2023-02-22

## 2023-02-22 NOTE — TELEPHONE ENCOUNTER
Please Approve or Refuse.   Send to Pharmacy per Pt's Request:      Next Visit Date:  5/12/2023   Last Visit Date: 2/13/2023    Hemoglobin A1C (%)   Date Value   02/13/2023 5.9   10/14/2022 5.5   10/11/2022 5.3             ( goal A1C is < 7)   BP Readings from Last 3 Encounters:   02/13/23 120/70   02/06/23 100/69   01/26/23 108/77          (goal 120/80)  BUN   Date Value Ref Range Status   01/09/2023 12 6 - 20 mg/dL Final     Creatinine   Date Value Ref Range Status   01/09/2023 0.85 0.50 - 0.90 mg/dL Final     Potassium   Date Value Ref Range Status   10/14/2022 4.4 3.7 - 5.3 mmol/L Final

## 2023-02-23 ENCOUNTER — HOSPITAL ENCOUNTER (OUTPATIENT)
Dept: WOMENS IMAGING | Age: 53
Discharge: HOME | End: 2023-02-25
Payer: COMMERCIAL

## 2023-02-23 DIAGNOSIS — Z12.39 SCREENING BREAST EXAMINATION: ICD-10-CM

## 2023-02-23 PROCEDURE — 77067 SCR MAMMO BI INCL CAD: CPT

## 2023-03-02 ENCOUNTER — HOSPITAL ENCOUNTER (OUTPATIENT)
Dept: PAIN MANAGEMENT | Age: 53
Discharge: HOME | End: 2023-03-02
Payer: COMMERCIAL

## 2023-03-02 VITALS
RESPIRATION RATE: 20 BRPM | WEIGHT: 211 LBS | DIASTOLIC BLOOD PRESSURE: 69 MMHG | TEMPERATURE: 97.3 F | OXYGEN SATURATION: 97 % | SYSTOLIC BLOOD PRESSURE: 100 MMHG | BODY MASS INDEX: 41.43 KG/M2 | HEART RATE: 76 BPM | HEIGHT: 60 IN

## 2023-03-02 DIAGNOSIS — M51.36 DEGENERATION OF LUMBAR INTERVERTEBRAL DISC: Chronic | ICD-10-CM

## 2023-03-02 DIAGNOSIS — M54.42 CHRONIC LEFT-SIDED LOW BACK PAIN WITH LEFT-SIDED SCIATICA: ICD-10-CM

## 2023-03-02 DIAGNOSIS — M16.12 PRIMARY OSTEOARTHRITIS OF LEFT HIP: ICD-10-CM

## 2023-03-02 DIAGNOSIS — Z79.899 ENCOUNTER FOR MEDICATION MANAGEMENT: ICD-10-CM

## 2023-03-02 DIAGNOSIS — M50.30 DEGENERATIVE DISC DISEASE, CERVICAL: ICD-10-CM

## 2023-03-02 DIAGNOSIS — M47.816 OSTEOARTHRITIS OF LUMBAR SPINE, UNSPECIFIED SPINAL OSTEOARTHRITIS COMPLICATION STATUS: ICD-10-CM

## 2023-03-02 DIAGNOSIS — G89.29 CHRONIC LEFT-SIDED LOW BACK PAIN WITH LEFT-SIDED SCIATICA: ICD-10-CM

## 2023-03-02 DIAGNOSIS — M54.50 CHRONIC BILATERAL LOW BACK PAIN WITHOUT SCIATICA: Chronic | ICD-10-CM

## 2023-03-02 DIAGNOSIS — M47.26 OSTEOARTHRITIS OF SPINE WITH RADICULOPATHY, LUMBAR REGION: ICD-10-CM

## 2023-03-02 DIAGNOSIS — M47.817 LUMBOSACRAL SPONDYLOSIS WITHOUT MYELOPATHY: ICD-10-CM

## 2023-03-02 DIAGNOSIS — M46.1 SACROILIAC INFLAMMATION (HCC): Primary | ICD-10-CM

## 2023-03-02 DIAGNOSIS — G89.29 CHRONIC BILATERAL LOW BACK PAIN WITHOUT SCIATICA: Chronic | ICD-10-CM

## 2023-03-02 DIAGNOSIS — F11.90 CHRONIC, CONTINUOUS USE OF OPIOIDS: ICD-10-CM

## 2023-03-02 DIAGNOSIS — Z98.1 S/P CERVICAL SPINAL FUSION: ICD-10-CM

## 2023-03-02 DIAGNOSIS — M46.1 SACROILIITIS (HCC): ICD-10-CM

## 2023-03-02 DIAGNOSIS — M54.16 LUMBAR RADICULOPATHY, CHRONIC: Chronic | ICD-10-CM

## 2023-03-02 DIAGNOSIS — M47.816 LUMBAR SPONDYLOSIS: Chronic | ICD-10-CM

## 2023-03-02 DIAGNOSIS — M47.896 OTHER OSTEOARTHRITIS OF SPINE, LUMBAR REGION: ICD-10-CM

## 2023-03-02 DIAGNOSIS — M43.07 SPONDYLOLYSIS, LUMBOSACRAL REGION: ICD-10-CM

## 2023-03-02 DIAGNOSIS — M50.20 CERVICAL DISC HERNIATION: ICD-10-CM

## 2023-03-02 PROCEDURE — G0481 DRUG TEST DEF 8-14 CLASSES: HCPCS

## 2023-03-02 PROCEDURE — 99213 OFFICE O/P EST LOW 20 MIN: CPT

## 2023-03-02 PROCEDURE — 80307 DRUG TEST PRSMV CHEM ANLYZR: CPT

## 2023-03-02 PROCEDURE — 99213 OFFICE O/P EST LOW 20 MIN: CPT | Performed by: NURSE PRACTITIONER

## 2023-03-02 RX ORDER — PREGABALIN 150 MG/1
150 CAPSULE ORAL 2 TIMES DAILY
Qty: 60 CAPSULE | Refills: 0 | Status: SHIPPED | OUTPATIENT
Start: 2023-03-02 | End: 2023-04-01

## 2023-03-02 RX ORDER — OXYCODONE HYDROCHLORIDE AND ACETAMINOPHEN 5; 325 MG/1; MG/1
1 TABLET ORAL EVERY 6 HOURS PRN
Qty: 120 TABLET | Refills: 0 | Status: SHIPPED | OUTPATIENT
Start: 2023-03-05 | End: 2023-04-04

## 2023-03-02 ASSESSMENT — ENCOUNTER SYMPTOMS
BACK PAIN: 1
SHORTNESS OF BREATH: 0
COUGH: 0
CONSTIPATION: 0
BOWEL INCONTINENCE: 0
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
## 2023-03-06 ENCOUNTER — HOSPITAL ENCOUNTER (OUTPATIENT)
Dept: SLEEP CENTER | Age: 53
Discharge: HOME | End: 2023-03-08
Payer: COMMERCIAL

## 2023-03-06 DIAGNOSIS — G47.10 HYPERSOMNIA: ICD-10-CM

## 2023-03-06 PROCEDURE — 95810 POLYSOM 6/> YRS 4/> PARAM: CPT

## 2023-03-07 VITALS
HEIGHT: 60 IN | WEIGHT: 211 LBS | BODY MASS INDEX: 41.43 KG/M2 | RESPIRATION RATE: 14 BRPM | OXYGEN SATURATION: 96 % | HEART RATE: 60 BPM

## 2023-03-07 ASSESSMENT — SLEEP AND FATIGUE QUESTIONNAIRES
HOW LIKELY ARE YOU TO NOD OFF OR FALL ASLEEP WHILE LYING DOWN TO REST IN THE AFTERNOON WHEN CIRCUMSTANCES PERMIT: 3
HOW LIKELY ARE YOU TO NOD OFF OR FALL ASLEEP WHILE SITTING QUIETLY AFTER LUNCH WITHOUT ALCOHOL: 3
HOW LIKELY ARE YOU TO NOD OFF OR FALL ASLEEP WHILE SITTING AND TALKING TO SOMEONE: 1
HOW LIKELY ARE YOU TO NOD OFF OR FALL ASLEEP WHILE WATCHING TV: 3
HOW LIKELY ARE YOU TO NOD OFF OR FALL ASLEEP WHILE SITTING AND READING: 3
HOW LIKELY ARE YOU TO NOD OFF OR FALL ASLEEP WHEN YOU ARE A PASSENGER IN A CAR FOR AN HOUR WITHOUT A BREAK: 3
HOW LIKELY ARE YOU TO NOD OFF OR FALL ASLEEP WHILE SITTING INACTIVE IN A PUBLIC PLACE: 3
HOW LIKELY ARE YOU TO NOD OFF OR FALL ASLEEP IN A CAR, WHILE STOPPED FOR A FEW MINUTES IN TRAFFIC: 1
ESS TOTAL SCORE: 20

## 2023-03-07 NOTE — PAYOR INFORMATION
Verified Demographics with Patient? No   If no why? Already verified  [address] Completed? No    If not why? Already completed  Verified Insurance through: Already verified  COB Completed? Not required  Auth Verification #:[id number]  Liability Due: $0  Discount Offered: na%       Previous Balance: $ 0   Discount Offered: na%  Site Collect Status: no liability  Patient Response: no liability  Financial Aid Offered?  Yes Pt declined  Cards Scanned: yes- at last visit

## 2023-03-21 ENCOUNTER — OFFICE VISIT (OUTPATIENT)
Dept: NEUROSURGERY | Age: 53
End: 2023-03-21

## 2023-03-21 VITALS
SYSTOLIC BLOOD PRESSURE: 109 MMHG | BODY MASS INDEX: 41.43 KG/M2 | HEART RATE: 77 BPM | WEIGHT: 211 LBS | OXYGEN SATURATION: 95 % | DIASTOLIC BLOOD PRESSURE: 76 MMHG | HEIGHT: 60 IN | TEMPERATURE: 97.6 F

## 2023-03-21 DIAGNOSIS — Z98.1 S/P FUSION OF SACROILIAC JOINT: Primary | ICD-10-CM

## 2023-03-21 DIAGNOSIS — M25.552 PAIN OF LEFT HIP: ICD-10-CM

## 2023-03-21 PROCEDURE — 99024 POSTOP FOLLOW-UP VISIT: CPT | Performed by: NEUROLOGICAL SURGERY

## 2023-03-23 DIAGNOSIS — M25.551 RIGHT HIP PAIN: Primary | ICD-10-CM

## 2023-03-24 DIAGNOSIS — F41.9 ANXIETY AND DEPRESSION: ICD-10-CM

## 2023-03-24 DIAGNOSIS — F32.A ANXIETY AND DEPRESSION: ICD-10-CM

## 2023-03-24 RX ORDER — SERTRALINE HYDROCHLORIDE 100 MG/1
TABLET, FILM COATED ORAL
Qty: 30 TABLET | Refills: 1 | Status: SHIPPED | OUTPATIENT
Start: 2023-03-24

## 2023-03-24 NOTE — TELEPHONE ENCOUNTER
Please Approve or Refuse.   Send to Pharmacy per Pt's Request: [facility]     Next Visit Date:  5/12/2023   Last Visit Date: 2/13/2023    Hemoglobin A1C (%)   Date Value   02/13/2023 5.9   10/14/2022 5.5   10/11/2022 5.3             ( goal A1C is < 7)   BP Readings from Last 3 Encounters:   03/21/23 109/76   03/02/23 100/69   02/13/23 120/70          (goal 120/80)  BUN   Date Value Ref Range Status   01/09/2023 12 6 - 20 mg/dL Final     Creatinine   Date Value Ref Range Status   01/09/2023 0.85 0.50 - 0.90 mg/dL Final     Potassium   Date Value Ref Range Status   10/14/2022 4.4 3.7 - 5.3 mmol/L Final

## 2023-03-28 ENCOUNTER — HOSPITAL ENCOUNTER (OUTPATIENT)
Dept: PAIN MANAGEMENT | Age: 53
Discharge: HOME | End: 2023-03-28
Payer: COMMERCIAL

## 2023-03-28 VITALS
BODY MASS INDEX: 41.43 KG/M2 | OXYGEN SATURATION: 97 % | HEIGHT: 60 IN | TEMPERATURE: 97.3 F | HEART RATE: 77 BPM | DIASTOLIC BLOOD PRESSURE: 75 MMHG | RESPIRATION RATE: 20 BRPM | WEIGHT: 211 LBS | SYSTOLIC BLOOD PRESSURE: 118 MMHG

## 2023-03-28 DIAGNOSIS — M54.16 LUMBAR RADICULOPATHY, CHRONIC: Chronic | ICD-10-CM

## 2023-03-28 DIAGNOSIS — M47.896 OTHER OSTEOARTHRITIS OF SPINE, LUMBAR REGION: ICD-10-CM

## 2023-03-28 DIAGNOSIS — M16.12 PRIMARY OSTEOARTHRITIS OF LEFT HIP: ICD-10-CM

## 2023-03-28 DIAGNOSIS — M50.20 CERVICAL DISC HERNIATION: ICD-10-CM

## 2023-03-28 DIAGNOSIS — M47.816 OSTEOARTHRITIS OF LUMBAR SPINE, UNSPECIFIED SPINAL OSTEOARTHRITIS COMPLICATION STATUS: ICD-10-CM

## 2023-03-28 DIAGNOSIS — M53.3 CHRONIC SI JOINT PAIN: Primary | Chronic | ICD-10-CM

## 2023-03-28 DIAGNOSIS — F11.90 CHRONIC, CONTINUOUS USE OF OPIOIDS: ICD-10-CM

## 2023-03-28 DIAGNOSIS — M54.42 CHRONIC LEFT-SIDED LOW BACK PAIN WITH LEFT-SIDED SCIATICA: ICD-10-CM

## 2023-03-28 DIAGNOSIS — M47.816 LUMBAR SPONDYLOSIS: Chronic | ICD-10-CM

## 2023-03-28 DIAGNOSIS — M51.36 DEGENERATION OF LUMBAR INTERVERTEBRAL DISC: Chronic | ICD-10-CM

## 2023-03-28 DIAGNOSIS — M47.26 OSTEOARTHRITIS OF SPINE WITH RADICULOPATHY, LUMBAR REGION: ICD-10-CM

## 2023-03-28 DIAGNOSIS — M50.30 DEGENERATIVE DISC DISEASE, CERVICAL: ICD-10-CM

## 2023-03-28 DIAGNOSIS — G89.29 CHRONIC LEFT-SIDED LOW BACK PAIN WITH LEFT-SIDED SCIATICA: ICD-10-CM

## 2023-03-28 DIAGNOSIS — Z79.899 ENCOUNTER FOR MEDICATION MANAGEMENT: ICD-10-CM

## 2023-03-28 DIAGNOSIS — G89.29 CHRONIC SI JOINT PAIN: Primary | Chronic | ICD-10-CM

## 2023-03-28 DIAGNOSIS — M46.1 SACROILIITIS (HCC): ICD-10-CM

## 2023-03-28 PROCEDURE — 99213 OFFICE O/P EST LOW 20 MIN: CPT

## 2023-03-28 PROCEDURE — 99213 OFFICE O/P EST LOW 20 MIN: CPT | Performed by: NURSE PRACTITIONER

## 2023-03-28 RX ORDER — PREGABALIN 150 MG/1
150 CAPSULE ORAL 2 TIMES DAILY
Qty: 60 CAPSULE | Refills: 2 | Status: SHIPPED | OUTPATIENT
Start: 2023-03-28 | End: 2023-04-27

## 2023-03-28 RX ORDER — OXYCODONE HYDROCHLORIDE AND ACETAMINOPHEN 5; 325 MG/1; MG/1
1 TABLET ORAL EVERY 6 HOURS PRN
Qty: 120 TABLET | Refills: 0 | Status: SHIPPED | OUTPATIENT
Start: 2023-04-04 | End: 2023-05-04

## 2023-03-28 RX ORDER — TIZANIDINE 4 MG/1
TABLET ORAL
Qty: 270 TABLET | Refills: 0 | Status: SHIPPED | OUTPATIENT
Start: 2023-03-28

## 2023-03-28 ASSESSMENT — ENCOUNTER SYMPTOMS
BACK PAIN: 1
BOWEL INCONTINENCE: 0
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
## 2023-04-05 RX ORDER — TIZANIDINE 4 MG/1
TABLET ORAL
Qty: 90 TABLET | Refills: 10 | OUTPATIENT
Start: 2023-04-05

## 2023-04-24 ENCOUNTER — HOSPITAL ENCOUNTER (OUTPATIENT)
Dept: PAIN MANAGEMENT | Facility: CLINIC | Age: 53
Discharge: HOME | End: 2023-04-24
Payer: COMMERCIAL

## 2023-04-24 VITALS
BODY MASS INDEX: 41.43 KG/M2 | TEMPERATURE: 97.1 F | HEIGHT: 60 IN | HEART RATE: 70 BPM | WEIGHT: 211 LBS | RESPIRATION RATE: 9 BRPM | DIASTOLIC BLOOD PRESSURE: 77 MMHG | OXYGEN SATURATION: 95 % | SYSTOLIC BLOOD PRESSURE: 112 MMHG

## 2023-04-24 DIAGNOSIS — R52 PAIN MANAGEMENT: ICD-10-CM

## 2023-04-24 DIAGNOSIS — M16.0 BILATERAL HIP JOINT ARTHRITIS: Primary | ICD-10-CM

## 2023-04-24 LAB — GLUCOSE BLD-MCNC: 106 MG/DL (ref 65–105)

## 2023-04-24 PROCEDURE — 82947 ASSAY GLUCOSE BLOOD QUANT: CPT

## 2023-04-24 PROCEDURE — 20610 DRAIN/INJ JOINT/BURSA W/O US: CPT

## 2023-04-24 PROCEDURE — 6360000002 HC RX W HCPCS: Performed by: ANESTHESIOLOGY

## 2023-04-24 PROCEDURE — 2500000003 HC RX 250 WO HCPCS: Performed by: ANESTHESIOLOGY

## 2023-04-24 PROCEDURE — 6360000004 HC RX CONTRAST MEDICATION: Performed by: ANESTHESIOLOGY

## 2023-04-24 PROCEDURE — 77002 NEEDLE LOCALIZATION BY XRAY: CPT

## 2023-04-24 RX ORDER — BUPIVACAINE HYDROCHLORIDE 5 MG/ML
INJECTION, SOLUTION EPIDURAL; INTRACAUDAL
Status: COMPLETED | OUTPATIENT
Start: 2023-04-24 | End: 2023-04-24

## 2023-04-24 RX ORDER — MIDAZOLAM HYDROCHLORIDE 2 MG/2ML
INJECTION, SOLUTION INTRAMUSCULAR; INTRAVENOUS
Status: COMPLETED | OUTPATIENT
Start: 2023-04-24 | End: 2023-04-24

## 2023-04-24 RX ORDER — LIDOCAINE HYDROCHLORIDE 10 MG/ML
INJECTION, SOLUTION EPIDURAL; INFILTRATION; INTRACAUDAL; PERINEURAL
Status: COMPLETED | OUTPATIENT
Start: 2023-04-24 | End: 2023-04-24

## 2023-04-24 RX ORDER — FENTANYL CITRATE 50 UG/ML
INJECTION, SOLUTION INTRAMUSCULAR; INTRAVENOUS
Status: COMPLETED | OUTPATIENT
Start: 2023-04-24 | End: 2023-04-24

## 2023-04-24 RX ORDER — DEXAMETHASONE SODIUM PHOSPHATE 10 MG/ML
INJECTION, SOLUTION INTRAMUSCULAR; INTRAVENOUS
Status: COMPLETED | OUTPATIENT
Start: 2023-04-24 | End: 2023-04-24

## 2023-04-24 RX ADMIN — MIDAZOLAM HYDROCHLORIDE 1 MG: 1 INJECTION, SOLUTION INTRAMUSCULAR; INTRAVENOUS at 14:01

## 2023-04-24 RX ADMIN — LIDOCAINE HYDROCHLORIDE 2 ML: 10 INJECTION, SOLUTION EPIDURAL; INFILTRATION; INTRACAUDAL at 14:01

## 2023-04-24 RX ADMIN — IOHEXOL 2 ML: 180 INJECTION INTRAVENOUS at 14:01

## 2023-04-24 RX ADMIN — BUPIVACAINE HYDROCHLORIDE 3 ML: 5 INJECTION, SOLUTION EPIDURAL; INTRACAUDAL; PERINEURAL at 14:02

## 2023-04-24 RX ADMIN — DEXAMETHASONE SODIUM PHOSPHATE 10 MG: 10 INJECTION, SOLUTION INTRAMUSCULAR; INTRAVENOUS at 14:02

## 2023-04-24 RX ADMIN — FENTANYL CITRATE 50 MCG: 50 INJECTION, SOLUTION INTRAMUSCULAR; INTRAVENOUS at 14:01

## 2023-04-24 ASSESSMENT — PAIN - FUNCTIONAL ASSESSMENT
PAIN_FUNCTIONAL_ASSESSMENT: 0-10
PAIN_FUNCTIONAL_ASSESSMENT: PREVENTS OR INTERFERES WITH ALL ACTIVE AND SOME PASSIVE ACTIVITIES
PAIN_FUNCTIONAL_ASSESSMENT: 0-10

## 2023-04-24 ASSESSMENT — PAIN DESCRIPTION - DESCRIPTORS: DESCRIPTORS: SHARP;THROBBING;SHOOTING

## 2023-04-24 NOTE — H&P
UPDATE:  Office visit pain clinic in [facility] with all required elements of H&P dated 03/28/2023  Patient seen preop, chart reviewed,   No changes in medical history and health assessment since last evaluation. PE:  AAO x 3, in NAD, VSS,. Resp: breathing on RA, no respiratory distress  Cardiac: rate normal    Risk / Benefits explained to patient, patient agree to proceed with plan.   ASA 3  MP 3

## 2023-04-24 NOTE — OP NOTE
PREOPERATIVE DIAGNOSIS:   Right hip osteoarthritis. POSTOPERATIVE DIAGNOSIS:  Right hip osteoarthritis. PROCEDURE PERFORMED:  Right hip steroid injection with fluoroscopy guidance. ANESTHESIA:  Local skin infiltration. BLOOD LOSS:  None. COMPLICATION:  None. OPERATIVE NOTE:  The patient was seen in the preoperative area. Chart was  reviewed. Informed consent was obtained. The patient was taken to the  procedure room and was placed in supine position. The area over the right hip  was prepped with ChloraPrep. A timeout was completed prior to the start of  the procedure. Standard monitors were connected and vital signs were  monitored throughout the procedure. The skin entry point was marked along the  greater trochanteric line. The skin and subcutaneous tissues were  anesthetized with 1% lidocaine. Then, a 25 gauge needle was advanced with an  oblique paramedian approach from greater trochanter towards the neck and head  of the femur. Once the bony contact was made, aspiration was positive for  thick yellow and pinkish tinged fluid. A total 3 mL of the treatment solution  containing 1 mL of dexamethasone 10 mg and 2 mL of 0.5% bupivacaine. The needle  was removed. The patient tolerated the procedure well. There was no  complication. SEDATION NOTE:    ASA CLASSIFICATION  3  MP   CLASSIFICATION  3    Moderate intravenous conscious sedation was supervised by Dr. Rut Gregg  The patient was independently monitored by a Registered Nurse assigned to the Procedure Room  Monitoring included automated blood pressure, continuous EKG, Capnography and continuous pulse oximetry. The detailed Conscious Record is permanently stored in the [place] [id number].      The following is the conscious sedation record;  Start Time:  1355  End times:  1405  Duration:  10 minutes  MEDS GIVEN 1 MG VERSED AND 50 MCG FENTANYL

## 2023-04-24 NOTE — DISCHARGE INSTRUCTIONS

## 2023-04-27 DIAGNOSIS — E11.9 TYPE 2 DIABETES MELLITUS WITHOUT COMPLICATION, WITHOUT LONG-TERM CURRENT USE OF INSULIN (HCC): ICD-10-CM

## 2023-04-27 DIAGNOSIS — R10.9 ABDOMINAL CRAMPS: ICD-10-CM

## 2023-04-27 DIAGNOSIS — J45.20 MILD INTERMITTENT ASTHMA WITHOUT COMPLICATION: ICD-10-CM

## 2023-04-27 DIAGNOSIS — J30.9 CHRONIC ALLERGIC RHINITIS: ICD-10-CM

## 2023-04-27 RX ORDER — LANCETS 33 GAUGE
EACH MISCELLANEOUS
Qty: 200 EACH | Refills: 3 | Status: SHIPPED | OUTPATIENT
Start: 2023-04-27

## 2023-04-27 RX ORDER — MONTELUKAST SODIUM 10 MG/1
10 TABLET ORAL NIGHTLY
Qty: 90 TABLET | Refills: 3 | Status: SHIPPED | OUTPATIENT
Start: 2023-04-27

## 2023-04-27 RX ORDER — DICYCLOMINE HYDROCHLORIDE 10 MG/1
CAPSULE ORAL
Qty: 120 CAPSULE | Refills: 1 | Status: SHIPPED | OUTPATIENT
Start: 2023-04-27

## 2023-04-27 NOTE — TELEPHONE ENCOUNTER
Please Approve or Refuse.   Send to Pharmacy per Pt's Request:      Next Visit Date:  5/12/2023   Last Visit Date: 2/13/2023    Hemoglobin A1C (%)   Date Value   02/13/2023 5.9   10/14/2022 5.5   10/11/2022 5.3             ( goal A1C is < 7)   BP Readings from Last 3 Encounters:   04/24/23 112/77   03/28/23 118/75   03/21/23 109/76          (goal 120/80)  BUN   Date Value Ref Range Status   01/09/2023 12 6 - 20 mg/dL Final     Creatinine   Date Value Ref Range Status   01/09/2023 0.85 0.50 - 0.90 mg/dL Final     Potassium   Date Value Ref Range Status   10/14/2022 4.4 3.7 - 5.3 mmol/L Final

## 2023-05-02 ENCOUNTER — HOSPITAL ENCOUNTER (OUTPATIENT)
Dept: PAIN MANAGEMENT | Age: 53
Discharge: HOME | End: 2023-05-02
Payer: COMMERCIAL

## 2023-05-02 VITALS — WEIGHT: 211 LBS | HEIGHT: 60 IN | BODY MASS INDEX: 41.43 KG/M2 | TEMPERATURE: 97.3 F

## 2023-05-02 DIAGNOSIS — M16.12 PRIMARY OSTEOARTHRITIS OF LEFT HIP: ICD-10-CM

## 2023-05-02 DIAGNOSIS — M46.1 SACROILIITIS (HCC): ICD-10-CM

## 2023-05-02 DIAGNOSIS — G89.29 CHRONIC SI JOINT PAIN: Primary | Chronic | ICD-10-CM

## 2023-05-02 DIAGNOSIS — M54.16 LUMBAR RADICULOPATHY, CHRONIC: Chronic | ICD-10-CM

## 2023-05-02 DIAGNOSIS — M51.36 DEGENERATION OF LUMBAR INTERVERTEBRAL DISC: Chronic | ICD-10-CM

## 2023-05-02 DIAGNOSIS — M16.0 BILATERAL HIP JOINT ARTHRITIS: ICD-10-CM

## 2023-05-02 DIAGNOSIS — F32.A ANXIETY AND DEPRESSION: ICD-10-CM

## 2023-05-02 DIAGNOSIS — M50.20 CERVICAL DISC HERNIATION: ICD-10-CM

## 2023-05-02 DIAGNOSIS — F41.9 ANXIETY AND DEPRESSION: ICD-10-CM

## 2023-05-02 DIAGNOSIS — M53.3 CHRONIC SI JOINT PAIN: Primary | Chronic | ICD-10-CM

## 2023-05-02 DIAGNOSIS — M50.30 DEGENERATIVE DISC DISEASE, CERVICAL: ICD-10-CM

## 2023-05-02 PROCEDURE — 99213 OFFICE O/P EST LOW 20 MIN: CPT | Performed by: NURSE PRACTITIONER

## 2023-05-02 PROCEDURE — 99213 OFFICE O/P EST LOW 20 MIN: CPT

## 2023-05-02 RX ORDER — OXYCODONE HYDROCHLORIDE AND ACETAMINOPHEN 5; 325 MG/1; MG/1
1 TABLET ORAL EVERY 6 HOURS PRN
Qty: 120 TABLET | Refills: 0 | Status: SHIPPED | OUTPATIENT
Start: 2023-05-04 | End: 2023-06-03

## 2023-05-02 ASSESSMENT — ENCOUNTER SYMPTOMS
BACK PAIN: 1
SHORTNESS OF BREATH: 0
BOWEL INCONTINENCE: 0
CONSTIPATION: 0
COUGH: 0

## 2023-05-02 ASSESSMENT — PAIN SCALES - GENERAL: PAINLEVEL_OUTOF10: 5

## 2023-05-02 NOTE — PROGRESS NOTES
m)   Wt 211 lb (95.7 kg)   BMI 41.21 kg/m²     Physical Exam  Cardiovascular:      Rate and Rhythm: Normal rate. Pulmonary:      Effort: Pulmonary effort is normal.   Musculoskeletal:         General: Normal range of motion. Skin:     General: Skin is warm and dry. Neurological:      Mental Status: She is alert and oriented to person, place, and time. Record/Diagnostics Review:    Last desirae 3/23  and was appropriate     Assessment:  Problem List Items Addressed This Visit       Chronic SI joint pain - Primary (Chronic)    Relevant Medications    oxyCODONE-acetaminophen (PERCOCET) 5-325 MG per tablet (Start on 5/4/2023)    Bilateral hip joint arthritis    Relevant Medications    oxyCODONE-acetaminophen (PERCOCET) 5-325 MG per tablet (Start on 5/4/2023)    Lumbar radiculopathy, chronic (Chronic)    Relevant Medications    oxyCODONE-acetaminophen (PERCOCET) 5-325 MG per tablet (Start on 5/4/2023)    Degeneration of lumbar intervertebral disc (Chronic)    Relevant Medications    oxyCODONE-acetaminophen (PERCOCET) 5-325 MG per tablet (Start on 5/4/2023)    Degenerative disc disease, cervical    Relevant Medications    oxyCODONE-acetaminophen (PERCOCET) 5-325 MG per tablet (Start on 5/4/2023)    Cervical disc herniation    Relevant Medications    oxyCODONE-acetaminophen (PERCOCET) 5-325 MG per tablet (Start on 5/4/2023)    Sacroiliitis (HCC)    Relevant Medications    oxyCODONE-acetaminophen (PERCOCET) 5-325 MG per tablet (Start on 5/4/2023)    Primary osteoarthritis of left hip    Relevant Medications    oxyCODONE-acetaminophen (PERCOCET) 5-325 MG per tablet (Start on 5/4/2023)          Treatment Plan:  Patient relates current medications are helping the pain. Patient reports taking pain medications as prescribed, denies obtaining medications from different sources and denies use of illegal drugs. Medication risk and benefits have been discussed.  Patient denies side effects from medications like nausea,

## 2023-05-03 RX ORDER — SERTRALINE HYDROCHLORIDE 100 MG/1
TABLET, FILM COATED ORAL
Qty: 90 TABLET | Refills: 1 | Status: SHIPPED | OUTPATIENT
Start: 2023-05-03

## 2023-05-08 ENCOUNTER — TELEPHONE (OUTPATIENT)
Dept: FAMILY MEDICINE CLINIC | Age: 53
End: 2023-05-08

## 2023-05-08 DIAGNOSIS — E11.9 TYPE 2 DIABETES MELLITUS WITHOUT COMPLICATION, WITHOUT LONG-TERM CURRENT USE OF INSULIN (HCC): ICD-10-CM

## 2023-05-08 RX ORDER — BLOOD SUGAR DIAGNOSTIC
1 STRIP MISCELLANEOUS 2 TIMES DAILY
Qty: 200 STRIP | Refills: 3 | Status: SHIPPED | OUTPATIENT
Start: 2023-05-08 | End: 2023-05-09 | Stop reason: SDUPTHER

## 2023-05-08 NOTE — TELEPHONE ENCOUNTER
Please Approve or Refuse.   Send to Pharmacy per Pt's Request: [facility] [place]     Next Visit Date:  7/10/2023   Last Visit Date: 2/13/2023    Hemoglobin A1C (%)   Date Value   02/13/2023 5.9   10/14/2022 5.5   10/11/2022 5.3             ( goal A1C is < 7)   BP Readings from Last 3 Encounters:   04/24/23 112/77   03/28/23 118/75   03/21/23 109/76          (goal 120/80)  BUN   Date Value Ref Range Status   01/09/2023 12 6 - 20 mg/dL Final     Creatinine   Date Value Ref Range Status   01/09/2023 0.85 0.50 - 0.90 mg/dL Final     Potassium   Date Value Ref Range Status   10/14/2022 4.4 3.7 - 5.3 mmol/L Final

## 2023-05-08 NOTE — TELEPHONE ENCOUNTER
1st attempt - LVM for pt to return call to reschedule due to provider being out of the office  2nd attempt - My Chart message sent

## 2023-05-08 NOTE — TELEPHONE ENCOUNTER
Pharmacy [facility] called and they will like a clarification in regard to medication listed below. They will like clarification on medication's instructions, they had 2 different instructions. I did verbalize to cancel script. Provider will clarify, correct and resend in script to pharmacy. Pharmacy verbalized will cancel pending medication, and await for new updated script. Medication listed below   needs to be clarified. Thank you! blood glucose test strips (ONETOUCH VERIO) strip [[id number]]     Order Details  Dose: 1 each Route: SubCUTAneous Frequency: 2 TIMES DAILY   Dispense Quantity: 200 strip Refills: 3          Sig: Inject 1 each into the skin 2 times daily As needed. USE 1 STRIP DAILY AS NEEDED         Start Date: 05/08/23 End Date: --   Written Date: 05/08/23 Expiration Date: 05/07/24       Diagnosis Association: Type 2 diabetes mellitus without complication, without long-term current use of insulin (HCC) (E11.9)   Original Order:  blood glucose test strips (ONETOUCH VERIO) strip [[id number]]

## 2023-05-09 DIAGNOSIS — E11.9 TYPE 2 DIABETES MELLITUS WITHOUT COMPLICATION, WITHOUT LONG-TERM CURRENT USE OF INSULIN (HCC): ICD-10-CM

## 2023-05-09 RX ORDER — BLOOD SUGAR DIAGNOSTIC
1 STRIP MISCELLANEOUS 2 TIMES DAILY
Qty: 200 STRIP | Refills: 3 | Status: SHIPPED | OUTPATIENT
Start: 2023-05-09

## 2023-05-09 RX ORDER — BLOOD SUGAR DIAGNOSTIC
1 STRIP MISCELLANEOUS 2 TIMES DAILY
Qty: 200 STRIP | Refills: 3 | Status: SHIPPED | OUTPATIENT
Start: 2023-05-09 | End: 2023-05-09 | Stop reason: SDUPTHER

## 2023-05-11 DIAGNOSIS — G43.719 INTRACTABLE CHRONIC MIGRAINE WITHOUT AURA AND WITHOUT STATUS MIGRAINOSUS: ICD-10-CM

## 2023-05-12 RX ORDER — TOPIRAMATE 100 MG/1
TABLET, FILM COATED ORAL
Qty: 60 TABLET | Refills: 10 | Status: SHIPPED | OUTPATIENT
Start: 2023-05-12

## 2023-06-01 ENCOUNTER — HOSPITAL ENCOUNTER (OUTPATIENT)
Dept: PAIN MANAGEMENT | Age: 53
Discharge: HOME | End: 2023-06-01
Payer: COMMERCIAL

## 2023-06-01 VITALS
BODY MASS INDEX: 41.43 KG/M2 | HEIGHT: 60 IN | DIASTOLIC BLOOD PRESSURE: 75 MMHG | WEIGHT: 211 LBS | SYSTOLIC BLOOD PRESSURE: 120 MMHG | OXYGEN SATURATION: 90 % | HEART RATE: 70 BPM

## 2023-06-01 DIAGNOSIS — M47.26 OSTEOARTHRITIS OF SPINE WITH RADICULOPATHY, LUMBAR REGION: ICD-10-CM

## 2023-06-01 DIAGNOSIS — M54.50 CHRONIC BILATERAL LOW BACK PAIN WITHOUT SCIATICA: Chronic | ICD-10-CM

## 2023-06-01 DIAGNOSIS — M47.816 OSTEOARTHRITIS OF LUMBAR SPINE, UNSPECIFIED SPINAL OSTEOARTHRITIS COMPLICATION STATUS: ICD-10-CM

## 2023-06-01 DIAGNOSIS — M50.30 DEGENERATIVE DISC DISEASE, CERVICAL: ICD-10-CM

## 2023-06-01 DIAGNOSIS — G89.29 CHRONIC LEFT-SIDED LOW BACK PAIN WITH LEFT-SIDED SCIATICA: ICD-10-CM

## 2023-06-01 DIAGNOSIS — M54.42 CHRONIC LEFT-SIDED LOW BACK PAIN WITH LEFT-SIDED SCIATICA: ICD-10-CM

## 2023-06-01 DIAGNOSIS — M47.896 OTHER OSTEOARTHRITIS OF SPINE, LUMBAR REGION: ICD-10-CM

## 2023-06-01 DIAGNOSIS — F43.23 ADJUSTMENT DISORDER WITH MIXED ANXIETY AND DEPRESSED MOOD: ICD-10-CM

## 2023-06-01 DIAGNOSIS — M47.817 LUMBOSACRAL SPONDYLOSIS WITHOUT MYELOPATHY: Primary | ICD-10-CM

## 2023-06-01 DIAGNOSIS — M16.12 PRIMARY OSTEOARTHRITIS OF LEFT HIP: ICD-10-CM

## 2023-06-01 DIAGNOSIS — M54.16 LUMBAR RADICULOPATHY, CHRONIC: Chronic | ICD-10-CM

## 2023-06-01 DIAGNOSIS — M46.1 SACROILIITIS (HCC): ICD-10-CM

## 2023-06-01 DIAGNOSIS — G89.29 CHRONIC BILATERAL LOW BACK PAIN WITHOUT SCIATICA: Chronic | ICD-10-CM

## 2023-06-01 DIAGNOSIS — M50.20 CERVICAL DISC HERNIATION: ICD-10-CM

## 2023-06-01 DIAGNOSIS — M51.36 DEGENERATION OF LUMBAR INTERVERTEBRAL DISC: Chronic | ICD-10-CM

## 2023-06-01 DIAGNOSIS — Z79.899 ENCOUNTER FOR MEDICATION MANAGEMENT: ICD-10-CM

## 2023-06-01 PROCEDURE — 99213 OFFICE O/P EST LOW 20 MIN: CPT

## 2023-06-01 RX ORDER — PREGABALIN 150 MG/1
150 CAPSULE ORAL 2 TIMES DAILY
Qty: 60 CAPSULE | Refills: 2 | Status: SHIPPED | OUTPATIENT
Start: 2023-06-01 | End: 2023-07-01

## 2023-06-01 RX ORDER — OXYCODONE HYDROCHLORIDE AND ACETAMINOPHEN 5; 325 MG/1; MG/1
1 TABLET ORAL EVERY 6 HOURS PRN
Qty: 120 TABLET | Refills: 0 | Status: SHIPPED | OUTPATIENT
Start: 2023-06-01 | End: 2023-07-01

## 2023-06-01 ASSESSMENT — ENCOUNTER SYMPTOMS
VOMITING: 0
NAUSEA: 0
DIARRHEA: 0
BACK PAIN: 1
CONSTIPATION: 1

## 2023-06-01 NOTE — PROGRESS NOTES
The patient is a 47 y. o. Non- / non  female. Chief Complaint   Patient presents with    Back Pain    Medication Refill     Percocet         HPI    Pain  Chronic going on for more than 1 year located in the lower lumbar area across midline and both sides right more than left  No dermatomal radiation  Failed physical therapy  On chronic medication management with oxycodone 5 mg 4 times a day  Daily morphine equaling 30  Reports no side effect  Denies any illicit substance use  Finds the medication helpful  Diagnosed with lumbar spondylosis and facet mediated pain  Had radiofrequency ablation 10 months ago that provided more than 50% relief for more than 6 months with improved activity tolerance  Pain is now back to the baseline    Schedule patient for repeat bilateral lumbar RFA  If right gluteal region pain persist after that we will consider for epidural injection  Automated prescription report reviewed  No sign or symptom of any aberrancy  Refill ordered    Pain score Today:  4  Adverse effects (Constipation / Nausea / Sedation / sexual Dysfunction / others) : constipation   Mood: good  Sleep pattern and quality: fair  Activity level: fair    Pill count Today: Percocet #10 Plus one or 2 at home in daily pill nettles   Last dose taken  06/01/2023  OARRS report reviewed today: yes  ER/Hospitalizations/PCP visit related to pain since last visit:no   Any legal problems e.g. DUI etc.:No  Satisfied with current management: Yes    Opioid Contract:01/26/2023  Last Urine Dug screen dated:03/02/2023    Hemoglobin A1C   Date Value Ref Range Status   02/13/2023 5.9 % Final       Past Medical History, Past Surgical History, Social History, Allergies and Medications reviewed and updated in EPIC as indicated    Family History reviewed and is noncontributory.         Past Medical History:   Diagnosis Date    Anxiety     Lashawn Lee CNP therapist video visit 1/10/2023    Asthma     does not follow with Pulmonology

## 2023-06-02 RX ORDER — BUSPIRONE HYDROCHLORIDE 30 MG/1
TABLET ORAL
Qty: 90 TABLET | Refills: 3 | Status: SHIPPED | OUTPATIENT
Start: 2023-06-02

## 2023-06-21 ENCOUNTER — HOSPITAL ENCOUNTER (OUTPATIENT)
Dept: PAIN MANAGEMENT | Facility: CLINIC | Age: 53
Discharge: HOME | End: 2023-06-21
Payer: COMMERCIAL

## 2023-06-21 VITALS
BODY MASS INDEX: 41.43 KG/M2 | WEIGHT: 211 LBS | RESPIRATION RATE: 14 BRPM | HEIGHT: 60 IN | DIASTOLIC BLOOD PRESSURE: 75 MMHG | TEMPERATURE: 97.1 F | HEART RATE: 69 BPM | OXYGEN SATURATION: 99 % | SYSTOLIC BLOOD PRESSURE: 114 MMHG

## 2023-06-21 VITALS
TEMPERATURE: 97.2 F | SYSTOLIC BLOOD PRESSURE: 108 MMHG | OXYGEN SATURATION: 97 % | RESPIRATION RATE: 16 BRPM | HEART RATE: 69 BPM | DIASTOLIC BLOOD PRESSURE: 74 MMHG

## 2023-06-21 DIAGNOSIS — M47.816 LUMBAR SPONDYLOSIS: Primary | Chronic | ICD-10-CM

## 2023-06-21 DIAGNOSIS — R52 PAIN MANAGEMENT: ICD-10-CM

## 2023-06-21 DIAGNOSIS — M47.817 LUMBOSACRAL SPONDYLOSIS WITHOUT MYELOPATHY: ICD-10-CM

## 2023-06-21 LAB — GLUCOSE BLD-MCNC: 109 MG/DL (ref 65–105)

## 2023-06-21 PROCEDURE — 6360000002 HC RX W HCPCS: Performed by: ANESTHESIOLOGY

## 2023-06-21 PROCEDURE — 64635 DESTROY LUMB/SAC FACET JNT: CPT

## 2023-06-21 PROCEDURE — 64636 DESTROY L/S FACET JNT ADDL: CPT

## 2023-06-21 PROCEDURE — 2500000003 HC RX 250 WO HCPCS: Performed by: ANESTHESIOLOGY

## 2023-06-21 PROCEDURE — 82947 ASSAY GLUCOSE BLOOD QUANT: CPT

## 2023-06-21 RX ORDER — FENTANYL CITRATE 50 UG/ML
INJECTION, SOLUTION INTRAMUSCULAR; INTRAVENOUS
Status: COMPLETED | OUTPATIENT
Start: 2023-06-21 | End: 2023-06-21

## 2023-06-21 RX ORDER — LIDOCAINE HYDROCHLORIDE 10 MG/ML
INJECTION, SOLUTION EPIDURAL; INFILTRATION; INTRACAUDAL; PERINEURAL
Status: COMPLETED | OUTPATIENT
Start: 2023-06-21 | End: 2023-06-21

## 2023-06-21 RX ORDER — MIDAZOLAM HYDROCHLORIDE 2 MG/2ML
INJECTION, SOLUTION INTRAMUSCULAR; INTRAVENOUS
Status: COMPLETED | OUTPATIENT
Start: 2023-06-21 | End: 2023-06-21

## 2023-06-21 RX ORDER — LIDOCAINE HYDROCHLORIDE 40 MG/ML
INJECTION, SOLUTION RETROBULBAR; TOPICAL
Status: COMPLETED | OUTPATIENT
Start: 2023-06-21 | End: 2023-06-21

## 2023-06-21 RX ADMIN — LIDOCAINE HYDROCHLORIDE 5 ML: 10 INJECTION, SOLUTION EPIDURAL; INFILTRATION; INTRACAUDAL at 14:31

## 2023-06-21 RX ADMIN — FENTANYL CITRATE 50 MCG: 50 INJECTION, SOLUTION INTRAMUSCULAR; INTRAVENOUS at 14:31

## 2023-06-21 RX ADMIN — FENTANYL CITRATE 50 MCG: 50 INJECTION, SOLUTION INTRAMUSCULAR; INTRAVENOUS at 14:40

## 2023-06-21 RX ADMIN — LIDOCAINE HYDROCHLORIDE 5 ML: 10 INJECTION, SOLUTION EPIDURAL; INFILTRATION; INTRACAUDAL at 14:40

## 2023-06-21 RX ADMIN — MIDAZOLAM HYDROCHLORIDE 2 MG: 1 INJECTION, SOLUTION INTRAMUSCULAR; INTRAVENOUS at 14:31

## 2023-06-21 RX ADMIN — LIDOCAINE HYDROCHLORIDE 5 ML: 40 SOLUTION RETROBULBAR; TOPICAL at 14:36

## 2023-06-21 ASSESSMENT — PAIN DESCRIPTION - DESCRIPTORS: DESCRIPTORS: SHOOTING

## 2023-06-21 NOTE — DISCHARGE INSTRUCTIONS
Discharge Instructions following Sedation or Anesthesia:  You have  received  a sedative/anesthetic therefore, you should not consume any alcoholic beverages for minimum of 12 hours. Do not drive or operate machinery for 24 hours. Do not sign legal documents for 24 hours. Dizziness, drowsiness, and unsteadiness may occur. Rest when need to. Increase diet as tolerated. Keep up on fluids if diet allows. General Instructions:  Do not take a tub bath for 72 hours after procedure (this includes hot tubs and swimming pools). You may shower, but avoid hot water to injection site. Avoid strenuous activity TODAY especially if you experience dizziness. Remove band-aid the next day. Wash off any residual iodine   Do not use heat, heating pad, or any other heating device over the injection site for 3 days after the procedure. If you experience pain after your procedure, you may continue with your current pain medication as prescribed. (DO NOT INCREASE YOUR PAIN MEDICATION WITHOUT TALKING TO DOCTOR)  Soreness and pain at injection site is common, may use ice to reduce soreness.     Call Isauro Lucio at [phone] if you experience:   Fever, chills or temperature over 100    Vomiting, Headache, persistent stiff neck, nausea, blurred vision   Difficulty in urinating or unable to urinate with 8 hours   Increase in weakness, numbness or loss of function   Increased redness, swelling or drainage at the injection site

## 2023-06-21 NOTE — INTERVAL H&P NOTE
Update History & Physical    The patient's History and Physical of June 1, 2023 was reviewed with the patient and I examined the patient. There was no change. The surgical site was confirmed by the patient and me. Plan: The risks, benefits, expected outcome, and alternative to the recommended procedure have been discussed with the patient. Patient understands and wants to proceed with the procedure.      ASA 3  MP 3  Electronically signed by Natalia Davis MD on 6/21/2023 at 2:17 PM

## 2023-06-21 NOTE — OP NOTE
Preoperative Diagnosis: Lumbar spondylosis w/o myelopathy, chronic low back pain  Postoperative Diagnosis: Lumbar spondylosis w/o myelopathy, chronic low back pain  SEDATION: SEE SEDATION NOTE  BLOOD LOSS: NONE    Procedure Performed:  :Radiofrequency ablation of median branches at the Transverse processes of L3 / L4 / L5  for L3/4 AND L4/5 facet joints on Bilateral under fluoroscopic guidance with IV sedation    Procedure:    After starting an IV, the patient was taken to procedure room. The patient was placed in prone position and skin over the back was prepped and draped in sterile manner. Standard monitors were connected and vitals were monitored during the case and they remained stable during the procedure. A meaningful communication was kept up with the patient throughout the procedure. Then using fluoroscopy the junction of the transverse process of the target vertebra with the superior process of the facet joint was observed and the view was optimized. The skin and deep tissues were infiltrated with 2 ml of  1 % lidocaine. The RF canula with the 10 mm active tip was introduced through the skin wheal under fluoroscopy guidance such that the tip of the needle lies in the groove of the transverse process with the superior processes of the facet joint. Then a lateral and AP view of the lumbar spine was obtained to make sure the tip of needle is not in the neural foramen. Then electric impedence was checked to make sure it is acceptable. Then a sensory stimulus was applied at 50 Hz up to 0.5 volt and concordant pain symptoms were reproduced. Then a motor stimulus was applied at 2 Hz up to 2 volts or 3 x times the sensory stimulus and no motor stimulation was seen in lower extremities. Some multifidus stimulus was seen. Then after negative aspiration 1 ml of 4% lidocaine was injected through the needle at each level. The radiofrequency lesion was done at 85 degrees centigrade for 110 seconds.

## 2023-06-21 NOTE — RESULT ENCOUNTER NOTE
Noted    Lab Results       Component                Value               Date                       LABA1C                   5.9                 02/13/2023                 LABA1C                   5.5                 10/14/2022                 LABA1C                   5.3                 10/11/2022                Future Appointments  6/23/2023  2:00 PM    DO Ketan Leiva Neuro           [facility]  6/27/2023  3:40 PM    Pritesh Mayen AP* [id number] [facility] [employer]  7/10/2023  11:30 AM   Terri Carlson MD          fp sc [facility]

## 2023-06-27 ENCOUNTER — HOSPITAL ENCOUNTER (OUTPATIENT)
Dept: PAIN MANAGEMENT | Age: 53
Discharge: HOME | End: 2023-06-27
Payer: COMMERCIAL

## 2023-06-27 VITALS — BODY MASS INDEX: 41.43 KG/M2 | TEMPERATURE: 97.3 F | RESPIRATION RATE: 20 BRPM | WEIGHT: 211 LBS | HEIGHT: 60 IN

## 2023-06-27 DIAGNOSIS — M54.50 CHRONIC BILATERAL LOW BACK PAIN WITHOUT SCIATICA: Chronic | ICD-10-CM

## 2023-06-27 DIAGNOSIS — G89.29 CHRONIC BILATERAL LOW BACK PAIN WITHOUT SCIATICA: Chronic | ICD-10-CM

## 2023-06-27 DIAGNOSIS — M51.36 DEGENERATION OF LUMBAR INTERVERTEBRAL DISC: Chronic | ICD-10-CM

## 2023-06-27 DIAGNOSIS — M50.20 CERVICAL DISC HERNIATION: ICD-10-CM

## 2023-06-27 DIAGNOSIS — M47.816 LUMBAR SPONDYLOSIS: Chronic | ICD-10-CM

## 2023-06-27 DIAGNOSIS — M54.16 LUMBAR RADICULOPATHY, CHRONIC: Primary | Chronic | ICD-10-CM

## 2023-06-27 DIAGNOSIS — G89.29 CHRONIC PAIN OF LEFT KNEE: ICD-10-CM

## 2023-06-27 DIAGNOSIS — M16.12 PRIMARY OSTEOARTHRITIS OF LEFT HIP: ICD-10-CM

## 2023-06-27 DIAGNOSIS — M43.07 SPONDYLOLYSIS, LUMBOSACRAL REGION: ICD-10-CM

## 2023-06-27 DIAGNOSIS — M25.562 CHRONIC PAIN OF LEFT KNEE: ICD-10-CM

## 2023-06-27 DIAGNOSIS — M53.3 CHRONIC SI JOINT PAIN: Chronic | ICD-10-CM

## 2023-06-27 DIAGNOSIS — G89.29 CHRONIC SI JOINT PAIN: Chronic | ICD-10-CM

## 2023-06-27 DIAGNOSIS — M50.30 DEGENERATIVE DISC DISEASE, CERVICAL: ICD-10-CM

## 2023-06-27 DIAGNOSIS — M46.1 SACROILIITIS (HCC): ICD-10-CM

## 2023-06-27 PROCEDURE — 99213 OFFICE O/P EST LOW 20 MIN: CPT | Performed by: NURSE PRACTITIONER

## 2023-06-27 PROCEDURE — 99213 OFFICE O/P EST LOW 20 MIN: CPT

## 2023-06-27 RX ORDER — OXYCODONE HYDROCHLORIDE AND ACETAMINOPHEN 5; 325 MG/1; MG/1
1 TABLET ORAL EVERY 6 HOURS PRN
Qty: 120 TABLET | Refills: 0 | Status: SHIPPED | OUTPATIENT
Start: 2023-07-01 | End: 2023-07-31

## 2023-06-27 ASSESSMENT — ENCOUNTER SYMPTOMS
COUGH: 0
CONSTIPATION: 0
SHORTNESS OF BREATH: 0
BACK PAIN: 1
BOWEL INCONTINENCE: 0

## 2023-07-02 DIAGNOSIS — E11.9 TYPE 2 DIABETES MELLITUS WITHOUT COMPLICATION, WITHOUT LONG-TERM CURRENT USE OF INSULIN (HCC): ICD-10-CM

## 2023-07-03 RX ORDER — DULAGLUTIDE 4.5 MG/.5ML
INJECTION, SOLUTION SUBCUTANEOUS
Qty: 2 ML | Refills: 10 | Status: SHIPPED | OUTPATIENT
Start: 2023-07-03

## 2023-07-10 DIAGNOSIS — K21.9 CHRONIC GERD: ICD-10-CM

## 2023-07-11 RX ORDER — OMEPRAZOLE 40 MG/1
CAPSULE, DELAYED RELEASE ORAL
Qty: 30 CAPSULE | Refills: 10 | Status: SHIPPED | OUTPATIENT
Start: 2023-07-11

## 2023-07-27 DIAGNOSIS — J30.9 CHRONIC ALLERGIC RHINITIS: ICD-10-CM

## 2023-07-27 RX ORDER — FLUTICASONE PROPIONATE 50 MCG
SPRAY, SUSPENSION (ML) NASAL
Qty: 16 G | Refills: 2 | Status: SHIPPED | OUTPATIENT
Start: 2023-07-27

## 2023-07-27 NOTE — TELEPHONE ENCOUNTER
Please Approve or Refuse.   Send to Pharmacy per Pt's Request:      Next Visit Date:  Visit date not found   Last Visit Date: 2/13/2023    Hemoglobin A1C (%)   Date Value   02/13/2023 5.9   10/14/2022 5.5   10/11/2022 5.3             ( goal A1C is < 7)   BP Readings from Last 3 Encounters:   06/21/23 108/74   06/21/23 114/75   06/01/23 120/75          (goal 120/80)  BUN   Date Value Ref Range Status   01/09/2023 12 6 - 20 mg/dL Final     Creatinine   Date Value Ref Range Status   01/09/2023 0.85 0.50 - 0.90 mg/dL Final     Potassium   Date Value Ref Range Status   10/14/2022 4.4 3.7 - 5.3 mmol/L Final

## 2023-07-31 ENCOUNTER — TELEPHONE (OUTPATIENT)
Dept: GASTROENTEROLOGY | Age: 53
End: 2023-07-31

## 2023-07-31 ENCOUNTER — HOSPITAL ENCOUNTER (OUTPATIENT)
Dept: PAIN MANAGEMENT | Age: 53
Discharge: HOME | End: 2023-07-31
Payer: COMMERCIAL

## 2023-07-31 VITALS — RESPIRATION RATE: 20 BRPM | TEMPERATURE: 97.3 F | WEIGHT: 211 LBS | HEIGHT: 60 IN | BODY MASS INDEX: 41.43 KG/M2

## 2023-07-31 DIAGNOSIS — M50.30 DEGENERATIVE DISC DISEASE, CERVICAL: ICD-10-CM

## 2023-07-31 DIAGNOSIS — M54.16 LUMBAR RADICULOPATHY, CHRONIC: Chronic | ICD-10-CM

## 2023-07-31 DIAGNOSIS — M51.36 DEGENERATION OF LUMBAR INTERVERTEBRAL DISC: Chronic | ICD-10-CM

## 2023-07-31 DIAGNOSIS — M16.12 PRIMARY OSTEOARTHRITIS OF LEFT HIP: ICD-10-CM

## 2023-07-31 DIAGNOSIS — M50.20 CERVICAL DISC HERNIATION: ICD-10-CM

## 2023-07-31 DIAGNOSIS — Z79.891 CHRONIC USE OF OPIATE FOR THERAPEUTIC PURPOSE: Primary | ICD-10-CM

## 2023-07-31 DIAGNOSIS — M46.1 SACROILIITIS (HCC): ICD-10-CM

## 2023-07-31 PROCEDURE — 99214 OFFICE O/P EST MOD 30 MIN: CPT | Performed by: STUDENT IN AN ORGANIZED HEALTH CARE EDUCATION/TRAINING PROGRAM

## 2023-07-31 PROCEDURE — 99213 OFFICE O/P EST LOW 20 MIN: CPT

## 2023-07-31 RX ORDER — OXYCODONE HYDROCHLORIDE AND ACETAMINOPHEN 5; 325 MG/1; MG/1
1 TABLET ORAL EVERY 6 HOURS PRN
Qty: 120 TABLET | Refills: 0 | Status: SHIPPED | OUTPATIENT
Start: 2023-07-31 | End: 2023-08-30

## 2023-07-31 RX ORDER — TIZANIDINE 4 MG/1
TABLET ORAL
Qty: 270 TABLET | Refills: 0 | Status: SHIPPED | OUTPATIENT
Start: 2023-07-31

## 2023-07-31 NOTE — PROGRESS NOTES
Normocephalic, atraumatic, Pupils equal and round  CARDIOVASCULAR: Well perfused, No peripheral cyanosis  PULMONARY: Good chest wall excursion, breathing unlabored  PSYCH: Appropriate affect and insight, non-pressured speech  SKIN: No rashes or lesions  MUSCULOSKELETAL:  Inspection: The back and extremities are symmetric and aligned. Muscle bulk is normal in appearance. Palpation: There is tenderness to palpation along the lumbar paraspinal musculature bilaterally  Lumbar range of motion is full  NEUROMUSCULAR:  Patient ambulates unassisted  Gait is nonantalgic  Sensation to light touch is grossly intact in lower extremities  Strength is grossly intact in lower extremities  No ankle clonus      DIAGNOSIS:    ICD-10-CM    1. Chronic use of opiate for therapeutic purpose  Z79.891       2. Degeneration of lumbar intervertebral disc  M51.36 oxyCODONE-acetaminophen (PERCOCET) 5-325 MG per tablet      3. Sacroiliitis (HCC)  M46.1 oxyCODONE-acetaminophen (PERCOCET) 5-325 MG per tablet      4. Cervical disc herniation  M50.20 oxyCODONE-acetaminophen (PERCOCET) 5-325 MG per tablet      5. Degenerative disc disease, cervical  M50.30 oxyCODONE-acetaminophen (PERCOCET) 5-325 MG per tablet      6. Lumbar radiculopathy, chronic  M54.16 oxyCODONE-acetaminophen (PERCOCET) 5-325 MG per tablet      7. Primary osteoarthritis of left hip  M16.12 oxyCODONE-acetaminophen (PERCOCET) 5-325 MG per tablet           ASSESSMENT:    Ashlyn Ho is a 47 y. o.female who presents with chronic low back pain, leg pain and occasional neck pain as well as opioid management visit. To review, patient has had symptoms for last 5 years. She denies low back surgeries except right SIJ fusion. The patient's history and physical examination are consistent with lumbar general disc disease, cervical degenerative disc disease and chronic lumbar radiculopathy. Neurologically, it appears the patient has full strength and normal sensation.  There is no

## 2023-07-31 NOTE — TELEPHONE ENCOUNTER
1st attempt: Josiah Gutierrez asking for return phone call to schedule colon procedure-referral onfile (2/13/23). 2nd attempt: writer will send letter to patient home as a reminder to contact office for scheduling.

## 2023-08-02 DIAGNOSIS — E78.2 MIXED HYPERLIPIDEMIA: ICD-10-CM

## 2023-08-02 DIAGNOSIS — I10 ESSENTIAL HYPERTENSION: ICD-10-CM

## 2023-08-03 RX ORDER — LISINOPRIL 5 MG/1
TABLET ORAL
Qty: 30 TABLET | Refills: 10 | OUTPATIENT
Start: 2023-08-03

## 2023-08-03 RX ORDER — ATORVASTATIN CALCIUM 20 MG/1
TABLET, FILM COATED ORAL
Qty: 30 TABLET | Refills: 10 | OUTPATIENT
Start: 2023-08-03

## 2023-08-07 DIAGNOSIS — N30.10 INTERSTITIAL CYSTITIS: ICD-10-CM

## 2023-08-07 RX ORDER — TROSPIUM CHLORIDE 20 MG/1
TABLET, FILM COATED ORAL
Qty: 90 TABLET | Refills: 0 | Status: SHIPPED | OUTPATIENT
Start: 2023-08-07

## 2023-08-08 DIAGNOSIS — E78.2 MIXED HYPERLIPIDEMIA: ICD-10-CM

## 2023-08-08 DIAGNOSIS — I10 ESSENTIAL HYPERTENSION: ICD-10-CM

## 2023-08-09 RX ORDER — ATORVASTATIN CALCIUM 20 MG/1
TABLET, FILM COATED ORAL
Qty: 30 TABLET | Refills: 10 | OUTPATIENT
Start: 2023-08-09

## 2023-08-09 RX ORDER — LISINOPRIL 5 MG/1
TABLET ORAL
Qty: 30 TABLET | Refills: 10 | OUTPATIENT
Start: 2023-08-09

## 2023-08-14 DIAGNOSIS — I10 ESSENTIAL HYPERTENSION: ICD-10-CM

## 2023-08-14 DIAGNOSIS — E78.2 MIXED HYPERLIPIDEMIA: ICD-10-CM

## 2023-08-15 RX ORDER — LISINOPRIL 5 MG/1
TABLET ORAL
Qty: 30 TABLET | Refills: 10 | OUTPATIENT
Start: 2023-08-15

## 2023-08-15 RX ORDER — ATORVASTATIN CALCIUM 20 MG/1
TABLET, FILM COATED ORAL
Qty: 30 TABLET | Refills: 10 | OUTPATIENT
Start: 2023-08-15

## 2023-08-16 DIAGNOSIS — E78.2 MIXED HYPERLIPIDEMIA: ICD-10-CM

## 2023-08-16 DIAGNOSIS — I10 ESSENTIAL HYPERTENSION: ICD-10-CM

## 2023-08-16 RX ORDER — ATORVASTATIN CALCIUM 20 MG/1
TABLET, FILM COATED ORAL
Qty: 30 TABLET | Refills: 10 | OUTPATIENT
Start: 2023-08-16

## 2023-08-16 RX ORDER — LISINOPRIL 5 MG/1
TABLET ORAL
Qty: 30 TABLET | Refills: 10 | OUTPATIENT
Start: 2023-08-16

## 2023-08-17 ENCOUNTER — OFFICE VISIT (OUTPATIENT)
Dept: FAMILY MEDICINE CLINIC | Age: 53
End: 2023-08-17
Payer: COMMERCIAL

## 2023-08-17 ENCOUNTER — HOSPITAL ENCOUNTER (OUTPATIENT)
Age: 53
Setting detail: SPECIMEN
Discharge: HOME | End: 2023-08-17

## 2023-08-17 VITALS
DIASTOLIC BLOOD PRESSURE: 80 MMHG | OXYGEN SATURATION: 98 % | SYSTOLIC BLOOD PRESSURE: 110 MMHG | HEART RATE: 72 BPM | HEIGHT: 60 IN | WEIGHT: 218 LBS | BODY MASS INDEX: 42.8 KG/M2

## 2023-08-17 DIAGNOSIS — N30.10 INTERSTITIAL CYSTITIS: ICD-10-CM

## 2023-08-17 DIAGNOSIS — I10 ESSENTIAL HYPERTENSION: ICD-10-CM

## 2023-08-17 DIAGNOSIS — E11.9 TYPE 2 DIABETES MELLITUS WITHOUT COMPLICATION, WITHOUT LONG-TERM CURRENT USE OF INSULIN (HCC): Primary | ICD-10-CM

## 2023-08-17 DIAGNOSIS — M54.16 LUMBAR RADICULOPATHY, CHRONIC: Chronic | ICD-10-CM

## 2023-08-17 DIAGNOSIS — M51.36 DEGENERATION OF LUMBAR INTERVERTEBRAL DISC: Chronic | ICD-10-CM

## 2023-08-17 DIAGNOSIS — F43.23 ADJUSTMENT DISORDER WITH MIXED ANXIETY AND DEPRESSED MOOD: ICD-10-CM

## 2023-08-17 DIAGNOSIS — E55.9 VITAMIN D DEFICIENCY: ICD-10-CM

## 2023-08-17 DIAGNOSIS — E78.2 MIXED HYPERLIPIDEMIA: ICD-10-CM

## 2023-08-17 PROBLEM — B96.89 BACTERIAL SINUSITIS: Status: RESOLVED | Noted: 2020-02-04 | Resolved: 2023-08-17

## 2023-08-17 PROBLEM — J32.9 BACTERIAL SINUSITIS: Status: RESOLVED | Noted: 2020-02-04 | Resolved: 2023-08-17

## 2023-08-17 LAB
BILIRUBIN, POC: NEGATIVE
BLOOD URINE, POC: NEGATIVE
CLARITY, POC: CLEAR
COLOR, POC: YELLOW
GLUCOSE URINE, POC: NEGATIVE
HBA1C MFR BLD: 5.3 %
KETONES, POC: NEGATIVE
LEUKOCYTE EST, POC: ABNORMAL
NITRITE, POC: NEGATIVE
PH, POC: 7.5
PROTEIN, POC: NEGATIVE
SPECIFIC GRAVITY, POC: 1.01
UROBILINOGEN, POC: 0.2

## 2023-08-17 PROCEDURE — G8427 DOCREV CUR MEDS BY ELIG CLIN: HCPCS | Performed by: FAMILY MEDICINE

## 2023-08-17 PROCEDURE — 81002 URINALYSIS NONAUTO W/O SCOPE: CPT | Performed by: FAMILY MEDICINE

## 2023-08-17 PROCEDURE — 2022F DILAT RTA XM EVC RTNOPTHY: CPT | Performed by: FAMILY MEDICINE

## 2023-08-17 PROCEDURE — 1036F TOBACCO NON-USER: CPT | Performed by: FAMILY MEDICINE

## 2023-08-17 PROCEDURE — 83037 HB GLYCOSYLATED A1C HOME DEV: CPT | Performed by: FAMILY MEDICINE

## 2023-08-17 PROCEDURE — 3017F COLORECTAL CA SCREEN DOC REV: CPT | Performed by: FAMILY MEDICINE

## 2023-08-17 PROCEDURE — 3044F HG A1C LEVEL LT 7.0%: CPT | Performed by: FAMILY MEDICINE

## 2023-08-17 PROCEDURE — 3079F DIAST BP 80-89 MM HG: CPT | Performed by: FAMILY MEDICINE

## 2023-08-17 PROCEDURE — 3074F SYST BP LT 130 MM HG: CPT | Performed by: FAMILY MEDICINE

## 2023-08-17 PROCEDURE — 99214 OFFICE O/P EST MOD 30 MIN: CPT | Performed by: FAMILY MEDICINE

## 2023-08-17 PROCEDURE — G8417 CALC BMI ABV UP PARAM F/U: HCPCS | Performed by: FAMILY MEDICINE

## 2023-08-17 RX ORDER — NITROFURANTOIN 25; 75 MG/1; MG/1
100 CAPSULE ORAL 2 TIMES DAILY
Qty: 20 CAPSULE | Refills: 0 | Status: SHIPPED | OUTPATIENT
Start: 2023-08-17 | End: 2023-08-27

## 2023-08-17 RX ORDER — DULAGLUTIDE 1.5 MG/.5ML
1.5 INJECTION, SOLUTION SUBCUTANEOUS WEEKLY
Qty: 12 ADJUSTABLE DOSE PRE-FILLED PEN SYRINGE | Refills: 1 | Status: SHIPPED | OUTPATIENT
Start: 2023-08-17

## 2023-08-17 RX ORDER — BLOOD SUGAR DIAGNOSTIC
1 STRIP MISCELLANEOUS 2 TIMES DAILY
Qty: 200 STRIP | Refills: 3 | Status: SHIPPED | OUTPATIENT
Start: 2023-08-17

## 2023-08-17 RX ORDER — PHENAZOPYRIDINE HYDROCHLORIDE 100 MG/1
100 TABLET, FILM COATED ORAL 3 TIMES DAILY PRN
Qty: 15 TABLET | Refills: 0 | Status: SHIPPED | OUTPATIENT
Start: 2023-08-17 | End: 2024-08-16

## 2023-08-17 ASSESSMENT — ENCOUNTER SYMPTOMS
WHEEZING: 0
BACK PAIN: 1
VOMITING: 0
TROUBLE SWALLOWING: 0
SORE THROAT: 0
COUGH: 0
BLOOD IN STOOL: 0
CONSTIPATION: 0
DIARRHEA: 0
NAUSEA: 0
RHINORRHEA: 0
ABDOMINAL PAIN: 0
SHORTNESS OF BREATH: 0
RECTAL PAIN: 0
CHEST TIGHTNESS: 0
SINUS PRESSURE: 0
STRIDOR: 0
ABDOMINAL DISTENTION: 0
EYE REDNESS: 0
COLOR CHANGE: 0

## 2023-08-17 NOTE — PATIENT INSTRUCTIONS
Thank you for choosing [facility]) as your healthcare provider as your care is important to us. Please arrive at your appointment on time. If you are unable to make your appointment, please call our office as soon as possible so that we may reschedule your appointment. Missing 3 appointments in a calendar year without notifying the office may lead to dismissal from the practice. We appreciate you calling at least 24 hours in advance, when possible. Thank you. New Updates for [facility]    Thank you for choosing [facility] to give you the best care! [facility]) is always trying to think of new ways to help their patients. We are asking all patients to try out the new digital registration that is now available through your [facility] account Via the diego you're now able to update your personal and registration information prior to your upcoming appointment. This will save you time once you arrive at the office to check-in, not to mention your information remains safe!! Many other perks come from signing up for an account, such as:  Requesting refills  Scheduling an appointment  Completing an E-Visit  Sending a message to the office/provider  Having access to your medication list  Paying your bill/copay prior to your appointment  Scheduling your yearly mammogram  Review your test results    If you are not familiar with [facility] please ask one of us and we will be happy to answer any questions or help you set-up your account.       Your [employer],

## 2023-08-17 NOTE — PROGRESS NOTES
Visit Information    Have you changed or started any medications since your last visit including any over-the-counter medicines, vitamins, or herbal medicines? no   Are you having any side effects from any of your medications? -  no  Have you stopped taking any of your medications? Is so, why? -  no    Have you seen any other physician or provider since your last visit? No  Have you had any other diagnostic tests since your last visit? No  Have you been seen in the emergency room and/or had an admission to a hospital since we last saw you? No  Have you had your routine dental cleaning in the past 6 months? yes -    Have you activated your [employer] account? If not, what are your barriers?  Yes     Patient Care Team:  Radha Wong MD as PCP - General (Family Medicine)  Radha Wong MD as PCP - Empaneled Provider  Tod Amaya MD as Orthopedic Surgeon (Orthopedic Surgery)  Arpan Ford MD as Consulting Physician (Gastroenterology)    Medical History Review  Past Medical, Family, and Social History reviewed and does contribute to the patient presenting condition    Health Maintenance   Topic Date Due    Diabetic foot exam  10/03/2020    COVID-19 Vaccine (4 - Booster for [place] series) 02/14/2022    Colorectal Cancer Screen  10/22/2022    Pneumococcal 0-64 years Vaccine (2 - PCV) 04/20/2023    Flu vaccine (1) 08/01/2023    Lipids  10/14/2023    GFR test (Diabetes, CKD 3-4, OR last GFR 15-59)  01/09/2024    A1C test (Diabetic or Prediabetic)  02/13/2024    Diabetic Alb to Cr ratio (uACR) test  02/13/2024    Depression Screen  02/13/2024    Diabetic retinal exam  04/04/2024    Breast cancer screen  02/23/2025    DTaP/Tdap/Td vaccine (2 - Td or Tdap) 01/11/2028    Hepatitis B vaccine  Completed    Shingles vaccine  Completed    Hepatitis C screen  Completed    HIV screen  Completed    Hepatitis A vaccine  Aged Out    Hib vaccine  Aged Out    Meningococcal (ACWY) vaccine  Aged Out    Depression Monitoring
Question:   Specify (ex-cath, midstream, cysto, etc)? Answer:   midstream    CBC     Standing Status:   Future     Standing Expiration Date:   8/16/2024    Lipid Panel     Standing Status:   Future     Standing Expiration Date:   8/16/2024     Order Specific Question:   Is Patient Fasting?/# of Hours     Answer:   yes, 8-10 hours    Magnesium     Standing Status:   Future     Standing Expiration Date:   8/16/2024    Vitamin D 25 Hydroxy     Standing Status:   Future     Standing Expiration Date:   8/16/2024    Urinalysis with Reflex to Culture     Standing Status:   Future     Standing Expiration Date:   8/17/2024     Order Specific Question:   SPECIFY(EX-CATH,MIDSTREAM,CYSTO,ETC)? Answer:   midstream    Uric Acid     Standing Status:   Future     Standing Expiration Date:   8/17/2024    Vitamin B12 & Folate     Standing Status:   Future     Standing Expiration Date:   8/16/2024    POCT glycosylated hemoglobin (Hb A1C)    POCT Urinalysis no Micro    HM DIABETES FOOT EXAM         Medications Discontinued During This Encounter   Medication Reason    Dulaglutide (TRULICITY) 4.5 W[place]/2.3ER SOPN DOSE ADJUSTMENT    blood glucose test strips (ONETOUCH VERIO) strip REORDER       Iqra received counseling on the following healthy behaviors: nutrition, exercise, and medication adherence  Reviewed prior labs and health maintenance  Continue current medications, diet and exercise. Discussed use, benefit, and side effects of prescribed medications. Barriers to medication compliance addressed. Patient given educational materials - see patient instructions  Was a self-tracking handout given in paper form or via [employer]?  Yes    Requested Prescriptions     Signed Prescriptions Disp Refills    Handicap Placard MISC 1 each 0     Sig: by Does not apply route    blood glucose test strips (ONETOUCH VERIO) strip 200 strip 3     Sig: Inject 1 each into the skin 2 times daily    dulaglutide (TRULICITY) 1.5 MU/9.2XK SC injection 12

## 2023-08-18 LAB
MICROORGANISM SPEC CULT: ABNORMAL
SPECIMEN DESCRIPTION: ABNORMAL

## 2023-08-21 DIAGNOSIS — E78.2 MIXED HYPERLIPIDEMIA: ICD-10-CM

## 2023-08-21 DIAGNOSIS — I10 ESSENTIAL HYPERTENSION: ICD-10-CM

## 2023-08-21 RX ORDER — CEPHALEXIN 500 MG/1
500 CAPSULE ORAL 2 TIMES DAILY
Qty: 20 CAPSULE | Refills: 0 | Status: SHIPPED | OUTPATIENT
Start: 2023-08-21

## 2023-08-21 RX ORDER — LISINOPRIL 5 MG/1
TABLET ORAL
Qty: 30 TABLET | Refills: 10 | Status: SHIPPED | OUTPATIENT
Start: 2023-08-21

## 2023-08-21 RX ORDER — ATORVASTATIN CALCIUM 20 MG/1
TABLET, FILM COATED ORAL
Qty: 30 TABLET | Refills: 10 | Status: SHIPPED | OUTPATIENT
Start: 2023-08-21

## 2023-08-21 NOTE — TELEPHONE ENCOUNTER
Please Approve or Refuse.   Send to Pharmacy per Pt's Request: [facility]      Next Visit Date:  11/20/2023   Last Visit Date: 8/17/2023    Hemoglobin A1C (%)   Date Value   08/17/2023 5.3   02/13/2023 5.9   10/14/2022 5.5             ( goal A1C is < 7)   BP Readings from Last 3 Encounters:   08/17/23 110/80   06/21/23 108/74   06/21/23 114/75          (goal 120/80)  BUN   Date Value Ref Range Status   01/09/2023 12 6 - 20 mg/dL Final     Creatinine   Date Value Ref Range Status   01/09/2023 0.85 0.50 - 0.90 mg/dL Final     Potassium   Date Value Ref Range Status   10/14/2022 4.4 3.7 - 5.3 mmol/L Final

## 2023-08-24 LAB
ABSOLUTE BASO #: 0.03 K/UL (ref 0–0.2)
ABSOLUTE EOS #: 0.1 K/UL (ref 0–0.5)
ABSOLUTE LYMPH #: 3.71 K/UL (ref 1–4)
ABSOLUTE MONO #: 0.4 K/UL (ref 0.2–1)
ABSOLUTE NEUT #: 2.43 K/UL (ref 1.5–7.5)
APPEARANCE: CLEAR
BACTERIA: ABNORMAL PER HPF
BASOPHILS RELATIVE PERCENT: 0.4 %
BILIRUBIN: NEGATIVE
CHOLESTEROL/HDL RATIO: 3.2 RATIO
CHOLESTEROL: 148 MG/DL
COLOR: YELLOW
EOSINOPHILS RELATIVE PERCENT: 1.5 %
EPITHELIAL CELLS: ABNORMAL PER HPF (ref 0–10)
FOLATE: 29.3 UG/L
GLUCOSE BLD-MCNC: NEGATIVE MG/DL
HCT VFR BLD CALC: 38.8 % (ref 34–45)
HDLC SERPL-MCNC: 46 MG/DL
HEMOGLOBIN: 13.4 G/DL (ref 11.5–15.5)
HYALINE CASTS: ABNORMAL
KETONES, URINE: NEGATIVE
LDL CHOLESTEROL CALCULATED: 62 MG/DL
LDL/HDL RATIO: 1.3 RATIO
LEUKOCYTE ESTERASE, URINE: ABNORMAL
LYMPHOCYTE %: 55.5 %
MAGNESIUM: 2.2 MG/DL (ref 1.6–2.6)
MCH RBC QN AUTO: 28.8 PG (ref 25–33)
MCHC RBC AUTO-ENTMCNC: 34.5 G/DL (ref 31–36)
MCV RBC AUTO: 83.4 FL (ref 80–99)
MONOCYTES # BLD: 6 %
NEUTROPHILS RELATIVE PERCENT: 36.5 %
NITRITE, URINE: NEGATIVE
OCCULT BLOOD,URINE: NEGATIVE
PDW BLD-RTO: 13.1 % (ref 11.5–15)
PH: 7 (ref 5–9)
PLATELETS: 284 K/UL (ref 130–400)
PMV BLD AUTO: 9.6 FL (ref 9.3–13)
PROTEIN, URINE: NEGATIVE
RBC: 4.65 M/UL (ref 3.8–5.4)
RBC: ABNORMAL PER HPF (ref 0–5)
SP GRAVITY MISCELLANEOUS: 1.01 (ref 1–1.03)
TRIGL SERPL-MCNC: 200 MG/DL
URIC ACID: 5.1 MG/DL (ref 2.7–6.1)
UROBILINOGEN, URINE: NORMAL
VITAMIN B-12: 530 PG/ML (ref 200–950)
VITAMIN D 25-HYDROXY: 35 NG/ML
VLDLC SERPL CALC-MCNC: 40 MG/DL
WBC: 6.7 K/UL (ref 3.5–11)
WBC: ABNORMAL PER HPF (ref 0–5)

## 2023-08-26 LAB — URINE CULTURE, ROUTINE: NORMAL

## 2023-08-28 ENCOUNTER — HOSPITAL ENCOUNTER (OUTPATIENT)
Dept: PAIN MANAGEMENT | Age: 53
Discharge: HOME | End: 2023-08-28
Payer: COMMERCIAL

## 2023-08-28 VITALS — HEIGHT: 60 IN | BODY MASS INDEX: 42.8 KG/M2 | WEIGHT: 218 LBS | TEMPERATURE: 97.3 F | RESPIRATION RATE: 20 BRPM

## 2023-08-28 DIAGNOSIS — M47.816 OSTEOARTHRITIS OF LUMBAR SPINE, UNSPECIFIED SPINAL OSTEOARTHRITIS COMPLICATION STATUS: ICD-10-CM

## 2023-08-28 DIAGNOSIS — G89.29 CHRONIC LEFT-SIDED LOW BACK PAIN WITH LEFT-SIDED SCIATICA: ICD-10-CM

## 2023-08-28 DIAGNOSIS — M51.36 DEGENERATION OF LUMBAR INTERVERTEBRAL DISC: Chronic | ICD-10-CM

## 2023-08-28 DIAGNOSIS — M50.20 CERVICAL DISC HERNIATION: ICD-10-CM

## 2023-08-28 DIAGNOSIS — G89.29 CHRONIC SI JOINT PAIN: Chronic | ICD-10-CM

## 2023-08-28 DIAGNOSIS — M54.16 LUMBAR RADICULOPATHY, CHRONIC: Chronic | ICD-10-CM

## 2023-08-28 DIAGNOSIS — M16.12 PRIMARY OSTEOARTHRITIS OF LEFT HIP: ICD-10-CM

## 2023-08-28 DIAGNOSIS — M47.816 LUMBAR SPONDYLOSIS: Chronic | ICD-10-CM

## 2023-08-28 DIAGNOSIS — M50.30 DEGENERATIVE DISC DISEASE, CERVICAL: ICD-10-CM

## 2023-08-28 DIAGNOSIS — Z79.899 ENCOUNTER FOR MEDICATION MANAGEMENT: ICD-10-CM

## 2023-08-28 DIAGNOSIS — M47.896 OTHER OSTEOARTHRITIS OF SPINE, LUMBAR REGION: ICD-10-CM

## 2023-08-28 DIAGNOSIS — M46.1 SACROILIITIS (HCC): ICD-10-CM

## 2023-08-28 DIAGNOSIS — M47.817 LUMBOSACRAL SPONDYLOSIS WITHOUT MYELOPATHY: Primary | ICD-10-CM

## 2023-08-28 DIAGNOSIS — M54.42 CHRONIC LEFT-SIDED LOW BACK PAIN WITH LEFT-SIDED SCIATICA: ICD-10-CM

## 2023-08-28 DIAGNOSIS — Z79.891 CHRONIC USE OF OPIATE FOR THERAPEUTIC PURPOSE: ICD-10-CM

## 2023-08-28 DIAGNOSIS — M47.26 OSTEOARTHRITIS OF SPINE WITH RADICULOPATHY, LUMBAR REGION: ICD-10-CM

## 2023-08-28 DIAGNOSIS — M53.3 CHRONIC SI JOINT PAIN: Chronic | ICD-10-CM

## 2023-08-28 PROCEDURE — 99213 OFFICE O/P EST LOW 20 MIN: CPT

## 2023-08-28 PROCEDURE — G0481 DRUG TEST DEF 8-14 CLASSES: HCPCS

## 2023-08-28 PROCEDURE — 99214 OFFICE O/P EST MOD 30 MIN: CPT | Performed by: NURSE PRACTITIONER

## 2023-08-28 PROCEDURE — 80307 DRUG TEST PRSMV CHEM ANLYZR: CPT

## 2023-08-28 RX ORDER — OXYCODONE HYDROCHLORIDE AND ACETAMINOPHEN 5; 325 MG/1; MG/1
1 TABLET ORAL EVERY 6 HOURS PRN
Qty: 120 TABLET | Refills: 0 | Status: SHIPPED | OUTPATIENT
Start: 2023-08-30 | End: 2023-09-29

## 2023-08-28 RX ORDER — PREGABALIN 150 MG/1
150 CAPSULE ORAL 2 TIMES DAILY
Qty: 60 CAPSULE | Refills: 2 | Status: SHIPPED | OUTPATIENT
Start: 2023-08-28 | End: 2023-11-26

## 2023-08-28 ASSESSMENT — ENCOUNTER SYMPTOMS
SHORTNESS OF BREATH: 0
CONSTIPATION: 0
BOWEL INCONTINENCE: 0
COUGH: 0
BACK PAIN: 1
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
## 2023-08-31 LAB
6-ACETYLMORPHINE, UR: NOT DETECTED
7-AMINOCLONAZEPAM, URINE: NOT DETECTED
ALPHA-OH-ALPRAZ, URINE: NOT DETECTED
ALPHA-OH-MIDAZOLAM, URINE: NOT DETECTED
ALPRAZOLAM, URINE: NOT DETECTED
AMPHETAMINES, URINE: NOT DETECTED
BARBITURATES, URINE: NOT DETECTED
BENZOYLECGONINE, UR: NOT DETECTED
BUPRENORPHINE URINE: NOT DETECTED
CARISOPRODOL, UR: NOT DETECTED
CLONAZEPAM, URINE: NOT DETECTED
CODEINE, URINE: NOT DETECTED
CREATININE URINE: 101.2 MG/DL (ref 20–400)
DIAZEPAM, URINE: NOT DETECTED
DRUGS EXPECTED, UR: NORMAL
EER HI RES INTERP UR: NORMAL
ETHYL GLUCURONIDE UR: NOT DETECTED
FENTANYL URINE: NOT DETECTED
GABAPENTIN: NOT DETECTED
HYDROCODONE, OPI6M: NOT DETECTED
HYDROMORPHONE, OPI3M: NOT DETECTED
LORAZEPAM, URINE: NOT DETECTED
MARIJUANA METAB, UR: NOT DETECTED
MDA, UR: NOT DETECTED
MDEA, EVE, UR: NOT DETECTED
MDMA URINE: NOT DETECTED
MEPERIDINE METAB, UR: NOT DETECTED
METHADONE, URINE: NOT DETECTED
METHAMPHETAMINE, URINE: NOT DETECTED
METHYLPHENIDATE: NOT DETECTED
MIDAZOLAM, URINE: NOT DETECTED
MORPHINE, OPI1M: NOT DETECTED
NALOXONE URINE: NOT DETECTED
NORBUPRENORPHINE, URINE: NOT DETECTED
NORDIAZEPAM, URINE: NOT DETECTED
NORFENTANYL, URINE: NOT DETECTED
NORHYDROCODONE, URINE: NOT DETECTED
NOROXYCODONE, URINE: PRESENT
NOROXYMORPHONE, URINE: NOT DETECTED
OXAZEPAM, URINE: NOT DETECTED
OXYCODONE URINE: PRESENT
OXYMORPHONE, URINE: PRESENT
PAIN MANAGEMENT DRUG PANEL INTERP, URINE: NORMAL
PAIN MGT DRUG PANEL, HI RES, UR: NORMAL
PCP,URINE: NOT DETECTED
PHENTERMINE, UR: NOT DETECTED
PREGABALIN: PRESENT
TAPENTADOL, URINE: NOT DETECTED
TAPENTADOL-O-SULFATE, URINE: NOT DETECTED
TEMAZEPAM, URINE: NOT DETECTED
TRAMADOL, URINE: NOT DETECTED
ZOLPIDEM METABOLITE (ZCA), URINE: NOT DETECTED
ZOLPIDEM, URINE: NOT DETECTED

## 2023-09-28 ENCOUNTER — HOSPITAL ENCOUNTER (OUTPATIENT)
Dept: PAIN MANAGEMENT | Age: 53
Discharge: HOME | End: 2023-09-28
Payer: COMMERCIAL

## 2023-09-28 VITALS — BODY MASS INDEX: 42.8 KG/M2 | RESPIRATION RATE: 20 BRPM | TEMPERATURE: 97.3 F | HEIGHT: 60 IN | WEIGHT: 218 LBS

## 2023-09-28 DIAGNOSIS — M47.816 LUMBAR SPONDYLOSIS: Chronic | ICD-10-CM

## 2023-09-28 DIAGNOSIS — M54.16 LUMBAR RADICULOPATHY, CHRONIC: Chronic | ICD-10-CM

## 2023-09-28 DIAGNOSIS — F11.90 CHRONIC, CONTINUOUS USE OF OPIOIDS: Primary | ICD-10-CM

## 2023-09-28 DIAGNOSIS — M46.1 SACROILIITIS (HCC): ICD-10-CM

## 2023-09-28 DIAGNOSIS — M47.817 LUMBOSACRAL SPONDYLOSIS WITHOUT MYELOPATHY: ICD-10-CM

## 2023-09-28 DIAGNOSIS — M50.30 DEGENERATIVE DISC DISEASE, CERVICAL: ICD-10-CM

## 2023-09-28 DIAGNOSIS — M51.36 DEGENERATION OF LUMBAR INTERVERTEBRAL DISC: Chronic | ICD-10-CM

## 2023-09-28 DIAGNOSIS — M16.12 PRIMARY OSTEOARTHRITIS OF LEFT HIP: ICD-10-CM

## 2023-09-28 DIAGNOSIS — M50.20 CERVICAL DISC HERNIATION: ICD-10-CM

## 2023-09-28 PROCEDURE — 99213 OFFICE O/P EST LOW 20 MIN: CPT | Performed by: NURSE PRACTITIONER

## 2023-09-28 PROCEDURE — 99213 OFFICE O/P EST LOW 20 MIN: CPT

## 2023-09-28 RX ORDER — OXYCODONE HYDROCHLORIDE AND ACETAMINOPHEN 5; 325 MG/1; MG/1
1 TABLET ORAL EVERY 6 HOURS PRN
Qty: 120 TABLET | Refills: 0 | Status: SHIPPED | OUTPATIENT
Start: 2023-10-01 | End: 2023-10-31

## 2023-09-28 ASSESSMENT — ENCOUNTER SYMPTOMS
SHORTNESS OF BREATH: 0
CONSTIPATION: 0
COUGH: 0
BOWEL INCONTINENCE: 0
BACK PAIN: 1

## 2023-09-29 DIAGNOSIS — F43.23 ADJUSTMENT DISORDER WITH MIXED ANXIETY AND DEPRESSED MOOD: ICD-10-CM

## 2023-10-02 RX ORDER — BUSPIRONE HYDROCHLORIDE 30 MG/1
TABLET ORAL
Qty: 90 TABLET | Refills: 10 | Status: SHIPPED | OUTPATIENT
Start: 2023-10-02

## 2023-10-02 NOTE — TELEPHONE ENCOUNTER
Please Approve or Refuse.   Send to Pharmacy per Pt's Request:      Next Visit Date:  11/20/2023   Last Visit Date: 8/17/2023    Hemoglobin A1C (%)   Date Value   08/17/2023 5.3   02/13/2023 5.9   10/14/2022 5.5             ( goal A1C is < 7)   BP Readings from Last 3 Encounters:   08/17/23 110/80   06/21/23 108/74   06/21/23 114/75          (goal 120/80)  BUN   Date Value Ref Range Status   01/09/2023 12 6 - 20 mg/dL Final     Creatinine   Date Value Ref Range Status   01/09/2023 0.85 0.50 - 0.90 mg/dL Final     Potassium   Date Value Ref Range Status   10/14/2022 4.4 3.7 - 5.3 mmol/L Final

## 2023-10-26 ENCOUNTER — HOSPITAL ENCOUNTER (OUTPATIENT)
Dept: PAIN MANAGEMENT | Age: 53
Discharge: HOME | End: 2023-10-26
Payer: COMMERCIAL

## 2023-10-26 VITALS
BODY MASS INDEX: 42.8 KG/M2 | DIASTOLIC BLOOD PRESSURE: 91 MMHG | SYSTOLIC BLOOD PRESSURE: 124 MMHG | WEIGHT: 218 LBS | OXYGEN SATURATION: 99 % | HEART RATE: 86 BPM | HEIGHT: 60 IN

## 2023-10-26 DIAGNOSIS — M16.12 PRIMARY OSTEOARTHRITIS OF LEFT HIP: ICD-10-CM

## 2023-10-26 DIAGNOSIS — M47.817 LUMBOSACRAL SPONDYLOSIS WITHOUT MYELOPATHY: ICD-10-CM

## 2023-10-26 DIAGNOSIS — M43.07 SPONDYLOLYSIS, LUMBOSACRAL REGION: ICD-10-CM

## 2023-10-26 DIAGNOSIS — M16.0 BILATERAL HIP JOINT ARTHRITIS: ICD-10-CM

## 2023-10-26 DIAGNOSIS — G89.29 CHRONIC BILATERAL LOW BACK PAIN WITHOUT SCIATICA: Primary | Chronic | ICD-10-CM

## 2023-10-26 DIAGNOSIS — G89.29 CHRONIC SI JOINT PAIN: Chronic | ICD-10-CM

## 2023-10-26 DIAGNOSIS — M50.20 CERVICAL DISC HERNIATION: ICD-10-CM

## 2023-10-26 DIAGNOSIS — M47.816 LUMBAR SPONDYLOSIS: Chronic | ICD-10-CM

## 2023-10-26 DIAGNOSIS — M53.3 CHRONIC SI JOINT PAIN: Chronic | ICD-10-CM

## 2023-10-26 DIAGNOSIS — Z79.891 CHRONIC USE OF OPIATE FOR THERAPEUTIC PURPOSE: ICD-10-CM

## 2023-10-26 DIAGNOSIS — M46.1 SACROILIITIS (HCC): ICD-10-CM

## 2023-10-26 DIAGNOSIS — M54.16 LUMBAR RADICULOPATHY, CHRONIC: Chronic | ICD-10-CM

## 2023-10-26 DIAGNOSIS — M50.30 DEGENERATIVE DISC DISEASE, CERVICAL: ICD-10-CM

## 2023-10-26 DIAGNOSIS — M54.50 CHRONIC BILATERAL LOW BACK PAIN WITHOUT SCIATICA: Primary | Chronic | ICD-10-CM

## 2023-10-26 DIAGNOSIS — M51.36 DEGENERATION OF LUMBAR INTERVERTEBRAL DISC: Chronic | ICD-10-CM

## 2023-10-26 PROCEDURE — 99213 OFFICE O/P EST LOW 20 MIN: CPT

## 2023-10-26 PROCEDURE — 99213 OFFICE O/P EST LOW 20 MIN: CPT | Performed by: NURSE PRACTITIONER

## 2023-10-26 RX ORDER — TIZANIDINE 4 MG/1
TABLET ORAL
Qty: 270 TABLET | Refills: 0 | Status: SHIPPED | OUTPATIENT
Start: 2023-10-26

## 2023-10-26 RX ORDER — OXYCODONE HYDROCHLORIDE AND ACETAMINOPHEN 5; 325 MG/1; MG/1
1 TABLET ORAL EVERY 6 HOURS PRN
Qty: 120 TABLET | Refills: 0 | Status: SHIPPED | OUTPATIENT
Start: 2023-10-31 | End: 2023-11-30

## 2023-10-26 ASSESSMENT — PAIN DESCRIPTION - LOCATION: LOCATION: BACK

## 2023-10-26 ASSESSMENT — ENCOUNTER SYMPTOMS
CONSTIPATION: 0
BACK PAIN: 1
BOWEL INCONTINENCE: 0
COUGH: 0
SHORTNESS OF BREATH: 0

## 2023-10-26 ASSESSMENT — PAIN SCALES - GENERAL: PAINLEVEL_OUTOF10: 6

## 2023-10-26 ASSESSMENT — PAIN DESCRIPTION - PAIN TYPE: TYPE: CHRONIC PAIN

## 2023-10-26 ASSESSMENT — PAIN DESCRIPTION - FREQUENCY: FREQUENCY: CONTINUOUS

## 2023-10-30 DIAGNOSIS — F32.A ANXIETY AND DEPRESSION: ICD-10-CM

## 2023-10-30 DIAGNOSIS — F43.23 ADJUSTMENT DISORDER WITH MIXED ANXIETY AND DEPRESSED MOOD: ICD-10-CM

## 2023-10-30 DIAGNOSIS — F41.9 ANXIETY AND DEPRESSION: ICD-10-CM

## 2023-10-31 RX ORDER — BUPROPION HYDROCHLORIDE 150 MG/1
150 TABLET ORAL EVERY MORNING
Qty: 30 TABLET | Refills: 10 | Status: SHIPPED | OUTPATIENT
Start: 2023-10-31

## 2023-10-31 RX ORDER — SERTRALINE HYDROCHLORIDE 100 MG/1
TABLET, FILM COATED ORAL
Qty: 30 TABLET | Refills: 10 | Status: SHIPPED | OUTPATIENT
Start: 2023-10-31

## 2023-10-31 NOTE — TELEPHONE ENCOUNTER
Please Approve or Refuse.   Send to Pharmacy per Pt's Request:      Next Visit Date:  11/20/2023   Last Visit Date: 8/17/2023    Hemoglobin A1C (%)   Date Value   08/17/2023 5.3   02/13/2023 5.9   10/14/2022 5.5             ( goal A1C is < 7)   BP Readings from Last 3 Encounters:   10/26/23 (!) 124/91   08/17/23 110/80   06/21/23 108/74          (goal 120/80)  BUN   Date Value Ref Range Status   01/09/2023 12 6 - 20 mg/dL Final     Creatinine   Date Value Ref Range Status   01/09/2023 0.85 0.50 - 0.90 mg/dL Final     Potassium   Date Value Ref Range Status   10/14/2022 4.4 3.7 - 5.3 mmol/L Final

## 2023-11-06 DIAGNOSIS — N30.10 INTERSTITIAL CYSTITIS: ICD-10-CM

## 2023-11-06 RX ORDER — TROSPIUM CHLORIDE 20 MG/1
TABLET, FILM COATED ORAL
Qty: 90 TABLET | Refills: 0 | Status: SHIPPED | OUTPATIENT
Start: 2023-11-06

## 2023-11-20 ENCOUNTER — OFFICE VISIT (OUTPATIENT)
Dept: FAMILY MEDICINE CLINIC | Age: 53
End: 2023-11-20
Payer: COMMERCIAL

## 2023-11-20 VITALS
HEART RATE: 71 BPM | HEIGHT: 60 IN | DIASTOLIC BLOOD PRESSURE: 70 MMHG | WEIGHT: 222 LBS | OXYGEN SATURATION: 98 % | SYSTOLIC BLOOD PRESSURE: 110 MMHG | BODY MASS INDEX: 43.59 KG/M2

## 2023-11-20 DIAGNOSIS — Z23 ENCOUNTER FOR IMMUNIZATION: ICD-10-CM

## 2023-11-20 DIAGNOSIS — E11.9 TYPE 2 DIABETES MELLITUS WITHOUT COMPLICATION, WITHOUT LONG-TERM CURRENT USE OF INSULIN (HCC): Primary | ICD-10-CM

## 2023-11-20 DIAGNOSIS — Z12.31 SCREENING MAMMOGRAM FOR HIGH-RISK PATIENT: ICD-10-CM

## 2023-11-20 DIAGNOSIS — G43.719 INTRACTABLE CHRONIC MIGRAINE WITHOUT AURA AND WITHOUT STATUS MIGRAINOSUS: ICD-10-CM

## 2023-11-20 DIAGNOSIS — Z12.11 COLON CANCER SCREENING: ICD-10-CM

## 2023-11-20 DIAGNOSIS — M47.817 LUMBOSACRAL SPONDYLOSIS WITHOUT MYELOPATHY: ICD-10-CM

## 2023-11-20 DIAGNOSIS — E78.2 MIXED HYPERLIPIDEMIA: ICD-10-CM

## 2023-11-20 DIAGNOSIS — E55.9 VITAMIN D DEFICIENCY: ICD-10-CM

## 2023-11-20 DIAGNOSIS — I10 ESSENTIAL HYPERTENSION: ICD-10-CM

## 2023-11-20 DIAGNOSIS — R05.3 PERSISTENT DRY COUGH: ICD-10-CM

## 2023-11-20 DIAGNOSIS — F43.23 ADJUSTMENT DISORDER WITH MIXED ANXIETY AND DEPRESSED MOOD: ICD-10-CM

## 2023-11-20 PROBLEM — E89.40 SURGICAL MENOPAUSE: Status: RESOLVED | Noted: 2020-10-15 | Resolved: 2023-11-20

## 2023-11-20 PROBLEM — L70.0 ACNE CYSTICA: Status: RESOLVED | Noted: 2020-10-08 | Resolved: 2023-11-20

## 2023-11-20 LAB — HBA1C MFR BLD: 5.9 %

## 2023-11-20 PROCEDURE — 3017F COLORECTAL CA SCREEN DOC REV: CPT | Performed by: FAMILY MEDICINE

## 2023-11-20 PROCEDURE — 3074F SYST BP LT 130 MM HG: CPT | Performed by: FAMILY MEDICINE

## 2023-11-20 PROCEDURE — 99214 OFFICE O/P EST MOD 30 MIN: CPT | Performed by: FAMILY MEDICINE

## 2023-11-20 PROCEDURE — 1036F TOBACCO NON-USER: CPT | Performed by: FAMILY MEDICINE

## 2023-11-20 PROCEDURE — G8427 DOCREV CUR MEDS BY ELIG CLIN: HCPCS | Performed by: FAMILY MEDICINE

## 2023-11-20 PROCEDURE — G8417 CALC BMI ABV UP PARAM F/U: HCPCS | Performed by: FAMILY MEDICINE

## 2023-11-20 PROCEDURE — 83036 HEMOGLOBIN GLYCOSYLATED A1C: CPT | Performed by: FAMILY MEDICINE

## 2023-11-20 PROCEDURE — G8484 FLU IMMUNIZE NO ADMIN: HCPCS | Performed by: FAMILY MEDICINE

## 2023-11-20 PROCEDURE — 3078F DIAST BP <80 MM HG: CPT | Performed by: FAMILY MEDICINE

## 2023-11-20 PROCEDURE — 3044F HG A1C LEVEL LT 7.0%: CPT | Performed by: FAMILY MEDICINE

## 2023-11-20 PROCEDURE — 2022F DILAT RTA XM EVC RTNOPTHY: CPT | Performed by: FAMILY MEDICINE

## 2023-11-20 RX ORDER — BENZONATATE 100 MG/1
100 CAPSULE ORAL 3 TIMES DAILY PRN
Qty: 21 CAPSULE | Refills: 0 | Status: SHIPPED | OUTPATIENT
Start: 2023-11-20 | End: 2023-11-27

## 2023-11-20 RX ORDER — AMLODIPINE BESYLATE 2.5 MG/1
2.5 TABLET ORAL DAILY
Qty: 90 TABLET | Refills: 1 | Status: SHIPPED | OUTPATIENT
Start: 2023-11-20

## 2023-11-20 SDOH — ECONOMIC STABILITY: INCOME INSECURITY: HOW HARD IS IT FOR YOU TO PAY FOR THE VERY BASICS LIKE FOOD, HOUSING, MEDICAL CARE, AND HEATING?: NOT HARD AT ALL

## 2023-11-20 SDOH — ECONOMIC STABILITY: FOOD INSECURITY: WITHIN THE PAST 12 MONTHS, YOU WORRIED THAT YOUR FOOD WOULD RUN OUT BEFORE YOU GOT MONEY TO BUY MORE.: NEVER TRUE

## 2023-11-20 SDOH — ECONOMIC STABILITY: FOOD INSECURITY: WITHIN THE PAST 12 MONTHS, THE FOOD YOU BOUGHT JUST DIDN'T LAST AND YOU DIDN'T HAVE MONEY TO GET MORE.: NEVER TRUE

## 2023-11-20 ASSESSMENT — ENCOUNTER SYMPTOMS
SINUS PRESSURE: 0
ABDOMINAL PAIN: 0
EYE REDNESS: 0
STRIDOR: 0
BACK PAIN: 1
TROUBLE SWALLOWING: 0
SHORTNESS OF BREATH: 0
WHEEZING: 0
BLOOD IN STOOL: 0
SORE THROAT: 0
CHEST TIGHTNESS: 0
COLOR CHANGE: 0
CONSTIPATION: 0
RHINORRHEA: 0
RECTAL PAIN: 0
ABDOMINAL DISTENTION: 0
NAUSEA: 0
DIARRHEA: 0
VOMITING: 0
COUGH: 1

## 2023-11-20 NOTE — PATIENT INSTRUCTIONS
New Updates for [facility]    Thank you for choosing [facility] to give you the best care! [facility]) is always trying to think of new ways to help their patients. We are asking all patients to try out the new digital registration that is now available through your [facility] account Via the diego you're now able to update your personal and registration information prior to your upcoming appointment. This will save you time once you arrive at the office to check-in, not to mention your information remains safe!! Many other perks come from signing up for an account, such as:  Requesting refills  Scheduling an appointment  Completing an E-Visit  Sending a message to the office/provider  Having access to your medication list  Paying your bill/copay prior to your appointment  Scheduling your yearly mammogram  Review your test results    If you are not familiar with [facility] please ask one of us and we will be happy to answer any questions or help you set-up your account. Your [facility] office,   Thank you for choosing [facility]) as your healthcare provider as your care is important to us. Please arrive at your appointment on time. If you are unable to make your appointment, please call our office as soon as possible so that we may reschedule your appointment. Missing 3 appointments in a calendar year without notifying the office may lead to dismissal from the practice. We appreciate you calling at least 24 hours in advance, when possible. Thank you.

## 2023-11-27 ENCOUNTER — HOSPITAL ENCOUNTER (OUTPATIENT)
Dept: PAIN MANAGEMENT | Age: 53
Discharge: HOME | End: 2023-11-27
Payer: COMMERCIAL

## 2023-11-27 VITALS
HEART RATE: 84 BPM | SYSTOLIC BLOOD PRESSURE: 128 MMHG | BODY MASS INDEX: 43.59 KG/M2 | OXYGEN SATURATION: 98 % | HEIGHT: 60 IN | DIASTOLIC BLOOD PRESSURE: 82 MMHG | WEIGHT: 222 LBS

## 2023-11-27 DIAGNOSIS — M47.896 OTHER OSTEOARTHRITIS OF SPINE, LUMBAR REGION: ICD-10-CM

## 2023-11-27 DIAGNOSIS — M47.817 LUMBOSACRAL SPONDYLOSIS WITHOUT MYELOPATHY: ICD-10-CM

## 2023-11-27 DIAGNOSIS — G89.29 CHRONIC BILATERAL LOW BACK PAIN WITHOUT SCIATICA: Primary | Chronic | ICD-10-CM

## 2023-11-27 DIAGNOSIS — M16.12 PRIMARY OSTEOARTHRITIS OF LEFT HIP: ICD-10-CM

## 2023-11-27 DIAGNOSIS — M54.42 LEFT-SIDED LOW BACK PAIN WITH LEFT-SIDED SCIATICA, UNSPECIFIED CHRONICITY: ICD-10-CM

## 2023-11-27 DIAGNOSIS — Z79.899 ENCOUNTER FOR MEDICATION MANAGEMENT: ICD-10-CM

## 2023-11-27 DIAGNOSIS — M54.16 LUMBAR RADICULOPATHY, CHRONIC: Chronic | ICD-10-CM

## 2023-11-27 DIAGNOSIS — M46.1 SACROILIITIS (HCC): ICD-10-CM

## 2023-11-27 DIAGNOSIS — M51.36 DEGENERATION OF LUMBAR INTERVERTEBRAL DISC: Chronic | ICD-10-CM

## 2023-11-27 DIAGNOSIS — M47.26 OSTEOARTHRITIS OF SPINE WITH RADICULOPATHY, LUMBAR REGION: ICD-10-CM

## 2023-11-27 DIAGNOSIS — M47.816 LUMBAR SPONDYLOSIS: Chronic | ICD-10-CM

## 2023-11-27 DIAGNOSIS — G89.29 CHRONIC LEFT-SIDED LOW BACK PAIN WITH LEFT-SIDED SCIATICA: ICD-10-CM

## 2023-11-27 DIAGNOSIS — M54.42 CHRONIC LEFT-SIDED LOW BACK PAIN WITH LEFT-SIDED SCIATICA: ICD-10-CM

## 2023-11-27 DIAGNOSIS — M50.30 DEGENERATIVE DISC DISEASE, CERVICAL: ICD-10-CM

## 2023-11-27 DIAGNOSIS — M50.20 CERVICAL DISC HERNIATION: ICD-10-CM

## 2023-11-27 DIAGNOSIS — M53.3 CHRONIC SI JOINT PAIN: Chronic | ICD-10-CM

## 2023-11-27 DIAGNOSIS — M47.816 OSTEOARTHRITIS OF LUMBAR SPINE, UNSPECIFIED SPINAL OSTEOARTHRITIS COMPLICATION STATUS: ICD-10-CM

## 2023-11-27 DIAGNOSIS — G89.29 CHRONIC SI JOINT PAIN: Chronic | ICD-10-CM

## 2023-11-27 DIAGNOSIS — M54.50 CHRONIC BILATERAL LOW BACK PAIN WITHOUT SCIATICA: Primary | Chronic | ICD-10-CM

## 2023-11-27 PROCEDURE — 99213 OFFICE O/P EST LOW 20 MIN: CPT

## 2023-11-27 PROCEDURE — 99214 OFFICE O/P EST MOD 30 MIN: CPT | Performed by: NURSE PRACTITIONER

## 2023-11-27 RX ORDER — DOCUSATE SODIUM 100 MG/1
100 CAPSULE, LIQUID FILLED ORAL DAILY PRN
Qty: 30 CAPSULE | Refills: 0 | Status: SHIPPED | OUTPATIENT
Start: 2023-11-27

## 2023-11-27 RX ORDER — OXYCODONE HYDROCHLORIDE AND ACETAMINOPHEN 5; 325 MG/1; MG/1
1 TABLET ORAL EVERY 6 HOURS PRN
Qty: 120 TABLET | Refills: 0 | Status: SHIPPED | OUTPATIENT
Start: 2023-12-02 | End: 2024-01-01

## 2023-11-27 RX ORDER — PREGABALIN 150 MG/1
150 CAPSULE ORAL 2 TIMES DAILY
Qty: 60 CAPSULE | Refills: 2 | Status: SHIPPED | OUTPATIENT
Start: 2023-11-27 | End: 2024-02-25

## 2023-11-27 ASSESSMENT — ENCOUNTER SYMPTOMS
BACK PAIN: 1
BOWEL INCONTINENCE: 0
SHORTNESS OF BREATH: 0
CONSTIPATION: 0
COUGH: 0

## 2023-11-27 ASSESSMENT — PAIN DESCRIPTION - LOCATION: LOCATION: BACK

## 2023-11-27 ASSESSMENT — PAIN DESCRIPTION - PAIN TYPE: TYPE: CHRONIC PAIN

## 2023-11-27 ASSESSMENT — PAIN SCALES - GENERAL: PAINLEVEL_OUTOF10: 5

## 2023-11-27 ASSESSMENT — PAIN DESCRIPTION - FREQUENCY: FREQUENCY: CONTINUOUS

## 2023-11-27 NOTE — PROGRESS NOTES
to medications and would like to continue them. As always, we encourage daily stretching and strengthening exercises, and recommend minimizing use of pain medications unless patient cannot get through daily activities due to pain. Due to the high risk nature of this patient's pain medication close monitoring is required. Continue current medication management, pt has been stable and compliant. Script written for percocet  Low back pain is gradually returning - she is requesting to repeat the RFA in January - will schedule this today  Follow up appointment made for 4 weeks    I have reviewed the chief complaint and history of present illness (including ROS and [address]) and vital documentation by my staff and I agree with their documentation and have added where applicable.

## 2023-11-30 ENCOUNTER — TELEPHONE (OUTPATIENT)
Age: 53
End: 2023-11-30

## 2023-12-01 NOTE — TELEPHONE ENCOUNTER
12/1/23- Patient returned call to office. She sees Dr. Carlos Chua and prefers to stay with his office.  Switched referral to [facility] reactions to medicines

## 2024-01-03 ENCOUNTER — HOSPITAL ENCOUNTER (OUTPATIENT)
Dept: PAIN MANAGEMENT | Facility: CLINIC | Age: 54
Discharge: HOME | End: 2024-01-03
Payer: COMMERCIAL

## 2024-01-03 VITALS
SYSTOLIC BLOOD PRESSURE: 126 MMHG | TEMPERATURE: 97.4 F | DIASTOLIC BLOOD PRESSURE: 78 MMHG | OXYGEN SATURATION: 95 % | RESPIRATION RATE: 10 BRPM | BODY MASS INDEX: 43.59 KG/M2 | HEIGHT: 60 IN | WEIGHT: 222 LBS | HEART RATE: 72 BPM

## 2024-01-03 DIAGNOSIS — M47.817 LUMBOSACRAL SPONDYLOSIS WITHOUT MYELOPATHY: Primary | ICD-10-CM

## 2024-01-03 DIAGNOSIS — R52 PAIN MANAGEMENT: ICD-10-CM

## 2024-01-03 LAB — GLUCOSE BLD-MCNC: 95 MG/DL (ref 65–105)

## 2024-01-03 PROCEDURE — 99152 MOD SED SAME PHYS/QHP 5/>YRS: CPT | Performed by: ANESTHESIOLOGY

## 2024-01-03 PROCEDURE — 64636 DESTROY L/S FACET JNT ADDL: CPT

## 2024-01-03 PROCEDURE — 64635 DESTROY LUMB/SAC FACET JNT: CPT | Performed by: ANESTHESIOLOGY

## 2024-01-03 PROCEDURE — 64635 DESTROY LUMB/SAC FACET JNT: CPT

## 2024-01-03 PROCEDURE — 2500000003 HC RX 250 WO HCPCS: Performed by: ANESTHESIOLOGY

## 2024-01-03 PROCEDURE — 6360000002 HC RX W HCPCS: Performed by: ANESTHESIOLOGY

## 2024-01-03 PROCEDURE — 82947 ASSAY GLUCOSE BLOOD QUANT: CPT

## 2024-01-03 PROCEDURE — 64636 DESTROY L/S FACET JNT ADDL: CPT | Performed by: ANESTHESIOLOGY

## 2024-01-03 RX ORDER — MIDAZOLAM HYDROCHLORIDE 2 MG/2ML
INJECTION, SOLUTION INTRAMUSCULAR; INTRAVENOUS
Status: COMPLETED | OUTPATIENT
Start: 2024-01-03 | End: 2024-01-03

## 2024-01-03 RX ORDER — LIDOCAINE HYDROCHLORIDE 10 MG/ML
INJECTION, SOLUTION EPIDURAL; INFILTRATION; INTRACAUDAL; PERINEURAL
Status: COMPLETED | OUTPATIENT
Start: 2024-01-03 | End: 2024-01-03

## 2024-01-03 RX ORDER — FENTANYL CITRATE 50 UG/ML
INJECTION, SOLUTION INTRAMUSCULAR; INTRAVENOUS
Status: COMPLETED | OUTPATIENT
Start: 2024-01-03 | End: 2024-01-03

## 2024-01-03 RX ORDER — LIDOCAINE HYDROCHLORIDE 40 MG/ML
INJECTION, SOLUTION RETROBULBAR; TOPICAL
Status: COMPLETED | OUTPATIENT
Start: 2024-01-03 | End: 2024-01-03

## 2024-01-03 RX ADMIN — MIDAZOLAM HYDROCHLORIDE 1 MG: 1 INJECTION, SOLUTION INTRAMUSCULAR; INTRAVENOUS at 12:56

## 2024-01-03 RX ADMIN — LIDOCAINE HYDROCHLORIDE 5 ML: 10 INJECTION, SOLUTION EPIDURAL; INFILTRATION; INTRACAUDAL at 12:45

## 2024-01-03 RX ADMIN — LIDOCAINE HYDROCHLORIDE 5 ML: 40 SOLUTION RETROBULBAR; TOPICAL at 12:58

## 2024-01-03 RX ADMIN — LIDOCAINE HYDROCHLORIDE 5 ML: 10 INJECTION, SOLUTION EPIDURAL; INFILTRATION; INTRACAUDAL at 12:58

## 2024-01-03 RX ADMIN — MIDAZOLAM HYDROCHLORIDE 1 MG: 1 INJECTION, SOLUTION INTRAMUSCULAR; INTRAVENOUS at 12:44

## 2024-01-03 RX ADMIN — FENTANYL CITRATE 50 MCG: 50 INJECTION, SOLUTION INTRAMUSCULAR; INTRAVENOUS at 12:56

## 2024-01-03 RX ADMIN — FENTANYL CITRATE 50 MCG: 50 INJECTION, SOLUTION INTRAMUSCULAR; INTRAVENOUS at 12:44

## 2024-01-03 ASSESSMENT — PAIN DESCRIPTION - DIRECTION: RADIATING_TOWARDS: BILAT LEGS

## 2024-01-03 ASSESSMENT — PAIN DESCRIPTION - LOCATION: LOCATION: BACK

## 2024-01-03 ASSESSMENT — PAIN - FUNCTIONAL ASSESSMENT
PAIN_FUNCTIONAL_ASSESSMENT: 0-10
PAIN_FUNCTIONAL_ASSESSMENT: PREVENTS OR INTERFERES WITH ALL ACTIVE AND SOME PASSIVE ACTIVITIES

## 2024-01-03 ASSESSMENT — PAIN SCALES - GENERAL: PAINLEVEL_OUTOF10: 6

## 2024-01-03 ASSESSMENT — PAIN DESCRIPTION - PAIN TYPE: TYPE: CHRONIC PAIN

## 2024-01-03 ASSESSMENT — PAIN DESCRIPTION - ORIENTATION: ORIENTATION: LOWER

## 2024-01-03 ASSESSMENT — PAIN DESCRIPTION - FREQUENCY: FREQUENCY: CONTINUOUS

## 2024-01-03 NOTE — OP NOTE
Preoperative Diagnosis: Lumbar spondylosis w/o myelopathy, chronic low back pain  Postoperative Diagnosis: Lumbar spondylosis w/o myelopathy, chronic low back pain  SEDATION: SEE SEDATION NOTE  BLOOD LOSS: NONE    Procedure Performed:  :Radiofrequency ablation of median branches at the Transverse processes of L3, L4 AND L5 for L3/4 AND L4/5 facet joints on Bilateral under fluoroscopic guidance with IV sedation    Procedure:    After starting an IV, the patient was taken to procedure room.  The patient was placed in prone position and skin over the back was prepped and draped in sterile manner.    Standard monitors were connected and vitals were monitored during the case and they remained stable during the procedure.    A meaningful communication was kept up with the patient throughout the procedure.    Then using fluoroscopy the junction of the transverse process of the target vertebra with the superior process of the facet joint was observed and the view was optimized.  The skin and deep tissues were infiltrated with 2 ml of  1 % lidocaine. The RF canula with the 10 mm active tip was introduced through the skin wheal under fluoroscopy guidance such that the tip of the needle lies in the groove of the transverse process with the superior processes of the facet joint.  Then a lateral and AP view of the lumbar spine was obtained to make sure the tip of needle is not in the neural foramen.  Then electric impedence was checked to make sure it is acceptable. Then a sensory stimulus was applied at 50 Hz up to 0.5 volt and concordant pain symptoms were reproduced. Then a motor stimulus was applied at 2 Hz up to 2 volts or 3 x times the sensory stimulus and no motor stimulation was seen in lower extremities.  Some multifidus stimulus was seen.  Then after negative aspiration 1 ml of 4% lidocaine was injected through the needle at each level.The radiofrequency lesion was done at 85 degrees centigrade for 110 seconds.

## 2024-01-03 NOTE — DISCHARGE INSTRUCTIONS
Discharge Instructions following Sedation or Anesthesia:  You have  received  a sedative/anesthetic therefore, you should not consume any alcoholic beverages for minimum of 12 hours.  Do not drive or operate machinery for 24 hours.  Do not sign legal documents for 24 hours.  Dizziness, drowsiness, and unsteadiness may occur.  Rest when need to.  Increase diet as tolerated.  Keep up on fluids if diet allows.      General Instructions:  Do not take a tub bath for 72 hours after procedure (this includes hot tubs and swimming pools).  You may shower, but avoid hot water to injection site.   Avoid strenuous activity TODAY especially if you experience dizziness.   Remove band-aid the next day.  Wash off any residual iodine   Do not use heat, heating pad, or any other heating device over the injection site for 3 days after the procedure.  If you experience pain after your procedure, you may continue with your current pain medication as prescribed.  (DO NOT INCREASE YOUR PAIN MEDICATION WITHOUT TALKING TO DOCTOR)  Soreness and pain at injection site is common, may use ice to reduce soreness.    Call [facility] at [phone] if you experience:   Fever, chills or temperature over 100    Vomiting, Headache, persistent stiff neck, nausea, blurred vision   Difficulty in urinating or unable to urinate with 8 hours   Increase in weakness, numbness or loss of function   Increased redness, swelling or drainage at the injection site

## 2024-01-03 NOTE — INTERVAL H&P NOTE
Update History & Physical    The patient's History and Physical of December 21, 2023 was reviewed with the patient and I examined the patient. There was no change. The surgical site was confirmed by the patient and me.     Plan: The risks, benefits, expected outcome, and alternative to the recommended procedure have been discussed with the patient. Patient understands and wants to proceed with the procedure.     ASA 3  MP 3    Electronically signed by Elmer Charles MD on 1/3/2024 at 12:38 PM

## 2024-01-04 ENCOUNTER — TELEPHONE (OUTPATIENT)
Dept: PAIN MANAGEMENT | Age: 54
End: 2024-01-04

## 2024-01-04 NOTE — TELEPHONE ENCOUNTER
Patient called in stating that her script went to the wrong pharmacy, it should go to [facility]. Pharmacy is updated. Please advise.

## 2024-01-17 ENCOUNTER — TELEPHONE (OUTPATIENT)
Dept: PAIN MANAGEMENT | Age: 54
End: 2024-01-17

## 2024-01-17 NOTE — TELEPHONE ENCOUNTER
I LM on patient's identified VM her appointment has been moved from 1/26 to 1/25 at 2:20.  I asked patient to call if date and time are not convenient.

## 2024-01-25 ENCOUNTER — HOSPITAL ENCOUNTER (OUTPATIENT)
Dept: PAIN MANAGEMENT | Age: 54
Discharge: HOME | End: 2024-01-25
Payer: COMMERCIAL

## 2024-01-25 VITALS — HEIGHT: 60 IN | BODY MASS INDEX: 43.59 KG/M2 | WEIGHT: 222 LBS

## 2024-01-25 DIAGNOSIS — M53.3 CHRONIC SI JOINT PAIN: Chronic | ICD-10-CM

## 2024-01-25 DIAGNOSIS — M50.20 CERVICAL DISC HERNIATION: ICD-10-CM

## 2024-01-25 DIAGNOSIS — M47.816 LUMBAR SPONDYLOSIS: Chronic | ICD-10-CM

## 2024-01-25 DIAGNOSIS — M51.36 DEGENERATION OF LUMBAR INTERVERTEBRAL DISC: Chronic | ICD-10-CM

## 2024-01-25 DIAGNOSIS — M16.0 BILATERAL HIP JOINT ARTHRITIS: ICD-10-CM

## 2024-01-25 DIAGNOSIS — G89.29 CHRONIC SI JOINT PAIN: Chronic | ICD-10-CM

## 2024-01-25 DIAGNOSIS — M16.12 PRIMARY OSTEOARTHRITIS OF LEFT HIP: ICD-10-CM

## 2024-01-25 DIAGNOSIS — Z79.891 CHRONIC USE OF OPIATE FOR THERAPEUTIC PURPOSE: ICD-10-CM

## 2024-01-25 DIAGNOSIS — M25.551 RIGHT HIP PAIN: ICD-10-CM

## 2024-01-25 DIAGNOSIS — G89.29 CHRONIC BILATERAL LOW BACK PAIN WITHOUT SCIATICA: Chronic | ICD-10-CM

## 2024-01-25 DIAGNOSIS — M46.1 SACROILIITIS (HCC): ICD-10-CM

## 2024-01-25 DIAGNOSIS — M54.16 LUMBAR RADICULOPATHY, CHRONIC: Primary | Chronic | ICD-10-CM

## 2024-01-25 DIAGNOSIS — M54.50 CHRONIC BILATERAL LOW BACK PAIN WITHOUT SCIATICA: Chronic | ICD-10-CM

## 2024-01-25 DIAGNOSIS — M25.551 PAIN OF RIGHT HIP: ICD-10-CM

## 2024-01-25 DIAGNOSIS — M50.30 DEGENERATIVE DISC DISEASE, CERVICAL: ICD-10-CM

## 2024-01-25 PROCEDURE — 99213 OFFICE O/P EST LOW 20 MIN: CPT

## 2024-01-25 PROCEDURE — 99214 OFFICE O/P EST MOD 30 MIN: CPT | Performed by: NURSE PRACTITIONER

## 2024-01-25 RX ORDER — OXYCODONE HYDROCHLORIDE AND ACETAMINOPHEN 5; 325 MG/1; MG/1
1 TABLET ORAL EVERY 6 HOURS PRN
Qty: 120 TABLET | Refills: 0 | Status: SHIPPED | OUTPATIENT
Start: 2024-02-06 | End: 2024-03-07

## 2024-01-25 RX ORDER — DOCUSATE SODIUM 100 MG/1
100 CAPSULE, LIQUID FILLED ORAL DAILY PRN
Qty: 30 CAPSULE | Refills: 0 | Status: SHIPPED | OUTPATIENT
Start: 2024-01-25

## 2024-01-25 RX ORDER — TIZANIDINE 4 MG/1
TABLET ORAL
Qty: 270 TABLET | Refills: 0 | Status: SHIPPED | OUTPATIENT
Start: 2024-01-25

## 2024-01-25 ASSESSMENT — ENCOUNTER SYMPTOMS
SHORTNESS OF BREATH: 0
CONSTIPATION: 0
COUGH: 0
BOWEL INCONTINENCE: 0
BACK PAIN: 1

## 2024-01-25 NOTE — PROGRESS NOTES
Chronic SI joint pain (Chronic)    Relevant Medications    oxyCODONE-acetaminophen (PERCOCET) 5-325 MG per tablet (Start on 2/6/2024)    tiZANidine (ZANAFLEX) 4 MG tablet    Bilateral hip joint arthritis    Relevant Medications    oxyCODONE-acetaminophen (PERCOCET) 5-325 MG per tablet (Start on 2/6/2024)    tiZANidine (ZANAFLEX) 4 MG tablet    Other Relevant Orders    MAJOR JOINT INJECTION    Lumbar radiculopathy, chronic - Primary (Chronic)    Relevant Medications    oxyCODONE-acetaminophen (PERCOCET) 5-325 MG per tablet (Start on 2/6/2024)    tiZANidine (ZANAFLEX) 4 MG tablet    Degeneration of lumbar intervertebral disc (Chronic)    Relevant Medications    oxyCODONE-acetaminophen (PERCOCET) 5-325 MG per tablet (Start on 2/6/2024)    tiZANidine (ZANAFLEX) 4 MG tablet    Lumbar spondylosis (Chronic)    Relevant Medications    oxyCODONE-acetaminophen (PERCOCET) 5-325 MG per tablet (Start on 2/6/2024)    tiZANidine (ZANAFLEX) 4 MG tablet    Chronic bilateral low back pain without sciatica (Chronic)    Relevant Medications    oxyCODONE-acetaminophen (PERCOCET) 5-325 MG per tablet (Start on 2/6/2024)    tiZANidine (ZANAFLEX) 4 MG tablet    Degenerative disc disease, cervical    Relevant Medications    oxyCODONE-acetaminophen (PERCOCET) 5-325 MG per tablet (Start on 2/6/2024)    tiZANidine (ZANAFLEX) 4 MG tablet    Cervical disc herniation    Relevant Medications    oxyCODONE-acetaminophen (PERCOCET) 5-325 MG per tablet (Start on 2/6/2024)    Sacroiliitis (HCC)    Relevant Medications    oxyCODONE-acetaminophen (PERCOCET) 5-325 MG per tablet (Start on 2/6/2024)    tiZANidine (ZANAFLEX) 4 MG tablet    Primary osteoarthritis of left hip    Relevant Medications    oxyCODONE-acetaminophen (PERCOCET) 5-325 MG per tablet (Start on 2/6/2024)    tiZANidine (ZANAFLEX) 4 MG tablet    Hip pain    Chronic use of opiate for therapeutic purpose     Other Visit Diagnoses       Right hip pain                   Treatment

## 2024-01-25 NOTE — H&P (VIEW-ONLY)
Plan:  Patient relates current medications are helping the pain. Patient reports taking pain medications as prescribed, denies obtaining medications from different sources and denies use of illegal drugs. Medication risk and benefits have been discussed. Patient denies side effects from medications like nausea, vomiting, constipation or drowsiness. Patient reports current activities of daily living are possible due to medications and would like to continue them.     As always, we encourage daily stretching and strengthening exercises, and recommend minimizing use of pain medications unless patient cannot get through daily activities due to pain.     Due to the high risk nature of this patient's pain medication close monitoring is required.   Continue current medication management, pt has been stable and compliant.  Script written for percocet  Right hip injection ordered  Follow up appointment made for 4 weeks    I have reviewed the chief complaint and history of present illness (including ROS and PFSH) and vital documentation by my staff and I agree with their documentation and have added where applicable.

## 2024-02-08 DIAGNOSIS — M50.20 CERVICAL DISC HERNIATION: ICD-10-CM

## 2024-02-08 DIAGNOSIS — M51.36 DEGENERATION OF LUMBAR INTERVERTEBRAL DISC: Chronic | ICD-10-CM

## 2024-02-08 DIAGNOSIS — Z79.899 ENCOUNTER FOR MEDICATION MANAGEMENT: ICD-10-CM

## 2024-02-08 DIAGNOSIS — G89.29 CHRONIC LEFT-SIDED LOW BACK PAIN WITH LEFT-SIDED SCIATICA: ICD-10-CM

## 2024-02-08 DIAGNOSIS — M47.816 OSTEOARTHRITIS OF LUMBAR SPINE, UNSPECIFIED SPINAL OSTEOARTHRITIS COMPLICATION STATUS: ICD-10-CM

## 2024-02-08 DIAGNOSIS — M54.42 CHRONIC LEFT-SIDED LOW BACK PAIN WITH LEFT-SIDED SCIATICA: ICD-10-CM

## 2024-02-08 DIAGNOSIS — M50.30 DEGENERATIVE DISC DISEASE, CERVICAL: ICD-10-CM

## 2024-02-08 DIAGNOSIS — M47.26 OSTEOARTHRITIS OF SPINE WITH RADICULOPATHY, LUMBAR REGION: ICD-10-CM

## 2024-02-08 DIAGNOSIS — M47.896 OTHER OSTEOARTHRITIS OF SPINE, LUMBAR REGION: ICD-10-CM

## 2024-02-08 DIAGNOSIS — M16.12 PRIMARY OSTEOARTHRITIS OF LEFT HIP: ICD-10-CM

## 2024-02-09 RX ORDER — PREGABALIN 150 MG/1
CAPSULE ORAL
Qty: 60 CAPSULE | Refills: 2 | OUTPATIENT
Start: 2024-02-09

## 2024-02-12 ENCOUNTER — HOSPITAL ENCOUNTER (OUTPATIENT)
Dept: PAIN MANAGEMENT | Facility: CLINIC | Age: 54
Discharge: HOME | End: 2024-02-12
Payer: COMMERCIAL

## 2024-02-12 VITALS
DIASTOLIC BLOOD PRESSURE: 85 MMHG | RESPIRATION RATE: 16 BRPM | HEART RATE: 77 BPM | SYSTOLIC BLOOD PRESSURE: 136 MMHG | HEIGHT: 60 IN | BODY MASS INDEX: 43.59 KG/M2 | TEMPERATURE: 97 F | WEIGHT: 222 LBS | OXYGEN SATURATION: 95 %

## 2024-02-12 DIAGNOSIS — N30.10 INTERSTITIAL CYSTITIS: ICD-10-CM

## 2024-02-12 DIAGNOSIS — M16.0 BILATERAL HIP JOINT ARTHRITIS: Primary | ICD-10-CM

## 2024-02-12 DIAGNOSIS — R52 PAIN MANAGEMENT: ICD-10-CM

## 2024-02-12 LAB — GLUCOSE BLD-MCNC: 114 MG/DL (ref 65–105)

## 2024-02-12 PROCEDURE — 20610 DRAIN/INJ JOINT/BURSA W/O US: CPT

## 2024-02-12 PROCEDURE — 20610 DRAIN/INJ JOINT/BURSA W/O US: CPT | Performed by: ANESTHESIOLOGY

## 2024-02-12 PROCEDURE — 2500000003 HC RX 250 WO HCPCS: Performed by: ANESTHESIOLOGY

## 2024-02-12 PROCEDURE — 82947 ASSAY GLUCOSE BLOOD QUANT: CPT

## 2024-02-12 PROCEDURE — 6360000004 HC RX CONTRAST MEDICATION: Performed by: ANESTHESIOLOGY

## 2024-02-12 PROCEDURE — 77002 NEEDLE LOCALIZATION BY XRAY: CPT | Performed by: ANESTHESIOLOGY

## 2024-02-12 PROCEDURE — 6360000002 HC RX W HCPCS: Performed by: ANESTHESIOLOGY

## 2024-02-12 PROCEDURE — 99152 MOD SED SAME PHYS/QHP 5/>YRS: CPT | Performed by: ANESTHESIOLOGY

## 2024-02-12 PROCEDURE — 77002 NEEDLE LOCALIZATION BY XRAY: CPT

## 2024-02-12 RX ORDER — TROSPIUM CHLORIDE 20 MG/1
TABLET, FILM COATED ORAL
Qty: 90 TABLET | Refills: 0 | Status: SHIPPED | OUTPATIENT
Start: 2024-02-12

## 2024-02-12 RX ORDER — BUPIVACAINE HYDROCHLORIDE 5 MG/ML
INJECTION, SOLUTION EPIDURAL; INTRACAUDAL
Status: COMPLETED | OUTPATIENT
Start: 2024-02-12 | End: 2024-02-12

## 2024-02-12 RX ORDER — DEXAMETHASONE SODIUM PHOSPHATE 10 MG/ML
INJECTION, SOLUTION INTRAMUSCULAR; INTRAVENOUS
Status: COMPLETED | OUTPATIENT
Start: 2024-02-12 | End: 2024-02-12

## 2024-02-12 RX ORDER — FENTANYL CITRATE 50 UG/ML
INJECTION, SOLUTION INTRAMUSCULAR; INTRAVENOUS
Status: COMPLETED | OUTPATIENT
Start: 2024-02-12 | End: 2024-02-12

## 2024-02-12 RX ORDER — LIDOCAINE HYDROCHLORIDE 10 MG/ML
INJECTION, SOLUTION EPIDURAL; INFILTRATION; INTRACAUDAL; PERINEURAL
Status: COMPLETED | OUTPATIENT
Start: 2024-02-12 | End: 2024-02-12

## 2024-02-12 RX ORDER — MIDAZOLAM HYDROCHLORIDE 2 MG/2ML
INJECTION, SOLUTION INTRAMUSCULAR; INTRAVENOUS
Status: COMPLETED | OUTPATIENT
Start: 2024-02-12 | End: 2024-02-12

## 2024-02-12 RX ADMIN — MIDAZOLAM HYDROCHLORIDE 1 MG: 1 INJECTION, SOLUTION INTRAMUSCULAR; INTRAVENOUS at 14:09

## 2024-02-12 RX ADMIN — DEXAMETHASONE SODIUM PHOSPHATE 10 MG: 10 INJECTION, SOLUTION INTRAMUSCULAR; INTRAVENOUS at 14:11

## 2024-02-12 RX ADMIN — BUPIVACAINE HYDROCHLORIDE 10 ML: 5 INJECTION, SOLUTION EPIDURAL; INTRACAUDAL; PERINEURAL at 14:11

## 2024-02-12 RX ADMIN — FENTANYL CITRATE 50 MCG: 50 INJECTION, SOLUTION INTRAMUSCULAR; INTRAVENOUS at 14:09

## 2024-02-12 RX ADMIN — IOHEXOL 2 ML: 180 INJECTION INTRAVENOUS at 14:10

## 2024-02-12 RX ADMIN — LIDOCAINE HYDROCHLORIDE 10 ML: 10 INJECTION, SOLUTION EPIDURAL; INFILTRATION; INTRACAUDAL at 14:10

## 2024-02-12 NOTE — OP NOTE
PREOPERATIVE DIAGNOSIS:   Bilateral hip osteoarthritis.     POSTOPERATIVE DIAGNOSIS:  Bilateral hip osteoarthritis.     PROCEDURE PERFORMED:  Bilateral hip steroid injection with fluoroscopy guidance.     ANESTHESIA:  versed and fentanyl     BLOOD LOSS:  None.     COMPLICATION:  None.     OPERATIVE NOTE:  The patient was seen in the preoperative area.  Chart was  reviewed.  Informed consent was obtained.  The patient was taken to the  procedure room and was placed in supine position.  The area over the right hip  was prepped with ChloraPrep.  A timeout was completed prior to the start of  the procedure.  Standard monitors were connected and vital signs were  monitored throughout the procedure.  The skin entry point was marked along the  greater trochanteric line.  The skin and subcutaneous tissues were  anesthetized with 1% lidocaine.  Then, a 22 gauge needle was advanced with an  oblique paramedian approach from greater trochanter towards the neck and head  of the femur.  Once the bony contact was made, aspiration was negative.     Finally total 3 mL of the treatment solution was injected form a mixture containing 5 ml of 0.5% bupivacaine mixed with 1 ml of dexamethasone 10 mg.  The needle was removed.  The patient tolerated the procedure well.  There was no  complication.     The same procedure was then repeated on other side with same technique and remaining 3 ml of treatment solution was injected on the other side.  Total dose of dexamethaosne was 10 mg    SEDATION NOTE:    ASA CLASSIFICATION  3  MP   CLASSIFICATION  3    Moderate intravenous conscious sedation was supervised by Dr. Charles  The patient was independently monitored by a Registered Nurse assigned to the Procedure Room  Monitoring included automated blood pressure, continuous EKG, Capnography and continuous pulse oximetry.   The detailed Conscious Record is permanently stored in the Hospital Information System.     The following is the conscious

## 2024-02-12 NOTE — INTERVAL H&P NOTE
Update History & Physical    The patient's History and Physical of January 25, 2024 was reviewed with the patient and I examined the patient. There was no change. The surgical site was confirmed by the patient and me.     Plan: The risks, benefits, expected outcome, and alternative to the recommended procedure have been discussed with the patient. Patient understands and wants to proceed with the procedure.     ASA 3  MP 3    Electronically signed by Elmer Charles MD on 2/12/2024 at 1:51 PM

## 2024-02-12 NOTE — DISCHARGE INSTRUCTIONS

## 2024-02-29 DIAGNOSIS — E11.9 TYPE 2 DIABETES MELLITUS WITHOUT COMPLICATION, WITHOUT LONG-TERM CURRENT USE OF INSULIN (HCC): ICD-10-CM

## 2024-02-29 RX ORDER — DULAGLUTIDE 1.5 MG/.5ML
INJECTION, SOLUTION SUBCUTANEOUS
Qty: 12 ML | Refills: 0 | Status: SHIPPED | OUTPATIENT
Start: 2024-02-29

## 2024-02-29 NOTE — TELEPHONE ENCOUNTER
Please Approve or Refuse.  Send to Pharmacy per Pt's Request:      Next Visit Date:  3/20/2024   Last Visit Date: 11/20/2023    Hemoglobin A1C (%)   Date Value   11/20/2023 5.9   08/17/2023 5.3   02/13/2023 5.9             ( goal A1C is < 7)   BP Readings from Last 3 Encounters:   02/12/24 136/85   01/03/24 126/78   11/27/23 128/82          (goal 120/80)  BUN   Date Value Ref Range Status   01/09/2023 12 6 - 20 mg/dL Final     Creatinine   Date Value Ref Range Status   01/09/2023 0.85 0.50 - 0.90 mg/dL Final     Potassium   Date Value Ref Range Status   10/14/2022 4.4 3.7 - 5.3 mmol/L Final

## 2024-03-05 ENCOUNTER — HOSPITAL ENCOUNTER (OUTPATIENT)
Dept: PAIN MANAGEMENT | Age: 54
Discharge: HOME | End: 2024-03-05
Payer: COMMERCIAL

## 2024-03-05 VITALS — HEIGHT: 60 IN | BODY MASS INDEX: 43.59 KG/M2 | WEIGHT: 222 LBS

## 2024-03-05 DIAGNOSIS — Z79.899 ENCOUNTER FOR MEDICATION MANAGEMENT: ICD-10-CM

## 2024-03-05 DIAGNOSIS — Z79.891 CHRONIC USE OF OPIATE FOR THERAPEUTIC PURPOSE: ICD-10-CM

## 2024-03-05 DIAGNOSIS — M51.36 DEGENERATION OF LUMBAR INTERVERTEBRAL DISC: Chronic | ICD-10-CM

## 2024-03-05 DIAGNOSIS — M25.551 RIGHT HIP PAIN: ICD-10-CM

## 2024-03-05 DIAGNOSIS — G89.29 CHRONIC BILATERAL LOW BACK PAIN WITHOUT SCIATICA: Primary | Chronic | ICD-10-CM

## 2024-03-05 DIAGNOSIS — M46.1 SACROILIITIS (HCC): ICD-10-CM

## 2024-03-05 DIAGNOSIS — M54.50 CHRONIC BILATERAL LOW BACK PAIN WITHOUT SCIATICA: Primary | Chronic | ICD-10-CM

## 2024-03-05 DIAGNOSIS — M47.26 OSTEOARTHRITIS OF SPINE WITH RADICULOPATHY, LUMBAR REGION: ICD-10-CM

## 2024-03-05 DIAGNOSIS — M50.20 CERVICAL DISC HERNIATION: ICD-10-CM

## 2024-03-05 DIAGNOSIS — M16.0 BILATERAL HIP JOINT ARTHRITIS: ICD-10-CM

## 2024-03-05 DIAGNOSIS — M50.30 DEGENERATIVE DISC DISEASE, CERVICAL: ICD-10-CM

## 2024-03-05 DIAGNOSIS — M16.12 PRIMARY OSTEOARTHRITIS OF LEFT HIP: ICD-10-CM

## 2024-03-05 DIAGNOSIS — M47.816 LUMBAR SPONDYLOSIS: Chronic | ICD-10-CM

## 2024-03-05 DIAGNOSIS — M54.42 CHRONIC LEFT-SIDED LOW BACK PAIN WITH LEFT-SIDED SCIATICA: ICD-10-CM

## 2024-03-05 DIAGNOSIS — M47.816 OSTEOARTHRITIS OF LUMBAR SPINE, UNSPECIFIED SPINAL OSTEOARTHRITIS COMPLICATION STATUS: ICD-10-CM

## 2024-03-05 DIAGNOSIS — M54.16 LUMBAR RADICULOPATHY, CHRONIC: Chronic | ICD-10-CM

## 2024-03-05 DIAGNOSIS — M47.896 OTHER OSTEOARTHRITIS OF SPINE, LUMBAR REGION: ICD-10-CM

## 2024-03-05 DIAGNOSIS — Z98.1 S/P FUSION OF SACROILIAC JOINT: ICD-10-CM

## 2024-03-05 DIAGNOSIS — G89.29 CHRONIC LEFT-SIDED LOW BACK PAIN WITH LEFT-SIDED SCIATICA: ICD-10-CM

## 2024-03-05 PROCEDURE — 99213 OFFICE O/P EST LOW 20 MIN: CPT | Performed by: NURSE PRACTITIONER

## 2024-03-05 PROCEDURE — 99213 OFFICE O/P EST LOW 20 MIN: CPT

## 2024-03-05 RX ORDER — PREGABALIN 150 MG/1
150 CAPSULE ORAL 2 TIMES DAILY
Qty: 60 CAPSULE | Refills: 2 | Status: SHIPPED | OUTPATIENT
Start: 2024-03-05 | End: 2024-06-03

## 2024-03-05 RX ORDER — OXYCODONE HYDROCHLORIDE AND ACETAMINOPHEN 5; 325 MG/1; MG/1
1 TABLET ORAL EVERY 6 HOURS PRN
Qty: 120 TABLET | Refills: 0 | Status: SHIPPED | OUTPATIENT
Start: 2024-03-07 | End: 2024-04-06

## 2024-03-05 ASSESSMENT — PAIN SCALES - GENERAL: PAINLEVEL_OUTOF10: 5

## 2024-03-05 ASSESSMENT — ENCOUNTER SYMPTOMS
SHORTNESS OF BREATH: 0
BOWEL INCONTINENCE: 0
COUGH: 0
BACK PAIN: 1
CONSTIPATION: 0

## 2024-03-05 NOTE — PROGRESS NOTES
relates current medications are helping the pain. Patient reports taking pain medications as prescribed, denies obtaining medications from different sources and denies use of illegal drugs. Medication risk and benefits have been discussed. Patient denies side effects from medications like nausea, vomiting, constipation or drowsiness. Patient reports current activities of daily living are possible due to medications and would like to continue them.     As always, we encourage daily stretching and strengthening exercises, and recommend minimizing use of pain medications unless patient cannot get through daily activities due to pain.     Due to the high risk nature of this patient's pain medication close monitoring is required.   Continue current medication management, pt has been stable and compliant.  Script written for percocet  Patient relates significant relief from recent intervention   Follow up appointment made for 4 weeks    I have reviewed the chief complaint and history of present illness (including ROS and PFSH) and vital documentation by my staff and I agree with their documentation and have added where applicable.

## 2024-03-12 ENCOUNTER — HOSPITAL ENCOUNTER (EMERGENCY)
Age: 54
Discharge: HOME | End: 2024-03-12
Attending: EMERGENCY MEDICINE
Payer: COMMERCIAL

## 2024-03-12 ENCOUNTER — APPOINTMENT (OUTPATIENT)
Dept: GENERAL RADIOLOGY | Age: 54
End: 2024-03-12
Payer: COMMERCIAL

## 2024-03-12 VITALS
SYSTOLIC BLOOD PRESSURE: 124 MMHG | BODY MASS INDEX: 42.48 KG/M2 | WEIGHT: 225 LBS | RESPIRATION RATE: 15 BRPM | OXYGEN SATURATION: 97 % | HEART RATE: 79 BPM | DIASTOLIC BLOOD PRESSURE: 99 MMHG | TEMPERATURE: 97.7 F | HEIGHT: 61 IN

## 2024-03-12 DIAGNOSIS — S93.401A SPRAIN OF RIGHT ANKLE, UNSPECIFIED LIGAMENT, INITIAL ENCOUNTER: Primary | ICD-10-CM

## 2024-03-12 PROCEDURE — 73610 X-RAY EXAM OF ANKLE: CPT

## 2024-03-12 PROCEDURE — 99283 EMERGENCY DEPT VISIT LOW MDM: CPT

## 2024-03-12 PROCEDURE — 6370000000 HC RX 637 (ALT 250 FOR IP): Performed by: PHYSICIAN ASSISTANT

## 2024-03-12 RX ORDER — IBUPROFEN 600 MG/1
600 TABLET ORAL ONCE
Status: COMPLETED | OUTPATIENT
Start: 2024-03-12 | End: 2024-03-12

## 2024-03-12 RX ADMIN — IBUPROFEN 600 MG: 600 TABLET, FILM COATED ORAL at 14:08

## 2024-03-12 ASSESSMENT — PAIN - FUNCTIONAL ASSESSMENT: PAIN_FUNCTIONAL_ASSESSMENT: 0-10

## 2024-03-12 ASSESSMENT — PAIN SCALES - GENERAL
PAINLEVEL_OUTOF10: 6
PAINLEVEL_OUTOF10: 10

## 2024-03-12 ASSESSMENT — LIFESTYLE VARIABLES
HOW OFTEN DO YOU HAVE A DRINK CONTAINING ALCOHOL: NEVER
HOW MANY STANDARD DRINKS CONTAINING ALCOHOL DO YOU HAVE ON A TYPICAL DAY: PATIENT DOES NOT DRINK

## 2024-03-12 NOTE — DISCHARGE INSTRUCTIONS
Recommend ice, elevation and rest.  Follow up with your podiatrist. Return to the ED if you develop worsening pain, swelling, numbness, fevers, or any other concerning symptoms.

## 2024-03-12 NOTE — ED PROVIDER NOTES
eMERGENCY dEPARTMENT eNCOUnter   Independent Attestation     Pt Name: Iqra Goodrich  MRN: [id number]  Birthdate 1970  Date of evaluation: 3/12/24     Iqra Goodrich is a 53 y.o. female with CC: Ankle Pain      Based on the medical record the care appears appropriate.  I was personally available for consultation in the Emergency Department.    Juan Francisco Merino DO  Attending Emergency Physician                 Juan Francisco Merino DO  03/12/24 0802    
BUSPIRONE (BUSPAR) 30 MG TABLET    TAKE 1 TABLET BY MOUTH THREE TIMES DAILY    CETIRIZINE (ZYRTEC) 10 MG TABLET    Take 1 tablet by mouth daily    DOCUSATE SODIUM (COLACE) 100 MG CAPSULE    Take 1 capsule by mouth daily as needed for Constipation    DULAGLUTIDE 1.5 MG/0.5ML SOPN    Inject 1.5 mg into the skin once a week    ELASTIC BANDAGES & SUPPORTS (LUMBAR BACK BRACE/SUPPORT PAD) MISC    1 each by Does not apply route daily as needed (pain)    FLUTICASONE (FLONASE) 50 MCG/ACT NASAL SPRAY    INHALE 2 SPRAYS IN EACH NOSTRIL DAILY    HANDICAP PLACARD MISC    by Does not apply route    HYDROXYZINE PAMOATE (VISTARIL) 50 MG CAPSULE    TAKE 1 CAPSULE BY MOUTH THREE TIMES DAILY AS NEEDED FOR ANXIETY    LACTOBACILLUS (ACIDOPHILUS) CAPS CAPSULE    TAKE 1 CAPSULE BY MOUTH TWICE DAILY    LAMOTRIGINE (LAMICTAL) 25 MG TABLET    TAKE 1 TABLET BY MOUTH ONCE DAILY    LANCETS (ONETOUCH DELICA PLUS [id number]) MISC    USE 1 EACH TWICE A DAY    MONTELUKAST (SINGULAIR) 10 MG TABLET    Take 1 tablet by mouth nightly TAKE 1 TABLET DAILY    NUTRITIONAL SUPPLEMENTS (GLUCERNA) LIQD    Please provide chocolate flavor .    OMEPRAZOLE (PRILOSEC) 40 MG DELAYED RELEASE CAPSULE    TAKE 1 CAPSULE BY MOUTH DAILY    OXYCODONE-ACETAMINOPHEN (PERCOCET) 5-325 MG PER TABLET    Take 1 tablet by mouth every 6 hours as needed for Pain for up to 30 days.    PREGABALIN (LYRICA) 150 MG CAPSULE    Take 1 capsule by mouth 2 times daily for 90 days. Max Daily Amount: 300 mg    PROPRANOLOL (INDERAL) 10 MG TABLET    TAKE 1 TABLET BY MOUTH TWICE DAILY AS NEEDED FOR ANXIETY    SERTRALINE (ZOLOFT) 100 MG TABLET    TAKE 1 TABLET BY MOUTH ONCE DAILY    TIZANIDINE (ZANAFLEX) 4 MG TABLET    TAKE 1 TABLET EVERY 8 HOURS AS NEEDED FOR SPASMS    TOPIRAMATE (TOPAMAX) 100 MG TABLET    TAKE TWO (2) TABLETS BY MOUTH NIGHTLY    TROSPIUM (SANCTURA) 20 MG TABLET    Take 1 tablet by mouth once daily    TRULICITY 1.5 MG/0.5ML SC INJECTION    INJECT 0.5ML SUBCUTANEOUSLY  ONCE A WEEK

## 2024-03-13 ENCOUNTER — TELEPHONE (OUTPATIENT)
Dept: PODIATRY | Age: 54
End: 2024-03-13

## 2024-03-13 NOTE — TELEPHONE ENCOUNTER
Patient went to [facility] 03/12/2024, Right Ankle sprain, needs follow up with Dr. Jaeger, please call patient at [phone] [facility]/sc

## 2024-03-19 ENCOUNTER — HOSPITAL ENCOUNTER (OUTPATIENT)
Dept: WOMENS IMAGING | Age: 54
Discharge: HOME | End: 2024-03-21
Payer: COMMERCIAL

## 2024-03-19 DIAGNOSIS — Z12.31 SCREENING MAMMOGRAM FOR HIGH-RISK PATIENT: ICD-10-CM

## 2024-03-19 PROCEDURE — 77063 BREAST TOMOSYNTHESIS BI: CPT

## 2024-03-20 ENCOUNTER — OFFICE VISIT (OUTPATIENT)
Dept: FAMILY MEDICINE CLINIC | Age: 54
End: 2024-03-20
Payer: COMMERCIAL

## 2024-03-20 VITALS
SYSTOLIC BLOOD PRESSURE: 122 MMHG | BODY MASS INDEX: 43.5 KG/M2 | WEIGHT: 230.4 LBS | OXYGEN SATURATION: 94 % | DIASTOLIC BLOOD PRESSURE: 82 MMHG | HEIGHT: 61 IN | HEART RATE: 82 BPM

## 2024-03-20 DIAGNOSIS — F43.23 ADJUSTMENT DISORDER WITH MIXED ANXIETY AND DEPRESSED MOOD: ICD-10-CM

## 2024-03-20 DIAGNOSIS — E55.9 VITAMIN D DEFICIENCY: ICD-10-CM

## 2024-03-20 DIAGNOSIS — E11.9 TYPE 2 DIABETES MELLITUS WITHOUT COMPLICATION, WITHOUT LONG-TERM CURRENT USE OF INSULIN (HCC): ICD-10-CM

## 2024-03-20 DIAGNOSIS — I10 ESSENTIAL HYPERTENSION: ICD-10-CM

## 2024-03-20 DIAGNOSIS — M47.817 LUMBOSACRAL SPONDYLOSIS WITHOUT MYELOPATHY: ICD-10-CM

## 2024-03-20 DIAGNOSIS — G43.719 INTRACTABLE CHRONIC MIGRAINE WITHOUT AURA AND WITHOUT STATUS MIGRAINOSUS: ICD-10-CM

## 2024-03-20 DIAGNOSIS — E78.2 MIXED HYPERLIPIDEMIA: ICD-10-CM

## 2024-03-20 DIAGNOSIS — R07.2 PRECORDIAL PAIN: Primary | ICD-10-CM

## 2024-03-20 LAB — HBA1C MFR BLD: 6.1 %

## 2024-03-20 PROCEDURE — 3074F SYST BP LT 130 MM HG: CPT | Performed by: FAMILY MEDICINE

## 2024-03-20 PROCEDURE — 83036 HEMOGLOBIN GLYCOSYLATED A1C: CPT | Performed by: FAMILY MEDICINE

## 2024-03-20 PROCEDURE — 3079F DIAST BP 80-89 MM HG: CPT | Performed by: FAMILY MEDICINE

## 2024-03-20 PROCEDURE — G8427 DOCREV CUR MEDS BY ELIG CLIN: HCPCS | Performed by: FAMILY MEDICINE

## 2024-03-20 PROCEDURE — 2022F DILAT RTA XM EVC RTNOPTHY: CPT | Performed by: FAMILY MEDICINE

## 2024-03-20 PROCEDURE — 3017F COLORECTAL CA SCREEN DOC REV: CPT | Performed by: FAMILY MEDICINE

## 2024-03-20 PROCEDURE — 3044F HG A1C LEVEL LT 7.0%: CPT | Performed by: FAMILY MEDICINE

## 2024-03-20 PROCEDURE — 99214 OFFICE O/P EST MOD 30 MIN: CPT | Performed by: FAMILY MEDICINE

## 2024-03-20 PROCEDURE — G8417 CALC BMI ABV UP PARAM F/U: HCPCS | Performed by: FAMILY MEDICINE

## 2024-03-20 PROCEDURE — G8482 FLU IMMUNIZE ORDER/ADMIN: HCPCS | Performed by: FAMILY MEDICINE

## 2024-03-20 PROCEDURE — 1036F TOBACCO NON-USER: CPT | Performed by: FAMILY MEDICINE

## 2024-03-20 RX ORDER — SODIUM CHLORIDE 9 MG/ML
500 INJECTION, SOLUTION INTRAVENOUS CONTINUOUS PRN
Status: SHIPPED | OUTPATIENT
Start: 2024-03-20 | End: 2024-03-20

## 2024-03-20 RX ORDER — NALOXONE HYDROCHLORIDE 4 MG/.1ML
SPRAY NASAL
COMMUNITY
Start: 2024-03-07

## 2024-03-20 RX ORDER — DULAGLUTIDE 3 MG/.5ML
3 INJECTION, SOLUTION SUBCUTANEOUS WEEKLY
Qty: 12 ADJUSTABLE DOSE PRE-FILLED PEN SYRINGE | Refills: 1 | Status: SHIPPED | OUTPATIENT
Start: 2024-03-20

## 2024-03-20 SDOH — ECONOMIC STABILITY: FOOD INSECURITY: WITHIN THE PAST 12 MONTHS, YOU WORRIED THAT YOUR FOOD WOULD RUN OUT BEFORE YOU GOT MONEY TO BUY MORE.: NEVER TRUE

## 2024-03-20 SDOH — ECONOMIC STABILITY: FOOD INSECURITY: WITHIN THE PAST 12 MONTHS, THE FOOD YOU BOUGHT JUST DIDN'T LAST AND YOU DIDN'T HAVE MONEY TO GET MORE.: NEVER TRUE

## 2024-03-20 SDOH — ECONOMIC STABILITY: INCOME INSECURITY: HOW HARD IS IT FOR YOU TO PAY FOR THE VERY BASICS LIKE FOOD, HOUSING, MEDICAL CARE, AND HEATING?: NOT HARD AT ALL

## 2024-03-20 ASSESSMENT — PATIENT HEALTH QUESTIONNAIRE - PHQ9
SUM OF ALL RESPONSES TO PHQ QUESTIONS 1-9: 23
SUM OF ALL RESPONSES TO PHQ QUESTIONS 1-9: 23
6. FEELING BAD ABOUT YOURSELF - OR THAT YOU ARE A FAILURE OR HAVE LET YOURSELF OR YOUR FAMILY DOWN: NEARLY EVERY DAY
7. TROUBLE CONCENTRATING ON THINGS, SUCH AS READING THE NEWSPAPER OR WATCHING TELEVISION: NEARLY EVERY DAY
5. POOR APPETITE OR OVEREATING: NEARLY EVERY DAY
10. IF YOU CHECKED OFF ANY PROBLEMS, HOW DIFFICULT HAVE THESE PROBLEMS MADE IT FOR YOU TO DO YOUR WORK, TAKE CARE OF THINGS AT HOME, OR GET ALONG WITH OTHER PEOPLE: VERY DIFFICULT
8. MOVING OR SPEAKING SO SLOWLY THAT OTHER PEOPLE COULD HAVE NOTICED. OR THE OPPOSITE, BEING SO FIGETY OR RESTLESS THAT YOU HAVE BEEN MOVING AROUND A LOT MORE THAN USUAL: MORE THAN HALF THE DAYS
9. THOUGHTS THAT YOU WOULD BE BETTER OFF DEAD, OR OF HURTING YOURSELF: NOT AT ALL
SUM OF ALL RESPONSES TO PHQ9 QUESTIONS 1 & 2: 6
SUM OF ALL RESPONSES TO PHQ QUESTIONS 1-9: 23
4. FEELING TIRED OR HAVING LITTLE ENERGY: NEARLY EVERY DAY
1. LITTLE INTEREST OR PLEASURE IN DOING THINGS: NEARLY EVERY DAY
3. TROUBLE FALLING OR STAYING ASLEEP: NEARLY EVERY DAY
2. FEELING DOWN, DEPRESSED OR HOPELESS: NEARLY EVERY DAY
SUM OF ALL RESPONSES TO PHQ QUESTIONS 1-9: 23

## 2024-03-20 ASSESSMENT — COLUMBIA-SUICIDE SEVERITY RATING SCALE - C-SSRS
1. WITHIN THE PAST MONTH, HAVE YOU WISHED YOU WERE DEAD OR WISHED YOU COULD GO TO SLEEP AND NOT WAKE UP?: NO
2. HAVE YOU ACTUALLY HAD ANY THOUGHTS OF KILLING YOURSELF?: NO
6. HAVE YOU EVER DONE ANYTHING, STARTED TO DO ANYTHING, OR PREPARED TO DO ANYTHING TO END YOUR LIFE?: NO

## 2024-03-20 ASSESSMENT — ENCOUNTER SYMPTOMS
COUGH: 0
VOMITING: 0
ABDOMINAL DISTENTION: 0
ABDOMINAL PAIN: 0
CONSTIPATION: 0
DIARRHEA: 0
RECTAL PAIN: 0
STRIDOR: 0
BACK PAIN: 1
SHORTNESS OF BREATH: 1
CHEST TIGHTNESS: 0
TROUBLE SWALLOWING: 0
EYE REDNESS: 0
WHEEZING: 0
SORE THROAT: 0
NAUSEA: 0
SINUS PRESSURE: 0
BLOOD IN STOOL: 0
COLOR CHANGE: 0
RHINORRHEA: 0

## 2024-03-20 NOTE — PATIENT INSTRUCTIONS
results and important health updates, keeping you well-informed and engaged in your healthcare journey.    5. Increased engagement and collaboration: Direct scheduling encourages greater patient engagement and empowers you to take an active role in managing your healthcare. By accessing the [facility] Patient Portal, you can securely message your healthcare provider, ask questions, request prescription refills, and seek medical advice whenever you need it.    To get started with direct scheduling through the [facility] Patient Portal or to register with [facility], simply visit [email or web address].     We understand that change can sometimes be overwhelming, but we assure you that adopting direct scheduling through the [facility] Patient Portal will enhance your healthcare experience and put you in control of your appointments. Our dedicated staff is available to answer any questions or provide assistance throughout the process.    Thank you for entrusting us with your healthcare needs. We are committed to continuously improving our services and ensuring your satisfaction. Together, let's embrace the future of healthcare convenience and accessibility through direct scheduling on the [facility] Patient Portal.    Wishing you good health and happiness,    []

## 2024-03-20 NOTE — PROGRESS NOTES
Visit Information    Have you changed or started any medications since your last visit including any over-the-counter medicines, vitamins, or herbal medicines? no   Have you stopped taking any of your medications? Is so, why? -  no  Are you having any side effects from any of your medications? - no    Have you seen any other physician or provider since your last visit?  yes -    Have you had any other diagnostic tests since your last visit?  yes -    Have you been seen in the emergency room and/or had an admission in a hospital since we last saw you?  yes -    Have you had your routine dental cleaning in the past 6 months?  no     Do you have an active MyChart account? If no, what is the barrier?  Yes    Patient Care Team:  Winnie Granger MD as PCP - General (Family Medicine)  Winnie Granger MD as PCP - Empaneled Provider  Freedom Leigh MD as Orthopedic Surgeon (Orthopedic Surgery)  Gee Richey MD as Consulting Physician (Gastroenterology)    Medical History Review  Past Medical, Family, and Social History reviewed and does contribute to the patient presenting condition    Health Maintenance   Topic Date Due    Colorectal Cancer Screen  10/22/2022    Pneumococcal 0-64 years Vaccine (2 of 2 - PCV) 04/20/2023    GFR test (Diabetes, CKD 3-4, OR last GFR 15-59)  01/09/2024    Diabetic Alb to Cr ratio (uACR) test  02/13/2024    Depression Screen  02/13/2024    Diabetic retinal exam  04/04/2024    Diabetic foot exam  08/17/2024    Lipids  08/24/2024    A1C test (Diabetic or Prediabetic)  11/20/2024    Breast cancer screen  03/19/2026    DTaP/Tdap/Td vaccine (2 - Td or Tdap) 01/11/2028    Hepatitis B vaccine  Completed    Flu vaccine  Completed    Shingles vaccine  Completed    COVID-19 Vaccine  Completed    Hepatitis C screen  Completed    HIV screen  Completed    Hepatitis A vaccine  Aged Out    Hib vaccine  Aged Out    Polio vaccine  Aged Out    Meningococcal (ACWY) vaccine  Aged Out    Depression 
medications and allergies were reviewed as documented intoday's encounter.        Review of Systems   Constitutional:  Positive for unexpected weight change. Negative for activity change, appetite change, fatigue and fever.   HENT:  Negative for congestion, ear pain, postnasal drip, rhinorrhea, sinus pressure, sore throat and trouble swallowing.    Eyes:  Negative for redness and visual disturbance.   Respiratory:  Positive for shortness of breath. Negative for cough, chest tightness, wheezing and stridor.    Cardiovascular:  Positive for chest pain. Negative for palpitations and leg swelling.   Gastrointestinal:  Negative for abdominal distention, abdominal pain, blood in stool, constipation, diarrhea, nausea, rectal pain and vomiting.   Endocrine: Negative for polydipsia, polyphagia and polyuria.   Genitourinary:  Negative for difficulty urinating, flank pain, frequency and urgency.   Musculoskeletal:  Positive for arthralgias, back pain, gait problem and myalgias. Negative for neck pain.   Skin:  Negative for color change, rash and wound.   Allergic/Immunologic: Negative for food allergies and immunocompromised state.   Neurological:  Positive for weakness and numbness. Negative for dizziness, speech difficulty, light-headedness and headaches.   Psychiatric/Behavioral:  Positive for decreased concentration. Negative for agitation, behavioral problems, dysphoric mood, hallucinations, sleep disturbance and suicidal ideas. The patient is nervous/anxious.            Physical Exam  Vitals and nursing note reviewed.   Constitutional:       General: She is not in acute distress.     Appearance: Normal appearance. She is well-developed. She is obese. She is not diaphoretic.   HENT:      Head: Normocephalic and atraumatic.      Right Ear: Tympanic membrane normal.      Nose: Nose normal.   Eyes:      General:         Right eye: No discharge.         Left eye: No discharge.      Extraocular Movements: Extraocular movements

## 2024-04-04 ENCOUNTER — HOSPITAL ENCOUNTER (OUTPATIENT)
Dept: PAIN MANAGEMENT | Age: 54
Discharge: HOME | End: 2024-04-04
Payer: COMMERCIAL

## 2024-04-04 VITALS — BODY MASS INDEX: 43.43 KG/M2 | WEIGHT: 230 LBS | HEIGHT: 61 IN

## 2024-04-04 DIAGNOSIS — M50.30 DEGENERATIVE DISC DISEASE, CERVICAL: ICD-10-CM

## 2024-04-04 DIAGNOSIS — M51.36 DEGENERATION OF LUMBAR INTERVERTEBRAL DISC: Chronic | ICD-10-CM

## 2024-04-04 DIAGNOSIS — Z98.1 S/P FUSION OF SACROILIAC JOINT: ICD-10-CM

## 2024-04-04 DIAGNOSIS — M47.816 LUMBAR SPONDYLOSIS: Chronic | ICD-10-CM

## 2024-04-04 DIAGNOSIS — M46.1 SACROILIITIS (HCC): ICD-10-CM

## 2024-04-04 DIAGNOSIS — M53.3 CHRONIC SI JOINT PAIN: Chronic | ICD-10-CM

## 2024-04-04 DIAGNOSIS — M54.16 LUMBAR RADICULOPATHY, CHRONIC: Primary | Chronic | ICD-10-CM

## 2024-04-04 DIAGNOSIS — Z79.891 CHRONIC USE OF OPIATE FOR THERAPEUTIC PURPOSE: ICD-10-CM

## 2024-04-04 DIAGNOSIS — G89.29 CHRONIC SI JOINT PAIN: Chronic | ICD-10-CM

## 2024-04-04 DIAGNOSIS — M50.20 CERVICAL DISC HERNIATION: ICD-10-CM

## 2024-04-04 DIAGNOSIS — M16.12 PRIMARY OSTEOARTHRITIS OF LEFT HIP: ICD-10-CM

## 2024-04-04 PROCEDURE — 99214 OFFICE O/P EST MOD 30 MIN: CPT | Performed by: NURSE PRACTITIONER

## 2024-04-04 PROCEDURE — 99213 OFFICE O/P EST LOW 20 MIN: CPT

## 2024-04-04 PROCEDURE — G0481 DRUG TEST DEF 8-14 CLASSES: HCPCS

## 2024-04-04 PROCEDURE — 80307 DRUG TEST PRSMV CHEM ANLYZR: CPT

## 2024-04-04 RX ORDER — OXYCODONE HYDROCHLORIDE AND ACETAMINOPHEN 5; 325 MG/1; MG/1
1 TABLET ORAL EVERY 6 HOURS PRN
Qty: 120 TABLET | Refills: 0 | Status: SHIPPED | OUTPATIENT
Start: 2024-04-06 | End: 2024-05-06

## 2024-04-04 RX ORDER — TIZANIDINE 4 MG/1
TABLET ORAL
Qty: 270 TABLET | Refills: 0 | Status: SHIPPED | OUTPATIENT
Start: 2024-04-04

## 2024-04-04 ASSESSMENT — ENCOUNTER SYMPTOMS
COUGH: 0
CONSTIPATION: 0
BACK PAIN: 1
SHORTNESS OF BREATH: 0

## 2024-04-04 NOTE — PROGRESS NOTES
cannot get through daily activities due to pain.     Due to the high risk nature of this patient's pain medication close monitoring is required.   Continue current medication management, pt has been stable and compliant.  Script written for percocet  MAMI obtained today for monitoring purposes. Last dose of medication was today   Follow up appointment made for 4 weeks    I have reviewed the chief complaint and history of present illness (including ROS and PFSH) and vital documentation by my staff and I agree with their documentation and have added where applicable.

## 2024-04-08 LAB
6-ACETYLMORPHINE, UR: NOT DETECTED
7-AMINOCLONAZEPAM, URINE: NOT DETECTED
ALPHA-OH-ALPRAZ, URINE: NOT DETECTED
ALPHA-OH-MIDAZOLAM, URINE: NOT DETECTED
ALPRAZOLAM, URINE: NOT DETECTED
AMPHETAMINES, URINE: NOT DETECTED
BARBITURATES, URINE: NEGATIVE
BENZOYLECGONINE, UR: NEGATIVE
BUPRENORPHINE URINE: NOT DETECTED
CARISOPRODOL, UR: NEGATIVE
CLONAZEPAM, URINE: NOT DETECTED
CODEINE, URINE: NOT DETECTED
CREAT UR-MCNC: 145.5 MG/DL (ref 20–400)
DIAZEPAM, URINE: NOT DETECTED
DRUGS EXPECTED, UR: NORMAL
EER HI RES INTERP UR: NORMAL
ETHYL GLUCURONIDE UR: NEGATIVE
FENTANYL URINE: NOT DETECTED
GABAPENTIN: NOT DETECTED
HYDROCODONE, URINE: NOT DETECTED
HYDROMORPHONE, URINE: NOT DETECTED
LORAZEPAM, URINE: NOT DETECTED
MARIJUANA METAB, UR: NEGATIVE
MDA, UR: NOT DETECTED
MDEA, EVE, UR: NOT DETECTED
MDMA URINE: NOT DETECTED
MEPERIDINE METAB, UR: NOT DETECTED
METHADONE, URINE: NEGATIVE
METHAMPHETAMINE, URINE: NOT DETECTED
METHYLPHENIDATE: NOT DETECTED
MIDAZOLAM, URINE: NOT DETECTED
MORPHINE, OPI1M: NOT DETECTED
NALOXONE URINE: NOT DETECTED
NORBUPRENORPHINE, URINE: NOT DETECTED
NORDIAZEPAM, URINE: NOT DETECTED
NORFENTANYL, URINE: NOT DETECTED
NORHYDROCODONE, URINE: NOT DETECTED
NOROXYCODONE, URINE: PRESENT
NOROXYMORPHONE, URINE: NOT DETECTED
OXAZEPAM, URINE: NOT DETECTED
OXYCODONE URINE: PRESENT
OXYMORPHONE, URINE: PRESENT
PAIN MANAGEMENT DRUG PANEL INTERP, URINE: NORMAL
PAIN MGT DRUG PANEL, HI RES, UR: NORMAL
PCP,URINE: NEGATIVE
PHENTERMINE, UR: NOT DETECTED
PREGABALIN: PRESENT
TAPENTADOL, URINE: NOT DETECTED
TAPENTADOL-O-SULFATE, URINE: NOT DETECTED
TEMAZEPAM, URINE: NOT DETECTED
TRAMADOL, URINE: NEGATIVE
ZOLPIDEM METABOLITE (ZCA), URINE: NOT DETECTED
ZOLPIDEM, URINE: NOT DETECTED

## 2024-04-10 DIAGNOSIS — G43.719 INTRACTABLE CHRONIC MIGRAINE WITHOUT AURA AND WITHOUT STATUS MIGRAINOSUS: ICD-10-CM

## 2024-04-10 RX ORDER — TOPIRAMATE 100 MG/1
TABLET, FILM COATED ORAL
Qty: 60 TABLET | Refills: 0 | Status: SHIPPED | OUTPATIENT
Start: 2024-04-10

## 2024-04-10 NOTE — TELEPHONE ENCOUNTER
Please Approve or Refuse.  Send to Pharmacy per Pt's Request: [facility]      Next Visit Date:  7/11/2024   Last Visit Date: 3/20/2024    Hemoglobin A1C (%)   Date Value   03/20/2024 6.1   11/20/2023 5.9   08/17/2023 5.3             ( goal A1C is < 7)   BP Readings from Last 3 Encounters:   03/20/24 122/82   03/12/24 (!) 124/99   02/12/24 136/85          (goal 120/80)  BUN   Date Value Ref Range Status   01/09/2023 12 6 - 20 mg/dL Final     Creatinine   Date Value Ref Range Status   01/09/2023 0.85 0.50 - 0.90 mg/dL Final     Potassium   Date Value Ref Range Status   10/14/2022 4.4 3.7 - 5.3 mmol/L Final

## 2024-04-16 ENCOUNTER — OFFICE VISIT (OUTPATIENT)
Dept: PODIATRY | Age: 54
End: 2024-04-16
Payer: COMMERCIAL

## 2024-04-16 VITALS — WEIGHT: 230 LBS | HEIGHT: 61 IN | BODY MASS INDEX: 43.43 KG/M2

## 2024-04-16 DIAGNOSIS — S93.401A SPRAIN OF LIGAMENT OF ANKLE, RIGHT, INITIAL ENCOUNTER: ICD-10-CM

## 2024-04-16 DIAGNOSIS — S99.911A ANKLE INJURY, RIGHT, INITIAL ENCOUNTER: ICD-10-CM

## 2024-04-16 DIAGNOSIS — S96.912A STRAIN OF LEFT FOOT, INITIAL ENCOUNTER: ICD-10-CM

## 2024-04-16 DIAGNOSIS — S86.311A TEAR OF PERONEAL TENDON, RIGHT, INITIAL ENCOUNTER: ICD-10-CM

## 2024-04-16 DIAGNOSIS — M79.672 FOOT PAIN, LEFT: Primary | ICD-10-CM

## 2024-04-16 PROCEDURE — 99204 OFFICE O/P NEW MOD 45 MIN: CPT | Performed by: PODIATRIST

## 2024-04-16 PROCEDURE — 3017F COLORECTAL CA SCREEN DOC REV: CPT | Performed by: PODIATRIST

## 2024-04-16 PROCEDURE — 1036F TOBACCO NON-USER: CPT | Performed by: PODIATRIST

## 2024-04-16 PROCEDURE — G8427 DOCREV CUR MEDS BY ELIG CLIN: HCPCS | Performed by: PODIATRIST

## 2024-04-16 PROCEDURE — G8417 CALC BMI ABV UP PARAM F/U: HCPCS | Performed by: PODIATRIST

## 2024-04-16 NOTE — PROGRESS NOTES
[facility] Podiatry  New Patient History and Physical    Chief Complaint:   Chief Complaint   Patient presents with    Ankle Injury     Patient rolled right ankle 3/12/24/xrays in Epic     Foot Pain     Left foot pain        HPI: Iqra Goodrich is a 53 y.o. female who presents to the office today complaining of right ankle injury. Occurred 3/12/24. Went to ER. Had xrays, was given a brace. Wore it for a few weeks. She is still having swelling, and pain, trouble walking. .feeling of instability, like it wants to give out. Also having left foot pain, using it more since her right ankle hurts .   Currently denies F/C/N/V.     Allergies   Allergen Reactions    Imitrex [Sumatriptan] Other (See Comments)     \"feels like head is going to explode    Morphine Itching     hives    Adhesive Tape Other (See Comments)     blisters    Seasonal Cough       Past Medical History:   Diagnosis Date    Anxiety     Toyin Matthews CNP therapist video visit 1/10/2023    Asthma     does not follow with Pulmonology    Colon polyp 10/31/2016    sessile serated adenoma x2    Depression     Diabetes mellitus (HCC)     Medication, Trulicity, has been back ordered    Diverticulitis 10/2016    Gastritis     GERD (gastroesophageal reflux disease)     Hemorrhoids     int/ext    Hypertension     Migraines     Osteoarthritis     Pain management     [facility] Dr. Elmer Charles  last visit 12/2022    PTSD (post-traumatic stress disorder)     states has been VERY anxious lately, many personal stressors, working 2 jobs, etc. has a virtual appt with psychiatrist tomorrow 1/10/23. pt believes she may need meds adjusted.    Rheumatoid arthritis (HCC)     states f/w pain mgmt for this    Sacroiliitis (HCC)     Shingles 01/22/2016    Snores     Tubular adenoma 10/31/2016    Under care of team     PCP, Marty Cramer CNP moved. Pt needs new PCP    Urinary leakage     Wears glasses        Prior to Admission medications    Medication Sig

## 2024-04-24 ENCOUNTER — TELEPHONE (OUTPATIENT)
Dept: FAMILY MEDICINE CLINIC | Age: 54
End: 2024-04-24

## 2024-04-24 ENCOUNTER — HOSPITAL ENCOUNTER (OUTPATIENT)
Age: 54
Discharge: HOME | End: 2024-04-26

## 2024-04-24 ENCOUNTER — HOSPITAL ENCOUNTER (OUTPATIENT)
Age: 54
Setting detail: SPECIMEN
Discharge: HOME | End: 2024-04-24
Payer: COMMERCIAL

## 2024-04-24 DIAGNOSIS — R07.2 PRECORDIAL PAIN: Primary | ICD-10-CM

## 2024-04-24 DIAGNOSIS — R07.2 PRECORDIAL PAIN: ICD-10-CM

## 2024-04-24 LAB
ALBUMIN SERPL-MCNC: 4.2 G/DL (ref 3.5–5.2)
ALP SERPL-CCNC: 95 U/L (ref 35–104)
ALT SERPL-CCNC: 18 U/L (ref 5–33)
ANION GAP SERPL CALCULATED.3IONS-SCNC: 9 MMOL/L (ref 9–17)
AST SERPL-CCNC: 14 U/L
BILIRUB SERPL-MCNC: <0.2 MG/DL (ref 0.3–1.2)
BUN SERPL-MCNC: 11 MG/DL (ref 6–20)
CALCIUM SERPL-MCNC: 9.5 MG/DL (ref 8.6–10.4)
CHLORIDE SERPL-SCNC: 105 MMOL/L (ref 98–107)
CHOLEST SERPL-MCNC: 154 MG/DL
CHOLESTEROL/HDL RATIO: 3.6
CO2 SERPL-SCNC: 27 MMOL/L (ref 20–31)
CREAT SERPL-MCNC: 1 MG/DL (ref 0.5–0.9)
GFR SERPL CREATININE-BSD FRML MDRD: 68 ML/MIN/1.73M2
GLUCOSE SERPL-MCNC: 117 MG/DL (ref 70–99)
HDLC SERPL-MCNC: 43 MG/DL
LDLC SERPL CALC-MCNC: 54 MG/DL (ref 0–130)
POTASSIUM SERPL-SCNC: 4.6 MMOL/L (ref 3.7–5.3)
PROT SERPL-MCNC: 7.1 G/DL (ref 6.4–8.3)
SODIUM SERPL-SCNC: 141 MMOL/L (ref 135–144)
TRIGL SERPL-MCNC: 287 MG/DL

## 2024-04-24 PROCEDURE — 36415 COLL VENOUS BLD VENIPUNCTURE: CPT

## 2024-04-24 PROCEDURE — 80053 COMPREHEN METABOLIC PANEL: CPT

## 2024-04-24 PROCEDURE — 80061 LIPID PANEL: CPT

## 2024-04-24 NOTE — TELEPHONE ENCOUNTER
PATIENT CALLED IN STATING SHE CAN'T GET STRESS DONE BECAUSE SHE HAS A BOOT ON HER FOOT AND [facility]

## 2024-04-25 DIAGNOSIS — E78.2 MIXED HYPERLIPIDEMIA: ICD-10-CM

## 2024-04-25 RX ORDER — ATORVASTATIN CALCIUM 40 MG/1
40 TABLET, FILM COATED ORAL DAILY
Qty: 90 TABLET | Refills: 1 | Status: SHIPPED | OUTPATIENT
Start: 2024-04-25

## 2024-04-30 DIAGNOSIS — I10 ESSENTIAL HYPERTENSION: ICD-10-CM

## 2024-04-30 RX ORDER — AMLODIPINE BESYLATE 2.5 MG/1
2.5 TABLET ORAL DAILY
Qty: 90 TABLET | Refills: 0 | Status: SHIPPED | OUTPATIENT
Start: 2024-04-30

## 2024-04-30 NOTE — TELEPHONE ENCOUNTER
Please Approve or Refuse.  Send to Pharmacy per Pt's Request:      Next Visit Date:  7/11/2024   Last Visit Date: 3/20/2024    Hemoglobin A1C (%)   Date Value   03/20/2024 6.1   11/20/2023 5.9   08/17/2023 5.3             ( goal A1C is < 7)   BP Readings from Last 3 Encounters:   03/20/24 122/82   03/12/24 (!) 124/99   02/12/24 136/85          (goal 120/80)  BUN   Date Value Ref Range Status   04/24/2024 11 6 - 20 mg/dL Final     Creatinine   Date Value Ref Range Status   04/24/2024 1.0 (H) 0.5 - 0.9 mg/dL Final     Potassium   Date Value Ref Range Status   04/24/2024 4.6 3.7 - 5.3 mmol/L Final

## 2024-05-02 ENCOUNTER — HOSPITAL ENCOUNTER (OUTPATIENT)
Dept: PAIN MANAGEMENT | Age: 54
Discharge: HOME | End: 2024-05-02
Payer: COMMERCIAL

## 2024-05-02 VITALS — WEIGHT: 230 LBS | HEIGHT: 61 IN | BODY MASS INDEX: 43.43 KG/M2

## 2024-05-02 DIAGNOSIS — M50.20 CERVICAL DISC HERNIATION: ICD-10-CM

## 2024-05-02 DIAGNOSIS — M46.1 SACROILIITIS (HCC): ICD-10-CM

## 2024-05-02 DIAGNOSIS — M50.30 DEGENERATIVE DISC DISEASE, CERVICAL: ICD-10-CM

## 2024-05-02 DIAGNOSIS — M53.3 CHRONIC SI JOINT PAIN: Chronic | ICD-10-CM

## 2024-05-02 DIAGNOSIS — G89.29 CHRONIC SI JOINT PAIN: Chronic | ICD-10-CM

## 2024-05-02 DIAGNOSIS — M47.816 LUMBAR SPONDYLOSIS: Chronic | ICD-10-CM

## 2024-05-02 DIAGNOSIS — M16.12 PRIMARY OSTEOARTHRITIS OF LEFT HIP: ICD-10-CM

## 2024-05-02 DIAGNOSIS — M54.50 CHRONIC BILATERAL LOW BACK PAIN WITHOUT SCIATICA: Chronic | ICD-10-CM

## 2024-05-02 DIAGNOSIS — G89.29 CHRONIC BILATERAL LOW BACK PAIN WITHOUT SCIATICA: Chronic | ICD-10-CM

## 2024-05-02 DIAGNOSIS — M51.36 DEGENERATION OF LUMBAR INTERVERTEBRAL DISC: Chronic | ICD-10-CM

## 2024-05-02 DIAGNOSIS — Z79.891 CHRONIC USE OF OPIATE FOR THERAPEUTIC PURPOSE: ICD-10-CM

## 2024-05-02 DIAGNOSIS — M54.16 LUMBAR RADICULOPATHY, CHRONIC: Primary | Chronic | ICD-10-CM

## 2024-05-02 PROCEDURE — 99213 OFFICE O/P EST LOW 20 MIN: CPT

## 2024-05-02 PROCEDURE — 99213 OFFICE O/P EST LOW 20 MIN: CPT | Performed by: NURSE PRACTITIONER

## 2024-05-02 RX ORDER — OXYCODONE HYDROCHLORIDE AND ACETAMINOPHEN 5; 325 MG/1; MG/1
1 TABLET ORAL EVERY 6 HOURS PRN
Qty: 120 TABLET | Refills: 0 | Status: SHIPPED | OUTPATIENT
Start: 2024-05-06 | End: 2024-06-05

## 2024-05-02 ASSESSMENT — ENCOUNTER SYMPTOMS
BACK PAIN: 1
CONSTIPATION: 0
COUGH: 0
SHORTNESS OF BREATH: 0
BOWEL INCONTINENCE: 0

## 2024-05-02 NOTE — PROGRESS NOTES
oxyCODONE-acetaminophen (PERCOCET) 5-325 MG per tablet (Start on 5/6/2024)    Lumbar spondylosis (Chronic)    Relevant Medications    oxyCODONE-acetaminophen (PERCOCET) 5-325 MG per tablet (Start on 5/6/2024)    Chronic bilateral low back pain without sciatica (Chronic)    Relevant Medications    oxyCODONE-acetaminophen (PERCOCET) 5-325 MG per tablet (Start on 5/6/2024)    Degenerative disc disease, cervical    Relevant Medications    oxyCODONE-acetaminophen (PERCOCET) 5-325 MG per tablet (Start on 5/6/2024)    Cervical disc herniation    Relevant Medications    oxyCODONE-acetaminophen (PERCOCET) 5-325 MG per tablet (Start on 5/6/2024)    Sacroiliitis (HCC)    Relevant Medications    oxyCODONE-acetaminophen (PERCOCET) 5-325 MG per tablet (Start on 5/6/2024)    Primary osteoarthritis of left hip    Relevant Medications    oxyCODONE-acetaminophen (PERCOCET) 5-325 MG per tablet (Start on 5/6/2024)    Chronic use of opiate for therapeutic purpose          Treatment Plan:  Patient relates current medications are helping the pain. Patient reports taking pain medications as prescribed, denies obtaining medications from different sources and denies use of illegal drugs. Medication risk and benefits have been discussed. Patient denies side effects from medications like nausea, vomiting, constipation or drowsiness. Patient reports current activities of daily living are possible due to medications and would like to continue them.     As always, we encourage daily stretching and strengthening exercises, and recommend minimizing use of pain medications unless patient cannot get through daily activities due to pain.     Due to the high risk nature of this patient's pain medication close monitoring is required.   Continue current medication management, pt has been stable and compliant.  Script written for percocet  Follow up appointment made for 4 weeks    I have reviewed the chief complaint and history of present illness

## 2024-05-09 ENCOUNTER — HOSPITAL ENCOUNTER (OUTPATIENT)
Dept: NUCLEAR MEDICINE | Age: 54
Discharge: HOME | End: 2024-05-11
Payer: COMMERCIAL

## 2024-05-09 ENCOUNTER — HOSPITAL ENCOUNTER (OUTPATIENT)
Age: 54
Discharge: HOME | End: 2024-05-11
Payer: COMMERCIAL

## 2024-05-09 DIAGNOSIS — R07.2 PRECORDIAL PAIN: ICD-10-CM

## 2024-05-09 LAB
STRESS BASELINE DIAS BP: 73 MMHG
STRESS BASELINE HR: 64 BPM
STRESS BASELINE SYS BP: 110 MMHG
STRESS ESTIMATED WORKLOAD: 1 METS
STRESS PEAK DIAS BP: 73 MMHG
STRESS PEAK SYS BP: 126 MMHG
STRESS PERCENT HR ACHIEVED: 55 %
STRESS POST PEAK HR: 93 BPM
STRESS RATE PRESSURE PRODUCT: NORMAL BPM*MMHG
STRESS STAGE RECOVERY 1 BP: NORMAL MMHG
STRESS STAGE RECOVERY 1 DURATION: 1 MIN:SEC
STRESS STAGE RECOVERY 1 HR: 93 BPM
STRESS STAGE RECOVERY 2 BP: NORMAL MMHG
STRESS STAGE RECOVERY 2 DURATION: 7 MIN:SEC
STRESS STAGE RECOVERY 2 HR: 78 BPM
STRESS TARGET HR: 168 BPM

## 2024-05-09 PROCEDURE — 93017 CV STRESS TEST TRACING ONLY: CPT

## 2024-05-09 PROCEDURE — 3430000000 HC RX DIAGNOSTIC RADIOPHARMACEUTICAL: Performed by: FAMILY MEDICINE

## 2024-05-09 PROCEDURE — 78452 HT MUSCLE IMAGE SPECT MULT: CPT

## 2024-05-09 PROCEDURE — 93016 CV STRESS TEST SUPVJ ONLY: CPT | Performed by: INTERNAL MEDICINE

## 2024-05-09 PROCEDURE — 93018 CV STRESS TEST I&R ONLY: CPT | Performed by: INTERNAL MEDICINE

## 2024-05-09 PROCEDURE — 6360000002 HC RX W HCPCS: Performed by: FAMILY MEDICINE

## 2024-05-09 PROCEDURE — A9500 TC99M SESTAMIBI: HCPCS | Performed by: FAMILY MEDICINE

## 2024-05-09 PROCEDURE — 2580000003 HC RX 258: Performed by: FAMILY MEDICINE

## 2024-05-09 RX ORDER — TETRAKIS(2-METHOXYISOBUTYLISOCYANIDE)COPPER(I) TETRAFLUOROBORATE 1 MG/ML
30 INJECTION, POWDER, LYOPHILIZED, FOR SOLUTION INTRAVENOUS
Status: COMPLETED | OUTPATIENT
Start: 2024-05-09 | End: 2024-05-09

## 2024-05-09 RX ORDER — NITROGLYCERIN 0.4 MG/1
0.4 TABLET SUBLINGUAL EVERY 5 MIN PRN
Status: ACTIVE | OUTPATIENT
Start: 2024-05-09 | End: 2024-05-09

## 2024-05-09 RX ORDER — ALBUTEROL SULFATE 90 UG/1
2 AEROSOL, METERED RESPIRATORY (INHALATION) PRN
Status: ACTIVE | OUTPATIENT
Start: 2024-05-09 | End: 2024-05-09

## 2024-05-09 RX ORDER — TETRAKIS(2-METHOXYISOBUTYLISOCYANIDE)COPPER(I) TETRAFLUOROBORATE 1 MG/ML
10 INJECTION, POWDER, LYOPHILIZED, FOR SOLUTION INTRAVENOUS
Status: COMPLETED | OUTPATIENT
Start: 2024-05-09 | End: 2024-05-09

## 2024-05-09 RX ORDER — AMINOPHYLLINE 25 MG/ML
50 INJECTION, SOLUTION INTRAVENOUS PRN
Status: ACTIVE | OUTPATIENT
Start: 2024-05-09 | End: 2024-05-09

## 2024-05-09 RX ORDER — SODIUM CHLORIDE 0.9 % (FLUSH) 0.9 %
5-40 SYRINGE (ML) INJECTION PRN
Status: ACTIVE | OUTPATIENT
Start: 2024-05-09 | End: 2024-05-09

## 2024-05-09 RX ORDER — ATROPINE SULFATE 0.1 MG/ML
0.5 INJECTION INTRAVENOUS EVERY 5 MIN PRN
Status: ACTIVE | OUTPATIENT
Start: 2024-05-09 | End: 2024-05-09

## 2024-05-09 RX ORDER — REGADENOSON 0.08 MG/ML
0.4 INJECTION, SOLUTION INTRAVENOUS
Status: COMPLETED | OUTPATIENT
Start: 2024-05-09 | End: 2024-05-09

## 2024-05-09 RX ORDER — SODIUM CHLORIDE 9 MG/ML
500 INJECTION, SOLUTION INTRAVENOUS CONTINUOUS PRN
Status: ACTIVE | OUTPATIENT
Start: 2024-05-09 | End: 2024-05-09

## 2024-05-09 RX ORDER — SODIUM CHLORIDE 0.9 % (FLUSH) 0.9 %
10 SYRINGE (ML) INJECTION PRN
Status: DISCONTINUED | OUTPATIENT
Start: 2024-05-09 | End: 2024-05-12 | Stop reason: HOSPADM

## 2024-05-09 RX ORDER — METOPROLOL TARTRATE 1 MG/ML
5 INJECTION, SOLUTION INTRAVENOUS EVERY 5 MIN PRN
Status: ACTIVE | OUTPATIENT
Start: 2024-05-09 | End: 2024-05-09

## 2024-05-09 RX ADMIN — Medication 10.8 MILLICURIE: at 07:02

## 2024-05-09 RX ADMIN — Medication 35 MILLICURIE: at 08:49

## 2024-05-09 RX ADMIN — REGADENOSON 0.4 MG: 0.08 INJECTION, SOLUTION INTRAVENOUS at 08:47

## 2024-05-09 RX ADMIN — SODIUM CHLORIDE, PRESERVATIVE FREE 10 ML: 5 INJECTION INTRAVENOUS at 07:02

## 2024-05-13 DIAGNOSIS — G43.719 INTRACTABLE CHRONIC MIGRAINE WITHOUT AURA AND WITHOUT STATUS MIGRAINOSUS: ICD-10-CM

## 2024-05-13 RX ORDER — TOPIRAMATE 100 MG/1
TABLET, FILM COATED ORAL
Qty: 60 TABLET | Refills: 3 | Status: SHIPPED | OUTPATIENT
Start: 2024-05-13 | End: 2024-05-14

## 2024-05-14 RX ORDER — TOPIRAMATE 100 MG/1
TABLET, FILM COATED ORAL
Qty: 60 TABLET | Refills: 10 | Status: SHIPPED | OUTPATIENT
Start: 2024-05-14

## 2024-05-28 DIAGNOSIS — N30.10 INTERSTITIAL CYSTITIS: ICD-10-CM

## 2024-05-28 RX ORDER — TROSPIUM CHLORIDE 20 MG/1
TABLET, FILM COATED ORAL
Qty: 90 TABLET | Refills: 0 | Status: SHIPPED | OUTPATIENT
Start: 2024-05-28

## 2024-06-04 ENCOUNTER — HOSPITAL ENCOUNTER (OUTPATIENT)
Dept: PAIN MANAGEMENT | Age: 54
Discharge: HOME | End: 2024-06-04
Payer: COMMERCIAL

## 2024-06-04 VITALS — HEIGHT: 60 IN | BODY MASS INDEX: 45.16 KG/M2 | WEIGHT: 230 LBS

## 2024-06-04 DIAGNOSIS — G89.29 CHRONIC SI JOINT PAIN: Chronic | ICD-10-CM

## 2024-06-04 DIAGNOSIS — M46.1 SACROILIITIS (HCC): ICD-10-CM

## 2024-06-04 DIAGNOSIS — M47.896 OTHER OSTEOARTHRITIS OF SPINE, LUMBAR REGION: ICD-10-CM

## 2024-06-04 DIAGNOSIS — M54.16 LUMBAR RADICULOPATHY, CHRONIC: Primary | Chronic | ICD-10-CM

## 2024-06-04 DIAGNOSIS — G89.29 CHRONIC LEFT-SIDED LOW BACK PAIN WITH LEFT-SIDED SCIATICA: ICD-10-CM

## 2024-06-04 DIAGNOSIS — M50.20 CERVICAL DISC HERNIATION: ICD-10-CM

## 2024-06-04 DIAGNOSIS — G89.29 CHRONIC RIGHT SHOULDER PAIN: ICD-10-CM

## 2024-06-04 DIAGNOSIS — M53.3 CHRONIC SI JOINT PAIN: Chronic | ICD-10-CM

## 2024-06-04 DIAGNOSIS — M25.511 CHRONIC RIGHT SHOULDER PAIN: ICD-10-CM

## 2024-06-04 DIAGNOSIS — Z79.891 CHRONIC USE OF OPIATE FOR THERAPEUTIC PURPOSE: ICD-10-CM

## 2024-06-04 DIAGNOSIS — M47.26 OSTEOARTHRITIS OF SPINE WITH RADICULOPATHY, LUMBAR REGION: ICD-10-CM

## 2024-06-04 DIAGNOSIS — M47.816 OSTEOARTHRITIS OF LUMBAR SPINE, UNSPECIFIED SPINAL OSTEOARTHRITIS COMPLICATION STATUS: ICD-10-CM

## 2024-06-04 DIAGNOSIS — M54.50 CHRONIC BILATERAL LOW BACK PAIN WITHOUT SCIATICA: Chronic | ICD-10-CM

## 2024-06-04 DIAGNOSIS — M16.12 PRIMARY OSTEOARTHRITIS OF LEFT HIP: ICD-10-CM

## 2024-06-04 DIAGNOSIS — M51.36 DEGENERATION OF LUMBAR INTERVERTEBRAL DISC: Chronic | ICD-10-CM

## 2024-06-04 DIAGNOSIS — M50.30 DEGENERATIVE DISC DISEASE, CERVICAL: ICD-10-CM

## 2024-06-04 DIAGNOSIS — Z79.899 ENCOUNTER FOR MEDICATION MANAGEMENT: ICD-10-CM

## 2024-06-04 DIAGNOSIS — M47.816 LUMBAR SPONDYLOSIS: Chronic | ICD-10-CM

## 2024-06-04 DIAGNOSIS — G89.29 CHRONIC BILATERAL LOW BACK PAIN WITHOUT SCIATICA: Chronic | ICD-10-CM

## 2024-06-04 DIAGNOSIS — M54.42 CHRONIC LEFT-SIDED LOW BACK PAIN WITH LEFT-SIDED SCIATICA: ICD-10-CM

## 2024-06-04 PROBLEM — Z98.890 POSTOPERATIVE STATE: Status: RESOLVED | Noted: 2021-03-04 | Resolved: 2024-06-04

## 2024-06-04 PROCEDURE — 99214 OFFICE O/P EST MOD 30 MIN: CPT | Performed by: NURSE PRACTITIONER

## 2024-06-04 PROCEDURE — 99213 OFFICE O/P EST LOW 20 MIN: CPT

## 2024-06-04 RX ORDER — PREGABALIN 150 MG/1
150 CAPSULE ORAL 2 TIMES DAILY
Qty: 60 CAPSULE | Refills: 2 | Status: SHIPPED | OUTPATIENT
Start: 2024-06-04 | End: 2024-09-02

## 2024-06-04 RX ORDER — OXYCODONE HYDROCHLORIDE AND ACETAMINOPHEN 5; 325 MG/1; MG/1
1 TABLET ORAL EVERY 6 HOURS PRN
Qty: 120 TABLET | Refills: 0 | Status: SHIPPED | OUTPATIENT
Start: 2024-06-07 | End: 2024-07-07

## 2024-06-04 ASSESSMENT — PAIN SCALES - GENERAL: PAINLEVEL_OUTOF10: 4

## 2024-06-04 ASSESSMENT — ENCOUNTER SYMPTOMS
SHORTNESS OF BREATH: 0
BOWEL INCONTINENCE: 0
COUGH: 0
CONSTIPATION: 0
BACK PAIN: 1
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
## 2024-06-06 DIAGNOSIS — K21.9 CHRONIC GERD: ICD-10-CM

## 2024-06-06 RX ORDER — OMEPRAZOLE 40 MG/1
CAPSULE, DELAYED RELEASE ORAL
Qty: 30 CAPSULE | Refills: 10 | Status: SHIPPED | OUTPATIENT
Start: 2024-06-06

## 2024-06-12 ENCOUNTER — PATIENT MESSAGE (OUTPATIENT)
Dept: FAMILY MEDICINE CLINIC | Age: 54
End: 2024-06-12

## 2024-06-12 DIAGNOSIS — E11.9 TYPE 2 DIABETES MELLITUS WITHOUT COMPLICATION, WITHOUT LONG-TERM CURRENT USE OF INSULIN (HCC): Primary | ICD-10-CM

## 2024-06-12 NOTE — TELEPHONE ENCOUNTER
From: Iqra Goodrich  To: Dr. Winnie Granger  Sent: 6/12/2024 4:02 PM EDT  Subject: Trulicity     Sooo I have been unable to get my trulicity for over a month due to it not being available at pharmacies. Is there another medication I can use? I went to see my brothers dr(not for my self) but with him. Dr ventura says it’s a big outage on all the injection medicines. He placed my brother on rybelsus 7mg and said I should speak with you about it . However , I don’t have [facility] anymore just the Medicaid Care source. What should I do? Continue not taking anything or what?? Don’t recommend metphormin I refuse to take it … makes my belly not right n spend all my time in the bathroom.

## 2024-06-12 NOTE — TELEPHONE ENCOUNTER
Lab Results   Component Value Date    LABA1C 6.1 03/20/2024    LABA1C 5.9 11/20/2023    LABA1C 5.3 08/17/2023

## 2024-07-03 ENCOUNTER — HOSPITAL ENCOUNTER (OUTPATIENT)
Dept: PAIN MANAGEMENT | Age: 54
Discharge: HOME | End: 2024-07-03

## 2024-07-03 DIAGNOSIS — M50.30 DEGENERATIVE DISC DISEASE, CERVICAL: ICD-10-CM

## 2024-07-03 DIAGNOSIS — M50.20 CERVICAL DISC HERNIATION: ICD-10-CM

## 2024-07-03 DIAGNOSIS — M46.1 SACROILIITIS (HCC): ICD-10-CM

## 2024-07-03 DIAGNOSIS — M51.36 DEGENERATION OF LUMBAR INTERVERTEBRAL DISC: Chronic | ICD-10-CM

## 2024-07-03 DIAGNOSIS — M54.16 LUMBAR RADICULOPATHY, CHRONIC: Chronic | ICD-10-CM

## 2024-07-03 DIAGNOSIS — M16.12 PRIMARY OSTEOARTHRITIS OF LEFT HIP: ICD-10-CM

## 2024-07-03 RX ORDER — OXYCODONE HYDROCHLORIDE AND ACETAMINOPHEN 5; 325 MG/1; MG/1
1 TABLET ORAL EVERY 6 HOURS PRN
Qty: 120 TABLET | Refills: 0 | Status: SHIPPED | OUTPATIENT
Start: 2024-07-03 | End: 2024-08-02

## 2024-07-03 RX ORDER — TIZANIDINE 4 MG/1
TABLET ORAL
Qty: 270 TABLET | Refills: 0 | Status: SHIPPED | OUTPATIENT
Start: 2024-07-03

## 2024-07-03 NOTE — PROGRESS NOTES
Epic was down when pt arrived for her appt. She was unable to wait for a later appt time. Will refill meds and reschedule her.

## 2024-07-11 ENCOUNTER — OFFICE VISIT (OUTPATIENT)
Dept: FAMILY MEDICINE CLINIC | Age: 54
End: 2024-07-11

## 2024-07-11 VITALS
OXYGEN SATURATION: 97 % | WEIGHT: 233.6 LBS | SYSTOLIC BLOOD PRESSURE: 118 MMHG | HEART RATE: 73 BPM | HEIGHT: 60 IN | BODY MASS INDEX: 45.86 KG/M2 | DIASTOLIC BLOOD PRESSURE: 92 MMHG

## 2024-07-11 DIAGNOSIS — I10 ESSENTIAL HYPERTENSION: ICD-10-CM

## 2024-07-11 DIAGNOSIS — N30.10 INTERSTITIAL CYSTITIS: ICD-10-CM

## 2024-07-11 DIAGNOSIS — K21.9 CHRONIC GERD: ICD-10-CM

## 2024-07-11 DIAGNOSIS — Z23 NEED FOR PNEUMOCOCCAL VACCINATION: ICD-10-CM

## 2024-07-11 DIAGNOSIS — E55.9 VITAMIN D DEFICIENCY: ICD-10-CM

## 2024-07-11 DIAGNOSIS — J32.4 CHRONIC PANSINUSITIS: ICD-10-CM

## 2024-07-11 DIAGNOSIS — E11.9 TYPE 2 DIABETES MELLITUS WITHOUT COMPLICATION, WITHOUT LONG-TERM CURRENT USE OF INSULIN (HCC): Primary | ICD-10-CM

## 2024-07-11 DIAGNOSIS — K63.5 POLYP OF COLON, UNSPECIFIED PART OF COLON, UNSPECIFIED TYPE: ICD-10-CM

## 2024-07-11 DIAGNOSIS — F43.23 ADJUSTMENT DISORDER WITH MIXED ANXIETY AND DEPRESSED MOOD: ICD-10-CM

## 2024-07-11 DIAGNOSIS — M51.36 DEGENERATION OF LUMBAR INTERVERTEBRAL DISC: Chronic | ICD-10-CM

## 2024-07-11 DIAGNOSIS — E78.2 MIXED HYPERLIPIDEMIA: ICD-10-CM

## 2024-07-11 DIAGNOSIS — B35.4 RINGWORM, BODY: ICD-10-CM

## 2024-07-11 LAB — HBA1C MFR BLD: 7.2 %

## 2024-07-11 RX ORDER — HYDROXYZINE PAMOATE 50 MG/1
CAPSULE ORAL
Qty: 90 CAPSULE | Refills: 1 | Status: SHIPPED | OUTPATIENT
Start: 2024-07-11

## 2024-07-11 RX ORDER — TROSPIUM CHLORIDE 20 MG/1
20 TABLET, FILM COATED ORAL DAILY
Qty: 90 TABLET | Refills: 0 | Status: CANCELLED | OUTPATIENT
Start: 2024-07-11

## 2024-07-11 RX ORDER — DULAGLUTIDE 4.5 MG/.5ML
4.5 INJECTION, SOLUTION SUBCUTANEOUS WEEKLY
Qty: 12 ADJUSTABLE DOSE PRE-FILLED PEN SYRINGE | Refills: 2 | Status: SHIPPED | OUTPATIENT
Start: 2024-07-11

## 2024-07-11 RX ORDER — ATORVASTATIN CALCIUM 20 MG/1
TABLET, FILM COATED ORAL
Qty: 30 TABLET | Refills: 10 | Status: SHIPPED | OUTPATIENT
Start: 2024-07-11

## 2024-07-11 RX ORDER — PROPRANOLOL HYDROCHLORIDE 10 MG/1
TABLET ORAL
Qty: 90 TABLET | Refills: 1 | Status: SHIPPED | OUTPATIENT
Start: 2024-07-11

## 2024-07-11 RX ORDER — KETOCONAZOLE 20 MG/G
CREAM TOPICAL
Qty: 1 EACH | Refills: 1 | Status: SHIPPED | OUTPATIENT
Start: 2024-07-11

## 2024-07-11 RX ORDER — AMLODIPINE BESYLATE 2.5 MG/1
2.5 TABLET ORAL DAILY
Qty: 90 TABLET | Refills: 0 | Status: SHIPPED | OUTPATIENT
Start: 2024-07-11

## 2024-07-11 SDOH — ECONOMIC STABILITY: INCOME INSECURITY: HOW HARD IS IT FOR YOU TO PAY FOR THE VERY BASICS LIKE FOOD, HOUSING, MEDICAL CARE, AND HEATING?: VERY HARD

## 2024-07-11 SDOH — ECONOMIC STABILITY: FOOD INSECURITY: WITHIN THE PAST 12 MONTHS, YOU WORRIED THAT YOUR FOOD WOULD RUN OUT BEFORE YOU GOT MONEY TO BUY MORE.: OFTEN TRUE

## 2024-07-11 SDOH — ECONOMIC STABILITY: FOOD INSECURITY: WITHIN THE PAST 12 MONTHS, THE FOOD YOU BOUGHT JUST DIDN'T LAST AND YOU DIDN'T HAVE MONEY TO GET MORE.: OFTEN TRUE

## 2024-07-11 ASSESSMENT — ENCOUNTER SYMPTOMS
COLOR CHANGE: 0
SINUS PRESSURE: 1
TROUBLE SWALLOWING: 0
CHEST TIGHTNESS: 0
VOMITING: 0
CONSTIPATION: 0
SHORTNESS OF BREATH: 0
NAUSEA: 0
RHINORRHEA: 0
BACK PAIN: 1
DIARRHEA: 0
SORE THROAT: 0
WHEEZING: 0
STRIDOR: 0
ABDOMINAL DISTENTION: 0
RECTAL PAIN: 0
BLOOD IN STOOL: 0
EYE REDNESS: 0
COUGH: 0
ABDOMINAL PAIN: 0

## 2024-07-11 NOTE — PROGRESS NOTES
Visit Information    Have you changed or started any medications since your last visit including any over-the-counter medicines, vitamins, or herbal medicines? no   Have you stopped taking any of your medications? Is so, why? -  no  Are you having any side effects from any of your medications? - no    Have you seen any other physician or provider since your last visit?  no   Have you had any other diagnostic tests since your last visit?  no   Have you been seen in the emergency room and/or had an admission in a hospital since we last saw you?  no   Have you had your routine dental cleaning in the past 6 months?  no     Do you have an active MyChart account? If no, what is the barrier?  Yes    Patient Care Team:  Winnie Granger MD as PCP - General (Family Medicine)  Winnie Granger MD as PCP - Empaneled Provider  Freedom Leigh MD as Orthopedic Surgeon (Orthopedic Surgery)  Gee Richey MD as Consulting Physician (Gastroenterology)    Medical History Review  Past Medical, Family, and Social History reviewed and does contribute to the patient presenting condition    Health Maintenance   Topic Date Due    Colorectal Cancer Screen  10/22/2022    Pneumococcal 0-64 years Vaccine (2 of 2 - PCV) 04/20/2023    Diabetic Alb to Cr ratio (uACR) test  02/13/2024    Diabetic retinal exam  04/04/2024    Flu vaccine (1) 08/01/2024    Diabetic foot exam  08/17/2024    A1C test (Diabetic or Prediabetic)  03/20/2025    Depression Monitoring  03/20/2025    Lipids  04/24/2025    GFR test (Diabetes, CKD 3-4, OR last GFR 15-59)  04/24/2025    Breast cancer screen  03/19/2026    DTaP/Tdap/Td vaccine (2 - Td or Tdap) 01/11/2028    Hepatitis B vaccine  Completed    Shingles vaccine  Completed    COVID-19 Vaccine  Completed    Hepatitis C screen  Completed    HIV screen  Completed    Hepatitis A vaccine  Aged Out    Hib vaccine  Aged Out    Polio vaccine  Aged Out    Meningococcal (ACWY) vaccine  Aged Out    Depression Screen  
CVA tenderness, guarding or rebound.   Musculoskeletal:         General: No tenderness.      Cervical back: Normal range of motion and neck supple. No rigidity or spasms.      Thoracic back: Spasms present. Normal range of motion.      Lumbar back: Spasms present. No tenderness. Decreased range of motion.      Right lower leg: No edema.      Left lower leg: No edema.   Lymphadenopathy:      Cervical: No cervical adenopathy.   Skin:     Coloration: Skin is not jaundiced or pale.      Findings: No bruising, erythema or rash.   Neurological:      General: No focal deficit present.      Mental Status: She is alert and oriented to person, place, and time.      Cranial Nerves: No cranial nerve deficit.      Sensory: Sensory deficit present.      Motor: No weakness or tremor.      Coordination: Romberg sign negative. Coordination normal.      Gait: Gait and tandem walk normal.      Deep Tendon Reflexes: Reflexes are normal and symmetric.   Psychiatric:         Attention and Perception: Attention and perception normal. She is attentive.         Mood and Affect: Mood is anxious. Mood is not depressed. Affect is not tearful.         Speech: She is communicative. Speech is not rapid and pressured, delayed or slurred.         Behavior: Behavior normal. Behavior is not agitated or slowed.         Thought Content: Thought content normal.         Judgment: Judgment normal.             ASSESSMENT AND PLAN      1. Type 2 diabetes mellitus without complication, without long-term current use of insulin (HCC)  Worsening, discussed with patient to start on Trulicity 4.5 mg, continue to monitor blood sugars monitor labs.  - POCT glycosylated hemoglobin (Hb A1C)  - Dulaglutide (TRULICITY) 4.5 MG/0.5ML SOPN; Inject 4.5 mg into the skin once a week  Dispense: 12 Adjustable Dose Pre-filled Pen Syringe; Refill: 2  - CBC; Future  - Magnesium; Future  - Microalbumin / Creatinine Urine Ratio; Future  - TSH with Reflex; Future    2. Essential

## 2024-07-11 NOTE — PATIENT INSTRUCTIONS
[facility] Financial Resources*  (Call [employer]/211 if need more resources).       Area Office on Aging of [facility] ([place] and surrounding [facility]):  What they offer: Assisted Living Waiver Program, Medicare benefits counseling, and referral to community resources.  Phone Number: [phone]   [place] [facility] ([place] and counties to the east):  What they offer: Assistance with utility expenses.  Phone Number: 607.391.1675   [facility] Veterans Service Commission:  What they offer:  Assistance with rent or mortgage payments, utilities, auto payments or repair, food, medical prescriptions, transportation to VA medical facilities for veterans and their widows who qualify.  Phone Number: 669.259.7497   [facility] Community Action Commission ([place] and counties to the west):   What they offer: Assistance with utility expenses.  Phone Number: 756.622.5138  [place] [employer] ([employer]):  What they offer: Medicaid, SNAP (food stamps), TANF (cash assistance), and childcare assistance.  Phone Number: 649.590.6596  [employer] ([facility]):  What they offer: Assistance with utility expenses.  Phone Number: 881.414.3664 ext: x11   Community Action Commission of [place]:  What they offer: Electric, gas and water payment service.  Phone: 615.775.2899    [facility] Agency on Aging, District [facility]:    [place], [place], [place], [place], [place], [place], [place], [place], [facility]:  What they offer: Referral to transportation and other resources for seniors.  Phone Number: [phone]   [place]    First Call for Help     [facility]  What they offer: Information and referral services about food, housing, utility assistance, drug and alcohol treatment, child and family support  Phone number: 372.267.1078    [employer]  What they offer: Programs for children, addiction recovery, family restoration  Phone number:

## 2024-07-23 ENCOUNTER — HOSPITAL ENCOUNTER (OUTPATIENT)
Age: 54
Discharge: HOME | End: 2024-07-23
Payer: COMMERCIAL

## 2024-07-23 DIAGNOSIS — E11.9 TYPE 2 DIABETES MELLITUS WITHOUT COMPLICATION, WITHOUT LONG-TERM CURRENT USE OF INSULIN (HCC): ICD-10-CM

## 2024-07-23 LAB
25(OH)D3 SERPL-MCNC: 36.7 NG/ML (ref 30–100)
CREAT UR-MCNC: 181 MG/DL (ref 28–217)
ERYTHROCYTE [DISTWIDTH] IN BLOOD BY AUTOMATED COUNT: 12.7 % (ref 11.5–14.9)
HCT VFR BLD AUTO: 42.1 % (ref 36–46)
HGB BLD-MCNC: 13.9 G/DL (ref 12–16)
MAGNESIUM SERPL-MCNC: 2.1 MG/DL (ref 1.6–2.6)
MCH RBC QN AUTO: 28.7 PG (ref 26–34)
MCHC RBC AUTO-ENTMCNC: 33 G/DL (ref 31–37)
MCV RBC AUTO: 87.2 FL (ref 80–100)
MICROALBUMIN UR-MCNC: <12 MG/L (ref 0–20)
MICROALBUMIN/CREAT UR-RTO: NORMAL MCG/MG CREAT (ref 0–25)
PLATELET # BLD AUTO: 247 K/UL (ref 150–450)
PMV BLD AUTO: 8 FL (ref 6–12)
RBC # BLD AUTO: 4.83 M/UL (ref 4–5.2)
TSH SERPL DL<=0.05 MIU/L-ACNC: 0.94 UIU/ML (ref 0.3–5)
WBC OTHER # BLD: 6.8 K/UL (ref 3.5–11)

## 2024-07-23 PROCEDURE — 85027 COMPLETE CBC AUTOMATED: CPT

## 2024-07-23 PROCEDURE — 82043 UR ALBUMIN QUANTITATIVE: CPT

## 2024-07-23 PROCEDURE — 84443 ASSAY THYROID STIM HORMONE: CPT

## 2024-07-23 PROCEDURE — 36415 COLL VENOUS BLD VENIPUNCTURE: CPT

## 2024-07-23 PROCEDURE — 82306 VITAMIN D 25 HYDROXY: CPT

## 2024-07-23 PROCEDURE — 83735 ASSAY OF MAGNESIUM: CPT

## 2024-07-23 PROCEDURE — 82570 ASSAY OF URINE CREATININE: CPT

## 2024-07-24 ENCOUNTER — TELEPHONE (OUTPATIENT)
Dept: GASTROENTEROLOGY | Age: 54
End: 2024-07-24

## 2024-07-24 NOTE — TELEPHONE ENCOUNTER
Attempt 1, writer called pt, went directly to voicemail/no answer. Writer left voicemail message for pt to call office back to schedule colonoscopy/Aziz    Attempt 2, writer sent pt colon letter/screening questionnaire in mail    Colonoscopy (WQ/Screening)  Dx: Polyp of colon, unspecified part of colon, unspecified type

## 2024-07-25 ENCOUNTER — TELEPHONE (OUTPATIENT)
Dept: GASTROENTEROLOGY | Age: 54
End: 2024-07-25

## 2024-07-25 NOTE — TELEPHONE ENCOUNTER
Received call from patient requesting to schedule colonoscopy w/ Dr. Monahan.    Referral in [facility].    Call back #: [phone]

## 2024-07-25 NOTE — TELEPHONE ENCOUNTER
Iqra called to schedule her colonoscopy from referral.  Please contact her at [phone] anything after 11.  Thank You

## 2024-08-01 ENCOUNTER — HOSPITAL ENCOUNTER (OUTPATIENT)
Dept: PAIN MANAGEMENT | Age: 54
Discharge: HOME | End: 2024-08-01
Payer: COMMERCIAL

## 2024-08-01 VITALS — WEIGHT: 233 LBS | BODY MASS INDEX: 45.75 KG/M2 | HEIGHT: 60 IN

## 2024-08-01 DIAGNOSIS — M54.50 CHRONIC BILATERAL LOW BACK PAIN WITHOUT SCIATICA: Chronic | ICD-10-CM

## 2024-08-01 DIAGNOSIS — M16.12 PRIMARY OSTEOARTHRITIS OF LEFT HIP: ICD-10-CM

## 2024-08-01 DIAGNOSIS — M50.30 DEGENERATIVE DISC DISEASE, CERVICAL: ICD-10-CM

## 2024-08-01 DIAGNOSIS — G89.29 CHRONIC BILATERAL LOW BACK PAIN WITHOUT SCIATICA: Chronic | ICD-10-CM

## 2024-08-01 DIAGNOSIS — M46.1 SACROILIITIS (HCC): ICD-10-CM

## 2024-08-01 DIAGNOSIS — M47.816 LUMBAR SPONDYLOSIS: Chronic | ICD-10-CM

## 2024-08-01 DIAGNOSIS — M54.16 LUMBAR RADICULOPATHY, CHRONIC: Primary | Chronic | ICD-10-CM

## 2024-08-01 DIAGNOSIS — M51.36 DEGENERATION OF LUMBAR INTERVERTEBRAL DISC: Chronic | ICD-10-CM

## 2024-08-01 DIAGNOSIS — G89.29 CHRONIC SI JOINT PAIN: Chronic | ICD-10-CM

## 2024-08-01 DIAGNOSIS — M53.3 CHRONIC SI JOINT PAIN: Chronic | ICD-10-CM

## 2024-08-01 DIAGNOSIS — Z79.891 CHRONIC USE OF OPIATE FOR THERAPEUTIC PURPOSE: ICD-10-CM

## 2024-08-01 DIAGNOSIS — Z98.1 S/P FUSION OF SACROILIAC JOINT: ICD-10-CM

## 2024-08-01 DIAGNOSIS — M16.0 BILATERAL HIP JOINT ARTHRITIS: ICD-10-CM

## 2024-08-01 DIAGNOSIS — M50.20 CERVICAL DISC HERNIATION: ICD-10-CM

## 2024-08-01 PROCEDURE — 99213 OFFICE O/P EST LOW 20 MIN: CPT

## 2024-08-01 PROCEDURE — 99213 OFFICE O/P EST LOW 20 MIN: CPT | Performed by: NURSE PRACTITIONER

## 2024-08-01 RX ORDER — OXYCODONE HYDROCHLORIDE AND ACETAMINOPHEN 5; 325 MG/1; MG/1
1 TABLET ORAL EVERY 6 HOURS PRN
Qty: 120 TABLET | Refills: 0 | Status: SHIPPED | OUTPATIENT
Start: 2024-08-03 | End: 2024-09-02

## 2024-08-01 ASSESSMENT — PAIN SCALES - GENERAL: PAINLEVEL_OUTOF10: 4

## 2024-08-01 ASSESSMENT — ENCOUNTER SYMPTOMS
BACK PAIN: 1
BOWEL INCONTINENCE: 0
CONSTIPATION: 0
SHORTNESS OF BREATH: 0
COUGH: 0
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
## 2024-08-29 ENCOUNTER — HOSPITAL ENCOUNTER (OUTPATIENT)
Dept: PAIN MANAGEMENT | Age: 54
Discharge: HOME | End: 2024-08-29
Payer: COMMERCIAL

## 2024-08-29 VITALS — HEIGHT: 60 IN | BODY MASS INDEX: 45.35 KG/M2 | WEIGHT: 231 LBS

## 2024-08-29 DIAGNOSIS — M54.42 CHRONIC LEFT-SIDED LOW BACK PAIN WITH LEFT-SIDED SCIATICA: ICD-10-CM

## 2024-08-29 DIAGNOSIS — M47.26 OSTEOARTHRITIS OF SPINE WITH RADICULOPATHY, LUMBAR REGION: ICD-10-CM

## 2024-08-29 DIAGNOSIS — M51.36 DEGENERATION OF LUMBAR INTERVERTEBRAL DISC: Chronic | ICD-10-CM

## 2024-08-29 DIAGNOSIS — M50.20 CERVICAL DISC HERNIATION: ICD-10-CM

## 2024-08-29 DIAGNOSIS — M16.12 PRIMARY OSTEOARTHRITIS OF LEFT HIP: ICD-10-CM

## 2024-08-29 DIAGNOSIS — G89.29 CHRONIC LEFT-SIDED LOW BACK PAIN WITH LEFT-SIDED SCIATICA: ICD-10-CM

## 2024-08-29 DIAGNOSIS — Z79.891 CHRONIC USE OF OPIATE FOR THERAPEUTIC PURPOSE: ICD-10-CM

## 2024-08-29 DIAGNOSIS — M54.16 LUMBAR RADICULOPATHY, CHRONIC: Chronic | ICD-10-CM

## 2024-08-29 DIAGNOSIS — M47.816 OSTEOARTHRITIS OF LUMBAR SPINE, UNSPECIFIED SPINAL OSTEOARTHRITIS COMPLICATION STATUS: ICD-10-CM

## 2024-08-29 DIAGNOSIS — M50.30 DEGENERATIVE DISC DISEASE, CERVICAL: ICD-10-CM

## 2024-08-29 DIAGNOSIS — M47.896 OTHER OSTEOARTHRITIS OF SPINE, LUMBAR REGION: ICD-10-CM

## 2024-08-29 DIAGNOSIS — M47.816 LUMBAR SPONDYLOSIS: Chronic | ICD-10-CM

## 2024-08-29 DIAGNOSIS — M54.50 CHRONIC BILATERAL LOW BACK PAIN WITHOUT SCIATICA: Primary | Chronic | ICD-10-CM

## 2024-08-29 DIAGNOSIS — Z79.899 ENCOUNTER FOR MEDICATION MANAGEMENT: ICD-10-CM

## 2024-08-29 DIAGNOSIS — G89.29 CHRONIC BILATERAL LOW BACK PAIN WITHOUT SCIATICA: Primary | Chronic | ICD-10-CM

## 2024-08-29 DIAGNOSIS — M46.1 SACROILIITIS (HCC): ICD-10-CM

## 2024-08-29 PROCEDURE — 99213 OFFICE O/P EST LOW 20 MIN: CPT

## 2024-08-29 PROCEDURE — 99214 OFFICE O/P EST MOD 30 MIN: CPT | Performed by: NURSE PRACTITIONER

## 2024-08-29 RX ORDER — OXYCODONE AND ACETAMINOPHEN 5; 325 MG/1; MG/1
1 TABLET ORAL EVERY 6 HOURS PRN
Qty: 120 TABLET | Refills: 0 | Status: SHIPPED | OUTPATIENT
Start: 2024-09-01 | End: 2024-10-01

## 2024-08-29 RX ORDER — PREGABALIN 150 MG/1
150 CAPSULE ORAL 2 TIMES DAILY
Qty: 60 CAPSULE | Refills: 2 | Status: SHIPPED | OUTPATIENT
Start: 2024-08-29 | End: 2024-11-27

## 2024-08-29 ASSESSMENT — ENCOUNTER SYMPTOMS
COUGH: 0
SHORTNESS OF BREATH: 0
CONSTIPATION: 0
BACK PAIN: 1
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
## 2024-09-02 DIAGNOSIS — F43.23 ADJUSTMENT DISORDER WITH MIXED ANXIETY AND DEPRESSED MOOD: ICD-10-CM

## 2024-09-03 RX ORDER — HYDROXYZINE PAMOATE 50 MG/1
CAPSULE ORAL
Qty: 90 CAPSULE | Refills: 0 | Status: SHIPPED | OUTPATIENT
Start: 2024-09-03

## 2024-09-03 NOTE — TELEPHONE ENCOUNTER
Please Approve or Refuse.  Send to Pharmacy per Pt's Request:      Next Visit Date:  1/13/2025   Last Visit Date: 7/11/2024    Hemoglobin A1C (%)   Date Value   07/11/2024 7.2   03/20/2024 6.1   11/20/2023 5.9             ( goal A1C is < 7)   BP Readings from Last 3 Encounters:   08/13/24 (!) 148/94   07/11/24 (!) 118/92   03/20/24 122/82          (goal 120/80)  BUN   Date Value Ref Range Status   04/24/2024 11 6 - 20 mg/dL Final     Creatinine   Date Value Ref Range Status   04/24/2024 1.0 (H) 0.5 - 0.9 mg/dL Final     Potassium   Date Value Ref Range Status   04/24/2024 4.6 3.7 - 5.3 mmol/L Final

## 2024-09-05 DIAGNOSIS — F43.23 ADJUSTMENT DISORDER WITH MIXED ANXIETY AND DEPRESSED MOOD: ICD-10-CM

## 2024-09-05 RX ORDER — BUSPIRONE HYDROCHLORIDE 30 MG/1
TABLET ORAL
Qty: 90 TABLET | Refills: 10 | Status: SHIPPED | OUTPATIENT
Start: 2024-09-05

## 2024-09-06 ENCOUNTER — TELEPHONE (OUTPATIENT)
Dept: PAIN MANAGEMENT | Age: 54
End: 2024-09-06

## 2024-09-06 DIAGNOSIS — M54.50 CHRONIC BILATERAL LOW BACK PAIN, UNSPECIFIED WHETHER SCIATICA PRESENT: ICD-10-CM

## 2024-09-06 DIAGNOSIS — M47.816 LUMBAR SPONDYLOSIS: Primary | ICD-10-CM

## 2024-09-06 DIAGNOSIS — G89.29 CHRONIC BILATERAL LOW BACK PAIN, UNSPECIFIED WHETHER SCIATICA PRESENT: ICD-10-CM

## 2024-09-23 ENCOUNTER — HOSPITAL ENCOUNTER (OUTPATIENT)
Dept: CT IMAGING | Age: 54
Discharge: HOME | End: 2024-09-25
Attending: STUDENT IN AN ORGANIZED HEALTH CARE EDUCATION/TRAINING PROGRAM
Payer: COMMERCIAL

## 2024-09-23 ENCOUNTER — HOSPITAL ENCOUNTER (OUTPATIENT)
Age: 54
Discharge: HOME | End: 2024-09-23
Payer: COMMERCIAL

## 2024-09-23 ENCOUNTER — HOSPITAL ENCOUNTER (OUTPATIENT)
Dept: MRI IMAGING | Age: 54
Discharge: HOME | End: 2024-09-25
Attending: STUDENT IN AN ORGANIZED HEALTH CARE EDUCATION/TRAINING PROGRAM
Payer: COMMERCIAL

## 2024-09-23 DIAGNOSIS — M47.896 OTHER OSTEOARTHRITIS OF SPINE, LUMBAR REGION: ICD-10-CM

## 2024-09-23 DIAGNOSIS — E11.9 TYPE 2 DIABETES MELLITUS WITHOUT COMPLICATION, WITHOUT LONG-TERM CURRENT USE OF INSULIN (HCC): ICD-10-CM

## 2024-09-23 DIAGNOSIS — K11.8 PAROTID NODULE: ICD-10-CM

## 2024-09-23 DIAGNOSIS — G89.29 CHRONIC BILATERAL LOW BACK PAIN, UNSPECIFIED WHETHER SCIATICA PRESENT: ICD-10-CM

## 2024-09-23 DIAGNOSIS — G89.29 CHRONIC LEFT-SIDED LOW BACK PAIN WITH LEFT-SIDED SCIATICA: ICD-10-CM

## 2024-09-23 DIAGNOSIS — M47.816 LUMBAR SPONDYLOSIS: ICD-10-CM

## 2024-09-23 DIAGNOSIS — M54.50 CHRONIC BILATERAL LOW BACK PAIN, UNSPECIFIED WHETHER SCIATICA PRESENT: ICD-10-CM

## 2024-09-23 DIAGNOSIS — M46.1 SACROILIITIS (HCC): ICD-10-CM

## 2024-09-23 DIAGNOSIS — E55.9 VITAMIN D DEFICIENCY: ICD-10-CM

## 2024-09-23 DIAGNOSIS — M51.36 DEGENERATION OF LUMBAR INTERVERTEBRAL DISC: Chronic | ICD-10-CM

## 2024-09-23 DIAGNOSIS — M54.42 CHRONIC LEFT-SIDED LOW BACK PAIN WITH LEFT-SIDED SCIATICA: ICD-10-CM

## 2024-09-23 DIAGNOSIS — J32.9 CHRONIC RHINOSINUSITIS: ICD-10-CM

## 2024-09-23 LAB
CREAT SERPL-MCNC: 0.9 MG/DL (ref 0.5–0.9)
EGFR, POC: 88 ML/MIN/1.73M2
ERYTHROCYTE [DISTWIDTH] IN BLOOD BY AUTOMATED COUNT: 13.1 % (ref 11.5–14.9)
GFR, ESTIMATED: 76 ML/MIN/1.73M2
HCT VFR BLD AUTO: 42.6 % (ref 36–46)
HGB BLD-MCNC: 14.5 G/DL (ref 12–16)
MAGNESIUM SERPL-MCNC: 2 MG/DL (ref 1.6–2.6)
MCH RBC QN AUTO: 29.4 PG (ref 26–34)
MCHC RBC AUTO-ENTMCNC: 34.2 G/DL (ref 31–37)
MCV RBC AUTO: 86 FL (ref 80–100)
PLATELET # BLD AUTO: 255 K/UL (ref 150–450)
PMV BLD AUTO: 7.7 FL (ref 6–12)
POC CREATININE: 0.8 MG/DL (ref 0.51–1.19)
RBC # BLD AUTO: 4.95 M/UL (ref 4–5.2)
WBC OTHER # BLD: 7.2 K/UL (ref 3.5–11)

## 2024-09-23 PROCEDURE — 6360000004 HC RX CONTRAST MEDICATION: Performed by: STUDENT IN AN ORGANIZED HEALTH CARE EDUCATION/TRAINING PROGRAM

## 2024-09-23 PROCEDURE — 83735 ASSAY OF MAGNESIUM: CPT

## 2024-09-23 PROCEDURE — A9579 GAD-BASE MR CONTRAST NOS,1ML: HCPCS | Performed by: NURSE PRACTITIONER

## 2024-09-23 PROCEDURE — 82043 UR ALBUMIN QUANTITATIVE: CPT

## 2024-09-23 PROCEDURE — 82570 ASSAY OF URINE CREATININE: CPT

## 2024-09-23 PROCEDURE — 82565 ASSAY OF CREATININE: CPT

## 2024-09-23 PROCEDURE — 70491 CT SOFT TISSUE NECK W/DYE: CPT

## 2024-09-23 PROCEDURE — 36415 COLL VENOUS BLD VENIPUNCTURE: CPT

## 2024-09-23 PROCEDURE — 6360000004 HC RX CONTRAST MEDICATION: Performed by: NURSE PRACTITIONER

## 2024-09-23 PROCEDURE — 85027 COMPLETE CBC AUTOMATED: CPT

## 2024-09-23 PROCEDURE — 2580000003 HC RX 258: Performed by: STUDENT IN AN ORGANIZED HEALTH CARE EDUCATION/TRAINING PROGRAM

## 2024-09-23 PROCEDURE — 72158 MRI LUMBAR SPINE W/O & W/DYE: CPT

## 2024-09-23 PROCEDURE — 82306 VITAMIN D 25 HYDROXY: CPT

## 2024-09-23 PROCEDURE — 2580000003 HC RX 258: Performed by: NURSE PRACTITIONER

## 2024-09-23 RX ORDER — SODIUM CHLORIDE 0.9 % (FLUSH) 0.9 %
10 SYRINGE (ML) INJECTION PRN
Status: DISCONTINUED | OUTPATIENT
Start: 2024-09-23 | End: 2024-09-26 | Stop reason: HOSPADM

## 2024-09-23 RX ORDER — 0.9 % SODIUM CHLORIDE 0.9 %
100 INTRAVENOUS SOLUTION INTRAVENOUS ONCE
Status: COMPLETED | OUTPATIENT
Start: 2024-09-23 | End: 2024-09-23

## 2024-09-23 RX ORDER — IOPAMIDOL 755 MG/ML
75 INJECTION, SOLUTION INTRAVASCULAR
Status: COMPLETED | OUTPATIENT
Start: 2024-09-23 | End: 2024-09-23

## 2024-09-23 RX ADMIN — IOPAMIDOL 75 ML: 755 INJECTION, SOLUTION INTRAVENOUS at 15:36

## 2024-09-23 RX ADMIN — SODIUM CHLORIDE, PRESERVATIVE FREE 10 ML: 5 INJECTION INTRAVENOUS at 15:36

## 2024-09-23 RX ADMIN — GADOTERIDOL 20 ML: 279.3 INJECTION, SOLUTION INTRAVENOUS at 16:57

## 2024-09-23 RX ADMIN — SODIUM CHLORIDE 100 ML: 0.9 INJECTION, SOLUTION INTRAVENOUS at 15:39

## 2024-09-23 RX ADMIN — SODIUM CHLORIDE, PRESERVATIVE FREE 10 ML: 5 INJECTION INTRAVENOUS at 16:58

## 2024-09-24 LAB
25(OH)D3 SERPL-MCNC: 39.2 NG/ML (ref 30–100)
CREAT UR-MCNC: 188 MG/DL (ref 28–217)
MICROALBUMIN UR-MCNC: <12 MG/L (ref 0–20)
MICROALBUMIN/CREAT UR-RTO: NORMAL MCG/MG CREAT (ref 0–25)

## 2024-09-26 ENCOUNTER — HOSPITAL ENCOUNTER (OUTPATIENT)
Dept: PAIN MANAGEMENT | Age: 54
Discharge: HOME | End: 2024-09-26
Payer: COMMERCIAL

## 2024-09-26 VITALS — WEIGHT: 232 LBS | HEIGHT: 60 IN | BODY MASS INDEX: 45.55 KG/M2

## 2024-09-26 DIAGNOSIS — M16.12 PRIMARY OSTEOARTHRITIS OF LEFT HIP: ICD-10-CM

## 2024-09-26 DIAGNOSIS — M47.816 LUMBAR SPONDYLOSIS: Chronic | ICD-10-CM

## 2024-09-26 DIAGNOSIS — Z79.891 CHRONIC USE OF OPIATE FOR THERAPEUTIC PURPOSE: ICD-10-CM

## 2024-09-26 DIAGNOSIS — M54.16 LUMBAR RADICULOPATHY, CHRONIC: Chronic | ICD-10-CM

## 2024-09-26 DIAGNOSIS — Z98.1 S/P FUSION OF SACROILIAC JOINT: ICD-10-CM

## 2024-09-26 DIAGNOSIS — M51.36 DEGENERATION OF LUMBAR INTERVERTEBRAL DISC: Chronic | ICD-10-CM

## 2024-09-26 DIAGNOSIS — M54.50 CHRONIC BILATERAL LOW BACK PAIN WITHOUT SCIATICA: Chronic | ICD-10-CM

## 2024-09-26 DIAGNOSIS — M16.0 BILATERAL HIP JOINT ARTHRITIS: Primary | ICD-10-CM

## 2024-09-26 DIAGNOSIS — M50.20 CERVICAL DISC HERNIATION: ICD-10-CM

## 2024-09-26 DIAGNOSIS — G89.29 CHRONIC BILATERAL LOW BACK PAIN WITHOUT SCIATICA: Chronic | ICD-10-CM

## 2024-09-26 DIAGNOSIS — M50.30 DEGENERATIVE DISC DISEASE, CERVICAL: ICD-10-CM

## 2024-09-26 DIAGNOSIS — M46.1 SACROILIITIS (HCC): ICD-10-CM

## 2024-09-26 PROCEDURE — 99213 OFFICE O/P EST LOW 20 MIN: CPT

## 2024-09-26 RX ORDER — OXYCODONE AND ACETAMINOPHEN 5; 325 MG/1; MG/1
1 TABLET ORAL EVERY 6 HOURS PRN
Qty: 120 TABLET | Refills: 0 | Status: SHIPPED | OUTPATIENT
Start: 2024-10-02 | End: 2024-11-01

## 2024-09-26 ASSESSMENT — ENCOUNTER SYMPTOMS
COUGH: 0
CONSTIPATION: 0
BACK PAIN: 1
SHORTNESS OF BREATH: 0

## 2024-09-27 ENCOUNTER — OFFICE VISIT (OUTPATIENT)
Dept: FAMILY MEDICINE CLINIC | Age: 54
End: 2024-09-27

## 2024-09-27 VITALS — BODY MASS INDEX: 45.31 KG/M2 | TEMPERATURE: 97.8 F | WEIGHT: 232 LBS

## 2024-09-27 DIAGNOSIS — Z23 ENCOUNTER FOR IMMUNIZATION: Primary | ICD-10-CM

## 2024-09-30 ENCOUNTER — OFFICE VISIT (OUTPATIENT)
Dept: FAMILY MEDICINE CLINIC | Age: 54
End: 2024-09-30
Payer: COMMERCIAL

## 2024-09-30 VITALS
BODY MASS INDEX: 44.96 KG/M2 | WEIGHT: 229 LBS | HEART RATE: 74 BPM | OXYGEN SATURATION: 98 % | DIASTOLIC BLOOD PRESSURE: 90 MMHG | HEIGHT: 60 IN | SYSTOLIC BLOOD PRESSURE: 138 MMHG

## 2024-09-30 DIAGNOSIS — I10 ESSENTIAL HYPERTENSION: Primary | ICD-10-CM

## 2024-09-30 PROCEDURE — 3075F SYST BP GE 130 - 139MM HG: CPT | Performed by: FAMILY MEDICINE

## 2024-09-30 PROCEDURE — 3080F DIAST BP >= 90 MM HG: CPT | Performed by: FAMILY MEDICINE

## 2024-09-30 PROCEDURE — 99214 OFFICE O/P EST MOD 30 MIN: CPT | Performed by: FAMILY MEDICINE

## 2024-09-30 PROCEDURE — G8417 CALC BMI ABV UP PARAM F/U: HCPCS | Performed by: FAMILY MEDICINE

## 2024-09-30 PROCEDURE — 1036F TOBACCO NON-USER: CPT | Performed by: FAMILY MEDICINE

## 2024-09-30 PROCEDURE — G8427 DOCREV CUR MEDS BY ELIG CLIN: HCPCS | Performed by: FAMILY MEDICINE

## 2024-09-30 PROCEDURE — 3017F COLORECTAL CA SCREEN DOC REV: CPT | Performed by: FAMILY MEDICINE

## 2024-09-30 RX ORDER — BLOOD PRESSURE TEST KIT
KIT MISCELLANEOUS
Qty: 1 KIT | Refills: 0 | Status: SHIPPED | OUTPATIENT
Start: 2024-09-30

## 2024-09-30 RX ORDER — AMLODIPINE BESYLATE 5 MG/1
5 TABLET ORAL DAILY
Qty: 90 TABLET | Refills: 1 | Status: SHIPPED | OUTPATIENT
Start: 2024-09-30

## 2024-09-30 ASSESSMENT — ENCOUNTER SYMPTOMS
ABDOMINAL DISTENTION: 0
CHEST TIGHTNESS: 0
BACK PAIN: 0
COUGH: 0
STRIDOR: 0
SHORTNESS OF BREATH: 0
CONSTIPATION: 0
BLOOD IN STOOL: 0
ABDOMINAL PAIN: 0
SORE THROAT: 0
RHINORRHEA: 0
NAUSEA: 0
EYE REDNESS: 0
VOMITING: 0
DIARRHEA: 0
RECTAL PAIN: 0
WHEEZING: 0
COLOR CHANGE: 0
TROUBLE SWALLOWING: 0
SINUS PRESSURE: 0

## 2024-09-30 NOTE — PROGRESS NOTES
Visit Information    Have you changed or started any medications since your last visit including any over-the-counter medicines, vitamins, or herbal medicines? no   Are you having any side effects from any of your medications? -  no  Have you stopped taking any of your medications? Is so, why? -  no    Have you seen any other physician or provider since your last visit? No  Have you had any other diagnostic tests since your last visit? No  Have you been seen in the emergency room and/or had an admission to a hospital since we last saw you? No  Have you had your routine dental cleaning in the past 6 months? no    Have you activated your [employer] account? If not, what are your barriers? Yes     Patient Care Team:  Winnie Granger MD as PCP - General (Family Medicine)  Winnie Granger MD as PCP - Empaneled Provider  Freedom Leigh MD as Orthopedic Surgeon (Orthopedic Surgery)  Gee Richey MD as Consulting Physician (Gastroenterology)    Medical History Review  Past Medical, Family, and Social History reviewed and does contribute to the patient presenting condition    Health Maintenance   Topic Date Due    Colorectal Cancer Screen  10/22/2022    Diabetic retinal exam  04/04/2024    Diabetic foot exam  08/17/2024    Depression Monitoring  03/20/2025    Lipids  04/24/2025    A1C test (Diabetic or Prediabetic)  07/11/2025    Diabetic Alb to Cr ratio (uACR) test  09/23/2025    GFR test (Diabetes, CKD 3-4, OR last GFR 15-59)  09/23/2025    Breast cancer screen  03/19/2026    DTaP/Tdap/Td vaccine (2 - Td or Tdap) 01/11/2028    Hepatitis B vaccine  Completed    Flu vaccine  Completed    Shingles vaccine  Completed    Pneumococcal 0-64 years Vaccine  Completed    COVID-19 Vaccine  Completed    Hepatitis C screen  Completed    HIV screen  Completed    Hepatitis A vaccine  Aged Out    Hib vaccine  Aged Out    Polio vaccine  Aged Out    Meningococcal (ACWY) vaccine  Aged Out    Depression Screen  Discontinued    Cervical 
agitated or slowed.         Thought Content: Thought content normal.         Judgment: Judgment normal.             ASSESSMENT AND PLAN      1. Essential hypertension  Uncontrolled, increase amlodipine to 5 mg, discussed to cut down on low-salt diet weight reduction.  Close follow-up in 1 month.  Also due for her diabetic checkup.  Nurse visit in 1 week for BP check.  If still high we will add just low-dose lisinopril.  - amLODIPine (NORVASC) 5 MG tablet; Take 1 tablet by mouth daily  Dispense: 90 tablet; Refill: 1  - Blood Pressure KIT; Dx: HTN. Needs automatic blood pressure machine to monitor her blood pressure.  Dispense: 1 kit; Refill: 0      No orders of the defined types were placed in this encounter.        Medications Discontinued During This Encounter   Medication Reason    amLODIPine (NORVASC) 2.5 MG tablet REORDER       Iqra received counseling on the following healthy behaviors: nutrition, exercise, and medication adherence  Reviewed prior labs and health maintenance  Continue current medications, diet and exercise.  Discussed use, benefit, and side effects of prescribed medications. Barriers to medication compliance addressed.   Patient given educational materials - see patient instructions  Was a self-tracking handout given in paper form or via [employer]? Yes    Requested Prescriptions     Signed Prescriptions Disp Refills    amLODIPine (NORVASC) 5 MG tablet 90 tablet 1     Sig: Take 1 tablet by mouth daily    Blood Pressure KIT 1 kit 0     Sig: Dx: HTN. Needs automatic blood pressure machine to monitor her blood pressure.       All patient questions answered.  Patient voiced understanding.    Quality Measures    Body mass index is 44.72 kg/m². Elevated. Weight control planned discussed Healthy diet and regular exercise.    BP: (!) 138/90 Blood pressure is high. Treatment plan consists of Weight Reduction, DASH Eating Plan, Dietary Sodium Restriction, Increased Physical Activity, and Antihypertensive

## 2024-10-01 ENCOUNTER — OFFICE VISIT (OUTPATIENT)
Dept: GASTROENTEROLOGY | Age: 54
End: 2024-10-01
Payer: COMMERCIAL

## 2024-10-01 ENCOUNTER — HOSPITAL ENCOUNTER (OUTPATIENT)
Age: 54
Discharge: HOME | End: 2024-10-01
Payer: COMMERCIAL

## 2024-10-01 ENCOUNTER — HOSPITAL ENCOUNTER (OUTPATIENT)
Age: 54
Discharge: HOME | End: 2024-10-03
Payer: COMMERCIAL

## 2024-10-01 ENCOUNTER — HOSPITAL ENCOUNTER (OUTPATIENT)
Dept: GENERAL RADIOLOGY | Age: 54
Discharge: HOME | End: 2024-10-03
Payer: COMMERCIAL

## 2024-10-01 ENCOUNTER — TELEPHONE (OUTPATIENT)
Dept: GASTROENTEROLOGY | Age: 54
End: 2024-10-01

## 2024-10-01 VITALS
SYSTOLIC BLOOD PRESSURE: 157 MMHG | RESPIRATION RATE: 20 BRPM | HEART RATE: 87 BPM | BODY MASS INDEX: 45.35 KG/M2 | TEMPERATURE: 97.3 F | OXYGEN SATURATION: 94 % | HEIGHT: 60 IN | WEIGHT: 231 LBS | DIASTOLIC BLOOD PRESSURE: 102 MMHG

## 2024-10-01 DIAGNOSIS — R19.4 ALTERED BOWEL HABITS: Primary | ICD-10-CM

## 2024-10-01 DIAGNOSIS — R19.4 ALTERED BOWEL HABITS: ICD-10-CM

## 2024-10-01 DIAGNOSIS — R10.9 ABDOMINAL PAIN, UNSPECIFIED ABDOMINAL LOCATION: ICD-10-CM

## 2024-10-01 DIAGNOSIS — R13.10 DYSPHAGIA, UNSPECIFIED TYPE: ICD-10-CM

## 2024-10-01 DIAGNOSIS — Z12.11 COLON CANCER SCREENING: Primary | ICD-10-CM

## 2024-10-01 DIAGNOSIS — R14.0 BLOATING: ICD-10-CM

## 2024-10-01 LAB
CRP SERPL HS-MCNC: <3 MG/L (ref 0–5)
ERYTHROCYTE [SEDIMENTATION RATE] IN BLOOD BY PHOTOMETRIC METHOD: 2 MM/HR (ref 0–30)
TSH SERPL DL<=0.05 MIU/L-ACNC: 0.63 UIU/ML (ref 0.3–5)

## 2024-10-01 PROCEDURE — 85652 RBC SED RATE AUTOMATED: CPT

## 2024-10-01 PROCEDURE — G2211 COMPLEX E/M VISIT ADD ON: HCPCS | Performed by: INTERNAL MEDICINE

## 2024-10-01 PROCEDURE — G8427 DOCREV CUR MEDS BY ELIG CLIN: HCPCS | Performed by: INTERNAL MEDICINE

## 2024-10-01 PROCEDURE — G8417 CALC BMI ABV UP PARAM F/U: HCPCS | Performed by: INTERNAL MEDICINE

## 2024-10-01 PROCEDURE — 99204 OFFICE O/P NEW MOD 45 MIN: CPT | Performed by: INTERNAL MEDICINE

## 2024-10-01 PROCEDURE — 86140 C-REACTIVE PROTEIN: CPT

## 2024-10-01 PROCEDURE — G8484 FLU IMMUNIZE NO ADMIN: HCPCS | Performed by: INTERNAL MEDICINE

## 2024-10-01 PROCEDURE — 3077F SYST BP >= 140 MM HG: CPT | Performed by: INTERNAL MEDICINE

## 2024-10-01 PROCEDURE — 84443 ASSAY THYROID STIM HORMONE: CPT

## 2024-10-01 PROCEDURE — 1036F TOBACCO NON-USER: CPT | Performed by: INTERNAL MEDICINE

## 2024-10-01 PROCEDURE — 82784 ASSAY IGA/IGD/IGG/IGM EACH: CPT

## 2024-10-01 PROCEDURE — 3080F DIAST BP >= 90 MM HG: CPT | Performed by: INTERNAL MEDICINE

## 2024-10-01 PROCEDURE — 3017F COLORECTAL CA SCREEN DOC REV: CPT | Performed by: INTERNAL MEDICINE

## 2024-10-01 PROCEDURE — 74019 RADEX ABDOMEN 2 VIEWS: CPT

## 2024-10-01 PROCEDURE — 36415 COLL VENOUS BLD VENIPUNCTURE: CPT

## 2024-10-01 PROCEDURE — 83516 IMMUNOASSAY NONANTIBODY: CPT

## 2024-10-01 ASSESSMENT — ENCOUNTER SYMPTOMS
NAUSEA: 1
DIARRHEA: 1
TROUBLE SWALLOWING: 1
RECTAL PAIN: 0
ABDOMINAL DISTENTION: 1
ANAL BLEEDING: 0
ABDOMINAL PAIN: 1
BLOOD IN STOOL: 0
CHOKING: 0
COLOR CHANGE: 0
COUGH: 1
CONSTIPATION: 1
VOMITING: 1
VOICE CHANGE: 1
SORE THROAT: 1
WHEEZING: 1

## 2024-10-01 NOTE — TELEPHONE ENCOUNTER
Pt saw Taniya today in clinic. Pt does not take blood thinners, however she does take Trulicity. Pt informed to stop Trulicity 2 weeks before procedure. Stop on Tuesday 10/15/24.     Procedure scheduled/Dr Monahan  Procedure: EGD/Colonoscopy W/ or W/O Biopsy (Diagnostic)  Dx:  Altered bowel habits; Abdominal pain, unspecified abdominal location  Date: Tuesday 10/29/24  Time: 12:30 pm/Arrive at 10:30 am  [place] [facility] [place], back of hospital  PAT Phone Call: Monday 10/21/24 at 9:30 am  Bowel Prep instructions given:  EGD Prep/Golytely Split-Bowel Prep  In office/via phone: in office  Clearance needed: N/A    Pt informed it is required they must have a /responsible adult that takes them to their procedure, stays at the hospital (before, during, and after procedure), and drives pt home. Pt informed /responsible adult must stay inside the hospital during their procedure. Pt voiced understanding of  protocol for procedure.     Pt informed they will receive a PAT Phone Call from a [facility] PAT Nurse 1-2 weeks prior to procedure. Pt informed they must complete PAT Call or procedure may be cancelled.

## 2024-10-01 NOTE — PROGRESS NOTES
Dr. Sawyer:  All 84 genes tested were negative. mouth daily, Disp: , Rfl:     Elastic Bandages & Supports (LUMBAR BACK BRACE/SUPPORT PAD) MISC, 1 each by Does not apply route daily as needed (pain), Disp: 1 each, Rfl: 0    ALLERGIES:   Allergies   Allergen Reactions    Imitrex [Sumatriptan] Other (See Comments)     \"feels like head is going to explode    Morphine Itching     hives    Adhesive Tape Other (See Comments)     blisters    Seasonal Cough       FAMILY HISTORY:       Problem Relation Age of Onset    Cancer Mother         Lung    Arthritis Mother             Asthma Mother     Depression Mother     Early Death Mother         She was diagnosed with lung cancer and interstitial … something of the lungs but her death was caused by the flu    Osteoarthritis Mother     Osteoporosis Mother     Spondylitis Mother     Heart Disease Father     Diabetes Father     High Blood Pressure Father     Alcohol Abuse Father          arterial sclerosis    Depression Father     Early Death Father         Aerterial sclerosis    Heart Attack Father     High Cholesterol Father     Mental Illness Father     Stroke Father     Breast Cancer Maternal Grandmother 55        over 50    Osteoarthritis Maternal Grandmother     Osteoporosis Maternal Grandmother     Other Other         Celiac Sprue. Aunt    Arthritis Brother     Diabetes Brother     High Blood Pressure Brother     High Cholesterol Brother     Heart Disease Paternal Uncle     Osteoarthritis Maternal Aunt     Osteoporosis Maternal Aunt          SOCIAL HISTORY:   Social History     Socioeconomic History    Marital status:      Spouse name: Not on file    Number of children: Not on file    Years of education: Not on file    Highest education level: Not on file   Occupational History    Occupation: unemployed   Tobacco Use    Smoking status: Never     Passive exposure: Current    Smokeless tobacco: Never   Vaping Use    Vaping status: Never Used   Substance and Sexual Activity    Alcohol use: Never    Drug

## 2024-10-02 ENCOUNTER — HOSPITAL ENCOUNTER (OUTPATIENT)
Age: 54
Setting detail: SPECIMEN
Discharge: HOME | End: 2024-10-02
Payer: COMMERCIAL

## 2024-10-02 DIAGNOSIS — R19.4 ALTERED BOWEL HABITS: ICD-10-CM

## 2024-10-02 LAB
C DIFF GDH + TOXINS A+B STL QL IA.RAPID: NEGATIVE
SPECIMEN DESCRIPTION: NORMAL

## 2024-10-02 PROCEDURE — 82705 FATS/LIPIDS FECES QUAL: CPT

## 2024-10-02 PROCEDURE — 87506 IADNA-DNA/RNA PROBE TQ 6-11: CPT

## 2024-10-02 PROCEDURE — 82653 EL-1 FECAL QUANTITATIVE: CPT

## 2024-10-02 PROCEDURE — 87328 CRYPTOSPORIDIUM AG IA: CPT

## 2024-10-02 PROCEDURE — 87324 CLOSTRIDIUM AG IA: CPT

## 2024-10-02 PROCEDURE — 83993 ASSAY FOR CALPROTECTIN FECAL: CPT

## 2024-10-02 PROCEDURE — 87329 GIARDIA AG IA: CPT

## 2024-10-02 PROCEDURE — 87449 NOS EACH ORGANISM AG IA: CPT

## 2024-10-02 RX ORDER — POLYETHYLENE GLYCOL 3350, SODIUM SULFATE ANHYDROUS, SODIUM BICARBONATE, SODIUM CHLORIDE, POTASSIUM CHLORIDE 236; 22.74; 6.74; 5.86; 2.97 G/4L; G/4L; G/4L; G/4L; G/4L
4 POWDER, FOR SOLUTION ORAL ONCE
Qty: 4000 ML | Refills: 0 | Status: SHIPPED | OUTPATIENT
Start: 2024-10-02 | End: 2024-10-02

## 2024-10-03 LAB
C PARVUM AG STL QL IA: NEGATIVE
CAMPYLOBACTER DNA SPEC NAA+PROBE: NORMAL
ETEC ELTA+ESTB GENES STL QL NAA+PROBE: NORMAL
G LAMBLIA AG STL QL IA: NEGATIVE
GLIADIN IGA SER IA-ACNC: 1 U/ML
GLIADIN IGG SER IA-ACNC: <0.4 U/ML
IGA SERPL-MCNC: 203 MG/DL (ref 70–400)
P SHIGELLOIDES DNA STL QL NAA+PROBE: NORMAL
SALMONELLA DNA SPEC QL NAA+PROBE: NORMAL
SHIGA TOXIN STX GENE SPEC NAA+PROBE: NORMAL
SHIGELLA DNA SPEC QL NAA+PROBE: NORMAL
SOURCE: NORMAL
SPECIMEN DESCRIPTION: NORMAL
SPECIMEN DESCRIPTION: NORMAL
TTG IGA SER IA-ACNC: 0.3 U/ML
V CHOL+PARA RFBL+TRKH+TNAA STL QL NAA+PR: NORMAL
Y ENTERO RECN STL QL NAA+PROBE: NORMAL

## 2024-10-04 LAB — CALPROTECTIN, FECAL: 36 UG/G

## 2024-10-05 LAB
FAT QUALITATIVE SPLIT STOOL: NORMAL
FECAL NEUTRAL FAT: NORMAL

## 2024-10-08 LAB — FECAL PANCREATIC ELASTASE-1: >800 UG/G

## 2024-10-15 DIAGNOSIS — E78.2 MIXED HYPERLIPIDEMIA: ICD-10-CM

## 2024-10-15 DIAGNOSIS — F41.9 ANXIETY AND DEPRESSION: ICD-10-CM

## 2024-10-15 DIAGNOSIS — F32.A ANXIETY AND DEPRESSION: ICD-10-CM

## 2024-10-15 DIAGNOSIS — F43.23 ADJUSTMENT DISORDER WITH MIXED ANXIETY AND DEPRESSED MOOD: ICD-10-CM

## 2024-10-15 RX ORDER — ATORVASTATIN CALCIUM 40 MG/1
40 TABLET, FILM COATED ORAL DAILY
Qty: 90 TABLET | Refills: 0 | Status: SHIPPED | OUTPATIENT
Start: 2024-10-15

## 2024-10-15 RX ORDER — SERTRALINE HYDROCHLORIDE 100 MG/1
TABLET, FILM COATED ORAL
Qty: 30 TABLET | Refills: 10 | Status: SHIPPED | OUTPATIENT
Start: 2024-10-15

## 2024-10-15 RX ORDER — BUPROPION HYDROCHLORIDE 150 MG/1
150 TABLET ORAL EVERY MORNING
Qty: 30 TABLET | Refills: 10 | Status: SHIPPED | OUTPATIENT
Start: 2024-10-15

## 2024-10-15 NOTE — TELEPHONE ENCOUNTER
Please Approve or Refuse.  Send to Pharmacy per Pt's Request: [facility]      Next Visit Date:  10/29/2024   Last Visit Date: 9/30/2024    Hemoglobin A1C (%)   Date Value   07/11/2024 7.2   03/20/2024 6.1   11/20/2023 5.9             ( goal A1C is < 7)   BP Readings from Last 3 Encounters:   10/01/24 (!) 157/102   09/30/24 (!) 138/90   08/13/24 (!) 148/94          (goal 120/80)  BUN   Date Value Ref Range Status   04/24/2024 11 6 - 20 mg/dL Final     Creatinine   Date Value Ref Range Status   09/23/2024 0.9 0.5 - 0.9 mg/dL Final     POC Creatinine   Date Value Ref Range Status   09/23/2024 0.8 0.51 - 1.19 mg/dL Final     Potassium   Date Value Ref Range Status   04/24/2024 4.6 3.7 - 5.3 mmol/L Final

## 2024-10-15 NOTE — TELEPHONE ENCOUNTER
Please Approve or Refuse.  Send to Pharmacy per Pt's Request:      Next Visit Date:  10/29/2024   Last Visit Date: 9/30/2024    Hemoglobin A1C (%)   Date Value   07/11/2024 7.2   03/20/2024 6.1   11/20/2023 5.9             ( goal A1C is < 7)   BP Readings from Last 3 Encounters:   10/01/24 (!) 157/102   09/30/24 (!) 138/90   08/13/24 (!) 148/94          (goal 120/80)  BUN   Date Value Ref Range Status   04/24/2024 11 6 - 20 mg/dL Final     Creatinine   Date Value Ref Range Status   09/23/2024 0.9 0.5 - 0.9 mg/dL Final     POC Creatinine   Date Value Ref Range Status   09/23/2024 0.8 0.51 - 1.19 mg/dL Final     Potassium   Date Value Ref Range Status   04/24/2024 4.6 3.7 - 5.3 mmol/L Final

## 2024-10-18 DIAGNOSIS — F32.A ANXIETY AND DEPRESSION: ICD-10-CM

## 2024-10-18 DIAGNOSIS — F43.23 ADJUSTMENT DISORDER WITH MIXED ANXIETY AND DEPRESSED MOOD: ICD-10-CM

## 2024-10-18 DIAGNOSIS — F41.9 ANXIETY AND DEPRESSION: ICD-10-CM

## 2024-10-20 NOTE — PRE-PROCEDURE INSTRUCTIONS
[facility]. Attempted to outreach pt to review pre-procedure instructions. VM obtained, and message left with apt. Reminder.
denies pain/discomfort (Rating = 0)

## 2024-10-21 ENCOUNTER — HOSPITAL ENCOUNTER (OUTPATIENT)
Dept: PREADMISSION TESTING | Age: 54
Discharge: HOME | End: 2024-10-25

## 2024-10-21 VITALS — BODY MASS INDEX: 46.13 KG/M2 | WEIGHT: 235 LBS | HEIGHT: 60 IN

## 2024-10-21 RX ORDER — BUPROPION HYDROCHLORIDE 150 MG/1
150 TABLET ORAL EVERY MORNING
Qty: 30 TABLET | Refills: 10 | OUTPATIENT
Start: 2024-10-21

## 2024-10-21 RX ORDER — SERTRALINE HYDROCHLORIDE 100 MG/1
TABLET, FILM COATED ORAL
Qty: 30 TABLET | Refills: 10 | OUTPATIENT
Start: 2024-10-21

## 2024-10-21 NOTE — PROGRESS NOTES
Pre-op Instructions For Out-Patient Endoscopy Surgery    Medication Instructions:  Please stop herbs and any supplements now (includes vitamins and minerals).    Please contact your surgeon and prescribing physician for pre-op instructions for any blood thinners.    If you have inhalers/aerosol treatments at home, please use them the morning of your surgery and bring the inhalers with you to the hospital.    Please take the following medications the morning of your surgery with a sip of water:    amlodipine, propanolol, Singulair     Surgery Instructions:  After midnight before surgery:  Do not eat or drink anything, including water, mints, gum, and hard candy.  You may brush your teeth without swallowing.  No smoking, chewing tobacco, or street drugs.    Please shower or bathe before surgery.       Please do not wear any cologne, lotion, powder, jewelry, piercings, perfume, makeup, nail polish, hair accessories, or hair spray on the day of surgery.  Wear loose comfortable clothing.    Leave your valuables at home but bring a payment source for any after-surgery prescriptions you plan to fill at [facility].  Bring a storage case for any glasses/contacts.    An adult who is responsible for you MUST drive you home and should be with you for the first 24 hours after surgery.     The Day of Surgery:  Arrive at [facility] Surgery Entrance at the time directed by your surgeon and check in at the desk.     If you have a living will or healthcare power of , please bring a copy.    You will be taken to the pre-op holding area where you will be prepared for surgery.  A physical assessment will be performed by a nurse practitioner or house officer.  Your IV will be started and you will meet your anesthesiologist.    When you go to surgery, your family will be directed to the surgical waiting room, where the doctor should speak with them after your surgery.    After surgery, you will be taken

## 2024-10-24 ENCOUNTER — HOSPITAL ENCOUNTER (OUTPATIENT)
Dept: PAIN MANAGEMENT | Age: 54
Discharge: HOME | End: 2024-10-24
Payer: COMMERCIAL

## 2024-10-24 ENCOUNTER — TELEPHONE (OUTPATIENT)
Dept: FAMILY MEDICINE CLINIC | Age: 54
End: 2024-10-24

## 2024-10-24 VITALS
BODY MASS INDEX: 46.13 KG/M2 | WEIGHT: 235 LBS | HEIGHT: 60 IN | SYSTOLIC BLOOD PRESSURE: 153 MMHG | DIASTOLIC BLOOD PRESSURE: 103 MMHG

## 2024-10-24 DIAGNOSIS — M50.30 DEGENERATIVE DISC DISEASE, CERVICAL: ICD-10-CM

## 2024-10-24 DIAGNOSIS — G89.29 CHRONIC LEFT-SIDED LOW BACK PAIN WITH LEFT-SIDED SCIATICA: ICD-10-CM

## 2024-10-24 DIAGNOSIS — M54.50 CHRONIC BILATERAL LOW BACK PAIN WITHOUT SCIATICA: Chronic | ICD-10-CM

## 2024-10-24 DIAGNOSIS — E66.01 MORBID OBESITY WITH BMI OF 40.0-44.9, ADULT: ICD-10-CM

## 2024-10-24 DIAGNOSIS — G89.29 CHRONIC BILATERAL LOW BACK PAIN WITHOUT SCIATICA: Chronic | ICD-10-CM

## 2024-10-24 DIAGNOSIS — M54.16 LUMBAR RADICULOPATHY, CHRONIC: Chronic | ICD-10-CM

## 2024-10-24 DIAGNOSIS — M54.42 CHRONIC LEFT-SIDED LOW BACK PAIN WITH LEFT-SIDED SCIATICA: ICD-10-CM

## 2024-10-24 DIAGNOSIS — M50.20 CERVICAL DISC HERNIATION: ICD-10-CM

## 2024-10-24 DIAGNOSIS — Z79.891 CHRONIC USE OF OPIATE FOR THERAPEUTIC PURPOSE: ICD-10-CM

## 2024-10-24 DIAGNOSIS — M51.369 DEGENERATION OF LUMBAR INTERVERTEBRAL DISC: Chronic | ICD-10-CM

## 2024-10-24 DIAGNOSIS — M47.816 LUMBAR SPONDYLOSIS: Chronic | ICD-10-CM

## 2024-10-24 DIAGNOSIS — M16.0 BILATERAL HIP JOINT ARTHRITIS: Primary | ICD-10-CM

## 2024-10-24 DIAGNOSIS — Z79.899 ENCOUNTER FOR MEDICATION MANAGEMENT: ICD-10-CM

## 2024-10-24 PROCEDURE — 99213 OFFICE O/P EST LOW 20 MIN: CPT | Performed by: ANESTHESIOLOGY

## 2024-10-24 PROCEDURE — 99213 OFFICE O/P EST LOW 20 MIN: CPT

## 2024-10-24 RX ORDER — PREGABALIN 150 MG/1
150 CAPSULE ORAL 2 TIMES DAILY
Qty: 60 CAPSULE | Refills: 2 | Status: SHIPPED | OUTPATIENT
Start: 2024-10-24 | End: 2025-01-22

## 2024-10-24 RX ORDER — OXYCODONE AND ACETAMINOPHEN 5; 325 MG/1; MG/1
1 TABLET ORAL EVERY 6 HOURS PRN
Qty: 120 TABLET | Refills: 0 | Status: SHIPPED | OUTPATIENT
Start: 2024-10-31 | End: 2024-11-30

## 2024-10-24 ASSESSMENT — PAIN DESCRIPTION - ORIENTATION: ORIENTATION: LOWER

## 2024-10-24 ASSESSMENT — ENCOUNTER SYMPTOMS
SHORTNESS OF BREATH: 0
BACK PAIN: 1
SORE THROAT: 0
RESPIRATORY NEGATIVE: 1
VOMITING: 0
CONSTIPATION: 0
DIARRHEA: 0
NAUSEA: 0
GASTROINTESTINAL NEGATIVE: 1
CHEST TIGHTNESS: 0

## 2024-10-24 ASSESSMENT — PAIN DESCRIPTION - LOCATION: LOCATION: BACK

## 2024-10-24 ASSESSMENT — PAIN DESCRIPTION - FREQUENCY: FREQUENCY: CONTINUOUS

## 2024-10-24 ASSESSMENT — PAIN DESCRIPTION - DESCRIPTORS: DESCRIPTORS: ACHING

## 2024-10-24 ASSESSMENT — PAIN DESCRIPTION - PAIN TYPE: TYPE: CHRONIC PAIN

## 2024-10-24 ASSESSMENT — PAIN SCALES - GENERAL: PAINLEVEL_OUTOF10: 5

## 2024-10-24 NOTE — TELEPHONE ENCOUNTER
Patient can increase amlodipine to 10 mg she can start taking 2 tablets, if she is willing to do virtual visit today, we can put her on the schedule.  Otherwise make a nurse visit in 1 week after increasing amlodipine to 10 mg.

## 2024-10-24 NOTE — PROGRESS NOTES
Dispense:  60 capsule     Refill:  2      Controlled Substance Monitoring:    Acute and Chronic Pain Monitoring:   RX Monitoring Periodic Controlled Substance Monitoring Chronic Pain > 50 MEDD   10/24/2024   2:50 PM Possible medication side effects, risk of tolerance/dependence & alternative treatments discussed.;No signs of potential drug abuse or diversion identified.;Obtaining appropriate analgesic effect of treatment. Obtained or confirmed \"Consent for Opioid Use\" on file.               Electronically signed by Elmer Charles MD on 10/24/2024 at 3:14 PM

## 2024-10-24 NOTE — TELEPHONE ENCOUNTER
Iqra called for a couple things.    She needed to reschedule her 10/29/24 appointment due to a conflict with another appointment. She is now scheduled for 12/16/24 but wanted to address this sooner    Has been taking the Amlodipine for about one month now, at bedtime, and she is worried her blood pressure is still too high.    She has been more tired lately for the past 2 weeks but not other symptoms to report and her BP has been at 189-159 / 118-110.    She stated she has multiple family members who have or have had heart related issues in life and she is trying to not have them.    Does she need to give the medication time to work? Get an increase in the doseage?    Please advise her as soon as possible.    Thank you.

## 2024-10-24 NOTE — H&P (VIEW-ONLY)
The patient is a 54 y.o.Non- / non  female.    Chief Complaint   Patient presents with    Back Pain        HPI    Medication Refill:   Percocet     Pain score Today:  5  Adverse effects (Constipation / Nausea / Sedation / sexual Dysfunction / others) : no   Mood: fair  Sleep pattern and quality: poor  Activity level: fair    Pill count Today:30  Last dose taken  10/23/2024   OARRS report reviewed today: yes  ER/Hospitalizations/PCP visit related to pain since last visit:no   Any legal problems e.g. DUI etc.:No  Satisfied with current management: Yes    Opioid Contract: 11/27/2023  Last Urine Dug screen dated: 04/04/2024    Hemoglobin A1C   Date Value Ref Range Status   07/11/2024 7.2 % Final       Past Medical History, Past Surgical History, Social History, Allergies and Medications reviewed and updated in EPIC as indicated    Family History reviewed and is noncontributory.        Past Medical History:   Diagnosis Date    Allergic rhinitis     Years ago still suuffer    Carla Matthews CNP therapist video visit 1/10/2023    Asthma     does not follow with Pulmonology    Chronic back pain     20s    Chronic kidney disease     I was told yeahs agobut nothing has been said since    Colon polyp 10/31/2016    sessile serated adenoma x2    Depression     Diabetes mellitus (HCC)     Medication, Trulicity, has been back ordered    Diverticulitis 10/2016    Gastritis     GERD (gastroesophageal reflux disease)     Hemorrhoids     int/ext    Hypertension     Migraines     Obesity     27    Osteoarthritis     Pain management     [facility] Dr. Elmer Charles  last visit 12/2022    PTSD (post-traumatic stress disorder)     states has been VERY anxious lately, many personal stressors, working 2 jobs, etc. has a virtual appt with psychiatrist tomorrow 1/10/23. pt believes she may need meds adjusted.    Rheumatoid arthritis (HCC)     states f/w pain mgmt for this    S/P Cystoscopy w/

## 2024-10-25 NOTE — PRE-PROCEDURE INSTRUCTIONS
No answer, left message ?          yes                   Unable to leave message ?    When were you told to arrive at hospital ?    1030/1230  Do you have a  ?    Are you on any blood thinners ?                     If yes when did you stop taking ?    Do you have your prep Rx filled and instruction ?      Nothing to eat the day before , only clear liquids.    Are you experiencing any covid symptoms ?     Do you have any infections or rash we should be aware of ?      Do you have the Hibiclens soap to use the night before and the morning of surgery ?    Nothing to eat or drink after midnight, only a sip of water to take any medication instructed to take the night before.  Wear comfortable clothing, leave any valuables at home, remove any jewelry and body piercing .  Left detailed message of above information

## 2024-10-28 ENCOUNTER — ANESTHESIA EVENT (OUTPATIENT)
Dept: ENDOSCOPY | Age: 54
End: 2024-10-28
Payer: COMMERCIAL

## 2024-10-29 ENCOUNTER — HOSPITAL ENCOUNTER (OUTPATIENT)
Age: 54
Setting detail: OUTPATIENT SURGERY
Discharge: HOME | End: 2024-10-29
Attending: INTERNAL MEDICINE | Admitting: INTERNAL MEDICINE
Payer: COMMERCIAL

## 2024-10-29 ENCOUNTER — ANESTHESIA (OUTPATIENT)
Dept: ENDOSCOPY | Age: 54
End: 2024-10-29
Payer: COMMERCIAL

## 2024-10-29 VITALS
HEIGHT: 60 IN | OXYGEN SATURATION: 95 % | BODY MASS INDEX: 46.13 KG/M2 | WEIGHT: 235 LBS | RESPIRATION RATE: 19 BRPM | DIASTOLIC BLOOD PRESSURE: 99 MMHG | TEMPERATURE: 97.1 F | SYSTOLIC BLOOD PRESSURE: 154 MMHG | HEART RATE: 86 BPM

## 2024-10-29 DIAGNOSIS — R19.4 ALTERED BOWEL HABITS: ICD-10-CM

## 2024-10-29 DIAGNOSIS — R10.9 ABDOMINAL PAIN, UNSPECIFIED ABDOMINAL LOCATION: ICD-10-CM

## 2024-10-29 LAB — GLUCOSE BLD-MCNC: 198 MG/DL (ref 65–105)

## 2024-10-29 PROCEDURE — 2500000003 HC RX 250 WO HCPCS: Performed by: NURSE ANESTHETIST, CERTIFIED REGISTERED

## 2024-10-29 PROCEDURE — 3609012400 HC EGD TRANSORAL BIOPSY SINGLE/MULTIPLE: Performed by: INTERNAL MEDICINE

## 2024-10-29 PROCEDURE — C1769 GUIDE WIRE: HCPCS | Performed by: INTERNAL MEDICINE

## 2024-10-29 PROCEDURE — 45380 COLONOSCOPY AND BIOPSY: CPT | Performed by: INTERNAL MEDICINE

## 2024-10-29 PROCEDURE — 6360000002 HC RX W HCPCS: Performed by: ANESTHESIOLOGY

## 2024-10-29 PROCEDURE — 7100000010 HC PHASE II RECOVERY - FIRST 15 MIN: Performed by: INTERNAL MEDICINE

## 2024-10-29 PROCEDURE — 43251 EGD REMOVE LESION SNARE: CPT | Performed by: INTERNAL MEDICINE

## 2024-10-29 PROCEDURE — 43248 EGD GUIDE WIRE INSERTION: CPT | Performed by: INTERNAL MEDICINE

## 2024-10-29 PROCEDURE — 6360000002 HC RX W HCPCS: Performed by: NURSE ANESTHETIST, CERTIFIED REGISTERED

## 2024-10-29 PROCEDURE — 3700000001 HC ADD 15 MINUTES (ANESTHESIA): Performed by: INTERNAL MEDICINE

## 2024-10-29 PROCEDURE — 45385 COLONOSCOPY W/LESION REMOVAL: CPT | Performed by: INTERNAL MEDICINE

## 2024-10-29 PROCEDURE — 3700000000 HC ANESTHESIA ATTENDED CARE: Performed by: INTERNAL MEDICINE

## 2024-10-29 PROCEDURE — 2709999900 HC NON-CHARGEABLE SUPPLY: Performed by: INTERNAL MEDICINE

## 2024-10-29 PROCEDURE — 3609010600 HC COLONOSCOPY POLYPECTOMY SNARE/COLD BIOPSY: Performed by: INTERNAL MEDICINE

## 2024-10-29 PROCEDURE — 7100000011 HC PHASE II RECOVERY - ADDTL 15 MIN: Performed by: INTERNAL MEDICINE

## 2024-10-29 PROCEDURE — 43239 EGD BIOPSY SINGLE/MULTIPLE: CPT | Performed by: INTERNAL MEDICINE

## 2024-10-29 PROCEDURE — 82947 ASSAY GLUCOSE BLOOD QUANT: CPT

## 2024-10-29 PROCEDURE — 2500000003 HC RX 250 WO HCPCS: Performed by: ANESTHESIOLOGY

## 2024-10-29 PROCEDURE — 2580000003 HC RX 258: Performed by: ANESTHESIOLOGY

## 2024-10-29 PROCEDURE — 88342 IMHCHEM/IMCYTCHM 1ST ANTB: CPT

## 2024-10-29 PROCEDURE — 88305 TISSUE EXAM BY PATHOLOGIST: CPT

## 2024-10-29 RX ORDER — ACETAMINOPHEN 325 MG/1
650 TABLET ORAL
Status: CANCELLED | OUTPATIENT
Start: 2024-10-29 | End: 2024-10-30

## 2024-10-29 RX ORDER — FENTANYL CITRATE 0.05 MG/ML
25 INJECTION, SOLUTION INTRAMUSCULAR; INTRAVENOUS EVERY 5 MIN PRN
Status: CANCELLED | OUTPATIENT
Start: 2024-10-29

## 2024-10-29 RX ORDER — SODIUM CHLORIDE 0.9 % (FLUSH) 0.9 %
5-40 SYRINGE (ML) INJECTION PRN
Status: CANCELLED | OUTPATIENT
Start: 2024-10-29

## 2024-10-29 RX ORDER — SODIUM CHLORIDE 0.9 % (FLUSH) 0.9 %
5-40 SYRINGE (ML) INJECTION EVERY 12 HOURS SCHEDULED
Status: CANCELLED | OUTPATIENT
Start: 2024-10-29

## 2024-10-29 RX ORDER — MIDAZOLAM HYDROCHLORIDE 2 MG/ML
2 SYRUP ORAL ONCE
Status: DISCONTINUED | OUTPATIENT
Start: 2024-10-29 | End: 2024-10-29

## 2024-10-29 RX ORDER — SODIUM CHLORIDE 9 MG/ML
INJECTION, SOLUTION INTRAVENOUS PRN
Status: CANCELLED | OUTPATIENT
Start: 2024-10-29

## 2024-10-29 RX ORDER — SODIUM CHLORIDE 9 MG/ML
INJECTION, SOLUTION INTRAVENOUS PRN
Status: DISCONTINUED | OUTPATIENT
Start: 2024-10-29 | End: 2024-10-29 | Stop reason: HOSPADM

## 2024-10-29 RX ORDER — SODIUM CHLORIDE 0.9 % (FLUSH) 0.9 %
5-40 SYRINGE (ML) INJECTION PRN
Status: DISCONTINUED | OUTPATIENT
Start: 2024-10-29 | End: 2024-10-29 | Stop reason: HOSPADM

## 2024-10-29 RX ORDER — PROPOFOL 10 MG/ML
INJECTION, EMULSION INTRAVENOUS
Status: DISCONTINUED | OUTPATIENT
Start: 2024-10-29 | End: 2024-10-29 | Stop reason: SDUPTHER

## 2024-10-29 RX ORDER — LIDOCAINE HYDROCHLORIDE 20 MG/ML
INJECTION, SOLUTION EPIDURAL; INFILTRATION; INTRACAUDAL; PERINEURAL
Status: DISCONTINUED | OUTPATIENT
Start: 2024-10-29 | End: 2024-10-29 | Stop reason: SDUPTHER

## 2024-10-29 RX ORDER — LABETALOL HYDROCHLORIDE 5 MG/ML
10 INJECTION, SOLUTION INTRAVENOUS
Status: CANCELLED | OUTPATIENT
Start: 2024-10-29

## 2024-10-29 RX ORDER — MIDAZOLAM HYDROCHLORIDE 1 MG/ML
2 INJECTION, SOLUTION INTRAMUSCULAR; INTRAVENOUS ONCE
Status: COMPLETED | OUTPATIENT
Start: 2024-10-29 | End: 2024-10-29

## 2024-10-29 RX ORDER — DIPHENHYDRAMINE HYDROCHLORIDE 50 MG/ML
12.5 INJECTION INTRAMUSCULAR; INTRAVENOUS
Status: CANCELLED | OUTPATIENT
Start: 2024-10-29 | End: 2024-10-30

## 2024-10-29 RX ORDER — HYDRALAZINE HYDROCHLORIDE 20 MG/ML
10 INJECTION INTRAMUSCULAR; INTRAVENOUS
Status: CANCELLED | OUTPATIENT
Start: 2024-10-29

## 2024-10-29 RX ORDER — METOCLOPRAMIDE HYDROCHLORIDE 5 MG/ML
10 INJECTION INTRAMUSCULAR; INTRAVENOUS
Status: CANCELLED | OUTPATIENT
Start: 2024-10-29 | End: 2024-10-30

## 2024-10-29 RX ORDER — NALOXONE HYDROCHLORIDE 0.4 MG/ML
INJECTION, SOLUTION INTRAMUSCULAR; INTRAVENOUS; SUBCUTANEOUS PRN
Status: CANCELLED | OUTPATIENT
Start: 2024-10-29

## 2024-10-29 RX ORDER — FENTANYL CITRATE 50 UG/ML
INJECTION, SOLUTION INTRAMUSCULAR; INTRAVENOUS
Status: DISCONTINUED | OUTPATIENT
Start: 2024-10-29 | End: 2024-10-29 | Stop reason: SDUPTHER

## 2024-10-29 RX ORDER — SODIUM CHLORIDE 0.9 % (FLUSH) 0.9 %
5-40 SYRINGE (ML) INJECTION EVERY 12 HOURS SCHEDULED
Status: DISCONTINUED | OUTPATIENT
Start: 2024-10-29 | End: 2024-10-29 | Stop reason: HOSPADM

## 2024-10-29 RX ORDER — LIDOCAINE HYDROCHLORIDE 10 MG/ML
1 INJECTION, SOLUTION EPIDURAL; INFILTRATION; INTRACAUDAL; PERINEURAL
Status: COMPLETED | OUTPATIENT
Start: 2024-10-29 | End: 2024-10-29

## 2024-10-29 RX ORDER — ONDANSETRON 2 MG/ML
4 INJECTION INTRAMUSCULAR; INTRAVENOUS
Status: CANCELLED | OUTPATIENT
Start: 2024-10-29 | End: 2024-10-30

## 2024-10-29 RX ORDER — SODIUM CHLORIDE 9 MG/ML
INJECTION, SOLUTION INTRAVENOUS CONTINUOUS
Status: DISCONTINUED | OUTPATIENT
Start: 2024-10-29 | End: 2024-10-29 | Stop reason: HOSPADM

## 2024-10-29 RX ORDER — MEPERIDINE HYDROCHLORIDE 25 MG/ML
12.5 INJECTION INTRAMUSCULAR; INTRAVENOUS; SUBCUTANEOUS EVERY 5 MIN PRN
Status: CANCELLED | OUTPATIENT
Start: 2024-10-29

## 2024-10-29 RX ORDER — MIDAZOLAM HYDROCHLORIDE 1 MG/ML
INJECTION, SOLUTION INTRAMUSCULAR; INTRAVENOUS
Status: DISCONTINUED | OUTPATIENT
Start: 2024-10-29 | End: 2024-10-29 | Stop reason: SDUPTHER

## 2024-10-29 RX ADMIN — PROPOFOL 80 MG: 10 INJECTION, EMULSION INTRAVENOUS at 12:41

## 2024-10-29 RX ADMIN — LIDOCAINE HYDROCHLORIDE 100 MG: 20 INJECTION, SOLUTION EPIDURAL; INFILTRATION; INTRACAUDAL; PERINEURAL at 12:41

## 2024-10-29 RX ADMIN — FENTANYL CITRATE 50 MCG: 50 INJECTION INTRAMUSCULAR; INTRAVENOUS at 12:35

## 2024-10-29 RX ADMIN — MIDAZOLAM 2 MG: 1 INJECTION INTRAMUSCULAR; INTRAVENOUS at 12:34

## 2024-10-29 RX ADMIN — SODIUM CHLORIDE: 9 INJECTION, SOLUTION INTRAVENOUS at 12:34

## 2024-10-29 RX ADMIN — MIDAZOLAM 2 MG: 1 INJECTION INTRAMUSCULAR; INTRAVENOUS at 12:30

## 2024-10-29 RX ADMIN — LIDOCAINE HYDROCHLORIDE 1 ML: 10 INJECTION, SOLUTION EPIDURAL; INFILTRATION; INTRACAUDAL; PERINEURAL at 11:23

## 2024-10-29 RX ADMIN — PROPOFOL 150 MCG/KG/MIN: 10 INJECTION, EMULSION INTRAVENOUS at 12:41

## 2024-10-29 ASSESSMENT — ENCOUNTER SYMPTOMS
BACK PAIN: 1
VOMITING: 1
ABDOMINAL PAIN: 1
CONSTIPATION: 1
SINUS PAIN: 1
DIARRHEA: 1
RHINORRHEA: 1
NAUSEA: 1

## 2024-10-29 ASSESSMENT — PAIN - FUNCTIONAL ASSESSMENT: PAIN_FUNCTIONAL_ASSESSMENT: 0-10

## 2024-10-29 NOTE — OP NOTE
EGD report    Esophagogastroduodenoscopy (EGD) Procedure Note    Procedure:  EGD with biopsies, polypectomy with snare, dilation with savory guidewire assisted.    Procedure Date: 10/29/2024    Indications:  Dysphagia, abdominal pain, bloating, altered bowel habits    Sedation: MAC    Attending Physician:  Dr. Chava Monahan MD    Assistant:  None    Procedure Details:    Informed consent was obtained for the procedure, including sedation. Risks of infection, perforation, hemorrhage, adverse drug reaction, and aspiration were discussed. The patient was placed in the left lateral decubitus position. The patient was monitored continuously with ECG tracing, pulse oximetry, blood pressure monitoring, and direct observation.      The gastroscope was inserted into the mouth and advanced under direct vision to second portion of the duodenum.  A careful inspection was made as the gastroscope was withdrawn, including a retroflexed view of the proximal stomach; findings and interventions are described below. Appropriate photodocumentation was obtained.    Findings:  Retropharyngeal area was grossly normal appearing     Esophagus: Few subtle rings and erosions noted particularly in the proximal to mid esophagus, proximal and distal esophageal biopsies obtained.    No significant dominant stricture noted, empiric dilation using savory guidewire assisted performed up to 18 mm, post dilation no significant mucosal disruption noted.                          Esophagogastric markings: Diaphragmatic hiatus-39 cm; GE junction-39 cm     Stomach:    Fundus: normal    Body: Multiple sessile polyps noted, 3 of these were sampled with cold snare.    Antrum: Patchy erythema, biopsies obtained to rule out H. pylori     Duodenum:     Bulb: normal    First part: Normal    Second Part: Normal  Biopsies obtained to rule out celiac disease    Complications:  None           Estimated blood loss: Minimal    Disposition: Home           Condition:

## 2024-10-29 NOTE — ANESTHESIA PRE PROCEDURE
Department of Anesthesiology  Preprocedure Note       Name:  Iqra Goodrich   Age:  54 y.o.  :  1970                                          MRN:  [id number]         Date:  10/29/2024      Surgeon: Surgeon(s):  Chava Monahan MD    Procedure: Procedure(s):  ESOPHAGOGASTRODUODENOSCOPY BIOPSY  COLONOSCOPY DIAGNOSTIC    Medications prior to admission:   Prior to Admission medications    Medication Sig Start Date End Date Taking? Authorizing Provider   oxyCODONE-acetaminophen (PERCOCET) 5-325 MG per tablet Take 1 tablet by mouth every 6 hours as needed for Pain for up to 30 days. 10/31/24 11/30/24 Yes Elmer Charles MD   pregabalin (LYRICA) 150 MG capsule Take 1 capsule by mouth 2 times daily for 90 days. Max Daily Amount: 300 mg 10/24/24 1/22/25 Yes Elmer Charles MD   atorvastatin (LIPITOR) 40 MG tablet Take 1 tablet by mouth once daily 10/15/24  Yes Winnie Granger MD   sertraline (ZOLOFT) 100 MG tablet TAKE 1 TABLET BY MOUTH ONCE DAILY 10/15/24  Yes Winnie Granger MD   buPROPion (WELLBUTRIN XL) 150 MG extended release tablet Take 1 tablet by mouth every morning 10/15/24  Yes Winnie Granger MD   methylcellulose (CITRUCEL) oral powder Take 2 g by mouth in the morning and at bedtime Take by mouth daily. 10/1/24 12/30/24 Yes Chava Monahan MD   amLODIPine (NORVASC) 5 MG tablet Take 1 tablet by mouth daily 24  Yes Winnie Granger MD   tiZANidine (ZANAFLEX) 4 MG tablet TAKE 1 TABLET EVERY 8 HOURS AS NEEDED FOR SPASMS 24  Yes Ivan, Tessie, APRN - CNP   busPIRone (BUSPAR) 30 MG tablet TAKE 1 TABLET BY MOUTH 3 TIMES DAILY 24  Yes Winnie Granger MD   hydrOXYzine pamoate (VISTARIL) 50 MG capsule TAKE 1 CAPSULE BY MOUTH THREE TIMES DAILY AS NEEDED FOR ANXIETY 9/3/24  Yes Winnie Granger MD   propranolol (INDERAL) 10 MG tablet TAKE 1 TABLET BY MOUTH TWICE DAILY AS NEEDED FOR ANXIETY 24  Yes Winnie Granger MD   omeprazole (PRILOSEC) 40 MG delayed release capsule TAKE 1 CAPSULE BY MOUTH DAILY

## 2024-10-29 NOTE — DISCHARGE INSTRUCTIONS
Sedation or General Anesthesia, Adult  Care After  Refer to this sheet in the next 24 hours. These instructions provide you with information on caring for yourself after your procedure. Your caregiver may also give you more specific instructions. Your treatment has been planned according to current medical practices, but problems sometimes occur. Call your caregiver if you have any problems or questions after your procedure.   HOME CARE INSTRUCTIONS   Do not participate in any activities that require you to be alert or coordinated. Do not:  Drive.  Swim.  Ride a bicycle.  Operate heavy machinery.  Cook.  Use power tools.  Climb ladders.  Work at heights.  Take a bath.  Do not drink alcohol.  Do not make any important decisions or sign legal documents.  Stay with an adult.  The first meal following your procedure should be light and small. Avoid solid foods if you feel sick to your stomach (nauseous) or if you throw up (vomit).  Drink enough fluids to keep your urine clear or pale yellow.  Only take your usual medicines or new medicines if your caregiver approves them.  Only take over-the-counter or prescription medicines for pain, discomfort, or fever as directed by your caregiver.  Keep all follow-up appointments as directed by your caregiver.  SEEK IMMEDIATE MEDICAL CARE IF:   You are not feeling normal or behaving normally after 24 hours.  You have persistent nausea and vomiting.  You are unable to drink fluids or eat food.  You have difficulty urinating.  You have difficulty breathing or speaking.  You have blue or gray skin.  There is difficulty waking or you cannot be woken up.  You have heavy bleeding, redness, or a lot of swelling where the sedative or anesthesia entered your skin (intravenous site).  You have a rash.  MAKE SURE YOU:  Understand these instructions.  Will watch your condition.  Will get help right away if you are not doing well or get worse.  Document Released: 12/18/2006 Document Revised:  block the view. This preparation may start several days before the procedure. Follow your doctor's instructions, which may include any of the following cleansing methods:   Enemas fluid introduced into the rectum to stimulate a bowel movement   Laxativesmedicines that cause you to have soft bowel movements   A clear-liquid diet   Oral cathartic medicinesa large container of fluid to drink, which stimulates a bowel movement   Leading up to your procedure:   Talk to your doctor about your medicines. You may be asked to stop taking some medicines up to one week before the procedure, like:   Anti-inflammatory drugs (eg, aspirin)   Blood thinners, like clopidogrel (Plavix) or warfarin (Coumadin)   Iron supplements or vitamins containing iron.   The night before, eat a light meal. Do not eat or drink anything after midnight.   Wear comfortable clothing.   If you have diabetes, ask your doctor if you need to adjust your insulin dose.   Arrange for a ride home after the procedure.   Anesthesia    You will receive a sedative. This will help you relax. You will be drowsy but awake.   Description of the Procedure    You will be asked to lie on your side or on your back. A scope, a long flexible tube with a camera on the end, will be inserted through the anus. It will be slowly pushed through the rectum to the colon. The scope will also add air to open the colon.   Using the scope, the doctor will locate the polyp. The polyp will be snipped off with a wire snare from the scope. In some cases, the polyp may be destroyed with an electric current. The electric current is also used to close the wound and stop bleeding. The polyps will then be removed for lab testing. When the doctor is finished, the scope will be slowly removed.   For larger polyps, a laparoscopic surgical procedure may be needed. Special surgical tools will be inserted through small incisions in the abdomen. The tools will be used to locate and remove the polyp.

## 2024-10-29 NOTE — H&P
HISTORY and PHYSICAL  [facility]       NAME:  Iqra Goodrich  MRN: [id number]   YOB: 1970   Date: 10/29/2024   Age: 54 y.o.  Gender: female       COMPLAINT AND PRESENT HISTORY:     Iqra Goodrich is 54 y.o.,  female, presents for ESOPHAGOGASTRODUODENOSCOPY BIOPSY, COLONOSCOPY DIAGNOSTIC     Primary dx: Altered bowel habits [R19.4]  Abdominal pain, unspecified abdominal location [R10.9].    HPI:  Iqra Goodrich is 54 y.o.,  female, will be having a Colonoscopy and EGD.  Prior Colonoscopy and EGD done 2019  Patient has hx of Colon Polyps and Diverticulosis.     Patient denies any  FH of Colon or esophogeal  Cancer.    Patient reports changes in bowel habits.  Pt has alternative between diarrhea and constipation for long time ,No GI /Rectal bleeding,  she experiencing red/  green stools depending on the food .   Patient has a history of abd. pain in the epigastric area, The pain is pressure with N/V, abdominal bloating, with no weight loss.   Patient denies any Dysphagia.Pt has hx of GERD Pt is on a PPI./ Antacid, that is helping to control symptoms.   No fever or chills.  Prep fully completed: yes . Pt reports her last BM is clear liquid     Review of additional significant medical hx:  (See chart for additional detail, including current medications /see ROS for current S/S):     NPO status: pt NPO since the past midnight   Medications taken TODAY (with sip of water):  pt took her am medication r/t condition   Anticoagulation status: none  Has hx of\ blood clots in the right leg.   Denies personal hx of MRSA infection.  Denies any personal or family hx of previous complications w/anesthesia.    PAST MEDICAL HISTORY     Past Medical History:   Diagnosis Date    Allergic rhinitis     Years ago still suuffer    Anxiety     Toyin Matthews CNP therapist video visit 1/10/2023    Asthma     does not follow with Pulmonology    Chronic back pain     20s    Chronic kidney disease     I was

## 2024-10-29 NOTE — ANESTHESIA POSTPROCEDURE EVALUATION
Department of Anesthesiology  Postprocedure Note    Patient: Iqra Goodrich  MRN: [id number]  YOB: 1970  Date of evaluation: 10/29/2024    Procedure Summary       Date: 10/29/24 Room / Location: [place] [address] / [facility]    Anesthesia Start: 1234 Anesthesia Stop: 1324    Procedures:       ESOPHAGOGASTRODUODENOSCOPY BIOPSY OF DUODENUM STOMACH AND STOMACH POLYPECTOMY, BIOPSY OF  DISTAL AND PROXIMAL ESOPHAGUS WITH ESOPHAGEAL DILATION (Esophagus)      COLONOSCOPY RANDOM COLON  BIOPSIES AND ASCENDING AND TRANSVERSE COLON POLYPECTOMY (Rectum) Diagnosis:       Altered bowel habits      Abdominal pain, unspecified abdominal location      (Altered bowel habits [R19.4])      (Abdominal pain, unspecified abdominal location [R10.9])    Surgeons: Chava Monahan MD Responsible Provider: Konstantin West MD    Anesthesia Type: general, TIVA ASA Status: 3            Anesthesia Type: No value filed.    Yolie Phase I: Yolie Score: 10    Yolie Phase II: Yolie Score: 10    Anesthesia Post Evaluation    Comments: POST- ANESTHESIA EVALUATION       Pt Name: Iqra Goodrich  MRN: [id number]  YOB: 1970  Date of evaluation: 10/29/2024  Time:  3:19 PM      BP (!) 154/99   Pulse 86   Temp 97.1 °F (36.2 °C)   Resp 19   Ht 1.524 m (5')   Wt 106.6 kg (235 lb)   SpO2 95%   BMI 45.90 kg/m²      Consciousness Level  Awake  Cardiopulmonary Status  Stable  Pain Adequately Treated YES  Nausea / Vomiting  NO  Adequate Hydration  YES  Anesthesia Related Complications NONE      Electronically signed by Konstantin West MD on 10/29/2024 at 3:19 PM           No notable events documented.

## 2024-10-29 NOTE — OP NOTE
Colonoscopy report    Colonoscopy Procedure Note    Procedure:  Colonoscopy with biopsies, polypectomy with snare    Procedure Date: 10/29/2024    Indications: Altered bowel habits, abdominal pain    Sedation:  MAC    Attending Physician:  Dr. Chava Monahan MD.     Assistant Physician: None    Consent:   Informed consent was obtained for the procedure after explaining the risks including bleeding, perforation, aspiration, arrhythmia, risks related to sedation, reaction to medications and rarely death, benefits and alternatives to the patient. The patient verbalized understanding and agreed to proceed with the procedure.       Procedure Details:  The patient was placed in the left lateral decubitus position.  Oxygen and cardiac monitoring equipment was attached. The patient's vital signs were monitored continuously  throughout the procedure. After appropriate sedation was achieved, a rectal examination was performed.  The pediatric colonoscope was inserted into the rectum and advanced under direct vision to the terminal ileum.  The quality of the colonic preparation was fair.  A careful inspection was made as the colonoscope was withdrawn, including a retroflexed view of the rectum; findings and interventions are described below.  Appropriate photodocumentation was obtained.           Complications:  None    Estimated blood loss:  Minimal           Disposition:  Home           Condition: stable    Withdrawal Time: 16 minutes    Findings:   The bowel prep was fair.  The terminal ileum was momentarily evaluated up to 3 cm, appeared unremarkable.  No significant inflammation noted throughout the colon, random biopsies obtained.  A 7 to 8 mm polyp noted in the ascending colon that was resected with hot snare.  A 3 mm polyp noted in the transverse colon that was resected with cold snare.  A 1 cm polyp noted in the transverse colon that was resected with hot snare  Retroflexion examination in the rectum with moderate  internal hemorrhoids.    Specimen Removed: Random colon biopsies, colon polyps    Endoscopic Impression:    Fair bowel prep.  Normal colonic mucosa, right and left colon biopsies obtained.  3 polyps (1X ascending, 2X transverse colon), ranging 3 mm to 1 cm, resected with combination of cold/hot snare.  Moderate internal hemorrhoids    Recommendations:  Follow pathology results.  Repeat colonoscopy for screening/surveillance in 3 years with a 2-day bowel prep.    Attending Attestation: I performed the procedure.    Chava Monahan MD

## 2024-10-30 ENCOUNTER — HOSPITAL ENCOUNTER (OUTPATIENT)
Dept: PAIN MANAGEMENT | Facility: CLINIC | Age: 54
Discharge: HOME | End: 2024-10-30
Payer: COMMERCIAL

## 2024-10-30 VITALS
HEIGHT: 60 IN | BODY MASS INDEX: 46.13 KG/M2 | RESPIRATION RATE: 10 BRPM | DIASTOLIC BLOOD PRESSURE: 85 MMHG | SYSTOLIC BLOOD PRESSURE: 140 MMHG | HEART RATE: 77 BPM | WEIGHT: 235 LBS | TEMPERATURE: 96.7 F | OXYGEN SATURATION: 98 %

## 2024-10-30 DIAGNOSIS — R52 PAIN MANAGEMENT: ICD-10-CM

## 2024-10-30 DIAGNOSIS — M47.817 LUMBOSACRAL SPONDYLOSIS WITHOUT MYELOPATHY: Primary | ICD-10-CM

## 2024-10-30 LAB — GLUCOSE BLD-MCNC: 105 MG/DL (ref 65–105)

## 2024-10-30 PROCEDURE — 82947 ASSAY GLUCOSE BLOOD QUANT: CPT

## 2024-10-30 PROCEDURE — 64635 DESTROY LUMB/SAC FACET JNT: CPT

## 2024-10-30 PROCEDURE — 2500000003 HC RX 250 WO HCPCS: Performed by: ANESTHESIOLOGY

## 2024-10-30 PROCEDURE — 6360000002 HC RX W HCPCS: Performed by: ANESTHESIOLOGY

## 2024-10-30 PROCEDURE — 64636 DESTROY L/S FACET JNT ADDL: CPT | Performed by: ANESTHESIOLOGY

## 2024-10-30 PROCEDURE — 99152 MOD SED SAME PHYS/QHP 5/>YRS: CPT | Performed by: ANESTHESIOLOGY

## 2024-10-30 PROCEDURE — 64636 DESTROY L/S FACET JNT ADDL: CPT

## 2024-10-30 PROCEDURE — 64635 DESTROY LUMB/SAC FACET JNT: CPT | Performed by: ANESTHESIOLOGY

## 2024-10-30 RX ORDER — LIDOCAINE HYDROCHLORIDE 10 MG/ML
INJECTION, SOLUTION EPIDURAL; INFILTRATION; INTRACAUDAL; PERINEURAL
Status: COMPLETED | OUTPATIENT
Start: 2024-10-30 | End: 2024-10-30

## 2024-10-30 RX ORDER — LIDOCAINE HYDROCHLORIDE 40 MG/ML
INJECTION, SOLUTION RETROBULBAR
Status: COMPLETED | OUTPATIENT
Start: 2024-10-30 | End: 2024-10-30

## 2024-10-30 RX ORDER — FENTANYL CITRATE 50 UG/ML
INJECTION, SOLUTION INTRAMUSCULAR; INTRAVENOUS
Status: COMPLETED | OUTPATIENT
Start: 2024-10-30 | End: 2024-10-30

## 2024-10-30 RX ORDER — MIDAZOLAM HYDROCHLORIDE 2 MG/2ML
INJECTION, SOLUTION INTRAMUSCULAR; INTRAVENOUS
Status: COMPLETED | OUTPATIENT
Start: 2024-10-30 | End: 2024-10-30

## 2024-10-30 RX ADMIN — FENTANYL CITRATE 50 MCG: 50 INJECTION, SOLUTION INTRAMUSCULAR; INTRAVENOUS at 13:26

## 2024-10-30 RX ADMIN — LIDOCAINE HYDROCHLORIDE 5 ML: 40 SOLUTION RETROBULBAR; TOPICAL at 13:19

## 2024-10-30 RX ADMIN — LIDOCAINE HYDROCHLORIDE 5 ML: 10 INJECTION, SOLUTION EPIDURAL; INFILTRATION; INTRACAUDAL at 13:24

## 2024-10-30 RX ADMIN — FENTANYL CITRATE 50 MCG: 50 INJECTION, SOLUTION INTRAMUSCULAR; INTRAVENOUS at 13:16

## 2024-10-30 RX ADMIN — LIDOCAINE HYDROCHLORIDE 5 ML: 10 INJECTION, SOLUTION EPIDURAL; INFILTRATION; INTRACAUDAL at 13:16

## 2024-10-30 RX ADMIN — MIDAZOLAM HYDROCHLORIDE 2 MG: 1 INJECTION, SOLUTION INTRAMUSCULAR; INTRAVENOUS at 13:16

## 2024-10-30 ASSESSMENT — PAIN DESCRIPTION - DESCRIPTORS: DESCRIPTORS: ACHING;SHOOTING;BURNING

## 2024-10-30 ASSESSMENT — PAIN - FUNCTIONAL ASSESSMENT: PAIN_FUNCTIONAL_ASSESSMENT: 0-10

## 2024-10-30 ASSESSMENT — PAIN SCALES - GENERAL: PAINLEVEL_OUTOF10: 8

## 2024-10-30 ASSESSMENT — PAIN DESCRIPTION - LOCATION: LOCATION: BACK

## 2024-10-30 NOTE — INTERVAL H&P NOTE
Update History & Physical    The patient's History and Physical of October 24, 2024 was reviewed with the patient and I examined the patient. There was no change. The surgical site was confirmed by the patient and me.     Plan: The risks, benefits, expected outcome, and alternative to the recommended procedure have been discussed with the patient. Patient understands and wants to proceed with the procedure.     ASA 3  MP 3    Electronically signed by Elmer Charles MD on 10/30/2024 at 1:10 PM

## 2024-10-30 NOTE — DISCHARGE INSTRUCTIONS
Discharge Instructions following Sedation or Anesthesia:  You have  received  a sedative/anesthetic therefore, you should not consume any alcoholic beverages for minimum of 12 hours.  Do not drive or operate machinery for 24 hours.  Do not sign legal documents for 24 hours.  Dizziness, drowsiness, and unsteadiness may occur.  Rest when need to.  Increase diet as tolerated.  Keep up on fluids if diet allows.      General Instructions:  Do not take a tub bath for 72 hours after procedure (this includes hot tubs and swimming pools).  You may shower, but avoid hot water to injection site.   Avoid strenuous activity TODAY especially if you experience dizziness.   Remove band-aid the next day.  Wash off any residual iodine   Do not use heat, heating pad, or any other heating device over the injection site for 3 days after the procedure.  If you experience pain after your procedure, you may continue with your current pain medication as prescribed.  (DO NOT INCREASE YOUR PAIN MEDICATION WITHOUT TALKING TO DOCTOR)  Soreness and pain at injection site is common, may use ice to reduce soreness.    Call [facility] at [phone] if you experience:   Fever, chills or temperature over 100    Vomiting, Headache, persistent stiff neck, nausea, blurred vision   Difficulty in urinating or unable to urinate with 8 hours   Increase in weakness, numbness or loss of function   Increased redness, swelling or drainage at the injection site

## 2024-11-01 ENCOUNTER — NURSE ONLY (OUTPATIENT)
Dept: FAMILY MEDICINE CLINIC | Age: 54
End: 2024-11-01
Payer: COMMERCIAL

## 2024-11-01 VITALS — SYSTOLIC BLOOD PRESSURE: 138 MMHG | OXYGEN SATURATION: 100 % | HEART RATE: 74 BPM | DIASTOLIC BLOOD PRESSURE: 90 MMHG

## 2024-11-01 DIAGNOSIS — I10 ESSENTIAL HYPERTENSION: Primary | ICD-10-CM

## 2024-11-01 LAB — SURGICAL PATHOLOGY REPORT: NORMAL

## 2024-11-01 PROCEDURE — 3075F SYST BP GE 130 - 139MM HG: CPT | Performed by: FAMILY MEDICINE

## 2024-11-01 PROCEDURE — 99211 OFF/OP EST MAY X REQ PHY/QHP: CPT | Performed by: FAMILY MEDICINE

## 2024-11-01 PROCEDURE — 3080F DIAST BP >= 90 MM HG: CPT | Performed by: FAMILY MEDICINE

## 2024-11-01 RX ORDER — LISINOPRIL 5 MG/1
5 TABLET ORAL DAILY
Qty: 90 TABLET | Refills: 1 | Status: SHIPPED | OUTPATIENT
Start: 2024-11-01

## 2024-11-01 NOTE — PROGRESS NOTES
Chief Complaint   Patient presents with    Hypertension    Blood Pressure Check      Iqra Goodrich is here for BP check    BP Readings from Last 3 Encounters:   11/01/24 (!) 138/90   10/30/24 (!) 140/85   10/29/24 (!) 154/99       BP is 138/90      Future Appointments   Date Time Provider Department Center   11/5/2024  8:40 AM Shane Bergman MD ED [facility]   11/21/2024  3:00 PM Elmer Charles MD [facility]   12/16/2024  2:30 PM Winnie Granger MD [facility]   1/16/2025  2:15 PM Chava Monahan MD [facility]        Patient's blood pressure is still running high, I sent lisinopril 5 mg to pharmacy she can also continue amlodipine 5 mg.  She can keep monitoring her blood pressure and keep appointment in December.

## 2024-11-08 ENCOUNTER — TELEMEDICINE ON DEMAND (OUTPATIENT)
Age: 54
End: 2024-11-08
Payer: COMMERCIAL

## 2024-11-08 DIAGNOSIS — K21.9 CHRONIC GERD: Primary | ICD-10-CM

## 2024-11-08 PROCEDURE — G8427 DOCREV CUR MEDS BY ELIG CLIN: HCPCS | Performed by: NURSE PRACTITIONER

## 2024-11-08 PROCEDURE — 3017F COLORECTAL CA SCREEN DOC REV: CPT | Performed by: NURSE PRACTITIONER

## 2024-11-08 PROCEDURE — G8417 CALC BMI ABV UP PARAM F/U: HCPCS | Performed by: NURSE PRACTITIONER

## 2024-11-08 PROCEDURE — G8484 FLU IMMUNIZE NO ADMIN: HCPCS | Performed by: NURSE PRACTITIONER

## 2024-11-08 PROCEDURE — 1036F TOBACCO NON-USER: CPT | Performed by: NURSE PRACTITIONER

## 2024-11-08 PROCEDURE — 99213 OFFICE O/P EST LOW 20 MIN: CPT | Performed by: NURSE PRACTITIONER

## 2024-11-08 RX ORDER — OMEPRAZOLE 40 MG/1
40 CAPSULE, DELAYED RELEASE ORAL 2 TIMES DAILY
Qty: 60 CAPSULE | Refills: 0 | Status: SHIPPED | OUTPATIENT
Start: 2024-11-08 | End: 2024-12-08

## 2024-11-08 NOTE — PROGRESS NOTES
Iqra Goodrich (:  1970) is a Established patient, here for evaluation of the following:    Heartburn (Takes Omeprazole 40mg QD but is no longer helping symptoms)       Assessment & Plan:  Below is the assessment and plan developed based on review of pertinent history, physical exam, labs, studies, and medications.  1. Chronic GERD  Take your medicines exactly as prescribed. Call your doctor if you think you are having a problem with your medicine.  Your doctor may recommend over-the-counter medicine. For mild or occasional indigestion, antacids, such as Tums, Mylanta, or Maalox, may help. Your doctor also may recommend over-the-counter acid reducers, such as famotidine (Pepcid AC), cimetidine (Tagamet HB), or omeprazole (Prilosec). Read and follow all instructions on the label. If you use these medicines often, talk with your doctor.  Stay at a weight that's healthy for you. Extra weight puts a lot of pressure on the valve between the stomach and esophagus. Losing even a few pounds can help. Talk to your doctor if you need help losing weight.  Change your eating habits.  Try to eat several small meals instead of two or three large meals.  After you eat, wait 2 to 3 hours before you lie down. Snacking close to bedtime can make your symptoms worse.  Avoid foods that make your symptoms worse. These may include chocolate, mint, alcohol, pepper, spicy foods, high-fat foods, or drinks with caffeine in them, such as tea, coffee, boaz, or energy drinks. If your symptoms are worse after you eat a certain food, you may want to stop eating it to see if your symptoms get better.  Try to quit smoking or chewing tobacco, or cut back as much as you can. If you need help quitting, talk to your doctor about quit-tobacco programs and medicines. These can increase your chances of quitting for good.  If you have GERD symptoms while trying to sleep, raise the head of your bed 6 to 8 inches by putting the frame on blocks or

## 2024-11-21 ENCOUNTER — HOSPITAL ENCOUNTER (OUTPATIENT)
Dept: PAIN MANAGEMENT | Age: 54
Discharge: HOME | End: 2024-11-21
Payer: COMMERCIAL

## 2024-11-21 DIAGNOSIS — M51.369 DEGENERATION OF LUMBAR INTERVERTEBRAL DISC: Chronic | ICD-10-CM

## 2024-11-21 DIAGNOSIS — M50.30 DEGENERATIVE DISC DISEASE, CERVICAL: ICD-10-CM

## 2024-11-21 DIAGNOSIS — M50.20 CERVICAL DISC HERNIATION: ICD-10-CM

## 2024-11-21 DIAGNOSIS — M54.16 LUMBAR RADICULOPATHY, CHRONIC: Chronic | ICD-10-CM

## 2024-11-21 DIAGNOSIS — Z79.899 ENCOUNTER FOR MEDICATION MANAGEMENT: ICD-10-CM

## 2024-11-21 DIAGNOSIS — M16.0 BILATERAL HIP JOINT ARTHRITIS: Primary | ICD-10-CM

## 2024-11-21 DIAGNOSIS — M54.42 CHRONIC LEFT-SIDED LOW BACK PAIN WITH LEFT-SIDED SCIATICA: ICD-10-CM

## 2024-11-21 DIAGNOSIS — G89.29 CHRONIC LEFT-SIDED LOW BACK PAIN WITH LEFT-SIDED SCIATICA: ICD-10-CM

## 2024-11-21 DIAGNOSIS — Z98.1 S/P FUSION OF SACROILIAC JOINT: ICD-10-CM

## 2024-11-21 PROCEDURE — 99214 OFFICE O/P EST MOD 30 MIN: CPT | Performed by: ANESTHESIOLOGY

## 2024-11-21 PROCEDURE — 99213 OFFICE O/P EST LOW 20 MIN: CPT

## 2024-11-21 RX ORDER — OXYCODONE AND ACETAMINOPHEN 5; 325 MG/1; MG/1
1 TABLET ORAL EVERY 6 HOURS PRN
Qty: 120 TABLET | Refills: 0 | Status: SHIPPED | OUTPATIENT
Start: 2024-11-30 | End: 2024-12-30

## 2024-11-21 RX ORDER — PREGABALIN 150 MG/1
150 CAPSULE ORAL 2 TIMES DAILY
Qty: 60 CAPSULE | Refills: 2 | Status: SHIPPED | OUTPATIENT
Start: 2024-11-21 | End: 2025-02-19

## 2024-11-21 ASSESSMENT — PAIN SCALES - GENERAL: PAINLEVEL_OUTOF10: 5

## 2024-11-21 ASSESSMENT — ENCOUNTER SYMPTOMS
RESPIRATORY NEGATIVE: 1
BACK PAIN: 1
GASTROINTESTINAL NEGATIVE: 1

## 2024-11-21 NOTE — PROGRESS NOTES
The patient is a 54 y.o.Non- / non  female.    Chief Complaint   Patient presents with    Back Pain    Medication Refill     Percocet    Follow Up After Procedure     RFA        Pain score Today:  5  Adverse effects (Constipation / Nausea / Sedation / sexual Dysfunction / others) : No  Mood: good  Sleep pattern and quality: fair  Activity level: good    Pill count Today: 34 Percocet pills with patient   Last dose taken  11/20/2024  OARRS report reviewed today: yes  ER/Hospitalizations/PCP visit related to pain since last visit:no   Any legal problems e.g. DUI etc.:No  Satisfied with current management: Yes    Opioid Contract:11/27/2023  Last Urine Dug screen dated: 4/4/2024    S/P:RFA       Outcome   Any improvement of activity?  Yes   Any side effects (appetite,leg cramping,facial fleshing): No    Increase of pain:  No  Pain score Today:  5  % of pain relief: 75%  Pain diary (medial branch block):           Hemoglobin A1C   Date Value Ref Range Status   07/11/2024 7.2 % Final      Hemoglobin A1C   Date Value Ref Range Status   07/11/2024 7.2 % Final       Past Medical History, Past Surgical History, Social History, Allergies and Medications reviewed and updated in EPIC as indicated    Family History reviewed and is noncontributory.            Past Medical History:   Diagnosis Date    Allergic rhinitis     Years ago still suuffer    Carla Matthews CNP therapist video visit 1/10/2023    Asthma     does not follow with Pulmonology    Chronic back pain     20s    Chronic kidney disease     I was told yeahs agobut nothing has been said since    Colon polyp 10/31/2016    sessile serated adenoma x2    Depression     Diabetes mellitus (HCC)     Medication, Trulicity, has been back ordered    Diverticulitis 10/2016    Gastritis     GERD (gastroesophageal reflux disease)     Hemorrhoids     int/ext    Hypertension     Migraines     Obesity     27    Osteoarthritis     Pain management     St.

## 2024-12-16 ENCOUNTER — HOSPITAL ENCOUNTER (OUTPATIENT)
Dept: PAIN MANAGEMENT | Age: 54
Discharge: HOME | End: 2024-12-16
Payer: COMMERCIAL

## 2024-12-16 VITALS — BODY MASS INDEX: 45.16 KG/M2 | HEIGHT: 60 IN | WEIGHT: 230 LBS

## 2024-12-16 DIAGNOSIS — M54.50 CHRONIC BILATERAL LOW BACK PAIN WITHOUT SCIATICA: Primary | Chronic | ICD-10-CM

## 2024-12-16 DIAGNOSIS — M46.1 SACROILIITIS (HCC): ICD-10-CM

## 2024-12-16 DIAGNOSIS — M54.16 LUMBAR RADICULOPATHY, CHRONIC: Chronic | ICD-10-CM

## 2024-12-16 DIAGNOSIS — M25.562 CHRONIC PAIN OF LEFT KNEE: ICD-10-CM

## 2024-12-16 DIAGNOSIS — Z79.891 CHRONIC USE OF OPIATE FOR THERAPEUTIC PURPOSE: ICD-10-CM

## 2024-12-16 DIAGNOSIS — G89.29 CHRONIC PAIN OF LEFT KNEE: ICD-10-CM

## 2024-12-16 DIAGNOSIS — M25.511 CHRONIC RIGHT SHOULDER PAIN: ICD-10-CM

## 2024-12-16 DIAGNOSIS — G89.29 CHRONIC BILATERAL LOW BACK PAIN WITHOUT SCIATICA: Primary | Chronic | ICD-10-CM

## 2024-12-16 DIAGNOSIS — M47.817 LUMBOSACRAL SPONDYLOSIS WITHOUT MYELOPATHY: ICD-10-CM

## 2024-12-16 DIAGNOSIS — M50.30 DEGENERATIVE DISC DISEASE, CERVICAL: ICD-10-CM

## 2024-12-16 DIAGNOSIS — M47.816 LUMBAR SPONDYLOSIS: Chronic | ICD-10-CM

## 2024-12-16 DIAGNOSIS — M50.20 CERVICAL DISC HERNIATION: ICD-10-CM

## 2024-12-16 DIAGNOSIS — M16.0 BILATERAL HIP JOINT ARTHRITIS: ICD-10-CM

## 2024-12-16 DIAGNOSIS — M51.369 DEGENERATION OF LUMBAR INTERVERTEBRAL DISC: Chronic | ICD-10-CM

## 2024-12-16 DIAGNOSIS — G89.29 CHRONIC RIGHT SHOULDER PAIN: ICD-10-CM

## 2024-12-16 PROCEDURE — G0480 DRUG TEST DEF 1-7 CLASSES: HCPCS

## 2024-12-16 PROCEDURE — 99213 OFFICE O/P EST LOW 20 MIN: CPT

## 2024-12-16 PROCEDURE — 80307 DRUG TEST PRSMV CHEM ANLYZR: CPT

## 2024-12-16 PROCEDURE — 99214 OFFICE O/P EST MOD 30 MIN: CPT | Performed by: NURSE PRACTITIONER

## 2024-12-16 RX ORDER — OXYCODONE AND ACETAMINOPHEN 5; 325 MG/1; MG/1
1 TABLET ORAL EVERY 6 HOURS PRN
Qty: 120 TABLET | Refills: 0 | Status: SHIPPED | OUTPATIENT
Start: 2024-12-30 | End: 2025-01-29

## 2024-12-16 RX ORDER — OXYCODONE AND ACETAMINOPHEN 5; 325 MG/1; MG/1
1 TABLET ORAL EVERY 6 HOURS PRN
Qty: 120 TABLET | Refills: 0 | Status: CANCELLED | OUTPATIENT
Start: 2024-12-16 | End: 2025-01-15

## 2024-12-16 ASSESSMENT — ENCOUNTER SYMPTOMS
COUGH: 0
CONSTIPATION: 0
BACK PAIN: 1
SHORTNESS OF BREATH: 0

## 2024-12-16 ASSESSMENT — PAIN SCALES - GENERAL: PAINLEVEL_OUTOF10: 8

## 2024-12-16 ASSESSMENT — PAIN DESCRIPTION - LOCATION: LOCATION: BACK

## 2024-12-16 NOTE — PROGRESS NOTES
Heart Disease Paternal Uncle     Osteoarthritis Maternal Aunt     Osteoporosis Maternal Aunt        Social History     Socioeconomic History    Marital status:      Spouse name: Not on file    Number of children: Not on file    Years of education: Not on file    Highest education level: Not on file   Occupational History    Occupation: unemployed   Tobacco Use    Smoking status: Never     Passive exposure: Current    Smokeless tobacco: Never   Vaping Use    Vaping status: Never Used   Substance and Sexual Activity    Alcohol use: Never    Drug use: Never    Sexual activity: Yes     Partners: Male     Comment:    Other Topics Concern    Not on file   Social History Narrative    Not on file     Social Determinants of Health     Financial Resource Strain: High Risk (7/11/2024)    Overall Financial Resource Strain (CARDIA)     Difficulty of Paying Living Expenses: Very hard   Food Insecurity: Food Insecurity Present (7/11/2024)    Hunger Vital Sign     Worried About Running Out of Food in the Last Year: Often true     Ran Out of Food in the Last Year: Often true   Transportation Needs: Unknown (7/11/2024)    PRAPARE - Transportation     Lack of Transportation (Medical): Not on file     Lack of Transportation (Non-Medical): No   Physical Activity: Not on file   Stress: Not on file   Social Connections: Not on file   Intimate Partner Violence: Not on file   Housing Stability: Unknown (7/11/2024)    Housing Stability Vital Sign     Unable to Pay for Housing in the Last Year: Not on file     Number of Places Lived in the Last Year: Not on file     Unstable Housing in the Last Year: No       Review of Systems:  Review of Systems   Constitutional: Negative for chills and fever.   Cardiovascular:  Negative for chest pain and palpitations.   Respiratory:  Negative for cough and shortness of breath.    Musculoskeletal:  Positive for back pain and joint pain.   Gastrointestinal:  Negative for constipation.

## 2024-12-20 LAB
6-ACETYLMORPHINE, UR: NOT DETECTED
7-AMINOCLONAZEPAM, URINE: NOT DETECTED
ALPHA-OH-ALPRAZ, URINE: NOT DETECTED
ALPHA-OH-MIDAZOLAM, URINE: NOT DETECTED
ALPRAZOLAM, URINE: NOT DETECTED
AMPHETAMINE, URINE: NOT DETECTED
BARBITURATES, URINE: NEGATIVE
BENZOYLECGONINE, UR: NEGATIVE
BUPRENORPHINE URINE: NOT DETECTED
CARISOPRODOL, UR: NEGATIVE
CLONAZEPAM, URINE: NOT DETECTED
CODEINE, URINE: NOT DETECTED
CREAT UR-MCNC: 141.7 MG/DL (ref 20–400)
DIAZEPAM, URINE: NOT DETECTED
DRUGS EXPECTED, UR: NORMAL
EER HI RES INTERP UR: NORMAL
ETHYL GLUCURONIDE UR: NEGATIVE
FENTANYL URINE: NOT DETECTED
GABAPENTIN: NOT DETECTED
HYDROCODONE, URINE: NOT DETECTED
HYDROMORPHONE, URINE: NOT DETECTED
LORAZEPAM, URINE: NOT DETECTED
MARIJUANA METAB, UR: NEGATIVE
MDA, URINE: NOT DETECTED
MDEA, EVE, UR: NOT DETECTED
MDMA, URINE: NOT DETECTED
MEPERIDINE METAB, UR: NOT DETECTED
METHADONE, URINE: NEGATIVE
METHAMPHETAMINE, URINE: NOT DETECTED
METHYLPHENIDATE: NOT DETECTED
MIDAZOLAM, URINE: NOT DETECTED
MORPHINE, OPI1M: NOT DETECTED
NALOXONE URINE: NOT DETECTED
NORBUPRENORPHINE, URINE: NOT DETECTED
NORDIAZEPAM, URINE: NOT DETECTED
NORFENTANYL, URINE: NOT DETECTED
NORHYDROCODONE, URINE: NOT DETECTED
NOROXYCODONE, URINE: PRESENT
NOROXYMORPHONE, URINE: PRESENT
OXAZEPAM, URINE: NOT DETECTED
OXYCODONE URINE: PRESENT
OXYMORPHONE, URINE: PRESENT
PAIN MANAGEMENT DRUG PANEL INTERP, URINE: NORMAL
PAIN MGT DRUG PANEL, HI RES, UR: NORMAL
PCP,URINE: NEGATIVE
PHENTERMINE, UR: NOT DETECTED
PREGABALIN: PRESENT
TAPENTADOL, URINE: NOT DETECTED
TAPENTADOL-O-SULFATE, URINE: NOT DETECTED
TEMAZEPAM, URINE: NOT DETECTED
TRAMADOL, URINE: NEGATIVE
ZOLPIDEM METABOLITE (ZCA), URINE: NOT DETECTED
ZOLPIDEM, URINE: NOT DETECTED

## 2024-12-30 DIAGNOSIS — K21.9 CHRONIC GERD: ICD-10-CM

## 2024-12-31 RX ORDER — OMEPRAZOLE 40 MG/1
40 CAPSULE, DELAYED RELEASE ORAL 2 TIMES DAILY
Qty: 60 CAPSULE | Refills: 0 | Status: SHIPPED | OUTPATIENT
Start: 2024-12-31 | End: 2025-01-30

## 2025-01-14 ENCOUNTER — E-VISIT (OUTPATIENT)
Dept: FAMILY MEDICINE CLINIC | Age: 55
End: 2025-01-14
Payer: COMMERCIAL

## 2025-01-14 DIAGNOSIS — J01.90 ACUTE BACTERIAL SINUSITIS: Primary | ICD-10-CM

## 2025-01-14 DIAGNOSIS — B96.89 ACUTE BACTERIAL SINUSITIS: Primary | ICD-10-CM

## 2025-01-14 PROCEDURE — 99423 OL DIG E/M SVC 21+ MIN: CPT | Performed by: FAMILY MEDICINE

## 2025-01-14 RX ORDER — GUAIFENESIN 600 MG/1
600 TABLET, EXTENDED RELEASE ORAL 2 TIMES DAILY
Qty: 30 TABLET | Refills: 0 | Status: SHIPPED | OUTPATIENT
Start: 2025-01-14

## 2025-01-14 RX ORDER — FLUTICASONE PROPIONATE 50 MCG
1 SPRAY, SUSPENSION (ML) NASAL DAILY
Qty: 32 G | Refills: 1 | Status: SHIPPED | OUTPATIENT
Start: 2025-01-14

## 2025-01-14 RX ORDER — CETIRIZINE HYDROCHLORIDE 10 MG/1
10 TABLET ORAL DAILY
Qty: 30 TABLET | Refills: 0 | Status: SHIPPED | OUTPATIENT
Start: 2025-01-14

## 2025-01-14 ASSESSMENT — LIFESTYLE VARIABLES: SMOKING_STATUS: NO, I'VE NEVER SMOKED

## 2025-01-14 NOTE — PROGRESS NOTES
Iqra Goodrich (1970) initiated an asynchronous digital communication through [employer].  Date of service: 1/14/2025     HPI: per patient's questionnaire    EXAM: not applicable    Diagnoses and all orders for this visit:  Assessment & Plan  Acute bacterial sinusitis   Acute condition, new, Supportive care with appropriate antipyretics and fluids.  Antibiotics ordered    Orders:    fluticasone (FLONASE) 50 MCG/ACT nasal spray; 1 spray by Each Nostril route daily    amoxicillin-clavulanate (AUGMENTIN) 875-125 MG per tablet; Take 1 tablet by mouth 2 times daily for 7 days    cetirizine (ZYRTEC ALLERGY) 10 MG tablet; Take 1 tablet by mouth daily    guaiFENesin (MUCINEX) 600 MG extended release tablet; Take 1 tablet by mouth 2 times daily        Patient was advised to contact PCP if symptoms worsen or failing to change as expected      Time: EV3 - 21 or more minutes were spent on the digital evaluation and management of this patient.    Electronically signed by Winnie Granger MD on 1/14/25 at 9:37 AM.

## 2025-01-15 DIAGNOSIS — E78.2 MIXED HYPERLIPIDEMIA: ICD-10-CM

## 2025-01-15 RX ORDER — ATORVASTATIN CALCIUM 40 MG/1
40 TABLET, FILM COATED ORAL DAILY
Qty: 90 TABLET | Refills: 0 | Status: SHIPPED | OUTPATIENT
Start: 2025-01-15

## 2025-01-15 NOTE — TELEPHONE ENCOUNTER
Please Approve or Refuse.  Send to Pharmacy per Pt's Request: [facility]      Next Visit Date:  1/20/2025   Last Visit Date: 1/14/2025    Hemoglobin A1C (%)   Date Value   07/11/2024 7.2   03/20/2024 6.1   11/20/2023 5.9             ( goal A1C is < 7)   BP Readings from Last 3 Encounters:   11/05/24 112/77   11/01/24 (!) 138/90   10/30/24 (!) 140/85          (goal 120/80)  BUN   Date Value Ref Range Status   04/24/2024 11 6 - 20 mg/dL Final     Creatinine   Date Value Ref Range Status   09/23/2024 0.9 0.5 - 0.9 mg/dL Final     POC Creatinine   Date Value Ref Range Status   09/23/2024 0.8 0.51 - 1.19 mg/dL Final     Potassium   Date Value Ref Range Status   04/24/2024 4.6 3.7 - 5.3 mmol/L Final

## 2025-01-27 ENCOUNTER — HOSPITAL ENCOUNTER (OUTPATIENT)
Dept: PAIN MANAGEMENT | Age: 55
Discharge: HOME | End: 2025-01-27
Payer: COMMERCIAL

## 2025-01-27 VITALS — WEIGHT: 230 LBS | HEIGHT: 60 IN | BODY MASS INDEX: 45.16 KG/M2

## 2025-01-27 DIAGNOSIS — G89.29 CHRONIC SI JOINT PAIN: Chronic | ICD-10-CM

## 2025-01-27 DIAGNOSIS — G89.29 CHRONIC LEFT-SIDED LOW BACK PAIN WITH LEFT-SIDED SCIATICA: ICD-10-CM

## 2025-01-27 DIAGNOSIS — M54.42 CHRONIC LEFT-SIDED LOW BACK PAIN WITH LEFT-SIDED SCIATICA: ICD-10-CM

## 2025-01-27 DIAGNOSIS — G89.29 CHRONIC BILATERAL LOW BACK PAIN WITHOUT SCIATICA: Primary | Chronic | ICD-10-CM

## 2025-01-27 DIAGNOSIS — M50.20 CERVICAL DISC HERNIATION: ICD-10-CM

## 2025-01-27 DIAGNOSIS — M51.369 DEGENERATION OF LUMBAR INTERVERTEBRAL DISC: Chronic | ICD-10-CM

## 2025-01-27 DIAGNOSIS — M47.816 LUMBAR SPONDYLOSIS: Chronic | ICD-10-CM

## 2025-01-27 DIAGNOSIS — M47.817 LUMBOSACRAL SPONDYLOSIS WITHOUT MYELOPATHY: ICD-10-CM

## 2025-01-27 DIAGNOSIS — M54.16 LUMBAR RADICULOPATHY, CHRONIC: Chronic | ICD-10-CM

## 2025-01-27 DIAGNOSIS — M50.30 DEGENERATIVE DISC DISEASE, CERVICAL: ICD-10-CM

## 2025-01-27 DIAGNOSIS — M54.50 CHRONIC BILATERAL LOW BACK PAIN WITHOUT SCIATICA: Primary | Chronic | ICD-10-CM

## 2025-01-27 DIAGNOSIS — M46.1 SACROILIITIS (HCC): ICD-10-CM

## 2025-01-27 DIAGNOSIS — R59.0 CERVICAL LYMPHADENOPATHY: ICD-10-CM

## 2025-01-27 DIAGNOSIS — Z79.891 CHRONIC USE OF OPIATE FOR THERAPEUTIC PURPOSE: ICD-10-CM

## 2025-01-27 DIAGNOSIS — Z79.899 ENCOUNTER FOR MEDICATION MANAGEMENT: ICD-10-CM

## 2025-01-27 DIAGNOSIS — M53.3 CHRONIC SI JOINT PAIN: Chronic | ICD-10-CM

## 2025-01-27 PROCEDURE — 99213 OFFICE O/P EST LOW 20 MIN: CPT | Performed by: NURSE PRACTITIONER

## 2025-01-27 PROCEDURE — 99213 OFFICE O/P EST LOW 20 MIN: CPT

## 2025-01-27 RX ORDER — OXYCODONE AND ACETAMINOPHEN 5; 325 MG/1; MG/1
1 TABLET ORAL EVERY 6 HOURS PRN
Qty: 120 TABLET | Refills: 0 | Status: SHIPPED | OUTPATIENT
Start: 2025-01-29 | End: 2025-02-28

## 2025-01-27 RX ORDER — PREGABALIN 150 MG/1
150 CAPSULE ORAL 2 TIMES DAILY
Qty: 60 CAPSULE | Refills: 2 | Status: SHIPPED | OUTPATIENT
Start: 2025-01-27 | End: 2025-04-27

## 2025-01-27 RX ORDER — OXYCODONE AND ACETAMINOPHEN 5; 325 MG/1; MG/1
1 TABLET ORAL EVERY 6 HOURS PRN
Qty: 120 TABLET | Refills: 0 | Status: CANCELLED | OUTPATIENT
Start: 2025-01-27 | End: 2025-02-26

## 2025-01-27 ASSESSMENT — ENCOUNTER SYMPTOMS
BOWEL INCONTINENCE: 0
CONSTIPATION: 0
BACK PAIN: 1
SHORTNESS OF BREATH: 0
COUGH: 0

## 2025-01-27 ASSESSMENT — PAIN DESCRIPTION - LOCATION: LOCATION: BACK

## 2025-01-27 ASSESSMENT — PAIN SCALES - GENERAL: PAINLEVEL_OUTOF10: 6
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
## 2025-02-07 DIAGNOSIS — B96.89 ACUTE BACTERIAL SINUSITIS: ICD-10-CM

## 2025-02-07 DIAGNOSIS — J01.90 ACUTE BACTERIAL SINUSITIS: ICD-10-CM

## 2025-02-07 RX ORDER — CETIRIZINE HYDROCHLORIDE 10 MG/1
10 TABLET ORAL DAILY
Qty: 30 TABLET | Refills: 0 | Status: SHIPPED | OUTPATIENT
Start: 2025-02-07

## 2025-02-07 NOTE — TELEPHONE ENCOUNTER
Please Approve or Refuse.  Send to Pharmacy per Pt's Request: [facility]      Next Visit Date:  called pt lvm   Last Visit Date: 1/14/2025    Hemoglobin A1C (%)   Date Value   07/11/2024 7.2   03/20/2024 6.1   11/20/2023 5.9             ( goal A1C is < 7)   BP Readings from Last 3 Encounters:   11/05/24 112/77   11/01/24 (!) 138/90   10/30/24 (!) 140/85          (goal 120/80)  BUN   Date Value Ref Range Status   04/24/2024 11 6 - 20 mg/dL Final     Creatinine   Date Value Ref Range Status   09/23/2024 0.9 0.5 - 0.9 mg/dL Final     POC Creatinine   Date Value Ref Range Status   09/23/2024 0.8 0.51 - 1.19 mg/dL Final     Potassium   Date Value Ref Range Status   04/24/2024 4.6 3.7 - 5.3 mmol/L Final

## 2025-02-19 DIAGNOSIS — K21.9 CHRONIC GERD: ICD-10-CM

## 2025-02-19 RX ORDER — OMEPRAZOLE 40 MG/1
40 CAPSULE, DELAYED RELEASE ORAL 2 TIMES DAILY
Qty: 60 CAPSULE | Refills: 0 | Status: SHIPPED | OUTPATIENT
Start: 2025-02-19

## 2025-02-26 ENCOUNTER — HOSPITAL ENCOUNTER (OUTPATIENT)
Dept: PAIN MANAGEMENT | Age: 55
Discharge: HOME | End: 2025-02-26
Payer: COMMERCIAL

## 2025-02-26 VITALS — HEIGHT: 60 IN | WEIGHT: 230 LBS | BODY MASS INDEX: 45.16 KG/M2

## 2025-02-26 DIAGNOSIS — Z79.891 CHRONIC USE OF OPIATE FOR THERAPEUTIC PURPOSE: ICD-10-CM

## 2025-02-26 DIAGNOSIS — M47.817 LUMBOSACRAL SPONDYLOSIS WITHOUT MYELOPATHY: ICD-10-CM

## 2025-02-26 DIAGNOSIS — M54.50 CHRONIC BILATERAL LOW BACK PAIN WITHOUT SCIATICA: Primary | Chronic | ICD-10-CM

## 2025-02-26 DIAGNOSIS — M50.30 DEGENERATIVE DISC DISEASE, CERVICAL: ICD-10-CM

## 2025-02-26 DIAGNOSIS — G89.29 CHRONIC BILATERAL LOW BACK PAIN WITHOUT SCIATICA: Primary | Chronic | ICD-10-CM

## 2025-02-26 DIAGNOSIS — G89.29 CHRONIC SI JOINT PAIN: Chronic | ICD-10-CM

## 2025-02-26 DIAGNOSIS — M50.20 CERVICAL DISC HERNIATION: ICD-10-CM

## 2025-02-26 DIAGNOSIS — M47.816 LUMBAR SPONDYLOSIS: Chronic | ICD-10-CM

## 2025-02-26 DIAGNOSIS — M53.3 CHRONIC SI JOINT PAIN: Chronic | ICD-10-CM

## 2025-02-26 DIAGNOSIS — M51.369 DEGENERATION OF LUMBAR INTERVERTEBRAL DISC: Chronic | ICD-10-CM

## 2025-02-26 DIAGNOSIS — M54.16 LUMBAR RADICULOPATHY, CHRONIC: Chronic | ICD-10-CM

## 2025-02-26 PROCEDURE — 99213 OFFICE O/P EST LOW 20 MIN: CPT

## 2025-02-26 PROCEDURE — 99213 OFFICE O/P EST LOW 20 MIN: CPT | Performed by: NURSE PRACTITIONER

## 2025-02-26 RX ORDER — OXYCODONE AND ACETAMINOPHEN 5; 325 MG/1; MG/1
1 TABLET ORAL EVERY 6 HOURS PRN
Qty: 120 TABLET | Refills: 0 | Status: SHIPPED | OUTPATIENT
Start: 2025-02-28 | End: 2025-03-30

## 2025-02-26 ASSESSMENT — ENCOUNTER SYMPTOMS
CONSTIPATION: 0
BOWEL INCONTINENCE: 0
SHORTNESS OF BREATH: 0
BACK PAIN: 1
COUGH: 0

## 2025-02-26 ASSESSMENT — PAIN SCALES - GENERAL: PAINLEVEL_OUTOF10: 7

## 2025-02-26 NOTE — PROGRESS NOTES
shortness of breath.    Musculoskeletal:  Positive for back pain.   Gastrointestinal:  Negative for bowel incontinence and constipation.   Genitourinary:  Negative for bladder incontinence.   Neurological:  Positive for numbness, tingling and weakness. Negative for disturbances in coordination and loss of balance.       Physical Exam:  Ht 1.524 m (5')   Wt 104.3 kg (230 lb)   BMI 44.92 kg/m²     Physical Exam  HENT:      Head: Normocephalic.   Pulmonary:      Effort: Pulmonary effort is normal.   Musculoskeletal:         General: Normal range of motion.      Cervical back: Normal range of motion.      Lumbar back: Tenderness present.   Skin:     General: Skin is warm and dry.   Neurological:      Mental Status: She is alert and oriented to person, place, and time.         Record/Diagnostics Review:    Last desirae 12/2024 and was appropriate     Assessment:  Problem List Items Addressed This Visit       Chronic SI joint pain (Chronic)    Relevant Medications    oxyCODONE-acetaminophen (PERCOCET) 5-325 MG per tablet (Start on 2/28/2025)    Lumbar radiculopathy, chronic (Chronic)    Relevant Medications    oxyCODONE-acetaminophen (PERCOCET) 5-325 MG per tablet (Start on 2/28/2025)    Degeneration of lumbar intervertebral disc (Chronic)    Relevant Medications    oxyCODONE-acetaminophen (PERCOCET) 5-325 MG per tablet (Start on 2/28/2025)    Lumbar spondylosis (Chronic)    Relevant Medications    oxyCODONE-acetaminophen (PERCOCET) 5-325 MG per tablet (Start on 2/28/2025)    Chronic bilateral low back pain without sciatica - Primary (Chronic)    Relevant Medications    oxyCODONE-acetaminophen (PERCOCET) 5-325 MG per tablet (Start on 2/28/2025)    Degenerative disc disease, cervical    Relevant Medications    oxyCODONE-acetaminophen (PERCOCET) 5-325 MG per tablet (Start on 2/28/2025)    Cervical disc herniation    Relevant Medications    oxyCODONE-acetaminophen (PERCOCET) 5-325 MG per tablet (Start on 2/28/2025)    Chronic

## 2025-03-07 DIAGNOSIS — B96.89 ACUTE BACTERIAL SINUSITIS: ICD-10-CM

## 2025-03-07 DIAGNOSIS — J01.90 ACUTE BACTERIAL SINUSITIS: ICD-10-CM

## 2025-03-07 RX ORDER — CETIRIZINE HYDROCHLORIDE 10 MG/1
10 TABLET ORAL DAILY
Qty: 30 TABLET | Refills: 0 | Status: SHIPPED | OUTPATIENT
Start: 2025-03-07

## 2025-03-07 NOTE — TELEPHONE ENCOUNTER
Please Approve or Refuse.  Send to Pharmacy per Pt's Request: [facility]      Next Visit Date:  Visit date not found   Last Visit Date: 1/14/2025    Hemoglobin A1C (%)   Date Value   07/11/2024 7.2   03/20/2024 6.1   11/20/2023 5.9             ( goal A1C is < 7)   BP Readings from Last 3 Encounters:   11/05/24 112/77   11/01/24 (!) 138/90   10/30/24 (!) 140/85          (goal 120/80)  BUN   Date Value Ref Range Status   04/24/2024 11 6 - 20 mg/dL Final     Creatinine   Date Value Ref Range Status   09/23/2024 0.9 0.5 - 0.9 mg/dL Final     POC Creatinine   Date Value Ref Range Status   09/23/2024 0.8 0.51 - 1.19 mg/dL Final     Potassium   Date Value Ref Range Status   04/24/2024 4.6 3.7 - 5.3 mmol/L Final

## 2025-03-10 ENCOUNTER — APPOINTMENT (OUTPATIENT)
Dept: GENERAL RADIOLOGY | Age: 55
End: 2025-03-10
Payer: COMMERCIAL

## 2025-03-10 ENCOUNTER — HOSPITAL ENCOUNTER (EMERGENCY)
Age: 55
Discharge: HOME | End: 2025-03-10
Attending: EMERGENCY MEDICINE
Payer: COMMERCIAL

## 2025-03-10 VITALS
BODY MASS INDEX: 44.96 KG/M2 | TEMPERATURE: 98.8 F | SYSTOLIC BLOOD PRESSURE: 123 MMHG | HEIGHT: 60 IN | RESPIRATION RATE: 20 BRPM | DIASTOLIC BLOOD PRESSURE: 84 MMHG | OXYGEN SATURATION: 92 % | WEIGHT: 229 LBS | HEART RATE: 113 BPM

## 2025-03-10 DIAGNOSIS — R11.2 NAUSEA VOMITING AND DIARRHEA: Primary | ICD-10-CM

## 2025-03-10 DIAGNOSIS — R19.7 NAUSEA VOMITING AND DIARRHEA: Primary | ICD-10-CM

## 2025-03-10 LAB
ALBUMIN SERPL-MCNC: 4.3 G/DL (ref 3.5–5.2)
ALP SERPL-CCNC: 82 U/L (ref 35–104)
ALT SERPL-CCNC: 32 U/L (ref 10–35)
ANION GAP SERPL CALCULATED.3IONS-SCNC: 14 MMOL/L (ref 9–16)
AST SERPL-CCNC: 26 U/L (ref 10–35)
BASOPHILS # BLD: 0 K/UL (ref 0–0.2)
BASOPHILS NFR BLD: 0 % (ref 0–2)
BILIRUB SERPL-MCNC: 0.6 MG/DL (ref 0–1.2)
BUN SERPL-MCNC: 11 MG/DL (ref 6–20)
CALCIUM SERPL-MCNC: 9.6 MG/DL (ref 8.6–10.4)
CHLORIDE SERPL-SCNC: 102 MMOL/L (ref 98–107)
CO2 SERPL-SCNC: 24 MMOL/L (ref 20–31)
CREAT SERPL-MCNC: 1 MG/DL (ref 0.7–1.2)
EOSINOPHIL # BLD: 0 K/UL (ref 0–0.4)
EOSINOPHILS RELATIVE PERCENT: 0 % (ref 0–4)
ERYTHROCYTE [DISTWIDTH] IN BLOOD BY AUTOMATED COUNT: 13.1 % (ref 11.5–14.9)
GFR, ESTIMATED: 67 ML/MIN/1.73M2
GLUCOSE SERPL-MCNC: 172 MG/DL (ref 74–99)
HCT VFR BLD AUTO: 46.5 % (ref 36–46)
HGB BLD-MCNC: 15.5 G/DL (ref 12–16)
LIPASE SERPL-CCNC: 35 U/L (ref 13–60)
LYMPHOCYTES NFR BLD: 0.7 K/UL (ref 1–4.8)
LYMPHOCYTES RELATIVE PERCENT: 9 % (ref 24–44)
MCH RBC QN AUTO: 28.7 PG (ref 26–34)
MCHC RBC AUTO-ENTMCNC: 33.3 G/DL (ref 31–37)
MCV RBC AUTO: 86.1 FL (ref 80–100)
MONOCYTES NFR BLD: 0.4 K/UL (ref 0.1–1.3)
MONOCYTES NFR BLD: 5 % (ref 1–7)
NEUTROPHILS NFR BLD: 86 % (ref 36–66)
NEUTS SEG NFR BLD: 6.7 K/UL (ref 1.3–9.1)
PLATELET # BLD AUTO: 228 K/UL (ref 150–450)
PMV BLD AUTO: 8 FL (ref 6–12)
POTASSIUM SERPL-SCNC: 4.5 MMOL/L (ref 3.7–5.3)
PROT SERPL-MCNC: 7.4 G/DL (ref 6.6–8.7)
RBC # BLD AUTO: 5.4 M/UL (ref 4–5.2)
SODIUM SERPL-SCNC: 140 MMOL/L (ref 136–145)
TROPONIN I SERPL HS-MCNC: <6 NG/L (ref 0–14)
TROPONIN I SERPL HS-MCNC: <6 NG/L (ref 0–14)
WBC OTHER # BLD: 7.9 K/UL (ref 3.5–11)

## 2025-03-10 PROCEDURE — 2580000003 HC RX 258

## 2025-03-10 PROCEDURE — 96374 THER/PROPH/DIAG INJ IV PUSH: CPT

## 2025-03-10 PROCEDURE — 83690 ASSAY OF LIPASE: CPT

## 2025-03-10 PROCEDURE — 6360000002 HC RX W HCPCS

## 2025-03-10 PROCEDURE — 84484 ASSAY OF TROPONIN QUANT: CPT

## 2025-03-10 PROCEDURE — 71045 X-RAY EXAM CHEST 1 VIEW: CPT

## 2025-03-10 PROCEDURE — 80053 COMPREHEN METABOLIC PANEL: CPT

## 2025-03-10 PROCEDURE — 2500000003 HC RX 250 WO HCPCS

## 2025-03-10 PROCEDURE — 36415 COLL VENOUS BLD VENIPUNCTURE: CPT

## 2025-03-10 PROCEDURE — 99285 EMERGENCY DEPT VISIT HI MDM: CPT

## 2025-03-10 PROCEDURE — 96375 TX/PRO/DX INJ NEW DRUG ADDON: CPT

## 2025-03-10 PROCEDURE — 6370000000 HC RX 637 (ALT 250 FOR IP)

## 2025-03-10 PROCEDURE — 85025 COMPLETE CBC W/AUTO DIFF WBC: CPT

## 2025-03-10 PROCEDURE — 93005 ELECTROCARDIOGRAM TRACING: CPT

## 2025-03-10 RX ORDER — ONDANSETRON 4 MG/1
4 TABLET, FILM COATED ORAL EVERY 8 HOURS PRN
Qty: 20 TABLET | Refills: 0 | Status: SHIPPED | OUTPATIENT
Start: 2025-03-10

## 2025-03-10 RX ORDER — DICYCLOMINE HYDROCHLORIDE 10 MG/1
10 CAPSULE ORAL
Qty: 30 CAPSULE | Refills: 0 | Status: SHIPPED | OUTPATIENT
Start: 2025-03-10

## 2025-03-10 RX ORDER — OXYCODONE AND ACETAMINOPHEN 5; 325 MG/1; MG/1
1 TABLET ORAL ONCE
Refills: 0 | Status: COMPLETED | OUTPATIENT
Start: 2025-03-10 | End: 2025-03-10

## 2025-03-10 RX ORDER — DICYCLOMINE HYDROCHLORIDE 10 MG/1
10 CAPSULE ORAL
Qty: 360 CAPSULE | Refills: 0 | Status: SHIPPED | OUTPATIENT
Start: 2025-03-10 | End: 2025-03-10

## 2025-03-10 RX ORDER — ONDANSETRON 2 MG/ML
4 INJECTION INTRAMUSCULAR; INTRAVENOUS ONCE
Status: COMPLETED | OUTPATIENT
Start: 2025-03-10 | End: 2025-03-10

## 2025-03-10 RX ADMIN — OXYCODONE HYDROCHLORIDE AND ACETAMINOPHEN 1 TABLET: 5; 325 TABLET ORAL at 20:22

## 2025-03-10 RX ADMIN — FAMOTIDINE 20 MG: 10 INJECTION, SOLUTION INTRAVENOUS at 18:59

## 2025-03-10 RX ADMIN — ONDANSETRON 4 MG: 2 INJECTION, SOLUTION INTRAMUSCULAR; INTRAVENOUS at 18:58

## 2025-03-10 ASSESSMENT — PAIN DESCRIPTION - ORIENTATION
ORIENTATION: RIGHT;LEFT
ORIENTATION: LEFT;RIGHT

## 2025-03-10 ASSESSMENT — PAIN DESCRIPTION - LOCATION
LOCATION: BACK;HIP
LOCATION: BACK;HIP

## 2025-03-10 ASSESSMENT — PAIN SCALES - GENERAL
PAINLEVEL_OUTOF10: 7
PAINLEVEL_OUTOF10: 10
PAINLEVEL_OUTOF10: 10

## 2025-03-10 ASSESSMENT — PAIN DESCRIPTION - DESCRIPTORS
DESCRIPTORS: SHARP
DESCRIPTORS: SHARP;ACHING

## 2025-03-10 ASSESSMENT — PAIN - FUNCTIONAL ASSESSMENT: PAIN_FUNCTIONAL_ASSESSMENT: 0-10

## 2025-03-10 NOTE — ED NOTES
Report given to KWADWO Latif from ED.   Report method in person   The following was reviewed with receiving RN:   Current vital signs:  BP (!) 112/43   Pulse (!) 120   Temp 98.8 °F (37.1 °C) (Oral)   Resp 20   Ht 1.524 m (5')   Wt 103.9 kg (229 lb)   SpO2 95%   BMI 44.72 kg/m²                      Any medication or safety alerts were reviewed. Any pending diagnostics and notifications were also reviewed, as well as any safety concerns or issues, abnormal labs, abnormal imaging, and abnormal assessment findings. Questions were answered.
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
## 2025-03-11 ENCOUNTER — TELEPHONE (OUTPATIENT)
Dept: FAMILY MEDICINE CLINIC | Age: 55
End: 2025-03-11

## 2025-03-11 LAB
EKG ATRIAL RATE: 111 BPM
EKG P AXIS: -10 DEGREES
EKG P-R INTERVAL: 128 MS
EKG Q-T INTERVAL: 336 MS
EKG QRS DURATION: 74 MS
EKG QTC CALCULATION (BAZETT): 456 MS
EKG R AXIS: 7 DEGREES
EKG T AXIS: 37 DEGREES
EKG VENTRICULAR RATE: 111 BPM

## 2025-03-11 PROCEDURE — 93010 ELECTROCARDIOGRAM REPORT: CPT | Performed by: INTERNAL MEDICINE

## 2025-03-11 ASSESSMENT — ENCOUNTER SYMPTOMS
SHORTNESS OF BREATH: 0
COUGH: 0

## 2025-03-11 NOTE — TELEPHONE ENCOUNTER
[facility] ED Follow up Call    Reason for ED visit:  Nausea vomiting and diarrhea          Hi Iqra , this is CAN from Winnie Billingsley's office, just calling to see how you are doing after your recent ED visit.    Did you receive discharge instructions?  Yes  Do you understand the discharge instructions? Yes  Did the ED give you any new prescriptions? Yes  Were you able to fill your prescriptions? Yes      Do you have one of our red, yellow and green  Zone sheets that help you to determine when you should go to the ED?Not Applicable      Do you need or want to make a follow up appt with your PCP?Yes    Do you have any further needs in the home i.e. Equipment?  No        FU appts/Provider:    Future Appointments   Date Time Provider Department Center   3/28/2025  3:40 PM Isha Kilgore APRN - CNP [facility] [place]   4/3/2025  3:15 PM Chava Monahan MD [id number]

## 2025-03-11 NOTE — ED PROVIDER NOTES
[facility] EMERGENCY DEPARTMENT  eMERGENCY dEPARTMENT eNCOUnter   Attending Attestation     Pt Name: Iqra Goodrich  MRN: [id number]  Birthdate 1970  Date of evaluation: 3/10/25    History, EXAM, MDM:    Iqra Goodrich is a 54 y.o. female who presents with Abdominal Pain (Abd pain, N/V/D since 5 am. ), Vomiting, and Diarrhea  Symptoms have been going on for the last day.  Patient is hemodynamically stable.  Abdomen soft there is no guarding no rebound no focal numbness.  An IV was placed, the patient was treated with parenteral antiemetics also prior to her Pepcid and she was also treated with analgesics.  Laboratory studies show a white blood cell count of 7.9, renal function electrolytes liver function test lipase were all within normal limits.  Cardiac evaluation was performed and we ruled out ACS, I think that the patient likely has a viral enteritis.    EKG: All EKG's are interpreted by the Emergency Department Physician who either signs or Co-signs this chart in the absence of a cardiologist.  EKG shows a sinus tachycardia rhythm.  HR is 111, , QRS 74, , no DEE, No STD, No TWI, the axis is normal.        Vitals:   Vitals:    03/10/25 1826 03/10/25 1828 03/10/25 2011   BP:  (!) 112/43 133/87   Pulse: (!) 120  (!) 110   Resp: 20     Temp: 98.8 °F (37.1 °C)     TempSrc: Oral     SpO2: 95%  92%   Weight: 103.9 kg (229 lb)     Height: 1.524 m (5')       I performed a history and physical examination of the patient and discussed management with the resident. I reviewed the resident’s note and agree with the documented findings and plan of care. Any areas of disagreement are noted on the chart. I was personally present for the key portions of any procedures. I have documented in the chart those procedures where I was not present during the key portions. I have personally reviewed all images and agree with the resident's interpretation. I have reviewed the emergency nurses triage note. I agree

## 2025-03-11 NOTE — DISCHARGE INSTRUCTIONS
Call today or tomorrow to follow up with Winnie Granger MD  in 2 days.    Take your medication as prescribed.  Avoid drinking alcohol or drinks that have caffeine it.  Drink plenty of water or fluids like Gatorade.    Return to the Emergency Department for worsening of nausea or vomiting, fever > 101.5, abdominal pain, blood in stool, vomiting blood, unable to tolerate oral fluids, continue to have vomiting for more than 24 hours, any other care or concern.

## 2025-03-12 ENCOUNTER — TELEPHONE (OUTPATIENT)
Dept: FAMILY MEDICINE CLINIC | Age: 55
End: 2025-03-12

## 2025-03-12 NOTE — TELEPHONE ENCOUNTER
[facility] ED Follow up Call    Reason for ED visit:    3/10/2025 (3 hours)  [facility] Emergency Department     Luís Montejo MD  Last attending  Treatment team Nausea vomiting and diarrhea  Clinical impression Abdominal Pain   Vomiting  Diarrhea  Chief complaint       Hi Iqra ,     This is Marychuy Belkys from Winnie Billingsley's office, just calling to see how you are doing after your recent ED visit.    Did you receive discharge instructions?  Yes  Do you understand the discharge instructions? Yes  Did the ED give you any new prescriptions? Yes  Were you able to fill your prescriptions? Yes    Do you have one of our red, yellow and green  Zone sheets that help you to determine when you should go to the ED?Yes    Do you need or want to make a follow up appt with your PCP?Yes    Do you have any further needs in the home i.e. Equipment?  No    FU appts/Provider:      Future Appointments   Date Time Provider Department Center   3/20/2025  2:45 PM [facility]   3/28/2025  3:40 PM Isha Kilgore, APRN - CNP [facility] [place]   4/3/2025  3:15 PM Chava Monahan MD [facility]

## 2025-03-14 ENCOUNTER — TELEPHONE (OUTPATIENT)
Dept: FAMILY MEDICINE CLINIC | Age: 55
End: 2025-03-14

## 2025-03-14 NOTE — TELEPHONE ENCOUNTER
[facility] ED Follow up Call    Reason for ED visit:    3/10/2025 (3 hours)  [facility] Emergency Department     Luís Montejo MD  Last attending  Treatment team Nausea vomiting and diarrhea  Clinical impression Abdominal Pain   Vomiting  Diarrhea  Chief complaint       Hi Iqra ,     This is Marychuy Belkys from Winnie Billingsley's office, just calling to see how you are doing after your recent ED visit.    Did you receive discharge instructions?  Yes  Do you understand the discharge instructions? Yes  Did the ED give you any new prescriptions? Yes  Were you able to fill your prescriptions? Yes    Do you have one of our red, yellow and green  Zone sheets that help you to determine when you should go to the ED?Yes    Do you need or want to make a follow up appt with your PCP?Yes    Do you have any further needs in the home i.e. Equipment?  No    FU appts/Provider:      Future Appointments   Date Time Provider Department Center   3/20/2025  2:45 PM [facility]   3/28/2025  3:40 PM Isha Kilgore, APRN - CNP [facility] [place]   4/3/2025  3:15 PM Chava Monahan MD [facility]

## 2025-03-20 ENCOUNTER — OFFICE VISIT (OUTPATIENT)
Dept: FAMILY MEDICINE CLINIC | Age: 55
End: 2025-03-20
Payer: COMMERCIAL

## 2025-03-20 ENCOUNTER — TELEPHONE (OUTPATIENT)
Dept: FAMILY MEDICINE CLINIC | Age: 55
End: 2025-03-20

## 2025-03-20 VITALS
HEART RATE: 70 BPM | DIASTOLIC BLOOD PRESSURE: 91 MMHG | BODY MASS INDEX: 44.33 KG/M2 | WEIGHT: 227 LBS | SYSTOLIC BLOOD PRESSURE: 151 MMHG

## 2025-03-20 DIAGNOSIS — E78.2 MIXED HYPERLIPIDEMIA: ICD-10-CM

## 2025-03-20 DIAGNOSIS — K21.9 CHRONIC GERD: ICD-10-CM

## 2025-03-20 DIAGNOSIS — M47.817 LUMBOSACRAL SPONDYLOSIS WITHOUT MYELOPATHY: ICD-10-CM

## 2025-03-20 DIAGNOSIS — I10 ESSENTIAL HYPERTENSION: Primary | ICD-10-CM

## 2025-03-20 DIAGNOSIS — E66.01 MORBID OBESITY WITH BMI OF 40.0-44.9, ADULT: ICD-10-CM

## 2025-03-20 DIAGNOSIS — G43.719 INTRACTABLE CHRONIC MIGRAINE WITHOUT AURA AND WITHOUT STATUS MIGRAINOSUS: ICD-10-CM

## 2025-03-20 DIAGNOSIS — E11.9 TYPE 2 DIABETES MELLITUS WITHOUT COMPLICATION, WITHOUT LONG-TERM CURRENT USE OF INSULIN: ICD-10-CM

## 2025-03-20 DIAGNOSIS — I10 ESSENTIAL HYPERTENSION: ICD-10-CM

## 2025-03-20 DIAGNOSIS — J45.20 MILD INTERMITTENT ASTHMA WITHOUT COMPLICATION: ICD-10-CM

## 2025-03-20 PROBLEM — M46.1 SACROILIITIS: Status: RESOLVED | Noted: 2024-09-26 | Resolved: 2025-03-20

## 2025-03-20 PROBLEM — N30.10 INTERSTITIAL CYSTITIS: Status: RESOLVED | Noted: 2021-05-24 | Resolved: 2025-03-20

## 2025-03-20 LAB — HBA1C MFR BLD: 7.2 %

## 2025-03-20 PROCEDURE — 3017F COLORECTAL CA SCREEN DOC REV: CPT | Performed by: FAMILY MEDICINE

## 2025-03-20 PROCEDURE — G8427 DOCREV CUR MEDS BY ELIG CLIN: HCPCS | Performed by: FAMILY MEDICINE

## 2025-03-20 PROCEDURE — 3077F SYST BP >= 140 MM HG: CPT | Performed by: FAMILY MEDICINE

## 2025-03-20 PROCEDURE — 99214 OFFICE O/P EST MOD 30 MIN: CPT | Performed by: FAMILY MEDICINE

## 2025-03-20 PROCEDURE — G8417 CALC BMI ABV UP PARAM F/U: HCPCS | Performed by: FAMILY MEDICINE

## 2025-03-20 PROCEDURE — 1036F TOBACCO NON-USER: CPT | Performed by: FAMILY MEDICINE

## 2025-03-20 PROCEDURE — 3051F HG A1C>EQUAL 7.0%<8.0%: CPT | Performed by: FAMILY MEDICINE

## 2025-03-20 PROCEDURE — 2022F DILAT RTA XM EVC RTNOPTHY: CPT | Performed by: FAMILY MEDICINE

## 2025-03-20 PROCEDURE — 3080F DIAST BP >= 90 MM HG: CPT | Performed by: FAMILY MEDICINE

## 2025-03-20 PROCEDURE — 83036 HEMOGLOBIN GLYCOSYLATED A1C: CPT | Performed by: FAMILY MEDICINE

## 2025-03-20 PROCEDURE — G2211 COMPLEX E/M VISIT ADD ON: HCPCS | Performed by: FAMILY MEDICINE

## 2025-03-20 RX ORDER — CETIRIZINE HYDROCHLORIDE 10 MG/1
10 TABLET ORAL DAILY
Qty: 30 TABLET | Refills: 0 | Status: SHIPPED | OUTPATIENT
Start: 2025-03-20

## 2025-03-20 RX ORDER — LISINOPRIL 10 MG/1
10 TABLET ORAL DAILY
Qty: 90 TABLET | Refills: 1 | Status: SHIPPED | OUTPATIENT
Start: 2025-03-20

## 2025-03-20 RX ORDER — AMLODIPINE BESYLATE 5 MG/1
5 TABLET ORAL DAILY
Qty: 90 TABLET | Refills: 0 | Status: SHIPPED | OUTPATIENT
Start: 2025-03-20

## 2025-03-20 RX ORDER — ACYCLOVIR 400 MG/1
TABLET ORAL
Qty: 3 EACH | Refills: 1 | Status: SHIPPED | OUTPATIENT
Start: 2025-03-20

## 2025-03-20 RX ORDER — OMEPRAZOLE 40 MG/1
40 CAPSULE, DELAYED RELEASE ORAL 2 TIMES DAILY
Qty: 60 CAPSULE | Refills: 0 | Status: SHIPPED | OUTPATIENT
Start: 2025-03-20

## 2025-03-20 RX ORDER — ACYCLOVIR 400 MG/1
TABLET ORAL
Qty: 3 EACH | Refills: 2 | Status: SHIPPED | OUTPATIENT
Start: 2025-03-20

## 2025-03-20 SDOH — ECONOMIC STABILITY: FOOD INSECURITY: WITHIN THE PAST 12 MONTHS, YOU WORRIED THAT YOUR FOOD WOULD RUN OUT BEFORE YOU GOT MONEY TO BUY MORE.: OFTEN TRUE

## 2025-03-20 SDOH — ECONOMIC STABILITY: FOOD INSECURITY: WITHIN THE PAST 12 MONTHS, THE FOOD YOU BOUGHT JUST DIDN'T LAST AND YOU DIDN'T HAVE MONEY TO GET MORE.: OFTEN TRUE

## 2025-03-20 ASSESSMENT — ENCOUNTER SYMPTOMS
ABDOMINAL DISTENTION: 0
BACK PAIN: 1
BLOOD IN STOOL: 0
TROUBLE SWALLOWING: 0
COUGH: 0
WHEEZING: 0
RECTAL PAIN: 0
ABDOMINAL PAIN: 0
COLOR CHANGE: 0
SHORTNESS OF BREATH: 0
CHEST TIGHTNESS: 0
VOMITING: 0
SORE THROAT: 0
STRIDOR: 0
NAUSEA: 0
CONSTIPATION: 0
SINUS PRESSURE: 0
EYE REDNESS: 0
RHINORRHEA: 0
DIARRHEA: 0

## 2025-03-20 ASSESSMENT — PATIENT HEALTH QUESTIONNAIRE - PHQ9
1. LITTLE INTEREST OR PLEASURE IN DOING THINGS: NOT AT ALL
2. FEELING DOWN, DEPRESSED OR HOPELESS: MORE THAN HALF THE DAYS
SUM OF ALL RESPONSES TO PHQ QUESTIONS 1-9: 2

## 2025-03-20 NOTE — TELEPHONE ENCOUNTER
Please Approve or Refuse.  Send to Pharmacy per Pt's Request:      Next Visit Date:  3/20/2025   Last Visit Date: 1/14/2025    Hemoglobin A1C (%)   Date Value   07/11/2024 7.2   03/20/2024 6.1   11/20/2023 5.9             ( goal A1C is < 7)   BP Readings from Last 3 Encounters:   03/10/25 123/84   11/05/24 112/77   11/01/24 (!) 138/90          (goal 120/80)  BUN   Date Value Ref Range Status   03/10/2025 11 6 - 20 mg/dL Final     Creatinine   Date Value Ref Range Status   03/10/2025 1.0 0.7 - 1.2 mg/dL Final     Potassium   Date Value Ref Range Status   03/10/2025 4.5 3.7 - 5.3 mmol/L Final

## 2025-03-20 NOTE — PROGRESS NOTES
recurred after the effects of the medication wore off. She is uncertain about the cause of her frequent illnesses, as she has received all her COVID-19 vaccines and believes she is up-to-date with all her immunizations. She has consulted a gastroenterologist who advised her to return only if there was an increase in her omeprazole dosage to 40 mg daily. She was unaware of the order for a gastric emptying study.    Her blood pressure was elevated during her last visit, leading to an increase in her amlodipine dosage to twice daily. However, she has not been adhering to this regimen. She is also on lisinopril.    Lumbar degenerative disc disease stable follows with pain management.    Migraine headaches stable denies any recent flareups.    Asthma is currently on Claritin as needed.      Supplemental Information  She has seen an eye doctor and needs to go back again because they just sent her a message saying this prescription is about to .    FAMILY HISTORY  Her father has Parkinson's disease.    MEDICATIONS  Current: Amlodipine, lisinopril, Trulicity, Zyrtec, omeprazole.  Discontinued: Singulair, Ozempic.    IMMUNIZATIONS  She has received all her COVID-19 vaccines and believes she has received her flu vaccine.        Results  Laboratory Studies  A1c is 7.2.      BP (!) 151/91   Pulse 70   Wt 103 kg (227 lb)   BMI 44.33 kg/m²   Body mass index is 44.33 kg/m².  Wt Readings from Last 3 Encounters:   25 103 kg (227 lb)   03/10/25 103.9 kg (229 lb)   25 104.3 kg (230 lb)        []Negative depression screening.      3/20/2025     2:43 PM 3/20/2024     1:02 PM 2023    12:56 PM 10/11/2022     2:35 PM 2022    10:18 AM 2021     2:56 PM 2021     6:22 PM   PHQ Scores   PHQ2 Score 2 6 0 0 3 1 2   PHQ9 Score 2 23 0 0 10 1 2      []1-4 = Minimal depression   []5-9 = Milddepression   []10-14 = Moderate depression   []15-19 = Moderately severe depression   []20-27 = Severe

## 2025-03-20 NOTE — TELEPHONE ENCOUNTER
At Check out patient said she forgot to discuss with you that she has been having trouble focusing and trying to get stuff done, she use to be on ritalin but took her self off when she was 19, she said it has been bothering her a lot lately and wanted to see if she could get something to help her focus again.

## 2025-03-28 ENCOUNTER — HOSPITAL ENCOUNTER (OUTPATIENT)
Dept: PAIN MANAGEMENT | Age: 55
Discharge: HOME | End: 2025-03-28
Payer: COMMERCIAL

## 2025-03-28 VITALS — BODY MASS INDEX: 44.57 KG/M2 | HEIGHT: 60 IN | WEIGHT: 227 LBS

## 2025-03-28 DIAGNOSIS — M53.3 CHRONIC SI JOINT PAIN: Chronic | ICD-10-CM

## 2025-03-28 DIAGNOSIS — M25.562 CHRONIC PAIN OF LEFT KNEE: ICD-10-CM

## 2025-03-28 DIAGNOSIS — G89.29 CHRONIC PAIN OF LEFT KNEE: ICD-10-CM

## 2025-03-28 DIAGNOSIS — M47.817 LUMBOSACRAL SPONDYLOSIS WITHOUT MYELOPATHY: ICD-10-CM

## 2025-03-28 DIAGNOSIS — M50.20 CERVICAL DISC HERNIATION: ICD-10-CM

## 2025-03-28 DIAGNOSIS — M16.12 PRIMARY OSTEOARTHRITIS OF LEFT HIP: ICD-10-CM

## 2025-03-28 DIAGNOSIS — Z79.891 CHRONIC USE OF OPIATE FOR THERAPEUTIC PURPOSE: ICD-10-CM

## 2025-03-28 DIAGNOSIS — M54.50 CHRONIC BILATERAL LOW BACK PAIN WITHOUT SCIATICA: Primary | Chronic | ICD-10-CM

## 2025-03-28 DIAGNOSIS — M50.30 DEGENERATIVE DISC DISEASE, CERVICAL: ICD-10-CM

## 2025-03-28 DIAGNOSIS — M54.16 LUMBAR RADICULOPATHY, CHRONIC: Chronic | ICD-10-CM

## 2025-03-28 DIAGNOSIS — G89.29 CHRONIC SI JOINT PAIN: Chronic | ICD-10-CM

## 2025-03-28 DIAGNOSIS — G89.29 CHRONIC BILATERAL LOW BACK PAIN WITHOUT SCIATICA: Primary | Chronic | ICD-10-CM

## 2025-03-28 DIAGNOSIS — M51.369 DEGENERATION OF LUMBAR INTERVERTEBRAL DISC: Chronic | ICD-10-CM

## 2025-03-28 PROCEDURE — 99213 OFFICE O/P EST LOW 20 MIN: CPT

## 2025-03-28 PROCEDURE — 99213 OFFICE O/P EST LOW 20 MIN: CPT | Performed by: NURSE PRACTITIONER

## 2025-03-28 RX ORDER — OXYCODONE AND ACETAMINOPHEN 5; 325 MG/1; MG/1
1 TABLET ORAL EVERY 6 HOURS PRN
Qty: 120 TABLET | Refills: 0 | Status: SHIPPED | OUTPATIENT
Start: 2025-03-30 | End: 2025-04-29

## 2025-03-28 ASSESSMENT — ENCOUNTER SYMPTOMS
COUGH: 0
SHORTNESS OF BREATH: 0
CONSTIPATION: 0
BACK PAIN: 1
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
## 2025-04-02 ENCOUNTER — HOSPITAL ENCOUNTER (OUTPATIENT)
Age: 55
Discharge: HOME | End: 2025-04-04
Payer: COMMERCIAL

## 2025-04-02 ENCOUNTER — HOSPITAL ENCOUNTER (OUTPATIENT)
Dept: GENERAL RADIOLOGY | Age: 55
Discharge: HOME | End: 2025-04-04
Payer: COMMERCIAL

## 2025-04-02 ENCOUNTER — HOSPITAL ENCOUNTER (OUTPATIENT)
Dept: PULMONOLOGY | Age: 55
Discharge: HOME | End: 2025-04-02
Payer: COMMERCIAL

## 2025-04-02 DIAGNOSIS — Z98.1 S/P FUSION OF SACROILIAC JOINT: ICD-10-CM

## 2025-04-02 DIAGNOSIS — M54.16 LUMBAR RADICULOPATHY, CHRONIC: Chronic | ICD-10-CM

## 2025-04-02 DIAGNOSIS — M16.0 BILATERAL HIP JOINT ARTHRITIS: ICD-10-CM

## 2025-04-02 DIAGNOSIS — Z79.899 ENCOUNTER FOR MEDICATION MANAGEMENT: ICD-10-CM

## 2025-04-02 DIAGNOSIS — J45.20 MILD INTERMITTENT ASTHMA WITHOUT COMPLICATION: ICD-10-CM

## 2025-04-02 LAB
DLCO %PRED: NORMAL
DLCO PRED: NORMAL
DLCO/VA %PRED: NORMAL
DLCO/VA PRED: NORMAL
DLCO/VA: NORMAL
DLCO: NORMAL
EXPIRATORY TIME: NORMAL
FEF 25-75% %PRED-PRE: NORMAL
FEF 25-75% PRED: NORMAL
FEF 25-75-PRE: NORMAL
FEV1 %PRED-PRE: NORMAL
FEV1 PRED: NORMAL
FEV1/FVC %PRED-PRE: NORMAL
FEV1/FVC PRED: NORMAL
FEV1/FVC: NORMAL
FEV1: NORMAL
FVC %PRED-PRE: NORMAL
FVC PRED: NORMAL
FVC: NORMAL
GAW %PRED: NORMAL
GAW PRED: NORMAL
GAW: NORMAL
IC PRE %PRED: NORMAL
IC PRED: NORMAL
IC: NORMAL
MVV %PRED-PRE: NORMAL
MVV PRED: NORMAL
MVV-PRE: NORMAL
PEF %PRED-PRE: NORMAL
PEF PRED: NORMAL
PEF-PRE: NORMAL
RAW %PRED: NORMAL
RAW PRED: NORMAL
RAW: NORMAL
RV PRE %PRED: NORMAL
RV PRED: NORMAL
RV: NORMAL
SVC %PRED: NORMAL
SVC PRED: NORMAL
SVC: NORMAL
TLC PRE %PRED: NORMAL
TLC PRED: NORMAL
TLC: NORMAL
VA %PRED: NORMAL
VA PRED: NORMAL
VA: NORMAL
VTG %PRED: NORMAL
VTG PRED: NORMAL
VTG: NORMAL

## 2025-04-02 PROCEDURE — 94729 DIFFUSING CAPACITY: CPT

## 2025-04-02 PROCEDURE — 73521 X-RAY EXAM HIPS BI 2 VIEWS: CPT

## 2025-04-02 PROCEDURE — 94664 DEMO&/EVAL PT USE INHALER: CPT

## 2025-04-02 PROCEDURE — 94060 EVALUATION OF WHEEZING: CPT

## 2025-04-02 PROCEDURE — 94726 PLETHYSMOGRAPHY LUNG VOLUMES: CPT

## 2025-04-02 RX ORDER — ALBUTEROL SULFATE 90 UG/1
2 INHALANT RESPIRATORY (INHALATION) ONCE
Status: DISCONTINUED | OUTPATIENT
Start: 2025-04-02 | End: 2025-04-03 | Stop reason: HOSPADM

## 2025-04-02 NOTE — PROCEDURES
Pulmonary function test performed.  Spirometry shows adequate technique and effort.  No evidence of airflow obstruction or restriction.  No evidence of acute bronchial reactivity.    Lung volumes suggest evidence of at least moderate air trapping.  This can sometimes be seen in occult asthma despite normal spirometry.      Diffusion capacity normal.      Airway resistance normal.      Interpretation:  Normal pulmonary function test with the exception of air trapping which can sometimes be seen in asthmatic patients.  May consider methacholine challenge test if clinically indicated.

## 2025-04-04 ENCOUNTER — RESULTS FOLLOW-UP (OUTPATIENT)
Dept: FAMILY MEDICINE CLINIC | Age: 55
End: 2025-04-04

## 2025-04-04 DIAGNOSIS — J45.20 MILD INTERMITTENT ASTHMA WITHOUT COMPLICATION: Primary | ICD-10-CM

## 2025-04-04 RX ORDER — ALBUTEROL SULFATE 90 UG/1
2 INHALANT RESPIRATORY (INHALATION) EVERY 6 HOURS PRN
Qty: 8 G | Refills: 0 | Status: SHIPPED | OUTPATIENT
Start: 2025-04-04 | End: 2025-04-23

## 2025-04-04 NOTE — RESULT ENCOUNTER NOTE
Please notify patient: Pulmonary test shows moderate asthma  I will prescribe her albuterol inhaler, to make appointment with her PCP to discuss  If she needs albuterol inhaler more than 2 times per week, then she will need an additional inhaler  Future Appointments  4/15/2025  10:00 AM   [facility]UC MED          [facility] NUC MED        [facility]  4/15/2025  11:00 AM   [facility] NUC MED          [facility] NUC MED        [facility]  4/15/2025  12:15 PM   [facility] NUC MED          [facility] NUC MED        [facility]  4/15/2025  1:30 PM    [facility] NUC MED          [facility] NUC MED        [facility]  4/15/2025  2:45 PM    [facility] NUC MED          [facility] NUC MED        [facility]  4/29/2025  3:00 PM    Jie Plasencia, APRN - C* [facility] PAIN[facility]        [place]  6/24/2025  3:00 PM    Winnie Granger MD         [facility]  7/3/2025   3:45 PM    Chava Monahan MD         [facility]

## 2025-04-14 DIAGNOSIS — E78.2 MIXED HYPERLIPIDEMIA: ICD-10-CM

## 2025-04-14 RX ORDER — ATORVASTATIN CALCIUM 40 MG/1
40 TABLET, FILM COATED ORAL DAILY
Qty: 90 TABLET | Refills: 0 | Status: SHIPPED | OUTPATIENT
Start: 2025-04-14

## 2025-04-14 NOTE — TELEPHONE ENCOUNTER
Please Approve or Refuse.  Send to Pharmacy per Pt's Request: [facility]      Next Visit Date:  6/24/2025   Last Visit Date: 3/20/2025    Hemoglobin A1C (%)   Date Value   03/20/2025 7.2   07/11/2024 7.2   03/20/2024 6.1             ( goal A1C is < 7)   BP Readings from Last 3 Encounters:   03/20/25 (!) 151/91   03/10/25 123/84   11/05/24 112/77          (goal 120/80)  BUN   Date Value Ref Range Status   03/10/2025 11 6 - 20 mg/dL Final     Creatinine   Date Value Ref Range Status   03/10/2025 1.0 0.7 - 1.2 mg/dL Final     Potassium   Date Value Ref Range Status   03/10/2025 4.5 3.7 - 5.3 mmol/L Final

## 2025-04-15 ENCOUNTER — HOSPITAL ENCOUNTER (OUTPATIENT)
Dept: NUCLEAR MEDICINE | Age: 55
Discharge: HOME | End: 2025-04-17
Attending: INTERNAL MEDICINE
Payer: COMMERCIAL

## 2025-04-15 DIAGNOSIS — R14.0 BLOATING: ICD-10-CM

## 2025-04-15 PROCEDURE — 78264 GASTRIC EMPTYING IMG STUDY: CPT

## 2025-04-15 PROCEDURE — 3430000000 HC RX DIAGNOSTIC RADIOPHARMACEUTICAL: Performed by: INTERNAL MEDICINE

## 2025-04-15 PROCEDURE — A9541 TC99M SULFUR COLLOID: HCPCS | Performed by: INTERNAL MEDICINE

## 2025-04-15 RX ORDER — TECHNETIUM TC 99M SULFUR COLLOID 2 MG
1 KIT MISCELLANEOUS ONCE
Status: COMPLETED | OUTPATIENT
Start: 2025-04-15 | End: 2025-04-15

## 2025-04-15 RX ADMIN — TECHNETIUM TC 99M SULFUR COLLOID 1 MILLICURIE: KIT at 10:00

## 2025-04-16 ENCOUNTER — RESULTS FOLLOW-UP (OUTPATIENT)
Dept: GASTROENTEROLOGY | Age: 55
End: 2025-04-16

## 2025-04-16 DIAGNOSIS — K31.84 GASTROPARESIS: Primary | ICD-10-CM

## 2025-04-16 RX ORDER — METOCLOPRAMIDE 5 MG/1
5 TABLET ORAL 3 TIMES DAILY
Qty: 120 TABLET | Refills: 3 | Status: SHIPPED | OUTPATIENT
Start: 2025-04-16

## 2025-04-16 NOTE — RESULT ENCOUNTER NOTE
Please let patient know that gastric emptying study is abnormal suggestive of gastroparesis. Condition where stomach does not empty in adequate time into the small bowel. This could be side effect of GLP 1 agonist such as mounjaro. If symptoms are troublesome, can consider reglan 5mg TID and if patient wants you can place orders.

## 2025-04-16 NOTE — TELEPHONE ENCOUNTER
Writer notified patient in regards to results with Gatric Emptying Test in that   The gastric emptying study is abnormal suggestive of gastroparesis. Condition where stomach does not empty in adequate time into the small bowel. This could be side effect of GLP 1 agonist such as mounjaro. Patient stated she is not on the Mounjaro and that she is currently only taking Trulicity.  If symptoms are troublesome, can consider reglan 5mg TID and if patient wants you can place orders      Patient stated yes that she would like to trial the reglan for her symptoms. Rx will be sent electronically to [facility] on [address]. Patient thanked writer. Please advise.

## 2025-04-18 DIAGNOSIS — K21.9 CHRONIC GERD: ICD-10-CM

## 2025-04-18 RX ORDER — OMEPRAZOLE 40 MG/1
40 CAPSULE, DELAYED RELEASE ORAL 2 TIMES DAILY
Qty: 60 CAPSULE | Refills: 3 | Status: SHIPPED | OUTPATIENT
Start: 2025-04-18

## 2025-04-23 DIAGNOSIS — J45.20 MILD INTERMITTENT ASTHMA WITHOUT COMPLICATION: ICD-10-CM

## 2025-04-23 RX ORDER — ALBUTEROL SULFATE 90 UG/1
INHALANT RESPIRATORY (INHALATION)
Qty: 9 G | Refills: 0 | Status: SHIPPED | OUTPATIENT
Start: 2025-04-23

## 2025-04-29 ENCOUNTER — HOSPITAL ENCOUNTER (OUTPATIENT)
Dept: PAIN MANAGEMENT | Age: 55
Discharge: HOME | End: 2025-04-29
Payer: COMMERCIAL

## 2025-04-29 VITALS — HEIGHT: 60 IN | WEIGHT: 227 LBS | BODY MASS INDEX: 44.57 KG/M2

## 2025-04-29 DIAGNOSIS — M50.30 DEGENERATIVE DISC DISEASE, CERVICAL: ICD-10-CM

## 2025-04-29 DIAGNOSIS — M50.20 CERVICAL DISC HERNIATION: ICD-10-CM

## 2025-04-29 DIAGNOSIS — M54.42 CHRONIC LEFT-SIDED LOW BACK PAIN WITH LEFT-SIDED SCIATICA: ICD-10-CM

## 2025-04-29 DIAGNOSIS — M47.817 LUMBOSACRAL SPONDYLOSIS WITHOUT MYELOPATHY: ICD-10-CM

## 2025-04-29 DIAGNOSIS — E66.01 MORBID OBESITY WITH BMI OF 40.0-44.9, ADULT (HCC): ICD-10-CM

## 2025-04-29 DIAGNOSIS — G89.29 CHRONIC LEFT-SIDED LOW BACK PAIN WITH LEFT-SIDED SCIATICA: ICD-10-CM

## 2025-04-29 DIAGNOSIS — M51.369 DEGENERATION OF LUMBAR INTERVERTEBRAL DISC: Chronic | ICD-10-CM

## 2025-04-29 DIAGNOSIS — Z79.891 CHRONIC USE OF OPIATE FOR THERAPEUTIC PURPOSE: Primary | ICD-10-CM

## 2025-04-29 DIAGNOSIS — E11.9 TYPE 2 DIABETES MELLITUS WITHOUT COMPLICATION, WITHOUT LONG-TERM CURRENT USE OF INSULIN (HCC): ICD-10-CM

## 2025-04-29 DIAGNOSIS — M54.16 LUMBAR RADICULOPATHY, CHRONIC: Chronic | ICD-10-CM

## 2025-04-29 DIAGNOSIS — Z79.899 ENCOUNTER FOR MEDICATION MANAGEMENT: ICD-10-CM

## 2025-04-29 PROCEDURE — 99213 OFFICE O/P EST LOW 20 MIN: CPT

## 2025-04-29 PROCEDURE — 99214 OFFICE O/P EST MOD 30 MIN: CPT | Performed by: NURSE PRACTITIONER

## 2025-04-29 RX ORDER — DULAGLUTIDE 4.5 MG/.5ML
INJECTION, SOLUTION SUBCUTANEOUS
Qty: 12 ML | Refills: 0 | Status: SHIPPED | OUTPATIENT
Start: 2025-04-29

## 2025-04-29 RX ORDER — PREGABALIN 150 MG/1
150 CAPSULE ORAL 2 TIMES DAILY
Qty: 60 CAPSULE | Refills: 2 | Status: SHIPPED | OUTPATIENT
Start: 2025-04-29 | End: 2025-07-28

## 2025-04-29 RX ORDER — OXYCODONE AND ACETAMINOPHEN 5; 325 MG/1; MG/1
1 TABLET ORAL EVERY 6 HOURS PRN
Qty: 120 TABLET | Refills: 0 | Status: SHIPPED | OUTPATIENT
Start: 2025-04-29 | End: 2025-05-29

## 2025-04-29 ASSESSMENT — ENCOUNTER SYMPTOMS
BACK PAIN: 1
BOWEL INCONTINENCE: 0

## 2025-04-29 ASSESSMENT — PAIN SCALES - GENERAL: PAINLEVEL_OUTOF10: 7

## 2025-04-29 NOTE — PROGRESS NOTES
drowsiness. Patient reports current activities of daily living are possible due to medications and would like to continue them.     As always, we encourage daily stretching and strengthening exercises, and recommend minimizing use of pain medications unless patient cannot get through daily activities due to pain.    We have discussed with the patient the effect the patient’s medical condition and opioid medication may have on the patient’s ability to safely operate a vehicle. Pt verbalized understanding.    Since last office visit, the patient reports worsening low back pain. The patient's history and physical exam are consistent with lumbosacral spondylosis. MRI lumbar spine 9/2024 reveals multilevel degenerative changes. The patient previously underwent bilateral lumbar L3, L4, L5 medial branch radiofrequency ablation on 10/30/2024 with 75% improvement in pain and function.  Therefore, we will plan for repeat bilateral lumbar L3, L4, L5 medial branch radiofrequency ablation using fluoroscopy guidance.  Diabetic, last A1c: 7.2 (3/20/25)     Due to the high risk nature of this patient's pain medication close monitoring is required.   Continue current medication management, pt has been stable and compliant.  Script written for percocet 5-325, lyrica, tizanidine  Follow up appointment made for 4 weeks    I have reviewed the chief complaint and history of present illness (including ROS and PFSH) and vital documentation by my staff and I agree with their documentation and have added where applicable.

## 2025-04-29 NOTE — TELEPHONE ENCOUNTER
Please Approve or Refuse.  Send to Pharmacy per Pt's Request:      Next Visit Date:  6/24/2025   Last Visit Date: 3/20/2025    Hemoglobin A1C (%)   Date Value   03/20/2025 7.2   07/11/2024 7.2   03/20/2024 6.1             ( goal A1C is < 7)   BP Readings from Last 3 Encounters:   03/20/25 (!) 151/91   03/10/25 123/84   11/05/24 112/77          (goal 120/80)  BUN   Date Value Ref Range Status   03/10/2025 11 6 - 20 mg/dL Final     Creatinine   Date Value Ref Range Status   03/10/2025 1.0 0.7 - 1.2 mg/dL Final     Potassium   Date Value Ref Range Status   03/10/2025 4.5 3.7 - 5.3 mmol/L Final
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
## 2025-05-05 DIAGNOSIS — J45.20 MILD INTERMITTENT ASTHMA WITHOUT COMPLICATION: ICD-10-CM

## 2025-05-06 RX ORDER — CETIRIZINE HYDROCHLORIDE 10 MG/1
10 TABLET ORAL DAILY
Qty: 30 TABLET | Refills: 0 | Status: SHIPPED | OUTPATIENT
Start: 2025-05-06

## 2025-05-10 ENCOUNTER — HOSPITAL ENCOUNTER (EMERGENCY)
Age: 55
Discharge: HOME | End: 2025-05-10
Attending: EMERGENCY MEDICINE
Payer: COMMERCIAL

## 2025-05-10 VITALS
TEMPERATURE: 98.9 F | OXYGEN SATURATION: 96 % | BODY MASS INDEX: 57.52 KG/M2 | RESPIRATION RATE: 18 BRPM | SYSTOLIC BLOOD PRESSURE: 162 MMHG | HEART RATE: 72 BPM | HEIGHT: 60 IN | WEIGHT: 293 LBS | DIASTOLIC BLOOD PRESSURE: 104 MMHG

## 2025-05-10 DIAGNOSIS — S61.412A LACERATION OF LEFT HAND WITHOUT FOREIGN BODY, INITIAL ENCOUNTER: Primary | ICD-10-CM

## 2025-05-10 PROCEDURE — 12001 RPR S/N/AX/GEN/TRNK 2.5CM/<: CPT

## 2025-05-10 PROCEDURE — 90471 IMMUNIZATION ADMIN: CPT

## 2025-05-10 PROCEDURE — 90715 TDAP VACCINE 7 YRS/> IM: CPT

## 2025-05-10 PROCEDURE — 6360000002 HC RX W HCPCS

## 2025-05-10 PROCEDURE — 99284 EMERGENCY DEPT VISIT MOD MDM: CPT

## 2025-05-10 RX ORDER — LIDOCAINE HYDROCHLORIDE 10 MG/ML
5 INJECTION, SOLUTION INFILTRATION; PERINEURAL ONCE
Status: COMPLETED | OUTPATIENT
Start: 2025-05-10 | End: 2025-05-10

## 2025-05-10 RX ADMIN — LIDOCAINE HYDROCHLORIDE 5 ML: 10 INJECTION, SOLUTION INFILTRATION; PERINEURAL at 16:36

## 2025-05-10 RX ADMIN — TETANUS TOXOID, REDUCED DIPHTHERIA TOXOID AND ACELLULAR PERTUSSIS VACCINE, ADSORBED 0.5 ML: 5; 2.5; 8; 8; 2.5 SUSPENSION INTRAMUSCULAR at 16:36

## 2025-05-10 ASSESSMENT — PAIN SCALES - GENERAL: PAINLEVEL_OUTOF10: 9

## 2025-05-10 ASSESSMENT — LIFESTYLE VARIABLES
HOW MANY STANDARD DRINKS CONTAINING ALCOHOL DO YOU HAVE ON A TYPICAL DAY: PATIENT DOES NOT DRINK
HOW OFTEN DO YOU HAVE A DRINK CONTAINING ALCOHOL: NEVER

## 2025-05-10 ASSESSMENT — PAIN - FUNCTIONAL ASSESSMENT: PAIN_FUNCTIONAL_ASSESSMENT: 0-10

## 2025-05-10 NOTE — DISCHARGE INSTRUCTIONS
You were seen in the ER today for a laceration to your left hand. 4 sutures were placed. You tetanus was updated. These need to stay in for 7 days. You can go to your primary care doctor, an urgent care, or the ER to have them removed. Apply antibiotic ointment. Keep area clean and dry. Water may run over during a shower but do not submerge.   Return if fever, drainage, redness, warmth, or any other concerns.

## 2025-05-10 NOTE — ED PROVIDER NOTES
[facility] EMERGENCY DEPARTMENT  Emergency Department Encounter  Emergency Medicine Resident     Pt Name:Iqra Goodrich  MRN: [id number]  Birthdate 1970  Date of evaluation: 5/10/25  PCP:  Winnie Granger MD  Note Started: 4:19 PM EDT      CHIEF COMPLAINT       Chief Complaint   Patient presents with    Laceration     Top left hand       HISTORY OF PRESENT ILLNESS  (Location/Symptom, Timing/Onset, Context/Setting, Quality, Duration, Modifying Factors, Severity.)      Iqra Goodrich is a 54 y.o. right-hand-dominant female who presents with a laceration to her left hand she sustained shortly PTA.  She was cutting carpet with a knife and her dog jumped on her back, startling her and causing the knife to slip.  She attempted to washout the wound and applied butterfly strips without success, bleeding persisted.  She is unsure of her tetanus status.  No other complaints or injuries.    PAST MEDICAL / SURGICAL / SOCIAL / FAMILY HISTORY      has a past medical history of Allergic rhinitis, Anxiety, Asthma, Chronic back pain, Chronic kidney disease, Colon polyp, Depression, Diabetes mellitus (HCC), Diverticulitis, Gastritis, GERD (gastroesophageal reflux disease), Hemorrhoids, Hypertension, Migraines, Obesity, Osteoarthritis, Pain management, PTSD (post-traumatic stress disorder), Rheumatoid arthritis (HCC), S/P Cystoscopy w/ Hydrodistension and DMSO, Sacroiliitis, Shingles, Snores, Tubular adenoma, Type 2 diabetes mellitus without complication ([facility]), Under care of team, Urinary leakage, and Wears glasses.     has a past surgical history that includes Hysterectomy; laparoscopy; Tonsillectomy; Hand surgery (Right); Tubal ligation; Cervical discectomy (12/2013); Cholecystectomy; Dilation and curettage of uterus; Colonoscopy (10/31/2016); Upper gastrointestinal endoscopy (10/31/2016); pr dest,paravertebral,l/s,addl lvls (03/25/2019); Colonoscopy (N/A, 10/22/2019); Upper gastrointestinal endoscopy (N/A,

## 2025-05-10 NOTE — PROCEDURES
PROCEDURE NOTE  Date: 5/10/2025   Name: Iqra Goodrich  YOB: 1970    Lac Repair    Date/Time: 5/10/2025 6:31 PM    Performed by: Kat Winchester DO  Authorized by: Beata Cota DO    Consent:     Consent obtained:  Verbal    Consent given by:  Patient    Risks, benefits, and alternatives were discussed: yes    Universal protocol:     Patient identity confirmed:  Verbally with patient  Anesthesia:     Anesthesia method:  Local infiltration    Local anesthetic:  Lidocaine 1% w/o epi  Laceration details:     Location:  Hand    Hand location:  L hand, dorsum    Length (cm):  2  Pre-procedure details:     Preparation:  Patient was prepped and draped in usual sterile fashion  Exploration:     Hemostasis achieved with:  Direct pressure    Wound exploration: wound explored through full range of motion and entire depth of wound visualized    Treatment:     Area cleansed with:  Saline    Amount of cleaning:  Standard    Irrigation method:  Pressure wash    Debridement:  None    Undermining:  None  Skin repair:     Repair method:  Sutures    Suture size:  5-0    Suture material:  Nylon    Suture technique:  Simple interrupted    Number of sutures:  4  Approximation:     Approximation:  Close  Repair type:     Repair type:  Simple  Post-procedure details:     Dressing:  Antibiotic ointment and sterile dressing    Procedure completion:  Tolerated

## 2025-05-12 ENCOUNTER — TELEPHONE (OUTPATIENT)
Dept: FAMILY MEDICINE CLINIC | Age: 55
End: 2025-05-12

## 2025-05-12 NOTE — TELEPHONE ENCOUNTER
[facility] ED Follow up Call    Reason for ED visit:  Laceration          Hi Iqra , this is CAN from Winnie Billingsley's office, just calling to see how you are doing after your recent ED visit.    Did you receive discharge instructions?  Yes  Do you understand the discharge instructions? Yes  Did the ED give you any new prescriptions? No: NONE GIVEN  Were you able to fill your prescriptions? No: NONE GIVEN      Do you have one of our red, yellow and green  Zone sheets that help you to determine when you should go to the ED?Not Applicable      Do you need or want to make a follow up appt with your PCP?Yes    Do you have any further needs in the home i.e. Equipment?  No        FU appts/Provider:    Future Appointments   Date Time Provider Department Center   5/14/2025 11:15 AM Elmer Charles MD [facility] PN [place]   5/29/2025  4:00 PM Elmer Charles MD [facility] PAIN[facility] [place]   6/24/2025  3:00 PM Winnie Granger MD [facility]   7/3/2025  3:45 PM Chava Monahan MD [facility]

## 2025-05-14 ENCOUNTER — HOSPITAL ENCOUNTER (OUTPATIENT)
Dept: PAIN MANAGEMENT | Facility: CLINIC | Age: 55
Discharge: HOME | End: 2025-05-14
Payer: COMMERCIAL

## 2025-05-14 VITALS
HEIGHT: 60 IN | BODY MASS INDEX: 44.57 KG/M2 | RESPIRATION RATE: 12 BRPM | TEMPERATURE: 97.1 F | SYSTOLIC BLOOD PRESSURE: 113 MMHG | DIASTOLIC BLOOD PRESSURE: 88 MMHG | OXYGEN SATURATION: 94 % | WEIGHT: 227 LBS | HEART RATE: 85 BPM

## 2025-05-14 DIAGNOSIS — M47.817 LUMBOSACRAL SPONDYLOSIS WITHOUT MYELOPATHY: Primary | ICD-10-CM

## 2025-05-14 DIAGNOSIS — R52 PAIN MANAGEMENT: ICD-10-CM

## 2025-05-14 PROCEDURE — 64636 DESTROY L/S FACET JNT ADDL: CPT | Performed by: ANESTHESIOLOGY

## 2025-05-14 PROCEDURE — 64636 DESTROY L/S FACET JNT ADDL: CPT

## 2025-05-14 PROCEDURE — 64635 DESTROY LUMB/SAC FACET JNT: CPT

## 2025-05-14 PROCEDURE — 6360000002 HC RX W HCPCS: Performed by: ANESTHESIOLOGY

## 2025-05-14 PROCEDURE — 64635 DESTROY LUMB/SAC FACET JNT: CPT | Performed by: ANESTHESIOLOGY

## 2025-05-14 PROCEDURE — 99152 MOD SED SAME PHYS/QHP 5/>YRS: CPT | Performed by: ANESTHESIOLOGY

## 2025-05-14 RX ORDER — MIDAZOLAM HYDROCHLORIDE 2 MG/2ML
INJECTION, SOLUTION INTRAMUSCULAR; INTRAVENOUS
Status: COMPLETED | OUTPATIENT
Start: 2025-05-14 | End: 2025-05-14

## 2025-05-14 RX ORDER — FENTANYL CITRATE 50 UG/ML
INJECTION, SOLUTION INTRAMUSCULAR; INTRAVENOUS
Status: COMPLETED | OUTPATIENT
Start: 2025-05-14 | End: 2025-05-14

## 2025-05-14 RX ORDER — LIDOCAINE HYDROCHLORIDE 10 MG/ML
INJECTION, SOLUTION EPIDURAL; INFILTRATION; INTRACAUDAL; PERINEURAL
Status: COMPLETED | OUTPATIENT
Start: 2025-05-14 | End: 2025-05-14

## 2025-05-14 RX ORDER — LIDOCAINE HYDROCHLORIDE 40 MG/ML
INJECTION, SOLUTION RETROBULBAR
Status: COMPLETED | OUTPATIENT
Start: 2025-05-14 | End: 2025-05-14

## 2025-05-14 RX ADMIN — FENTANYL CITRATE 50 MCG: 50 INJECTION, SOLUTION INTRAMUSCULAR; INTRAVENOUS at 11:34

## 2025-05-14 RX ADMIN — LIDOCAINE HYDROCHLORIDE 8 ML: 10 INJECTION, SOLUTION EPIDURAL; INFILTRATION; INTRACAUDAL; PERINEURAL at 11:34

## 2025-05-14 RX ADMIN — LIDOCAINE HYDROCHLORIDE 5 ML: 40 INJECTION, SOLUTION RETROBULBAR; TOPICAL at 11:37

## 2025-05-14 RX ADMIN — MIDAZOLAM HYDROCHLORIDE 2 MG: 1 INJECTION, SOLUTION INTRAMUSCULAR; INTRAVENOUS at 11:34

## 2025-05-14 ASSESSMENT — PAIN - FUNCTIONAL ASSESSMENT: PAIN_FUNCTIONAL_ASSESSMENT: 0-10

## 2025-05-14 ASSESSMENT — PAIN DESCRIPTION - DESCRIPTORS: DESCRIPTORS: SHOOTING;BURNING

## 2025-05-14 ASSESSMENT — PAIN SCALES - GENERAL: PAINLEVEL_OUTOF10: 6

## 2025-05-14 ASSESSMENT — PAIN DESCRIPTION - LOCATION: LOCATION: BACK

## 2025-05-14 NOTE — DISCHARGE INSTRUCTIONS
Discharge Instructions following Sedation or Anesthesia:  You have  received  a sedative/anesthetic therefore, you should not consume any alcoholic beverages for minimum of 12 hours.  Do not drive or operate machinery for 24 hours.  Do not sign legal documents for 24 hours.  Dizziness, drowsiness, and unsteadiness may occur.  Rest when need to.  Increase diet as tolerated.  Keep up on fluids if diet allows.      General Instructions:  Do not take a tub bath for 72 hours after procedure (this includes hot tubs and swimming pools).  You may shower, but avoid hot water to injection site.   Avoid strenuous activity TODAY especially if you experience dizziness.   Remove band-aid the next day.  Wash off any residual iodine   Do not use heat, heating pad, or any other heating device over the injection site for 3 days after the procedure.  If you experience pain after your procedure, you may continue with your current pain medication as prescribed.  (DO NOT INCREASE YOUR PAIN MEDICATION WITHOUT TALKING TO DOCTOR)  Soreness and pain at injection site is common, may use ice to reduce soreness.    Call [facility] at [phone] if you experience:   Fever, chills or temperature over 100    Vomiting, Headache, persistent stiff neck, nausea, blurred vision   Difficulty in urinating or unable to urinate with 8 hours   Increase in weakness, numbness or loss of function   Increased redness, swelling or drainage at the injection site

## 2025-05-14 NOTE — INTERVAL H&P NOTE
Update History & Physical    The patient's History and Physical of April 29, 2025 was reviewed with the patient and I examined the patient. There was no change. The surgical site was confirmed by the patient and me.     Plan: The risks, benefits, expected outcome, and alternative to the recommended procedure have been discussed with the patient. Patient understands and wants to proceed with the procedure.     ASA 3  MP 3    Electronically signed by Elmer Charles MD on 5/14/2025 at 11:18 AM

## 2025-05-25 DIAGNOSIS — G43.719 INTRACTABLE CHRONIC MIGRAINE WITHOUT AURA AND WITHOUT STATUS MIGRAINOSUS: ICD-10-CM

## 2025-05-27 RX ORDER — TOPIRAMATE 100 MG/1
TABLET, FILM COATED ORAL
Qty: 60 TABLET | Refills: 11 | Status: SHIPPED | OUTPATIENT
Start: 2025-05-27 | End: 2025-05-28 | Stop reason: SDUPTHER

## 2025-05-27 NOTE — TELEPHONE ENCOUNTER
Please Approve or Refuse.  Send to Pharmacy per Pt's Request:      Next Visit Date:  5/28/2025   Last Visit Date: 3/20/2025    Hemoglobin A1C (%)   Date Value   03/20/2025 7.2   07/11/2024 7.2   03/20/2024 6.1             ( goal A1C is < 7)   BP Readings from Last 3 Encounters:   05/14/25 113/88   05/10/25 (!) 162/104   03/20/25 (!) 151/91          (goal 120/80)  BUN   Date Value Ref Range Status   03/10/2025 11 6 - 20 mg/dL Final     Creatinine   Date Value Ref Range Status   03/10/2025 1.0 0.7 - 1.2 mg/dL Final     Potassium   Date Value Ref Range Status   03/10/2025 4.5 3.7 - 5.3 mmol/L Final

## 2025-05-28 ENCOUNTER — OFFICE VISIT (OUTPATIENT)
Dept: FAMILY MEDICINE CLINIC | Age: 55
End: 2025-05-28
Payer: COMMERCIAL

## 2025-05-28 VITALS
OXYGEN SATURATION: 98 % | HEART RATE: 84 BPM | SYSTOLIC BLOOD PRESSURE: 110 MMHG | HEIGHT: 60 IN | DIASTOLIC BLOOD PRESSURE: 80 MMHG | BODY MASS INDEX: 44.17 KG/M2 | WEIGHT: 225 LBS

## 2025-05-28 DIAGNOSIS — I10 ESSENTIAL HYPERTENSION: Primary | ICD-10-CM

## 2025-05-28 DIAGNOSIS — Z12.31 ENCOUNTER FOR SCREENING MAMMOGRAM FOR HIGH-RISK PATIENT: ICD-10-CM

## 2025-05-28 DIAGNOSIS — G43.719 INTRACTABLE CHRONIC MIGRAINE WITHOUT AURA AND WITHOUT STATUS MIGRAINOSUS: ICD-10-CM

## 2025-05-28 DIAGNOSIS — E66.01 MORBID OBESITY WITH BMI OF 40.0-44.9, ADULT (HCC): ICD-10-CM

## 2025-05-28 DIAGNOSIS — M16.12 PRIMARY OSTEOARTHRITIS OF LEFT HIP: ICD-10-CM

## 2025-05-28 DIAGNOSIS — E78.2 MIXED HYPERLIPIDEMIA: ICD-10-CM

## 2025-05-28 DIAGNOSIS — E55.9 VITAMIN D DEFICIENCY: ICD-10-CM

## 2025-05-28 DIAGNOSIS — K31.84 GASTROPARESIS: ICD-10-CM

## 2025-05-28 DIAGNOSIS — L30.9 DERMATITIS: ICD-10-CM

## 2025-05-28 DIAGNOSIS — E11.9 TYPE 2 DIABETES MELLITUS WITHOUT COMPLICATION, WITHOUT LONG-TERM CURRENT USE OF INSULIN (HCC): ICD-10-CM

## 2025-05-28 DIAGNOSIS — M47.817 LUMBOSACRAL SPONDYLOSIS WITHOUT MYELOPATHY: ICD-10-CM

## 2025-05-28 PROBLEM — M25.562 CHRONIC PAIN OF LEFT KNEE: Status: RESOLVED | Noted: 2022-09-20 | Resolved: 2025-05-28

## 2025-05-28 PROBLEM — G89.29 CHRONIC PAIN OF LEFT KNEE: Status: RESOLVED | Noted: 2022-09-20 | Resolved: 2025-05-28

## 2025-05-28 PROCEDURE — 3017F COLORECTAL CA SCREEN DOC REV: CPT | Performed by: FAMILY MEDICINE

## 2025-05-28 PROCEDURE — 1036F TOBACCO NON-USER: CPT | Performed by: FAMILY MEDICINE

## 2025-05-28 PROCEDURE — 3051F HG A1C>EQUAL 7.0%<8.0%: CPT | Performed by: FAMILY MEDICINE

## 2025-05-28 PROCEDURE — G8427 DOCREV CUR MEDS BY ELIG CLIN: HCPCS | Performed by: FAMILY MEDICINE

## 2025-05-28 PROCEDURE — 2022F DILAT RTA XM EVC RTNOPTHY: CPT | Performed by: FAMILY MEDICINE

## 2025-05-28 PROCEDURE — 99214 OFFICE O/P EST MOD 30 MIN: CPT | Performed by: FAMILY MEDICINE

## 2025-05-28 PROCEDURE — 3079F DIAST BP 80-89 MM HG: CPT | Performed by: FAMILY MEDICINE

## 2025-05-28 PROCEDURE — 3074F SYST BP LT 130 MM HG: CPT | Performed by: FAMILY MEDICINE

## 2025-05-28 PROCEDURE — G8417 CALC BMI ABV UP PARAM F/U: HCPCS | Performed by: FAMILY MEDICINE

## 2025-05-28 RX ORDER — PHENTERMINE HYDROCHLORIDE 37.5 MG/1
37.5 TABLET ORAL
Qty: 30 TABLET | Refills: 0 | Status: SHIPPED | OUTPATIENT
Start: 2025-05-28 | End: 2025-06-27

## 2025-05-28 RX ORDER — TOPIRAMATE 100 MG/1
100 TABLET, FILM COATED ORAL 2 TIMES DAILY
Qty: 60 TABLET | Refills: 0 | Status: SHIPPED | OUTPATIENT
Start: 2025-05-28 | End: 2025-05-28

## 2025-05-28 RX ORDER — TOPIRAMATE 100 MG/1
100 TABLET, FILM COATED ORAL 2 TIMES DAILY
Qty: 60 TABLET | Refills: 0 | Status: SHIPPED | OUTPATIENT
Start: 2025-05-28

## 2025-05-28 RX ORDER — CLOTRIMAZOLE AND BETAMETHASONE DIPROPIONATE 10; .64 MG/G; MG/G
CREAM TOPICAL
Qty: 1 EACH | Refills: 0 | Status: SHIPPED | OUTPATIENT
Start: 2025-05-28

## 2025-05-28 SDOH — ECONOMIC STABILITY: FOOD INSECURITY: WITHIN THE PAST 12 MONTHS, YOU WORRIED THAT YOUR FOOD WOULD RUN OUT BEFORE YOU GOT MONEY TO BUY MORE.: OFTEN TRUE

## 2025-05-28 SDOH — ECONOMIC STABILITY: FOOD INSECURITY: WITHIN THE PAST 12 MONTHS, THE FOOD YOU BOUGHT JUST DIDN'T LAST AND YOU DIDN'T HAVE MONEY TO GET MORE.: OFTEN TRUE

## 2025-05-28 ASSESSMENT — ENCOUNTER SYMPTOMS
SORE THROAT: 0
TROUBLE SWALLOWING: 0
SHORTNESS OF BREATH: 0
STRIDOR: 0
BACK PAIN: 1
COLOR CHANGE: 0
EYE REDNESS: 0
NAUSEA: 0
WHEEZING: 0
COUGH: 0
DIARRHEA: 0
RHINORRHEA: 0
CONSTIPATION: 0
SINUS PRESSURE: 0
ABDOMINAL PAIN: 0
CHEST TIGHTNESS: 0
RECTAL PAIN: 0
BLOOD IN STOOL: 0
VOMITING: 0
ABDOMINAL DISTENTION: 1

## 2025-05-28 ASSESSMENT — PATIENT HEALTH QUESTIONNAIRE - PHQ9
1. LITTLE INTEREST OR PLEASURE IN DOING THINGS: SEVERAL DAYS
SUM OF ALL RESPONSES TO PHQ QUESTIONS 1-9: 2
2. FEELING DOWN, DEPRESSED OR HOPELESS: SEVERAL DAYS
SUM OF ALL RESPONSES TO PHQ QUESTIONS 1-9: 2

## 2025-05-28 NOTE — PROGRESS NOTES
Visit Information    Have you changed or started any medications since your last visit including any over-the-counter medicines, vitamins, or herbal medicines? no   Are you having any side effects from any of your medications? -  no  Have you stopped taking any of your medications? Is so, why? -  no    Have you seen any other physician or provider since your last visit? No  Have you had any other diagnostic tests since your last visit? Yes - Records Obtained  Have you been seen in the emergency room and/or had an admission to a hospital since we last saw you? Yes - Records Obtained  Have you had your routine dental cleaning in the past 6 months? no    Have you activated your Watchwith account? If not, what are your barriers? Yes     Patient Care Team:  Winnie Granger MD as PCP - General (Family Medicine)  Winnie Granger MD as PCP - Empaneled Provider  Freedom Leigh MD as Orthopedic Surgeon (Orthopedic Surgery)  Gee Richey MD as Consulting Physician (Gastroenterology)    Medical History Review  Past Medical, Family, and Social History reviewed and does contribute to the patient presenting condition    Health Maintenance   Topic Date Due    Diabetic retinal exam  04/04/2024    Diabetic foot exam  08/17/2024    COVID-19 Vaccine (5 - 2024-25 season) 09/01/2024    Lipids  04/24/2025    A1C test (Diabetic or Prediabetic)  06/20/2025    Diabetic Alb to Cr ratio (uACR) test  09/23/2025    GFR test (Diabetes, CKD 3-4, OR last GFR 15-59)  03/10/2026    Breast cancer screen  03/19/2026    Depression Screen  03/20/2026    Colorectal Cancer Screen  10/29/2027    DTaP/Tdap/Td vaccine (3 - Td or Tdap) 05/10/2035    Hepatitis B vaccine  Completed    Flu vaccine  Completed    Shingles vaccine  Completed    Pneumococcal 50+ years Vaccine  Completed    Hepatitis C screen  Completed    HIV screen  Completed    Hepatitis A vaccine  Aged Out    Hib vaccine  Aged Out    Polio vaccine  Aged Out    Meningococcal (ACWY) vaccine

## 2025-05-28 NOTE — PROGRESS NOTES
Chief Complaint   Patient presents with    ED Follow-up     Does not need suture removal     has a spot on her right wrist that gets itchy and red     Other     Sugars are too high at home insurance denied the mounjaro      The patient (or guardian, if applicable) and other individuals in attendance with the patient were advised that Artificial Intelligence will be utilized during this visit to record, process the conversation to generate a clinical note, and support improvement of the AI technology. The patient (or guardian, if applicable) and other individuals in attendance at the appointment consented to the use of AI, including the recording.                    ED Follow-up (Does not need suture removal ), has a spot on her right wrist that gets itchy and red , and Other (Sugars are too high at home insurance denied the mounjaro )      History of Present Illness  The patient presents for a follow-up on diabetes and chronic medical problems.    She has been managing her diabetes with Trulicity, which was increased to the maximum dose approximately a year ago. Despite this, her blood glucose levels have been averaging around 166, with occasional spikes nearing 300. 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 However, she reports that her blood glucose level was 140 today, even though she had not consumed any food except for a piece of gum. She also experiences gas and has been advised by her gastroenterologist to avoid Mounjaro due to its potential to exacerbate her gastroparesis. Metformin is not a viable option for her as it induces severe diarrhea. She is currently on Reglan three times daily and omeprazole 80 mg for her gastroparesis.    Patient has been on Trulicity with highest dose for almost a year but there has not been any change seen in her weight patient cannot take Ozempic due

## 2025-05-28 NOTE — PATIENT INSTRUCTIONS
[facility] Food Resources*  (Call [facility] for more resources)    [facility] [employer]  What they offer: Food pantry second and fourth Tuesday 4:30-6pm  Phone Number and Address: [phone] Toll Free: [employer] at 3TEN8, between [employer] and [employer]; [address] [address]  [facility] Office on Aging of [facility] [place]  What they offer: “Meals & Nutrition” search option on website  Phone Number and Website: 635.545.5205; https://Visualtising/  [facility]  What they offer: Dinner is open to all (must be registered and in good standing) and three hot meals a day for shelter residents. Breakfast 7-8am, Lunch 12-1pm, and Dinner 5-6pm daily.  Phone and Address: [phone]; [address] [address]  [employer] Last Mile Delivery program  What they offer: Food Box Delivery for those within [facility] who are homebound or without transportation. Applications done by phone. Qualifying individuals are eligible for 3 deliveries for the lifetime of the program.  Phone number: Call for eligibility check at 2-1-1 or 7-478-545AccuRev (2226)  [facility]  What they offer: Food Pantry second and fourth Saturday 1-5pm  Phone and Address: [phone]; [address] [address]  [employer] Mobile Food Pantries   What they offer: Mobile pantries throughout the [facility]. Anyone in need may visit one pantry per month.  Phone Number and Website: For locations and schedule call 2-1-1 or 5-190-508-HELP (7186) or check the website www.feedtoledo.org  [employer] [place] [employer]  What they offer: Food Pantry, call for schedule, open to All  Phone Number: [phone]  [facility]  What they offer: Hot meals, Breakfast: Monday, Wednesday, Friday 8-10am, Lunch: Monday-Friday 10am-12:30pm. Food pantry (serves [address] or east of [facility]) Tuesday

## 2025-05-29 ENCOUNTER — TELEPHONE (OUTPATIENT)
Dept: FAMILY MEDICINE CLINIC | Age: 55
End: 2025-05-29

## 2025-05-29 ENCOUNTER — HOSPITAL ENCOUNTER (OUTPATIENT)
Dept: PAIN MANAGEMENT | Age: 55
Discharge: HOME | End: 2025-05-29
Payer: COMMERCIAL

## 2025-05-29 VITALS — HEIGHT: 60 IN | WEIGHT: 225 LBS | BODY MASS INDEX: 44.17 KG/M2

## 2025-05-29 DIAGNOSIS — M53.3 CHRONIC SI JOINT PAIN: Chronic | ICD-10-CM

## 2025-05-29 DIAGNOSIS — Z79.891 CHRONIC USE OF OPIATE FOR THERAPEUTIC PURPOSE: ICD-10-CM

## 2025-05-29 DIAGNOSIS — M54.42 CHRONIC LEFT-SIDED LOW BACK PAIN WITH LEFT-SIDED SCIATICA: ICD-10-CM

## 2025-05-29 DIAGNOSIS — G89.29 CHRONIC SI JOINT PAIN: Chronic | ICD-10-CM

## 2025-05-29 DIAGNOSIS — M50.20 CERVICAL DISC HERNIATION: ICD-10-CM

## 2025-05-29 DIAGNOSIS — G89.29 CHRONIC LEFT-SIDED LOW BACK PAIN WITH LEFT-SIDED SCIATICA: ICD-10-CM

## 2025-05-29 DIAGNOSIS — M54.16 LUMBAR RADICULOPATHY, CHRONIC: Chronic | ICD-10-CM

## 2025-05-29 DIAGNOSIS — M51.369 DEGENERATION OF LUMBAR INTERVERTEBRAL DISC: Chronic | ICD-10-CM

## 2025-05-29 DIAGNOSIS — M50.30 DEGENERATIVE DISC DISEASE, CERVICAL: ICD-10-CM

## 2025-05-29 DIAGNOSIS — M47.817 LUMBOSACRAL SPONDYLOSIS WITHOUT MYELOPATHY: ICD-10-CM

## 2025-05-29 DIAGNOSIS — Z79.899 ENCOUNTER FOR MEDICATION MANAGEMENT: ICD-10-CM

## 2025-05-29 DIAGNOSIS — M16.0 BILATERAL HIP JOINT ARTHRITIS: Primary | ICD-10-CM

## 2025-05-29 PROCEDURE — 99213 OFFICE O/P EST LOW 20 MIN: CPT

## 2025-05-29 PROCEDURE — 99214 OFFICE O/P EST MOD 30 MIN: CPT | Performed by: ANESTHESIOLOGY

## 2025-05-29 RX ORDER — PREGABALIN 150 MG/1
150 CAPSULE ORAL 2 TIMES DAILY
Qty: 60 CAPSULE | Refills: 2 | Status: SHIPPED | OUTPATIENT
Start: 2025-05-29 | End: 2025-08-27

## 2025-05-29 RX ORDER — OXYCODONE AND ACETAMINOPHEN 5; 325 MG/1; MG/1
1 TABLET ORAL EVERY 6 HOURS PRN
Qty: 120 TABLET | Refills: 0 | Status: SHIPPED | OUTPATIENT
Start: 2025-05-29 | End: 2025-06-28

## 2025-05-29 ASSESSMENT — ENCOUNTER SYMPTOMS
RESPIRATORY NEGATIVE: 1
BACK PAIN: 1
GASTROINTESTINAL NEGATIVE: 1

## 2025-05-29 ASSESSMENT — PAIN DESCRIPTION - FREQUENCY: FREQUENCY: CONTINUOUS

## 2025-05-29 ASSESSMENT — PAIN DESCRIPTION - LOCATION: LOCATION: BACK

## 2025-05-29 ASSESSMENT — PAIN DESCRIPTION - ORIENTATION: ORIENTATION: LOWER

## 2025-05-29 ASSESSMENT — PAIN DESCRIPTION - DESCRIPTORS: DESCRIPTORS: ACHING

## 2025-05-29 ASSESSMENT — PAIN DESCRIPTION - PAIN TYPE: TYPE: CHRONIC PAIN

## 2025-05-29 ASSESSMENT — PAIN SCALES - GENERAL: PAINLEVEL_OUTOF10: 5

## 2025-05-29 NOTE — PROGRESS NOTES
The patient is a 54 y.o.Non- / non  female.    Chief Complaint   Patient presents with    Back Pain    Medication Refill      Patient had recent lumbar ablation report at least 80% plus improvement in axial back pain with improved range of motion and activity tolerance  Baseline pain score went from 8-2/10    Pain score Today:  2-3/10  Adverse effects (Constipation / Nausea / Sedation / sexual Dysfunction / others) : no   Mood: fair  Sleep pattern and quality: poor  Activity level: fair    Pill count Today:2  Last dose taken: 5/29/2025 AM   OARRS report reviewed today: yes  ER/Hospitalizations/PCP visit related to pain since last visit: no   Any legal problems e.g. DUI etc.:No  Satisfied with current management: Yes    Opioid Contract:11/21/2024  Last Urine Dug screen dated:12/16/2024    Hemoglobin A1C   Date Value Ref Range Status   03/20/2025 7.2 % Final       Past Medical History, Past Surgical History, Social History, Allergies and Medications reviewed and updated in EPIC as indicated    Family History reviewed and is noncontributory.            Past Medical History:   Diagnosis Date    Allergic rhinitis     Years ago still suuffer    Anxiety     Toyin Matthews CNP therapist video visit 1/10/2023    Asthma     does not follow with Pulmonology    Chronic back pain     20s    Chronic kidney disease     I was told yeahs agobut nothing has been said since    Colon polyp 10/31/2016    sessile serated adenoma x2    Depression     Diabetes mellitus (HCC)     Medication, Trulicity, has been back ordered    Diverticulitis 10/2016    Gastritis     GERD (gastroesophageal reflux disease)     Hemorrhoids     int/ext    Hypertension     Migraines     Obesity     27    Osteoarthritis     Pain management     [facility] Dr. Elmer Charles  last visit 12/2022    PTSD (post-traumatic stress disorder)     states has been VERY anxious lately, many personal stressors, working 2 jobs, etc. has a virtual appt

## 2025-05-29 NOTE — TELEPHONE ENCOUNTER
Patient called regarding the Addipex, the patient said the insurance doesn't cover and wanted to know if there was anything else you can prescribe she can't afford it.

## 2025-05-29 NOTE — TELEPHONE ENCOUNTER
Notify patient insurance will not cover if she has to pay out-of-pocket is about $10-$20 she can use [employer] coupon.

## 2025-06-09 DIAGNOSIS — J45.20 MILD INTERMITTENT ASTHMA WITHOUT COMPLICATION: ICD-10-CM

## 2025-06-09 RX ORDER — CETIRIZINE HYDROCHLORIDE 10 MG/1
10 TABLET ORAL DAILY
Qty: 30 TABLET | Refills: 0 | Status: SHIPPED | OUTPATIENT
Start: 2025-06-09

## 2025-06-09 NOTE — TELEPHONE ENCOUNTER
Please Approve or Refuse.  Send to Pharmacy per Pt's Request:      Next Visit Date:  6/24/2025   Last Visit Date: 5/28/2025    Hemoglobin A1C (%)   Date Value   03/20/2025 7.2   07/11/2024 7.2   03/20/2024 6.1             ( goal A1C is < 7)   BP Readings from Last 3 Encounters:   05/28/25 110/80   05/14/25 113/88   05/10/25 (!) 162/104          (goal 120/80)  BUN   Date Value Ref Range Status   03/10/2025 11 6 - 20 mg/dL Final     Creatinine   Date Value Ref Range Status   03/10/2025 1.0 0.7 - 1.2 mg/dL Final     Potassium   Date Value Ref Range Status   03/10/2025 4.5 3.7 - 5.3 mmol/L Final

## 2025-06-14 DIAGNOSIS — I10 ESSENTIAL HYPERTENSION: ICD-10-CM

## 2025-06-15 RX ORDER — AMLODIPINE BESYLATE 5 MG/1
5 TABLET ORAL DAILY
Qty: 90 TABLET | Refills: 0 | Status: SHIPPED | OUTPATIENT
Start: 2025-06-15

## 2025-06-20 DIAGNOSIS — E11.9 TYPE 2 DIABETES MELLITUS WITHOUT COMPLICATION, WITHOUT LONG-TERM CURRENT USE OF INSULIN (HCC): ICD-10-CM

## 2025-06-20 RX ORDER — DULAGLUTIDE 3 MG/.5ML
INJECTION, SOLUTION SUBCUTANEOUS
Qty: 4 ML | Refills: 0 | Status: SHIPPED | OUTPATIENT
Start: 2025-06-20

## 2025-06-24 ENCOUNTER — HOSPITAL ENCOUNTER (OUTPATIENT)
Dept: PAIN MANAGEMENT | Age: 55
Discharge: HOME | End: 2025-06-24
Payer: COMMERCIAL

## 2025-06-24 ENCOUNTER — OFFICE VISIT (OUTPATIENT)
Dept: FAMILY MEDICINE CLINIC | Age: 55
End: 2025-06-24
Payer: COMMERCIAL

## 2025-06-24 ENCOUNTER — PATIENT MESSAGE (OUTPATIENT)
Dept: FAMILY MEDICINE CLINIC | Age: 55
End: 2025-06-24

## 2025-06-24 VITALS — HEIGHT: 60 IN | WEIGHT: 218 LBS | BODY MASS INDEX: 42.8 KG/M2

## 2025-06-24 VITALS
DIASTOLIC BLOOD PRESSURE: 86 MMHG | WEIGHT: 218 LBS | BODY MASS INDEX: 42.8 KG/M2 | HEART RATE: 91 BPM | OXYGEN SATURATION: 98 % | HEIGHT: 60 IN | SYSTOLIC BLOOD PRESSURE: 120 MMHG

## 2025-06-24 DIAGNOSIS — E66.01 MORBID OBESITY WITH BMI OF 40.0-44.9, ADULT (HCC): ICD-10-CM

## 2025-06-24 DIAGNOSIS — E11.9 TYPE 2 DIABETES MELLITUS WITHOUT COMPLICATION, WITHOUT LONG-TERM CURRENT USE OF INSULIN (HCC): Primary | ICD-10-CM

## 2025-06-24 DIAGNOSIS — G43.719 INTRACTABLE CHRONIC MIGRAINE WITHOUT AURA AND WITHOUT STATUS MIGRAINOSUS: ICD-10-CM

## 2025-06-24 DIAGNOSIS — M54.16 LUMBAR RADICULOPATHY, CHRONIC: Chronic | ICD-10-CM

## 2025-06-24 DIAGNOSIS — Z79.891 CHRONIC USE OF OPIATE FOR THERAPEUTIC PURPOSE: Primary | ICD-10-CM

## 2025-06-24 DIAGNOSIS — I10 ESSENTIAL HYPERTENSION: ICD-10-CM

## 2025-06-24 DIAGNOSIS — M50.30 DEGENERATIVE DISC DISEASE, CERVICAL: ICD-10-CM

## 2025-06-24 DIAGNOSIS — M54.50 CHRONIC BILATERAL LOW BACK PAIN WITHOUT SCIATICA: Chronic | ICD-10-CM

## 2025-06-24 DIAGNOSIS — Z98.1 S/P FUSION OF SACROILIAC JOINT: ICD-10-CM

## 2025-06-24 DIAGNOSIS — E11.9 TYPE 2 DIABETES MELLITUS WITHOUT COMPLICATION, WITHOUT LONG-TERM CURRENT USE OF INSULIN (HCC): ICD-10-CM

## 2025-06-24 DIAGNOSIS — M50.20 CERVICAL DISC HERNIATION: ICD-10-CM

## 2025-06-24 DIAGNOSIS — G89.29 CHRONIC BILATERAL LOW BACK PAIN WITHOUT SCIATICA: Chronic | ICD-10-CM

## 2025-06-24 DIAGNOSIS — F43.23 ADJUSTMENT DISORDER WITH MIXED ANXIETY AND DEPRESSED MOOD: ICD-10-CM

## 2025-06-24 DIAGNOSIS — M51.369 DEGENERATION OF LUMBAR INTERVERTEBRAL DISC: Chronic | ICD-10-CM

## 2025-06-24 LAB — HBA1C MFR BLD: 6.3 %

## 2025-06-24 PROCEDURE — G8417 CALC BMI ABV UP PARAM F/U: HCPCS | Performed by: FAMILY MEDICINE

## 2025-06-24 PROCEDURE — 1036F TOBACCO NON-USER: CPT | Performed by: FAMILY MEDICINE

## 2025-06-24 PROCEDURE — G8427 DOCREV CUR MEDS BY ELIG CLIN: HCPCS | Performed by: FAMILY MEDICINE

## 2025-06-24 PROCEDURE — 99214 OFFICE O/P EST MOD 30 MIN: CPT | Performed by: FAMILY MEDICINE

## 2025-06-24 PROCEDURE — 99214 OFFICE O/P EST MOD 30 MIN: CPT | Performed by: NURSE PRACTITIONER

## 2025-06-24 PROCEDURE — 3044F HG A1C LEVEL LT 7.0%: CPT | Performed by: FAMILY MEDICINE

## 2025-06-24 PROCEDURE — 3079F DIAST BP 80-89 MM HG: CPT | Performed by: FAMILY MEDICINE

## 2025-06-24 PROCEDURE — 83036 HEMOGLOBIN GLYCOSYLATED A1C: CPT | Performed by: FAMILY MEDICINE

## 2025-06-24 PROCEDURE — 3074F SYST BP LT 130 MM HG: CPT | Performed by: FAMILY MEDICINE

## 2025-06-24 PROCEDURE — 3017F COLORECTAL CA SCREEN DOC REV: CPT | Performed by: FAMILY MEDICINE

## 2025-06-24 PROCEDURE — 99213 OFFICE O/P EST LOW 20 MIN: CPT

## 2025-06-24 PROCEDURE — 2022F DILAT RTA XM EVC RTNOPTHY: CPT | Performed by: FAMILY MEDICINE

## 2025-06-24 RX ORDER — TOPIRAMATE 100 MG/1
100 TABLET, FILM COATED ORAL 2 TIMES DAILY
Qty: 60 TABLET | Refills: 0 | Status: SHIPPED | OUTPATIENT
Start: 2025-06-24

## 2025-06-24 RX ORDER — OXYCODONE AND ACETAMINOPHEN 5; 325 MG/1; MG/1
1 TABLET ORAL EVERY 6 HOURS PRN
Qty: 120 TABLET | Refills: 0 | Status: SHIPPED | OUTPATIENT
Start: 2025-06-28 | End: 2025-07-28

## 2025-06-24 RX ORDER — DULAGLUTIDE 3 MG/.5ML
3 INJECTION, SOLUTION SUBCUTANEOUS WEEKLY
Qty: 4 ML | Refills: 0 | Status: SHIPPED | OUTPATIENT
Start: 2025-06-24

## 2025-06-24 RX ORDER — BUPROPION HYDROCHLORIDE 150 MG/1
150 TABLET ORAL EVERY MORNING
Qty: 30 TABLET | Refills: 10 | Status: SHIPPED | OUTPATIENT
Start: 2025-06-24

## 2025-06-24 RX ORDER — PHENTERMINE HYDROCHLORIDE 37.5 MG/1
37.5 TABLET ORAL
Qty: 30 TABLET | Refills: 0 | Status: SHIPPED | OUTPATIENT
Start: 2025-06-24 | End: 2025-07-24

## 2025-06-24 SDOH — ECONOMIC STABILITY: FOOD INSECURITY: WITHIN THE PAST 12 MONTHS, THE FOOD YOU BOUGHT JUST DIDN'T LAST AND YOU DIDN'T HAVE MONEY TO GET MORE.: NEVER TRUE

## 2025-06-24 SDOH — ECONOMIC STABILITY: FOOD INSECURITY: WITHIN THE PAST 12 MONTHS, YOU WORRIED THAT YOUR FOOD WOULD RUN OUT BEFORE YOU GOT MONEY TO BUY MORE.: NEVER TRUE

## 2025-06-24 ASSESSMENT — PATIENT HEALTH QUESTIONNAIRE - PHQ9
9. THOUGHTS THAT YOU WOULD BE BETTER OFF DEAD, OR OF HURTING YOURSELF: NOT AT ALL
2. FEELING DOWN, DEPRESSED OR HOPELESS: NEARLY EVERY DAY
1. LITTLE INTEREST OR PLEASURE IN DOING THINGS: SEVERAL DAYS
5. POOR APPETITE OR OVEREATING: NEARLY EVERY DAY
SUM OF ALL RESPONSES TO PHQ QUESTIONS 1-9: 13
8. MOVING OR SPEAKING SO SLOWLY THAT OTHER PEOPLE COULD HAVE NOTICED. OR THE OPPOSITE, BEING SO FIGETY OR RESTLESS THAT YOU HAVE BEEN MOVING AROUND A LOT MORE THAN USUAL: NOT AT ALL
SUM OF ALL RESPONSES TO PHQ QUESTIONS 1-9: 13
7. TROUBLE CONCENTRATING ON THINGS, SUCH AS READING THE NEWSPAPER OR WATCHING TELEVISION: NEARLY EVERY DAY
3. TROUBLE FALLING OR STAYING ASLEEP: SEVERAL DAYS
4. FEELING TIRED OR HAVING LITTLE ENERGY: SEVERAL DAYS
10. IF YOU CHECKED OFF ANY PROBLEMS, HOW DIFFICULT HAVE THESE PROBLEMS MADE IT FOR YOU TO DO YOUR WORK, TAKE CARE OF THINGS AT HOME, OR GET ALONG WITH OTHER PEOPLE: SOMEWHAT DIFFICULT
6. FEELING BAD ABOUT YOURSELF - OR THAT YOU ARE A FAILURE OR HAVE LET YOURSELF OR YOUR FAMILY DOWN: SEVERAL DAYS
SUM OF ALL RESPONSES TO PHQ QUESTIONS 1-9: 13
SUM OF ALL RESPONSES TO PHQ QUESTIONS 1-9: 13

## 2025-06-24 ASSESSMENT — ENCOUNTER SYMPTOMS
BLOOD IN STOOL: 0
BACK PAIN: 1
CONSTIPATION: 0
ABDOMINAL PAIN: 0
BOWEL INCONTINENCE: 0
BACK PAIN: 1
DIARRHEA: 0
VOMITING: 0
ABDOMINAL DISTENTION: 0
COLOR CHANGE: 0
SINUS PRESSURE: 0
STRIDOR: 0
RECTAL PAIN: 0
ABDOMINAL PAIN: 1
SORE THROAT: 0
WHEEZING: 0
SHORTNESS OF BREATH: 0
CHEST TIGHTNESS: 0
TROUBLE SWALLOWING: 0
NAUSEA: 0
RHINORRHEA: 0
COUGH: 0
EYE REDNESS: 0

## 2025-06-24 ASSESSMENT — PAIN SCALES - GENERAL: PAINLEVEL_OUTOF10: 8

## 2025-06-24 NOTE — PROGRESS NOTES
stress disorder)     states has been VERY anxious lately, many personal stressors, working 2 jobs, etc. has a virtual appt with psychiatrist tomorrow 1/10/23. pt believes she may need meds adjusted.    Rheumatoid arthritis (HCC)     states f/w pain mgmt for this    S/P Cystoscopy w/ Hydrodistension and DMSO 03/04/2021    Sacroiliitis     Shingles 01/22/2016    Snores     Tubular adenoma 10/31/2016    Type 2 diabetes mellitus without complication (HCC)     5 years ago maybe?    Under care of team     PCP, Marty Cramer CNP moved. Pt needs new PCP    Urinary leakage     Wears glasses        Past Surgical History:   Procedure Laterality Date    ABDOMINAL ADHESION SURGERY  07/08/2021    APPENDECTOMY  07/08/2021    CERVICAL DISCECTOMY  12/2013    & fusion     CHOLECYSTECTOMY      COLONOSCOPY  10/31/2016    int/ext hemorrhoids; sessile serated adenomax2; tubular adenoma    COLONOSCOPY N/A 10/22/2019    COLONOSCOPY POLYPECTOMY SNARE/COLD BIOPSY AND RANDOM BIOPSIES performed by Gee Richey MD at [facility] ENDO    COLONOSCOPY N/A 10/29/2024    COLONOSCOPY RANDOM COLON  BIOPSIES AND ASCENDING AND TRANSVERSE COLON POLYPECTOMY performed by Chava Monahan MD at [facility] ENDO    CYSTOSCOPY N/A 03/04/2021    CYSTOSCOPY HYDRODISTENTION WITH DMSO AND UREAPLASMA , MYCOPLASMA CULTURES performed by Fuad Watkins DO at [facility] OR    DILATION AND CURETTAGE OF UTERUS      HAND SURGERY Right     thumb surgery, BONE REMOVED    HYSTERECTOMY (CERVIX STATUS UNKNOWN)      HYSTERECTOMY, TOTAL ABDOMINAL (CERVIX REMOVED)  9/1998    HYSTERECTOMY, VAGINAL      LAPAROSCOPY      LUMBAR FUSION Right 01/23/2023    MIS- MINIMALLY INVASIVE SACROILIITIS FUSION performed by Caty Byrne DO at [facility] OR    OTHER SURGICAL HISTORY N/A 01/23/2023    MINIMALLY INVASIVE SACROILIITIS FUSION (Right)    PAIN MANAGEMENT PROCEDURE      VT DEST,PARAVERTEBRAL,L/S,ADDL LVLS  03/25/2019         TONSILLECTOMY      TUBAL LIGATION      UPPER GASTROINTESTINAL

## 2025-06-24 NOTE — PROGRESS NOTES
Chief Complaint   Patient presents with    Hypertension    Diabetes       The patient (or guardian, if applicable) and other individuals in attendance with the patient were advised that Artificial Intelligence will be utilized during this visit to record, process the conversation to generate a clinical note, and support improvement of the AI technology. The patient (or guardian, if applicable) and other individuals in attendance at the appointment consented to the use of AI, including the recording.                  Hypertension and Diabetes      History of Present Illness  The patient presents for a 3-month follow-up appointment for diabetes management.    She has been unable to monitor her blood glucose levels for the past 4 days due to a malfunctioning Dexcom device, which she had to remove. Her insurance only covers 3 devices per month, and she is currently awaiting a replacement, expected in approximately 5 days. She does not have any means to check her blood sugar levels currently.     She reports persistent thirst, consuming 2 cups of coffee in the morning and water throughout the day. She is currently on Trulicity 3 mg and Adipex, both prescribed by this provider. Her blood glucose levels were well-controlled on Trulicity. She expresses a desire to lose weight but is more concerned about her risk of heart disease due to a strong family history. She has not yet completed the blood work ordered at her last visit.    She has a history of ADHD in childhood, for which she was treated with Ritalin. She reports difficulty focusing and maintaining attention during conversations and is seeking medication to help with her focus. She is currently taking sertraline but has not started Wellbutrin due to a misunderstanding.    She experiences occasional palpitations but does not report any associated tachycardia or chills.    FAMILY HISTORY  The patient has a family history of heart disease.        Results  Labs   - A1c:

## 2025-06-24 NOTE — PROGRESS NOTES
Visit Information    Have you changed or started any medications since your last visit including any over-the-counter medicines, vitamins, or herbal medicines? no   Are you having any side effects from any of your medications? -  no  Have you stopped taking any of your medications? Is so, why? -  no    Have you seen any other physician or provider since your last visit? No  Have you had any other diagnostic tests since your last visit? No  Have you been seen in the emergency room and/or had an admission to a hospital since we last saw you? No  Have you had your routine dental cleaning in the past 6 months? no    Have you activated your Ameibo account? If not, what are your barriers? Yes     Patient Care Team:  Winnie Granger MD as PCP - General (Family Medicine)  Winnie Granger MD as PCP - Empaneled Provider  Freedom Leigh MD as Orthopedic Surgeon (Orthopedic Surgery)  Gee Richey MD as Consulting Physician (Gastroenterology)    Medical History Review  Past Medical, Family, and Social History reviewed and does contribute to the patient presenting condition    Health Maintenance   Topic Date Due    Diabetic retinal exam  04/04/2024    Diabetic foot exam  08/17/2024    COVID-19 Vaccine (5 - 2024-25 season) 09/01/2024    Lipids  04/24/2025    A1C test (Diabetic or Prediabetic)  06/20/2025    Diabetic Alb to Cr ratio (uACR) test  09/23/2025    GFR test (Diabetes, CKD 3-4, OR last GFR 15-59)  03/10/2026    Breast cancer screen  03/19/2026    Depression Screen  05/28/2026    Colorectal Cancer Screen  10/29/2027    DTaP/Tdap/Td vaccine (3 - Td or Tdap) 05/10/2035    Hepatitis B vaccine  Completed    Flu vaccine  Completed    Shingles vaccine  Completed    Pneumococcal 50+ years Vaccine  Completed    Hepatitis C screen  Completed    HIV screen  Completed    Hepatitis A vaccine  Aged Out    Hib vaccine  Aged Out    Polio vaccine  Aged Out    Meningococcal (ACWY) vaccine  Aged Out    Meningococcal B vaccine

## 2025-07-02 DIAGNOSIS — M50.20 CERVICAL DISC HERNIATION: ICD-10-CM

## 2025-07-02 DIAGNOSIS — M51.369 DEGENERATION OF LUMBAR INTERVERTEBRAL DISC: Chronic | ICD-10-CM

## 2025-07-02 DIAGNOSIS — M54.42 CHRONIC LEFT-SIDED LOW BACK PAIN WITH LEFT-SIDED SCIATICA: ICD-10-CM

## 2025-07-02 DIAGNOSIS — M50.30 DEGENERATIVE DISC DISEASE, CERVICAL: ICD-10-CM

## 2025-07-02 DIAGNOSIS — Z79.899 ENCOUNTER FOR MEDICATION MANAGEMENT: ICD-10-CM

## 2025-07-02 DIAGNOSIS — G89.29 CHRONIC LEFT-SIDED LOW BACK PAIN WITH LEFT-SIDED SCIATICA: ICD-10-CM

## 2025-07-03 ENCOUNTER — OFFICE VISIT (OUTPATIENT)
Dept: GASTROENTEROLOGY | Age: 55
End: 2025-07-03

## 2025-07-03 VITALS
HEIGHT: 60 IN | HEART RATE: 89 BPM | DIASTOLIC BLOOD PRESSURE: 85 MMHG | TEMPERATURE: 97.2 F | SYSTOLIC BLOOD PRESSURE: 117 MMHG | BODY MASS INDEX: 42.8 KG/M2 | WEIGHT: 218 LBS | RESPIRATION RATE: 19 BRPM

## 2025-07-03 DIAGNOSIS — R19.4 ALTERED BOWEL HABITS: ICD-10-CM

## 2025-07-03 DIAGNOSIS — M50.20 CERVICAL DISC HERNIATION: ICD-10-CM

## 2025-07-03 DIAGNOSIS — G89.29 CHRONIC LEFT-SIDED LOW BACK PAIN WITH LEFT-SIDED SCIATICA: ICD-10-CM

## 2025-07-03 DIAGNOSIS — M51.369 DEGENERATION OF LUMBAR INTERVERTEBRAL DISC: Chronic | ICD-10-CM

## 2025-07-03 DIAGNOSIS — K31.84 GASTROPARESIS: Primary | ICD-10-CM

## 2025-07-03 DIAGNOSIS — M54.42 CHRONIC LEFT-SIDED LOW BACK PAIN WITH LEFT-SIDED SCIATICA: ICD-10-CM

## 2025-07-03 DIAGNOSIS — M50.30 DEGENERATIVE DISC DISEASE, CERVICAL: ICD-10-CM

## 2025-07-03 DIAGNOSIS — Z79.899 ENCOUNTER FOR MEDICATION MANAGEMENT: ICD-10-CM

## 2025-07-03 DIAGNOSIS — R11.2 NAUSEA AND VOMITING, UNSPECIFIED VOMITING TYPE: ICD-10-CM

## 2025-07-03 RX ORDER — ONDANSETRON 4 MG/1
4 TABLET, ORALLY DISINTEGRATING ORAL 3 TIMES DAILY PRN
Qty: 60 TABLET | Refills: 0 | Status: SHIPPED | OUTPATIENT
Start: 2025-07-03 | End: 2025-08-02

## 2025-07-03 RX ORDER — POLYETHYLENE GLYCOL 3350 17 G/17G
POWDER, FOR SOLUTION ORAL
Qty: 1530 G | Refills: 1 | Status: SHIPPED | OUTPATIENT
Start: 2025-07-03

## 2025-07-03 ASSESSMENT — ENCOUNTER SYMPTOMS
DIARRHEA: 1
RECTAL PAIN: 0
NAUSEA: 1
COLOR CHANGE: 0
ABDOMINAL DISTENTION: 1
BLOOD IN STOOL: 0
ABDOMINAL PAIN: 1
VOICE CHANGE: 1
COUGH: 1
ANAL BLEEDING: 0
SORE THROAT: 1
WHEEZING: 1
CHOKING: 0
VOMITING: 1
CONSTIPATION: 1
TROUBLE SWALLOWING: 1

## 2025-07-03 NOTE — PROGRESS NOTES
Moved in the Last Year: 0     Homeless in the Last Year: No         REVIEW OF SYSTEMS: A 12-point review of systems was obtained and pertinent positives and negatives were listed below.     REVIEW OF SYSTEMS:     Constitutional: No fever, no chills, no lethargy, no weakness.  HEENT:  No headache, otalgia, itchy eyes, nasal discharge or sore throat.  Cardiac:  No chest pain, dyspnea, orthopnea or PND.  Chest:   No cough, phlegm or wheezing.  Abdomen:      Detailed by MA   Neuro:  No focal weakness, abnormal movements or seizure like activity.  Skin:   No rashes, no itching.  :   No hematuria, no pyuria, no dysuria, no flank pain.  Extremities:  No swelling or joint pains.  ROS was otherwise negative    Review of Systems   Constitutional:  Positive for appetite change (Feelings of Hunger with the Epigastric Pain), fatigue and unexpected weight change (gained weight and on trulicity).   HENT:  Positive for sore throat, trouble swallowing (Feels food and medications get stuck, always having to clear throat/Better since Reglan) and voice change.    Respiratory:  Positive for cough and wheezing. Negative for choking.    Cardiovascular:  Negative for chest pain, palpitations and leg swelling.   Gastrointestinal:  Positive for abdominal distention, abdominal pain (Generalized in entire abdomen/cramping type pain/aching pain as well/after eating Epigastric Pain), constipation, diarrhea, nausea and vomiting (Occasionally). Negative for anal bleeding, blood in stool and rectal pain.   Skin:  Negative for color change.   Allergic/Immunologic: Negative for environmental allergies and food allergies.   Neurological:  Positive for headaches. Negative for dizziness and light-headedness.   Hematological:  Bruises/bleeds easily.       PHYSICAL EXAMINATION: Vital signs reviewed per the nursing documentation.     Resp 19   Ht 1.524 m (5')   BMI 42.58 kg/m²   Body mass index is 42.58 kg/m².   Physical Exam    Physical Exam

## 2025-07-03 NOTE — TELEPHONE ENCOUNTER
Please Approve or Refuse.  Send to Pharmacy per Pt's Request:      Next Visit Date:  7/23/2025   Last Visit Date: 6/24/2025    Hemoglobin A1C (%)   Date Value   06/24/2025 6.3   03/20/2025 7.2   07/11/2024 7.2             ( goal A1C is < 7)   BP Readings from Last 3 Encounters:   06/24/25 120/86   05/28/25 110/80   05/14/25 113/88          (goal 120/80)  BUN   Date Value Ref Range Status   03/10/2025 11 6 - 20 mg/dL Final     Creatinine   Date Value Ref Range Status   03/10/2025 1.0 0.7 - 1.2 mg/dL Final     Potassium   Date Value Ref Range Status   03/10/2025 4.5 3.7 - 5.3 mmol/L Final

## 2025-07-06 RX ORDER — PREGABALIN 150 MG/1
150 CAPSULE ORAL 2 TIMES DAILY
Qty: 60 CAPSULE | Refills: 2 | Status: SHIPPED | OUTPATIENT
Start: 2025-07-06 | End: 2025-10-04

## 2025-07-07 RX ORDER — PREGABALIN 150 MG/1
CAPSULE ORAL
Qty: 60 CAPSULE | Refills: 0 | OUTPATIENT
Start: 2025-07-07

## 2025-07-08 DIAGNOSIS — J45.20 MILD INTERMITTENT ASTHMA WITHOUT COMPLICATION: ICD-10-CM

## 2025-07-08 DIAGNOSIS — L30.9 DERMATITIS: ICD-10-CM

## 2025-07-08 RX ORDER — CLOTRIMAZOLE AND BETAMETHASONE DIPROPIONATE 10; .64 MG/G; MG/G
CREAM TOPICAL
Qty: 1 EACH | Refills: 0 | Status: SHIPPED | OUTPATIENT
Start: 2025-07-08

## 2025-07-08 RX ORDER — ALBUTEROL SULFATE 90 UG/1
2 INHALANT RESPIRATORY (INHALATION) EVERY 6 HOURS PRN
Qty: 9 G | Refills: 0 | Status: SHIPPED | OUTPATIENT
Start: 2025-07-08

## 2025-07-08 NOTE — TELEPHONE ENCOUNTER
Please Approve or Refuse.  Send to Pharmacy per Pt's Request: [facility]      Next Visit Date:  7/8/2025   Last Visit Date: 6/24/2025    Hemoglobin A1C (%)   Date Value   06/24/2025 6.3   03/20/2025 7.2   07/11/2024 7.2             ( goal A1C is < 7)   BP Readings from Last 3 Encounters:   07/03/25 117/85   06/24/25 120/86   05/28/25 110/80          (goal 120/80)  BUN   Date Value Ref Range Status   03/10/2025 11 6 - 20 mg/dL Final     Creatinine   Date Value Ref Range Status   03/10/2025 1.0 0.7 - 1.2 mg/dL Final     Potassium   Date Value Ref Range Status   03/10/2025 4.5 3.7 - 5.3 mmol/L Final

## 2025-07-08 NOTE — TELEPHONE ENCOUNTER
Please Approve or Refuse.  Send to Pharmacy per Pt's Request: [email or web address]      Next Visit Date:  7/23/2025   Last Visit Date: 6/24/2025    Hemoglobin A1C (%)   Date Value   06/24/2025 6.3   03/20/2025 7.2   07/11/2024 7.2             ( goal A1C is < 7)   BP Readings from Last 3 Encounters:   07/03/25 117/85   06/24/25 120/86   05/28/25 110/80          (goal 120/80)  BUN   Date Value Ref Range Status   03/10/2025 11 6 - 20 mg/dL Final     Creatinine   Date Value Ref Range Status   03/10/2025 1.0 0.7 - 1.2 mg/dL Final     Potassium   Date Value Ref Range Status   03/10/2025 4.5 3.7 - 5.3 mmol/L Final

## 2025-07-11 DIAGNOSIS — E78.2 MIXED HYPERLIPIDEMIA: ICD-10-CM

## 2025-07-11 RX ORDER — ATORVASTATIN CALCIUM 40 MG/1
40 TABLET, FILM COATED ORAL DAILY
Qty: 90 TABLET | Refills: 0 | Status: SHIPPED | OUTPATIENT
Start: 2025-07-11

## 2025-07-11 NOTE — TELEPHONE ENCOUNTER
Please Approve or Refuse.  Send to Pharmacy per Pt's Request:      Next Visit Date:  7/23/2025   Last Visit Date: 6/24/2025    Hemoglobin A1C (%)   Date Value   06/24/2025 6.3   03/20/2025 7.2   07/11/2024 7.2             ( goal A1C is < 7)   BP Readings from Last 3 Encounters:   07/03/25 117/85   06/24/25 120/86   05/28/25 110/80          (goal 120/80)  BUN   Date Value Ref Range Status   03/10/2025 11 6 - 20 mg/dL Final     Creatinine   Date Value Ref Range Status   03/10/2025 1.0 0.7 - 1.2 mg/dL Final     Potassium   Date Value Ref Range Status   03/10/2025 4.5 3.7 - 5.3 mmol/L Final

## 2025-07-12 DIAGNOSIS — J45.20 MILD INTERMITTENT ASTHMA WITHOUT COMPLICATION: ICD-10-CM

## 2025-07-13 RX ORDER — CETIRIZINE HYDROCHLORIDE 10 MG/1
10 TABLET ORAL DAILY
Qty: 30 TABLET | Refills: 0 | Status: SHIPPED | OUTPATIENT
Start: 2025-07-13

## 2025-07-22 DIAGNOSIS — E66.01 MORBID OBESITY WITH BMI OF 40.0-44.9, ADULT (HCC): ICD-10-CM

## 2025-07-22 DIAGNOSIS — E11.9 TYPE 2 DIABETES MELLITUS WITHOUT COMPLICATION, WITHOUT LONG-TERM CURRENT USE OF INSULIN (HCC): ICD-10-CM

## 2025-07-22 RX ORDER — PHENTERMINE HYDROCHLORIDE 37.5 MG/1
37.5 TABLET ORAL
Qty: 30 TABLET | Refills: 0 | OUTPATIENT
Start: 2025-07-22 | End: 2025-08-21

## 2025-07-22 RX ORDER — BLOOD SUGAR DIAGNOSTIC
1 STRIP MISCELLANEOUS 2 TIMES DAILY
Qty: 200 STRIP | Refills: 3 | Status: SHIPPED | OUTPATIENT
Start: 2025-07-22

## 2025-07-22 RX ORDER — LANCETS 33 GAUGE
EACH MISCELLANEOUS
Qty: 200 EACH | Refills: 3 | Status: SHIPPED | OUTPATIENT
Start: 2025-07-22

## 2025-07-23 ENCOUNTER — HOSPITAL ENCOUNTER (OUTPATIENT)
Dept: PAIN MANAGEMENT | Age: 55
Discharge: HOME | End: 2025-07-23
Payer: COMMERCIAL

## 2025-07-23 ENCOUNTER — OFFICE VISIT (OUTPATIENT)
Dept: FAMILY MEDICINE CLINIC | Age: 55
End: 2025-07-23
Payer: COMMERCIAL

## 2025-07-23 ENCOUNTER — HOSPITAL ENCOUNTER (OUTPATIENT)
Age: 55
Discharge: HOME | End: 2025-07-23
Payer: COMMERCIAL

## 2025-07-23 VITALS
WEIGHT: 213 LBS | OXYGEN SATURATION: 98 % | SYSTOLIC BLOOD PRESSURE: 118 MMHG | HEIGHT: 60 IN | DIASTOLIC BLOOD PRESSURE: 80 MMHG | HEART RATE: 74 BPM | BODY MASS INDEX: 41.82 KG/M2

## 2025-07-23 VITALS — WEIGHT: 213 LBS | HEIGHT: 60 IN | BODY MASS INDEX: 41.82 KG/M2

## 2025-07-23 DIAGNOSIS — Z79.891 CHRONIC USE OF OPIATE FOR THERAPEUTIC PURPOSE: ICD-10-CM

## 2025-07-23 DIAGNOSIS — E66.01 MORBID OBESITY WITH BMI OF 40.0-44.9, ADULT (HCC): ICD-10-CM

## 2025-07-23 DIAGNOSIS — Z76.89 ENCOUNTER FOR WEIGHT MANAGEMENT: ICD-10-CM

## 2025-07-23 DIAGNOSIS — I10 ESSENTIAL HYPERTENSION: ICD-10-CM

## 2025-07-23 DIAGNOSIS — M54.50 CHRONIC BILATERAL LOW BACK PAIN WITHOUT SCIATICA: Primary | Chronic | ICD-10-CM

## 2025-07-23 DIAGNOSIS — M54.16 LUMBAR RADICULOPATHY, CHRONIC: Chronic | ICD-10-CM

## 2025-07-23 DIAGNOSIS — E11.9 TYPE 2 DIABETES MELLITUS WITHOUT COMPLICATION, WITHOUT LONG-TERM CURRENT USE OF INSULIN (HCC): Primary | ICD-10-CM

## 2025-07-23 DIAGNOSIS — M25.511 CHRONIC RIGHT SHOULDER PAIN: ICD-10-CM

## 2025-07-23 DIAGNOSIS — E11.9 TYPE 2 DIABETES MELLITUS WITHOUT COMPLICATION, WITHOUT LONG-TERM CURRENT USE OF INSULIN (HCC): ICD-10-CM

## 2025-07-23 DIAGNOSIS — M47.817 LUMBOSACRAL SPONDYLOSIS WITHOUT MYELOPATHY: Chronic | ICD-10-CM

## 2025-07-23 DIAGNOSIS — M51.369 DEGENERATION OF LUMBAR INTERVERTEBRAL DISC: Chronic | ICD-10-CM

## 2025-07-23 DIAGNOSIS — K31.84 GASTROPARESIS: ICD-10-CM

## 2025-07-23 DIAGNOSIS — G89.29 CHRONIC BILATERAL LOW BACK PAIN WITHOUT SCIATICA: Primary | Chronic | ICD-10-CM

## 2025-07-23 DIAGNOSIS — M50.30 DEGENERATIVE DISC DISEASE, CERVICAL: ICD-10-CM

## 2025-07-23 DIAGNOSIS — M50.20 CERVICAL DISC HERNIATION: ICD-10-CM

## 2025-07-23 DIAGNOSIS — E78.2 MIXED HYPERLIPIDEMIA: ICD-10-CM

## 2025-07-23 DIAGNOSIS — G89.29 CHRONIC RIGHT SHOULDER PAIN: ICD-10-CM

## 2025-07-23 LAB
CHOLEST SERPL-MCNC: 121 MG/DL (ref 0–199)
CHOLESTEROL/HDL RATIO: 3.2
ERYTHROCYTE [DISTWIDTH] IN BLOOD BY AUTOMATED COUNT: 12.6 % (ref 11.5–14.9)
HCT VFR BLD AUTO: 42.5 % (ref 36–46)
HDLC SERPL-MCNC: 38 MG/DL
HGB BLD-MCNC: 14.2 G/DL (ref 12–16)
LDLC SERPL CALC-MCNC: 41 MG/DL (ref 0–100)
MAGNESIUM SERPL-MCNC: 2 MG/DL (ref 1.6–2.6)
MCH RBC QN AUTO: 29.2 PG (ref 26–34)
MCHC RBC AUTO-ENTMCNC: 33.4 G/DL (ref 31–37)
MCV RBC AUTO: 87.4 FL (ref 80–100)
NRBC BLD-RTO: 0 PER 100 WBC
PLATELET # BLD AUTO: 315 K/UL (ref 150–450)
PMV BLD AUTO: 9.3 FL (ref 8–13.5)
RBC # BLD AUTO: 4.86 M/UL (ref 3.95–5.11)
TRIGL SERPL-MCNC: 210 MG/DL (ref 0–149)
TSH SERPL DL<=0.05 MIU/L-ACNC: 1.07 UIU/ML (ref 0.27–4.2)
URATE SERPL-MCNC: 5.5 MG/DL (ref 2.4–5.7)
WBC OTHER # BLD: 9.4 K/UL (ref 3.5–11)

## 2025-07-23 PROCEDURE — 3017F COLORECTAL CA SCREEN DOC REV: CPT | Performed by: FAMILY MEDICINE

## 2025-07-23 PROCEDURE — 3079F DIAST BP 80-89 MM HG: CPT | Performed by: FAMILY MEDICINE

## 2025-07-23 PROCEDURE — 99214 OFFICE O/P EST MOD 30 MIN: CPT | Performed by: FAMILY MEDICINE

## 2025-07-23 PROCEDURE — 1036F TOBACCO NON-USER: CPT | Performed by: FAMILY MEDICINE

## 2025-07-23 PROCEDURE — 99214 OFFICE O/P EST MOD 30 MIN: CPT | Performed by: NURSE PRACTITIONER

## 2025-07-23 PROCEDURE — 80061 LIPID PANEL: CPT

## 2025-07-23 PROCEDURE — 2022F DILAT RTA XM EVC RTNOPTHY: CPT | Performed by: FAMILY MEDICINE

## 2025-07-23 PROCEDURE — G0480 DRUG TEST DEF 1-7 CLASSES: HCPCS

## 2025-07-23 PROCEDURE — 85027 COMPLETE CBC AUTOMATED: CPT

## 2025-07-23 PROCEDURE — 3044F HG A1C LEVEL LT 7.0%: CPT | Performed by: FAMILY MEDICINE

## 2025-07-23 PROCEDURE — G8427 DOCREV CUR MEDS BY ELIG CLIN: HCPCS | Performed by: FAMILY MEDICINE

## 2025-07-23 PROCEDURE — 84550 ASSAY OF BLOOD/URIC ACID: CPT

## 2025-07-23 PROCEDURE — 83735 ASSAY OF MAGNESIUM: CPT

## 2025-07-23 PROCEDURE — 80307 DRUG TEST PRSMV CHEM ANLYZR: CPT

## 2025-07-23 PROCEDURE — 99213 OFFICE O/P EST LOW 20 MIN: CPT

## 2025-07-23 PROCEDURE — 36415 COLL VENOUS BLD VENIPUNCTURE: CPT

## 2025-07-23 PROCEDURE — 3074F SYST BP LT 130 MM HG: CPT | Performed by: FAMILY MEDICINE

## 2025-07-23 PROCEDURE — 84443 ASSAY THYROID STIM HORMONE: CPT

## 2025-07-23 PROCEDURE — G8417 CALC BMI ABV UP PARAM F/U: HCPCS | Performed by: FAMILY MEDICINE

## 2025-07-23 RX ORDER — PHENTERMINE HYDROCHLORIDE 37.5 MG/1
37.5 TABLET ORAL
Qty: 30 TABLET | Refills: 0 | Status: SHIPPED | OUTPATIENT
Start: 2025-07-23 | End: 2025-08-22

## 2025-07-23 RX ORDER — OXYCODONE AND ACETAMINOPHEN 5; 325 MG/1; MG/1
1 TABLET ORAL EVERY 6 HOURS PRN
Qty: 120 TABLET | Refills: 0 | Status: SHIPPED | OUTPATIENT
Start: 2025-08-01 | End: 2025-08-31

## 2025-07-23 SDOH — ECONOMIC STABILITY: FOOD INSECURITY: WITHIN THE PAST 12 MONTHS, YOU WORRIED THAT YOUR FOOD WOULD RUN OUT BEFORE YOU GOT MONEY TO BUY MORE.: NEVER TRUE

## 2025-07-23 SDOH — ECONOMIC STABILITY: FOOD INSECURITY: WITHIN THE PAST 12 MONTHS, THE FOOD YOU BOUGHT JUST DIDN'T LAST AND YOU DIDN'T HAVE MONEY TO GET MORE.: NEVER TRUE

## 2025-07-23 ASSESSMENT — PAIN SCALES - GENERAL: PAINLEVEL_OUTOF10: 5

## 2025-07-23 ASSESSMENT — ENCOUNTER SYMPTOMS
ABDOMINAL PAIN: 0
COLOR CHANGE: 0
SINUS PRESSURE: 0
BLOOD IN STOOL: 0
EYE REDNESS: 0
SHORTNESS OF BREATH: 0
VOMITING: 0
COUGH: 0
DIARRHEA: 0
SORE THROAT: 0
SHORTNESS OF BREATH: 0
WHEEZING: 0
ABDOMINAL DISTENTION: 1
RECTAL PAIN: 0
CONSTIPATION: 0
TROUBLE SWALLOWING: 0
NAUSEA: 1
RHINORRHEA: 0
BACK PAIN: 1
BACK PAIN: 1
CONSTIPATION: 0
COUGH: 0
STRIDOR: 0
CHEST TIGHTNESS: 0

## 2025-07-23 ASSESSMENT — PATIENT HEALTH QUESTIONNAIRE - PHQ9
SUM OF ALL RESPONSES TO PHQ QUESTIONS 1-9: 0
1. LITTLE INTEREST OR PLEASURE IN DOING THINGS: NOT AT ALL
SUM OF ALL RESPONSES TO PHQ QUESTIONS 1-9: 0
SUM OF ALL RESPONSES TO PHQ QUESTIONS 1-9: 0
2. FEELING DOWN, DEPRESSED OR HOPELESS: NOT AT ALL
SUM OF ALL RESPONSES TO PHQ QUESTIONS 1-9: 0

## 2025-07-23 NOTE — PROGRESS NOTES
Chief Complaint   Patient presents with    Weight Management     4 week follow up      The patient (or guardian, if applicable) and other individuals in attendance with the patient were advised that Artificial Intelligence will be utilized during this visit to record, process the conversation to generate a clinical note, and support improvement of the AI technology. The patient (or guardian, if applicable) and other individuals in attendance at the appointment consented to the use of AI, including the recording.                    Weight Management (4 week follow up )      History of Present Illness  The patient presents for evaluation of gastroparesis, diabetes mellitus, and weight management.    She reports an increase in vomiting episodes over the past few months, which she attributes to her gastroparesis. The severity of her stomach pain occasionally renders her immobile. She recalls a particularly difficult period of 10 consecutive days when she experienced cold sweats and severe stomach discomfort, leading to loss of appetite and immediate regurgitation of any food consumed. Despite these symptoms, she has not observed any weight loss. She has been prescribed Zofran and continues to take Reglan.    Her Trulicity dosage was reduced from 4.5 mg to 3 mg during her last visit. She has been using Adipex for the past 2 months, which she believes has helped control her diet and lower her blood sugar levels. However, she has not noticed any significant changes in her weight during the second month of Adipex use. She maintains a high fluid intake, primarily water, and consumes coffee in the morning. She experiences constant thirst and rapid fatigue when outdoors, which she suspects may be due to heat or sun exposure. She monitors her weight at home every morning after urination, recording a weight of 213 pounds without clothes or shoes. She notes that her weight fluctuates daily, but she is unsure if she is losing 
Visit Information    Have you changed or started any medications since your last visit including any over-the-counter medicines, vitamins, or herbal medicines? no   Are you having any side effects from any of your medications? -  no  Have you stopped taking any of your medications? Is so, why? -  no    Have you seen any other physician or provider since your last visit? No  Have you had any other diagnostic tests since your last visit? No  Have you been seen in the emergency room and/or had an admission to a hospital since we last saw you? No  Have you had your routine dental cleaning in the past 6 months? no    Have you activated your [employer] account? If not, what are your barriers? Yes     Patient Care Team:  Winnie Granger MD as PCP - General (Family Medicine)  Winnie Granger MD as PCP - Empaneled Provider  Feredom Leigh MD as Orthopedic Surgeon (Orthopedic Surgery)  Gee Richey MD as Consulting Physician (Gastroenterology)    Medical History Review  Past Medical, Family, and Social History reviewed and does contribute to the patient presenting condition    Health Maintenance   Topic Date Due    Diabetic retinal exam  04/04/2024    COVID-19 Vaccine (5 - 2024-25 season) 09/01/2024    Lipids  04/24/2025    Flu vaccine (1) 08/01/2025    Diabetic Alb to Cr ratio (uACR) test  09/23/2025    A1C test (Diabetic or Prediabetic)  09/24/2025    GFR test (Diabetes, CKD 3-4, OR last GFR 15-59)  03/10/2026    Breast cancer screen  03/19/2026    Diabetic foot exam  06/24/2026    Depression Monitoring  06/24/2026    Colorectal Cancer Screen  10/29/2027    DTaP/Tdap/Td vaccine (3 - Td or Tdap) 05/10/2035    Hepatitis B vaccine  Completed    Shingles vaccine  Completed    Pneumococcal 50+ years Vaccine  Completed    Hepatitis C screen  Completed    HIV screen  Completed    Hepatitis A vaccine  Aged Out    Hib vaccine  Aged Out    Polio vaccine  Aged Out    Meningococcal (ACWY) vaccine  Aged Out    Meningococcal B 
supervision
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
## 2025-07-27 LAB
6-ACETYLMORPHINE, UR: NOT DETECTED
7-AMINOCLONAZEPAM, URINE: NOT DETECTED
ALPHA-OH-ALPRAZ, URINE: NOT DETECTED
ALPHA-OH-MIDAZOLAM, URINE: NOT DETECTED
ALPRAZOLAM, URINE: NOT DETECTED
AMPHETAMINE, URINE: NOT DETECTED
BARBITURATES, URINE: NEGATIVE
BENZOYLECGONINE, UR: NEGATIVE
BUPRENORPHINE URINE: NOT DETECTED
CARISOPRODOL, URINE: NEGATIVE
CLONAZEPAM, URINE: NOT DETECTED
CODEINE, URINE: NOT DETECTED
CREAT UR-MCNC: 278.3 MG/DL (ref 20–400)
DIAZEPAM, URINE: NOT DETECTED
DRUGS EXPECTED, UR: ABNORMAL
EER HI RES INTERP UR: ABNORMAL
ETHYL GLUCURONIDE UR: NEGATIVE
FENTANYL URINE: NOT DETECTED
GABAPENTIN: NOT DETECTED
HYDROCODONE, URINE: NOT DETECTED
HYDROMORPHONE, URINE: NOT DETECTED
LORAZEPAM, URINE: NOT DETECTED
MARIJUANA METAB, UR: NEGATIVE
MDA, URINE: NOT DETECTED
MDEA, EVE, UR: NOT DETECTED
MDMA, URINE: NOT DETECTED
MEPERIDINE METAB, UR: NOT DETECTED
METHADONE, URINE: NEGATIVE
METHAMPHETAMINE, URINE: NOT DETECTED
METHYLPHENIDATE: NOT DETECTED
MIDAZOLAM, URINE: NOT DETECTED
MORPHINE, OPI1M: NOT DETECTED
NALOXONE URINE: NOT DETECTED
NORBUPRENORPHINE, URINE: NOT DETECTED
NORDIAZEPAM, URINE: NOT DETECTED
NORFENTANYL, URINE: NOT DETECTED
NORHYDROCODONE, URINE: NOT DETECTED
NOROXYCODONE, URINE: PRESENT
NOROXYMORPHONE, URINE: NOT DETECTED
OXAZEPAM, URINE: NOT DETECTED
OXYCODONE URINE: PRESENT
OXYMORPHONE, URINE: PRESENT
PAIN MANAGEMENT DRUG PANEL INTERP, URINE: ABNORMAL
PAIN MGT DRUG PANEL, HI RES, UR: ABNORMAL
PCP,URINE: NEGATIVE
PHENTERMINE, UR: PRESENT
PREGABALIN: PRESENT
TAPENTADOL, URINE: NOT DETECTED
TAPENTADOL-O-SULFATE, URINE: NOT DETECTED
TEMAZEPAM, URINE: NOT DETECTED
TRAMADOL, URINE: NEGATIVE
ZOLPIDEM METABOLITE (ZCA), URINE: NOT DETECTED
ZOLPIDEM, URINE: NOT DETECTED

## 2025-07-30 DIAGNOSIS — J45.20 MILD INTERMITTENT ASTHMA WITHOUT COMPLICATION: ICD-10-CM

## 2025-07-30 RX ORDER — ALBUTEROL SULFATE 90 UG/1
INHALANT RESPIRATORY (INHALATION)
Qty: 9 G | Refills: 0 | Status: SHIPPED | OUTPATIENT
Start: 2025-07-30

## 2025-07-30 NOTE — TELEPHONE ENCOUNTER
Please Approve or Refuse.  Send to Pharmacy per Pt's Request:      Next Visit Date:  8/26/2025   Last Visit Date: 7/23/2025    Hemoglobin A1C (%)   Date Value   06/24/2025 6.3   03/20/2025 7.2   07/11/2024 7.2             ( goal A1C is < 7)   BP Readings from Last 3 Encounters:   07/23/25 118/80   07/03/25 117/85   06/24/25 120/86          (goal 120/80)  BUN   Date Value Ref Range Status   03/10/2025 11 6 - 20 mg/dL Final     Creatinine   Date Value Ref Range Status   03/10/2025 1.0 0.7 - 1.2 mg/dL Final     Potassium   Date Value Ref Range Status   03/10/2025 4.5 3.7 - 5.3 mmol/L Final

## 2025-08-14 DIAGNOSIS — J45.20 MILD INTERMITTENT ASTHMA WITHOUT COMPLICATION: ICD-10-CM

## 2025-08-14 RX ORDER — CETIRIZINE HYDROCHLORIDE 10 MG/1
10 TABLET ORAL DAILY
Qty: 30 TABLET | Refills: 0 | Status: SHIPPED | OUTPATIENT
Start: 2025-08-14

## 2025-08-19 DIAGNOSIS — K21.9 CHRONIC GERD: ICD-10-CM

## 2025-08-19 RX ORDER — OMEPRAZOLE 40 MG/1
40 CAPSULE, DELAYED RELEASE ORAL 2 TIMES DAILY
Qty: 60 CAPSULE | Refills: 0 | Status: SHIPPED | OUTPATIENT
Start: 2025-08-19

## 2025-08-20 DIAGNOSIS — L30.9 DERMATITIS: ICD-10-CM

## 2025-08-20 DIAGNOSIS — Z76.89 ENCOUNTER FOR WEIGHT MANAGEMENT: ICD-10-CM

## 2025-08-20 DIAGNOSIS — E66.01 MORBID OBESITY WITH BMI OF 40.0-44.9, ADULT (HCC): ICD-10-CM

## 2025-08-20 RX ORDER — POLYETHYLENE GLYCOL 3350 17 G/17G
POWDER, FOR SOLUTION ORAL
Qty: 1530 G | Refills: 1 | OUTPATIENT
Start: 2025-08-20

## 2025-08-21 DIAGNOSIS — F43.23 ADJUSTMENT DISORDER WITH MIXED ANXIETY AND DEPRESSED MOOD: ICD-10-CM

## 2025-08-21 RX ORDER — BUSPIRONE HYDROCHLORIDE 30 MG/1
TABLET ORAL
Qty: 90 TABLET | Refills: 10 | Status: SHIPPED | OUTPATIENT
Start: 2025-08-21 | End: 2025-08-22 | Stop reason: SDUPTHER

## 2025-08-22 DIAGNOSIS — F43.23 ADJUSTMENT DISORDER WITH MIXED ANXIETY AND DEPRESSED MOOD: ICD-10-CM

## 2025-08-22 RX ORDER — CLOTRIMAZOLE AND BETAMETHASONE DIPROPIONATE 10; .64 MG/G; MG/G
CREAM TOPICAL
Qty: 1 EACH | Refills: 0 | Status: SHIPPED | OUTPATIENT
Start: 2025-08-22

## 2025-08-22 RX ORDER — PHENTERMINE HYDROCHLORIDE 37.5 MG/1
37.5 TABLET ORAL
Qty: 30 TABLET | Refills: 0 | OUTPATIENT
Start: 2025-08-22 | End: 2025-09-21

## 2025-08-24 RX ORDER — BUSPIRONE HYDROCHLORIDE 30 MG/1
TABLET ORAL
Qty: 90 TABLET | Refills: 10 | Status: SHIPPED | OUTPATIENT
Start: 2025-08-24

## 2025-08-27 ENCOUNTER — HOSPITAL ENCOUNTER (OUTPATIENT)
Dept: PAIN MANAGEMENT | Age: 55
Discharge: HOME | End: 2025-08-27
Payer: COMMERCIAL

## 2025-08-27 ENCOUNTER — OFFICE VISIT (OUTPATIENT)
Dept: FAMILY MEDICINE CLINIC | Age: 55
End: 2025-08-27
Payer: COMMERCIAL

## 2025-08-27 VITALS — HEIGHT: 60 IN | WEIGHT: 213 LBS | BODY MASS INDEX: 41.82 KG/M2

## 2025-08-27 VITALS
SYSTOLIC BLOOD PRESSURE: 126 MMHG | BODY MASS INDEX: 41.82 KG/M2 | WEIGHT: 213 LBS | OXYGEN SATURATION: 97 % | HEART RATE: 109 BPM | HEIGHT: 60 IN | DIASTOLIC BLOOD PRESSURE: 84 MMHG

## 2025-08-27 DIAGNOSIS — G89.29 CHRONIC BILATERAL LOW BACK PAIN WITHOUT SCIATICA: Primary | Chronic | ICD-10-CM

## 2025-08-27 DIAGNOSIS — E66.813 CLASS 3 SEVERE OBESITY WITH SERIOUS COMORBIDITY AND BODY MASS INDEX (BMI) OF 40.0 TO 44.9 IN ADULT, UNSPECIFIED OBESITY TYPE (HCC): ICD-10-CM

## 2025-08-27 DIAGNOSIS — M53.3 CHRONIC SI JOINT PAIN: Chronic | ICD-10-CM

## 2025-08-27 DIAGNOSIS — Z98.1 S/P FUSION OF SACROILIAC JOINT: ICD-10-CM

## 2025-08-27 DIAGNOSIS — K31.84 GASTROPARESIS: ICD-10-CM

## 2025-08-27 DIAGNOSIS — Z79.891 CHRONIC USE OF OPIATE FOR THERAPEUTIC PURPOSE: ICD-10-CM

## 2025-08-27 DIAGNOSIS — G89.29 CHRONIC SI JOINT PAIN: Chronic | ICD-10-CM

## 2025-08-27 DIAGNOSIS — M51.369 DEGENERATION OF LUMBAR INTERVERTEBRAL DISC: Chronic | ICD-10-CM

## 2025-08-27 DIAGNOSIS — E11.9 TYPE 2 DIABETES MELLITUS WITHOUT COMPLICATION, WITHOUT LONG-TERM CURRENT USE OF INSULIN (HCC): Primary | ICD-10-CM

## 2025-08-27 DIAGNOSIS — M50.30 DEGENERATIVE DISC DISEASE, CERVICAL: ICD-10-CM

## 2025-08-27 DIAGNOSIS — M50.20 CERVICAL DISC HERNIATION: ICD-10-CM

## 2025-08-27 DIAGNOSIS — M54.16 LUMBAR RADICULOPATHY, CHRONIC: Chronic | ICD-10-CM

## 2025-08-27 DIAGNOSIS — M54.50 CHRONIC BILATERAL LOW BACK PAIN WITHOUT SCIATICA: Primary | Chronic | ICD-10-CM

## 2025-08-27 DIAGNOSIS — R41.3 MEMORY PROBLEM: ICD-10-CM

## 2025-08-27 DIAGNOSIS — E66.01 MORBID OBESITY WITH BMI OF 40.0-44.9, ADULT (HCC): ICD-10-CM

## 2025-08-27 DIAGNOSIS — F43.23 ADJUSTMENT DISORDER WITH MIXED ANXIETY AND DEPRESSED MOOD: ICD-10-CM

## 2025-08-27 DIAGNOSIS — M47.817 LUMBOSACRAL SPONDYLOSIS WITHOUT MYELOPATHY: Chronic | ICD-10-CM

## 2025-08-27 PROBLEM — E66.9 OBESITY, UNSPECIFIED: Status: ACTIVE | Noted: 2025-08-27

## 2025-08-27 PROCEDURE — 3017F COLORECTAL CA SCREEN DOC REV: CPT | Performed by: FAMILY MEDICINE

## 2025-08-27 PROCEDURE — 3074F SYST BP LT 130 MM HG: CPT | Performed by: FAMILY MEDICINE

## 2025-08-27 PROCEDURE — 3079F DIAST BP 80-89 MM HG: CPT | Performed by: FAMILY MEDICINE

## 2025-08-27 PROCEDURE — G8417 CALC BMI ABV UP PARAM F/U: HCPCS | Performed by: FAMILY MEDICINE

## 2025-08-27 PROCEDURE — 3044F HG A1C LEVEL LT 7.0%: CPT | Performed by: FAMILY MEDICINE

## 2025-08-27 PROCEDURE — 99214 OFFICE O/P EST MOD 30 MIN: CPT | Performed by: FAMILY MEDICINE

## 2025-08-27 PROCEDURE — 2022F DILAT RTA XM EVC RTNOPTHY: CPT | Performed by: FAMILY MEDICINE

## 2025-08-27 PROCEDURE — 1036F TOBACCO NON-USER: CPT | Performed by: FAMILY MEDICINE

## 2025-08-27 PROCEDURE — 99213 OFFICE O/P EST LOW 20 MIN: CPT | Performed by: NURSE PRACTITIONER

## 2025-08-27 PROCEDURE — G8427 DOCREV CUR MEDS BY ELIG CLIN: HCPCS | Performed by: FAMILY MEDICINE

## 2025-08-27 PROCEDURE — 99213 OFFICE O/P EST LOW 20 MIN: CPT

## 2025-08-27 RX ORDER — PHENTERMINE HYDROCHLORIDE 37.5 MG/1
37.5 TABLET ORAL
Qty: 30 TABLET | Refills: 0 | Status: SHIPPED | OUTPATIENT
Start: 2025-08-27 | End: 2025-09-26

## 2025-08-27 RX ORDER — OXYCODONE AND ACETAMINOPHEN 5; 325 MG/1; MG/1
1 TABLET ORAL EVERY 6 HOURS PRN
Qty: 120 TABLET | Refills: 0 | Status: SHIPPED | OUTPATIENT
Start: 2025-09-04 | End: 2025-10-04

## 2025-08-27 SDOH — ECONOMIC STABILITY: FOOD INSECURITY: WITHIN THE PAST 12 MONTHS, THE FOOD YOU BOUGHT JUST DIDN'T LAST AND YOU DIDN'T HAVE MONEY TO GET MORE.: NEVER TRUE

## 2025-08-27 SDOH — ECONOMIC STABILITY: FOOD INSECURITY: WITHIN THE PAST 12 MONTHS, YOU WORRIED THAT YOUR FOOD WOULD RUN OUT BEFORE YOU GOT MONEY TO BUY MORE.: NEVER TRUE

## 2025-08-27 ASSESSMENT — ENCOUNTER SYMPTOMS
VOMITING: 0
TROUBLE SWALLOWING: 0
BLOOD IN STOOL: 0
ABDOMINAL DISTENTION: 0
COLOR CHANGE: 0
SINUS PRESSURE: 0
STRIDOR: 0
RECTAL PAIN: 0
SORE THROAT: 0
ABDOMINAL PAIN: 0
BACK PAIN: 1
CONSTIPATION: 0
EYE REDNESS: 0
COUGH: 0
SHORTNESS OF BREATH: 0
CONSTIPATION: 0
SHORTNESS OF BREATH: 0
DIARRHEA: 0
COUGH: 0
RHINORRHEA: 0
WHEEZING: 0
BACK PAIN: 1
CHEST TIGHTNESS: 0
NAUSEA: 0

## 2025-08-27 ASSESSMENT — PATIENT HEALTH QUESTIONNAIRE - PHQ9
4. FEELING TIRED OR HAVING LITTLE ENERGY: NEARLY EVERY DAY
2. FEELING DOWN, DEPRESSED OR HOPELESS: NEARLY EVERY DAY
SUM OF ALL RESPONSES TO PHQ QUESTIONS 1-9: 17
SUM OF ALL RESPONSES TO PHQ QUESTIONS 1-9: 17
1. LITTLE INTEREST OR PLEASURE IN DOING THINGS: SEVERAL DAYS
3. TROUBLE FALLING OR STAYING ASLEEP: NEARLY EVERY DAY
10. IF YOU CHECKED OFF ANY PROBLEMS, HOW DIFFICULT HAVE THESE PROBLEMS MADE IT FOR YOU TO DO YOUR WORK, TAKE CARE OF THINGS AT HOME, OR GET ALONG WITH OTHER PEOPLE: EXTREMELY DIFFICULT
8. MOVING OR SPEAKING SO SLOWLY THAT OTHER PEOPLE COULD HAVE NOTICED. OR THE OPPOSITE, BEING SO FIGETY OR RESTLESS THAT YOU HAVE BEEN MOVING AROUND A LOT MORE THAN USUAL: NOT AT ALL
7. TROUBLE CONCENTRATING ON THINGS, SUCH AS READING THE NEWSPAPER OR WATCHING TELEVISION: NEARLY EVERY DAY
5. POOR APPETITE OR OVEREATING: NEARLY EVERY DAY
SUM OF ALL RESPONSES TO PHQ QUESTIONS 1-9: 17
9. THOUGHTS THAT YOU WOULD BE BETTER OFF DEAD, OR OF HURTING YOURSELF: NOT AT ALL
6. FEELING BAD ABOUT YOURSELF - OR THAT YOU ARE A FAILURE OR HAVE LET YOURSELF OR YOUR FAMILY DOWN: SEVERAL DAYS
SUM OF ALL RESPONSES TO PHQ QUESTIONS 1-9: 17

## 2025-09-04 ENCOUNTER — OFFICE VISIT (OUTPATIENT)
Dept: GASTROENTEROLOGY | Age: 55
End: 2025-09-04

## 2025-09-04 ENCOUNTER — E-VISIT (OUTPATIENT)
Dept: FAMILY MEDICINE CLINIC | Age: 55
End: 2025-09-04
Payer: COMMERCIAL

## 2025-09-04 VITALS
TEMPERATURE: 98.2 F | WEIGHT: 212 LBS | DIASTOLIC BLOOD PRESSURE: 83 MMHG | SYSTOLIC BLOOD PRESSURE: 147 MMHG | HEIGHT: 60 IN | HEART RATE: 93 BPM | BODY MASS INDEX: 41.62 KG/M2 | RESPIRATION RATE: 18 BRPM

## 2025-09-04 DIAGNOSIS — J01.90 ACUTE BACTERIAL SINUSITIS: Primary | ICD-10-CM

## 2025-09-04 DIAGNOSIS — B96.89 ACUTE BACTERIAL SINUSITIS: Primary | ICD-10-CM

## 2025-09-04 DIAGNOSIS — K31.84 GASTROPARESIS: Primary | ICD-10-CM

## 2025-09-04 DIAGNOSIS — K59.00 CONSTIPATION, UNSPECIFIED CONSTIPATION TYPE: ICD-10-CM

## 2025-09-04 PROCEDURE — 99423 OL DIG E/M SVC 21+ MIN: CPT | Performed by: FAMILY MEDICINE

## 2025-09-04 RX ORDER — FLUTICASONE PROPIONATE 50 MCG
1 SPRAY, SUSPENSION (ML) NASAL DAILY
Qty: 32 G | Refills: 1 | Status: SHIPPED | OUTPATIENT
Start: 2025-09-04

## 2025-09-04 RX ORDER — SENNOSIDES 8.6 MG/1
1 TABLET ORAL 2 TIMES DAILY
Qty: 60 TABLET | Refills: 11 | Status: SHIPPED | OUTPATIENT
Start: 2025-09-04 | End: 2026-09-04

## 2025-09-04 RX ORDER — PRUCALOPRIDE 2 MG/1
2 TABLET, FILM COATED ORAL DAILY
Qty: 90 TABLET | Refills: 0 | Status: SHIPPED | OUTPATIENT
Start: 2025-09-04 | End: 2025-12-03

## 2025-09-04 RX ORDER — GUAIFENESIN 600 MG/1
600 TABLET, EXTENDED RELEASE ORAL 2 TIMES DAILY
Qty: 30 TABLET | Refills: 0 | Status: SHIPPED | OUTPATIENT
Start: 2025-09-04

## 2025-09-04 RX ORDER — ONDANSETRON 4 MG/1
4 TABLET, ORALLY DISINTEGRATING ORAL 3 TIMES DAILY PRN
Qty: 60 TABLET | Refills: 1 | Status: SHIPPED | OUTPATIENT
Start: 2025-09-04 | End: 2025-10-04

## 2025-09-04 RX ORDER — ONDANSETRON 4 MG/1
4 TABLET, ORALLY DISINTEGRATING ORAL 3 TIMES DAILY PRN
Qty: 30 TABLET | Refills: 1 | Status: SHIPPED | OUTPATIENT
Start: 2025-09-04 | End: 2025-09-04

## 2025-09-04 ASSESSMENT — ENCOUNTER SYMPTOMS
VOMITING: 1
DIARRHEA: 1
ABDOMINAL DISTENTION: 1
WHEEZING: 1
BLOOD IN STOOL: 0
CONSTIPATION: 1
TROUBLE SWALLOWING: 1
COUGH: 1
VOICE CHANGE: 1
RECTAL PAIN: 0
COLOR CHANGE: 0
ABDOMINAL PAIN: 1
CHOKING: 0
ANAL BLEEDING: 0
NAUSEA: 1
SORE THROAT: 1

## 2025-09-04 ASSESSMENT — LIFESTYLE VARIABLES: SMOKING_STATUS: NO, I'VE NEVER SMOKED

## (undated) DEVICE — GARMENT,MEDLINE,DVT,INT,CALF,MED, GEN2: Brand: MEDLINE

## (undated) DEVICE — PACK PROCEDURE SURG LUMBAR SPINE SVMMC

## (undated) DEVICE — POLYP TRAP: Brand: TRAPEASE®

## (undated) DEVICE — BLADE ES L4IN INSUL EDGE

## (undated) DEVICE — GOWN,AURORA,NONREINFORCED,LARGE: Brand: MEDLINE

## (undated) DEVICE — SNARE ENDOSCP L240CM OD24MM LOOP W15MM RND INSUL STIFF BRAID

## (undated) DEVICE — SINGLE PORT MANIFOLD: Brand: NEPTUNE 2

## (undated) DEVICE — FORCEPS BX L240CM WRK CHN 2.8MM STD CAP W/ NDL MIC MESH

## (undated) DEVICE — SUTURE VCRL SZ 2-0 L18IN ABSRB UD CT-1 L36MM 1/2 CIR J839D

## (undated) DEVICE — DEFENDO AIR WATER SUCTION AND BIOPSY VALVE KIT FOR  OLYMPUS: Brand: DEFENDO AIR/WATER/SUCTION AND BIOPSY VALVE

## (undated) DEVICE — GOWN,POLY REINFORCED,LG: Brand: MEDLINE

## (undated) DEVICE — SOLUTION SURG PREP ANTIMICROBIAL 4 OZ SKIN WND EXIDINE

## (undated) DEVICE — ZIMMER® STERILE DISPOSABLE TOURNIQUET CUFF WITH PLC, DUAL PORT, SINGLE BLADDER, 30 IN. (76 CM)

## (undated) DEVICE — TOWEL,OR,DSP,ST,NATURAL,DLX,4/PK,20PK/CS: Brand: MEDLINE

## (undated) DEVICE — 3.0MM PRECISION NEURO (MATCH HEAD)

## (undated) DEVICE — APPLICATOR MEDICATED 26 CC SOLUTION HI LT ORNG CHLORAPREP

## (undated) DEVICE — SOLUTION SCRB 4OZ 4% CHG H2O AIDED FOR PREOPERATIVE SKIN

## (undated) DEVICE — STANDARD HYPODERMIC NEEDLE,POLYPROPYLENE HUB: Brand: MONOJECT

## (undated) DEVICE — PIN EXCHANGE 3.2MM

## (undated) DEVICE — BITEBLOCK 54FR W/ DENT RIM BLOX

## (undated) DEVICE — TUBING, SUCTION, 3/16" X 10', STRAIGHT: Brand: MEDLINE

## (undated) DEVICE — GLOVE ORANGE PI 7   MSG9070

## (undated) DEVICE — C-ARM: Brand: UNBRANDED

## (undated) DEVICE — CONTAINER,SPECIMEN,4OZ,OR STRL: Brand: MEDLINE

## (undated) DEVICE — ELECTRODE PT RET AD L9FT HI MOIST COND ADH HYDRGEL CORDED

## (undated) DEVICE — TOTAL TRAY, DB, 100% SILI FOLEY, 16FR 10: Brand: MEDLINE

## (undated) DEVICE — TOTAL TRAY, 16FR 10ML SIL FOLEY, URN: Brand: MEDLINE

## (undated) DEVICE — PIN GUIDE 3.2MM

## (undated) DEVICE — DRESSING PETRO W3XL3IN OIL EMUL N ADH GZ KNIT IMPREG CELOS

## (undated) DEVICE — INTENDED FOR TISSUE SEPARATION, AND OTHER PROCEDURES THAT REQUIRE A SHARP SURGICAL BLADE TO PUNCTURE OR CUT.: Brand: BARD-PARKER ® CARBON RIB-BACK BLADES

## (undated) DEVICE — SPONGE LAP W18XL18IN WHT COT 4 PLY FLD STRUNG RADPQ DISP ST

## (undated) DEVICE — SYRINGE MED 10ML TRNSLUC BRL PLUNG BLK MRK POLYPR CTRL

## (undated) DEVICE — SUTURE VCRL SZ 0 L18IN ABSRB UD L36MM CT-1 1/2 CIR J840D

## (undated) DEVICE — SPONGE,NEURO,1"X1",XR,STRL,LF,10/PK: Brand: MEDLINE

## (undated) DEVICE — DRAPE,UNDRBUT,WHT GRAD PCH,CAPPORT,20/CS: Brand: MEDLINE

## (undated) DEVICE — 3M™ IOBAN™ 2 ANTIMICROBIAL INCISE DRAPE 6650EZ: Brand: IOBAN™ 2

## (undated) DEVICE — ERBE NESSY® OMEGA PLATE USA (85+23)CM² , WITH CABLE 3 M: Brand: ERBE

## (undated) DEVICE — JELLY,LUBE,STERILE,FLIP TOP,TUBE,2-OZ: Brand: MEDLINE

## (undated) DEVICE — CONNECTOR TBNG WHT PLAS SUCT STR 5IN1 LTWT W/ M CONN

## (undated) DEVICE — GOWN,SIRUS,NONRNF,SETINSLV,XL,20/CS: Brand: MEDLINE

## (undated) DEVICE — GLOVE ORANGE PI 7 1/2   MSG9075

## (undated) DEVICE — GOWN,SURGICAL,AURORA,SLEEVE: Brand: MEDLINE

## (undated) DEVICE — BLADE ES ELASTOMERIC COAT INSUL DURABLE BEND UPTO 90DEG

## (undated) DEVICE — Z DISCONTINUED BY MEDLINE USE 2711682 TRAY SKIN PREP DRY W/ PREM GLV

## (undated) DEVICE — SUTURE VCRL + SZ 2-0 L27IN ABSRB UD CT-2 L26MM 1/2 CIR TAPR VCP269H

## (undated) DEVICE — Device

## (undated) DEVICE — SNARE ENDOSCP L240CM LOOP W13MM DIA2.4MM SHT THROW SM OVL

## (undated) DEVICE — ADHESIVE SKIN CLOSURE TOP 36 CC HI VISC DERMBND MINI

## (undated) DEVICE — GLOVE SURG SZ 65 THK91MIL LTX FREE SYN POLYISOPRENE

## (undated) DEVICE — ENDO KIT W/SYRINGE: Brand: MEDLINE INDUSTRIES, INC.

## (undated) DEVICE — AGENT HEMOSTATIC SURGIFLOW MATRIX KIT W/THROMBIN

## (undated) DEVICE — SYRINGE,CONTROL,LL,FINGER,GRIP: Brand: MEDLINE INDUSTRIES, INC.

## (undated) DEVICE — GLOVE SURG SZ 85 STD WHT LTX SYN POLYMER BEAD REINF ANTI RL

## (undated) DEVICE — DRAPE,REIN 53X77,STERILE: Brand: MEDLINE

## (undated) DEVICE — MARKER,SKIN,WI/RULER AND LABELS: Brand: MEDLINE

## (undated) DEVICE — GOWN,AURORA,NONRNF,XL,30/CS: Brand: MEDLINE

## (undated) DEVICE — PIN BLUNT 3.2MM

## (undated) DEVICE — C-ARMOR C-ARM EQUIPMENT COVERS CLEAR STERILE UNIVERSAL FIT 12 PER CASE: Brand: C-ARMOR

## (undated) DEVICE — NEEDLE SPINAL 22GA L3.5IN SPINOCAN

## (undated) DEVICE — SHEET, T, LAPAROTOMY, STERILE: Brand: MEDLINE

## (undated) DEVICE — MHPB CYSTO PACK-LF: Brand: MEDLINE INDUSTRIES, INC.

## (undated) DEVICE — SYRINGE MED 50ML LUERSLIP TIP

## (undated) DEVICE — 3M™ WARMING BLANKET, UPPER BODY, 10 PER CASE, 42268: Brand: BAIR HUGGER™

## (undated) DEVICE — CODMAN® SURGICAL PATTIES 1/2" X 3" (1.27CM X 7.62CM): Brand: CODMAN®

## (undated) DEVICE — CONNECTOR,TUBING,5-IN-1,NON-STERILE: Brand: MEDLINE INDUSTRIES, INC.

## (undated) DEVICE — NEEDLE HYPO 25GA L1.5IN BLU POLYPR HUB S STL REG BVL STR

## (undated) DEVICE — SPONGE,NEURO,0.5"X0.5",XR,STRL,10/PK: Brand: MEDLINE

## (undated) DEVICE — PROTECTOR ULN NRV PUR FOAM HK LOOP STRP ANATOMICALLY

## (undated) DEVICE — Z INACTIVE PER BARD SYRINGE IRRIG 70ML PLASTICTOOMEY DISPOSABLE

## (undated) DEVICE — SYRINGE, LUER LOCK, 10ML: Brand: MEDLINE

## (undated) DEVICE — SYRINGE IRRIG 60ML SFT PLIABLE BLB EZ TO GRP 1 HND USE W/

## (undated) DEVICE — GAUZE,SPONGE,FLUFF,6"X6.75",STRL,5/TRAY: Brand: MEDLINE